# Patient Record
Sex: MALE | Race: OTHER | URBAN - METROPOLITAN AREA
[De-identification: names, ages, dates, MRNs, and addresses within clinical notes are randomized per-mention and may not be internally consistent; named-entity substitution may affect disease eponyms.]

---

## 2023-01-11 ENCOUNTER — OUTPATIENT (OUTPATIENT)
Dept: OUTPATIENT SERVICES | Facility: HOSPITAL | Age: 68
LOS: 1 days | Discharge: HOME | End: 2023-01-11
Payer: MEDICAID

## 2023-01-11 DIAGNOSIS — C61 MALIGNANT NEOPLASM OF PROSTATE: ICD-10-CM

## 2023-01-11 PROCEDURE — 78815 PET IMAGE W/CT SKULL-THIGH: CPT | Mod: 26,PI

## 2023-08-29 PROBLEM — Z00.00 ENCOUNTER FOR PREVENTIVE HEALTH EXAMINATION: Status: ACTIVE | Noted: 2023-08-29

## 2023-08-30 ENCOUNTER — APPOINTMENT (OUTPATIENT)
Dept: PAIN MANAGEMENT | Facility: CLINIC | Age: 68
End: 2023-08-30
Payer: MEDICARE

## 2023-08-30 VITALS
HEIGHT: 73 IN | SYSTOLIC BLOOD PRESSURE: 152 MMHG | DIASTOLIC BLOOD PRESSURE: 97 MMHG | WEIGHT: 170 LBS | BODY MASS INDEX: 22.53 KG/M2 | HEART RATE: 94 BPM

## 2023-08-30 DIAGNOSIS — Z85.038 PERSONAL HISTORY OF OTHER MALIGNANT NEOPLASM OF LARGE INTESTINE: ICD-10-CM

## 2023-08-30 PROCEDURE — 99204 OFFICE O/P NEW MOD 45 MIN: CPT

## 2023-08-30 RX ORDER — LOPERAMIDE HYDROCHLORIDE 2 MG/1
2 CAPSULE ORAL
Refills: 0 | Status: ACTIVE | COMMUNITY

## 2023-08-30 RX ORDER — CHLORHEXIDINE GLUCONATE 4 %
325 (65 FE) LIQUID (ML) TOPICAL
Refills: 0 | Status: ACTIVE | COMMUNITY

## 2023-08-30 RX ORDER — AMLODIPINE BESYLATE 10 MG/1
10 TABLET ORAL
Refills: 0 | Status: ACTIVE | COMMUNITY

## 2023-08-30 RX ORDER — OXYCODONE HYDROCHLORIDE AND ACETAMINOPHEN 10; 325 MG/1; MG/1
10-325 TABLET ORAL
Refills: 0 | Status: ACTIVE | COMMUNITY

## 2023-08-30 NOTE — ASSESSMENT
[FreeTextEntry1] : This is 67-year-old female with complaints of lower back pain with radicular features into the lower extremities. Pain is not associated with numbness/tingling. He is not experiencing any pain today. Since the pain is intermittent, we will hold off on obtaining any imaging at this time.  In the interim we will provide him with a prescription for physical therapy which she is advised to begin as soon as possible. He will follow up in 6 weeks for reassessment. quest  Physical therapy of the lumbar spine 2-3 times a week for 4-8 weeks stressing a home exercise program of walking, shoulder girdle strengthening, swimming, elliptical , recumbent bike, Buddy chi and Yoga. Use things that heat like hot shower or icy heat before rehab, exercising and at the beginning of the day, and ice (ice in a bag never directly on the skin) after activity and at the end of the day.  I, Oliva Granado, attest that this documentation has been prepared under the direction and in the presence of Provider Clarisa Manzo MD.   Thank you for allowing me to assist in the management of this patient.    Best Regards,    Clarisa Manzo M.D., FAAPMR   Diplomate, American Board of Physical Medicine and Rehabilitation Diplomate, American Board of Pain Medicine  Diplomate, American Board of Pain Management

## 2023-08-30 NOTE — DATA REVIEWED
[FreeTextEntry1] : SOAPP: low risk 8/30/23 Low risk: Patient has combination of a low risk SOAP and no risk factors. UDS would be repeated randomly every quarter.  UDS: No data obtained today.   [place] REGISTRY: Checked.

## 2023-08-30 NOTE — HISTORY OF PRESENT ILLNESS
[FreeTextEntry1] : This is a 67-year-old male here to establish care for back pain that radiates down into the bilateral lower extremities. Pain is not associated with numbness/tingling. Patient has not previously trialed a physical therapy program. There is no imaging for review today. The pain is intermittent, and he is not experiencing any pain today.   The patient has had this pain for 1 year. The pain started after an injury at work. Patient describes pain as severe and constant. During the last month the pain has been worse during the morning. Pain described as cramping. Pain is associated with numbness and pins and needles into the lower extremities. Patient has weakness in the upper extremities. Pain is increased with lying down, standing, sitting, walking, exercise, relaxation, coughing/wheezing, bowel movements. Bowel or bladder habits have not changed.  ACTIVITIES: Patient could walk less than 1 block before the pain starts. Patient can sit 1 hour before pain starts. Patient can stand 30 minutes before pain starts. The patient sometimes lays down because of pain. Patient uses walker at this time. Patient has difficulty going to work, performing household chores, doing yardwork shopping, participating regulation activities, exercising at this time.  PRIOR PAIN TREATMENTS: No relief with surgery.  PRIOR PAIN MEDICATIONS: Tylenol, aspirin.

## 2023-08-30 NOTE — PHYSICAL EXAM
[Diminished] : patella reflex diminished [Normal Coordination] : normal coordination [Normal DTR UE/LE] : normal DTR UE/LE  [Normal Sensation] : normal sensation [Normal Mood and Affect] : normal mood and affect [Orientated] : orientated [Able to Communicate] : able to communicate [Well Developed] : well developed [Well Nourished] : well nourished [] : patient ambulates with assistive device

## 2023-09-15 ENCOUNTER — EMERGENCY (EMERGENCY)
Facility: HOSPITAL | Age: 68
LOS: 0 days | Discharge: HOME | End: 2023-09-15
Attending: EMERGENCY MEDICINE
Payer: MEDICAID

## 2023-09-15 VITALS
OXYGEN SATURATION: 100 % | HEART RATE: 80 BPM | DIASTOLIC BLOOD PRESSURE: 95 MMHG | RESPIRATION RATE: 16 BRPM | SYSTOLIC BLOOD PRESSURE: 154 MMHG | HEIGHT: 73 IN | WEIGHT: 173.06 LBS | TEMPERATURE: 98 F

## 2023-09-15 VITALS
TEMPERATURE: 99 F | DIASTOLIC BLOOD PRESSURE: 96 MMHG | SYSTOLIC BLOOD PRESSURE: 150 MMHG | OXYGEN SATURATION: 100 % | RESPIRATION RATE: 18 BRPM | HEART RATE: 68 BPM

## 2023-09-15 DIAGNOSIS — M54.50 LOW BACK PAIN, UNSPECIFIED: ICD-10-CM

## 2023-09-15 DIAGNOSIS — I10 ESSENTIAL (PRIMARY) HYPERTENSION: ICD-10-CM

## 2023-09-15 DIAGNOSIS — G89.29 OTHER CHRONIC PAIN: ICD-10-CM

## 2023-09-15 PROCEDURE — 99283 EMERGENCY DEPT VISIT LOW MDM: CPT

## 2023-09-15 PROCEDURE — 99284 EMERGENCY DEPT VISIT MOD MDM: CPT

## 2023-09-15 RX ORDER — LIDOCAINE 4 G/100G
1 CREAM TOPICAL ONCE
Refills: 0 | Status: COMPLETED | OUTPATIENT
Start: 2023-09-15 | End: 2023-09-15

## 2023-09-15 RX ORDER — LIDOCAINE 4 G/100G
1 CREAM TOPICAL
Qty: 2 | Refills: 0
Start: 2023-09-15 | End: 2023-09-24

## 2023-09-15 RX ORDER — IBUPROFEN 200 MG
800 TABLET ORAL ONCE
Refills: 0 | Status: COMPLETED | OUTPATIENT
Start: 2023-09-15 | End: 2023-09-15

## 2023-09-15 RX ORDER — IBUPROFEN 200 MG
1 TABLET ORAL
Qty: 28 | Refills: 0
Start: 2023-09-15 | End: 2023-09-21

## 2023-09-15 RX ADMIN — Medication 800 MILLIGRAM(S): at 12:19

## 2023-09-15 RX ADMIN — LIDOCAINE 1 PATCH: 4 CREAM TOPICAL at 12:19

## 2023-09-15 NOTE — ED ADULT TRIAGE NOTE - CHIEF COMPLAINT QUOTE
"Since yesterday I've been having back pain." Pt denies injury/ fall/ trauma. Pt states he is unable to ambulate.

## 2023-09-15 NOTE — ED PROVIDER NOTE - ATTENDING CONTRIBUTION TO CARE
67-year-old male with past medical history of hypertension, sent from [facility] for back pain for 2 days.  Patient has been having chronic back pain and had recently seen a pain management doctor.  Patient was advised to have physical therapy, patient says that he is about to start it but not yet.  Patient was assessed and his symptoms were consistent with radiculopathy as per the pain management doctor.  Patient admits his back pain is similar to his previous back pain, no recent trauma.  No midline back pain, no back pain red flags.  Patient says pain is worse with movement, but able to ambulate.  Patient denies any other symptoms.  Exam: Tender to palpation bilateral paravertebral lumbar muscles, no midline T or L-spine tenderness, no step-off, negative bilateral straight leg raise, patient is ambulatory in ED impression: Symptomatic treatment for patient's MSK back pain, patient advised to follow through with physical therapy that was arranged for him by his pain management doctor.  Patient was also advised to follow-up with his pain management physician.

## 2023-09-15 NOTE — ED ADULT NURSE NOTE - CAS TRG GEN SKIN COLOR
58 year old white female known to us from a prior screening colonoscopy done 10 years ago which was normal.  She has had no major interval illness.  There is no family history of colon cancer or polyps.
Normal for race

## 2023-09-15 NOTE — ED PROVIDER NOTE - NSFOLLOWUPCLINICS_GEN_ALL_ED_FT
[facility]  Physical Therapy  Multiple Location  [place]   Phone: [phone]  Fax:   Follow Up Time: 1-3 Days

## 2023-09-15 NOTE — ED ADULT NURSE NOTE - CAS ELECT INFOMATION PROVIDED
no lesions,  no deformities,  no traumatic injuries,  no significant scars are present,  chest wall non-tender,  no masses present, breathing is unlabored without accessory muscle use,normal breath sounds
DC instructions

## 2023-09-15 NOTE — ED PROVIDER NOTE - OBJECTIVE STATEMENT
67 year-old male past medical history-year-old male with a history of hypertension coming from [facility] with back pain x2 days.  Admits to chronic back pain for greater than a year.  Saw pain management 2 weeks ago and was advised to follow-up with physical therapy.  Concern for radiculopathy and also referred to a neurosurgeon.  Patient admits to bilateral lower back pain for the last 2 days that has been worse with movement.  Still able to ambulate at baseline with a walker.  Denies urinary or fecal incontinence, fever, chills, numbness tingling, trauma.  Taking Tylenol with minimal relief

## 2023-09-15 NOTE — ED PROVIDER NOTE - PHYSICAL EXAMINATION
CONSTITUTIONAL: NAD  SKIN: Warm dry  HEAD: NCAT  EYES: NL inspection  ENT: MMM  NECK: Supple; non tender.  CARD: RRR  RESP: CTAB  ABD: S/NT no R/G  EXT: no pedal edema  NEURO: Grossly unremarkable, Gait normal with walker  MSK: No midline c/t/l ttp, bl paraspinal lumbar ttp  PSYCH: Cooperative, appropriate.

## 2023-09-15 NOTE — ED PROVIDER NOTE - NSFOLLOWUPINSTRUCTIONS_ED_ALL_ED_FT
Back Pain    Back pain is very common in adults. The cause of back pain is rarely dangerous and the pain often gets better over time. The cause of your back pain may not be known and may include strain of muscles or ligaments, degeneration of the spinal disks, or arthritis. Occasionally the pain may radiate down your leg(s). Over-the-counter medicines to reduce pain and inflammation are often the most helpful. Stretching and remaining active frequently helps the healing process.     SEEK IMMEDIATE MEDICAL CARE IF YOU HAVE THE FOLLOWING SYMPTOMS: bowel or bladder control problems, unusual weakness or numbness in your arms or legs, nausea or vomiting, abdominal pain, fever, dizziness/lightheadedness.  ~~  Our Emergency Department Referral Coordinators will be reaching out to you in the next 24-48 hours from 9:00am to 5:00pm (Monday - Friday) with a follow up appointment. Please expect a phone call from the hospital in that time frame. If you do not receive a call or if you have any questions or concerns, you can reach them at [phone] or [phone].

## 2023-09-15 NOTE — ED PROVIDER NOTE - PATIENT PORTAL LINK FT
You can access the FollowMyHealth Patient Portal offered by [facility] by registering at the following website: [email or web address]. By joining [email or web address]’s FollowMyHealth portal, you will also be able to view your health information using other applications (apps) compatible with our system.

## 2023-09-15 NOTE — ED PROVIDER NOTE - CLINICAL SUMMARY MEDICAL DECISION MAKING FREE TEXT BOX
Symptomatic treatment for patient's MSK back pain, patient advised to follow through with physical therapy that was arranged for him by his pain management doctor.  Patient was also advised to follow-up with his pain management physician.

## 2023-10-18 ENCOUNTER — APPOINTMENT (OUTPATIENT)
Dept: PAIN MANAGEMENT | Facility: CLINIC | Age: 68
End: 2023-10-18
Payer: MEDICARE

## 2023-10-18 VITALS
HEIGHT: 73 IN | HEART RATE: 82 BPM | WEIGHT: 170 LBS | SYSTOLIC BLOOD PRESSURE: 158 MMHG | DIASTOLIC BLOOD PRESSURE: 106 MMHG | BODY MASS INDEX: 22.53 KG/M2

## 2023-10-18 PROCEDURE — 99213 OFFICE O/P EST LOW 20 MIN: CPT

## 2023-11-22 ENCOUNTER — EMERGENCY (EMERGENCY)
Facility: HOSPITAL | Age: 68
LOS: 0 days | Discharge: HOME | End: 2023-11-22
Attending: EMERGENCY MEDICINE
Payer: MEDICARE

## 2023-11-22 VITALS
OXYGEN SATURATION: 98 % | HEART RATE: 96 BPM | WEIGHT: 169.98 LBS | DIASTOLIC BLOOD PRESSURE: 102 MMHG | RESPIRATION RATE: 18 BRPM | TEMPERATURE: 99 F | SYSTOLIC BLOOD PRESSURE: 177 MMHG

## 2023-11-22 DIAGNOSIS — M54.9 DORSALGIA, UNSPECIFIED: ICD-10-CM

## 2023-11-22 DIAGNOSIS — I10 ESSENTIAL (PRIMARY) HYPERTENSION: ICD-10-CM

## 2023-11-22 DIAGNOSIS — N32.89 OTHER SPECIFIED DISORDERS OF BLADDER: ICD-10-CM

## 2023-11-22 DIAGNOSIS — K40.90 UNILATERAL INGUINAL HERNIA, WITHOUT OBSTRUCTION OR GANGRENE, NOT SPECIFIED AS RECURRENT: ICD-10-CM

## 2023-11-22 DIAGNOSIS — Z85.46 PERSONAL HISTORY OF MALIGNANT NEOPLASM OF PROSTATE: ICD-10-CM

## 2023-11-22 LAB
ALBUMIN SERPL ELPH-MCNC: 4.3 G/DL — SIGNIFICANT CHANGE UP (ref 3.5–5.2)
ALBUMIN SERPL ELPH-MCNC: 4.3 G/DL — SIGNIFICANT CHANGE UP (ref 3.5–5.2)
ALP SERPL-CCNC: 57 U/L — SIGNIFICANT CHANGE UP (ref 30–115)
ALP SERPL-CCNC: 57 U/L — SIGNIFICANT CHANGE UP (ref 30–115)
ALT FLD-CCNC: 14 U/L — SIGNIFICANT CHANGE UP (ref 0–41)
ALT FLD-CCNC: 14 U/L — SIGNIFICANT CHANGE UP (ref 0–41)
ANION GAP SERPL CALC-SCNC: 12 MMOL/L — SIGNIFICANT CHANGE UP (ref 7–14)
ANION GAP SERPL CALC-SCNC: 12 MMOL/L — SIGNIFICANT CHANGE UP (ref 7–14)
APPEARANCE UR: CLEAR — SIGNIFICANT CHANGE UP
APPEARANCE UR: CLEAR — SIGNIFICANT CHANGE UP
AST SERPL-CCNC: 17 U/L — SIGNIFICANT CHANGE UP (ref 0–41)
AST SERPL-CCNC: 17 U/L — SIGNIFICANT CHANGE UP (ref 0–41)
BACTERIA # UR AUTO: NEGATIVE /HPF — SIGNIFICANT CHANGE UP
BACTERIA # UR AUTO: NEGATIVE /HPF — SIGNIFICANT CHANGE UP
BASOPHILS # BLD AUTO: 0.01 K/UL — SIGNIFICANT CHANGE UP (ref 0–0.2)
BASOPHILS # BLD AUTO: 0.01 K/UL — SIGNIFICANT CHANGE UP (ref 0–0.2)
BASOPHILS NFR BLD AUTO: 0.2 % — SIGNIFICANT CHANGE UP (ref 0–1)
BASOPHILS NFR BLD AUTO: 0.2 % — SIGNIFICANT CHANGE UP (ref 0–1)
BILIRUB SERPL-MCNC: 0.5 MG/DL — SIGNIFICANT CHANGE UP (ref 0.2–1.2)
BILIRUB SERPL-MCNC: 0.5 MG/DL — SIGNIFICANT CHANGE UP (ref 0.2–1.2)
BILIRUB UR-MCNC: NEGATIVE — SIGNIFICANT CHANGE UP
BILIRUB UR-MCNC: NEGATIVE — SIGNIFICANT CHANGE UP
BUN SERPL-MCNC: 15 MG/DL — SIGNIFICANT CHANGE UP (ref 10–20)
BUN SERPL-MCNC: 15 MG/DL — SIGNIFICANT CHANGE UP (ref 10–20)
CALCIUM SERPL-MCNC: 9.3 MG/DL — SIGNIFICANT CHANGE UP (ref 8.4–10.5)
CALCIUM SERPL-MCNC: 9.3 MG/DL — SIGNIFICANT CHANGE UP (ref 8.4–10.5)
CAST: 1 /LPF — SIGNIFICANT CHANGE UP (ref 0–4)
CAST: 1 /LPF — SIGNIFICANT CHANGE UP (ref 0–4)
CHLORIDE SERPL-SCNC: 109 MMOL/L — SIGNIFICANT CHANGE UP (ref 98–110)
CHLORIDE SERPL-SCNC: 109 MMOL/L — SIGNIFICANT CHANGE UP (ref 98–110)
CO2 SERPL-SCNC: 24 MMOL/L — SIGNIFICANT CHANGE UP (ref 17–32)
CO2 SERPL-SCNC: 24 MMOL/L — SIGNIFICANT CHANGE UP (ref 17–32)
COLOR SPEC: YELLOW — SIGNIFICANT CHANGE UP
COLOR SPEC: YELLOW — SIGNIFICANT CHANGE UP
CREAT SERPL-MCNC: 1.4 MG/DL — SIGNIFICANT CHANGE UP (ref 0.7–1.5)
CREAT SERPL-MCNC: 1.4 MG/DL — SIGNIFICANT CHANGE UP (ref 0.7–1.5)
DIFF PNL FLD: ABNORMAL
DIFF PNL FLD: ABNORMAL
EGFR: 54 ML/MIN/1.73M2 — LOW
EGFR: 54 ML/MIN/1.73M2 — LOW
EOSINOPHIL # BLD AUTO: 0.12 K/UL — SIGNIFICANT CHANGE UP (ref 0–0.7)
EOSINOPHIL # BLD AUTO: 0.12 K/UL — SIGNIFICANT CHANGE UP (ref 0–0.7)
EOSINOPHIL NFR BLD AUTO: 2.8 % — SIGNIFICANT CHANGE UP (ref 0–8)
EOSINOPHIL NFR BLD AUTO: 2.8 % — SIGNIFICANT CHANGE UP (ref 0–8)
GLUCOSE SERPL-MCNC: 110 MG/DL — HIGH (ref 70–99)
GLUCOSE SERPL-MCNC: 110 MG/DL — HIGH (ref 70–99)
GLUCOSE UR QL: NEGATIVE MG/DL — SIGNIFICANT CHANGE UP
GLUCOSE UR QL: NEGATIVE MG/DL — SIGNIFICANT CHANGE UP
HCT VFR BLD CALC: 31.7 % — LOW (ref 42–52)
HCT VFR BLD CALC: 31.7 % — LOW (ref 42–52)
HGB BLD-MCNC: 9.7 G/DL — LOW (ref 14–18)
HGB BLD-MCNC: 9.7 G/DL — LOW (ref 14–18)
IMM GRANULOCYTES NFR BLD AUTO: 0.5 % — HIGH (ref 0.1–0.3)
IMM GRANULOCYTES NFR BLD AUTO: 0.5 % — HIGH (ref 0.1–0.3)
KETONES UR-MCNC: NEGATIVE MG/DL — SIGNIFICANT CHANGE UP
KETONES UR-MCNC: NEGATIVE MG/DL — SIGNIFICANT CHANGE UP
LACTATE SERPL-SCNC: 0.9 MMOL/L — SIGNIFICANT CHANGE UP (ref 0.7–2)
LACTATE SERPL-SCNC: 0.9 MMOL/L — SIGNIFICANT CHANGE UP (ref 0.7–2)
LEUKOCYTE ESTERASE UR-ACNC: NEGATIVE — SIGNIFICANT CHANGE UP
LEUKOCYTE ESTERASE UR-ACNC: NEGATIVE — SIGNIFICANT CHANGE UP
LYMPHOCYTES # BLD AUTO: 0.6 K/UL — LOW (ref 1.2–3.4)
LYMPHOCYTES # BLD AUTO: 0.6 K/UL — LOW (ref 1.2–3.4)
LYMPHOCYTES # BLD AUTO: 13.8 % — LOW (ref 20.5–51.1)
LYMPHOCYTES # BLD AUTO: 13.8 % — LOW (ref 20.5–51.1)
MCHC RBC-ENTMCNC: 28.9 PG — SIGNIFICANT CHANGE UP (ref 27–31)
MCHC RBC-ENTMCNC: 28.9 PG — SIGNIFICANT CHANGE UP (ref 27–31)
MCHC RBC-ENTMCNC: 30.6 G/DL — LOW (ref 32–37)
MCHC RBC-ENTMCNC: 30.6 G/DL — LOW (ref 32–37)
MCV RBC AUTO: 94.3 FL — HIGH (ref 80–94)
MCV RBC AUTO: 94.3 FL — HIGH (ref 80–94)
MONOCYTES # BLD AUTO: 0.51 K/UL — SIGNIFICANT CHANGE UP (ref 0.1–0.6)
MONOCYTES # BLD AUTO: 0.51 K/UL — SIGNIFICANT CHANGE UP (ref 0.1–0.6)
MONOCYTES NFR BLD AUTO: 11.7 % — HIGH (ref 1.7–9.3)
MONOCYTES NFR BLD AUTO: 11.7 % — HIGH (ref 1.7–9.3)
NEUTROPHILS # BLD AUTO: 3.1 K/UL — SIGNIFICANT CHANGE UP (ref 1.4–6.5)
NEUTROPHILS # BLD AUTO: 3.1 K/UL — SIGNIFICANT CHANGE UP (ref 1.4–6.5)
NEUTROPHILS NFR BLD AUTO: 71 % — SIGNIFICANT CHANGE UP (ref 42.2–75.2)
NEUTROPHILS NFR BLD AUTO: 71 % — SIGNIFICANT CHANGE UP (ref 42.2–75.2)
NITRITE UR-MCNC: NEGATIVE — SIGNIFICANT CHANGE UP
NITRITE UR-MCNC: NEGATIVE — SIGNIFICANT CHANGE UP
NRBC # BLD: 0 /100 WBCS — SIGNIFICANT CHANGE UP (ref 0–0)
NRBC # BLD: 0 /100 WBCS — SIGNIFICANT CHANGE UP (ref 0–0)
PH UR: 6 — SIGNIFICANT CHANGE UP (ref 5–8)
PH UR: 6 — SIGNIFICANT CHANGE UP (ref 5–8)
PLATELET # BLD AUTO: 251 K/UL — SIGNIFICANT CHANGE UP (ref 130–400)
PLATELET # BLD AUTO: 251 K/UL — SIGNIFICANT CHANGE UP (ref 130–400)
PMV BLD: 9.5 FL — SIGNIFICANT CHANGE UP (ref 7.4–10.4)
PMV BLD: 9.5 FL — SIGNIFICANT CHANGE UP (ref 7.4–10.4)
POTASSIUM SERPL-MCNC: 3.9 MMOL/L — SIGNIFICANT CHANGE UP (ref 3.5–5)
POTASSIUM SERPL-MCNC: 3.9 MMOL/L — SIGNIFICANT CHANGE UP (ref 3.5–5)
POTASSIUM SERPL-SCNC: 3.9 MMOL/L — SIGNIFICANT CHANGE UP (ref 3.5–5)
POTASSIUM SERPL-SCNC: 3.9 MMOL/L — SIGNIFICANT CHANGE UP (ref 3.5–5)
PROT SERPL-MCNC: 6.8 G/DL — SIGNIFICANT CHANGE UP (ref 6–8)
PROT SERPL-MCNC: 6.8 G/DL — SIGNIFICANT CHANGE UP (ref 6–8)
PROT UR-MCNC: 100 MG/DL
PROT UR-MCNC: 100 MG/DL
RBC # BLD: 3.36 M/UL — LOW (ref 4.7–6.1)
RBC # BLD: 3.36 M/UL — LOW (ref 4.7–6.1)
RBC # FLD: 12.6 % — SIGNIFICANT CHANGE UP (ref 11.5–14.5)
RBC # FLD: 12.6 % — SIGNIFICANT CHANGE UP (ref 11.5–14.5)
RBC CASTS # UR COMP ASSIST: 2 /HPF — SIGNIFICANT CHANGE UP (ref 0–4)
RBC CASTS # UR COMP ASSIST: 2 /HPF — SIGNIFICANT CHANGE UP (ref 0–4)
SODIUM SERPL-SCNC: 145 MMOL/L — SIGNIFICANT CHANGE UP (ref 135–146)
SODIUM SERPL-SCNC: 145 MMOL/L — SIGNIFICANT CHANGE UP (ref 135–146)
SP GR SPEC: 1.02 — SIGNIFICANT CHANGE UP (ref 1–1.03)
SP GR SPEC: 1.02 — SIGNIFICANT CHANGE UP (ref 1–1.03)
SQUAMOUS # UR AUTO: 3 /HPF — SIGNIFICANT CHANGE UP (ref 0–5)
SQUAMOUS # UR AUTO: 3 /HPF — SIGNIFICANT CHANGE UP (ref 0–5)
UROBILINOGEN FLD QL: 1 MG/DL — SIGNIFICANT CHANGE UP (ref 0.2–1)
UROBILINOGEN FLD QL: 1 MG/DL — SIGNIFICANT CHANGE UP (ref 0.2–1)
WBC # BLD: 4.36 K/UL — LOW (ref 4.8–10.8)
WBC # BLD: 4.36 K/UL — LOW (ref 4.8–10.8)
WBC # FLD AUTO: 4.36 K/UL — LOW (ref 4.8–10.8)
WBC # FLD AUTO: 4.36 K/UL — LOW (ref 4.8–10.8)
WBC UR QL: 2 /HPF — SIGNIFICANT CHANGE UP (ref 0–5)
WBC UR QL: 2 /HPF — SIGNIFICANT CHANGE UP (ref 0–5)

## 2023-11-22 PROCEDURE — 99285 EMERGENCY DEPT VISIT HI MDM: CPT

## 2023-11-22 PROCEDURE — 80053 COMPREHEN METABOLIC PANEL: CPT

## 2023-11-22 PROCEDURE — 81001 URINALYSIS AUTO W/SCOPE: CPT

## 2023-11-22 PROCEDURE — 99284 EMERGENCY DEPT VISIT MOD MDM: CPT | Mod: 25

## 2023-11-22 PROCEDURE — 74177 CT ABD & PELVIS W/CONTRAST: CPT | Mod: 26,MA

## 2023-11-22 PROCEDURE — 74177 CT ABD & PELVIS W/CONTRAST: CPT | Mod: MA

## 2023-11-22 PROCEDURE — 36415 COLL VENOUS BLD VENIPUNCTURE: CPT

## 2023-11-22 PROCEDURE — 96372 THER/PROPH/DIAG INJ SC/IM: CPT

## 2023-11-22 PROCEDURE — 85025 COMPLETE CBC W/AUTO DIFF WBC: CPT

## 2023-11-22 PROCEDURE — 87086 URINE CULTURE/COLONY COUNT: CPT

## 2023-11-22 PROCEDURE — 83605 ASSAY OF LACTIC ACID: CPT

## 2023-11-22 RX ORDER — METHOCARBAMOL 500 MG/1
1000 TABLET, FILM COATED ORAL ONCE
Refills: 0 | Status: DISCONTINUED | OUTPATIENT
Start: 2023-11-22 | End: 2023-11-22

## 2023-11-22 RX ORDER — KETOROLAC TROMETHAMINE 30 MG/ML
30 SYRINGE (ML) INJECTION ONCE
Refills: 0 | Status: DISCONTINUED | OUTPATIENT
Start: 2023-11-22 | End: 2023-11-22

## 2023-11-22 RX ADMIN — METHOCARBAMOL 1000 MILLIGRAM(S): 500 TABLET, FILM COATED ORAL at 09:17

## 2023-11-22 RX ADMIN — Medication 30 MILLIGRAM(S): at 09:17

## 2023-11-22 RX ADMIN — Medication 30 MILLIGRAM(S): at 09:32

## 2023-11-22 NOTE — ED PROVIDER NOTE - ATTENDING APP SHARED VISIT CONTRIBUTION OF CARE
68-year-old male past med history of hypertension, chronic back pain, prostate cancer presenting for evaluation of left-sided back pain similar to prior chronic pain.  He only takes aspirin for it.  Pain is non-radiating, no clear exacerbating/ alleviating factors.  Denies any associated fever /chills, nausea/ vomiting, urinary complaints, diarrhea, black or bloody stools, focal weakness or paresthesias.  Patient states that his appointment with his doctor who treats his cancer on December 6th.  Elderly male resting in stretcher, does not appear to be in acute distress, PERRL, pink conjunctiva, supple neck, speaking full sentences, lungs clear to auscultation, abdomen soft/NT/ND, mild left-sided paraspinal tenderness to palpation, no lower leg edema, he is awake and alert, follows directions appropriately, normal mood and affect.  Plan: Will give dose of analgesia, obtain CT, labs and reassess.  Patient is amenable to plan.

## 2023-11-22 NOTE — ED PROVIDER NOTE - NSFOLLOWUPINSTRUCTIONS_ED_ALL_ED_FT
Please follow up with your oncologist appointment on Dec 6 2023.     Back Pain    Back pain is very common in adults. The cause of back pain is rarely dangerous and the pain often gets better over time. The cause of your back pain may not be known and may include strain of muscles or ligaments, degeneration of the spinal disks, or arthritis. Occasionally the pain may radiate down your leg(s). Over-the-counter medicines to reduce pain and inflammation are often the most helpful. Stretching and remaining active frequently helps the healing process.     SEEK IMMEDIATE MEDICAL CARE IF YOU HAVE THE FOLLOWING SYMPTOMS: bowel or bladder control problems, unusual weakness or numbness in your arms or legs, nausea or vomiting, abdominal pain, fever, dizziness/lightheadedness.

## 2023-11-22 NOTE — ED PROVIDER NOTE - PHYSICAL EXAMINATION
VITAL SIGNS: I have reviewed nursing notes and confirm.  CONSTITUTIONAL: Patient is in no acute distress.  SKIN: Skin exam is warm and dry, no acute rash.  HEAD: Normocephalic; atraumatic.  EYES: PERRL, EOM intact; conjunctiva and sclera clear.  ENT: No nasal discharge; airway clear.   NECK: Supple; non tender.  CARD: S1, S2 normal; no murmurs, gallops, or rubs. Regular rate and rhythm.  RESP: Clear to auscultation bilaterally. No wheezes, rales or rhonchi.  ABD: Normal bowel sounds; soft; non-distended; non-tender. +Large, non-tender inguinal hernia.   EXT: Normal ROM. No edema. +Tenderness to left paraspinal region. No midline tenderness.   LYMPH: No acute cervical adenopathy.  NEURO: Alert, oriented. Grossly unremarkable. No focal deficits.  PSYCH: Cooperative, appropriate.

## 2023-11-22 NOTE — ED ADULT NURSE NOTE - NSFALLHARMRISKINTERV_ED_ALL_ED

## 2023-11-22 NOTE — ED PROVIDER NOTE - CARE PROVIDER_API CALL
Julius Tapia  Pain Medicine  [address] [place] [address]  Phone: [phone]  Fax: [phone]  Follow Up Time: 1-3 Days

## 2023-11-22 NOTE — ED PROVIDER NOTE - PATIENT PORTAL LINK FT
You can access the FollowMyHealth Patient Portal offered by [facility] by registering at the following website: [email or web address]. By joining [employer]’s FollowMyHealth portal, you will also be able to view your health information using other applications (apps) compatible with our system.

## 2023-11-22 NOTE — ED PROVIDER NOTE - CLINICAL SUMMARY MEDICAL DECISION MAKING FREE TEXT BOX
68-year-old male with back pain.  His history of pain, takes aspirin for it.  Vital signs reviewed.  Labs ordered and reviewed..  CT abdomen pelvis suspicious for malignancy, patient reports history of prostate cancer.  Has an appointment with his doctor on December 6.  States that he is currently not on any cancer treatment. Patient was given dose of analgesia, appears stable for discharge home, will give contact information to pain management doctor.  Patient is aware of the need to follow-up with his cancer specialist as scheduled, strict return precautions given.

## 2023-11-22 NOTE — ED PROVIDER NOTE - OBJECTIVE STATEMENT
68-year-old male with past medical history of hypertension, chronic back pain, prostate cancer presenting for evaluation of worsening left-sided back pain.  States that he has been taking aspirin for it without any relief.  Denies any urinary or bowel incontinence.  No paresthesias or weakness.  Denies any abdominal pain, nausea, vomiting, or diarrhea.  No fevers or chills. No urinary symptoms.

## 2023-11-23 LAB
CULTURE RESULTS: SIGNIFICANT CHANGE UP
CULTURE RESULTS: SIGNIFICANT CHANGE UP
SPECIMEN SOURCE: SIGNIFICANT CHANGE UP
SPECIMEN SOURCE: SIGNIFICANT CHANGE UP

## 2023-11-29 ENCOUNTER — APPOINTMENT (OUTPATIENT)
Dept: PAIN MANAGEMENT | Facility: CLINIC | Age: 68
End: 2023-11-29
Payer: MEDICAID

## 2023-11-29 VITALS
WEIGHT: 170 LBS | DIASTOLIC BLOOD PRESSURE: 92 MMHG | BODY MASS INDEX: 22.53 KG/M2 | SYSTOLIC BLOOD PRESSURE: 136 MMHG | HEIGHT: 73 IN | HEART RATE: 91 BPM

## 2023-11-29 PROCEDURE — 99213 OFFICE O/P EST LOW 20 MIN: CPT

## 2024-01-04 ENCOUNTER — OUTPATIENT (OUTPATIENT)
Dept: OUTPATIENT SERVICES | Facility: HOSPITAL | Age: 69
LOS: 1 days | End: 2024-01-04
Payer: MEDICAID

## 2024-01-04 DIAGNOSIS — M25.511 PAIN IN RIGHT SHOULDER: ICD-10-CM

## 2024-01-04 DIAGNOSIS — M41.9 SCOLIOSIS, UNSPECIFIED: ICD-10-CM

## 2024-01-04 PROCEDURE — 72082 X-RAY EXAM ENTIRE SPI 2/3 VW: CPT | Mod: 26

## 2024-01-04 PROCEDURE — 73030 X-RAY EXAM OF SHOULDER: CPT | Mod: RT

## 2024-01-04 PROCEDURE — 72082 X-RAY EXAM ENTIRE SPI 2/3 VW: CPT

## 2024-01-04 PROCEDURE — 73030 X-RAY EXAM OF SHOULDER: CPT | Mod: 26,RT

## 2024-01-05 DIAGNOSIS — M25.511 PAIN IN RIGHT SHOULDER: ICD-10-CM

## 2024-01-05 DIAGNOSIS — M41.9 SCOLIOSIS, UNSPECIFIED: ICD-10-CM

## 2024-01-09 ENCOUNTER — APPOINTMENT (OUTPATIENT)
Dept: PAIN MANAGEMENT | Facility: CLINIC | Age: 69
End: 2024-01-09
Payer: MEDICAID

## 2024-01-09 VITALS
BODY MASS INDEX: 22.53 KG/M2 | WEIGHT: 170 LBS | HEIGHT: 73 IN | SYSTOLIC BLOOD PRESSURE: 125 MMHG | HEART RATE: 92 BPM | DIASTOLIC BLOOD PRESSURE: 85 MMHG

## 2024-01-09 DIAGNOSIS — M54.16 RADICULOPATHY, LUMBAR REGION: ICD-10-CM

## 2024-01-09 PROCEDURE — 99213 OFFICE O/P EST LOW 20 MIN: CPT

## 2024-01-23 ENCOUNTER — APPOINTMENT (OUTPATIENT)
Dept: UROLOGY | Facility: CLINIC | Age: 69
End: 2024-01-23
Payer: MEDICAID

## 2024-01-23 DIAGNOSIS — C61 MALIGNANT NEOPLASM OF PROSTATE: ICD-10-CM

## 2024-01-23 DIAGNOSIS — R93.49 ABNORMAL RADIOLOGIC FINDINGS ON DIAGNOSITIC IMAGING OF OTHER URINARY ORGANS: ICD-10-CM

## 2024-01-23 DIAGNOSIS — E78.00 PURE HYPERCHOLESTEROLEMIA, UNSPECIFIED: ICD-10-CM

## 2024-01-23 PROCEDURE — 99204 OFFICE O/P NEW MOD 45 MIN: CPT

## 2024-01-23 NOTE — REVIEW OF SYSTEMS
[Feeling Tired] : feeling tired [Recent Weight Loss (___ Lbs)] : no recent weight loss [Shortness Of Breath] : no shortness of breath

## 2024-01-23 NOTE — PHYSICAL EXAM
[General Appearance - In No Acute Distress] : no acute distress [] : no respiratory distress [de-identified] : Uses a walker

## 2024-01-23 NOTE — HISTORY OF PRESENT ILLNESS
[FreeTextEntry1] : Patient is very poor historian.  68-year-old states history of prostate cancer underwent radiation therapy 1 year ago according to patient.  He has a 1 year history of feeling tired and weak.  Currently he has no pain complaint but has seen Ortho for low back pain.  Currently uses a walker.  No urinary complaint currently  Patient was seen in the emergency room in November 22, 2023 for back pain.  White blood cell count 4.36, urine culture negative, urine analysis okay, creatinine 1.4.  CT scan showed retroperitoneal and left pelvic adenopathy, left hydronephrosis, left adrenal mass increasing in size, left lateral urinary bladder mass and retrocrural mass.  I reviewed a PET scan previously done showed retroperitoneal mass encasing the aorta and left iliac chain, left perirectal wall mass, left suprapubic clavicular mass, medial spinal and retrocrural masses.  I note testosterone level done 10/20/2023 was 9 and 1-24 was 11.  I do not know if he is on antiandrogen therapy.  He cannot provide for me his previous urologist or treating physicians names

## 2024-01-23 NOTE — ASSESSMENT
[FreeTextEntry1] : Prostate cancer history post radiation according to patient with extensive lymphadenopathy, masses consistent with metastatic disease.  Question is whether this is from prostate or some other neoplastic process.  Recommend PSMA PET scan and PSA.  Left hydronephrosis likely secondary to lymphadenopathy which is extensive.  Will continue to monitor.  New bladder mass recommend cystoscopy

## 2024-02-20 ENCOUNTER — INPATIENT (INPATIENT)
Facility: HOSPITAL | Age: 69
LOS: 30 days | Discharge: NURSING, REHAB OR LONG-TERM CARE | DRG: 374 | End: 2024-03-22
Attending: INTERNAL MEDICINE | Admitting: STUDENT IN AN ORGANIZED HEALTH CARE EDUCATION/TRAINING PROGRAM
Payer: MEDICARE

## 2024-02-20 ENCOUNTER — APPOINTMENT (OUTPATIENT)
Dept: PAIN MANAGEMENT | Facility: CLINIC | Age: 69
End: 2024-02-20
Payer: MEDICARE

## 2024-02-20 VITALS
HEART RATE: 78 BPM | SYSTOLIC BLOOD PRESSURE: 112 MMHG | RESPIRATION RATE: 17 BRPM | WEIGHT: 169.98 LBS | TEMPERATURE: 98 F | DIASTOLIC BLOOD PRESSURE: 78 MMHG | OXYGEN SATURATION: 100 %

## 2024-02-20 DIAGNOSIS — J98.59 OTHER DISEASES OF MEDIASTINUM, NOT ELSEWHERE CLASSIFIED: ICD-10-CM

## 2024-02-20 DIAGNOSIS — M54.50 LOW BACK PAIN, UNSPECIFIED: ICD-10-CM

## 2024-02-20 DIAGNOSIS — M25.511 PAIN IN RIGHT SHOULDER: ICD-10-CM

## 2024-02-20 LAB
ALBUMIN SERPL ELPH-MCNC: 4.1 G/DL — SIGNIFICANT CHANGE UP (ref 3.5–5.2)
ALP SERPL-CCNC: 66 U/L — SIGNIFICANT CHANGE UP (ref 30–115)
ALT FLD-CCNC: 13 U/L — SIGNIFICANT CHANGE UP (ref 0–41)
ANION GAP SERPL CALC-SCNC: 13 MMOL/L — SIGNIFICANT CHANGE UP (ref 7–14)
AST SERPL-CCNC: 21 U/L — SIGNIFICANT CHANGE UP (ref 0–41)
BASOPHILS # BLD AUTO: 0.01 K/UL — SIGNIFICANT CHANGE UP (ref 0–0.2)
BASOPHILS NFR BLD AUTO: 0.2 % — SIGNIFICANT CHANGE UP (ref 0–1)
BILIRUB SERPL-MCNC: 0.4 MG/DL — SIGNIFICANT CHANGE UP (ref 0.2–1.2)
BUN SERPL-MCNC: 23 MG/DL — HIGH (ref 10–20)
CALCIUM SERPL-MCNC: 8.4 MG/DL — SIGNIFICANT CHANGE UP (ref 8.4–10.5)
CHLORIDE SERPL-SCNC: 112 MMOL/L — HIGH (ref 98–110)
CO2 SERPL-SCNC: 18 MMOL/L — SIGNIFICANT CHANGE UP (ref 17–32)
CREAT SERPL-MCNC: 1.5 MG/DL — SIGNIFICANT CHANGE UP (ref 0.7–1.5)
EGFR: 50 ML/MIN/1.73M2 — LOW
EOSINOPHIL # BLD AUTO: 0.03 K/UL — SIGNIFICANT CHANGE UP (ref 0–0.7)
EOSINOPHIL NFR BLD AUTO: 0.5 % — SIGNIFICANT CHANGE UP (ref 0–8)
GLUCOSE SERPL-MCNC: 107 MG/DL — HIGH (ref 70–99)
HCT VFR BLD CALC: 32.5 % — LOW (ref 42–52)
HGB BLD-MCNC: 9.9 G/DL — LOW (ref 14–18)
IMM GRANULOCYTES NFR BLD AUTO: 0.3 % — SIGNIFICANT CHANGE UP (ref 0.1–0.3)
LYMPHOCYTES # BLD AUTO: 0.61 K/UL — LOW (ref 1.2–3.4)
LYMPHOCYTES # BLD AUTO: 10.5 % — LOW (ref 20.5–51.1)
MCHC RBC-ENTMCNC: 28.5 PG — SIGNIFICANT CHANGE UP (ref 27–31)
MCHC RBC-ENTMCNC: 30.5 G/DL — LOW (ref 32–37)
MCV RBC AUTO: 93.7 FL — SIGNIFICANT CHANGE UP (ref 80–94)
MONOCYTES # BLD AUTO: 0.68 K/UL — HIGH (ref 0.1–0.6)
MONOCYTES NFR BLD AUTO: 11.7 % — HIGH (ref 1.7–9.3)
NEUTROPHILS # BLD AUTO: 4.47 K/UL — SIGNIFICANT CHANGE UP (ref 1.4–6.5)
NEUTROPHILS NFR BLD AUTO: 76.8 % — HIGH (ref 42.2–75.2)
NRBC # BLD: 0 /100 WBCS — SIGNIFICANT CHANGE UP (ref 0–0)
PLATELET # BLD AUTO: 292 K/UL — SIGNIFICANT CHANGE UP (ref 130–400)
PMV BLD: 9.5 FL — SIGNIFICANT CHANGE UP (ref 7.4–10.4)
POTASSIUM SERPL-MCNC: 5 MMOL/L — SIGNIFICANT CHANGE UP (ref 3.5–5)
POTASSIUM SERPL-SCNC: 5 MMOL/L — SIGNIFICANT CHANGE UP (ref 3.5–5)
PROT SERPL-MCNC: 6.5 G/DL — SIGNIFICANT CHANGE UP (ref 6–8)
RBC # BLD: 3.47 M/UL — LOW (ref 4.7–6.1)
RBC # FLD: 13.6 % — SIGNIFICANT CHANGE UP (ref 11.5–14.5)
SODIUM SERPL-SCNC: 143 MMOL/L — SIGNIFICANT CHANGE UP (ref 135–146)
WBC # BLD: 5.82 K/UL — SIGNIFICANT CHANGE UP (ref 4.8–10.8)
WBC # FLD AUTO: 5.82 K/UL — SIGNIFICANT CHANGE UP (ref 4.8–10.8)

## 2024-02-20 PROCEDURE — 86901 BLOOD TYPING SEROLOGIC RH(D): CPT

## 2024-02-20 PROCEDURE — 84155 ASSAY OF PROTEIN SERUM: CPT

## 2024-02-20 PROCEDURE — 84300 ASSAY OF URINE SODIUM: CPT

## 2024-02-20 PROCEDURE — 93971 EXTREMITY STUDY: CPT | Mod: LT

## 2024-02-20 PROCEDURE — 83735 ASSAY OF MAGNESIUM: CPT

## 2024-02-20 PROCEDURE — 85014 HEMATOCRIT: CPT

## 2024-02-20 PROCEDURE — 97530 THERAPEUTIC ACTIVITIES: CPT | Mod: GP

## 2024-02-20 PROCEDURE — 93005 ELECTROCARDIOGRAM TRACING: CPT

## 2024-02-20 PROCEDURE — 84540 ASSAY OF URINE/UREA-N: CPT

## 2024-02-20 PROCEDURE — 86160 COMPLEMENT ANTIGEN: CPT

## 2024-02-20 PROCEDURE — 87077 CULTURE AEROBIC IDENTIFY: CPT

## 2024-02-20 PROCEDURE — 82570 ASSAY OF URINE CREATININE: CPT

## 2024-02-20 PROCEDURE — 86301 IMMUNOASSAY TUMOR CA 19-9: CPT

## 2024-02-20 PROCEDURE — 97116 GAIT TRAINING THERAPY: CPT | Mod: GP

## 2024-02-20 PROCEDURE — 80053 COMPREHEN METABOLIC PANEL: CPT

## 2024-02-20 PROCEDURE — 85027 COMPLETE CBC AUTOMATED: CPT

## 2024-02-20 PROCEDURE — 87040 BLOOD CULTURE FOR BACTERIA: CPT

## 2024-02-20 PROCEDURE — 86304 IMMUNOASSAY TUMOR CA 125: CPT

## 2024-02-20 PROCEDURE — 86803 HEPATITIS C AB TEST: CPT

## 2024-02-20 PROCEDURE — 81001 URINALYSIS AUTO W/SCOPE: CPT

## 2024-02-20 PROCEDURE — 84133 ASSAY OF URINE POTASSIUM: CPT

## 2024-02-20 PROCEDURE — 92610 EVALUATE SWALLOWING FUNCTION: CPT | Mod: GN

## 2024-02-20 PROCEDURE — 82803 BLOOD GASES ANY COMBINATION: CPT

## 2024-02-20 PROCEDURE — 84154 ASSAY OF PSA FREE: CPT

## 2024-02-20 PROCEDURE — 84145 PROCALCITONIN (PCT): CPT

## 2024-02-20 PROCEDURE — 88305 TISSUE EXAM BY PATHOLOGIST: CPT

## 2024-02-20 PROCEDURE — 84100 ASSAY OF PHOSPHORUS: CPT

## 2024-02-20 PROCEDURE — 77333 RADIATION TREATMENT AID(S): CPT

## 2024-02-20 PROCEDURE — 82728 ASSAY OF FERRITIN: CPT

## 2024-02-20 PROCEDURE — 87150 DNA/RNA AMPLIFIED PROBE: CPT

## 2024-02-20 PROCEDURE — 92526 ORAL FUNCTION THERAPY: CPT | Mod: GN

## 2024-02-20 PROCEDURE — 36415 COLL VENOUS BLD VENIPUNCTURE: CPT

## 2024-02-20 PROCEDURE — 84466 ASSAY OF TRANSFERRIN: CPT

## 2024-02-20 PROCEDURE — 83615 LACTATE (LD) (LDH) ENZYME: CPT

## 2024-02-20 PROCEDURE — 83605 ASSAY OF LACTIC ACID: CPT

## 2024-02-20 PROCEDURE — 83550 IRON BINDING TEST: CPT

## 2024-02-20 PROCEDURE — 87186 SC STD MICRODIL/AGAR DIL: CPT

## 2024-02-20 PROCEDURE — 71045 X-RAY EXAM CHEST 1 VIEW: CPT

## 2024-02-20 PROCEDURE — 82105 ALPHA-FETOPROTEIN SERUM: CPT

## 2024-02-20 PROCEDURE — 94640 AIRWAY INHALATION TREATMENT: CPT

## 2024-02-20 PROCEDURE — 97110 THERAPEUTIC EXERCISES: CPT | Mod: GP

## 2024-02-20 PROCEDURE — 84165 PROTEIN E-PHORESIS SERUM: CPT

## 2024-02-20 PROCEDURE — 84295 ASSAY OF SERUM SODIUM: CPT

## 2024-02-20 PROCEDURE — 86900 BLOOD TYPING SEROLOGIC ABO: CPT

## 2024-02-20 PROCEDURE — 97162 PT EVAL MOD COMPLEX 30 MIN: CPT | Mod: GP

## 2024-02-20 PROCEDURE — 82962 GLUCOSE BLOOD TEST: CPT

## 2024-02-20 PROCEDURE — 74220 X-RAY XM ESOPHAGUS 1CNTRST: CPT

## 2024-02-20 PROCEDURE — 82378 CARCINOEMBRYONIC ANTIGEN: CPT

## 2024-02-20 PROCEDURE — 74018 RADEX ABDOMEN 1 VIEW: CPT

## 2024-02-20 PROCEDURE — 76770 US EXAM ABDO BACK WALL COMP: CPT

## 2024-02-20 PROCEDURE — 71260 CT THORAX DX C+: CPT | Mod: 26,MA

## 2024-02-20 PROCEDURE — 93306 TTE W/DOPPLER COMPLETE: CPT

## 2024-02-20 PROCEDURE — 99285 EMERGENCY DEPT VISIT HI MDM: CPT

## 2024-02-20 PROCEDURE — 74177 CT ABD & PELVIS W/CONTRAST: CPT

## 2024-02-20 PROCEDURE — 84132 ASSAY OF SERUM POTASSIUM: CPT

## 2024-02-20 PROCEDURE — C1726: CPT

## 2024-02-20 PROCEDURE — C9113: CPT

## 2024-02-20 PROCEDURE — 88173 CYTOPATH EVAL FNA REPORT: CPT

## 2024-02-20 PROCEDURE — 77290 THER RAD SIMULAJ FIELD CPLX: CPT

## 2024-02-20 PROCEDURE — 99222 1ST HOSP IP/OBS MODERATE 55: CPT

## 2024-02-20 PROCEDURE — 88341 IMHCHEM/IMCYTCHM EA ADD ANTB: CPT

## 2024-02-20 PROCEDURE — 99214 OFFICE O/P EST MOD 30 MIN: CPT

## 2024-02-20 PROCEDURE — 73030 X-RAY EXAM OF SHOULDER: CPT | Mod: LT

## 2024-02-20 PROCEDURE — 84156 ASSAY OF PROTEIN URINE: CPT

## 2024-02-20 PROCEDURE — L8699: CPT

## 2024-02-20 PROCEDURE — C9399: CPT

## 2024-02-20 PROCEDURE — 86850 RBC ANTIBODY SCREEN: CPT

## 2024-02-20 PROCEDURE — C1876: CPT

## 2024-02-20 PROCEDURE — 83935 ASSAY OF URINE OSMOLALITY: CPT

## 2024-02-20 PROCEDURE — 85018 HEMOGLOBIN: CPT

## 2024-02-20 PROCEDURE — 83010 ASSAY OF HAPTOGLOBIN QUANT: CPT

## 2024-02-20 PROCEDURE — C1769: CPT

## 2024-02-20 PROCEDURE — 82330 ASSAY OF CALCIUM: CPT

## 2024-02-20 PROCEDURE — 80048 BASIC METABOLIC PNL TOTAL CA: CPT

## 2024-02-20 PROCEDURE — 88344 IMHCHEM/IMCYTCHM EA MLT ANTB: CPT

## 2024-02-20 PROCEDURE — 83540 ASSAY OF IRON: CPT

## 2024-02-20 PROCEDURE — 85025 COMPLETE CBC W/AUTO DIFF WBC: CPT

## 2024-02-20 PROCEDURE — 88342 IMHCHEM/IMCYTCHM 1ST ANTB: CPT

## 2024-02-20 PROCEDURE — C1889: CPT

## 2024-02-20 PROCEDURE — 84153 ASSAY OF PSA TOTAL: CPT

## 2024-02-20 PROCEDURE — 86334 IMMUNOFIX E-PHORESIS SERUM: CPT

## 2024-02-20 RX ORDER — ONDANSETRON 8 MG/1
4 TABLET, FILM COATED ORAL EVERY 8 HOURS
Refills: 0 | Status: DISCONTINUED | OUTPATIENT
Start: 2024-02-20 | End: 2024-02-26

## 2024-02-20 RX ORDER — SODIUM CHLORIDE 9 MG/ML
1000 INJECTION INTRAMUSCULAR; INTRAVENOUS; SUBCUTANEOUS ONCE
Refills: 0 | Status: COMPLETED | OUTPATIENT
Start: 2024-02-20 | End: 2024-02-20

## 2024-02-20 RX ORDER — ACETAMINOPHEN 500 MG
2 TABLET ORAL
Refills: 0 | DISCHARGE

## 2024-02-20 RX ORDER — PANTOPRAZOLE SODIUM 20 MG/1
40 TABLET, DELAYED RELEASE ORAL ONCE
Refills: 0 | Status: COMPLETED | OUTPATIENT
Start: 2024-02-20 | End: 2024-02-20

## 2024-02-20 RX ORDER — AMLODIPINE BESYLATE 2.5 MG/1
5 TABLET ORAL DAILY
Refills: 0 | Status: DISCONTINUED | OUTPATIENT
Start: 2024-02-20 | End: 2024-02-21

## 2024-02-20 RX ORDER — ENOXAPARIN SODIUM 100 MG/ML
30 INJECTION SUBCUTANEOUS EVERY 24 HOURS
Refills: 0 | Status: DISCONTINUED | OUTPATIENT
Start: 2024-02-20 | End: 2024-02-21

## 2024-02-20 RX ORDER — IBUPROFEN 200 MG
800 TABLET ORAL EVERY 6 HOURS
Refills: 0 | Status: DISCONTINUED | OUTPATIENT
Start: 2024-02-20 | End: 2024-02-21

## 2024-02-20 RX ORDER — SODIUM CHLORIDE 9 MG/ML
1000 INJECTION, SOLUTION INTRAVENOUS
Refills: 0 | Status: DISCONTINUED | OUTPATIENT
Start: 2024-02-20 | End: 2024-02-21

## 2024-02-20 RX ORDER — LANOLIN ALCOHOL/MO/W.PET/CERES
3 CREAM (GRAM) TOPICAL AT BEDTIME
Refills: 0 | Status: DISCONTINUED | OUTPATIENT
Start: 2024-02-20 | End: 2024-03-06

## 2024-02-20 RX ORDER — ACETAMINOPHEN 500 MG
650 TABLET ORAL EVERY 6 HOURS
Refills: 0 | Status: DISCONTINUED | OUTPATIENT
Start: 2024-02-20 | End: 2024-03-06

## 2024-02-20 RX ORDER — INFLUENZA VIRUS VACCINE 15; 15; 15; 15 UG/.5ML; UG/.5ML; UG/.5ML; UG/.5ML
0.7 SUSPENSION INTRAMUSCULAR ONCE
Refills: 0 | Status: DISCONTINUED | OUTPATIENT
Start: 2024-02-20 | End: 2024-03-06

## 2024-02-20 RX ADMIN — PANTOPRAZOLE SODIUM 40 MILLIGRAM(S): 20 TABLET, DELAYED RELEASE ORAL at 16:11

## 2024-02-20 NOTE — H&P ADULT - ASSESSMENT
CT Chest: Interval enlargement of the central/posterior mediastinal mass with development of more extensive lymphadenopathy. The mass encircles the descending thoracic aorta and has some mass effect upon the left atrium. The mid aspect of the esophagus is encircled by the mass and difficult to   distinguish. Possible ovoid pill is noted within the mass and possible location of the mid esophagus appear. The maximal esophagus appears distended with layering debris within. Consideration may be given to esophageal stenting.  - f/u GI consult  - f/u oncology consult  - f/u Surgical oncology consult  - f/u Speech and swallow  -   Anemia  - Hg of 9.9  - f/u iron panel  - MCV normal      #DVT PPx- Lovenox  #GI PPx- Protonix IV  #Diet- NPO till cleared by speech and swallow  #Activity- AAT  #Dispo- Acute      68-year-old male with past medical history of hypertension, chronic back pain, prostate cancer, and posterior mediastinal mass 6.5 cm seen on CT 11/2023, presents for 3 months of progressively difficulty swallowing associated with 10 pounds weight loss and odynophagia. Comes in today now unable to swallow his pills with water, resulting in 2 episode of emesis. He mentioned that he gets SOB during exertion but denies any exertional chest pain. As per patient he was treated at [facility] for prostate cancer and is s/p radiation therapy. He states that he has problem during walking and unable to maintain the posture.     # Dysphagia with odynophagia  # Hx of prostate cancer s/p radiation therapy  # Prostate cancer metastasis to mediastinum or primary mediastinal mass?  - CT Chest: Interval enlargement of the central/posterior mediastinal mass with development of more extensive lymphadenopathy. The mass encircles the descending thoracic aorta and has some mass effect upon the left atrium. The mid aspect of the esophagus is encircled by the mass and difficult to   distinguish. Possible ovoid pill is noted within the mass and possible location of the mid esophagus appear. The maximal esophagus appears distended with layering debris within. Consideration may be given to esophageal stenting.  - NPO for now  - started on D5LR @ 50  - consider CTAb/pel w contrast for staging  - AFP, LDH, PSA  - f/u GI consult  - f/u oncology consult  -f/u pulm consult  - f/u Speech and swallow  -   Anemia  - Hg of 9.9  - f/u iron panel  - MCV normal, SPEP    # CKD stage 3  - avoid nephrotoxic agent  - creat baseline    #DVT PPx- Lovenox  #GI PPx- Protonix IV  #Diet- NPO till cleared by speech and swallow  #Activity- AAT, PT consult,   #Dispo- Acute

## 2024-02-20 NOTE — H&P ADULT - NSHPLABSRESULTS_GEN_ALL_CORE
9.9    5.82  )-----------( 292      ( 20 Feb 2024 12:10 )             32.5     02-20    143  |  112<H>  |  23<H>  ----------------------------<  107<H>  5.0   |  18  |  1.5    Ca    8.4      20 Feb 2024 12:10    TPro  6.5  /  Alb  4.1  /  TBili  0.4  /  DBili  x   /  AST  21  /  ALT  13  /  AlkPhos  66  02-20          Urinalysis Basic - ( 20 Feb 2024 12:10 )    Color: x / Appearance: x / SG: x / pH: x  Gluc: 107 mg/dL / Ketone: x  / Bili: x / Urobili: x   Blood: x / Protein: x / Nitrite: x   Leuk Esterase: x / RBC: x / WBC x   Sq Epi: x / Non Sq Epi: x / Bacteria: x        Lactate Trend        CAPILLARY BLOOD GLUCOSE

## 2024-02-20 NOTE — ED ADULT NURSE NOTE - OBJECTIVE STATEMENT
67 y/o male BIBA from [facility] with c/o difficulty swallowing and sensation of something stuck x2 months

## 2024-02-20 NOTE — PATIENT PROFILE ADULT - NS PRO AD NO ADVANCE DIRECTIVE
19 yo F, no known history, here for assessment of L sided CP and palpitations which woke her from sleep.    Pain L sided, sharp, worse with inspiration and associated with dyspnea.    Had recent 10 lb intentional weight loss via keto diet, just started working out and noted HR in 180s with low impact work out.    No LE edema, personal or FH of PE/DVT, is on OCPs.
No

## 2024-02-20 NOTE — DISCUSSION/SUMMARY
[de-identified] : This is a 68 year-old male with complaints of localized lower back pain. He is been undergoing physical therapy for his back, which he states has improved his symptoms. We have previously recommended an MRI for back pain, however, we will hold off obtaining imaging at this point since his back pain has improved.  He will start PT on his R shoulder, a script was provided for him today.   We will re-evaluate in 6-8 weeks after he finishes Physical Therapy.  Total clinician time spent today on the patient is 30 minutes including preparing to see the patient, obtaining and/or reviewing and confirming history, performing medically necessary and appropriate examination, counseling and educating the patient and/or family, documenting clinical information in the EHR and communicating and/or referring to other healthcare professionals.  ODILIA Arechiga MD

## 2024-02-20 NOTE — ED PROVIDER NOTE - PROGRESS NOTE DETAILS
Russ Adair DO: CT results show increasing mediastinal mass. GI consulted and will follow. Discussed with mar and accepts for admission.

## 2024-02-20 NOTE — REASON FOR VISIT
[Follow-Up Visit] : a follow-up pain management visit [FreeTextEntry2] : LOWER BACK PAIN / RIGHT SHOULDER PAIN

## 2024-02-20 NOTE — H&P ADULT - NSHPREVIEWOFSYSTEMS_GEN_ALL_CORE
CONSTITUTIONAL: No weakness, fevers or chills  EYES/ENT: No visual changes;  No vertigo or throat pain   NECK: No pain or stiffness  RESPIRATORY: No cough, wheezing, hemoptysis; No shortness of breath  CARDIOVASCULAR: No chest pain or palpitations  GASTROINTESTINAL: No abdominal or epigastric pain. No nausea, vomiting, or hematemesis; No diarrhea or constipation. No melena or hematochezia.  GENITOURINARY: No dysuria, frequency or hematuria  NEUROLOGICAL: No numbness or weakness  SKIN: No itching, rashes CONSTITUTIONAL: + weakness, fevers or chills  EYES/ENT: No visual changes;  No vertigo or throat pain   NECK: No pain or stiffness  RESPIRATORY: No cough, wheezing, hemoptysis; No shortness of breath  CARDIOVASCULAR: No chest pain or palpitations  GASTROINTESTINAL: No abdominal or epigastric pain. 2 episodes of nausea, vomiting,  No diarrhea or constipation.  GENITOURINARY: No dysuria, frequency or hematuria  NEUROLOGICAL: No numbness or weakness  SKIN: No itching, rashes

## 2024-02-20 NOTE — ED PROVIDER NOTE - OBJECTIVE STATEMENT
68-year-old male with past medical history of hypertension, chronic back pain, prostate cancer, and posterior mediastinal mass 6.5 cm seen on CT 11/2023, presents for 2 months of progressively difficulty swallowing. Comes in today now unable to swallow his pills with water, resulting in episode of emesis. States having severe decreased PO intake secondary to worsening symptoms. Has not yet followed up with anyone about this. Is still able to tolerate own secretions. Denies any abdominal pain, chest pain, dyspnea, diarrhea, fever, chills.

## 2024-02-20 NOTE — ED PROVIDER NOTE - CLINICAL SUMMARY MEDICAL DECISION MAKING FREE TEXT BOX
68-year-old male history of hypertension presenting for evaluation of progressively worsening difficulty swallowing for the past several months.  Per EMR, patient was found to have mediastinal mass on CT in November 2023 but has not followed up.  States that he is now unable to swallow medications, difficulty swallowing liquids and solids.  No abdominal pain, diarrhea.  No other acute complaints.  No difficulty breathing.  Chronically ill appearing, NAD, non toxic. NCAT PERRLA EOMI airway intact, no drooling, no stridor, no pooling of secretions, no posterior oropharyngeal erythema/edema/lesions. Speaking in full sentences. +tolerating secretions, tolerating PO neck supple non tender normal wob cta bl rrr abdomen s nt nd no rebound no guarding WWPx4 neuro non focal.  Labs imaging reviewed.  GI paged.  Will admit for further evaluation.

## 2024-02-20 NOTE — PHYSICAL THERAPY INITIAL EVALUATION ADULT - SPECIFY REASON(S)
pt declined despite max encouragement. Pt was educated about the importance/benefits of participating in PT and being OOB.  pt continued to decline.  (continued below)

## 2024-02-20 NOTE — H&P ADULT - NSHPPHYSICALEXAM_GEN_ALL_CORE
GENERAL: NAD, lying in bed comfortably  HEAD:  Atraumatic, Normocephalic  EYES: conjunctiva and sclera clear  ENT: Moist mucous membranes  NECK: Supple, No JVD  CHEST/LUNG: Clear to auscultation bilaterally; No rales, rhonchi, wheezing, or rubs. Unlabored respirations  HEART: Regular rate and rhythm; No murmurs, rubs, or gallops  ABDOMEN: Soft, nontender, nondistended  EXTREMITIES:  No clubbing, cyanosis, or edema  NERVOUS SYSTEM:  A&Ox3 GENERAL: NAD, lying in bed comfortably  HEAD:  Atraumatic, Normocephalic  EYES: conjunctiva and sclera clear  ENT: Moist mucous membranes  NECK: Supple, No JVD  CHEST/LUNG: Clear to auscultation bilaterally; No rales, rhonchi, wheezing, or rubs. Unlabored respirations  HEART: Regular rate and rhythm; No murmurs, rubs, or gallops  ABDOMEN: Soft, nontender, nondistended, dysphagia, nausea, vomiting  EXTREMITIES:  No clubbing, cyanosis, or edema  NERVOUS SYSTEM:  A&Ox3

## 2024-02-20 NOTE — PHYSICAL EXAM
[Normal Coordination] : normal coordination [Normal DTR UE/LE] : normal DTR UE/LE  [Normal Sensation] : normal sensation [Normal Mood and Affect] : normal mood and affect [Oriented] : oriented [Able to Communicate] : able to communicate [Well Developed] : well developed [Well Nourished] : well nourished [Diminished] : patella reflex diminished [Right] : right shoulder [] : ROM is limited secondary to pain

## 2024-02-20 NOTE — ED PROVIDER NOTE - PHYSICAL EXAMINATION
Initial vital signs reviewed.  General: NAD, nontoxic appearing.  HENT: AT/NC. +halitosis. Dry mucous membrane. Oropharynx clear. Able to swallow secretions.  Eyes: non-injected conjunctivae b/l.  Neck: supple.  CV: RRR, no murmurs. 2+ distal pulses x4.  Pulm: nonlabored work of breathing, CTAB.  Abd: soft, nondistended, nontender.  MSK: no joint deformity.  Skin: warm, dry, well-perfused.  Neuro: A&Ox4.  Psych: appropriate mood and affect.

## 2024-02-20 NOTE — H&P ADULT - ATTENDING COMMENTS
Medicine Attending Addendum  Patient was seen and examined with medicine team.  Nursing records reviewed. I agree with the resident/PA/NP's note including past medical history, home medications, social history, allergies, surgical history, family history, and review of system. I have reviewed relevant vitals, laboratory values, imaging studies, and microbiology.     68-year-old male with past medical history of hypertension, chronic back pain, prostate cancer, and posterior mediastinal mass 6.5 cm seen on CT 11/2023, presents for 3 months of progressively difficulty swallowing associated with 10 pounds weight loss and odynophagia. Comes in today now unable to swallow his pills with water, resulting in 2 episode of emesis. He mentioned that he gets SOB during exertion but denies any exertional chest pain. As per patient he was treated at [facility] for prostate cancer and is s/p radiation therapy. He states that he has problem during walking and unable to maintain the posture.     # Dysphagia with odynophagia in setting of Mediastinal Mass, r/o lymphoma vs solid tumors  # Hx of prostate cancer s/p radiation therapy  # Prostate cancer metastasis to mediastinum or primary mediastinal mass?  - CT Chest: Interval enlargement of the central/posterior mediastinal mass with development of more extensive lymphadenopathy. The mass encircles the descending thoracic aorta and has some mass effect upon the left atrium. The mid aspect of the esophagus is encircled by the mass and difficult to   distinguish. Possible ovoid pill is noted within the mass and possible location of the mid esophagus appear. The maximal esophagus appears distended with layering debris within. Consideration may be given to esophageal stenting.  - NPO for now  - started on D5LR @ 50  - consider CTAb/pel w contrast for staging  - AFP, LDH, PSA  - May need IR for mediastinal mass biopsy  - f/u GI consult for possible stent  - f/u oncology consult  -f/u pulm consult  - f/u Speech and swallow    #Anemia in setting of h/o Cancer  - Hg of 9.9, MCV normal  - f/u iron panel  - transfuse in Hgb < 7.0    # CKD stage 3  - avoid nephrotoxic agent  - creat baseline    Seen and examined on 2/20/2024

## 2024-02-20 NOTE — HISTORY OF PRESENT ILLNESS
[FreeTextEntry1] : ORIGINAL PRESENTATION: This is a 68-year-old male here to establish care for back pain that radiates down into the bilateral lower extremities. Pain is not associated with numbness/tingling. Patient has not previously trialed a physical therapy program. There is no imaging for review today. The pain is intermittent, and he is not experiencing any pain today.   The patient has had this pain for 1 year. The pain started after an injury at work. Patient describes pain as severe and constant. During the last month the pain has been worse during the morning. Pain described as cramping. Pain is associated with numbness and pins and needles into the lower extremities. Patient has weakness in the upper extremities. Pain is increased with lying down, standing, sitting, walking, exercise, relaxation, coughing/wheezing, bowel movements. Bowel or bladder habits have not changed.  ACTIVITIES: Patient could walk less than 1 block before the pain starts. Patient can sit 1 hour before pain starts. Patient can stand 30 minutes before pain starts. The patient sometimes lays down because of pain. Patient uses walker at this time. Patient has difficulty going to work, performing household chores, doing yardwork shopping, participating regulation activities, exercising at this time.  PRIOR PAIN TREATMENTS: No relief with surgery.  PRIOR PAIN MEDICATIONS: Tylenol, aspirin.  TODAY: (2/20/24) 68-year-old male, here for a revisit, last seen on 1/9/24. There are no new complaints or acute changes today. He ambulates with a walker. He is a resident at [facility].  He is under our care for localized lower back pain without radicular features into the lower extremities. We had previously advised him that we would need to order imaging, however he would need to complete a full 6 weeks of physical therapy prior to that. Pt has been undergoing physical therapy for the past 6 weeks, he states that he goes there twice a week. He has been noticing great improvement in his back pain and therefore, we will hold off obtaining a MRI at this time.  He has also been complaining of R shoulder pain for the past 7 months without radicular features, he denies any trauma to the shoulder. We advised him to start physical therapy and pt agrees with the plan.  He is currently on no pain medications.

## 2024-02-20 NOTE — PATIENT PROFILE ADULT - CHOOSE INDICATION TO IMMUNIZE (AN ORDER WILL BE GENERATED WHEN THIS NOTE IS SAVED):
Alert-The patient is alert, awake and responds to voice. The patient is oriented to time, place, and person. The triage nurse is able to obtain subjective information.
Patient is not pregnant (male or female)

## 2024-02-20 NOTE — PATIENT PROFILE ADULT - FALL HARM RISK - RISK INTERVENTIONS
Assistance OOB with selected safe patient handling equipment/Assistance with ambulation/Communicate Fall Risk and Risk Factors to all staff, patient, and family/Discuss with provider need for PT consult/Monitor gait and stability/Provide patient with walking aids - walker, cane, crutches/Reinforce activity limits and safety measures with patient and family/Review medications for side effects contributing to fall risk/Sit up slowly, dangle for a short time, stand at bedside before walking/Visual Cue: Yellow wristband/Bed in lowest position, wheels locked, appropriate side rails in place/Call bell, personal items and telephone in reach/Instruct patient to call for assistance before getting out of bed or chair/Non-slip footwear when patient is out of bed/[place] to call system/Physically safe environment - no spills, clutter or unnecessary equipment/Purposeful Proactive Rounding/Room/bathroom lighting operational, light cord in reach

## 2024-02-20 NOTE — H&P ADULT - HISTORY OF PRESENT ILLNESS
68-year-old male with past medical history of hypertension, chronic back pain, prostate cancer, and posterior mediastinal mass 6.5 cm seen on CT 11/2023, presents for 2 months of progressively difficulty swallowing. Comes in today now unable to swallow his pills with water, resulting in episode of emesis. States having severe decreased PO intake secondary to worsening symptoms. Has not yet followed up with anyone about this. Is still able to tolerate own secretions. Denies any abdominal pain, chest pain, dyspnea, diarrhea, fever, chills     Vital Signs Last 24 Hrs  T(C): 36.7 (20 Feb 2024 10:53), Max: 36.7 (20 Feb 2024 10:53)  T(F): 98 (20 Feb 2024 10:53), Max: 98 (20 Feb 2024 10:53)  HR: 78 (20 Feb 2024 10:53) (78 - 78)  BP: 112/78 (20 Feb 2024 10:53) (112/78 - 112/78)  RR: 17 (20 Feb 2024 10:53) (17 - 17)  SpO2: 100% (20 Feb 2024 10:53) (100% - 100%)  O2 Parameters below as of 20 Feb 2024 10:53  Patient On (Oxygen Delivery Method): room air           68-year-old male with past medical history of hypertension, chronic back pain, prostate cancer, and posterior mediastinal mass 6.5 cm seen on CT 11/2023, presents for 3 months of progressively difficulty swallowing associated with 10 pounds weight loss and odynophagia. Comes in today now unable to swallow his pills with water, resulting in 2 episode of emesis. He mentioned that he gets SOB during exertion but denies any exertional chest pain. As per patient he was treated at [facility] for prostate cancer and is s/p radiation therapy. He states that he has problem during walking and unable to maintain the posture.   Denies any abdominal pain, chest pain, dyspnea, diarrhea, fever, chills     Vital Signs Last 24 Hrs  T(C): 36.7 (20 Feb 2024 10:53), Max: 36.7 (20 Feb 2024 10:53)  T(F): 98 (20 Feb 2024 10:53), Max: 98 (20 Feb 2024 10:53)  HR: 78 (20 Feb 2024 10:53) (78 - 78)  BP: 112/78 (20 Feb 2024 10:53) (112/78 - 112/78)  RR: 17 (20 Feb 2024 10:53) (17 - 17)  SpO2: 100% (20 Feb 2024 10:53) (100% - 100%)  O2 Parameters below as of 20 Feb 2024 10:53  Patient On (Oxygen Delivery Method): room air

## 2024-02-20 NOTE — ED ADULT NURSE NOTE - NSFALLRISKINTERV_ED_ALL_ED

## 2024-02-20 NOTE — ED ADULT TRIAGE NOTE - MODE OF ARRIVAL
Patient  is stable with current medication and we discussed a low fat low cholesterol diet  Weight loss also discussed for this will help lower cholesterol also  Recheck lipids in 6 months  Ambulance EMS

## 2024-02-21 ENCOUNTER — TRANSCRIPTION ENCOUNTER (OUTPATIENT)
Age: 69
End: 2024-02-21

## 2024-02-21 ENCOUNTER — RESULT REVIEW (OUTPATIENT)
Age: 69
End: 2024-02-21

## 2024-02-21 LAB
ANION GAP SERPL CALC-SCNC: 13 MMOL/L — SIGNIFICANT CHANGE UP (ref 7–14)
BUN SERPL-MCNC: 21 MG/DL — HIGH (ref 10–20)
CALCIUM SERPL-MCNC: 8.2 MG/DL — LOW (ref 8.4–10.5)
CHLORIDE SERPL-SCNC: 109 MMOL/L — SIGNIFICANT CHANGE UP (ref 98–110)
CO2 SERPL-SCNC: 18 MMOL/L — SIGNIFICANT CHANGE UP (ref 17–32)
CREAT SERPL-MCNC: 1.3 MG/DL — SIGNIFICANT CHANGE UP (ref 0.7–1.5)
EGFR: 59 ML/MIN/1.73M2 — LOW
GLUCOSE SERPL-MCNC: 104 MG/DL — HIGH (ref 70–99)
HAPTOGLOB SERPL-MCNC: 237 MG/DL — HIGH (ref 34–200)
HCT VFR BLD CALC: 32.5 % — LOW (ref 42–52)
HCV AB S/CO SERPL IA: 0.04 COI — SIGNIFICANT CHANGE UP
HCV AB SERPL-IMP: SIGNIFICANT CHANGE UP
HGB BLD-MCNC: 10 G/DL — LOW (ref 14–18)
IRON SATN MFR SERPL: 27 % — SIGNIFICANT CHANGE UP (ref 15–50)
IRON SATN MFR SERPL: 48 UG/DL — SIGNIFICANT CHANGE UP (ref 35–150)
LDH SERPL L TO P-CCNC: 246 U/L — HIGH (ref 50–242)
MCHC RBC-ENTMCNC: 29.1 PG — SIGNIFICANT CHANGE UP (ref 27–31)
MCHC RBC-ENTMCNC: 30.8 G/DL — LOW (ref 32–37)
MCV RBC AUTO: 94.5 FL — HIGH (ref 80–94)
NRBC # BLD: 0 /100 WBCS — SIGNIFICANT CHANGE UP (ref 0–0)
PLATELET # BLD AUTO: 282 K/UL — SIGNIFICANT CHANGE UP (ref 130–400)
PMV BLD: 9.7 FL — SIGNIFICANT CHANGE UP (ref 7.4–10.4)
POTASSIUM SERPL-MCNC: 5 MMOL/L — SIGNIFICANT CHANGE UP (ref 3.5–5)
POTASSIUM SERPL-SCNC: 5 MMOL/L — SIGNIFICANT CHANGE UP (ref 3.5–5)
RBC # BLD: 3.44 M/UL — LOW (ref 4.7–6.1)
RBC # FLD: 13.6 % — SIGNIFICANT CHANGE UP (ref 11.5–14.5)
SODIUM SERPL-SCNC: 140 MMOL/L — SIGNIFICANT CHANGE UP (ref 135–146)
TIBC SERPL-MCNC: 179 UG/DL — LOW (ref 220–430)
TRANSFERRIN SERPL-MCNC: 145 MG/DL — LOW (ref 200–360)
UIBC SERPL-MCNC: 131 UG/DL — SIGNIFICANT CHANGE UP (ref 110–370)
WBC # BLD: 4.88 K/UL — SIGNIFICANT CHANGE UP (ref 4.8–10.8)
WBC # FLD AUTO: 4.88 K/UL — SIGNIFICANT CHANGE UP (ref 4.8–10.8)

## 2024-02-21 PROCEDURE — 88342 IMHCHEM/IMCYTCHM 1ST ANTB: CPT | Mod: 26

## 2024-02-21 PROCEDURE — 93010 ELECTROCARDIOGRAM REPORT: CPT

## 2024-02-21 PROCEDURE — 88305 TISSUE EXAM BY PATHOLOGIST: CPT | Mod: 26

## 2024-02-21 PROCEDURE — 88344 IMHCHEM/IMCYTCHM EA MLT ANTB: CPT | Mod: 26

## 2024-02-21 PROCEDURE — 88341 IMHCHEM/IMCYTCHM EA ADD ANTB: CPT | Mod: 26

## 2024-02-21 PROCEDURE — 88342 IMHCHEM/IMCYTCHM 1ST ANTB: CPT | Mod: 26,XP

## 2024-02-21 PROCEDURE — 43266 EGD ENDOSCOPIC STENT PLACE: CPT | Mod: XU

## 2024-02-21 PROCEDURE — 99222 1ST HOSP IP/OBS MODERATE 55: CPT

## 2024-02-21 PROCEDURE — 99223 1ST HOSP IP/OBS HIGH 75: CPT | Mod: GC

## 2024-02-21 PROCEDURE — 74177 CT ABD & PELVIS W/CONTRAST: CPT | Mod: 26

## 2024-02-21 PROCEDURE — 43238 EGD US FINE NEEDLE BX/ASPIR: CPT

## 2024-02-21 PROCEDURE — 88173 CYTOPATH EVAL FNA REPORT: CPT | Mod: 26

## 2024-02-21 PROCEDURE — 99232 SBSQ HOSP IP/OBS MODERATE 35: CPT

## 2024-02-21 PROCEDURE — 99223 1ST HOSP IP/OBS HIGH 75: CPT | Mod: 25

## 2024-02-21 RX ORDER — SODIUM CHLORIDE 9 MG/ML
1000 INJECTION, SOLUTION INTRAVENOUS
Refills: 0 | Status: DISCONTINUED | OUTPATIENT
Start: 2024-02-21 | End: 2024-02-24

## 2024-02-21 RX ORDER — FUROSEMIDE 40 MG
40 TABLET ORAL ONCE
Refills: 0 | Status: COMPLETED | OUTPATIENT
Start: 2024-02-21 | End: 2024-02-21

## 2024-02-21 RX ORDER — ENOXAPARIN SODIUM 100 MG/ML
40 INJECTION SUBCUTANEOUS EVERY 24 HOURS
Refills: 0 | Status: DISCONTINUED | OUTPATIENT
Start: 2024-02-21 | End: 2024-02-27

## 2024-02-21 RX ADMIN — Medication 40 MILLIGRAM(S): at 09:32

## 2024-02-21 RX ADMIN — ENOXAPARIN SODIUM 40 MILLIGRAM(S): 100 INJECTION SUBCUTANEOUS at 09:44

## 2024-02-21 NOTE — CONSULT NOTE ADULT - SUBJECTIVE AND OBJECTIVE BOX
RUFUS PICKARD  68y  [id number]  No Known Allergies      Chief Complaint   Patient is a 68y old  Male who presents with a chief complaint of Mediastinal Mass (21 Feb 2024 11:26)      History of Present Illness   HPI:  68-year-old male with past medical history of hypertension, chronic back pain, prostate cancer, and posterior mediastinal mass 6.5 cm seen on CT 11/2023, presents for 3 months of progressively difficulty swallowing associated with 10 pounds weight loss and odynophagia. Comes in today now unable to swallow his pills with water, resulting in 2 episode of emesis. He mentioned that he gets SOB during exertion but denies any exertional chest pain. As per patient he was treated at [facility] for prostate cancer and is s/p radiation therapy. He states that he has problem during walking and unable to maintain the posture.   Denies any abdominal pain, chest pain, dyspnea, diarrhea, fever, chills     Vital Signs Last 24 Hrs  T(C): 36.7 (20 Feb 2024 10:53), Max: 36.7 (20 Feb 2024 10:53)  T(F): 98 (20 Feb 2024 10:53), Max: 98 (20 Feb 2024 10:53)  HR: 78 (20 Feb 2024 10:53) (78 - 78)  BP: 112/78 (20 Feb 2024 10:53) (112/78 - 112/78)  RR: 17 (20 Feb 2024 10:53) (17 - 17)  SpO2: 100% (20 Feb 2024 10:53) (100% - 100%)  O2 Parameters below as of 20 Feb 2024 10:53  Patient On (Oxygen Delivery Method): room air     (20 Feb 2024 15:17)       Past Medical and Surgical History  PAST MEDICAL & SURGICAL HISTORY:  HTN (hypertension)      Prostate cancer          Social History     Oncologic history     Family History   FAMILY HISTORY:      Medicaltions   Home medication  HOME MEDICATIONS:  Feosol 325 mg (65 mg elemental iron) oral tablet: 1 tab(s) orally 2 times a day (20 Feb 2024 16:17)  ibuprofen 800 mg oral tablet: 1 tab(s) orally every 6 hours (20 Feb 2024 16:19)  Imodium 2 mg oral capsule: 1 cap(s) orally 2 times a day (20 Feb 2024 16:16)  lidocaine 5% patch:  (20 Feb 2024 16:26)  Norvasc 5 mg oral tablet: 1 tab(s) orally once a day (20 Feb 2024 16:16)  Tylenol 325 mg oral tablet: 2 tab(s) orally every 6 hours (20 Feb 2024 16:26)    Active medication  MEDICATIONS  (STANDING):  dextrose 5% + lactated ringers. 1000 milliLiter(s) (50 mL/Hr) IV Continuous <Continuous>  enoxaparin Injectable 40 milliGRAM(s) SubCutaneous every 24 hours  influenza  Vaccine (HIGH DOSE) 0.7 milliLiter(s) IntraMuscular once    MEDICATIONS  (PRN):  acetaminophen     Tablet .. 650 milliGRAM(s) Oral every 6 hours PRN Temp greater or equal to 38C (100.4F), Mild Pain (1 - 3)  aluminum hydroxide/magnesium hydroxide/simethicone Suspension 30 milliLiter(s) Oral every 4 hours PRN Dyspepsia  melatonin 3 milliGRAM(s) Oral at bedtime PRN Insomnia  ondansetron Injectable 4 milliGRAM(s) IV Push every 8 hours PRN Nausea and/or Vomiting        Physical Exam   Vital Signs Last 24 Hrs  T(C): 36.8 (21 Feb 2024 11:05), Max: 37.2 (21 Feb 2024 00:13)  T(F): 98.2 (21 Feb 2024 11:05), Max: 98.9 (21 Feb 2024 00:13)  HR: 100 (21 Feb 2024 11:05) (87 - 100)  BP: 105/68 (21 Feb 2024 11:05) (89/60 - 115/77)  BP(mean): 84 (21 Feb 2024 00:13) (84 - 84)  RR: 18 (21 Feb 2024 11:05) (17 - 19)  SpO2: 97% (20 Feb 2024 22:44) (97% - 97%)    Parameters below as of 20 Feb 2024 23:02  Patient On (Oxygen Delivery Method): room air        General: adult in NAD  HEENT: clear oropharynx, anicteric sclera, pink conjunctiva  Neck: supple  CV: normal S1/S2 with no murmur rubs or gallops  Lungs: positive air movement b/l ant lungs,clear to auscultation, no wheezes, no rales  Abdomen: soft non-tender non-distended, no hepatosplenomegaly  Ext: no clubbing cyanosis or edema  Skin: no rashes and no petechiae  Neuro: alert and oriented X 4, no focal deficits      Labs                          10.0   4.88  )-----------( 282      ( 21 Feb 2024 07:21 )             32.5         Mean Cell Volume : 94.5 fL  Mean Cell Hemoglobin : 29.1 pg  Mean Cell Hemoglobin Concentration : 30.8 g/dL  Auto Neutrophil # : x  Auto Lymphocyte # : x  Auto Monocyte # : x  Auto Eosinophil # : x  Auto Basophil # : x  Auto Neutrophil % : x  Auto Lymphocyte % : x  Auto Monocyte % : x  Auto Eosinophil % : x  Auto Basophil % : x    Serial CBC's  02-21 @ 07:21  Hct-32.5 / Hgb-10.0 / Plat-282 / RBC-3.44 / WBC-4.88  Serial CBC's  02-20 @ 12:10  Hct-32.5 / Hgb-9.9 / Plat-292 / RBC-3.47 / WBC-5.82    02-21    140  |  109  |  21<H>  ----------------------------<  104<H>  5.0   |  18  |  1.3    Ca    8.2<L>      21 Feb 2024 07:21    TPro  6.5  /  Alb  4.1  /  TBili  0.4  /  DBili  x   /  AST  21  /  ALT  13  /  AlkPhos  66  02-20      Iron - Total Binding Capacity.: 179 ug/dL (02-21 @ 07:21)        Urinalysis Basic - ( 21 Feb 2024 07:21 )    Color: x / Appearance: x / SG: x / pH: x  Gluc: 104 mg/dL / Ketone: x  / Bili: x / Urobili: x   Blood: x / Protein: x / Nitrite: x   Leuk Esterase: x / RBC: x / WBC x   Sq Epi: x / Non Sq Epi: x / Bacteria: x      Radiology    ACC: [id number] EXAM:  CT CHEST IC   ORDERED BY: LUCIA READ   PROCEDURE DATE:  02/20/2024   INTERPRETATION:  CLINICAL HISTORY / REASON FOR EXAM: Mediastinal mass.    TECHNIQUE: Multislice helical sections were obtained from the thoracic   inlet to the lung bases with intravenous contrast. Coronal and sagittal   reformatted images are also submitted.  3-D/MIP postprocessing images were performed as well..    COMPARISON: PET CT dated 1/11/2023    FINDINGS:    TUBES/LINES: None.    LUNGS, PLEURA, AND AIRWAYS: There are small bilateral pleural effusions.   The left pleural effusion is similar to the prior exam. No lobar   consolidation or pneumothorax is present. No evidence of endobronchial   obstruction, bronchiectasis or honeycombing.    MEDIASTINUM/LYMPH NODES: There is a large posterior/central mediastinal   mass extending from the subcarinal region and surrounding the aorta and   distal esophagus. It measures approximately 11.7 x 10.5 x 5.7 cm at the   level of the pulmonary trunk. There are associated enlarged paratracheal   and prevascular lymph nodes measuring up to 3 cm. These nodes are much   larger since the prior CT of 1/11/2023. Perihilar enlarged lymph nodes   are noted as well. Some area of this mass demonstrate hypodensities   likely reflecting central necrosis. An oval density is noted within the   mass at the level of the ember and may reflect a pill within the   esophagus. The more proximal esophagus is distended with layering debris   within.    HEART/GREAT VESSELS: No pericardial effusion. No pulmonary embolus. Heart   size unremarkable. The left atrium is mildly compressed by the   mediastinal mass Coronary artery and thoracic aorta atherosclerotic   calcifications. No aneurysmal dilation of the thoracic aorta.    BONES/SOFT TISSUES: No acute osseous abnormality. No definite lytic or   blastic lesion is seen.    VISUALIZED UPPER ABDOMEN: Upper abdominal superior mesenteric mass in the   region of the SMA measuring up to 5 x 5 cm. There is delayed left   nephrogram, partially included nodular thickening of the left adrenal.    OTHER: Thyroid gland is unremarkable.    IMPRESSION:    No evidence of pulmonary embolus    Interval enlargement of the central/posterior mediastinal mass with   development of more extensive lymphadenopathy. The mass encircles the   descending thoracic aorta and has some mass effect upon the left atrium.   The mid aspect of the esophagus is encircled by the mass and difficult to   distinguish. Possible ovoid pill is noted within the mass and possible   location of the mid esophagus appear. The maximal esophagus appears   distended with layering debris within. Consideration may be given to   esophageal stenting.    ACC: [id number] EXAM:  CT ABDOMEN AND PELVIS IC   ORDERED BY: ISABEL ARCE   PROCEDURE DATE:  11/22/2023    INTERPRETATION:  CLINICAL STATEMENT: Metastatic prostate cancer. Back   pain.    TECHNIQUE: Contiguous axial CT images were obtained from the lower chest   to the pubic symphysis following administration of intravenous contrast.    100 cc administered of Omnipaque 350 (0 cc discarded).  Oral contrast was   not administered.  Reformatted images in the coronal and sagittal planes   were acquired.    Comparison made with FDG PET CT January 11, 2023.    FINDINGS:    LOWER CHEST: Partially imaged posterior mediastinal mass measuring up to   6.5 cm; correlation with oncological history recommended.    HEPATOBILIARY: Unremarkable.    SPLEEN: Unremarkable.    PANCREAS: Unremarkable.    ADRENAL GLANDS: Left adrenal 2.3 cm mass, apparently larger since January 2023 PET/CT.    KIDNEYS/ABDOMINOPELVIC NODES: Retrocrural soft tissue mass, correlating   with January 2023 PET/CT. Retroperitoneal and left pelvic iliac chain   conglomerate adenopathy, similar to January 2023 PET/CT. Increased severe   left hydroureteronephrosis likely obstructed by pelvic lymphadenopathy   rather than left lateral urinary bladder mass.    Right renal cyst. No right hydronephrosis.    PELVIC ORGANS: Evaluation of primary prostate neoplasm limited by   modality. Left lateral urinary bladder wall masslike thickening,   suspicious for primary urinary bladder neoplasm, correlation for history   of synchronous urinary bladder neoplasm suggested.    PERITONEUM/MESENTERY/BOWEL: Unchanged large left inguinal hernia   containing nonobstructed large bowel. No evidence of acute osseous   abnormality.    BONES/SOFT TISSUES: Degenerative change, lumbar spine.    OTHER: Vascular calcifications.      IMPRESSION:    Clarification with patient complex oncological history and prior outside   imaging recommended. Limited history available.    Retroperitoneal and left pelvic iliac chain conglomerate lymphadenopathy,   similar to January 2023 PET/CT. Increased severe left   hydroureteronephrosis likely obstructed by pelvic lymphadenopathy rather   than left lateral urinary bladder mass.    Left lateral urinary bladder wall masslike thickening, suspicious for   primary urinary bladder neoplasm, correlation for history of synchronous   urinary bladder neoplasm suggested.    Evaluation of primary prostate neoplasm limited by modality.    Partial imaged posterior mediastinal mass measuring up to 6.5 cm;   correlation with oncological history recommended. Retrocrural soft tissue   mass, correlating with January 2023 PET/CT.      ACC: [id number] EXAM:  PETCT [facility] ONC FDG INIT                        PROCEDURE DATE:  01/11/2023    INTERPRETATION:  Reason for study: Malignant neoplasm of kidney  FDG PET CT STUDY   initial  treatment strategy  REASON: TUMOR IMAGING - PET with concurrently acquired CT for attenuation   correction and anatomic localization; skull base to mid - thigh .    CPT code 50804.    Fasting blood glucose level:     85 mg/dl    HISTORY: 67-year-old male with biopsy proven (mediastinal mass)   metastatic prostate adenocarcinoma. No chemotherapy or radiation.    Started on Docetaxel.  CT chest, abdomen, pelvis 12/21/2022  Left supraclavicular lymphadenopathy.  Left posterior mediastinal mass.  Abdominal retroperitoneal heterogeneous mass with presumed obstruction of   left ureter and resulting moderate hydronephroureter.  Heterogeneous mesenteric mass abutting the sigmoid colon, additional   necrotic mass inseparable from the posterior rectal wall.  Soft tissue mass at left lateral urinary bladder base.  Left inguinal hernia.    TECHNIQUE: Approximately 45 minutes after the intravenous administration   of 7.5 mCi 18-Fluorine FDG, whole body PET images were acquired from base   of skull to mid - thigh.    CT protocol used for this PET/CT study is designed for attenuation   correction and anatomic localization of PET findings.  This  CT   is not designed to replace state-of-the-art diagnostic CT scans for   specific imaging protocols of different body parts.      COMPARISON : None available    FINDINGS:    Head/Neck: There is diffuse bilateral symmetric parotid gland FDG uptake.   Correlate for infectious/inflammatory etiologies. No focal mass seen.  Normal uptake within the brain, pharyngeal lymphoid tissue, salivary   glands and laryngeal musculature is noted.    Thorax:  FDG avid 4.1 x 4.2 cm left supraclavicular mass (series 3 image 250) max   SUV 4.7.  FDG avid posterior mediastinal mass measuring 3.9 x 5.0 cm abutting the   descending thoracic aorta (series 3 image 217) max SUV 5.3.  Borderline FDG avid retrocrural soft tissue seen on axial image 170, SUV   max 3.3.    Non FDG avid small left pleural effusion.      Abdomen/Pelvis:    Indistinct retroperitoneal soft tissue mass encasing the abdominal aorta   and left iliac arteries and extending into the left pelvis, measuring   approximately 3.2 x 6.0 x 21.9 cm max SUV 4.7. Again noted moderate left   hydronephroureter, likely due to mass effect upon the left ureter from   retroperitoneal mass.    FDG avid indistinct soft tissue mass abutting the left posterolateral   aspect of the rectum measuring approximately 3.7 x 2.9 x 5.5 cm max SUV   4.3.    Musculoskeletal :  No suspicious hypermetabolic osseous lesions.    Additional CT findings:  Right renal cyst.  8.7 x 12.7 cm left inguinal hernia containing loops of nonobstructed   bowel.  Coronary artery calcifications. Aortic vascular calcifications.  Degenerative changes of the cervical spine. Grade 1 L3-4 and L4-5   anterolisthesis.  Prostatic calcifications.    IMPRESSION:    FDG avid indistinct retroperitoneal soft tissue mass encasing the   abdominal aorta and left iliac arteries spanning 21.9 cm.    Additional FDG avid left posterolateral rectal wall soft tissue mass,   left supraclavicular mass, posterior mediastinal mass, retrocrural soft   tissue as catalogued above. Findings most consistent with metastatic   disease. (SUV max 5.3)    Again noted moderate left hydronephroureter, likely due to mass effect   upon the left ureter from retroperitoneal mass.    Non FDG avid small left pleural effusion.

## 2024-02-21 NOTE — PROGRESS NOTE ADULT - ASSESSMENT
68-year-old male with past medical history of hypertension, chronic back pain, prostate cancer, and posterior mediastinal mass 6.5 cm seen on CT 11/2023, presents for 3 months of progressively difficulty swallowing associated with 10 pounds weight loss and odynophagia. Comes in today now unable to swallow his pills with water, resulting in 2 episode of emesis. He mentioned that he gets SOB during exertion but denies any exertional chest pain. As per patient he was treated at [facility] for prostate cancer and is s/p radiation therapy. He states that he has problem during walking and unable to maintain the posture.     # Dysphagia with odynophagia  # Hx of prostate cancer s/p radiation therapy  # Prostate cancer metastasis to mediastinum or primary mediastinal mass?  - CT Chest: Interval enlargement of the central/posterior mediastinal mass with development of more extensive lymphadenopathy. The mass encircles the descending thoracic aorta and has some mass effect upon the left atrium. The mid aspect of the esophagus is encircled by the mass and difficult to distinguish. Possible ovoid pill is noted within the mass and possible location of the mid esophagus appear. The maximal esophagus appears distended with layering debris within. Consideration may be given to esophageal stenting.  - NPO for now, consider easy to chew once GI eval complete (per speech and swallow)   - c/w D5LR @ 50  - f/u AFP, LDH, PSA, CEA, CA-19, CA-125  - EGD with GI today   - f/u oncology consult  - f/u pulm consult  - f/u CT surgery   - pending CT abd and pelvis w/ IV contrast for staging   - will give 1x dose 40mg IV solumedrol for possible edema   - patient may have already had biopsy of lesion and workup at [facility], will try to get records     # small b/l pleural effusions on CT chest   - will give 1x dose 40mg IV Lasix   - f/u CXR in the AM     # Anemia  - Hg of 9.9  - f/u iron panel  - MCV normal, SPEP    # CKD stage 3  - avoid nephrotoxic agent  - creat baseline    #Misc   #DVT PPx- Lovenox  #GI PPx- Protonix IV  #Diet- NPO till cleared by speech and swallow  #Activity- AAT, PT consult,   #Dispo- Acute

## 2024-02-21 NOTE — CONSULT NOTE ADULT - ATTENDING COMMENTS
Mr. Stallings was seen and examined in the endoscopy suite prior to his procedure.  Pulmonary was consulted due to finding of progression of mediastinal mass with lymphadenopathy highly concerning for malignancy.  While bronchoscopy with biopsy is possible, the patient is at high risk of undergoing any respiratory procedure due to compression of the pulmonary vein by his malignancy, as well as pressure on central airways.  Recommended possible obtaining IR biopsy of adrenal or abdominal lymph nodes.  Patient is currently scheduled for GI procedure for biopsy, this is also a reasonable approach to obtain the necessary diagnosis.    As the patient is about to undergo biopsy by different service, The pulmonary service will sign off.  Please do not hesitate to recall the pulmonary service for further evaluation and recommendation if the patient's pulmonary symptoms increase or if there are further clinical questions.  We will sign off at this time.

## 2024-02-21 NOTE — PROGRESS NOTE ADULT - ATTENDING COMMENTS
Pt examined this am. Feels like his swallowing has improved since admission, but still difficult. Seen by slp and did well but given location of mediastinal mass, recommended npo for now. He stated he was told about the mass before, but had no op follow up as the NH he is in did not send him. However, collateral information noted that he did follow up at [facility]? Onc,CT surg and GI consult. Continue IVF, give 40mg solumedrol IV, changes meds to IV, avoid an NGT at this time. No concern for lack of airway protection. Send tumor markers. Could be metastatic prostate ca. CTAP showed a bladder thickening which must also be evaluated for possible Mets.

## 2024-02-21 NOTE — PRE-ANESTHESIA EVALUATION ADULT - NSDENTALSD_ENT_ALL_CORE
Very loose tooth Right upper, possibility of dislodging  the tooth with the pt under stood and agreed./loose teeth Very loose tooth Right upper, possibility of dislodging the tooth D/W the pt under stood and agreed./loose teeth

## 2024-02-21 NOTE — PRE-OP CHECKLIST - ANTIBIOTIC
From: Keven Fuentes  Sent: 11/7/2018 5:21 PM EST  Subject: Medication Renewal Request    [address]  Carlos Spivey would like a refill of the following medications:     simvastatin (ZOCOR) 20 MG tablet Gildardo Espinal, APRN - CNP]   Patient Comment: 30 day supply @ [facility] & 90 day supply for [facility] to be put on hold til due    Preferred pharmacy: [address] [place] [address] , [address] [facility] [phone] - F [phone] n/a

## 2024-02-21 NOTE — CONSULT NOTE ADULT - ATTENDING COMMENTS
Patient was seen earlier today after placement of esophageal stent he is a poor historian and unable to tell us any information the team found a previous PET scan that was done in January of last year there is some inflammation in the PET scan but states that he has metastatic biopsy-proven prostate cancer and that the mediastinal mass was previously biopsied and was consistent with prostate cancer based on the ordering of the report and information provided he has been on Docetaxel.    the patient is unable to provide us with any of his oncologist for treatment records    I am unable to offer any recommendations regarding treatment and asked to have some additional information including patient's biopsy report treatment summary.  Please reach out to  [facility] medical record department to see if there is any records can also reach out to the nurse practitioner who ordered the previous PET CT scan to see if she could tell us who has been treating the patient and if they have any records  Anaya Multani NP.    Without the above information is difficult for me to recommend any treatment options for example if he progressed on ADT, chemotherapy, ARPI, may be due next could be PSMA radioactive based treatment, does he have a HRD mutation than PARP inhibitor? If any family is available would be great to speak to them as well.  Can reach out to his current residence to see if they have any information.

## 2024-02-21 NOTE — CONSULT NOTE ADULT - ATTENDING COMMENTS
Agree with above.  Patient with hx of metastatic prostate cancer treated at [facility], who is a poor historian, being seen for dysphagia to solids and liquids. he is unable to tolerate p.o. intake likely due to the extrinsic compression +/- esophageal invasion of the mediastinal mass.  He will need EGD for further evaluation + EUS with tissue sampling (pt reports no prior workup of mediastinal mass).  Pending EGD findings, may consider esophageal stent placement for palliation of sx.  Rest of recommendations per the note above.

## 2024-02-21 NOTE — PRE-OP CHECKLIST - LOOSE TEETH
- Etiology likely multi-factorial in setting of acute ischemic infarctions of the brain in conjunction vs acute kidney injury with uremia (now resolving) vs resolving hypoxic respiratory failure, covid-19 infection, superimposed bacterial pneumonia, urinary retention, concern for nutritional deficiencies)  - S/p management of: acute pneumonia with antibiotics; pulmonary embolus with full-dose anticoagulation  - Reorientation with family at bedside (pt speaks [place], does best with family present)  - MRI Brain, and MRA Head demonstrated multiple evolving subacute infarcts with associated hemorrhages, likely expected evolution of ischemia  - Neuro recs appreciated  - As per neuro, EEG not suggestive of seizure-like activity  - C/w Keppra 500 mg BID  - repeat CT head 5/5 normal no yes

## 2024-02-21 NOTE — CONSULT NOTE ADULT - ASSESSMENT
Impression:    Enlarging mediastinal mass with central necrosis  Mediastinal lymphadenopathy  Retrocrural mass  Retroperitonal mass  Left renal hydronephrosis  Urinary bladder mass  Ho prostate cancer s/p radiation      Plan:    Patient is a poor historian  Unsure if its metastatic prostate cancer or another tumor.  Need to collaborate with urology and may need a biopsy likely bronch EBUS for diagnostic purpose.  Clarify GOC       Impression:    Enlarging mediastinal mass with central necrosis  Mediastinal lymphadenopathy  Retrocrural mass  Retroperitonal mass  Left renal hydronephrosis  Urinary bladder mass  Ho prostate cancer s/p radiation      Plan:    Patient is a poor historian  Unsure if its metastatic prostate cancer or another tumor.  Patient to undergo biopsy with GI  Clarify GOC      Please call with questions.

## 2024-02-21 NOTE — CONSULT NOTE ADULT - SUBJECTIVE AND OBJECTIVE BOX
Patient is a 68y old  Male who presents with a chief complaint of Mediastinal Mass (20 Feb 2024 15:17)      HPI:  68-year-old male with past medical history of hypertension, chronic back pain, prostate cancer, and posterior mediastinal mass 6.5 cm seen on CT 11/2023, presents for 3 months of progressively difficulty swallowing associated with 10 pounds weight loss and odynophagia. Comes in today now unable to swallow his pills with water, resulting in 2 episode of emesis. He mentioned that he gets SOB during exertion but denies any exertional chest pain. As per patient he was treated at [facility] for prostate cancer and is s/p radiation therapy. He states that he has problem during walking and unable to maintain the posture.   Denies any abdominal pain, chest pain, dyspnea, diarrhea, fever, chills    Pulmonary consulted for mediastinal mass. (20 Feb 2024 15:17)      PAST MEDICAL & SURGICAL HISTORY:  HTN (hypertension)      Prostate cancer          SOCIAL HX:   Ex Smoking                             FAMILY HISTORY:  :  No known cardiovacular family hisotry     Review Of Systems:     All ROS are negative except per HPI       Allergies    No Known Allergies    Intolerances          PHYSICAL EXAM    ICU Vital Signs Last 24 Hrs  T(C): 36.6 (21 Feb 2024 05:00), Max: 37.2 (21 Feb 2024 00:13)  T(F): 97.8 (21 Feb 2024 05:00), Max: 98.9 (21 Feb 2024 00:13)  HR: 93 (21 Feb 2024 06:13) (78 - 96)  BP: 109/73 (21 Feb 2024 06:13) (89/60 - 115/77)  BP(mean): 84 (21 Feb 2024 00:13) (84 - 84)  ABP: --  ABP(mean): --  RR: 18 (21 Feb 2024 05:00) (17 - 19)  SpO2: 97% (20 Feb 2024 22:44) (97% - 100%)    O2 Parameters below as of 20 Feb 2024 23:02  Patient On (Oxygen Delivery Method): room air            CONSTITUTIONAL:  cachetic    ENT:   Airway patent,   Mouth with normal mucosa.     CARDIAC:   Normal rate,   Regular rhythm.    No edema      RESPIRATORY:   No wheezing  mildly diminished on the left lung fields  Not tachypneic,  No use of accessory muscles    GASTROINTESTINAL:  Abdomen soft,   Non-tender,   No guarding,   + BS      NEUROLOGICAL:   Alert and oriented   No motor deficits.    SKIN:   Skin normal color for race,   No evidence of rash.                LABS:                          10.0   4.88  )-----------( 282      ( 21 Feb 2024 07:21 )             32.5                                               02-21    140  |  109  |  21<H>  ----------------------------<  104<H>  5.0   |  18  |  1.3    Ca    8.2<L>      21 Feb 2024 07:21    TPro  6.5  /  Alb  4.1  /  TBili  0.4  /  DBili  x   /  AST  21  /  ALT  13  /  AlkPhos  66  02-20             Urinalysis Basic - ( 21 Feb 2024 07:21 )    Color: x / Appearance: x / SG: x / pH: x  Gluc: 104 mg/dL / Ketone: x  / Bili: x / Urobili: x   Blood: x / Protein: x / Nitrite: x   Leuk Esterase: x / RBC: x / WBC x   Sq Epi: x / Non Sq Epi: x / Bacteria: x                  LIVER FUNCTIONS - ( 20 Feb 2024 12:10 )  Alb: 4.1 g/dL / Pro: 6.5 g/dL / ALK PHOS: 66 U/L / ALT: 13 U/L / AST: 21 U/L / GGT: x                                                                                                   MEDICATIONS  (STANDING):  dextrose 5% + lactated ringers. 1000 milliLiter(s) (50 mL/Hr) IV Continuous <Continuous>  enoxaparin Injectable 40 milliGRAM(s) SubCutaneous every 24 hours  influenza  Vaccine (HIGH DOSE) 0.7 milliLiter(s) IntraMuscular once    MEDICATIONS  (PRN):  acetaminophen     Tablet .. 650 milliGRAM(s) Oral every 6 hours PRN Temp greater or equal to 38C (100.4F), Mild Pain (1 - 3)  aluminum hydroxide/magnesium hydroxide/simethicone Suspension 30 milliLiter(s) Oral every 4 hours PRN Dyspepsia  melatonin 3 milliGRAM(s) Oral at bedtime PRN Insomnia  ondansetron Injectable 4 milliGRAM(s) IV Push every 8 hours PRN Nausea and/or Vomiting

## 2024-02-21 NOTE — CONSULT NOTE ADULT - SUBJECTIVE AND OBJECTIVE BOX
Gastroenterology Consultation:    Patient is a 68y old  Male who presents with a chief complaint of Mediastinal Mass (21 Feb 2024 09:40)    Admitted on: 02-20-24    HPI:  68-year-old male with past medical history of hypertension, chronic back pain, prostate cancer, and posterior mediastinal mass 6.5 cm seen on CT 11/2023, presents for 3 months of progressively difficulty swallowing associated with 10 pounds weight loss and odynophagia. Comes in today now unable to swallow his pills with water, resulting in 2 episode of emesis. He mentioned that he gets SOB during exertion. As per patient he was treated at [facility] for prostate cancer and is s/p radiation therapy.   Patient had known mediastinal mass since jan 2023, Was evaluated recently by his urologist for new bladder mass. Patient was scheduled for cystoscopy and PET scan to r/o multiple primary malignancy.   Mediastinal mass was never biopsied per patient. Denies any hematochezia, melena or hematemesis.     Prior EGD/ Colonoscopy:  never had endoscopy or colonoscopy     PAST MEDICAL & SURGICAL HISTORY:  HTN (hypertension)      Prostate cancer            FAMILY HISTORY:  no fhx of gi cancers    Social History:  no smoking alcohol or substance use     Home Medications:  Feosol 325 mg (65 mg elemental iron) oral tablet: 1 tab(s) orally 2 times a day (20 Feb 2024 16:17)  ibuprofen 800 mg oral tablet: 1 tab(s) orally every 6 hours (20 Feb 2024 16:19)  Imodium 2 mg oral capsule: 1 cap(s) orally 2 times a day (20 Feb 2024 16:16)  lidocaine 5% patch:  (20 Feb 2024 16:26)  Norvasc 5 mg oral tablet: 1 tab(s) orally once a day (20 Feb 2024 16:16)  Tylenol 325 mg oral tablet: 2 tab(s) orally every 6 hours (20 Feb 2024 16:26)        MEDICATIONS  (STANDING):  dextrose 5% + lactated ringers. 1000 milliLiter(s) (50 mL/Hr) IV Continuous <Continuous>  enoxaparin Injectable 40 milliGRAM(s) SubCutaneous every 24 hours  influenza  Vaccine (HIGH DOSE) 0.7 milliLiter(s) IntraMuscular once    MEDICATIONS  (PRN):  acetaminophen     Tablet .. 650 milliGRAM(s) Oral every 6 hours PRN Temp greater or equal to 38C (100.4F), Mild Pain (1 - 3)  aluminum hydroxide/magnesium hydroxide/simethicone Suspension 30 milliLiter(s) Oral every 4 hours PRN Dyspepsia  melatonin 3 milliGRAM(s) Oral at bedtime PRN Insomnia  ondansetron Injectable 4 milliGRAM(s) IV Push every 8 hours PRN Nausea and/or Vomiting      Allergies  No Known Allergies      Review of Systems:   Constitutional:  No Fever, No Chills  ENT/Mouth:  No Hearing Changes,  No Difficulty Swallowing  Eyes:  No Eye Pain, No Vision Changes  Cardiovascular:  No Chest Pain, No Palpitations  Respiratory:  No Cough, No Dyspnea  Gastrointestinal:  As described in HPI  Musculoskeletal:  No Joint Swelling, No Back Pain  Skin:  No Skin Lesions, No Jaundice  Neuro:  No Syncope, No Dizziness  Heme/Lymph:  No Bruising, No Bleeding.          Physical Examination:  T(C): 36.6 (02-21-24 @ 05:00), Max: 37.2 (02-21-24 @ 00:13)  HR: 93 (02-21-24 @ 06:13) (87 - 96)  BP: 109/73 (02-21-24 @ 06:13) (89/60 - 115/77)  RR: 18 (02-21-24 @ 05:00) (17 - 19)  SpO2: 97% (02-20-24 @ 22:44) (97% - 97%)      02-21-24 @ 07:01  -  02-21-24 @ 11:04  --------------------------------------------------------  IN: 0 mL / OUT: 130 mL / NET: -130 mL      GENERAL: AAOx3, no acute distress.  HEAD:  Atraumatic, Normocephalic  EYES: conjunctiva and sclera clear  NECK: Supple, no JVD or thyromegaly  CHEST/LUNG: Clear to auscultation bilaterally  HEART: Regular rate and rhythm; normal S1, S2, No murmurs.  ABDOMEN: Soft, nontender, nondistended  NEUROLOGY: No asterixis or tremor.   SKIN: Intact, no jaundice    Data:                        10.0   4.88  )-----------( 282      ( 21 Feb 2024 07:21 )             32.5     Hgb Trend:  10.0  02-21-24 @ 07:21  9.9  02-20-24 @ 12:10        02-21    140  |  109  |  21<H>  ----------------------------<  104<H>  5.0   |  18  |  1.3    Ca    8.2<L>      21 Feb 2024 07:21    TPro  6.5  /  Alb  4.1  /  TBili  0.4  /  DBili  x   /  AST  21  /  ALT  13  /  AlkPhos  66  02-20    Liver panel trend:  TBili 0.4   /   AST 21   /   ALT 13   /   AlkP 66   /   Tptn 6.5   /   Alb 4.1    /   DBili --      02-20              Radiology:

## 2024-02-21 NOTE — CONSULT NOTE ADULT - ASSESSMENT
68-year-old male with past medical history of hypertension, chronic back pain, prostate cancer, and posterior mediastinal mass 6.5 cm seen on CT 11/2023, presents for 3 months of progressively difficulty swallowing associated with 10 pounds weight loss. Comes in today now unable to swallow his pills with water, resulting in 2 episode of emesis. GI consulted for dysphagia.     # Dysphagia secondary to mediastinal mass   Prostate cancer and is s/p radiation therapy.   Patient had known mediastinal mass since jan 2023, Was evaluated recently by his urologist for new bladder mass. Patient was scheduled for cystoscopy and PET scan to r/o multiple primary malignancy.   Mediastinal mass was never biopsied per patient.  Hx of VERONICA on iron supplement. NO overt GI bleeding.   Reviewed CT scan: large post mediastinal mass, + LNs, pill within upper esophagus, distended proximal esophagus. and upper mesenteric mass.    PLAN   NPO   EGD today   Oncology evaluation.   F/U with urology as outpatient for bladder mass and prostate cancer.   Plan was discussed with the patient.    Will follow

## 2024-02-22 LAB
AFP-TM SERPL-MCNC: 3.5 NG/ML — SIGNIFICANT CHANGE UP
ALBUMIN SERPL ELPH-MCNC: 3.9 G/DL — SIGNIFICANT CHANGE UP (ref 3.5–5.2)
ALP SERPL-CCNC: 64 U/L — SIGNIFICANT CHANGE UP (ref 30–115)
ALT FLD-CCNC: 17 U/L — SIGNIFICANT CHANGE UP (ref 0–41)
ANION GAP SERPL CALC-SCNC: 15 MMOL/L — HIGH (ref 7–14)
AST SERPL-CCNC: 23 U/L — SIGNIFICANT CHANGE UP (ref 0–41)
BASOPHILS # BLD AUTO: 0.02 K/UL — SIGNIFICANT CHANGE UP (ref 0–0.2)
BASOPHILS NFR BLD AUTO: 0.2 % — SIGNIFICANT CHANGE UP (ref 0–1)
BILIRUB SERPL-MCNC: 0.5 MG/DL — SIGNIFICANT CHANGE UP (ref 0.2–1.2)
BUN SERPL-MCNC: 22 MG/DL — HIGH (ref 10–20)
CALCIUM SERPL-MCNC: 7.8 MG/DL — LOW (ref 8.4–10.5)
CANCER AG125 SERPL-ACNC: 57 U/ML — HIGH
CANCER AG19-9 SERPL-ACNC: 131 U/ML — HIGH
CEA SERPL-MCNC: 2.4 NG/ML — SIGNIFICANT CHANGE UP (ref 0–3.8)
CHLORIDE SERPL-SCNC: 106 MMOL/L — SIGNIFICANT CHANGE UP (ref 98–110)
CO2 SERPL-SCNC: 18 MMOL/L — SIGNIFICANT CHANGE UP (ref 17–32)
CREAT SERPL-MCNC: 1.5 MG/DL — SIGNIFICANT CHANGE UP (ref 0.7–1.5)
EGFR: 50 ML/MIN/1.73M2 — LOW
EOSINOPHIL # BLD AUTO: 0 K/UL — SIGNIFICANT CHANGE UP (ref 0–0.7)
EOSINOPHIL NFR BLD AUTO: 0 % — SIGNIFICANT CHANGE UP (ref 0–8)
FERRITIN SERPL-MCNC: 883 NG/ML — HIGH (ref 30–400)
GLUCOSE SERPL-MCNC: 115 MG/DL — HIGH (ref 70–99)
HCT VFR BLD CALC: 32.6 % — LOW (ref 42–52)
HGB BLD-MCNC: 10 G/DL — LOW (ref 14–18)
IMM GRANULOCYTES NFR BLD AUTO: 0.3 % — SIGNIFICANT CHANGE UP (ref 0.1–0.3)
LYMPHOCYTES # BLD AUTO: 0.64 K/UL — LOW (ref 1.2–3.4)
LYMPHOCYTES # BLD AUTO: 6.5 % — LOW (ref 20.5–51.1)
MAGNESIUM SERPL-MCNC: 2.3 MG/DL — SIGNIFICANT CHANGE UP (ref 1.8–2.4)
MCHC RBC-ENTMCNC: 28.7 PG — SIGNIFICANT CHANGE UP (ref 27–31)
MCHC RBC-ENTMCNC: 30.7 G/DL — LOW (ref 32–37)
MCV RBC AUTO: 93.4 FL — SIGNIFICANT CHANGE UP (ref 80–94)
MONOCYTES # BLD AUTO: 0.81 K/UL — HIGH (ref 0.1–0.6)
MONOCYTES NFR BLD AUTO: 8.2 % — SIGNIFICANT CHANGE UP (ref 1.7–9.3)
NEUTROPHILS # BLD AUTO: 8.32 K/UL — HIGH (ref 1.4–6.5)
NEUTROPHILS NFR BLD AUTO: 84.8 % — HIGH (ref 42.2–75.2)
NRBC # BLD: 0 /100 WBCS — SIGNIFICANT CHANGE UP (ref 0–0)
PLATELET # BLD AUTO: 286 K/UL — SIGNIFICANT CHANGE UP (ref 130–400)
PMV BLD: 9.8 FL — SIGNIFICANT CHANGE UP (ref 7.4–10.4)
POTASSIUM SERPL-MCNC: 4.8 MMOL/L — SIGNIFICANT CHANGE UP (ref 3.5–5)
POTASSIUM SERPL-SCNC: 4.8 MMOL/L — SIGNIFICANT CHANGE UP (ref 3.5–5)
PROT SERPL-MCNC: 5.9 G/DL — LOW (ref 6–8.3)
PROT SERPL-MCNC: 6.5 G/DL — SIGNIFICANT CHANGE UP (ref 6–8)
PSA FREE FLD-MCNC: 10 % — SIGNIFICANT CHANGE UP
PSA FREE FLD-MCNC: 22.9 NG/ML — SIGNIFICANT CHANGE UP
PSA SERPL-MCNC: 220 NG/ML — HIGH (ref 0–4)
RBC # BLD: 3.49 M/UL — LOW (ref 4.7–6.1)
RBC # FLD: 13.5 % — SIGNIFICANT CHANGE UP (ref 11.5–14.5)
SODIUM SERPL-SCNC: 139 MMOL/L — SIGNIFICANT CHANGE UP (ref 135–146)
WBC # BLD: 9.82 K/UL — SIGNIFICANT CHANGE UP (ref 4.8–10.8)
WBC # FLD AUTO: 9.82 K/UL — SIGNIFICANT CHANGE UP (ref 4.8–10.8)

## 2024-02-22 PROCEDURE — 71045 X-RAY EXAM CHEST 1 VIEW: CPT | Mod: 26

## 2024-02-22 PROCEDURE — 99232 SBSQ HOSP IP/OBS MODERATE 35: CPT

## 2024-02-22 RX ORDER — CHLORHEXIDINE GLUCONATE 213 G/1000ML
1 SOLUTION TOPICAL
Refills: 0 | Status: DISCONTINUED | OUTPATIENT
Start: 2024-02-22 | End: 2024-03-22

## 2024-02-22 RX ORDER — TRAMADOL HYDROCHLORIDE 50 MG/1
25 TABLET ORAL EVERY 6 HOURS
Refills: 0 | Status: DISCONTINUED | OUTPATIENT
Start: 2024-02-22 | End: 2024-02-24

## 2024-02-22 RX ORDER — SODIUM CHLORIDE 9 MG/ML
500 INJECTION, SOLUTION INTRAVENOUS ONCE
Refills: 0 | Status: COMPLETED | OUTPATIENT
Start: 2024-02-22 | End: 2024-02-22

## 2024-02-22 RX ADMIN — Medication 650 MILLIGRAM(S): at 16:47

## 2024-02-22 RX ADMIN — Medication 650 MILLIGRAM(S): at 11:10

## 2024-02-22 RX ADMIN — Medication 650 MILLIGRAM(S): at 09:49

## 2024-02-22 RX ADMIN — Medication 650 MILLIGRAM(S): at 17:17

## 2024-02-22 RX ADMIN — ENOXAPARIN SODIUM 40 MILLIGRAM(S): 100 INJECTION SUBCUTANEOUS at 09:50

## 2024-02-22 NOTE — DIETITIAN INITIAL EVALUATION ADULT - WEIGHT USED FOR CALCULATIONS
Pediatric Protocol: Jet Aerosol Assessment      Patient  Gilberto Yanez     14 m.o.   male     12/4/2021  5:57 AM    Breath Sounds Pre Procedure: Right Breath Sounds: Expiratory wheezing                               Left Breath Sounds: Expiratory wheezing    Breath Sounds Post Procedure: Right Breath Sounds: Clear, Scattered wheezing                                 Left Breath Sounds: Clear    Breathing pattern: Pre procedure Breathing Pattern: Regular          Post procedure Breathing Pattern: Regular    PAS Score:  2     Heart Rate: Pre procedure Pulse: 141           Post procedure Pulse: 152    Resp Rate: Pre procedure Respirations: 36           Post procedure Respirations: 32    Suctioned:     Sputum: Pre procedure                   Post procedure      Oxygen: O2 Device: Nasal cannula   3L     Changed: Oxygen 2L    SpO2: Pre procedure SpO2: 100 %                 Post procedure SpO2: 100 %      Nebulizer Therapy: Current medications Aerosolized Medications: Albuterol 5mg every 2 hours      Changed: no      Problem List:   Patient Active Problem List   Diagnosis Code    Single liveborn infant, delivered vaginally Z38.00    Acute respiratory distress R06.03    Wheezing-associated respiratory infection (WARI) J98.8         Respiratory Therapist: Tootie Alves RT current weight IBW

## 2024-02-22 NOTE — PHYSICAL THERAPY INITIAL EVALUATION ADULT - GENERAL OBSERVATIONS, REHAB EVAL
15:23. Pt currently off unit at endoscopy. Will f/u with PT as appropriate.
ADONIS alves reviewed with pt. He reports doing them daily at home. [id number]
8:03-8:30am  Pt encountered in semi-vincent position in bed, A & O x 3 in NAD,  c/o pain 8/10 Rt shoulder at end range( dec Rt shoulder AROM by 25%). Pt requires supervision in bed mobility and sit/stand transfer.  Pt ambulated 50 ft CGA using RW with decreased sherri, NBOS, dec posture and minimal gait instability secondary to weakness. Pt demonstrates limited ambulation secondary to c/o feeling ligthheaded. Orthostatic BP checked: /79, HR 97 supine; /75, HR 95 sitting; BP 92/57, HR 111supine. Pt left in bed as found after PT, applied heating pad to Rt shoulder, Dr Gomez made aware.  Pt will benefit from skilled PT 3-5x/wk for thera ex, balance act and gait training to improve mobility status and return to previous level of function.

## 2024-02-22 NOTE — PROGRESS NOTE ADULT - ATTENDING COMMENTS
Pt examined this am. Having pain in the front and back of his throat. Tolerating liquids but not solids. Will start IVF. Feels dizzy at times. SW able to obtain [facility] records and noted oncologist there comes here and they were contacted. Orthostatics +. Pt had a stent placed via EGD on 2/21, on clear liquid for now.

## 2024-02-22 NOTE — DIETITIAN INITIAL EVALUATION ADULT - PERTINENT LABORATORY DATA
02-22    139  |  106  |  22<H>  ----------------------------<  115<H>  4.8   |  18  |  1.5    Ca    7.8<L>      22 Feb 2024 07:16  Mg     2.3     02-22    TPro  6.5  /  Alb  3.9  /  TBili  0.5  /  DBili  x   /  AST  23  /  ALT  17  /  AlkPhos  64  02-22

## 2024-02-22 NOTE — PROGRESS NOTE ADULT - ATTENDING COMMENTS
Dysphagia secondary to mediastinal mass  s/p EGD with esophageal stent and EUS guided FNB , awaitng pathology, assesment and plan as above

## 2024-02-22 NOTE — DIETITIAN INITIAL EVALUATION ADULT - ORAL INTAKE PTA/DIET HISTORY
Pt reports poor po intake for the last 2 months d/t poor appetite and pain.  Pt unsure of UBW but states he weighed about 300# 3 years ago.   pt states he has lost about 10# in last several months  per [facility] tab, wt hx: 6/23: 77.1 kg, 9/23: 78.5, 10/23: 79.8, 11/23:74.7, 12/23: 77.6  pt unsure when last BM was- sometime last week.  NKFA  Pt c/o vomiting 2 days ago but this has resolved.    Appetite while at this facility poor. Pt reports increased pain when swallowing on neck area. Pt is not consuming clear liquid trays, only takes small sips of water but this is becoming increasingly painful for him.   Pt has no other GI complaints

## 2024-02-22 NOTE — PROGRESS NOTE ADULT - ASSESSMENT
68-year-old male with past medical history of hypertension, chronic back pain, prostate cancer, and posterior mediastinal mass 6.5 cm seen on CT 11/2023, presents for 3 months of progressively difficulty swallowing associated with 10 pounds weight loss. Comes in today now unable to swallow his pills with water, resulting in 2 episode of emesis. GI consulted for dysphagia.     # Dysphagia secondary to mediastinal mass  s/p EGD with esophageal stent and EUS guided FNB   Prostate cancer and is s/p radiation therapy.   Patient had known mediastinal mass since jan 2023, Was evaluated recently by his urologist for new bladder mass. Patient was scheduled for cystoscopy and PET scan to r/o multiple primary malignancy.   Mediastinal mass was never biopsied per patient.  Hx of VERONICA on iron supplement. NO overt GI bleeding.   Reviewed CT scan: large post mediastinal mass, + LNs, pill within upper esophagus, distended proximal esophagus. and upper mesenteric mass.    PLAN   Await pathology results.   c/w clear liquids today. advance to soft diet tomorrow as tolerated  f/u with oncology   F/U with urology as outpatient for bladder mass and prostate cancer.   Plan was discussed with the patient.    Will follow    68-year-old male with past medical history of hypertension, chronic back pain, prostate cancer, and posterior mediastinal mass 6.5 cm seen on CT 11/2023, presents for 3 months of progressively difficulty swallowing associated with 10 pounds weight loss. Comes in today now unable to swallow his pills with water, resulting in 2 episode of emesis. GI consulted for dysphagia.     # Dysphagia secondary to mediastinal mass  s/p EGD with esophageal stent and EUS guided FNB   Prostate cancer and is s/p radiation therapy.   Patient had known mediastinal mass since jan 2023, Was evaluated recently by his urologist for new bladder mass. Patient was scheduled for cystoscopy and PET scan to r/o multiple primary malignancy.   Mediastinal mass was never biopsied per patient.  Hx of VERONICA on iron supplement. NO overt GI bleeding.   Reviewed CT scan: large post mediastinal mass, + LNs, pill within upper esophagus, distended proximal esophagus. and upper mesenteric mass.    PLAN   Await pathology results.   c/w clear liquids today. advance to soft diet tomorrow as tolerated  f/u with oncology   F/U with urology as outpatient for bladder mass and prostate cancer.   Plan was discussed with the patient.        Please follow up with [facility] after discharge.

## 2024-02-22 NOTE — CONSULT NOTE ADULT - SUBJECTIVE AND OBJECTIVE BOX
GENERAL SURGERY CONSULT NOTE    Patient: RUFUS PICKARD , 68y ([birth year])Male   MRN: [id number]  Location: [address]   Visit: 02-20-24 Inpatient  Date: 02-22-24 @ 14:54    HPI:  68-year-old male with past medical history of hypertension, chronic back pain, prostate cancer, and posterior mediastinal mass 6.5 cm seen on CT 11/2023, presents for 3 months of progressively difficulty swallowing associated with 10 pounds weight loss and odynophagia. Comes in today now unable to swallow his pills with water, resulting in 2 episode of emesis. He mentioned that he gets SOB during exertion but denies any exertional chest pain. As per patient he was treated at [facility] for prostate cancer and is s/p radiation therapy. He states that he has problem during walking and unable to maintain the posture.   Denies any abdominal pain, chest pain, dyspnea, diarrhea, fever, chills     Vital Signs Last 24 Hrs  T(C): 36.7 (20 Feb 2024 10:53), Max: 36.7 (20 Feb 2024 10:53)  T(F): 98 (20 Feb 2024 10:53), Max: 98 (20 Feb 2024 10:53)  HR: 78 (20 Feb 2024 10:53) (78 - 78)  BP: 112/78 (20 Feb 2024 10:53) (112/78 - 112/78)  RR: 17 (20 Feb 2024 10:53) (17 - 17)  SpO2: 100% (20 Feb 2024 10:53) (100% - 100%)  O2 Parameters below as of 20 Feb 2024 10:53  Patient On (Oxygen Delivery Method): room air      General Surgery consulted for evaluation of mediastinal mass. As per patient he was treated at [facility] for prostate cancer and is s/p radiation therapy. He states that he has problem during walking and unable to maintain the posture.    According to the patient he only received radiation for the prostate cancer and no biopsy was done. However according to the PET scan report done on 1/11/2023 the patient had biopsy of mediastinal mass done showing metastatic prostate adenocarcinoma and was started on docetaxel. The patient then had both the mediastinal and the retroperitoneal mass. He does not remember he was biopsied and unable to provide further information about who he was following with. Pt also had OP appointment with urology on 1/23/2024 for new findings on CT A/P done in our ED in November showing again the mediastinal mass, retroperitoneal mass/LN conglomerate and possible new left sided bladder mass causing severe hydronephrosis. He was ordered cystoscopy and PET scan as OP but not done.     Overall patient is awake but has poor insight into medical condition but unable to tell about his medical history and the treatment he has gotten for his maliganancy. The patient reports nausea with no vomiting however reports that he is unable to tolerate liquids at this time. The patient denies any abdominal pain, no CP, SOB fever or chills.     This information was found in the report of the PET scan done on 1/11/2023:   67-year-old male with biopsy proven (mediastinal mass)   metastatic prostate adenocarcinoma. No chemotherapy or radiation.    Started on Docetaxel.  CT chest, abdomen, pelvis 12/21/2022  Left supraclavicular lymphadenopathy.  Left posterior mediastinal mass.  Abdominal retroperitoneal heterogeneous mass with presumed obstruction of   left ureter and resulting moderate hydronephroureter.  Heterogeneous mesenteric mass abutting the sigmoid colon, additional   necrotic mass inseparable from the posterior rectal wall.  Soft tissue mass at left lateral urinary bladder base.  Left inguinal hernia.      CT chest scan here demonstrates interval enlargement of this mass diffusely and development o LAD, encircles the descending aorta, mass effect on LA, esophageal encircling with distension of esophagus as well. Patient is s/p EGD EUS yesterday where a 18mm x 15cm covered metallic stent with ovesco clip was placed 30cm from incisiors down to 38cm from incisors.        (20 Feb 2024 15:17)      PAST MEDICAL & SURGICAL HISTORY:  HTN (hypertension)      Prostate cancer          Home Medications:  Feosol 325 mg (65 mg elemental iron) oral tablet: 1 tab(s) orally 2 times a day (20 Feb 2024 16:17)  ibuprofen 800 mg oral tablet: 1 tab(s) orally every 6 hours (20 Feb 2024 16:19)  Imodium 2 mg oral capsule: 1 cap(s) orally 2 times a day (20 Feb 2024 16:16)  lidocaine 5% patch:  (20 Feb 2024 16:26)  Norvasc 5 mg oral tablet: 1 tab(s) orally once a day (20 Feb 2024 16:16)  Tylenol 325 mg oral tablet: 2 tab(s) orally every 6 hours (20 Feb 2024 16:26)        VITALS:  T(F): 97.3 (02-22-24 @ 14:20), Max: 97.8 (02-22-24 @ 05:36)  HR: 94 (02-22-24 @ 14:20) (91 - 101)  BP: 116/64 (02-22-24 @ 14:20) (102/68 - 128/88)  RR: 18 (02-22-24 @ 14:20) (16 - 21)  SpO2: 100% (02-21-24 @ 17:14) (97% - 100%)    PHYSICAL EXAM:  General: NAD, AAOx3, calm and cooperative-poor historian  HEENT: NCAT, SAKINA, EOMI, Trachea ML, Neck supple  Cardiac: RRR  Respiratory: CTAB  Abdomen: Soft, non-distended, non-tender, no rebound, no guarding.       MEDICATIONS  (STANDING):  chlorhexidine 2% Cloths 1 Application(s) Topical <User Schedule>  dextrose 5% + lactated ringers. 1000 milliLiter(s) (50 mL/Hr) IV Continuous <Continuous>  enoxaparin Injectable 40 milliGRAM(s) SubCutaneous every 24 hours  influenza  Vaccine (HIGH DOSE) 0.7 milliLiter(s) IntraMuscular once  traMADol 25 milliGRAM(s) Oral every 6 hours    MEDICATIONS  (PRN):  acetaminophen     Tablet .. 650 milliGRAM(s) Oral every 6 hours PRN Temp greater or equal to 38C (100.4F), Mild Pain (1 - 3)  aluminum hydroxide/magnesium hydroxide/simethicone Suspension 30 milliLiter(s) Oral every 4 hours PRN Dyspepsia  melatonin 3 milliGRAM(s) Oral at bedtime PRN Insomnia  ondansetron Injectable 4 milliGRAM(s) IV Push every 8 hours PRN Nausea and/or Vomiting      LAB/STUDIES:                        10.0   9.82  )-----------( 286      ( 22 Feb 2024 07:16 )             32.6     02-22    139  |  106  |  22<H>  ----------------------------<  115<H>  4.8   |  18  |  1.5    Ca    7.8<L>      22 Feb 2024 07:16  Mg     2.3     02-22    TPro  6.5  /  Alb  3.9  /  TBili  0.5  /  DBili  x   /  AST  23  /  ALT  17  /  AlkPhos  64  02-22      LIVER FUNCTIONS - ( 22 Feb 2024 07:16 )  Alb: 3.9 g/dL / Pro: 6.5 g/dL / ALK PHOS: 64 U/L / ALT: 17 U/L / AST: 23 U/L / GGT: x           Urinalysis Basic - ( 22 Feb 2024 07:16 )    Color: x / Appearance: x / SG: x / pH: x  Gluc: 115 mg/dL / Ketone: x  / Bili: x / Urobili: x   Blood: x / Protein: x / Nitrite: x   Leuk Esterase: x / RBC: x / WBC x   Sq Epi: x / Non Sq Epi: x / Bacteria: x        IMAGING:    < from: CT Chest w/ IV Cont (02.20.24 @ 13:58) >  No evidence of pulmonary embolus    Interval enlargement of the central/posterior mediastinal mass with   development of more extensive lymphadenopathy. The mass encircles the   descending thoracic aorta and has some mass effect upon the left atrium.   The mid aspect of the esophagus is encircled by the mass and difficult to   distinguish. Possible ovoid pill is noted within the mass and possible   location of the mid esophagus appear. The maximal esophagus appears   distended with layering debris within. Consideration may be given to   esophageal stenting.    < end of copied text >  < from: CT Abdomen and Pelvis w/ IV Cont (02.21.24 @ 12:07) >  1.  Since November 22, 2023, increasedupper abdominal bulky   lymphadenopathy.  2.  Unchanged bulky retroperitoneal lymphadenopathy, large left bladder   mass and perirectal soft tissue infiltrative mass.  3.  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  4.  Chronic moderate to severe left hydroureteronephrosis.    See separately dictated CT chest report for intrathoracic findings.    < end of copied text >

## 2024-02-22 NOTE — DIETITIAN INITIAL EVALUATION ADULT - OTHER INFO
CT Chest: Interval enlargement of the central/posterior mediastinal mass with development of more extensive lymphadenopathy. The mass encircles the descending thoracic aorta and has some mass effect upon the left atrium. The mid aspect of the esophagus is encircled by the mass and difficult to distinguish. Possible ovoid pill is noted within the mass and possible location of the mid esophagus appear.  Primary diagnosis of Mediastinal mass  pmhx: prostate cx, htn

## 2024-02-22 NOTE — PHYSICAL THERAPY INITIAL EVALUATION ADULT - PERTINENT HX OF CURRENT PROBLEM, REHAB EVAL
68-year-old male with past medical history of hypertension, chronic back pain, prostate cancer, and posterior mediastinal mass 6.5 cm seen on CT 11/2023, presents for 3 months of progressively difficulty swallowing associated with 10 pounds weight loss and odynophagia. Comes in today now unable to swallow his pills with water, resulting in 2 episode of emesis. He mentioned that he gets SOB during exertion but denies any exertional chest pain. As per patient he was treated at [facility] for prostate cancer and is s/p radiation therapy. He states that he has problem during walking and unable to maintain the posture.
68-year-old male with past medical history of hypertension, chronic back pain, prostate cancer, and posterior mediastinal mass 6.5 cm seen on CT 11/2023, presents for 3 months of progressively difficulty swallowing associated with 10 pounds weight loss and odynophagia. Comes in today now unable to swallow his pills with water, resulting in 2 episode of emesis. He mentioned that he gets SOB during exertion but denies any exertional chest pain. As per patient he was treated at [facility] for prostate cancer and is s/p radiation therapy. He states that he has problem during walking and unable to maintain the posture.

## 2024-02-22 NOTE — PROGRESS NOTE ADULT - ASSESSMENT
68-year-old male with past medical history of hypertension, chronic back pain, prostate cancer, and posterior mediastinal mass 6.5 cm seen on CT 11/2023, presents for 3 months of progressively difficulty swallowing associated with 10 pounds weight loss and odynophagia. Comes in today now unable to swallow his pills with water, resulting in 2 episode of emesis. He mentioned that he gets SOB during exertion but denies any exertional chest pain. As per patient he was treated at [facility] for prostate cancer and is s/p radiation therapy. He states that he has problem during walking and unable to maintain the posture.     # Dysphagia with odynophagia  # Hx of prostate cancer s/p radiation therapy  # Prostate cancer metastasis to mediastinum or primary mediastinal mass?  - CT Chest: Interval enlargement of the central/posterior mediastinal mass with development of more extensive lymphadenopathy. The mass encircles the descending thoracic aorta and has some mass effect upon the left atrium. The mid aspect of the esophagus is encircled by the mass and difficult to distinguish. Possible ovoid pill is noted within the mass and possible location of the mid esophagus appear. The maximal esophagus appears distended with layering debris within. Consideration may be given to esophageal stenting.  - CT abdomen and pelvis:  Increasedupper abdominal bulky lymphadenopathy. Unchanged bulky retroperitoneal lymphadenopathy, large left bladder   mass and perirectal soft tissue infiltrative mass. 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 Chronic moderate to severe left hydroureteronephrosis  - cleared for easy to chew from SS but deferred to GI; can start clear liquid diet today as per GI   - EGD with GI 2/21 -> stent placed and biopsies taken   - AFP, LDH, PSA, CEA, CA-19, CA-125 noted   - oncology recs appreciated -> pending records from [facility] for further recommendations on treatment options   - pulm recs appreciated   - pending CT surgery eval   - per onc note, patient had prior biopsy of site that demonstrated metastatic prostate adenocarcinoma and was started on Docetaxel. Patient does not recall; attempted to contact [facility] medical records twice yesterday, left voicemail and phone number without call back; will try [facility] today.     # small b/l pleural effusions on CT chest   - will give 1x dose 40mg IV Lasix     # Anemia  - Hg of 9.9  - f/u iron panel  - MCV normal, SPEP    # CKD stage 3  - avoid nephrotoxic agent  - creat baseline    #Misc   #DVT PPx- Lovenox  #GI PPx- Protonix IV  #Diet- clear liquid diet   #Activity- AAT, PT consult  #Dispo- Acute   68-year-old male with past medical history of hypertension, chronic back pain, prostate cancer, and posterior mediastinal mass 6.5 cm seen on CT 11/2023, presents for 3 months of progressively difficulty swallowing associated with 10 pounds weight loss and odynophagia. Comes in today now unable to swallow his pills with water, resulting in 2 episode of emesis. He mentioned that he gets SOB during exertion but denies any exertional chest pain. As per patient he was treated at [facility] for prostate cancer and is s/p radiation therapy. He states that he has problem during walking and unable to maintain the posture.     # Dysphagia with odynophagia  # Hx of prostate cancer s/p radiation therapy  # Prostate cancer metastasis to mediastinum or primary mediastinal mass?  - CT Chest: Interval enlargement of the central/posterior mediastinal mass with development of more extensive lymphadenopathy. The mass encircles the descending thoracic aorta and has some mass effect upon the left atrium. The mid aspect of the esophagus is encircled by the mass and difficult to distinguish. Possible ovoid pill is noted within the mass and possible location of the mid esophagus appear. The maximal esophagus appears distended with layering debris within. Consideration may be given to esophageal stenting.  - CT abdomen and pelvis:  Increasedupper abdominal bulky lymphadenopathy. Unchanged bulky retroperitoneal lymphadenopathy, large left bladder   mass and perirectal soft tissue infiltrative mass. 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 Chronic moderate to severe left hydroureteronephrosis  - cleared for easy to chew from SS but deferred to GI; can start clear liquid diet today as per GI   - EGD with GI 2/21 -> stent placed and biopsies taken   - AFP, LDH, PSA, CEA, CA-19, CA-125 noted   - oncology recs appreciated -> pending records from [facility] for further recommendations on treatment options   - pulm recs appreciated   - pending CT surgery eval   - per onc note, patient had prior biopsy of site that demonstrated metastatic prostate adenocarcinoma and was started on Docetaxel. Patient does not recall; attempted to contact [facility] medical records twice yesterday, left voicemail and phone number without call back; will try [facility] today  - tramadol 25mg q6h for pain management     # small b/l pleural effusions on CT chest   - will give 1x dose 40mg IV Lasix     # Anemia  - Hg of 9.9  - f/u iron panel  - MCV normal, SPEP    # CKD stage 3  - avoid nephrotoxic agent  - creat baseline    #Misc   #DVT PPx- Lovenox  #GI PPx- Protonix IV  #Diet- clear liquid diet   #Activity- AAT, PT consult  #Dispo- Acute

## 2024-02-22 NOTE — CONSULT NOTE ADULT - ASSESSMENT
ASSESSMENT:  68-year-old male with past medical history of hypertension, chronic back pain, prostate cancer, and posterior mediastinal mass 6.5 cm seen on CT 11/2023, presents for 3 months of progressively difficulty swallowing associated with 10 pounds weight loss and odynophagia    PLAN:  -Pending         Above plan discussed with Attending Surgeon Dr. Cornejo  , patient, patient family, and Primary team  02-22-24 @ 14:54     ASSESSMENT:  68-year-old male with past medical history of hypertension, chronic back pain, prostate cancer, and posterior mediastinal mass 6.5 cm seen on CT 11/2023, presents for 3 months of progressively difficulty swallowing associated with 10 pounds weight loss and odynophagia. CT surgery consulted for mediastinal mass.    PLAN:  -No acute surgical intervention indicated at this time  -Appreciate GI follow up, monitor toleration of PO intake  -Appreciate Heme/Onc recommendations-patient will likely benefit from continued radiation +/- chemo  -Surgery team to follow      Above plan discussed with Attending Surgeon Dr. Cornejo  , patient, patient family, and Primary team  02-22-24 @ 14:54

## 2024-02-22 NOTE — PROGRESS NOTE ADULT - SUBJECTIVE AND OBJECTIVE BOX
24H events:    Patient is a 68y old Male who presents with a chief complaint of Mediastinal Mass (21 Feb 2024 12:04)    Primary diagnosis of Mediastinal mass    Today is hospital day 2d.   EGD done yesterday, stent placed at site of stricture and biopsies taken.   Attempted to get medical records from [facility] yesterday without success, will call NH today.   Pending CT surgery.     PAST MEDICAL & SURGICAL HISTORY  HTN (hypertension)    Prostate cancer      SOCIAL HISTORY:  Social History:      ALLERGIES:  No Known Allergies    MEDICATIONS:  STANDING MEDICATIONS  dextrose 5% + lactated ringers. 1000 milliLiter(s) IV Continuous <Continuous>  enoxaparin Injectable 40 milliGRAM(s) SubCutaneous every 24 hours  influenza  Vaccine (HIGH DOSE) 0.7 milliLiter(s) IntraMuscular once  lactated ringers Bolus 500 milliLiter(s) IV Bolus once    PRN MEDICATIONS  acetaminophen     Tablet .. 650 milliGRAM(s) Oral every 6 hours PRN  aluminum hydroxide/magnesium hydroxide/simethicone Suspension 30 milliLiter(s) Oral every 4 hours PRN  melatonin 3 milliGRAM(s) Oral at bedtime PRN  ondansetron Injectable 4 milliGRAM(s) IV Push every 8 hours PRN    VITALS:   T(F): 97.8  HR: 91  BP: 102/68  RR: 18  SpO2: 100%    PHYSICAL EXAM:    GENERAL: NAD, non-toxic appearing  NECK: Supple, no stiffness, no JVD, no thyromegaly   HEART: Regular rate and rhythm, normal s1s2  LUNGS: Unlabored respirations, b/l air entry, no adventitious breath sounds   ABDOMEN: Soft, nontender, nondistended; +BS  EXTREMITIES: No clubbing, cyanosis, or edema; 2+ peripheral pulses   NERVOUS SYSTEM: AOx3  SKIN: Warm and dry, no rashes or lesions      LABS:                        10.0   9.82  )-----------( 286      ( 22 Feb 2024 07:16 )             32.6     02-22    139  |  106  |  22<H>  ----------------------------<  115<H>  4.8   |  18  |  1.5    Ca    7.8<L>      22 Feb 2024 07:16  Mg     2.3     02-22    TPro  6.5  /  Alb  3.9  /  TBili  0.5  /  DBili  x   /  AST  23  /  ALT  17  /  AlkPhos  64  02-22      Urinalysis Basic - ( 22 Feb 2024 07:16 )    Color: x / Appearance: x / SG: x / pH: x  Gluc: 115 mg/dL / Ketone: x  / Bili: x / Urobili: x   Blood: x / Protein: x / Nitrite: x   Leuk Esterase: x / RBC: x / WBC x   Sq Epi: x / Non Sq Epi: x / Bacteria: x          RADIOLOGY:  < from: CT Abdomen and Pelvis w/ IV Cont (02.21.24 @ 12:07) >      FINDINGS:    HEPATOBILIARY: Unremarkable.    SPLEEN: Unremarkable.    PANCREAS: Unremarkable.    ADRENAL GLANDS: Left adrenal gland 2.8 cm metastasis, previously 2.3 cm.    KIDNEYS:Chronic moderate to severe left hydroureteronephrosis secondary   to a large bladder mass. Subcentimeter hypodensities in the right kidney,   too small to characterize.    ABDOMINOPELVIC NODES: Increased bulky heterogeneous and partially   necrotic upper abdominal lymphadenopathy. For example, a large   gastrohepatic xavier mass measures 5.3 x 4.4 cm. Persistent   retroperitoneal lymphadenopathy, and not significantly changed.    PELVIC ORGANS: Large 7 cm bladder mass, unchanged since prior CT. Limited   evaluation of the primary prostate neoplasm secondary to modality.    PERITONEUM/MESENTERY/BOWEL: Infiltrative mesorectal soft tissue   inseparable from the prostate gland and the rectum. No bowel obstruction,   ascites or pneumoperitoneum.    BONES/SOFT TISSUES: Large left inguinal hernia containing fat and   nonobstructed small bowel/colonic loops. Multilevel degenerative changes   of the spine noted. No suspicious osseous lesions.      IMPRESSION:  1.  Since November 22, 2023, increasedupper abdominal bulky   lymphadenopathy.  2.  Unchanged bulky retroperitoneal lymphadenopathy, large left bladder   mass and perirectal soft tissue infiltrative mass.  3.  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  4.  Chronic moderate to severe left hydroureteronephrosis.    See separately dictated CT chest report for intrathoracic findings.

## 2024-02-22 NOTE — DIETITIAN INITIAL EVALUATION ADULT - SIGNS/SYMPTOMS
[facility] Care Transitions Follow Up Call    -First attempt to reach the patient for subsequent Care Transition call. HIPAA compliant message left with CTN's contact information requesting return phone call.   -Noted in EMR patient had appointment with PCP earlier today.        Patient: Rosemarie Powell  Patient : 1959   MRN: [id number]  Reason for Admission: intractable abdominal pain  Discharge Date: 23 RARS: Readmission Risk Score: 21.7        Follow Up  Future Appointments   Date Time Provider [address] [place] [address]   11/15/2023 10:30 AM DO [facility]                Jeramie Robertson RN moderate fat and muscle loss, <50% estimated needs for >5 days decreased estimate of food, dysphagia

## 2024-02-22 NOTE — DIETITIAN INITIAL EVALUATION ADULT - OTHER CALCULATIONS
Energy: 5137-2179 kcal/day (MSJ x 1.2-1.5 SF)  Protein: 101-126 g/day (1.2-1.5 g/kg)  Fluids: 2100 ml/day (25 ml/kg)  Estimates made with consideration for hx of poor po intake, acuity of illness, age, BMI

## 2024-02-22 NOTE — DIETITIAN INITIAL EVALUATION ADULT - NSFNSGIASSESSMENTFT_GEN_A_CORE
pt unsure of last BM- reports sometime last week.  No recorded BM as st this facility. No other c/o GI issues

## 2024-02-22 NOTE — PROGRESS NOTE ADULT - SUBJECTIVE AND OBJECTIVE BOX
Gastroenterology progress note:     Patient is a 68y old  Male who presents with a chief complaint of Mediastinal Mass (22 Feb 2024 14:54)       Admitted on: 02-20-24    We are following the patient for dysphagia     s/p esophageal stent and FNB     PAST MEDICAL & SURGICAL HISTORY:  HTN (hypertension)      Prostate cancer          MEDICATIONS  (STANDING):  chlorhexidine 2% Cloths 1 Application(s) Topical <User Schedule>  dextrose 5% + lactated ringers. 1000 milliLiter(s) (50 mL/Hr) IV Continuous <Continuous>  enoxaparin Injectable 40 milliGRAM(s) SubCutaneous every 24 hours  influenza  Vaccine (HIGH DOSE) 0.7 milliLiter(s) IntraMuscular once  traMADol 25 milliGRAM(s) Oral every 6 hours    MEDICATIONS  (PRN):  acetaminophen     Tablet .. 650 milliGRAM(s) Oral every 6 hours PRN Temp greater or equal to 38C (100.4F), Mild Pain (1 - 3)  aluminum hydroxide/magnesium hydroxide/simethicone Suspension 30 milliLiter(s) Oral every 4 hours PRN Dyspepsia  melatonin 3 milliGRAM(s) Oral at bedtime PRN Insomnia  ondansetron Injectable 4 milliGRAM(s) IV Push every 8 hours PRN Nausea and/or Vomiting      Allergies  No Known Allergies      Review of Systems:   Cardiovascular:  No Chest Pain, No Palpitations  Respiratory:  No Cough, No Dyspnea  Gastrointestinal:  As described in HPI  Skin:  No Skin Lesions, No Jaundice  Neuro:  No Syncope, No Dizziness    Physical Examination:  T(C): 36.3 (02-22-24 @ 14:20), Max: 36.6 (02-22-24 @ 05:36)  HR: 94 (02-22-24 @ 14:20) (91 - 100)  BP: 116/64 (02-22-24 @ 14:20) (102/68 - 127/91)  RR: 18 (02-22-24 @ 14:20) (18 - 21)  SpO2: 100% (02-21-24 @ 17:14) (100% - 100%)      02-21-24 @ 07:01  -  02-22-24 @ 07:00  --------------------------------------------------------  IN: 0 mL / OUT: 130 mL / NET: -130 mL      GENERAL:  no acute distress.  HEAD:  Atraumatic, Normocephalic  EYES: conjunctiva and sclera clear  NECK: Supple, no JVD or thyromegaly  CHEST/LUNG: Clear to auscultation bilaterally  HEART: Regular rate and rhythm; normal S1, S2, No murmurs.  ABDOMEN: Soft, nontender, nondistended  NEUROLOGY: No asterixis or tremor.   SKIN: Intact, no jaundice     Data:                        10.0   9.82  )-----------( 286      ( 22 Feb 2024 07:16 )             32.6     Hgb trend:  10.0  02-22-24 @ 07:16  10.0  02-21-24 @ 07:21  9.9  02-20-24 @ 12:10        02-22    139  |  106  |  22<H>  ----------------------------<  115<H>  4.8   |  18  |  1.5    Ca    7.8<L>      22 Feb 2024 07:16  Mg     2.3     02-22    TPro  6.5  /  Alb  3.9  /  TBili  0.5  /  DBili  x   /  AST  23  /  ALT  17  /  AlkPhos  64  02-22    Liver panel trend:  TBili 0.5   /   AST 23   /   ALT 17   /   AlkP 64   /   Tptn 6.5   /   Alb 3.9    /   DBili --      02-22  TBili --   /   AST --   /   ALT --   /   AlkP --   /   Tptn 5.9   /   Alb --    /   DBili --      02-21  TBili 0.4   /   AST 21   /   ALT 13   /   AlkP 66   /   Tptn 6.5   /   Alb 4.1    /   DBili --      02-20             Radiology:

## 2024-02-22 NOTE — DIETITIAN INITIAL EVALUATION ADULT - COLLABORATION WITH OTHER PROVIDERS
interventions: meals, snacks, supplements  Monitoring/intervention: energy/protein intake, wts, labs, skin status, GI, NFPE

## 2024-02-22 NOTE — DIETITIAN INITIAL EVALUATION ADULT - ENTERAL
Consider Jevity 1.2 bolus of 420 ml q 6 hours (1680 ml total)  100 ml H2O flush pre and post feeds  to provide 2016 kcal 93 g protein, 2100 ml total H2O

## 2024-02-22 NOTE — DIETITIAN INITIAL EVALUATION ADULT - PERTINENT MEDS FT
MEDICATIONS  (STANDING):  enoxaparin Injectable 40 milliGRAM(s) SubCutaneous every 24 hours  influenza  Vaccine (HIGH DOSE) 0.7 milliLiter(s) IntraMuscular once  traMADol 25 milliGRAM(s) Oral every 6 hours    MEDICATIONS  (PRN):  aluminum hydroxide/magnesium hydroxide/simethicone Suspension 30 milliLiter(s) Oral every 4 hours PRN Dyspepsia  melatonin 3 milliGRAM(s) Oral at bedtime PRN Insomnia  ondansetron Injectable 4 milliGRAM(s) IV Push every 8 hours PRN Nausea and/or Vomiting

## 2024-02-23 PROCEDURE — 99232 SBSQ HOSP IP/OBS MODERATE 35: CPT

## 2024-02-23 RX ORDER — PIPERACILLIN AND TAZOBACTAM 4; .5 G/20ML; G/20ML
3.38 INJECTION, POWDER, LYOPHILIZED, FOR SOLUTION INTRAVENOUS EVERY 8 HOURS
Refills: 0 | Status: DISCONTINUED | OUTPATIENT
Start: 2024-02-23 | End: 2024-02-23

## 2024-02-23 RX ORDER — LIDOCAINE 4 G/100G
1 CREAM TOPICAL EVERY 24 HOURS
Refills: 0 | Status: DISCONTINUED | OUTPATIENT
Start: 2024-02-23 | End: 2024-03-22

## 2024-02-23 RX ORDER — PIPERACILLIN AND TAZOBACTAM 4; .5 G/20ML; G/20ML
3.38 INJECTION, POWDER, LYOPHILIZED, FOR SOLUTION INTRAVENOUS ONCE
Refills: 0 | Status: DISCONTINUED | OUTPATIENT
Start: 2024-02-23 | End: 2024-02-23

## 2024-02-23 RX ADMIN — TRAMADOL HYDROCHLORIDE 25 MILLIGRAM(S): 50 TABLET ORAL at 07:30

## 2024-02-23 RX ADMIN — TRAMADOL HYDROCHLORIDE 25 MILLIGRAM(S): 50 TABLET ORAL at 13:44

## 2024-02-23 RX ADMIN — ENOXAPARIN SODIUM 40 MILLIGRAM(S): 100 INJECTION SUBCUTANEOUS at 12:42

## 2024-02-23 RX ADMIN — TRAMADOL HYDROCHLORIDE 25 MILLIGRAM(S): 50 TABLET ORAL at 06:57

## 2024-02-23 RX ADMIN — CHLORHEXIDINE GLUCONATE 1 APPLICATION(S): 213 SOLUTION TOPICAL at 06:30

## 2024-02-23 RX ADMIN — TRAMADOL HYDROCHLORIDE 25 MILLIGRAM(S): 50 TABLET ORAL at 12:44

## 2024-02-23 RX ADMIN — TRAMADOL HYDROCHLORIDE 25 MILLIGRAM(S): 50 TABLET ORAL at 00:28

## 2024-02-23 RX ADMIN — Medication 650 MILLIGRAM(S): at 01:50

## 2024-02-23 RX ADMIN — Medication 650 MILLIGRAM(S): at 09:19

## 2024-02-23 RX ADMIN — TRAMADOL HYDROCHLORIDE 25 MILLIGRAM(S): 50 TABLET ORAL at 01:00

## 2024-02-23 RX ADMIN — TRAMADOL HYDROCHLORIDE 25 MILLIGRAM(S): 50 TABLET ORAL at 23:25

## 2024-02-23 NOTE — PROGRESS NOTE ADULT - ATTENDING COMMENTS
Pt is complaining of pain and not eating and refusing medications. Continue IVF. Awaiting path. Repeat slp in case any edema from interventions. Trying to get a hold of his oncologist who comes to both [facility] and [facility].

## 2024-02-23 NOTE — SWALLOW BEDSIDE ASSESSMENT ADULT - COMMENTS
Dysphagia secondary to mediastinal mass  s/p EGD with esophageal stent and EUS guided FNB Prostate cancer and is s/p radiation therapy.   Known to ST 2/21/24- reccs for ETC/thin   GI recommends upgrade to soft diet/thin liquids as tolerated

## 2024-02-23 NOTE — PROGRESS NOTE ADULT - ASSESSMENT
68-year-old male with past medical history of hypertension, chronic back pain, prostate cancer, and posterior mediastinal mass 6.5 cm seen on CT 11/2023, presents for 3 months of progressively difficulty swallowing associated with 10 pounds weight loss and odynophagia. Comes in today now unable to swallow his pills with water, resulting in 2 episode of emesis. He mentioned that he gets SOB during exertion but denies any exertional chest pain. As per patient he was treated at [facility] for prostate cancer and is s/p radiation therapy. He states that he has problem during walking and unable to maintain the posture.     # Dysphagia with odynophagia  # Hx of prostate cancer s/p radiation therapy  # Prostate cancer metastasis to mediastinum or primary mediastinal mass?  - CT Chest: Interval enlargement of the central/posterior mediastinal mass with development of more extensive lymphadenopathy. The mass encircles the descending thoracic aorta and has some mass effect upon the left atrium. The mid aspect of the esophagus is encircled by the mass and difficult to distinguish. Possible ovoid pill is noted within the mass and possible location of the mid esophagus appear. The maximal esophagus appears distended with layering debris within. Consideration may be given to esophageal stenting.  - CT abdomen and pelvis:  Increasedupper abdominal bulky lymphadenopathy. Unchanged bulky retroperitoneal lymphadenopathy, large left bladder   mass and perirectal soft tissue infiltrative mass. 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 Chronic moderate to severe left hydroureteronephrosis  - cleared for easy to chew from SS but deferred to GI; can start clear liquid diet today as per GI   - EGD with GI 2/21 -> stent placed and biopsies taken   - AFP, LDH, PSA, CEA, CA-19, CA-125 noted   - oncology recs appreciated ->patient used to follow with Dr. Kenia Irizarry, consult placed   - pulm recs appreciated   - CT surgery following -> no acute intervention   - per onc note, patient had prior biopsy of site that demonstrated metastatic prostate adenocarcinoma and was started on Docetaxel.  - tramadol 25mg q6h for pain management   - orthostatics positive, likely secondary to poor oral intake, started on IV fluids     # small b/l pleural effusions on CT chest   - s/p 1x dose 40mg IV Lasix , CXR improved     # Anemia  - Hg of 9.9  - f/u iron panel  - MCV normal, SPEP    # CKD stage 3  - avoid nephrotoxic agent  - creat baseline    #Misc   #DVT PPx- Lovenox  #GI PPx- Protonix IV  #Diet- clear liquid diet   #Activity- AAT, PT consult  #Dispo- Acute

## 2024-02-23 NOTE — PROGRESS NOTE ADULT - SUBJECTIVE AND OBJECTIVE BOX
24H events:    Patient is a 68y old Male who presents with a chief complaint of Mediastinal Mass (23 Feb 2024 06:33)    Primary diagnosis of Mediastinal mass    Today is hospital day 3d.   Patient reports pain and difficulty with swallowing. Will have SS re-eval.   Medical records obtained from [facility], patient was following with Dr. Kenia Irizarry.     PAST MEDICAL & SURGICAL HISTORY  HTN (hypertension)    Prostate cancer      SOCIAL HISTORY:  Social History:      ALLERGIES:  No Known Allergies    MEDICATIONS:  STANDING MEDICATIONS  chlorhexidine 2% Cloths 1 Application(s) Topical <User Schedule>  dextrose 5% + lactated ringers. 1000 milliLiter(s) IV Continuous <Continuous>  enoxaparin Injectable 40 milliGRAM(s) SubCutaneous every 24 hours  influenza  Vaccine (HIGH DOSE) 0.7 milliLiter(s) IntraMuscular once  traMADol 25 milliGRAM(s) Oral every 6 hours    PRN MEDICATIONS  acetaminophen     Tablet .. 650 milliGRAM(s) Oral every 6 hours PRN  aluminum hydroxide/magnesium hydroxide/simethicone Suspension 30 milliLiter(s) Oral every 4 hours PRN  melatonin 3 milliGRAM(s) Oral at bedtime PRN  ondansetron Injectable 4 milliGRAM(s) IV Push every 8 hours PRN    VITALS:   T(F): 98.6  HR: 112  BP: 98/-  RR: 18  SpO2: 97%    PHYSICAL EXAM:    GENERAL: NAD, non-toxic appearing  NECK: Supple, no stiffness, no JVD, no thyromegaly   HEART: Regular rate and rhythm, normal s1s2  LUNGS: Unlabored respirations, b/l air entry, no adventitious breath sounds   ABDOMEN: Soft, nontender, nondistended; +BS  EXTREMITIES: No clubbing, cyanosis, or edema; 2+ peripheral pulses   NERVOUS SYSTEM: AOx3  SKIN: Warm and dry, no rashes or lesions    LABS:                        10.0   9.82  )-----------( 286      ( 22 Feb 2024 07:16 )             32.6     02-22    139  |  106  |  22<H>  ----------------------------<  115<H>  4.8   |  18  |  1.5    Ca    7.8<L>      22 Feb 2024 07:16  Mg     2.3     02-22    TPro  6.5  /  Alb  3.9  /  TBili  0.5  /  DBili  x   /  AST  23  /  ALT  17  /  AlkPhos  64  02-22      Urinalysis Basic - ( 22 Feb 2024 07:16 )    Color: x / Appearance: x / SG: x / pH: x  Gluc: 115 mg/dL / Ketone: x  / Bili: x / Urobili: x   Blood: x / Protein: x / Nitrite: x   Leuk Esterase: x / RBC: x / WBC x   Sq Epi: x / Non Sq Epi: x / Bacteria: x        RADIOLOGY:    < from: Xray Chest 1 View- PORTABLE-Urgent (Xray Chest 1 View- PORTABLE-Urgent .) (02.22.24 @ 08:09) >    Findings:    Support devices: None.    Cardiac/mediastinum/hilum: Mediastinal mass better appreciated on   referenced CT    Lung parenchyma/Pleura: Bibasilar opacities/effusions. No pneumothorax.    Skeleton/soft tissues: Esophageal stent in place    Impression:    Bibasilar opacities/effusions.    Mediastinal mass, better evaluated on referenced CT.               show

## 2024-02-23 NOTE — PROGRESS NOTE ADULT - SUBJECTIVE AND OBJECTIVE BOX
GENERAL SURGERY PROGRESS NOTE    Patient: RUFUS PICKARD , 68y ([birth year])Male   MRN: [id number]  Location: [address]  Visit: 02-20-24 Inpatient  Date: 02-23-24 @ 06:34    PAST MEDICAL & SURGICAL HISTORY:  HTN (hypertension)  Prostate cancer    Vitals:   T(F): 98.8 (02-23-24 @ 04:14), Max: 98.8 (02-23-24 @ 04:14)  HR: 118 (02-23-24 @ 04:14)  BP: 97/61 (02-23-24 @ 04:14)  RR: 18 (02-23-24 @ 04:14)  SpO2: 96% (02-23-24 @ 04:14)    Diet, Clear Liquid    Fluids:     I & O's:    02-21-24 @ 07:01  -  02-22-24 @ 07:00  --------------------------------------------------------  IN:  Total IN: 0 mL    OUT:    Voided (mL): 130 mL  Total OUT: 130 mL    Total NET: -130 mL    PHYSICAL EXAM:  General: NAD, AAOx3, calm and cooperative-poor historian  HEENT: NCAT, SAKINA, EOMI, Trachea ML, Neck supple  Cardiac: RRR  Respiratory: CTAB  Abdomen: Soft, non-distended, non-tender, no rebound, no guarding.     MEDICATIONS  (STANDING):  chlorhexidine 2% Cloths 1 Application(s) Topical <User Schedule>  dextrose 5% + lactated ringers. 1000 milliLiter(s) (50 mL/Hr) IV Continuous <Continuous>  enoxaparin Injectable 40 milliGRAM(s) SubCutaneous every 24 hours  influenza  Vaccine (HIGH DOSE) 0.7 milliLiter(s) IntraMuscular once  traMADol 25 milliGRAM(s) Oral every 6 hours    MEDICATIONS  (PRN):  acetaminophen     Tablet .. 650 milliGRAM(s) Oral every 6 hours PRN Temp greater or equal to 38C (100.4F), Mild Pain (1 - 3)  aluminum hydroxide/magnesium hydroxide/simethicone Suspension 30 milliLiter(s) Oral every 4 hours PRN Dyspepsia  melatonin 3 milliGRAM(s) Oral at bedtime PRN Insomnia  ondansetron Injectable 4 milliGRAM(s) IV Push every 8 hours PRN Nausea and/or Vomiting    DVT PROPHYLAXIS: enoxaparin Injectable 40 milliGRAM(s) SubCutaneous every 24 hours    GI PROPHYLAXIS:   ANTICOAGULATION:   ANTIBIOTICS:      LAB/STUDIES:  Labs:  CAPILLARY BLOOD GLUCOSE                        10.0   9.82  )-----------( 286      ( 22 Feb 2024 07:16 )             32.6       Auto Neutrophil %: 84.8 % (02-22-24 @ 07:16)  Auto Immature Granulocyte %: 0.3 % (02-22-24 @ 07:16)    02-22    139  |  106  |  22<H>  ----------------------------<  115<H>  4.8   |  18  |  1.5    Calcium: 7.8 mg/dL (02-22-24 @ 07:16)    LFTs:             6.5  | 0.5  | 23       ------------------[64      ( 22 Feb 2024 07:16 )  3.9  | x    | 17          Lipase:x      Amylase:x        Urinalysis Basic - ( 22 Feb 2024 07:16 )    Color: x / Appearance: x / SG: x / pH: x  Gluc: 115 mg/dL / Ketone: x  / Bili: x / Urobili: x   Blood: x / Protein: x / Nitrite: x   Leuk Esterase: x / RBC: x / WBC x   Sq Epi: x / Non Sq Epi: x / Bacteria: x

## 2024-02-23 NOTE — PROGRESS NOTE ADULT - ASSESSMENT
ASSESSMENT:  68-year-old male with past medical history of hypertension, chronic back pain, prostate cancer, and posterior mediastinal mass 6.5 cm seen on CT 11/2023, presents for 3 months of progressively difficulty swallowing associated with 10 pounds weight loss and odynophagia. CT surgery consulted for mediastinal mass.    PLAN:  -No acute surgical intervention indicated at this time  -Tolerating diet  -Appreciate Heme/Onc recommendations-patient will likely benefit from continued radiation +/- chemo  -Recall as needed    [phone]

## 2024-02-24 LAB
% ALBUMIN: 56.5 % — SIGNIFICANT CHANGE UP
% ALPHA 1: 5.7 % — SIGNIFICANT CHANGE UP
% ALPHA 2: 13.6 % — SIGNIFICANT CHANGE UP
% BETA: 11 % — SIGNIFICANT CHANGE UP
% GAMMA: 13.2 % — SIGNIFICANT CHANGE UP
ALBUMIN SERPL ELPH-MCNC: 3.3 G/DL — LOW (ref 3.6–5.5)
ALBUMIN SERPL ELPH-MCNC: 3.4 G/DL — LOW (ref 3.5–5.2)
ALBUMIN/GLOB SERPL ELPH: 1.3 RATIO — SIGNIFICANT CHANGE UP
ALP SERPL-CCNC: 91 U/L — SIGNIFICANT CHANGE UP (ref 30–115)
ALPHA1 GLOB SERPL ELPH-MCNC: 0.3 G/DL — SIGNIFICANT CHANGE UP (ref 0.1–0.4)
ALPHA2 GLOB SERPL ELPH-MCNC: 0.8 G/DL — SIGNIFICANT CHANGE UP (ref 0.5–1)
ALT FLD-CCNC: 16 U/L — SIGNIFICANT CHANGE UP (ref 0–41)
ANION GAP SERPL CALC-SCNC: 15 MMOL/L — HIGH (ref 7–14)
AST SERPL-CCNC: 20 U/L — SIGNIFICANT CHANGE UP (ref 0–41)
B-GLOBULIN SERPL ELPH-MCNC: 0.6 G/DL — SIGNIFICANT CHANGE UP (ref 0.5–1)
BASOPHILS # BLD AUTO: 0.03 K/UL — SIGNIFICANT CHANGE UP (ref 0–0.2)
BASOPHILS NFR BLD AUTO: 0.2 % — SIGNIFICANT CHANGE UP (ref 0–1)
BILIRUB SERPL-MCNC: 1 MG/DL — SIGNIFICANT CHANGE UP (ref 0.2–1.2)
BUN SERPL-MCNC: 51 MG/DL — HIGH (ref 10–20)
CALCIUM SERPL-MCNC: 7.5 MG/DL — LOW (ref 8.4–10.5)
CHLORIDE SERPL-SCNC: 106 MMOL/L — SIGNIFICANT CHANGE UP (ref 98–110)
CO2 SERPL-SCNC: 18 MMOL/L — SIGNIFICANT CHANGE UP (ref 17–32)
CREAT SERPL-MCNC: 2.3 MG/DL — HIGH (ref 0.7–1.5)
EGFR: 30 ML/MIN/1.73M2 — LOW
EOSINOPHIL # BLD AUTO: 0 K/UL — SIGNIFICANT CHANGE UP (ref 0–0.7)
EOSINOPHIL NFR BLD AUTO: 0 % — SIGNIFICANT CHANGE UP (ref 0–8)
GAMMA GLOBULIN: 0.8 G/DL — SIGNIFICANT CHANGE UP (ref 0.6–1.6)
GLUCOSE SERPL-MCNC: 150 MG/DL — HIGH (ref 70–99)
HCT VFR BLD CALC: 34.2 % — LOW (ref 42–52)
HGB BLD-MCNC: 10.5 G/DL — LOW (ref 14–18)
IMM GRANULOCYTES NFR BLD AUTO: 1.6 % — HIGH (ref 0.1–0.3)
INTERPRETATION SERPL IFE-IMP: SIGNIFICANT CHANGE UP
LACTATE SERPL-SCNC: 2 MMOL/L — SIGNIFICANT CHANGE UP (ref 0.7–2)
LYMPHOCYTES # BLD AUTO: 0.46 K/UL — LOW (ref 1.2–3.4)
LYMPHOCYTES # BLD AUTO: 2.7 % — LOW (ref 20.5–51.1)
MAGNESIUM SERPL-MCNC: 2.5 MG/DL — HIGH (ref 1.8–2.4)
MCHC RBC-ENTMCNC: 28.8 PG — SIGNIFICANT CHANGE UP (ref 27–31)
MCHC RBC-ENTMCNC: 30.7 G/DL — LOW (ref 32–37)
MCV RBC AUTO: 94 FL — SIGNIFICANT CHANGE UP (ref 80–94)
MONOCYTES # BLD AUTO: 0.59 K/UL — SIGNIFICANT CHANGE UP (ref 0.1–0.6)
MONOCYTES NFR BLD AUTO: 3.5 % — SIGNIFICANT CHANGE UP (ref 1.7–9.3)
NEUTROPHILS # BLD AUTO: 15.42 K/UL — HIGH (ref 1.4–6.5)
NEUTROPHILS NFR BLD AUTO: 92 % — HIGH (ref 42.2–75.2)
NRBC # BLD: 0 /100 WBCS — SIGNIFICANT CHANGE UP (ref 0–0)
PLATELET # BLD AUTO: 257 K/UL — SIGNIFICANT CHANGE UP (ref 130–400)
PMV BLD: 10.1 FL — SIGNIFICANT CHANGE UP (ref 7.4–10.4)
POTASSIUM SERPL-MCNC: 4.6 MMOL/L — SIGNIFICANT CHANGE UP (ref 3.5–5)
POTASSIUM SERPL-SCNC: 4.6 MMOL/L — SIGNIFICANT CHANGE UP (ref 3.5–5)
PROT PATTERN SERPL ELPH-IMP: SIGNIFICANT CHANGE UP
PROT SERPL-MCNC: 5.9 G/DL — LOW (ref 6–8.3)
PROT SERPL-MCNC: 6.3 G/DL — SIGNIFICANT CHANGE UP (ref 6–8)
RBC # BLD: 3.64 M/UL — LOW (ref 4.7–6.1)
RBC # FLD: 13.8 % — SIGNIFICANT CHANGE UP (ref 11.5–14.5)
SODIUM SERPL-SCNC: 139 MMOL/L — SIGNIFICANT CHANGE UP (ref 135–146)
WBC # BLD: 16.77 K/UL — HIGH (ref 4.8–10.8)
WBC # FLD AUTO: 16.77 K/UL — HIGH (ref 4.8–10.8)

## 2024-02-24 PROCEDURE — 99232 SBSQ HOSP IP/OBS MODERATE 35: CPT

## 2024-02-24 PROCEDURE — 71045 X-RAY EXAM CHEST 1 VIEW: CPT | Mod: 26

## 2024-02-24 RX ORDER — TRAMADOL HYDROCHLORIDE 50 MG/1
25 TABLET ORAL
Refills: 0 | Status: DISCONTINUED | OUTPATIENT
Start: 2024-02-24 | End: 2024-02-25

## 2024-02-24 RX ORDER — PIPERACILLIN AND TAZOBACTAM 4; .5 G/20ML; G/20ML
3.38 INJECTION, POWDER, LYOPHILIZED, FOR SOLUTION INTRAVENOUS EVERY 8 HOURS
Refills: 0 | Status: DISCONTINUED | OUTPATIENT
Start: 2024-02-24 | End: 2024-02-27

## 2024-02-24 RX ORDER — SODIUM CHLORIDE 9 MG/ML
1000 INJECTION, SOLUTION INTRAVENOUS
Refills: 0 | Status: DISCONTINUED | OUTPATIENT
Start: 2024-02-24 | End: 2024-02-24

## 2024-02-24 RX ORDER — PIPERACILLIN AND TAZOBACTAM 4; .5 G/20ML; G/20ML
3.38 INJECTION, POWDER, LYOPHILIZED, FOR SOLUTION INTRAVENOUS ONCE
Refills: 0 | Status: COMPLETED | OUTPATIENT
Start: 2024-02-24 | End: 2024-02-24

## 2024-02-24 RX ORDER — SODIUM CHLORIDE 9 MG/ML
1000 INJECTION, SOLUTION INTRAVENOUS
Refills: 0 | Status: DISCONTINUED | OUTPATIENT
Start: 2024-02-24 | End: 2024-02-26

## 2024-02-24 RX ORDER — SODIUM CHLORIDE 9 MG/ML
500 INJECTION, SOLUTION INTRAVENOUS ONCE
Refills: 0 | Status: COMPLETED | OUTPATIENT
Start: 2024-02-24 | End: 2024-02-24

## 2024-02-24 RX ORDER — LOPERAMIDE HCL 2 MG
2 TABLET ORAL ONCE
Refills: 0 | Status: DISCONTINUED | OUTPATIENT
Start: 2024-02-24 | End: 2024-02-25

## 2024-02-24 RX ADMIN — SODIUM CHLORIDE 1000 MILLILITER(S): 9 INJECTION, SOLUTION INTRAVENOUS at 13:14

## 2024-02-24 RX ADMIN — PIPERACILLIN AND TAZOBACTAM 25 GRAM(S): 4; .5 INJECTION, POWDER, LYOPHILIZED, FOR SOLUTION INTRAVENOUS at 21:24

## 2024-02-24 RX ADMIN — TRAMADOL HYDROCHLORIDE 25 MILLIGRAM(S): 50 TABLET ORAL at 05:42

## 2024-02-24 RX ADMIN — Medication 650 MILLIGRAM(S): at 06:10

## 2024-02-24 RX ADMIN — Medication 650 MILLIGRAM(S): at 06:45

## 2024-02-24 RX ADMIN — TRAMADOL HYDROCHLORIDE 25 MILLIGRAM(S): 50 TABLET ORAL at 06:15

## 2024-02-24 RX ADMIN — TRAMADOL HYDROCHLORIDE 25 MILLIGRAM(S): 50 TABLET ORAL at 00:00

## 2024-02-24 RX ADMIN — ENOXAPARIN SODIUM 40 MILLIGRAM(S): 100 INJECTION SUBCUTANEOUS at 09:38

## 2024-02-24 RX ADMIN — CHLORHEXIDINE GLUCONATE 1 APPLICATION(S): 213 SOLUTION TOPICAL at 05:42

## 2024-02-24 RX ADMIN — PIPERACILLIN AND TAZOBACTAM 200 GRAM(S): 4; .5 INJECTION, POWDER, LYOPHILIZED, FOR SOLUTION INTRAVENOUS at 10:42

## 2024-02-24 RX ADMIN — TRAMADOL HYDROCHLORIDE 25 MILLIGRAM(S): 50 TABLET ORAL at 23:29

## 2024-02-24 NOTE — PROGRESS NOTE ADULT - ATTENDING COMMENTS
Overnight, pt had a 101 fever, leukocytosis of 16k, and cxr showing developing pna; likely aspiration. Started on zosyn. SLP recommended pureed. Cr shows DIMAS with a cr of 2.3 from 1.5 and he is complaining of dry mouth.  Chest tube still attached to suction. Refusing meds and food. Encouraged him to try to eat and take his medications.

## 2024-02-24 NOTE — PROGRESS NOTE ADULT - ASSESSMENT
68-year-old male with past medical history of hypertension, chronic back pain, prostate cancer, and posterior mediastinal mass 6.5 cm seen on CT 11/2023, presents for 3 months of progressively difficulty swallowing associated with 10 pounds weight loss and odynophagia. Comes in today now unable to swallow his pills with water, resulting in 2 episode of emesis. He mentioned that he gets SOB during exertion but denies any exertional chest pain. As per patient he was treated at [facility] for prostate cancer and is s/p radiation therapy. He states that he has problem during walking and unable to maintain the posture.     #aspiration PNA vs viral infection vs HAP   - SIRS positive (WBC increased to 16, febrile to 101 and tachy to 129 overnight)  - CXR with worsening L-sided opacity   - f/u infectious workup (BCx, UA, UCx, procal, RVP, MRSA, lactate)  - patient cleared for pureed diet and thin liquids per SS   - start zosyn 3.375mg q8h   - aspiration precautions     # Dysphagia with odynophagia  # Hx of prostate cancer s/p radiation therapy  # Prostate cancer metastasis to mediastinum or primary mediastinal mass?  - CT Chest: Interval enlargement of the central/posterior mediastinal mass with development of more extensive lymphadenopathy. The mass encircles the descending thoracic aorta and has some mass effect upon the left atrium. The mid aspect of the esophagus is encircled by the mass and difficult to distinguish. Possible ovoid pill is noted within the mass and possible location of the mid esophagus appear. The maximal esophagus appears distended with layering debris within. Consideration may be given to esophageal stenting.  - CT abdomen and pelvis:  Increasedupper abdominal bulky lymphadenopathy. Unchanged bulky retroperitoneal lymphadenopathy, large left bladder   mass and perirectal soft tissue infiltrative mass. 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 Chronic moderate to severe left hydroureteronephrosis  - cleared for easy to chew from SS but deferred to GI; can start clear liquid diet today as per GI   - EGD with GI 2/21 -> stent placed and biopsies taken   - AFP, LDH, PSA, CEA, CA-19, CA-125 noted   - oncology recs appreciated ->patient used to follow with Dr. Kenia Irizarry, consult placed   - pulm recs appreciated   - CT surgery following -> no acute intervention   - per onc note, patient had prior biopsy of site that demonstrated metastatic prostate adenocarcinoma and was started on Docetaxel.  - tramadol 25mg q6h PRN for pain management   - orthostatics positive, likely secondary to poor oral intake, started on IV fluids     # small b/l pleural effusions on CT chest   - s/p 1x dose 40mg IV Lasix , CXR improved     # Anemia  - Hg of 9.9  - f/u iron panel  - MCV normal, SPEP    # CKD stage 3  - avoid nephrotoxic agent  - creat baseline    #Misc   #DVT PPx- Lovenox  #GI PPx- Protonix IV  #Diet- clear liquid diet   #Activity- AAT, PT consult  #Dispo- Acute

## 2024-02-24 NOTE — PROGRESS NOTE ADULT - SUBJECTIVE AND OBJECTIVE BOX
24H events:    Patient is a 68y old Male who presents with a chief complaint of Mediastinal Mass (23 Feb 2024 09:17)    Primary diagnosis of Mediastinal mass    Today is hospital day 4d.   Febrile overnight, CXR worsening, concern for aspiration. Infectious workup pending.   Patient with poor oral intake, states has no appetite, nutrition consult placed.     PAST MEDICAL & SURGICAL HISTORY  HTN (hypertension)    Prostate cancer      SOCIAL HISTORY:  Social History:      ALLERGIES:  No Known Allergies    MEDICATIONS:  STANDING MEDICATIONS  chlorhexidine 2% Cloths 1 Application(s) Topical <User Schedule>  dextrose 5% + lactated ringers. 1000 milliLiter(s) IV Continuous <Continuous>  enoxaparin Injectable 40 milliGRAM(s) SubCutaneous every 24 hours  influenza  Vaccine (HIGH DOSE) 0.7 milliLiter(s) IntraMuscular once  lidocaine   4% Patch 1 Patch Transdermal every 24 hours  piperacillin/tazobactam IVPB. 3.375 Gram(s) IV Intermittent once  piperacillin/tazobactam IVPB.- 3.375 Gram(s) IV Intermittent once  piperacillin/tazobactam IVPB.. 3.375 Gram(s) IV Intermittent every 8 hours    PRN MEDICATIONS  acetaminophen     Tablet .. 650 milliGRAM(s) Oral every 6 hours PRN  aluminum hydroxide/magnesium hydroxide/simethicone Suspension 30 milliLiter(s) Oral every 4 hours PRN  melatonin 3 milliGRAM(s) Oral at bedtime PRN  ondansetron Injectable 4 milliGRAM(s) IV Push every 8 hours PRN  traMADol 25 milliGRAM(s) Oral four times a day PRN    VITALS:   T(F): 101  HR: 80  BP: 140/63  RR: 18  SpO2: --    PHYSICAL EXAM:    GENERAL: NAD, non-toxic appearing  NECK: Supple, no stiffness, no JVD, no thyromegaly   HEART: Regular rate and rhythm, normal s1s2  LUNGS: Unlabored respirations, b/l air entry, no adventitious breath sounds   ABDOMEN: Soft, nontender, nondistended; +BS  EXTREMITIES: No clubbing, cyanosis, or edema; 2+ peripheral pulses   NERVOUS SYSTEM: AOx3  SKIN: Warm and dry, no rashes or lesions    LABS:                        10.5   16.77 )-----------( 257      ( 24 Feb 2024 06:17 )             34.2     02-24    139  |  106  |  51<H>  ----------------------------<  150<H>  4.6   |  18  |  2.3<H>    Ca    7.5<L>      24 Feb 2024 06:17  Mg     2.5     02-24    TPro  6.3  /  Alb  3.4<L>  /  TBili  1.0  /  DBili  x   /  AST  20  /  ALT  16  /  AlkPhos  91  02-24      Urinalysis Basic - ( 24 Feb 2024 06:17 )    Color: x / Appearance: x / SG: x / pH: x  Gluc: 150 mg/dL / Ketone: x  / Bili: x / Urobili: x   Blood: x / Protein: x / Nitrite: x   Leuk Esterase: x / RBC: x / WBC x   Sq Epi: x / Non Sq Epi: x / Bacteria: x          RADIOLOGY:

## 2024-02-25 LAB
ALBUMIN SERPL ELPH-MCNC: 3.1 G/DL — LOW (ref 3.5–5.2)
ALP SERPL-CCNC: 78 U/L — SIGNIFICANT CHANGE UP (ref 30–115)
ALT FLD-CCNC: 13 U/L — SIGNIFICANT CHANGE UP (ref 0–41)
ANION GAP SERPL CALC-SCNC: 16 MMOL/L — HIGH (ref 7–14)
AST SERPL-CCNC: 15 U/L — SIGNIFICANT CHANGE UP (ref 0–41)
BASOPHILS # BLD AUTO: 0.01 K/UL — SIGNIFICANT CHANGE UP (ref 0–0.2)
BASOPHILS NFR BLD AUTO: 0.1 % — SIGNIFICANT CHANGE UP (ref 0–1)
BILIRUB SERPL-MCNC: 0.7 MG/DL — SIGNIFICANT CHANGE UP (ref 0.2–1.2)
BUN SERPL-MCNC: 59 MG/DL — HIGH (ref 10–20)
CALCIUM SERPL-MCNC: 6.9 MG/DL — LOW (ref 8.4–10.5)
CHLORIDE SERPL-SCNC: 107 MMOL/L — SIGNIFICANT CHANGE UP (ref 98–110)
CO2 SERPL-SCNC: 16 MMOL/L — LOW (ref 17–32)
CREAT SERPL-MCNC: 2.6 MG/DL — HIGH (ref 0.7–1.5)
EGFR: 26 ML/MIN/1.73M2 — LOW
EOSINOPHIL # BLD AUTO: 0 K/UL — SIGNIFICANT CHANGE UP (ref 0–0.7)
EOSINOPHIL NFR BLD AUTO: 0 % — SIGNIFICANT CHANGE UP (ref 0–8)
GLUCOSE SERPL-MCNC: 163 MG/DL — HIGH (ref 70–99)
HCT VFR BLD CALC: 29.7 % — LOW (ref 42–52)
HGB BLD-MCNC: 9.4 G/DL — LOW (ref 14–18)
IMM GRANULOCYTES NFR BLD AUTO: 0.6 % — HIGH (ref 0.1–0.3)
LYMPHOCYTES # BLD AUTO: 0.56 K/UL — LOW (ref 1.2–3.4)
LYMPHOCYTES # BLD AUTO: 3.9 % — LOW (ref 20.5–51.1)
MAGNESIUM SERPL-MCNC: 2.8 MG/DL — HIGH (ref 1.8–2.4)
MCHC RBC-ENTMCNC: 28.7 PG — SIGNIFICANT CHANGE UP (ref 27–31)
MCHC RBC-ENTMCNC: 31.6 G/DL — LOW (ref 32–37)
MCV RBC AUTO: 90.5 FL — SIGNIFICANT CHANGE UP (ref 80–94)
MONOCYTES # BLD AUTO: 0.93 K/UL — HIGH (ref 0.1–0.6)
MONOCYTES NFR BLD AUTO: 6.5 % — SIGNIFICANT CHANGE UP (ref 1.7–9.3)
NEUTROPHILS # BLD AUTO: 12.71 K/UL — HIGH (ref 1.4–6.5)
NEUTROPHILS NFR BLD AUTO: 88.9 % — HIGH (ref 42.2–75.2)
NRBC # BLD: 0 /100 WBCS — SIGNIFICANT CHANGE UP (ref 0–0)
PLATELET # BLD AUTO: 253 K/UL — SIGNIFICANT CHANGE UP (ref 130–400)
PMV BLD: 10.4 FL — SIGNIFICANT CHANGE UP (ref 7.4–10.4)
POTASSIUM SERPL-MCNC: 4.1 MMOL/L — SIGNIFICANT CHANGE UP (ref 3.5–5)
POTASSIUM SERPL-SCNC: 4.1 MMOL/L — SIGNIFICANT CHANGE UP (ref 3.5–5)
PROT SERPL-MCNC: 5.7 G/DL — LOW (ref 6–8)
RBC # BLD: 3.28 M/UL — LOW (ref 4.7–6.1)
RBC # FLD: 13.7 % — SIGNIFICANT CHANGE UP (ref 11.5–14.5)
SODIUM SERPL-SCNC: 139 MMOL/L — SIGNIFICANT CHANGE UP (ref 135–146)
WBC # BLD: 14.29 K/UL — HIGH (ref 4.8–10.8)
WBC # FLD AUTO: 14.29 K/UL — HIGH (ref 4.8–10.8)

## 2024-02-25 PROCEDURE — 99232 SBSQ HOSP IP/OBS MODERATE 35: CPT

## 2024-02-25 RX ORDER — CALCIUM GLUCONATE 100 MG/ML
2 VIAL (ML) INTRAVENOUS ONCE
Refills: 0 | Status: COMPLETED | OUTPATIENT
Start: 2024-02-25 | End: 2024-02-25

## 2024-02-25 RX ORDER — METOPROLOL TARTRATE 50 MG
25 TABLET ORAL
Refills: 0 | Status: DISCONTINUED | OUTPATIENT
Start: 2024-02-25 | End: 2024-02-27

## 2024-02-25 RX ORDER — POLYETHYLENE GLYCOL 3350 17 G/17G
17 POWDER, FOR SOLUTION ORAL DAILY
Refills: 0 | Status: DISCONTINUED | OUTPATIENT
Start: 2024-02-25 | End: 2024-02-29

## 2024-02-25 RX ADMIN — CHLORHEXIDINE GLUCONATE 1 APPLICATION(S): 213 SOLUTION TOPICAL at 05:33

## 2024-02-25 RX ADMIN — TRAMADOL HYDROCHLORIDE 25 MILLIGRAM(S): 50 TABLET ORAL at 05:34

## 2024-02-25 RX ADMIN — ENOXAPARIN SODIUM 40 MILLIGRAM(S): 100 INJECTION SUBCUTANEOUS at 08:44

## 2024-02-25 RX ADMIN — LIDOCAINE 1 PATCH: 4 CREAM TOPICAL at 12:26

## 2024-02-25 RX ADMIN — Medication 25 MILLIGRAM(S): at 16:44

## 2024-02-25 RX ADMIN — LIDOCAINE 1 PATCH: 4 CREAM TOPICAL at 19:42

## 2024-02-25 RX ADMIN — PIPERACILLIN AND TAZOBACTAM 25 GRAM(S): 4; .5 INJECTION, POWDER, LYOPHILIZED, FOR SOLUTION INTRAVENOUS at 21:24

## 2024-02-25 RX ADMIN — TRAMADOL HYDROCHLORIDE 25 MILLIGRAM(S): 50 TABLET ORAL at 06:05

## 2024-02-25 RX ADMIN — PIPERACILLIN AND TAZOBACTAM 25 GRAM(S): 4; .5 INJECTION, POWDER, LYOPHILIZED, FOR SOLUTION INTRAVENOUS at 12:27

## 2024-02-25 RX ADMIN — Medication 200 GRAM(S): at 16:43

## 2024-02-25 RX ADMIN — TRAMADOL HYDROCHLORIDE 25 MILLIGRAM(S): 50 TABLET ORAL at 21:26

## 2024-02-25 RX ADMIN — SODIUM CHLORIDE 75 MILLILITER(S): 9 INJECTION, SOLUTION INTRAVENOUS at 01:50

## 2024-02-25 RX ADMIN — SODIUM CHLORIDE 75 MILLILITER(S): 9 INJECTION, SOLUTION INTRAVENOUS at 21:25

## 2024-02-25 RX ADMIN — PIPERACILLIN AND TAZOBACTAM 25 GRAM(S): 4; .5 INJECTION, POWDER, LYOPHILIZED, FOR SOLUTION INTRAVENOUS at 05:34

## 2024-02-25 RX ADMIN — POLYETHYLENE GLYCOL 3350 17 GRAM(S): 17 POWDER, FOR SOLUTION ORAL at 16:43

## 2024-02-25 RX ADMIN — TRAMADOL HYDROCHLORIDE 25 MILLIGRAM(S): 50 TABLET ORAL at 21:57

## 2024-02-25 RX ADMIN — TRAMADOL HYDROCHLORIDE 25 MILLIGRAM(S): 50 TABLET ORAL at 00:00

## 2024-02-25 RX ADMIN — SODIUM CHLORIDE 75 MILLILITER(S): 9 INJECTION, SOLUTION INTRAVENOUS at 08:43

## 2024-02-25 NOTE — PROGRESS NOTE ADULT - SUBJECTIVE AND OBJECTIVE BOX
24H events:    Patient is a 68y old Male who presents with a chief complaint of Mediastinal Mass (23 Feb 2024 09:17)    Primary diagnosis of Mediastinal mass.     Pt is still refusing food. He is taking medications with apple sauce. He said that he is not having pain when swallows, but is still refusing food. Asked that he at least have his apple sauce. He sounds wet but limited to his upper resp tract, his lungs sound clear. Elevated the head of his bed.       PAST MEDICAL & SURGICAL HISTORY  HTN (hypertension)    Prostate cancer      SOCIAL HISTORY:  Social History:      ALLERGIES:  No Known Allergies    MEDICATIONS:  STANDING MEDICATIONS  chlorhexidine 2% Cloths 1 Application(s) Topical <User Schedule>  dextrose 5% + lactated ringers. 1000 milliLiter(s) IV Continuous <Continuous>  enoxaparin Injectable 40 milliGRAM(s) SubCutaneous every 24 hours  influenza  Vaccine (HIGH DOSE) 0.7 milliLiter(s) IntraMuscular once  lidocaine   4% Patch 1 Patch Transdermal every 24 hours  piperacillin/tazobactam IVPB. 3.375 Gram(s) IV Intermittent once  piperacillin/tazobactam IVPB.- 3.375 Gram(s) IV Intermittent once  piperacillin/tazobactam IVPB.. 3.375 Gram(s) IV Intermittent every 8 hours    PRN MEDICATIONS  acetaminophen     Tablet .. 650 milliGRAM(s) Oral every 6 hours PRN  aluminum hydroxide/magnesium hydroxide/simethicone Suspension 30 milliLiter(s) Oral every 4 hours PRN  melatonin 3 milliGRAM(s) Oral at bedtime PRN  ondansetron Injectable 4 milliGRAM(s) IV Push every 8 hours PRN  traMADol 25 milliGRAM(s) Oral four times a day PRN    VITALS:   T(F): 101  HR: 80  BP: 140/63  RR: 18  SpO2: --    PHYSICAL EXAM:    GENERAL: NAD, non-toxic appearing  NECK: Supple, no stiffness, no JVD, no thyromegaly   HEART: Regular rate and rhythm, normal s1s2  LUNGS: Unlabored respirations, b/l air entry, no adventitious breath sounds   ABDOMEN: Soft, nontender, nondistended; +BS  EXTREMITIES: No clubbing, cyanosis, or edema; 2+ peripheral pulses   NERVOUS SYSTEM: AOx3  SKIN: Warm and dry, no rashes or lesions    LABS:                        10.5   16.77 )-----------( 257      ( 24 Feb 2024 06:17 )             34.2     02-24    139  |  106  |  51<H>  ----------------------------<  150<H>  4.6   |  18  |  2.3<H>    Ca    7.5<L>      24 Feb 2024 06:17  Mg     2.5     02-24    TPro  6.3  /  Alb  3.4<L>  /  TBili  1.0  /  DBili  x   /  AST  20  /  ALT  16  /  AlkPhos  91  02-24      Urinalysis Basic - ( 24 Feb 2024 06:17 )    Color: x / Appearance: x / SG: x / pH: x  Gluc: 150 mg/dL / Ketone: x  / Bili: x / Urobili: x   Blood: x / Protein: x / Nitrite: x   Leuk Esterase: x / RBC: x / WBC x   Sq Epi: x / Non Sq Epi: x / Bacteria: x          RADIOLOGY:

## 2024-02-25 NOTE — PROGRESS NOTE ADULT - ASSESSMENT
68-year-old male with past medical history of hypertension, chronic back pain, prostate cancer, and posterior mediastinal mass 6.5 cm seen on CT 11/2023, presents for 3 months of progressively difficulty swallowing associated with 10 pounds weight loss and odynophagia. Comes in today now unable to swallow his pills with water, resulting in 2 episode of emesis. He mentioned that he gets SOB during exertion but denies any exertional chest pain. As per patient he was treated at [facility] for prostate cancer and is s/p radiation therapy. He states that he has problem during walking and unable to maintain the posture.     #aspiration PNA vs viral infection vs HAP   - SIRS positive (WBC increased to 16, febrile to 101 and tachy to 129 )  - CXR with worsening L-sided opacity   - f/u infectious workup (BCx, UA, UCx, procal, RVP, MRSA, lactate)  - patient cleared for pureed diet and thin liquids per SS   - start zosyn 3.375mg q8h   - aspiration precautions     # Dysphagia with odynophagia  # Hx of prostate cancer s/p radiation therapy  # Prostate cancer metastasis to mediastinum or primary mediastinal mass?  - CT Chest: Interval enlargement of the central/posterior mediastinal mass with development of more extensive lymphadenopathy. The mass encircles the descending thoracic aorta and has some mass effect upon the left atrium. The mid aspect of the esophagus is encircled by the mass and difficult to distinguish. Possible ovoid pill is noted within the mass and possible location of the mid esophagus appear. The maximal esophagus appears distended with layering debris within. Consideration may be given to esophageal stenting.  - CT abdomen and pelvis:  Increasedupper abdominal bulky lymphadenopathy. Unchanged bulky retroperitoneal lymphadenopathy, large left bladder   mass and perirectal soft tissue infiltrative mass. 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 Chronic moderate to severe left hydroureteronephrosis  - cleared for easy to chew from SS but deferred to GI; can start clear liquid diet today as per GI   - EGD with GI 2/21 -> stent placed and biopsies taken   - AFP, LDH, PSA, CEA, CA-19, CA-125 noted   - oncology recs appreciated ->patient used to follow with Dr. Kenia Irizarry, consult placed   - pulm recs appreciated   - CT surgery following -> no acute intervention   - per onc note, patient had prior biopsy of site that demonstrated metastatic prostate adenocarcinoma and was started on Docetaxel.  - tramadol 25mg q6h PRN for pain management   - orthostatics positive, likely secondary to poor oral intake, started on IV fluids     # small b/l pleural effusions on CT chest   - s/p 1x dose 40mg IV Lasix , CXR improved     # Anemia  - Hg of 9.9  - f/u iron panel  - MCV normal, SPEP    # CKD stage 3  - avoid nephrotoxic agent  - creat baseline    #Misc   #DVT PPx- Lovenox  #GI PPx- Protonix IV  #Diet- clear liquid diet   #Activity- AAT, PT consult  #Dispo- Acute   68-year-old male with past medical history of hypertension, chronic back pain, prostate cancer, and posterior mediastinal mass 6.5 cm seen on CT 11/2023, presents for 3 months of progressively difficulty swallowing associated with 10 pounds weight loss and odynophagia. Comes in today now unable to swallow his pills with water, resulting in 2 episode of emesis. He mentioned that he gets SOB during exertion but denies any exertional chest pain. As per patient he was treated at [facility] for prostate cancer and is s/p radiation therapy. He states that he has problem during walking and unable to maintain the posture.     #aspiration PNA vs viral infection vs HAP   - SIRS positive (WBC increased to 16, febrile to 101 and tachy to 129 )  - CXR with worsening L-sided opacity   - f/u infectious workup (BCx, UA, UCx, procal, RVP, MRSA, lactate)  - patient cleared for pureed diet and thin liquids per SS   - start zosyn 3.375mg q8h   - aspiration precautions   - metoprolol 12.5mg bid  - ECG      # Dysphagia with odynophagia  # Hx of prostate cancer s/p radiation therapy  # Prostate cancer metastasis to mediastinum or primary mediastinal mass?  - CT Chest: Interval enlargement of the central/posterior mediastinal mass with development of more extensive lymphadenopathy. The mass encircles the descending thoracic aorta and has some mass effect upon the left atrium. The mid aspect of the esophagus is encircled by the mass and difficult to distinguish. Possible ovoid pill is noted within the mass and possible location of the mid esophagus appear. The maximal esophagus appears distended with layering debris within. Consideration may be given to esophageal stenting.  - CT abdomen and pelvis:  Increasedupper abdominal bulky lymphadenopathy. Unchanged bulky retroperitoneal lymphadenopathy, large left bladder   mass and perirectal soft tissue infiltrative mass. 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 Chronic moderate to severe left hydroureteronephrosis  - cleared for easy to chew from SS but deferred to GI; can start clear liquid diet today as per GI   - EGD with GI 2/21 -> stent placed and biopsies taken   - AFP and CEA wnl  - LDH and haptoglobin elevated   - PSA total 220, PSA free is 22   - CA19 131  -  57  - oncology recs appreciated ->patient used to follow with Dr. Kenia Irizarry, called and teams her and she is not picking up the phone calls  - pulm recs appreciated   - CT surgery following -> no acute intervention   - per onc note, patient had prior biopsy of site that demonstrated metastatic prostate adenocarcinoma and was started on Docetaxel.  - tramadol 25mg q6h PRN for pain management   - orthostatics positive, likely secondary to poor oral intake, started on IV fluids   - ensure added, nutrition consulted    #CHUCKIE vs ATN induced DIMAS  - pt came in with a baseline of 1.5 and increased to 2.6 with contrast use  - c/w IVF  - renally dose medications    # small b/l pleural effusions on CT chest   - s/p 1x dose 40mg IV Lasix , CXR improved     # normocytic Anemia  - Hb 9-10  - f/u iron panel  - MCV normal, SPEP    #Misc   #DVT PPx- Lovenox  #GI PPx- Protonix IV  #Diet- clear liquid diet   #Activity- AAT, PT consult  #Dispo- Acute

## 2024-02-26 DIAGNOSIS — N17.9 ACUTE KIDNEY FAILURE, UNSPECIFIED: ICD-10-CM

## 2024-02-26 DIAGNOSIS — R13.10 DYSPHAGIA, UNSPECIFIED: ICD-10-CM

## 2024-02-26 DIAGNOSIS — J18.9 PNEUMONIA, UNSPECIFIED ORGANISM: ICD-10-CM

## 2024-02-26 DIAGNOSIS — Z51.5 ENCOUNTER FOR PALLIATIVE CARE: ICD-10-CM

## 2024-02-26 DIAGNOSIS — J98.59 OTHER DISEASES OF MEDIASTINUM, NOT ELSEWHERE CLASSIFIED: ICD-10-CM

## 2024-02-26 LAB
ALBUMIN SERPL ELPH-MCNC: 2.7 G/DL — LOW (ref 3.5–5.2)
ALP SERPL-CCNC: 65 U/L — SIGNIFICANT CHANGE UP (ref 30–115)
ALT FLD-CCNC: 26 U/L — SIGNIFICANT CHANGE UP (ref 0–41)
ANION GAP SERPL CALC-SCNC: 12 MMOL/L — SIGNIFICANT CHANGE UP (ref 7–14)
AST SERPL-CCNC: 32 U/L — SIGNIFICANT CHANGE UP (ref 0–41)
BASOPHILS # BLD AUTO: 0.01 K/UL — SIGNIFICANT CHANGE UP (ref 0–0.2)
BASOPHILS NFR BLD AUTO: 0.1 % — SIGNIFICANT CHANGE UP (ref 0–1)
BILIRUB SERPL-MCNC: 0.4 MG/DL — SIGNIFICANT CHANGE UP (ref 0.2–1.2)
BUN SERPL-MCNC: 52 MG/DL — HIGH (ref 10–20)
CALCIUM SERPL-MCNC: 6.8 MG/DL — LOW (ref 8.4–10.4)
CHLORIDE SERPL-SCNC: 109 MMOL/L — SIGNIFICANT CHANGE UP (ref 98–110)
CO2 SERPL-SCNC: 19 MMOL/L — SIGNIFICANT CHANGE UP (ref 17–32)
CREAT SERPL-MCNC: 2.3 MG/DL — HIGH (ref 0.7–1.5)
EGFR: 30 ML/MIN/1.73M2 — LOW
EOSINOPHIL # BLD AUTO: 0.06 K/UL — SIGNIFICANT CHANGE UP (ref 0–0.7)
EOSINOPHIL NFR BLD AUTO: 0.6 % — SIGNIFICANT CHANGE UP (ref 0–8)
GLUCOSE SERPL-MCNC: 169 MG/DL — HIGH (ref 70–99)
GRAM STN FLD: ABNORMAL
GRAM STN FLD: ABNORMAL
HCT VFR BLD CALC: 26.3 % — LOW (ref 42–52)
HGB BLD-MCNC: 8.2 G/DL — LOW (ref 14–18)
IMM GRANULOCYTES NFR BLD AUTO: 0.9 % — HIGH (ref 0.1–0.3)
LYMPHOCYTES # BLD AUTO: 0.4 K/UL — LOW (ref 1.2–3.4)
LYMPHOCYTES # BLD AUTO: 3.9 % — LOW (ref 20.5–51.1)
MAGNESIUM SERPL-MCNC: 2.5 MG/DL — HIGH (ref 1.8–2.4)
MCHC RBC-ENTMCNC: 28.6 PG — SIGNIFICANT CHANGE UP (ref 27–31)
MCHC RBC-ENTMCNC: 31.2 G/DL — LOW (ref 32–37)
MCV RBC AUTO: 91.6 FL — SIGNIFICANT CHANGE UP (ref 80–94)
METHOD TYPE: SIGNIFICANT CHANGE UP
MONOCYTES # BLD AUTO: 0.95 K/UL — HIGH (ref 0.1–0.6)
MONOCYTES NFR BLD AUTO: 9.3 % — SIGNIFICANT CHANGE UP (ref 1.7–9.3)
NEUTROPHILS # BLD AUTO: 8.7 K/UL — HIGH (ref 1.4–6.5)
NEUTROPHILS NFR BLD AUTO: 85.2 % — HIGH (ref 42.2–75.2)
NRBC # BLD: 0 /100 WBCS — SIGNIFICANT CHANGE UP (ref 0–0)
PLATELET # BLD AUTO: 250 K/UL — SIGNIFICANT CHANGE UP (ref 130–400)
PMV BLD: 10 FL — SIGNIFICANT CHANGE UP (ref 7.4–10.4)
POTASSIUM SERPL-MCNC: 3.4 MMOL/L — LOW (ref 3.5–5)
POTASSIUM SERPL-SCNC: 3.4 MMOL/L — LOW (ref 3.5–5)
PROT SERPL-MCNC: 5.1 G/DL — LOW (ref 6–8)
RBC # BLD: 2.87 M/UL — LOW (ref 4.7–6.1)
RBC # FLD: 14 % — SIGNIFICANT CHANGE UP (ref 11.5–14.5)
S PYO DNA BLD POS QL NAA+NON-PROBE: SIGNIFICANT CHANGE UP
SODIUM SERPL-SCNC: 140 MMOL/L — SIGNIFICANT CHANGE UP (ref 135–146)
WBC # BLD: 10.21 K/UL — SIGNIFICANT CHANGE UP (ref 4.8–10.8)
WBC # FLD AUTO: 10.21 K/UL — SIGNIFICANT CHANGE UP (ref 4.8–10.8)

## 2024-02-26 PROCEDURE — 99232 SBSQ HOSP IP/OBS MODERATE 35: CPT

## 2024-02-26 PROCEDURE — 99223 1ST HOSP IP/OBS HIGH 75: CPT

## 2024-02-26 PROCEDURE — 99497 ADVNCD CARE PLAN 30 MIN: CPT | Mod: 25

## 2024-02-26 RX ORDER — POTASSIUM CHLORIDE 20 MEQ
40 PACKET (EA) ORAL ONCE
Refills: 0 | Status: COMPLETED | OUTPATIENT
Start: 2024-02-26 | End: 2024-02-26

## 2024-02-26 RX ORDER — SODIUM CHLORIDE 9 MG/ML
1000 INJECTION, SOLUTION INTRAVENOUS
Refills: 0 | Status: DISCONTINUED | OUTPATIENT
Start: 2024-02-26 | End: 2024-02-29

## 2024-02-26 RX ORDER — CALCIUM GLUCONATE 100 MG/ML
2 VIAL (ML) INTRAVENOUS ONCE
Refills: 0 | Status: COMPLETED | OUTPATIENT
Start: 2024-02-26 | End: 2024-02-26

## 2024-02-26 RX ADMIN — ENOXAPARIN SODIUM 40 MILLIGRAM(S): 100 INJECTION SUBCUTANEOUS at 12:25

## 2024-02-26 RX ADMIN — SODIUM CHLORIDE 75 MILLILITER(S): 9 INJECTION, SOLUTION INTRAVENOUS at 05:34

## 2024-02-26 RX ADMIN — Medication 200 GRAM(S): at 16:41

## 2024-02-26 RX ADMIN — POLYETHYLENE GLYCOL 3350 17 GRAM(S): 17 POWDER, FOR SOLUTION ORAL at 12:29

## 2024-02-26 RX ADMIN — PIPERACILLIN AND TAZOBACTAM 25 GRAM(S): 4; .5 INJECTION, POWDER, LYOPHILIZED, FOR SOLUTION INTRAVENOUS at 14:50

## 2024-02-26 RX ADMIN — LIDOCAINE 1 PATCH: 4 CREAM TOPICAL at 12:24

## 2024-02-26 RX ADMIN — Medication 25 MILLIGRAM(S): at 18:50

## 2024-02-26 RX ADMIN — PIPERACILLIN AND TAZOBACTAM 25 GRAM(S): 4; .5 INJECTION, POWDER, LYOPHILIZED, FOR SOLUTION INTRAVENOUS at 21:12

## 2024-02-26 RX ADMIN — PIPERACILLIN AND TAZOBACTAM 25 GRAM(S): 4; .5 INJECTION, POWDER, LYOPHILIZED, FOR SOLUTION INTRAVENOUS at 05:34

## 2024-02-26 RX ADMIN — Medication 40 MILLIEQUIVALENT(S): at 12:24

## 2024-02-26 RX ADMIN — CHLORHEXIDINE GLUCONATE 1 APPLICATION(S): 213 SOLUTION TOPICAL at 05:34

## 2024-02-26 RX ADMIN — Medication 25 MILLIGRAM(S): at 05:35

## 2024-02-26 RX ADMIN — SODIUM CHLORIDE 100 MILLILITER(S): 9 INJECTION, SOLUTION INTRAVENOUS at 09:39

## 2024-02-26 NOTE — CONSULT NOTE ADULT - SUBJECTIVE AND OBJECTIVE BOX
CC: weight loss and odynophagia    HPI:  68-year-old male with past medical history of hypertension, chronic back pain, prostate cancer, and posterior mediastinal mass 6.5 cm seen on CT 11/2023, presents for 3 months of progressively difficulty swallowing associated with 10 pounds weight loss and odynophagia. Comes in today now unable to swallow his pills with water, resulting in 2 episode of emesis. He mentioned that he gets SOB during exertion but denies any exertional chest pain. As per patient he was treated at [facility] for prostate cancer and is s/p radiation therapy. He states that he has problem during walking and unable to maintain the posture.   Denies any abdominal pain, chest pain, dyspnea, diarrhea, fever, chills     Vital Signs Last 24 Hrs  T(C): 36.7 (20 Feb 2024 10:53), Max: 36.7 (20 Feb 2024 10:53)  T(F): 98 (20 Feb 2024 10:53), Max: 98 (20 Feb 2024 10:53)  HR: 78 (20 Feb 2024 10:53) (78 - 78)  BP: 112/78 (20 Feb 2024 10:53) (112/78 - 112/78)  RR: 17 (20 Feb 2024 10:53) (17 - 17)  SpO2: 100% (20 Feb 2024 10:53) (100% - 100%)  O2 Parameters below as of 20 Feb 2024 10:53  Patient On (Oxygen Delivery Method): room air           (20 Feb 2024 15:17)    PERTINENT PM/SXH:   HTN (hypertension)    Prostate cancer        FAMILY HISTORY:  None pertinent    ITEMS NOT CHECKED ARE NOT PRESENT    SOCIAL HISTORY:   Significant other/partner[ ]  Children[ ]  Restorationism/Spirituality:  Substance hx:  [ ]   Tobacco hx:  [ ]   Alcohol hx: [ ]   Living Situation: [ x]Home  [ ]Long term care  [ ]Rehab [ ]Other  Home Services: [ ] HHA [ ] Domi RN [ ] Hospice  Occupation:  Home Opioid hx:  [ ] Y [ ] N [x ] I-Stop Reference No:     Reference #: [id number]    Practitioner Count: 0  Pharmacy Count: 0  Current Opioid Prescriptions: 0  Current Benzodiazepine Prescriptions: 0  Current Stimulant Prescriptions: 0      Patient Demographic Information (PDI)       PDI	First Name	Last Name	Birth Date	Gender	Street Address	[facility]  ANA Stallings	1955	[place]	[address] [facility]	[address]  B	Armani Stallings	1955	Male	[address] [facility]	[id number]    Prescription Information      PDI Filter:    PDI	Current Rx	Drug Type	Rx Written	Rx Dispensed	Drug	Quantity	Days Supply  A	N	O	07/21/2023	07/22/2023	oxycodone-acetaminophen 5-325 mg tablet	30	30  Prescriber Name Russ Haynes MD  Prescriber ALYSSA # [id number]  Payment Method Medicaid  Dispenser [employer]  A	N	O	06/26/2023	06/26/2023	oxycodone-acetaminophen 5-325 mg tablet	30	30  Prescriber Name Anaya Multani  Prescriber ALYSSA # [id number]  Payment Method Medicaid  Dispenser [employer]  A	N	O	05/25/2023	05/25/2023	oxycodone-acetaminophen 5-325 mg tablet	30	30  Prescriber Name Anaya Multani  Prescriber ALYSSA # [id number]  Payment Method Medicaid  Dispenser [employer]  A	N	O	04/28/2023	04/28/2023	oxycodone-acetaminophen 5-325 mg tablet	30	30  Prescriber Name Anaya Multani  Prescriber ALYSSA # [id number]  Payment Method Medicaid  Dispenser [employer]  A	N	O	03/21/2023	03/23/2023	oxycodone-acetaminophen 5-325 mg tablet	30	7  Prescriber Name Anaya Multani  Prescriber ALYSSA # [id number]  Payment Method Insurance  Dispenser [employer]  B	N	O	07/25/2023	07/25/2023	oxycodone-acetaminophen 5-325 mg tab	30	30  Prescriber Name Anaya Multani  Prescriber ALYSSA # [id number]  Payment Method Cash  Dispenser [employer]     ADVANCE DIRECTIVES:     [ x] Full Code [ ] DNR  MOLST  [ ]  Living Will  [ ]   DECISION MAKER(s):  [ ] Health Care Proxy(s)  [ ] Surrogate(s)  [ ] Guardian           Name(s): Phone Number(s):      BASELINE (I)ADL(s) (prior to admission):    [place]: [ ]Total  [ ] Moderate [ ]Dependent  Palliative Performance Status Version 2:         %    http://[facility].org/files/news/palliative_performance_scale_ppsv2.pdf    Allergies    No Known Allergies    Intolerances    MEDICATIONS  (STANDING):  chlorhexidine 2% Cloths 1 Application(s) Topical <User Schedule>  dextrose 5% + lactated ringers. 1000 milliLiter(s) (100 mL/Hr) IV Continuous <Continuous>  enoxaparin Injectable 40 milliGRAM(s) SubCutaneous every 24 hours  influenza  Vaccine (HIGH DOSE) 0.7 milliLiter(s) IntraMuscular once  lidocaine   4% Patch 1 Patch Transdermal every 24 hours  metoprolol tartrate 25 milliGRAM(s) Oral two times a day  piperacillin/tazobactam IVPB.. 3.375 Gram(s) IV Intermittent every 8 hours  polyethylene glycol 3350 17 Gram(s) Oral daily  potassium chloride   Powder 40 milliEquivalent(s) Oral once    MEDICATIONS  (PRN):  acetaminophen     Tablet .. 650 milliGRAM(s) Oral every 6 hours PRN Temp greater or equal to 38C (100.4F), Mild Pain (1 - 3)  aluminum hydroxide/magnesium hydroxide/simethicone Suspension 30 milliLiter(s) Oral every 4 hours PRN Dyspepsia  melatonin 3 milliGRAM(s) Oral at bedtime PRN Insomnia  traMADol 25 milliGRAM(s) Oral four times a day PRN Moderate Pain (4 - 6)    PRESENT SYMPTOMS: [ ]Unable to obtain due to poor mentation   Source if other than patient:  [ ]Family   [ ]Team     Pain: [ ]yes [ ]no  QOL impact -   Location -                    Aggravating factors -  Quality -  Radiation -  Timing-  Severity (0-10 scale):  Minimal acceptable level (0-10 scale):     CPOT:    https://www.[facility].org/getattachment/[email or web address]/Critical-Care-Pain-Observation-Tool-(CPOT)    PAIN AD Score:   http://geriatrictoolkit.[place].[facility]/cog/painad.pdf (press ctrl +  left click to view)    Dyspnea:                           [ ]None[ ]Mild [ ]Moderate [ ]Severe     Respiratory Distress Observation Scale (RDOS):   A score of 0 to 2 signifies little or no respiratory distress, 3 signifies mild distress, scores 4 to 6 indicate moderate distress, and scores greater than 7 signify severe distress  https://www.[facility].ca/sites/default/files/PDFS/563371-yhxnpldojpk-yevskiyu-uiwnagfapfr-udxbt.pdf    Anxiety:                             [ ]None[ ]Mild [ ]Moderate [ ]Severe   Fatigue:                             [ ]None[ ]Mild [ ]Moderate [ ]Severe   Nausea:                             [ ]None[ ]Mild [ ]Moderate [ ]Severe   Loss of appetite:              [ ]None[ ]Mild [ ]Moderate [ ]Severe   Constipation:                    [ ]None[ ]Mild [ ]Moderate [ ]Severe    Other Symptoms:  [ ]All other review of systems negative     Palliative Performance Status Version 2:         %    http://npcrc.org/files/news/palliative_performance_scale_ppsv2.pdf    PHYSICAL EXAM:  Vital Signs Last 24 Hrs  T(C): 36.3 (26 Feb 2024 05:35), Max: 36.8 (25 Feb 2024 13:11)  T(F): 97.4 (26 Feb 2024 05:35), Max: 98.2 (25 Feb 2024 13:11)  HR: 88 (26 Feb 2024 05:35) (88 - 103)  BP: 90/59 (26 Feb 2024 05:35) (90/59 - 121/63)  BP(mean): 86 (25 Feb 2024 20:44) (86 - 86)  RR: 18 (26 Feb 2024 05:35) (18 - 18)  SpO2: --     I&O's Summary    25 Feb 2024 07:01  -  26 Feb 2024 07:00  --------------------------------------------------------  IN: 901 mL / OUT: 750 mL / NET: 151 mL        GENERAL:  [ ] No acute distress [ ]Lethargic  [ ]Unarousable  [ ]Verbal  [ ]Non-Verbal [ ]Cachexia    BEHAVIORAL/PSYCH:  [ ]Alert and Oriented x  [ ] Anxiety [ ] Delirium [ ] Agitation [ ] Calm   EYES: [ ] No scleral icterus [ ] Scleral icterus [ ] Closed  ENMT:  [ ]Dry mouth  [ ]No external oral lesions [ ] No external ear or nose lesions  CARDIOVASCULAR:  [ ]Regular [ ]Irregular [ ]Tachy [ ]Not Tachy  [ ]Rambo [ ] Edema [ ] No edema  PULMONARY:  [ ]Tachypnea  [ ]Audible excessive secretions [ ] No labored breathing [ ] labored breathing  GASTROINTESTINAL: [ ]Soft  [ ]Distended  [ ]Not distended [ ]Non tender [ ]Tender  MUSCULOSKELETAL: [ ]No clubbing [ ] clubbing  [ ] No cyanosis [ ] cyanosis  NEUROLOGIC: [ ]No focal deficits  [ ]Follows commands  [ ]Does not follow commands  [ ]Cognitive impairment  [ ]Dysphagia  [ ]Dysarthria  [ ]Paresis   SKIN: [ ] Jaundiced [ ] Non-jaundiced [ ]Rash [ ]No Rash [ ] Warm [ ] Dry  MISC/LINES: [ ] ET tube [ ] Trach [ ]NGT/OGT [ ]PEG [ ]Angel    LABS: reviewed by me                        8.2    10.21 )-----------( 250      ( 26 Feb 2024 07:54 )             26.3   02-26    140  |  109  |  52<H>  ----------------------------<  169<H>  3.4<L>   |  19  |  2.3<H>    Ca    6.8<L>      26 Feb 2024 07:54  Mg     2.5     02-26    TPro  5.1<L>  /  Alb  2.7<L>  /  TBili  0.4  /  DBili  x   /  AST  32  /  ALT  26  /  AlkPhos  65  02-26      Urinalysis Basic - ( 26 Feb 2024 07:54 )    Color: x / Appearance: x / SG: x / pH: x  Gluc: 169 mg/dL / Ketone: x  / Bili: x / Urobili: x   Blood: x / Protein: x / Nitrite: x   Leuk Esterase: x / RBC: x / WBC x   Sq Epi: x / Non Sq Epi: x / Bacteria: x      RADIOLOGY & ADDITIONAL STUDIES: reviewed by me    EKG: reviewed by me      PROTEIN CALORIE MALNUTRITION PRESENT: [ ]mild [ ]moderate [ ]severe [ ]underweight [ ]morbid obesity  https://www.andeal.org/vault/4930/web/files/ONC/Table_Clinical%20Characteristics%20to%20Document%20Malnutrition-White%20JV%20et%20al%202012.pdf    Height (cm): 185.4 (02-22-24 @ 14:44), 185.4 (09-15-23 @ 10:44)  Weight (kg): 77.1 (02-21-24 @ 14:55), 77.1 (11-22-23 @ 08:11), 78.5 (09-15-23 @ 10:44)  BMI (kg/m2): 22.4 (02-22-24 @ 14:44), 22.4 (02-21-24 @ 14:55), 22.4 (11-22-23 @ 08:11)  [ ]PPSV2 < or = to 30% [ ]significant weight loss  [ ]poor nutritional intake  [ ]anasarca      [ ]Artificial Nutrition      Palliative Care Spiritual/Emotional Screening Tool Question  Severity (0-4):                    OR                    [ x] Unable to determine. Will assess at later time if appropriate.  Score of 2 or greater indicates recommendation of Chaplaincy and/or SW referral  Chaplaincy Referral: [ ] Yes [ ] Refused [ ] Following     Caregiver [place]:  [ ] Yes [ ] No    OR    [x ] Unable to determine. Will assess at later time if appropriate.  Social Work Referral [ ]  Patient and Family Centered Care Referral [ ]    Anticipatory Grief Present: [ ] Yes [ ] No    OR     [ x] Unable to determine. Will assess at later time if appropriate.  Social Work Referral [ ]  Patient and Family Centered Care Referral [ ]    Patient discussed with primary medical team MD  Palliative care education provided to patient and/or family   CC: weight loss and odynophagia    HPI:  68-year-old male with past medical history of hypertension, chronic back pain, prostate cancer, and posterior mediastinal mass 6.5 cm seen on CT 11/2023, presents for 3 months of progressively difficulty swallowing associated with 10 pounds weight loss and odynophagia. Comes in today now unable to swallow his pills with water, resulting in 2 episode of emesis. He mentioned that he gets SOB during exertion but denies any exertional chest pain. As per patient he was treated at [facility] for prostate cancer and is s/p radiation therapy. He states that he has problem during walking and unable to maintain the posture.   Denies any abdominal pain, chest pain, dyspnea, diarrhea, fever, chills     Vital Signs Last 24 Hrs  T(C): 36.7 (20 Feb 2024 10:53), Max: 36.7 (20 Feb 2024 10:53)  T(F): 98 (20 Feb 2024 10:53), Max: 98 (20 Feb 2024 10:53)  HR: 78 (20 Feb 2024 10:53) (78 - 78)  BP: 112/78 (20 Feb 2024 10:53) (112/78 - 112/78)  RR: 17 (20 Feb 2024 10:53) (17 - 17)  SpO2: 100% (20 Feb 2024 10:53) (100% - 100%)  O2 Parameters below as of 20 Feb 2024 10:53  Patient On (Oxygen Delivery Method): room air           (20 Feb 2024 15:17)    PERTINENT PM/SXH:   HTN (hypertension)    Prostate cancer        FAMILY HISTORY:  None pertinent    ITEMS NOT CHECKED ARE NOT PRESENT    SOCIAL HISTORY:   Significant other/partner[ ]  Children[ ]  Quaker/Spirituality:  Substance hx:  [ ]   Tobacco hx:  [ ]   Alcohol hx: [ ]   Living Situation: [ x]Home  [ ]Long term care  [ ]Rehab [ ]Other  Home Services: [ ] HHA [ ] Domi RN [ ] Hospice  Occupation:  Home Opioid hx:  [ ] Y [ ] N [x ] I-Stop Reference No:     Reference #: [id number]    Practitioner Count: 0  Pharmacy Count: 0  Current Opioid Prescriptions: 0  Current Benzodiazepine Prescriptions: 0  Current Stimulant Prescriptions: 0      Patient Demographic Information (PDI)       PDI	First Name	Last Name	Birth Date	Gender	Street Address	[facility]  ANA Stallings	1955	[place]	[address]	[address]  B	Armani Stallings	1955	Male	[address] [facility]	[address]    Prescription Information      PDI Filter:    PDI	Current Rx	Drug Type	Rx Written	Rx Dispensed	Drug	Quantity	Days Supply  A	N	O	07/21/2023	07/22/2023	oxycodone-acetaminophen 5-325 mg tablet	30	30  Prescriber Name Russ Haynes MD  Prescriber ALYSSA # [id number]  Payment Method Medicaid  Dispenser [employer]  A	N	O	06/26/2023	06/26/2023	oxycodone-acetaminophen 5-325 mg tablet	30	30  Prescriber Name Anaya Multani  Prescriber ALYSSA # [id number]  Payment Method Medicaid  Dispenser [employer]  A	N	O	05/25/2023	05/25/2023	oxycodone-acetaminophen 5-325 mg tablet	30	30  Prescriber Name Anaya Multani  Prescriber ALYSSA # [id number]  Payment Method Medicaid  Dispenser [employer]  A	N	O	04/28/2023	04/28/2023	oxycodone-acetaminophen 5-325 mg tablet	30	30  Prescriber Name Anaya Multani  Prescriber ALYSSA # [id number]  Payment Method Medicaid  Dispenser [employer]  A	N	O	03/21/2023	03/23/2023	oxycodone-acetaminophen 5-325 mg tablet	30	7  Prescriber Name Anaya Multani  Prescriber ALYSSA # [id number]  Payment Method Insurance  Dispenser [employer]  B	N	O	07/25/2023	07/25/2023	oxycodone-acetaminophen 5-325 mg tab	30	30  Prescriber Name Anaya Multani  Prescriber ALYSSA # [id number]  Payment Method Cash  Dispenser [employer]     ADVANCE DIRECTIVES:     [ x] Full Code [ ] DNR  MOLST  [ ]  Living Will  [ ]   DECISION MAKER(s):  [ ] Health Care Proxy(s)  [ x] Surrogate(s)  [ ] Guardian           Name(s): Phone Number(s):  Unknown      BASELINE (I)ADL(s) (prior to admission):    [place]: [ ]Total  [ ] Moderate [ ]Dependent  Palliative Performance Status Version 2:         %    http://[facility].org/files/news/palliative_performance_scale_ppsv2.pdf    Allergies    No Known Allergies    Intolerances    MEDICATIONS  (STANDING):  chlorhexidine 2% Cloths 1 Application(s) Topical <User Schedule>  dextrose 5% + lactated ringers. 1000 milliLiter(s) (100 mL/Hr) IV Continuous <Continuous>  enoxaparin Injectable 40 milliGRAM(s) SubCutaneous every 24 hours  influenza  Vaccine (HIGH DOSE) 0.7 milliLiter(s) IntraMuscular once  lidocaine   4% Patch 1 Patch Transdermal every 24 hours  metoprolol tartrate 25 milliGRAM(s) Oral two times a day  piperacillin/tazobactam IVPB.. 3.375 Gram(s) IV Intermittent every 8 hours  polyethylene glycol 3350 17 Gram(s) Oral daily  potassium chloride   Powder 40 milliEquivalent(s) Oral once    MEDICATIONS  (PRN):  acetaminophen     Tablet .. 650 milliGRAM(s) Oral every 6 hours PRN Temp greater or equal to 38C (100.4F), Mild Pain (1 - 3)  aluminum hydroxide/magnesium hydroxide/simethicone Suspension 30 milliLiter(s) Oral every 4 hours PRN Dyspepsia  melatonin 3 milliGRAM(s) Oral at bedtime PRN Insomnia  traMADol 25 milliGRAM(s) Oral four times a day PRN Moderate Pain (4 - 6)    PRESENT SYMPTOMS: [ ]Unable to obtain due to poor mentation   Source if other than patient:  [ ]Family   [ ]Team     Pain: [ ]yes [ x]no  QOL impact -   Location -                    Aggravating factors -  Quality -  Radiation -  Timing-  Severity (0-10 scale):  Minimal acceptable level (0-10 scale):     CPOT:    https://www.[facility].org/getattachment/[email or web address]/Critical-Care-Pain-Observation-Tool-(CPOT)    PAIN AD Score:   http://geriatrictoolkit.[place].[facility]/cog/painad.pdf (press ctrl +  left click to view)    Dyspnea:                           [ x]None[ ]Mild [ ]Moderate [ ]Severe     Respiratory Distress Observation Scale (RDOS):   A score of 0 to 2 signifies little or no respiratory distress, 3 signifies mild distress, scores 4 to 6 indicate moderate distress, and scores greater than 7 signify severe distress  https://www.[facility].ca/sites/default/files/PDFS/582014-ndvztaafxps-lfefjedf-pcslqqttful-yeuln.pdf    Anxiety:                             [x ]None[ ]Mild [ ]Moderate [ ]Severe   Fatigue:                             [x ]None[ ]Mild [ ]Moderate [ ]Severe   Nausea:                             [x ]None[ ]Mild [ ]Moderate [ ]Severe   Loss of appetite:              [ x]None[ ]Mild [ ]Moderate [ ]Severe   Constipation:                    [x ]None[ ]Mild [ ]Moderate [ ]Severe    Other Symptoms:  [x ]All other review of systems negative     Palliative Performance Status Version 2:         30%    http://[facility].org/files/news/palliative_performance_scale_ppsv2.pdf    PHYSICAL EXAM:  Vital Signs Last 24 Hrs  T(C): 36.3 (26 Feb 2024 05:35), Max: 36.8 (25 Feb 2024 13:11)  T(F): 97.4 (26 Feb 2024 05:35), Max: 98.2 (25 Feb 2024 13:11)  HR: 88 (26 Feb 2024 05:35) (88 - 103)  BP: 90/59 (26 Feb 2024 05:35) (90/59 - 121/63)  BP(mean): 86 (25 Feb 2024 20:44) (86 - 86)  RR: 18 (26 Feb 2024 05:35) (18 - 18)  SpO2: --     I&O's Summary    25 Feb 2024 07:01  -  26 Feb 2024 07:00  --------------------------------------------------------  IN: 901 mL / OUT: 750 mL / NET: 151 mL        GENERAL:  [ ] No acute distress [ ]Lethargic  [ ]Unarousable  [x ]Verbal  [ ]Non-Verbal [ ]Cachexia    BEHAVIORAL/PSYCH:  [x ]Alert and Oriented x2-3  [ ] Anxiety [ ] Delirium [ ] Agitation [x ] Calm   EYES: [x ] No scleral icterus [ ] Scleral icterus [ ] Closed  ENMT:  [ ]Dry mouth  [x ]No external oral lesions [ ] No external ear or nose lesions  CARDIOVASCULAR:  [ ]Regular [ ]Irregular [ ]Tachy [x ]Not Tachy  [ ]Rambo [ ] Edema [ ] No edema  PULMONARY:  [ ]Tachypnea  [ ]Audible excessive secretions [x ] No labored breathing [ ] labored breathing  GASTROINTESTINAL: [ ]Soft  [ ]Distended  [ x]Not distended [ ]Non tender [ ]Tender  MUSCULOSKELETAL: [ ]No clubbing [ ] clubbing  [ x] No cyanosis [ ] cyanosis  NEUROLOGIC: [ ]No focal deficits  [ x]Follows commands  [ ]Does not follow commands  [ ]Cognitive impairment  [ ]Dysphagia  [ ]Dysarthria  [ ]Paresis   SKIN: [ ] Jaundiced [ ] Non-jaundiced [ ]Rash [ ]No Rash [ ] Warm [ ] Dry  MISC/LINES: [ ] ET tube [ ] Trach [ ]NGT/OGT [ ]PEG [ ]Angel    LABS: reviewed by me                        8.2    10.21 )-----------( 250      ( 26 Feb 2024 07:54 )             26.3   02-26    140  |  109  |  52<H>  ----------------------------<  169<H>  3.4<L>   |  19  |  2.3<H>    Ca    6.8<L>      26 Feb 2024 07:54  Mg     2.5     02-26    TPro  5.1<L>  /  Alb  2.7<L>  /  TBili  0.4  /  DBili  x   /  AST  32  /  ALT  26  /  AlkPhos  65  02-26      Urinalysis Basic - ( 26 Feb 2024 07:54 )    Color: x / Appearance: x / SG: x / pH: x  Gluc: 169 mg/dL / Ketone: x  / Bili: x / Urobili: x   Blood: x / Protein: x / Nitrite: x   Leuk Esterase: x / RBC: x / WBC x   Sq Epi: x / Non Sq Epi: x / Bacteria: x      RADIOLOGY & ADDITIONAL STUDIES: reviewed by me    < from: Xray Chest 1 View- PORTABLE-Urgent (Xray Chest 1 View- PORTABLE-Urgent .) (02.24.24 @ 07:57) >  Impression:    Increased left basilar opacity, could represent pneumonia in the   appropriate clinical setting.    < end of copied text >      EKG: reviewed by me    < from: 12 Lead ECG (02.21.24 @ 14:56) >  Ventricular Rate 90 BPM    Atrial Rate 90 BPM    P-R Interval 174 ms    QRS Duration 82 ms    Q-T Interval 400 ms    QTC Calculation(Bazett) 489 ms    P Axis 40 degrees    R Axis 40 degrees    T Axis 67 degrees    Diagnosis Line Normal sinus rhythm  Prolonged QT  Abnormal ECG    < end of copied text >      PROTEIN CALORIE MALNUTRITION PRESENT: [ ]mild [ ]moderate [ ]severe [ ]underweight [ ]morbid obesity  https://www.andeal.org/vault/2440/web/files/ONC/Table_Clinical%20Characteristics%20to%20Document%20Malnutrition-White%20JV%20et%20al%232487.pdf    Height (cm): 185.4 (02-22-24 @ 14:44), 185.4 (09-15-23 @ 10:44)  Weight (kg): 77.1 (02-21-24 @ 14:55), 77.1 (11-22-23 @ 08:11), 78.5 (09-15-23 @ 10:44)  BMI (kg/m2): 22.4 (02-22-24 @ 14:44), 22.4 (02-21-24 @ 14:55), 22.4 (11-22-23 @ 08:11)  [ ]PPSV2 < or = to 30% [ ]significant weight loss  [ ]poor nutritional intake  [ ]anasarca      [ ]Artificial Nutrition      Palliative Care Spiritual/Emotional Screening Tool Question  Severity (0-4):                    OR                    [ x] Unable to determine. Will assess at later time if appropriate.  Score of 2 or greater indicates recommendation of Chaplaincy and/or SW referral  Chaplaincy Referral: [ ] Yes [ ] Refused [ ] Following     Caregiver [place]:  [ ] Yes [ ] No    OR    [x ] Unable to determine. Will assess at later time if appropriate.  Social Work Referral [ ]  Patient and Family Centered Care Referral [ ]    Anticipatory Grief Present: [ ] Yes [ ] No    OR     [ x] Unable to determine. Will assess at later time if appropriate.  Social Work Referral [ ]  Patient and Family Centered Care Referral [ ]    Patient discussed with primary medical team MD  Palliative care education provided to patient and/or family

## 2024-02-26 NOTE — CONSULT NOTE ADULT - PROBLEM SELECTOR RECOMMENDATION 3
Seen by Dr. Irizarry as outpatient  -She has not been reached by primary team  -per chart review, outside biopsy showed mestatic prostate cancer  -f/u with oncology  -GOC as appropriate

## 2024-02-26 NOTE — PROGRESS NOTE ADULT - ATTENDING COMMENTS
Pt is still not eating. Palliative consulted for discussions with family regarding a PEG tube as blindly placing an NGT would not be a good idea given the location of his mass. BP low, asymptomatic but increased rate of IVF. Hb trending downwards, continue to monitor and transfuse if Hb <7. Path still pending. Please re-consult oncology as the private attending has not been picking up. Pt is still not eating. Palliative consulted for discussions with family regarding a PEG tube as blindly placing an NGT would not be a good idea given the location of his mass. BP low, asymptomatic but increased rate of IVF. Hb trending downwards, continue to monitor and transfuse if Hb <7. Path still pending. Please re-consult oncology as the private attending has not been picking up. Cr from [facility] has peaked and downtrending.

## 2024-02-26 NOTE — PROGRESS NOTE ADULT - ASSESSMENT
68-year-old male with past medical history of hypertension, chronic back pain, prostate cancer, and posterior mediastinal mass 6.5 cm seen on CT 11/2023, presents for 3 months of progressively difficulty swallowing associated with 10 pounds weight loss and odynophagia. Comes in today now unable to swallow his pills with water, resulting in 2 episode of emesis. He mentioned that he gets SOB during exertion but denies any exertional chest pain. As per patient he was treated at [facility] for prostate cancer and is s/p radiation therapy. He states that he has problem during walking and unable to maintain the posture.     #aspiration PNA vs viral infection vs HAP   - SIRS positive (WBC increased to 16, febrile to 101 and tachy to 129 )  - CXR with worsening L-sided opacity   - f/u infectious workup (BCx, UA, UCx, procal, RVP, MRSA, lactate)  - patient cleared for pureed diet and thin liquids per SS   - start zosyn 3.375mg q8h   - aspiration precautions   - metoprolol 12.5mg bid  - ECG      # Dysphagia with odynophagia  # Hx of prostate cancer s/p radiation therapy  # Prostate cancer metastasis to mediastinum or primary mediastinal mass?  - CT Chest: Interval enlargement of the central/posterior mediastinal mass with development of more extensive lymphadenopathy. The mass encircles the descending thoracic aorta and has some mass effect upon the left atrium. The mid aspect of the esophagus is encircled by the mass and difficult to distinguish. Possible ovoid pill is noted within the mass and possible location of the mid esophagus appear. The maximal esophagus appears distended with layering debris within. Consideration may be given to esophageal stenting.  - CT abdomen and pelvis:  Increasedupper abdominal bulky lymphadenopathy. Unchanged bulky retroperitoneal lymphadenopathy, large left bladder   mass and perirectal soft tissue infiltrative mass. 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 Chronic moderate to severe left hydroureteronephrosis  - cleared for easy to chew from SS but deferred to GI; can start clear liquid diet today as per GI   - EGD with GI 2/21 -> stent placed and biopsies taken   - AFP and CEA wnl  - LDH and haptoglobin elevated   - PSA total 220, PSA free is 22   - CA19 131  -  57  - oncology recs appreciated ->patient used to follow with Dr. Kenia Irizarry, called and teams her and she is not picking up the phone calls  - pulm recs appreciated   - CT surgery following -> no acute intervention   - per onc note, patient had prior biopsy of site that demonstrated metastatic prostate adenocarcinoma and was started on Docetaxel.  - tramadol 25mg q6h PRN for pain management   - orthostatics positive, likely secondary to poor oral intake, started on IV fluids   - ensure added, nutrition consulted    #CHUCKIE vs ATN induced DIMAS  - pt came in with a baseline of 1.5 and increased to 2.6 with contrast use  - c/w IVF  - renally dose medications    # small b/l pleural effusions on CT chest   - s/p 1x dose 40mg IV Lasix , CXR improved     # normocytic Anemia  - Hb 9-10  - f/u iron panel  - MCV normal, SPEP    #Misc   #DVT PPx- Lovenox  #GI PPx- Protonix IV  #Diet- clear liquid diet   #Activity- AAT, PT consult  #Dispo- Acute   68-year-old male with past medical history of hypertension, chronic back pain, prostate cancer, and posterior mediastinal mass 6.5 cm seen on CT 11/2023, presents for 3 months of progressively difficulty swallowing associated with 10 pounds weight loss and odynophagia. Comes in today now unable to swallow his pills with water, resulting in 2 episode of emesis. He mentioned that he gets SOB during exertion but denies any exertional chest pain. As per patient he was treated at [facility] for prostate cancer and is s/p radiation therapy. He states that he has problem during walking and unable to maintain the posture.     #aspiration PNA vs viral infection vs HAP   - SIRS positive (WBC increased to 16, febrile to 101 and tachy to 129 )  - CXR with worsening L-sided opacity   - f/u infectious workup (BCx NGTD, pending procal, RVP, MRSA)  - patient cleared for pureed diet and thin liquids per SS   - on zosyn 3.375mg q8h   - aspiration precautions     # Dysphagia with odynophagia  # Hx of prostate cancer s/p radiation therapy  # Prostate cancer metastasis to mediastinum or primary mediastinal mass?  - CT Chest: Interval enlargement of the central/posterior mediastinal mass with development of more extensive lymphadenopathy. The mass encircles the descending thoracic aorta and has some mass effect upon the left atrium. The mid aspect of the esophagus is encircled by the mass and difficult to distinguish. Possible ovoid pill is noted within the mass and possible location of the mid esophagus appear. The maximal esophagus appears distended with layering debris within. Consideration may be given to esophageal stenting.  - CT abdomen and pelvis:  Increasedupper abdominal bulky lymphadenopathy. Unchanged bulky retroperitoneal lymphadenopathy, large left bladder   mass and perirectal soft tissue infiltrative mass. 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 Chronic moderate to severe left hydroureteronephrosis  - EGD with GI 2/21 -> stent placed and biopsies taken -pathology pending   - CT surgery following -> no acute intervention   - AFP and CEA wnl  - LDH and haptoglobin elevated   - PSA total 220, PSA free is 22   - CA19 131  -  57  - oncology recs appreciated ->patient used to follow with Dr. Kenia Irizarry, called and teams her and she is not picking up the phone calls  - per onc note, patient had prior biopsy of site that demonstrated metastatic prostate adenocarcinoma and was started on Docetaxel.  - tramadol 25mg q6h PRN for pain management   - orthostatics positive, likely secondary to poor oral intake, on IV fluids   - ensure added, nutrition consulted    #CHUCKIE vs ATN induced DIMAS  - pt came in with a baseline of 1.5 and increased to 2.6 with contrast use  - c/w IVF  - renally dose medications    # small b/l pleural effusions on CT chest   - s/p 1x dose 40mg IV Lasix , CXR improved     #normocytic Anemia  - Hb 9-10  - f/u iron  studies noted % sat 27   - MCV normal, SPEP negative     #hypocalcemia  -replenished   -monitor     #Misc   #DVT PPx- Lovenox  #GI PPx- none   #Diet-  puree   #Activity- AAT, PT consult  #Dispo- Acute

## 2024-02-26 NOTE — CONSULT NOTE ADULT - ASSESSMENT
68yMale with history of prostate cancer, posterior mediastinal mass seen on CT in November 2023, presents with difficulty swallowing associated with weight loss and odynophagia.  Patient with evidence of aspiration on arrival with concern for viral infection vs pneumonia, dysphagia with odynophagia, DIMAS. Palliative care consulted for [facility].    Spoke with patient at bedside. Palliative care introduced. He was not able to provide a significant medical history, but noted something was wrong with his throat.  We discussed if he had family or NOK, and he noted there was nobody that could help make decisions for him. We discussed code status briefly, and he noted that he wished to be Full code.    Called and spoke with Sadi at [facility] to find out about additional NOK. He asked that I call tomorrow.      MEDD (morphine equivalent daily dose):    Education about palliative care provided to patient/family.  See Recs below.    Please call [phone] with questions or concerns 24/7.   We will continue to follow.

## 2024-02-26 NOTE — PROGRESS NOTE ADULT - SUBJECTIVE AND OBJECTIVE BOX
24H events:    Today is hospital day 6d. This morning patient was seen and examined at bedside, resting comfortably in bed.    No acute or major events overnight.    PAST MEDICAL & SURGICAL HISTORY  HTN (hypertension)    Prostate cancer        SOCIAL HISTORY:  Social History:      ALLERGIES:  No Known Allergies      MEDICATIONS:  STANDING MEDICATIONS  chlorhexidine 2% Cloths 1 Application(s) Topical <User Schedule>  dextrose 5% + lactated ringers. 1000 milliLiter(s) IV Continuous <Continuous>  enoxaparin Injectable 40 milliGRAM(s) SubCutaneous every 24 hours  influenza  Vaccine (HIGH DOSE) 0.7 milliLiter(s) IntraMuscular once  lidocaine   4% Patch 1 Patch Transdermal every 24 hours  metoprolol tartrate 25 milliGRAM(s) Oral two times a day  piperacillin/tazobactam IVPB.. 3.375 Gram(s) IV Intermittent every 8 hours  polyethylene glycol 3350 17 Gram(s) Oral daily    PRN MEDICATIONS  acetaminophen     Tablet .. 650 milliGRAM(s) Oral every 6 hours PRN  aluminum hydroxide/magnesium hydroxide/simethicone Suspension 30 milliLiter(s) Oral every 4 hours PRN  melatonin 3 milliGRAM(s) Oral at bedtime PRN  ondansetron Injectable 4 milliGRAM(s) IV Push every 8 hours PRN  traMADol 25 milliGRAM(s) Oral four times a day PRN      VITALS:   T(C): 36.3 (02-26-24 @ 05:35), Max: 36.8 (02-25-24 @ 13:11)  HR: 88 (02-26-24 @ 05:35) (88 - 103)  BP: 90/59 (02-26-24 @ 05:35) (90/59 - 121/63)  RR: 18 (02-26-24 @ 05:35) (18 - 18)  SpO2: --  I&O's Summary    24 Feb 2024 07:01  -  25 Feb 2024 07:00  --------------------------------------------------------  IN: 0 mL / OUT: 150 mL / NET: -150 mL    25 Feb 2024 07:01  -  26 Feb 2024 06:44  --------------------------------------------------------  IN: 901 mL / OUT: 750 mL / NET: 151 mL          PHYSICAL EXAM:      LABS:                        9.4    14.29 )-----------( 253      ( 25 Feb 2024 07:51 )             29.7     02-25    139  |  107  |  59<H>  ----------------------------<  163<H>  4.1   |  16<L>  |  2.6<H>    Ca    6.9<L>      25 Feb 2024 07:51  Mg     2.8     02-25    TPro  5.7<L>  /  Alb  3.1<L>  /  TBili  0.7  /  DBili  x   /  AST  15  /  ALT  13  /  AlkPhos  78  02-25      Urinalysis Basic - ( 25 Feb 2024 07:51 )    Color: x / Appearance: x / SG: x / pH: x  Gluc: 163 mg/dL / Ketone: x  / Bili: x / Urobili: x   Blood: x / Protein: x / Nitrite: x   Leuk Esterase: x / RBC: x / WBC x   Sq Epi: x / Non Sq Epi: x / Bacteria: x            Culture - Blood (collected 24 Feb 2024 12:14)  Source: .Blood None  Preliminary Report (25 Feb 2024 20:01):    No growth at 24 hours               24H events:    Today is hospital day 6d. This morning patient was seen and examined at bedside, resting comfortably in bed.    No acute or major events overnight. Not eating much.     PAST MEDICAL & SURGICAL HISTORY  HTN (hypertension)    Prostate cancer        SOCIAL HISTORY:  Social History:      ALLERGIES:  No Known Allergies      MEDICATIONS:  STANDING MEDICATIONS  chlorhexidine 2% Cloths 1 Application(s) Topical <User Schedule>  dextrose 5% + lactated ringers. 1000 milliLiter(s) IV Continuous <Continuous>  enoxaparin Injectable 40 milliGRAM(s) SubCutaneous every 24 hours  influenza  Vaccine (HIGH DOSE) 0.7 milliLiter(s) IntraMuscular once  lidocaine   4% Patch 1 Patch Transdermal every 24 hours  metoprolol tartrate 25 milliGRAM(s) Oral two times a day  piperacillin/tazobactam IVPB.. 3.375 Gram(s) IV Intermittent every 8 hours  polyethylene glycol 3350 17 Gram(s) Oral daily    PRN MEDICATIONS  acetaminophen     Tablet .. 650 milliGRAM(s) Oral every 6 hours PRN  aluminum hydroxide/magnesium hydroxide/simethicone Suspension 30 milliLiter(s) Oral every 4 hours PRN  melatonin 3 milliGRAM(s) Oral at bedtime PRN  ondansetron Injectable 4 milliGRAM(s) IV Push every 8 hours PRN  traMADol 25 milliGRAM(s) Oral four times a day PRN      VITALS:   T(C): 36.3 (02-26-24 @ 05:35), Max: 36.8 (02-25-24 @ 13:11)  HR: 88 (02-26-24 @ 05:35) (88 - 103)  BP: 90/59 (02-26-24 @ 05:35) (90/59 - 121/63)  RR: 18 (02-26-24 @ 05:35) (18 - 18)  SpO2: --  I&O's Summary    24 Feb 2024 07:01  -  25 Feb 2024 07:00  --------------------------------------------------------  IN: 0 mL / OUT: 150 mL / NET: -150 mL    25 Feb 2024 07:01  -  26 Feb 2024 06:44  --------------------------------------------------------  IN: 901 mL / OUT: 750 mL / NET: 151 mL          PHYSICAL EXAM:    GENERAL: NAD, non-toxic appearing  NECK: Supple, no stiffness, no JVD, no thyromegaly   HEART: Regular rate and rhythm, normal s1s2  LUNGS: Unlabored respirations, b/l air entry, no adventitious breath sounds   ABDOMEN: Soft, nontender, nondistended; +BS  EXTREMITIES: No clubbing, cyanosis, or edema;   NERVOUS SYSTEM: AOx3  SKIN: Warm and dry, no rashes or lesions    LABS:                        9.4    14.29 )-----------( 253      ( 25 Feb 2024 07:51 )             29.7     02-25    139  |  107  |  59<H>  ----------------------------<  163<H>  4.1   |  16<L>  |  2.6<H>    Ca    6.9<L>      25 Feb 2024 07:51  Mg     2.8     02-25    TPro  5.7<L>  /  Alb  3.1<L>  /  TBili  0.7  /  DBili  x   /  AST  15  /  ALT  13  /  AlkPhos  78  02-25      Urinalysis Basic - ( 25 Feb 2024 07:51 )    Color: x / Appearance: x / SG: x / pH: x  Gluc: 163 mg/dL / Ketone: x  / Bili: x / Urobili: x   Blood: x / Protein: x / Nitrite: x   Leuk Esterase: x / RBC: x / WBC x   Sq Epi: x / Non Sq Epi: x / Bacteria: x            Culture - Blood (collected 24 Feb 2024 12:14)  Source: .Blood None  Preliminary Report (25 Feb 2024 20:01):    No growth at 24 hours

## 2024-02-26 NOTE — CONSULT NOTE ADULT - CONVERSATION DETAILS
Spoke with patient at bedside. Palliative care introduced. He was not able to provide a significant medical history, but noted something was wrong with his throat.  We discussed if he had family or NOK, and he noted there was nobody that could help make decisions for him. We discussed code status briefly, and he noted that he wished to be Full code.

## 2024-02-26 NOTE — CONSULT NOTE ADULT - NS ATTEST RISK PROBLEM GEN_ALL_CORE FT
[x ] 1 acute or chronic illnesses or injuries that may pose a threat to life or bodily function  2.2  [x ] Personally review and interpretation of  image or testing   2.3  [ x] Discussion of management or test interpretation with external physician/other qualified health care professional\appropriate source (not separately reported)

## 2024-02-26 NOTE — CONSULT NOTE ADULT - PROBLEM SELECTOR RECOMMENDATION 5
-Full code  -Patient with limited understanding of medical condition  -Would benefit from NOK/surrogate/HCP  -will follow up with Sadi at [facility]'s tomorrow  -will follow

## 2024-02-27 PROBLEM — C61 MALIGNANT NEOPLASM OF PROSTATE: Chronic | Status: ACTIVE | Noted: 2024-02-20

## 2024-02-27 PROBLEM — I10 ESSENTIAL (PRIMARY) HYPERTENSION: Chronic | Status: ACTIVE | Noted: 2024-02-20

## 2024-02-27 LAB
ALBUMIN SERPL ELPH-MCNC: 2.6 G/DL — LOW (ref 3.5–5.2)
ALP SERPL-CCNC: 77 U/L — SIGNIFICANT CHANGE UP (ref 30–115)
ALT FLD-CCNC: 36 U/L — SIGNIFICANT CHANGE UP (ref 0–41)
ANION GAP SERPL CALC-SCNC: 12 MMOL/L — SIGNIFICANT CHANGE UP (ref 7–14)
AST SERPL-CCNC: 41 U/L — SIGNIFICANT CHANGE UP (ref 0–41)
BASOPHILS # BLD AUTO: 0.01 K/UL — SIGNIFICANT CHANGE UP (ref 0–0.2)
BASOPHILS NFR BLD AUTO: 0.1 % — SIGNIFICANT CHANGE UP (ref 0–1)
BILIRUB SERPL-MCNC: 0.6 MG/DL — SIGNIFICANT CHANGE UP (ref 0.2–1.2)
BUN SERPL-MCNC: 45 MG/DL — HIGH (ref 10–20)
CALCIUM SERPL-MCNC: 7.4 MG/DL — LOW (ref 8.4–10.5)
CHLORIDE SERPL-SCNC: 109 MMOL/L — SIGNIFICANT CHANGE UP (ref 98–110)
CO2 SERPL-SCNC: 19 MMOL/L — SIGNIFICANT CHANGE UP (ref 17–32)
CREAT SERPL-MCNC: 2.6 MG/DL — HIGH (ref 0.7–1.5)
EGFR: 26 ML/MIN/1.73M2 — LOW
EOSINOPHIL # BLD AUTO: 0.04 K/UL — SIGNIFICANT CHANGE UP (ref 0–0.7)
EOSINOPHIL NFR BLD AUTO: 0.5 % — SIGNIFICANT CHANGE UP (ref 0–8)
GLUCOSE SERPL-MCNC: 190 MG/DL — HIGH (ref 70–99)
HCT VFR BLD CALC: 26.3 % — LOW (ref 42–52)
HGB BLD-MCNC: 8.3 G/DL — LOW (ref 14–18)
IMM GRANULOCYTES NFR BLD AUTO: 1.5 % — HIGH (ref 0.1–0.3)
LYMPHOCYTES # BLD AUTO: 0.56 K/UL — LOW (ref 1.2–3.4)
LYMPHOCYTES # BLD AUTO: 7 % — LOW (ref 20.5–51.1)
MAGNESIUM SERPL-MCNC: 2.6 MG/DL — HIGH (ref 1.8–2.4)
MCHC RBC-ENTMCNC: 28.1 PG — SIGNIFICANT CHANGE UP (ref 27–31)
MCHC RBC-ENTMCNC: 31.6 G/DL — LOW (ref 32–37)
MCV RBC AUTO: 89.2 FL — SIGNIFICANT CHANGE UP (ref 80–94)
MONOCYTES # BLD AUTO: 0.89 K/UL — HIGH (ref 0.1–0.6)
MONOCYTES NFR BLD AUTO: 11.1 % — HIGH (ref 1.7–9.3)
NEUTROPHILS # BLD AUTO: 6.39 K/UL — SIGNIFICANT CHANGE UP (ref 1.4–6.5)
NEUTROPHILS NFR BLD AUTO: 79.8 % — HIGH (ref 42.2–75.2)
NRBC # BLD: 0 /100 WBCS — SIGNIFICANT CHANGE UP (ref 0–0)
PHOSPHATE SERPL-MCNC: 1.9 MG/DL — LOW (ref 2.1–4.9)
PLATELET # BLD AUTO: 262 K/UL — SIGNIFICANT CHANGE UP (ref 130–400)
POTASSIUM SERPL-MCNC: 3.9 MMOL/L — SIGNIFICANT CHANGE UP (ref 3.5–5)
POTASSIUM SERPL-SCNC: 3.9 MMOL/L — SIGNIFICANT CHANGE UP (ref 3.5–5)
PROCALCITONIN SERPL-MCNC: 7.32 NG/ML — HIGH (ref 0.02–0.1)
PROT SERPL-MCNC: 5.3 G/DL — LOW (ref 6–8)
RBC # BLD: 2.95 M/UL — LOW (ref 4.7–6.1)
RBC # FLD: 13.9 % — SIGNIFICANT CHANGE UP (ref 11.5–14.5)
SODIUM SERPL-SCNC: 140 MMOL/L — SIGNIFICANT CHANGE UP (ref 135–146)
SURGICAL PATHOLOGY STUDY: SIGNIFICANT CHANGE UP
WBC # BLD: 8.01 K/UL — SIGNIFICANT CHANGE UP (ref 4.8–10.8)
WBC # FLD AUTO: 8.01 K/UL — SIGNIFICANT CHANGE UP (ref 4.8–10.8)

## 2024-02-27 PROCEDURE — 99232 SBSQ HOSP IP/OBS MODERATE 35: CPT

## 2024-02-27 RX ORDER — SODIUM CHLORIDE 9 MG/ML
1000 INJECTION, SOLUTION INTRAVENOUS
Refills: 0 | Status: COMPLETED | OUTPATIENT
Start: 2024-02-27 | End: 2024-02-28

## 2024-02-27 RX ORDER — METOPROLOL TARTRATE 50 MG
25 TABLET ORAL
Refills: 0 | Status: DISCONTINUED | OUTPATIENT
Start: 2024-02-27 | End: 2024-03-10

## 2024-02-27 RX ORDER — CEFEPIME 1 G/1
2000 INJECTION, POWDER, FOR SOLUTION INTRAMUSCULAR; INTRAVENOUS EVERY 12 HOURS
Refills: 0 | Status: DISCONTINUED | OUTPATIENT
Start: 2024-02-27 | End: 2024-02-28

## 2024-02-27 RX ORDER — VANCOMYCIN HCL 1 G
VIAL (EA) INTRAVENOUS
Refills: 0 | Status: DISCONTINUED | OUTPATIENT
Start: 2024-02-27 | End: 2024-02-27

## 2024-02-27 RX ORDER — HEPARIN SODIUM 5000 [USP'U]/ML
5000 INJECTION INTRAVENOUS; SUBCUTANEOUS EVERY 12 HOURS
Refills: 0 | Status: DISCONTINUED | OUTPATIENT
Start: 2024-02-28 | End: 2024-03-22

## 2024-02-27 RX ORDER — ONDANSETRON 8 MG/1
4 TABLET, FILM COATED ORAL ONCE
Refills: 0 | Status: DISCONTINUED | OUTPATIENT
Start: 2024-02-27 | End: 2024-03-22

## 2024-02-27 RX ORDER — VANCOMYCIN HCL 1 G
1250 VIAL (EA) INTRAVENOUS ONCE
Refills: 0 | Status: DISCONTINUED | OUTPATIENT
Start: 2024-02-27 | End: 2024-02-27

## 2024-02-27 RX ADMIN — Medication 25 MILLIGRAM(S): at 18:16

## 2024-02-27 RX ADMIN — Medication 100 MILLIGRAM(S): at 10:39

## 2024-02-27 RX ADMIN — LIDOCAINE 1 PATCH: 4 CREAM TOPICAL at 20:02

## 2024-02-27 RX ADMIN — CEFEPIME 100 MILLIGRAM(S): 1 INJECTION, POWDER, FOR SOLUTION INTRAMUSCULAR; INTRAVENOUS at 18:14

## 2024-02-27 RX ADMIN — Medication 100 MILLIGRAM(S): at 22:20

## 2024-02-27 RX ADMIN — PIPERACILLIN AND TAZOBACTAM 25 GRAM(S): 4; .5 INJECTION, POWDER, LYOPHILIZED, FOR SOLUTION INTRAVENOUS at 05:11

## 2024-02-27 RX ADMIN — CHLORHEXIDINE GLUCONATE 1 APPLICATION(S): 213 SOLUTION TOPICAL at 05:12

## 2024-02-27 RX ADMIN — Medication 100 MILLIGRAM(S): at 18:13

## 2024-02-27 RX ADMIN — Medication 63.75 MILLIMOLE(S): at 10:39

## 2024-02-27 RX ADMIN — LIDOCAINE 1 PATCH: 4 CREAM TOPICAL at 13:09

## 2024-02-27 NOTE — PROGRESS NOTE ADULT - SUBJECTIVE AND OBJECTIVE BOX
HPI:  68-year-old male with past medical history of hypertension, chronic back pain, prostate cancer, and posterior mediastinal mass 6.5 cm seen on CT 11/2023, presents for 3 months of progressively difficulty swallowing associated with 10 pounds weight loss and odynophagia. Comes in today now unable to swallow his pills with water, resulting in 2 episode of emesis. He mentioned that he gets SOB during exertion but denies any exertional chest pain. As per patient he was treated at [facility] for prostate cancer and is s/p radiation therapy. He states that he has problem during walking and unable to maintain the posture.   Denies any abdominal pain, chest pain, dyspnea, diarrhea, fever, chills    Interval history  -Patient seen at bedside  -Denied SOB  -endorsed some back pain     ADVANCE DIRECTIVES:     [ x] Full Code [ ] DNR  MOLST  [ ]  Living Will  [ ]   DECISION MAKER(s):  [ ] Health Care Proxy(s)  [ x] Surrogate(s)  [ ] Guardian           Name(s): Phone Number(s):  Unknown      BASELINE (I)ADL(s) (prior to admission):    [place]: [ ]Total  [ ] Moderate [ ]Dependent  Palliative Performance Status Version 2:         %    http://npcrc.org/files/news/palliative_performance_scale_[email or web address].pdf    Allergies    No Known Allergies    Intolerances    MEDICATIONS  (STANDING):  cefepime   IVPB 2000 milliGRAM(s) IV Intermittent every 12 hours  chlorhexidine 2% Cloths 1 Application(s) Topical <User Schedule>  clindamycin IVPB      clindamycin IVPB 900 milliGRAM(s) IV Intermittent every 8 hours  dextrose 5% + lactated ringers. 1000 milliLiter(s) (100 mL/Hr) IV Continuous <Continuous>  dextrose 5% + lactated ringers. 1000 milliLiter(s) (100 mL/Hr) IV Continuous <Continuous>  influenza  Vaccine (HIGH DOSE) 0.7 milliLiter(s) IntraMuscular once  lidocaine   4% Patch 1 Patch Transdermal every 24 hours  metoprolol tartrate 25 milliGRAM(s) Oral two times a day  ondansetron Injectable 4 milliGRAM(s) IV Push once  polyethylene glycol 3350 17 Gram(s) Oral daily    MEDICATIONS  (PRN):  acetaminophen     Tablet .. 650 milliGRAM(s) Oral every 6 hours PRN Temp greater or equal to 38C (100.4F), Mild Pain (1 - 3)  aluminum hydroxide/magnesium hydroxide/simethicone Suspension 30 milliLiter(s) Oral every 4 hours PRN Dyspepsia  melatonin 3 milliGRAM(s) Oral at bedtime PRN Insomnia  traMADol 25 milliGRAM(s) Oral four times a day PRN Moderate Pain (4 - 6)    PRESENT SYMPTOMS: [ ]Unable to obtain due to poor mentation   Source if other than patient:  [ ]Family   [ ]Team     Pain: [x ]yes [ ]no  QOL impact - none given  Location -             back       Aggravating factors - none given  Quality - none given  Radiation - none given  Timing- none given  Severity (0-10 scale): none given  Minimal acceptable level (0-10 scale):  none given    CPOT:    https://www.[email or web address]/getattachment/[email or web address]/Critical-Care-Pain-Observation-Tool-(CPOT)    PAIN AD Score:   [email or web address]/painad.pdf (press ctrl +  left click to view)    Dyspnea:                           [ x]None[ ]Mild [ ]Moderate [ ]Severe     Respiratory Distress Observation Scale (RDOS):   A score of 0 to 2 signifies little or no respiratory distress, 3 signifies mild distress, scores 4 to 6 indicate moderate distress, and scores greater than 7 signify severe distress  [email or web address]/sites/default/files/PDFS/424149-tlkzeowghln-mrokvyzj-quupfmbmqpu-kilzu.pdf    Anxiety:                             [x ]None[ ]Mild [ ]Moderate [ ]Severe   Fatigue:                             [x ]None[ ]Mild [ ]Moderate [ ]Severe   Nausea:                             [x ]None[ ]Mild [ ]Moderate [ ]Severe   Loss of appetite:              [ x]None[ ]Mild [ ]Moderate [ ]Severe   Constipation:                    [x ]None[ ]Mild [ ]Moderate [ ]Severe    Other Symptoms:  [x ]All other review of systems negative     Palliative Performance Status Version 2:         30%    http://npcrc.org/files/news/palliative_performance_scale_ppsv2.pdf    PHYSICAL EXAM:  Vital Signs Last 24 Hrs  T(C): 36.7 (27 Feb 2024 14:02), Max: 36.9 (27 Feb 2024 05:00)  T(F): 98.1 (27 Feb 2024 14:02), Max: 98.5 (27 Feb 2024 05:00)  HR: 92 (27 Feb 2024 14:02) (87 - 95)  BP: 107/73 (27 Feb 2024 14:02) (93/62 - 107/73)  BP(mean): 87 (27 Feb 2024 14:02) (87 - 87)  RR: 18 (27 Feb 2024 14:02) (18 - 18)  SpO2: 95% (27 Feb 2024 14:02) (95% - 95%)        GENERAL:  [ ] No acute distress [ ]Lethargic  [ ]Unarousable  [x ]Verbal  [ ]Non-Verbal [ ]Cachexia    BEHAVIORAL/PSYCH:  [x ]Alert and Oriented x2-3  [ ] Anxiety [ ] Delirium [ ] Agitation [x ] Calm   EYES: [x ] No scleral icterus [ ] Scleral icterus [ ] Closed  ENMT:  [ ]Dry mouth  [x ]No external oral lesions [ ] No external ear or nose lesions  CARDIOVASCULAR:  [ ]Regular [ ]Irregular [ ]Tachy [x ]Not Tachy  [ ]Rambo [ ] Edema [ ] No edema  PULMONARY:  [ ]Tachypnea  [ ]Audible excessive secretions [x ] No labored breathing [ ] labored breathing  GASTROINTESTINAL: [ ]Soft  [ ]Distended  [ x]Not distended [ ]Non tender [ ]Tender  MUSCULOSKELETAL: [ ]No clubbing [ ] clubbing  [ x] No cyanosis [ ] cyanosis  NEUROLOGIC: [ ]No focal deficits  [ x]Follows commands  [ ]Does not follow commands  [ ]Cognitive impairment  [ ]Dysphagia  [ ]Dysarthria  [ ]Paresis   SKIN: [ ] Jaundiced [ ] Non-jaundiced [ ]Rash [ ]No Rash [ ] Warm [ ] Dry  MISC/LINES: [ ] ET tube [ ] Trach [ ]NGT/OGT [ ]PEG [ ]Angle    LABS: reviewed by me                                   8.3    8.01  )-----------( 262      ( 27 Feb 2024 06:34 )             26.3       02-27    140  |  109  |  45<H>  ----------------------------<  190<H>  3.9   |  19  |  2.6<H>    Ca    7.4<L>      27 Feb 2024 06:34  Phos  1.9     02-27  Mg     2.6     02-27    TPro  5.3<L>  /  Alb  2.6<L>  /  TBili  0.6  /  DBili  x   /  AST  41  /  ALT  36  /  AlkPhos  77  02-27              Urinalysis Basic - ( 27 Feb 2024 06:34 )    Color: x / Appearance: x / SG: x / pH: x  Gluc: 190 mg/dL / Ketone: x  / Bili: x / Urobili: x   Blood: x / Protein: x / Nitrite: x   Leuk Esterase: x / RBC: x / WBC x   Sq Epi: x / Non Sq Epi: x / Bacteria: x                  CAPILLARY BLOOD GLUCOSE                RADIOLOGY & ADDITIONAL STUDIES: reviewed by me    < from: Xray Chest 1 View- PORTABLE-Urgent (Xray Chest 1 View- PORTABLE-Urgent .) (02.24.24 @ 07:57) >  Impression:    Increased left basilar opacity, could represent pneumonia in the   appropriate clinical setting.    < end of copied text >      EKG: reviewed by me    < from: 12 Lead ECG (02.21.24 @ 14:56) >  Ventricular Rate 90 BPM    Atrial Rate 90 BPM    P-R Interval 174 ms    QRS Duration 82 ms    Q-T Interval 400 ms    QTC Calculation(Bazett) 489 ms    P Axis 40 degrees    R Axis 40 degrees    T Axis 67 degrees    Diagnosis Line Normal sinus rhythm  Prolonged QT  Abnormal ECG    < end of copied text >        PROTEIN CALORIE MALNUTRITION PRESENT: [ ]mild [ ]moderate [ ]severe [ ]underweight [ ]morbid obesity  https://www.andeal.org/vault/2440/web/files/ONC/Table_Clinical%20Characteristics%20to%20Document%20Malnutrition-White%20JV%20et%20al%416915.pdf    Height (cm): 185.4 (02-22-24 @ 14:44), 185.4 (09-15-23 @ 10:44)  Weight (kg): 77.1 (02-21-24 @ 14:55), 77.1 (11-22-23 @ 08:11), 78.5 (09-15-23 @ 10:44)  BMI (kg/m2): 22.4 (02-22-24 @ 14:44), 22.4 (02-21-24 @ 14:55), 22.4 (11-22-23 @ 08:11)  [ ]PPSV2 < or = to 30% [ ]significant weight loss  [ ]poor nutritional intake  [ ]anasarca      [ ]Artificial Nutrition      Palliative Care Spiritual/Emotional Screening Tool Question  Severity (0-4):                    OR                    [ x] Unable to determine. Will assess at later time if appropriate.  Score of 2 or greater indicates recommendation of Chaplaincy and/or SW referral  Chaplaincy Referral: [ ] Yes [ ] Refused [ ] Following     Caregiver [place]:  [ ] Yes [ ] No    OR    [x ] Unable to determine. Will assess at later time if appropriate.  Social Work Referral [ ]  Patient and Family Centered Care Referral [ ]    Anticipatory Grief Present: [ ] Yes [ ] No    OR     [ x] Unable to determine. Will assess at later time if appropriate.  Social Work Referral [ ]  Patient and Family Centered Care Referral [ ]    Patient discussed with primary medical team MD  Palliative care education provided to patient and/or family

## 2024-02-27 NOTE — PROGRESS NOTE ADULT - ASSESSMENT
68-year-old male with past medical history of hypertension, chronic back pain, prostate cancer, and posterior mediastinal mass 6.5 cm seen on CT 11/2023, presents for 3 months of progressively difficulty swallowing associated with 10 pounds weight loss and odynophagia. Comes in today now unable to swallow his pills with water, resulting in 2 episode of emesis. He mentioned that he gets SOB during exertion but denies any exertional chest pain. As per patient he was treated at [facility] for prostate cancer and is s/p radiation therapy. He states that he has problem during walking and unable to maintain the posture.     #aspiration PNA vs viral infection vs HAP   - SIRS positive (WBC increased to 16, febrile to 101 and tachy to 129 )  - CXR with worsening L-sided opacity   - f/u infectious workup (BCx NGTD, pending procal, RVP, MRSA)-procal 7, blood culture positive GAS  - patient cleared for pureed diet and thin liquids per SS   - on zosyn 3.375mg q8h   - aspiration precautions     # Dysphagia with odynophagia  # Hx of prostate cancer s/p radiation therapy  # Prostate cancer metastasis to mediastinum or primary mediastinal mass?  - CT Chest: Interval enlargement of the central/posterior mediastinal mass with development of more extensive lymphadenopathy. The mass encircles the descending thoracic aorta and has some mass effect upon the left atrium. The mid aspect of the esophagus is encircled by the mass and difficult to distinguish. Possible ovoid pill is noted within the mass and possible location of the mid esophagus appear. The maximal esophagus appears distended with layering debris within. Consideration may be given to esophageal stenting.  - CT abdomen and pelvis:  Increasedupper abdominal bulky lymphadenopathy. Unchanged bulky retroperitoneal lymphadenopathy, large left bladder   mass and perirectal soft tissue infiltrative mass. 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 Chronic moderate to severe left hydroureteronephrosis  - EGD with GI 2/21 -> stent placed and biopsies taken -pathology pending   - CT surgery following -> no acute intervention   - AFP and CEA wnl  - LDH and haptoglobin elevated   - PSA total 220, PSA free is 22   - CA19 131  -  57  - oncology recs appreciated ->patient used to follow with Dr. Kenia Irizarry, called and teams her and she is not picking up the phone calls  - per onc note, patient had prior biopsy of site that demonstrated metastatic prostate adenocarcinoma and was started on Docetaxel.  - tramadol 25mg q6h PRN for pain management   - orthostatics positive, likely secondary to poor oral intake, on IV fluids   - ensure added, nutrition consulted    #CHUCKIE vs ATN induced DIMAS  - pt came in with a baseline of 1.5 and increased to 2.6 with contrast use  - c/w IVF  - renally dose medications    # small b/l pleural effusions on CT chest   - s/p 1x dose 40mg IV Lasix , CXR improved     #normocytic Anemia  - Hb 9-10  - f/u iron  studies noted % sat 27   - MCV normal, SPEP negative     #hypocalcemia  -replenished   -monitor     #Misc   #DVT PPx- Lovenox  #GI PPx- none   #Diet-  puree   #Activity- AAT, PT consult  #Dispo- Acute 68-year-old male with past medical history of hypertension, chronic back pain, prostate cancer, and posterior mediastinal mass 6.5 cm seen on CT 11/2023, presents for 3 months of progressively difficulty swallowing associated with 10 pounds weight loss and odynophagia. Comes in today now unable to swallow his pills with water, resulting in 2 episode of emesis. He mentioned that he gets SOB during exertion but denies any exertional chest pain. As per patient he was treated at [facility] for prostate cancer and is s/p radiation therapy. He states that he has problem during walking and unable to maintain the posture.     #aspiration PNA vs viral infection vs HAP   #GAS s pyogenes bacteremia   - SIRS positive (WBC increased to 16, febrile to 101 and tachy to 129 )   - CXR with worsening L-sided opacity   - Blood cx on the 24 positive    -procal 7  - aspiration precautions   - patient cleared for pureed diet and thin liquids per SS   - on zosyn 3.375mg q8h switched to cefepime and clindamycin pending ID rec  -TTE   -repeat blood cx orderd      # Dysphagia with odynophagia  # Hx of prostate cancer s/p radiation therapy  # Prostate cancer metastasis to mediastinum or primary mediastinal mass?  - CT Chest: Interval enlargement of the central/posterior mediastinal mass with development of more extensive lymphadenopathy. The mass encircles the descending thoracic aorta and has some mass effect upon the left atrium. The mid aspect of the esophagus is encircled by the mass and difficult to distinguish. Possible ovoid pill is noted within the mass and possible location of the mid esophagus appear. The maximal esophagus appears distended with layering debris within. Consideration may be given to esophageal stenting.  - CT abdomen and pelvis:  Increasedupper abdominal bulky lymphadenopathy. Unchanged bulky retroperitoneal lymphadenopathy, large left bladder   mass and perirectal soft tissue infiltrative mass. 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 Chronic moderate to severe left hydroureteronephrosis  - EGD with GI 2/21 -> stent placed and biopsies taken -pathology pending   - CT surgery following -> no acute intervention   - AFP and CEA wnl  - LDH and haptoglobin elevated   - PSA total 220, PSA free is 22   - CA19 131  -  57  - oncology recs appreciated ->patient used to follow with Dr. Kenia Irizarry, called and teams her and she is not picking up the phone calls  - per onc note, patient had prior biopsy of site that demonstrated metastatic prostate adenocarcinoma and was started on Docetaxel.  - tramadol 25mg q6h PRN for pain management   - orthostatics positive, likely secondary to poor oral intake, on IV fluids   - ensure added, nutrition consulted, calorie count ordered   -palliative consulted , patient needs surrogate/HCP     #CHUCKIE vs ATN induced DIMAS  - pt came in with a baseline of 1.5 and increased to 2.6 with contrast use  - c/w IVF  - renally dose medications  -crea not improving , bladder scan ordered    # small b/l pleural effusions on CT chest   - s/p 1x dose 40mg IV Lasix , CXR improved     #normocytic Anemia  - Hb 9-10  - f/u iron  studies noted % sat 27   - MCV normal, SPEP negative     #hypocalcemia  -replenished   -monitor , today corrected 8.52    #Misc   #DVT PPx- Lovenox  #GI PPx- none   #Diet-  puree   #Activity- AAT, PT consult Home PT; back to [facility] w/ assistance  #Dispo- Acute 68-year-old male with past medical history of hypertension, chronic back pain, prostate cancer, and posterior mediastinal mass 6.5 cm seen on CT 11/2023, presents for 3 months of progressively difficulty swallowing associated with 10 pounds weight loss and odynophagia. Comes in today now unable to swallow his pills with water, resulting in 2 episode of emesis. He mentioned that he gets SOB during exertion but denies any exertional chest pain. As per patient he was treated at [facility] for prostate cancer and is s/p radiation therapy. He states that he has problem during walking and unable to maintain the posture.     #aspiration PNA vs viral infection vs HAP   #GAS s pyogenes bacteremia   - SIRS positive (WBC increased to 16, febrile to 101 and tachy to 129 )   - CXR with worsening L-sided opacity   - Blood cx on the 24 positive    -procal 7  - aspiration precautions   - patient cleared for pureed diet and thin liquids per SS   - on zosyn 3.375mg q8h switched to cefepime and clindamycin pending ID rec  -TTE   -repeat blood cx orderd      # Dysphagia with odynophagia  # Hx of prostate cancer s/p radiation therapy  # Prostate cancer metastasis to mediastinum or primary mediastinal mass?  - CT Chest: Interval enlargement of the central/posterior mediastinal mass with development of more extensive lymphadenopathy. The mass encircles the descending thoracic aorta and has some mass effect upon the left atrium. The mid aspect of the esophagus is encircled by the mass and difficult to distinguish. Possible ovoid pill is noted within the mass and possible location of the mid esophagus appear. The maximal esophagus appears distended with layering debris within. Consideration may be given to esophageal stenting.  - CT abdomen and pelvis:  Increasedupper abdominal bulky lymphadenopathy. Unchanged bulky retroperitoneal lymphadenopathy, large left bladder   mass and perirectal soft tissue infiltrative mass. 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 Chronic moderate to severe left hydroureteronephrosis  - EGD with GI 2/21 -> stent placed and biopsies taken -pathology pending   - CT surgery following -> no acute intervention   - AFP and CEA wnl  - LDH and haptoglobin elevated   - PSA total 220, PSA free is 22   - CA19 131  -  57  - oncology recs appreciated ->patient used to follow with Dr. Kenia Irizarry, called and teams her and she is not picking up the phone calls  - per onc note, patient had prior biopsy of site that demonstrated metastatic prostate adenocarcinoma and was started on Docetaxel.  - tramadol 25mg q6h PRN for pain management   - orthostatics positive, likely secondary to poor oral intake, on IV fluids   - ensure added, nutrition consulted, calorie count ordered   -palliative consulted , patient needs surrogate/HCP     #CHUCKIE vs ATN induced DIMAS  - pt came in with a baseline of 1.5 and increased to 2.6 with contrast use  - c/w IVF  - renally dose medications  -crea not improving , bladder scan ordered    # small b/l pleural effusions on CT chest   - s/p 1x dose 40mg IV Lasix , CXR improved     #normocytic Anemia  - Hb 9-10  - f/u iron  studies noted % sat 27   - MCV normal, SPEP negative     #hypocalcemia  -replenished   -monitor , today corrected 8.52    #Misc   #DVT PPx- heparin   #GI PPx- none   #Diet-  puree   #Activity- AAT, PT consult Home PT; back to [facility] w/ assistance  #Dispo- Acute 68-year-old male with past medical history of hypertension, chronic back pain, prostate cancer, and posterior mediastinal mass 6.5 cm seen on CT 11/2023, presents for 3 months of progressively difficulty swallowing associated with 10 pounds weight loss and odynophagia. Comes in today now unable to swallow his pills with water, resulting in 2 episode of emesis. He mentioned that he gets SOB during exertion but denies any exertional chest pain. As per patient he was treated at [facility] for prostate cancer and is s/p radiation therapy. He states that he has problem during walking and unable to maintain the posture.     #aspiration PNA vs viral infection vs HAP   #GAS s pyogenes bacteremia   - SIRS positive (WBC increased to 16, febrile to 101 and tachy to 129 )   - CXR with worsening L-sided opacity   - Blood cx (2/24/24): positive for strep pyogenes  - repeat BCx daily until cleared  - procal 7  - aspiration precautions   - patient cleared for pureed diet and thin liquids per SS   - on zosyn 3.375mg q8h switched to cefepime and clindamycin pending ID rec  - TTE       # Dysphagia with odynophagia  # Hx of prostate cancer s/p radiation therapy  # Prostate cancer metastasis to mediastinum or primary mediastinal mass?  - CT Chest: Interval enlargement of the central/posterior mediastinal mass with development of more extensive lymphadenopathy. The mass encircles the descending thoracic aorta and has some mass effect upon the left atrium. The mid aspect of the esophagus is encircled by the mass and difficult to distinguish. Possible ovoid pill is noted within the mass and possible location of the mid esophagus appear. The maximal esophagus appears distended with layering debris within. Consideration may be given to esophageal stenting.  - CT abdomen and pelvis:  Increased upper abdominal bulky lymphadenopathy. Unchanged bulky retroperitoneal lymphadenopathy, large left bladder   mass and perirectal soft tissue infiltrative mass. 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 Chronic moderate to severe left hydroureteronephrosis  - EGD with GI 2/21 -> stent placed and biopsies taken -pathology pending   - CT surgery following -> no acute intervention   - AFP and CEA wnl  - LDH and haptoglobin elevated   - PSA total 220, PSA free is 22   - CA19 131  -  57  - oncology recs appreciated ->patient used to follow with Dr. Kenia Irizarry, called and teams her and she is not picking up the phone calls  - per onc note, patient had prior biopsy of site that demonstrated metastatic prostate adenocarcinoma and was started on Docetaxel.  - tramadol 25mg q6h PRN for pain management   - orthostatics positive, likely secondary to poor oral intake, on IV fluids   - ensure added, nutrition consulted, calorie count ordered   -palliative consulted , patient needs surrogate/HCP   - zofran prn    #CHUCKIE vs ATN induced DIMAS  - pt came in with a baseline of 1.5 and increased to 2.6 with contrast use  - c/w IVF  - renally dose medications  -crea not improving , bladder scan ordered    # small b/l pleural effusions on CT chest   - s/p 1x dose 40mg IV Lasix , CXR improved     #normocytic Anemia  - Hb 9-10  - f/u iron  studies noted % sat 27   - MCV normal, SPEP negative     #hypocalcemia  -replenished   -monitor , today corrected 8.52    #Misc   #DVT PPx- heparin   #GI PPx- none   #Diet-  puree   #Activity- AAT, PT consult Home PT; back to [facility] w/ assistance  #Dispo- Acute

## 2024-02-27 NOTE — PROGRESS NOTE ADULT - SUBJECTIVE AND OBJECTIVE BOX
24H events:    Today is hospital day 7d. This morning patient was seen and examined at bedside, resting comfortably in bed.    No acute or major events overnight.    PAST MEDICAL & SURGICAL HISTORY  HTN (hypertension)    Prostate cancer        SOCIAL HISTORY:  Social History:      ALLERGIES:  No Known Allergies      MEDICATIONS:  STANDING MEDICATIONS  chlorhexidine 2% Cloths 1 Application(s) Topical <User Schedule>  dextrose 5% + lactated ringers. 1000 milliLiter(s) IV Continuous <Continuous>  enoxaparin Injectable 40 milliGRAM(s) SubCutaneous every 24 hours  influenza  Vaccine (HIGH DOSE) 0.7 milliLiter(s) IntraMuscular once  lidocaine   4% Patch 1 Patch Transdermal every 24 hours  metoprolol tartrate 25 milliGRAM(s) Oral two times a day  piperacillin/tazobactam IVPB.. 3.375 Gram(s) IV Intermittent every 8 hours  polyethylene glycol 3350 17 Gram(s) Oral daily    PRN MEDICATIONS  acetaminophen     Tablet .. 650 milliGRAM(s) Oral every 6 hours PRN  aluminum hydroxide/magnesium hydroxide/simethicone Suspension 30 milliLiter(s) Oral every 4 hours PRN  melatonin 3 milliGRAM(s) Oral at bedtime PRN  traMADol 25 milliGRAM(s) Oral four times a day PRN      VITALS:   T(C): 36.9 (02-27-24 @ 05:00), Max: 36.9 (02-27-24 @ 05:00)  HR: 87 (02-27-24 @ 05:00) (87 - 97)  BP: 93/62 (02-27-24 @ 05:00) (93/62 - 132/59)  RR: 18 (02-27-24 @ 05:00) (18 - 20)  SpO2: --  I&O's Summary    25 Feb 2024 07:01  -  26 Feb 2024 07:00  --------------------------------------------------------  IN: 901 mL / OUT: 750 mL / NET: 151 mL    26 Feb 2024 07:01  -  27 Feb 2024 06:52  --------------------------------------------------------  IN: 1375 mL / OUT: 0 mL / NET: 1375 mL          PHYSICAL EXAM:      LABS:                        8.2    10.21 )-----------( 250      ( 26 Feb 2024 07:54 )             26.3     02-26    140  |  109  |  52<H>  ----------------------------<  169<H>  3.4<L>   |  19  |  2.3<H>    Ca    6.8<L>      26 Feb 2024 07:54  Mg     2.5     02-26    TPro  5.1<L>  /  Alb  2.7<L>  /  TBili  0.4  /  DBili  x   /  AST  32  /  ALT  26  /  AlkPhos  65  02-26      Urinalysis Basic - ( 26 Feb 2024 07:54 )    Color: x / Appearance: x / SG: x / pH: x  Gluc: 169 mg/dL / Ketone: x  / Bili: x / Urobili: x   Blood: x / Protein: x / Nitrite: x   Leuk Esterase: x / RBC: x / WBC x   Sq Epi: x / Non Sq Epi: x / Bacteria: x            Culture - Blood (collected 24 Feb 2024 12:14)  Source: .Blood None  Gram Stain (26 Feb 2024 21:23):    Growth in aerobic bottle: Gram positive cocci in pairs    Growth in anaerobic bottle: Gram positive cocci in pairs  Preliminary Report (26 Feb 2024 21:23):    Growth in aerobic bottle: Gram positive cocci in pairs    Direct identification is available within approximately 3-5    hours either by Blood Panel Multiplexed PCR or Direct    MALDI-TOF. Details: [email or web address]    Growth in anaerobic bottle: Gram positive cocci in pairs  Organism: Blood Culture PCR (26 Feb 2024 17:25)  Organism: Blood Culture PCR (26 Feb 2024 17:25)               24H events:    Today is hospital day 7d. This morning patient was seen and examined at bedside, resting comfortably in bed.    Blood cultures positive from the 24th. Patient encouraged to eat more.      PAST MEDICAL & SURGICAL HISTORY  HTN (hypertension)    Prostate cancer        SOCIAL HISTORY:  Social History:      ALLERGIES:  No Known Allergies      MEDICATIONS:  STANDING MEDICATIONS  chlorhexidine 2% Cloths 1 Application(s) Topical <User Schedule>  dextrose 5% + lactated ringers. 1000 milliLiter(s) IV Continuous <Continuous>  enoxaparin Injectable 40 milliGRAM(s) SubCutaneous every 24 hours  influenza  Vaccine (HIGH DOSE) 0.7 milliLiter(s) IntraMuscular once  lidocaine   4% Patch 1 Patch Transdermal every 24 hours  metoprolol tartrate 25 milliGRAM(s) Oral two times a day  piperacillin/tazobactam IVPB.. 3.375 Gram(s) IV Intermittent every 8 hours  polyethylene glycol 3350 17 Gram(s) Oral daily    PRN MEDICATIONS  acetaminophen     Tablet .. 650 milliGRAM(s) Oral every 6 hours PRN  aluminum hydroxide/magnesium hydroxide/simethicone Suspension 30 milliLiter(s) Oral every 4 hours PRN  melatonin 3 milliGRAM(s) Oral at bedtime PRN  traMADol 25 milliGRAM(s) Oral four times a day PRN      VITALS:   T(C): 36.9 (02-27-24 @ 05:00), Max: 36.9 (02-27-24 @ 05:00)  HR: 87 (02-27-24 @ 05:00) (87 - 97)  BP: 93/62 (02-27-24 @ 05:00) (93/62 - 132/59)  RR: 18 (02-27-24 @ 05:00) (18 - 20)  SpO2: --  I&O's Summary    25 Feb 2024 07:01  -  26 Feb 2024 07:00  --------------------------------------------------------  IN: 901 mL / OUT: 750 mL / NET: 151 mL    26 Feb 2024 07:01  -  27 Feb 2024 06:52  --------------------------------------------------------  IN: 1375 mL / OUT: 0 mL / NET: 1375 mL          PHYSICAL EXAM:    GENERAL: NAD, non-toxic appearing, cachectic , poor dental hygiene   NECK: Supple, no stiffness, no JVD  HEART: Regular rate and rhythm, normal s1s2  LUNGS: Unlabored respirations, b/l air entry, no adventitious breath sounds   ABDOMEN: Soft, nontender, nondistended; +BS  EXTREMITIES: No clubbing, cyanosis, or edema;   NERVOUS SYSTEM: AOx3  SKIN: Warm and dry,     LABS:                        8.2    10.21 )-----------( 250      ( 26 Feb 2024 07:54 )             26.3     02-26    140  |  109  |  52<H>  ----------------------------<  169<H>  3.4<L>   |  19  |  2.3<H>    Ca    6.8<L>      26 Feb 2024 07:54  Mg     2.5     02-26    TPro  5.1<L>  /  Alb  2.7<L>  /  TBili  0.4  /  DBili  x   /  AST  32  /  ALT  26  /  AlkPhos  65  02-26      Urinalysis Basic - ( 26 Feb 2024 07:54 )    Color: x / Appearance: x / SG: x / pH: x  Gluc: 169 mg/dL / Ketone: x  / Bili: x / Urobili: x   Blood: x / Protein: x / Nitrite: x   Leuk Esterase: x / RBC: x / WBC x   Sq Epi: x / Non Sq Epi: x / Bacteria: x            Culture - Blood (collected 24 Feb 2024 12:14)  Source: .Blood None  Gram Stain (26 Feb 2024 21:23):    Growth in aerobic bottle: Gram positive cocci in pairs    Growth in anaerobic bottle: Gram positive cocci in pairs  Preliminary Report (26 Feb 2024 21:23):    Growth in aerobic bottle: Gram positive cocci in pairs    Direct identification is available within approximately 3-5    hours either by Blood Panel Multiplexed PCR or Direct    MALDI-TOF. Details: [email or web address]    Growth in anaerobic bottle: Gram positive cocci in pairs  Organism: Blood Culture PCR (26 Feb 2024 17:25)  Organism: Blood Culture PCR (26 Feb 2024 17:25)

## 2024-02-27 NOTE — PROGRESS NOTE ADULT - ATTENDING COMMENTS
Pt stated he has been throwing up all morning. Elevated the head of his bed and added zofran. BCx + for strep pyogenes. Abx changed to cefepime and clindamycin. Repeat Bcx qd. Tried calling his oncologist, Dr. Aquino but has been unable to get a hold of her despite multiple attempts.

## 2024-02-27 NOTE — PROGRESS NOTE ADULT - ASSESSMENT
68yMale with history of prostate cancer, posterior mediastinal mass seen on CT in November 2023, presents with difficulty swallowing associated with weight loss and odynophagia.  Patient with evidence of aspiration on arrival with concern for viral infection vs pneumonia, dysphagia with odynophagia, DIMAS. Palliative care consulted for GOC.    Patient continues to not have capacity.  He denies any family or NOK. Recommend speaking with [facility]. If the patient does not have any NOK, he would benefit from guardianship.     Will follow      Education about palliative care provided to patient/family.  See Recs below.    Please call [phone] with questions or concerns 24/7.   We will continue to follow.

## 2024-02-28 LAB
-  CEFTRIAXONE: SIGNIFICANT CHANGE UP
-  PENICILLIN: SIGNIFICANT CHANGE UP
-  VANCOMYCIN: SIGNIFICANT CHANGE UP
ALBUMIN SERPL ELPH-MCNC: 2.8 G/DL — LOW (ref 3.5–5.2)
ALP SERPL-CCNC: 82 U/L — SIGNIFICANT CHANGE UP (ref 30–115)
ALT FLD-CCNC: 39 U/L — SIGNIFICANT CHANGE UP (ref 0–41)
ANION GAP SERPL CALC-SCNC: 13 MMOL/L — SIGNIFICANT CHANGE UP (ref 7–14)
APPEARANCE UR: ABNORMAL
AST SERPL-CCNC: 29 U/L — SIGNIFICANT CHANGE UP (ref 0–41)
BACTERIA # UR AUTO: NEGATIVE /HPF — SIGNIFICANT CHANGE UP
BASOPHILS # BLD AUTO: 0.01 K/UL — SIGNIFICANT CHANGE UP (ref 0–0.2)
BASOPHILS NFR BLD AUTO: 0.1 % — SIGNIFICANT CHANGE UP (ref 0–1)
BILIRUB SERPL-MCNC: 0.4 MG/DL — SIGNIFICANT CHANGE UP (ref 0.2–1.2)
BILIRUB UR-MCNC: NEGATIVE — SIGNIFICANT CHANGE UP
BUN SERPL-MCNC: 41 MG/DL — HIGH (ref 10–20)
CALCIUM SERPL-MCNC: 7.9 MG/DL — LOW (ref 8.4–10.5)
CAST: 6 /LPF — HIGH (ref 0–4)
CHLORIDE SERPL-SCNC: 112 MMOL/L — HIGH (ref 98–110)
CO2 SERPL-SCNC: 17 MMOL/L — SIGNIFICANT CHANGE UP (ref 17–32)
COLOR SPEC: YELLOW — SIGNIFICANT CHANGE UP
CREAT ?TM UR-MCNC: 126 MG/DL — SIGNIFICANT CHANGE UP
CREAT SERPL-MCNC: 2.3 MG/DL — HIGH (ref 0.7–1.5)
CULTURE RESULTS: ABNORMAL
DIFF PNL FLD: ABNORMAL
EGFR: 30 ML/MIN/1.73M2 — LOW
EOSINOPHIL # BLD AUTO: 0.07 K/UL — SIGNIFICANT CHANGE UP (ref 0–0.7)
EOSINOPHIL NFR BLD AUTO: 0.8 % — SIGNIFICANT CHANGE UP (ref 0–8)
FINE GRAN CASTS #/AREA URNS AUTO: PRESENT
GLUCOSE SERPL-MCNC: 134 MG/DL — HIGH (ref 70–99)
GLUCOSE UR QL: NEGATIVE MG/DL — SIGNIFICANT CHANGE UP
HCT VFR BLD CALC: 28.4 % — LOW (ref 42–52)
HGB BLD-MCNC: 8.8 G/DL — LOW (ref 14–18)
IMM GRANULOCYTES NFR BLD AUTO: 2.9 % — HIGH (ref 0.1–0.3)
KETONES UR-MCNC: NEGATIVE MG/DL — SIGNIFICANT CHANGE UP
LEUKOCYTE ESTERASE UR-ACNC: NEGATIVE — SIGNIFICANT CHANGE UP
LYMPHOCYTES # BLD AUTO: 0.64 K/UL — LOW (ref 1.2–3.4)
LYMPHOCYTES # BLD AUTO: 7.5 % — LOW (ref 20.5–51.1)
MAGNESIUM SERPL-MCNC: 2.6 MG/DL — HIGH (ref 1.8–2.4)
MCHC RBC-ENTMCNC: 28.6 PG — SIGNIFICANT CHANGE UP (ref 27–31)
MCHC RBC-ENTMCNC: 31 G/DL — LOW (ref 32–37)
MCV RBC AUTO: 92.2 FL — SIGNIFICANT CHANGE UP (ref 80–94)
METHOD TYPE: SIGNIFICANT CHANGE UP
MONOCYTES # BLD AUTO: 1.06 K/UL — HIGH (ref 0.1–0.6)
MONOCYTES NFR BLD AUTO: 12.4 % — HIGH (ref 1.7–9.3)
NEUTROPHILS # BLD AUTO: 6.53 K/UL — HIGH (ref 1.4–6.5)
NEUTROPHILS NFR BLD AUTO: 76.3 % — HIGH (ref 42.2–75.2)
NITRITE UR-MCNC: NEGATIVE — SIGNIFICANT CHANGE UP
NON-GYNECOLOGICAL CYTOLOGY STUDY: SIGNIFICANT CHANGE UP
NRBC # BLD: 0 /100 WBCS — SIGNIFICANT CHANGE UP (ref 0–0)
ORGANISM # SPEC MICROSCOPIC CNT: ABNORMAL
ORGANISM # SPEC MICROSCOPIC CNT: ABNORMAL
ORGANISM # SPEC MICROSCOPIC CNT: SIGNIFICANT CHANGE UP
OSMOLALITY UR: 509 MOS/KG — SIGNIFICANT CHANGE UP (ref 50–1200)
PH UR: 5.5 — SIGNIFICANT CHANGE UP (ref 5–8)
PHOSPHATE SERPL-MCNC: 3.8 MG/DL — SIGNIFICANT CHANGE UP (ref 2.1–4.9)
PLATELET # BLD AUTO: 278 K/UL — SIGNIFICANT CHANGE UP (ref 130–400)
PMV BLD: 10.1 FL — SIGNIFICANT CHANGE UP (ref 7.4–10.4)
POTASSIUM SERPL-MCNC: 3.9 MMOL/L — SIGNIFICANT CHANGE UP (ref 3.5–5)
POTASSIUM SERPL-SCNC: 3.9 MMOL/L — SIGNIFICANT CHANGE UP (ref 3.5–5)
POTASSIUM UR-SCNC: 23 MMOL/L — SIGNIFICANT CHANGE UP
PROT ?TM UR-MCNC: 148 MG/DLG/24H — SIGNIFICANT CHANGE UP
PROT SERPL-MCNC: 5.5 G/DL — LOW (ref 6–8)
PROT UR-MCNC: 100 MG/DL
PROT/CREAT UR-RTO: 1.2 RATIO — HIGH (ref 0–0.2)
RBC # BLD: 3.08 M/UL — LOW (ref 4.7–6.1)
RBC # FLD: 14 % — SIGNIFICANT CHANGE UP (ref 11.5–14.5)
RBC CASTS # UR COMP ASSIST: 3 /HPF — SIGNIFICANT CHANGE UP (ref 0–4)
SODIUM SERPL-SCNC: 142 MMOL/L — SIGNIFICANT CHANGE UP (ref 135–146)
SODIUM UR-SCNC: 42 MMOL/L — SIGNIFICANT CHANGE UP
SP GR SPEC: 1.02 — SIGNIFICANT CHANGE UP (ref 1–1.03)
SPECIMEN SOURCE: SIGNIFICANT CHANGE UP
SQUAMOUS # UR AUTO: 5 /HPF — SIGNIFICANT CHANGE UP (ref 0–5)
UROBILINOGEN FLD QL: 0.2 MG/DL — SIGNIFICANT CHANGE UP (ref 0.2–1)
UUN UR-MCNC: 909 MG/DL — SIGNIFICANT CHANGE UP
WBC # BLD: 8.56 K/UL — SIGNIFICANT CHANGE UP (ref 4.8–10.8)
WBC # FLD AUTO: 8.56 K/UL — SIGNIFICANT CHANGE UP (ref 4.8–10.8)
WBC UR QL: 2 /HPF — SIGNIFICANT CHANGE UP (ref 0–5)

## 2024-02-28 PROCEDURE — 99232 SBSQ HOSP IP/OBS MODERATE 35: CPT

## 2024-02-28 RX ORDER — CEFTRIAXONE 500 MG/1
2000 INJECTION, POWDER, FOR SOLUTION INTRAMUSCULAR; INTRAVENOUS EVERY 24 HOURS
Refills: 0 | Status: DISCONTINUED | OUTPATIENT
Start: 2024-02-28 | End: 2024-03-06

## 2024-02-28 RX ADMIN — Medication 25 MILLIGRAM(S): at 18:21

## 2024-02-28 RX ADMIN — Medication 100 MILLIGRAM(S): at 05:55

## 2024-02-28 RX ADMIN — LIDOCAINE 1 PATCH: 4 CREAM TOPICAL at 18:23

## 2024-02-28 RX ADMIN — Medication 25 MILLIGRAM(S): at 05:55

## 2024-02-28 RX ADMIN — SODIUM CHLORIDE 100 MILLILITER(S): 9 INJECTION, SOLUTION INTRAVENOUS at 09:16

## 2024-02-28 RX ADMIN — CEFTRIAXONE 100 MILLIGRAM(S): 500 INJECTION, POWDER, FOR SOLUTION INTRAMUSCULAR; INTRAVENOUS at 18:22

## 2024-02-28 RX ADMIN — HEPARIN SODIUM 5000 UNIT(S): 5000 INJECTION INTRAVENOUS; SUBCUTANEOUS at 18:21

## 2024-02-28 RX ADMIN — CEFEPIME 100 MILLIGRAM(S): 1 INJECTION, POWDER, FOR SOLUTION INTRAMUSCULAR; INTRAVENOUS at 06:33

## 2024-02-28 RX ADMIN — HEPARIN SODIUM 5000 UNIT(S): 5000 INJECTION INTRAVENOUS; SUBCUTANEOUS at 05:55

## 2024-02-28 RX ADMIN — CHLORHEXIDINE GLUCONATE 1 APPLICATION(S): 213 SOLUTION TOPICAL at 06:06

## 2024-02-28 RX ADMIN — LIDOCAINE 1 PATCH: 4 CREAM TOPICAL at 11:58

## 2024-02-28 RX ADMIN — LIDOCAINE 1 PATCH: 4 CREAM TOPICAL at 01:37

## 2024-02-28 NOTE — CONSULT NOTE ADULT - ASSESSMENT
68-year-old male with past medical history of hypertension, chronic back pain, prostate cancer, and posterior mediastinal mass 6.5 cm seen on CT 11/2023, presents for 3 months of progressively difficulty swallowing associated with 10 pounds weight loss and odynophagia. 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 Also found to be in sepsis secondary to GAS bacteremia. hospital stay complicated by DIMAS.    Assessment and plan  DIMAS/ stage 4 prostate cancer/ mediastinal mass s/p bx/ HTN  patient s/p 2 iv contrast studies within 2 days period   DIMAS likely due to CHUCKIE  other possible causes IRGN iso GAS bacteremia  chronic left hydro iso prostate cancer and LN, creatinine at baseline on admission, unlikely contributing to DIMAS  please send UA with urine lytes and spot for PCR  C3 C4 levels  repeat blood cultures  creatinine stable, non oliguric  strict i/os  gentle IVH if odyno/dysphagia still present with inability to tolerate adequate po intake  no indications for RRT  will follow  68-year-old male with past medical history of hypertension, chronic back pain, prostate cancer, and posterior mediastinal mass 6.5 cm seen on CT 11/2023, presents for 3 months of progressively difficulty swallowing associated with 10 pounds weight loss and odynophagia. 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 Also found to be in sepsis secondary to GAS bacteremia. hospital stay complicated by DIMAS.    Assessment and plan  DIMAS/ stage 4 prostate cancer/ mediastinal mass s/p bx/ HTN  patient s/p 2 iv contrast studies within 2 days period   DIMAS likely due to CHUCKIE  other possible causes IRGN iso GAS bacteremia, doubt prerenal given no improvement after IVF   chronic left hydro iso prostate cancer and LN, creatinine at baseline on admission, unlikely contributing to DIMAS  please send UA with urine lytes and spot for PCR  C3 C4 levels  repeat blood cultures  creatinine stable, non oliguric  strict i/os  gentle IVH if odyno/dysphagia still present with inability to tolerate adequate po intake  no indications for RRT  will follow

## 2024-02-28 NOTE — CONSULT NOTE ADULT - ATTENDING COMMENTS
DIMAS on CKD 3 iso contrast administration, bacteremia, mediastinal mass with poor po intake  creat stable, cont gentle iv hydration, rest of w/u as above DIMAS on CKD 3 iso contrast administration, bacteremia, left hydronephrosis, mediastinal mass with poor po intake  creat stable, cont gentle iv hydration, rest of w/u as above

## 2024-02-28 NOTE — CHART NOTE - NSCHARTNOTEFT_GEN_A_CORE
3 Day Calorie count was initiated from 2/28 to 3/1; Calorie count hung, RN aware. Covering RD to f/u on results of calorie count on 3/2     Stefanie Riojas, KRISAT [id number] Roxi Sanon)  [facility]ne[facility]  [address] [place] [address], [address]  [place] [address]  Phone: [phone]  Fax: [phone]  Follow Up Time:

## 2024-02-28 NOTE — PROGRESS NOTE ADULT - ASSESSMENT
68-year-old male with past medical history of hypertension, chronic back pain, prostate cancer, and posterior mediastinal mass 6.5 cm seen on CT 11/2023, presents for 3 months of progressively difficulty swallowing associated with 10 pounds weight loss and odynophagia. Comes in today now unable to swallow his pills with water, resulting in 2 episode of emesis. He mentioned that he gets SOB during exertion but denies any exertional chest pain. As per patient he was treated at [facility] for prostate cancer and is s/p radiation therapy. He states that he has problem during walking and unable to maintain the posture.     #aspiration PNA vs viral infection vs HAP   #GAS s pyogenes bacteremia   - SIRS positive (WBC increased to 16, febrile to 101 and tachy to 129 )   - CXR with worsening L-sided opacity   - Blood cx (2/24/24): positive for strep pyogenes  - repeat BCx daily until cleared  - procal 7  - aspiration precautions   - patient cleared for pureed diet and thin liquids per SS   - on zosyn 3.375mg q8h switched to cefepime and clindamycin pending ID rec  - TTE       # Dysphagia with odynophagia  # Hx of prostate cancer s/p radiation therapy  # Prostate cancer metastasis to mediastinum or primary mediastinal mass?  - CT Chest: Interval enlargement of the central/posterior mediastinal mass with development of more extensive lymphadenopathy. The mass encircles the descending thoracic aorta and has some mass effect upon the left atrium. The mid aspect of the esophagus is encircled by the mass and difficult to distinguish. Possible ovoid pill is noted within the mass and possible location of the mid esophagus appear. The maximal esophagus appears distended with layering debris within. Consideration may be given to esophageal stenting.  - CT abdomen and pelvis:  Increased upper abdominal bulky lymphadenopathy. Unchanged bulky retroperitoneal lymphadenopathy, large left bladder   mass and perirectal soft tissue infiltrative mass. 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 Chronic moderate to severe left hydroureteronephrosis  - EGD with GI 2/21 -> stent placed and biopsies taken -pathology - Poorly differentiated carcinoma.  - CT surgery following -> no acute intervention   - AFP and CEA wnl  - LDH and haptoglobin elevated   - PSA total 220, PSA free is 22   - CA19 131  -  57  - oncology recs appreciated ->patient used to follow with Dr. Kenia Irizarry, called and teams her and she is not picking up the phone calls  - per onc note, patient had prior biopsy of site that demonstrated metastatic prostate adenocarcinoma and was started on Docetaxel.  - tramadol 25mg q6h PRN for pain management   - orthostatics positive, likely secondary to poor oral intake, on IV fluids   - ensure added, nutrition consulted, calorie count ordered   -palliative consulted , patient needs surrogate/HCP   - zofran prn    #CHUCKIE vs ATN induced DIMAS  - pt came in with a baseline of 1.5 and increased to 2.6 with contrast use  - c/w IVF  - renally dose medications  -crea not improving , bladder scan ordered    # small b/l pleural effusions on CT chest   - s/p 1x dose 40mg IV Lasix , CXR improved     #normocytic Anemia  - Hb 9-10  - f/u iron  studies noted % sat 27   - MCV normal, SPEP negative     #hypocalcemia  -replenished   -monitor , today corrected 8.52    #Misc   #DVT PPx- heparin   #GI PPx- none   #Diet-  puree   #Activity- AAT, PT consult Home PT; back to [facility] w/ assistance  #Dispo- Acute   68-year-old male with past medical history of hypertension, chronic back pain, prostate cancer, and posterior mediastinal mass 6.5 cm seen on CT 11/2023, presents for 3 months of progressively difficulty swallowing associated with 10 pounds weight loss and odynophagia. Comes in today now unable to swallow his pills with water, resulting in 2 episode of emesis. He mentioned that he gets SOB during exertion but denies any exertional chest pain. As per patient he was treated at [facility] for prostate cancer and is s/p radiation therapy. He states that he has problem during walking and unable to maintain the posture.     #aspiration PNA vs viral infection vs HAP   #GAS s pyogenes bacteremia   - SIRS positive (WBC increased to 16, febrile to 101 and tachy to 129 )   - CXR with worsening L-sided opacity   - Blood cx (2/24/24): positive for strep pyogenes  - repeat BCx daily until cleared  - procal 7  - aspiration precautions   - patient cleared for pureed diet and thin liquids per SS   - on zosyn 3.375mg q8h switched to cefepime and clindamycin pending ID rec  - TTE       # Dysphagia with odynophagia  # Hx of prostate cancer s/p radiation therapy  # Prostate cancer metastasis to mediastinum or primary mediastinal mass?  - CT Chest: Interval enlargement of the central/posterior mediastinal mass with development of more extensive lymphadenopathy. The mass encircles the descending thoracic aorta and has some mass effect upon the left atrium. The mid aspect of the esophagus is encircled by the mass and difficult to distinguish. Possible ovoid pill is noted within the mass and possible location of the mid esophagus appear. The maximal esophagus appears distended with layering debris within. Consideration may be given to esophageal stenting.  - CT abdomen and pelvis:  Increased upper abdominal bulky lymphadenopathy. Unchanged bulky retroperitoneal lymphadenopathy, large left bladder   mass and perirectal soft tissue infiltrative mass. 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 Chronic moderate to severe left hydroureteronephrosis  - EGD with GI 2/21 -> stent placed and esophageal biopsies taken -pathology - Poorly differentiated carcinoma.  - CT surgery following -> no acute intervention   - AFP and CEA wnl  - LDH and haptoglobin elevated   - PSA total 220, PSA free is 22   - CA19 131  -  57  - oncology recs appreciated ->patient used to follow with Dr. Kenia Irizarry, called and teams her and she is not picking up the phone calls  - per onc note, patient had prior biopsy of site that demonstrated metastatic prostate adenocarcinoma and was started on Docetaxel.  - tramadol 25mg q6h PRN for pain management   - orthostatics positive, likely secondary to poor oral intake, on IV fluids   - ensure added, nutrition consulted, calorie count ordered   -palliative consulted , patient needs surrogate    - zofran prn    #CHUCKIE vs ATN induced DIMAS  - pt came in with a baseline of 1.5 and increased to 2.6 with contrast use  - c/w IVF  - renally dose medications  -crea not improving , US ordered, nephro cs     # small b/l pleural effusions on CT chest   - s/p 1x dose 40mg IV Lasix , CXR improved     #normocytic Anemia  - Hb 9-10  - f/u iron  studies noted % sat 27   - MCV normal, SPEP negative     #hypocalcemia  -replenished   -monitor     #Misc   #DVT PPx- heparin   #GI PPx- none   #Diet-  puree   #Activity- AAT, PT consult Home PT; [facility] w/ assistance  #Dispo- Acute

## 2024-02-28 NOTE — CHART NOTE - NSCHARTNOTEFT_GEN_A_CORE
Pt seen for dysphagia follow-up at b/s. Pt declined all po trials w/ SLP (thin, puree and offered soft solids) Calorie count hung at b/s. Pt reportedly taking milk and water only, not eating puree foods, reports they are getting "stuck". Pt also reporting "even water is still coming back up". The need to optimize nutrition was discussed. Pt was questioned whether he would consider a feeding tube to support his nutrition/hydration needs at this time.  Pt was closed off and presented w/ decreased reception to information provided.   Recommend:   -Suggest Esophagram/barium swallow to assess if placement of stent improved bolus flow through esophagus   -continue puree/thin as tolerated/accepted, encourage supplements  -GOC discussion to determine if pt is a candidate for and would consent to PEG if he is unable to meet his nut/hydration needs

## 2024-02-28 NOTE — CONSULT NOTE ADULT - SUBJECTIVE AND OBJECTIVE BOX
RUFUS PICKARD  68y, Male  Allergy: No Known Allergies      CHIEF COMPLAINT: Mediastinal Mass (28 Feb 2024 09:28)      LOS  8d    HPI:  68-year-old male with past medical history of hypertension, chronic back pain, prostate cancer, and posterior mediastinal mass 6.5 cm seen on CT 11/2023, presents for 3 months of progressively difficulty swallowing associated with 10 pounds weight loss and odynophagia. Comes in today now unable to swallow his pills with water, resulting in 2 episode of emesis. He mentioned that he gets SOB during exertion but denies any exertional chest pain. As per patient he was treated at [facility] for prostate cancer and is s/p radiation therapy. He states that he has problem during walking and unable to maintain the posture.   Denies any abdominal pain, chest pain, dyspnea, diarrhea, fever, chills     Vital Signs Last 24 Hrs  T(C): 36.7 (20 Feb 2024 10:53), Max: 36.7 (20 Feb 2024 10:53)  T(F): 98 (20 Feb 2024 10:53), Max: 98 (20 Feb 2024 10:53)  HR: 78 (20 Feb 2024 10:53) (78 - 78)  BP: 112/78 (20 Feb 2024 10:53) (112/78 - 112/78)  RR: 17 (20 Feb 2024 10:53) (17 - 17)  SpO2: 100% (20 Feb 2024 10:53) (100% - 100%)  O2 Parameters below as of 20 Feb 2024 10:53  Patient On (Oxygen Delivery Method): room air           (20 Feb 2024 15:17)      INFECTIOUS DISEASE HISTORY:  History as above.    PAST MEDICAL & SURGICAL HISTORY:  HTN (hypertension)      Prostate cancer          FAMILY HISTORY  Family history reviewed and non-contributory      SOCIAL HISTORY  Social History:        ROS  General: Denies rigors, nightsweats  HEENT: Denies headache, rhinorrhea, sore throat, eye pain  CV: Denies CP, palpitations  PULM: Denies wheezing, hemoptysis  GI: Denies hematemesis, hematochezia, melena  : Denies discharge, hematuria  MSK: Denies arthralgias, myalgias  SKIN: Denies rash, lesions  NEURO: Denies paresthesias, weakness  PSYCH: Denies depression, anxiety    VITALS:  T(F): 98.1, Max: 98.8 (02-27-24 @ 20:30)  HR: 92  BP: 129/66  RR: 18Vital Signs Last 24 Hrs  T(C): 36.7 (28 Feb 2024 05:25), Max: 37.1 (27 Feb 2024 20:30)  T(F): 98.1 (28 Feb 2024 05:25), Max: 98.8 (27 Feb 2024 20:30)  HR: 92 (28 Feb 2024 05:25) (92 - 97)  BP: 129/66 (28 Feb 2024 05:25) (100/64 - 129/66)  BP(mean): 87 (27 Feb 2024 14:02) (87 - 87)  RR: 18 (28 Feb 2024 05:25) (18 - 18)  SpO2: 96% (28 Feb 2024 05:25) (95% - 96%)        PHYSICAL EXAM:  Gen: NAD, resting in bed  HEENT: Normocephalic, atraumatic  Neck: supple, no lymphadenopathy  CV: Regular rate & regular rhythm  Lungs: decreased BS at bases, no fremitus  Abdomen: Soft, BS present  Ext: Warm, well perfused  Neuro: non focal, awake  Skin: no rash, no erythema  Lines: no phlebitis    TESTS & MEASUREMENTS:                        8.8    8.56  )-----------( 278      ( 28 Feb 2024 04:30 )             28.4     02-28    142  |  112<H>  |  41<H>  ----------------------------<  134<H>  3.9   |  17  |  2.3<H>    Ca    7.9<L>      28 Feb 2024 04:30  Phos  3.8     02-28  Mg     2.6     02-28    TPro  5.5<L>  /  Alb  2.8<L>  /  TBili  0.4  /  DBili  x   /  AST  29  /  ALT  39  /  AlkPhos  82  02-28      LIVER FUNCTIONS - ( 28 Feb 2024 04:30 )  Alb: 2.8 g/dL / Pro: 5.5 g/dL / ALK PHOS: 82 U/L / ALT: 39 U/L / AST: 29 U/L / GGT: x           Urinalysis Basic - ( 28 Feb 2024 04:30 )    Color: x / Appearance: x / SG: x / pH: x  Gluc: 134 mg/dL / Ketone: x  / Bili: x / Urobili: x   Blood: x / Protein: x / Nitrite: x   Leuk Esterase: x / RBC: x / WBC x   Sq Epi: x / Non Sq Epi: x / Bacteria: x        Culture - Blood (collected 02-24-24 @ 12:14)  Source: .Blood None  Gram Stain (02-26-24 @ 21:23):    Growth in aerobic bottle: Gram positive cocci in pairs    Growth in anaerobic bottle: Gram positive cocci in pairs  Preliminary Report (02-27-24 @ 12:03):    Growth in aerobic and anaerobic bottles: Streptococcus pyogenes (Group A)    Direct identification is available within approximately 3-5    hours either by Blood Panel Multiplexed PCR or Direct    MALDI-TOF. Details: [email or web address]  Organism: Blood Culture PCR (02-26-24 @ 17:25)  Organism: Blood Culture PCR (02-26-24 @ 17:25)      Method Type: PCR      -  Streptococcus pyogenes (Group A): Detec    Culture - Urine (collected 11-22-23 @ 09:16)  Source: Clean Catch Clean Catch (Midstream)  Final Report (11-23-23 @ 22:36):    <10,000 CFU/mL Normal Urogenital Leigh Ann        Lactate, Blood: 2.0 mmol/L (02-24-24 @ 12:14)      INFECTIOUS DISEASES TESTING  Procalcitonin, Serum: 7.32 ng/mL (02-24-24 @ 12:14)      RADIOLOGY & ADDITIONAL TESTS:  I have personally reviewed the last Chest xray  CXR      CT      CARDIOLOGY TESTING  12 Lead ECG:   Ventricular Rate 90 BPM    Atrial Rate 90 BPM    P-R Interval 174 ms    QRS Duration 82 ms    Q-T Interval 400 ms    QTC Calculation(Bazett) 489 ms    P Axis 40 degrees    R Axis 40 degrees    T Axis 67 degrees    Diagnosis Line Normal sinus rhythm  Prolonged QT  Abnormal ECG    Confirmed by SHERITA OSMAN, JEANIE ([id number]) on 2/21/2024 10:11:43 PM (02-21-24 @ 14:56)      MEDICATIONS  cefepime   IVPB 2000 IV Intermittent every 12 hours  chlorhexidine 2% Cloths 1 Topical <User Schedule>  clindamycin IVPB     clindamycin IVPB 900 IV Intermittent every 8 hours  dextrose 5% + lactated ringers. 1000 IV Continuous <Continuous>  heparin   Injectable 5000 SubCutaneous every 12 hours  influenza  Vaccine (HIGH DOSE) 0.7 IntraMuscular once  lidocaine   4% Patch 1 Transdermal every 24 hours  metoprolol tartrate 25 Oral two times a day  ondansetron Injectable 4 IV Push once  polyethylene glycol 3350 17 Oral daily      Weight  Weight (kg): 77.1 (02-21-24 @ 14:55)    ANTIBIOTICS:  cefepime   IVPB 2000 milliGRAM(s) IV Intermittent every 12 hours  clindamycin IVPB      clindamycin IVPB 900 milliGRAM(s) IV Intermittent every 8 hours      ALLERGIES:  No Known Allergies      ASSESSMENT  68y M admitted with Other diseases of mediastinum      HTN (hypertension)    Prostate cancer        IMPRESSION  #  #Severe Sepsis on admission T<96.8F, T>101F, Pulse>90, Resp Rate>20, WBC>12, wbc<4, Bands>10%, lactic acidosis, metabolic encephalopathy, metabolic acidosis, metabolic alkalosis, DIMAS due to suspected Gram negative pneumonia, aspiration pneumonia, pyelonephritis, cystitis, cellulitis, bacteremia  Pt has an acute illness which poses threat to bodily function   #Lactic acidosis  #Hyponatremia   #Obesity BMI (kg/m2): 22.4  #DM   #Abx allergy: No Known Allergies        RECOMMENDATIONS  - F/u blood cultures, urine culture, wound culture  - Continue  - Continue  - Please check vanc trough 30 min prior to the 4th dose     Spectra [id number]     RUFUS PICKARD  68y, Male  Allergy: No Known Allergies      CHIEF COMPLAINT: Mediastinal Mass (28 Feb 2024 09:28)      LOS  8d    HPI:  68-year-old male with past medical history of hypertension, chronic back pain, prostate cancer, and posterior mediastinal mass 6.5 cm seen on CT 11/2023, presents for 3 months of progressively difficulty swallowing associated with 10 pounds weight loss and odynophagia. Comes in today now unable to swallow his pills with water, resulting in 2 episode of emesis. He mentioned that he gets SOB during exertion but denies any exertional chest pain. As per patient he was treated at [facility] for prostate cancer and is s/p radiation therapy. He states that he has problem during walking and unable to maintain the posture.   Denies any abdominal pain, chest pain, dyspnea, diarrhea, fever, chills     Vital Signs Last 24 Hrs  T(C): 36.7 (20 Feb 2024 10:53), Max: 36.7 (20 Feb 2024 10:53)  T(F): 98 (20 Feb 2024 10:53), Max: 98 (20 Feb 2024 10:53)  HR: 78 (20 Feb 2024 10:53) (78 - 78)  BP: 112/78 (20 Feb 2024 10:53) (112/78 - 112/78)  RR: 17 (20 Feb 2024 10:53) (17 - 17)  SpO2: 100% (20 Feb 2024 10:53) (100% - 100%)  O2 Parameters below as of 20 Feb 2024 10:53  Patient On (Oxygen Delivery Method): room air           (20 Feb 2024 15:17)      INFECTIOUS DISEASE HISTORY:  History as above.  ID consulted for GAS bacteremia       PAST MEDICAL & SURGICAL HISTORY:  HTN (hypertension)      Prostate cancer          FAMILY HISTORY  Family history reviewed and non-contributory      SOCIAL HISTORY  Social History:        ROS  General: Denies rigors, nightsweats  HEENT: Denies headache, rhinorrhea, sore throat, eye pain  CV: Denies CP, palpitations  PULM: Denies wheezing, hemoptysis  GI: Denies hematemesis, hematochezia, melena  : Denies discharge, hematuria  MSK: Denies arthralgias, myalgias  SKIN: Denies rash, lesions  NEURO: Denies paresthesias, weakness  PSYCH: Denies depression, anxiety    VITALS:  T(F): 98.1, Max: 98.8 (02-27-24 @ 20:30)  HR: 92  BP: 129/66  RR: 18Vital Signs Last 24 Hrs  T(C): 36.7 (28 Feb 2024 05:25), Max: 37.1 (27 Feb 2024 20:30)  T(F): 98.1 (28 Feb 2024 05:25), Max: 98.8 (27 Feb 2024 20:30)  HR: 92 (28 Feb 2024 05:25) (92 - 97)  BP: 129/66 (28 Feb 2024 05:25) (100/64 - 129/66)  BP(mean): 87 (27 Feb 2024 14:02) (87 - 87)  RR: 18 (28 Feb 2024 05:25) (18 - 18)  SpO2: 96% (28 Feb 2024 05:25) (95% - 96%)        PHYSICAL EXAM:  Gen: NAD, resting in bed  HEENT: Normocephalic, atraumatic  Neck: supple, no lymphadenopathy  CV: Regular rate & regular rhythm  Lungs: decreased BS at bases, no fremitus  Abdomen: Soft, BS present  Ext: Warm, well perfused  Neuro: non focal, awake  Skin: no rash, no erythema  Lines: no phlebitis    TESTS & MEASUREMENTS:                        8.8    8.56  )-----------( 278      ( 28 Feb 2024 04:30 )             28.4     02-28    142  |  112<H>  |  41<H>  ----------------------------<  134<H>  3.9   |  17  |  2.3<H>    Ca    7.9<L>      28 Feb 2024 04:30  Phos  3.8     02-28  Mg     2.6     02-28    TPro  5.5<L>  /  Alb  2.8<L>  /  TBili  0.4  /  DBili  x   /  AST  29  /  ALT  39  /  AlkPhos  82  02-28      LIVER FUNCTIONS - ( 28 Feb 2024 04:30 )  Alb: 2.8 g/dL / Pro: 5.5 g/dL / ALK PHOS: 82 U/L / ALT: 39 U/L / AST: 29 U/L / GGT: x           Urinalysis Basic - ( 28 Feb 2024 04:30 )    Color: x / Appearance: x / SG: x / pH: x  Gluc: 134 mg/dL / Ketone: x  / Bili: x / Urobili: x   Blood: x / Protein: x / Nitrite: x   Leuk Esterase: x / RBC: x / WBC x   Sq Epi: x / Non Sq Epi: x / Bacteria: x        Culture - Blood (collected 02-24-24 @ 12:14)  Source: .Blood None  Gram Stain (02-26-24 @ 21:23):    Growth in aerobic bottle: Gram positive cocci in pairs    Growth in anaerobic bottle: Gram positive cocci in pairs  Preliminary Report (02-27-24 @ 12:03):    Growth in aerobic and anaerobic bottles: Streptococcus pyogenes (Group A)    Direct identification is available within approximately 3-5    hours either by Blood Panel Multiplexed PCR or Direct    MALDI-TOF. Details: [email or web address]  Organism: Blood Culture PCR (02-26-24 @ 17:25)  Organism: Blood Culture PCR (02-26-24 @ 17:25)      Method Type: PCR      -  Streptococcus pyogenes (Group A): Detec    Culture - Urine (collected 11-22-23 @ 09:16)  Source: Clean Catch Clean Catch (Midstream)  Final Report (11-23-23 @ 22:36):    <10,000 CFU/mL Normal Urogenital Leigh Ann        Lactate, Blood: 2.0 mmol/L (02-24-24 @ 12:14)      INFECTIOUS DISEASES TESTING  Procalcitonin, Serum: 7.32 ng/mL (02-24-24 @ 12:14)      RADIOLOGY & ADDITIONAL TESTS:  I have personally reviewed the last Chest xray  CXR      CT      CARDIOLOGY TESTING  12 Lead ECG:   Ventricular Rate 90 BPM    Atrial Rate 90 BPM    P-R Interval 174 ms    QRS Duration 82 ms    Q-T Interval 400 ms    QTC Calculation(Bazett) 489 ms    P Axis 40 degrees    R Axis 40 degrees    T Axis 67 degrees    Diagnosis Line Normal sinus rhythm  Prolonged QT  Abnormal ECG    Confirmed by SHERITA OSMAN, JEANIE ([id number]) on 2/21/2024 10:11:43 PM (02-21-24 @ 14:56)      MEDICATIONS  cefepime   IVPB 2000 IV Intermittent every 12 hours  chlorhexidine 2% Cloths 1 Topical <User Schedule>  clindamycin IVPB     clindamycin IVPB 900 IV Intermittent every 8 hours  dextrose 5% + lactated ringers. 1000 IV Continuous <Continuous>  heparin   Injectable 5000 SubCutaneous every 12 hours  influenza  Vaccine (HIGH DOSE) 0.7 IntraMuscular once  lidocaine   4% Patch 1 Transdermal every 24 hours  metoprolol tartrate 25 Oral two times a day  ondansetron Injectable 4 IV Push once  polyethylene glycol 3350 17 Oral daily      Weight  Weight (kg): 77.1 (02-21-24 @ 14:55)    ANTIBIOTICS:  cefepime   IVPB 2000 milliGRAM(s) IV Intermittent every 12 hours  clindamycin IVPB      clindamycin IVPB 900 milliGRAM(s) IV Intermittent every 8 hours      ALLERGIES:  No Known Allergies

## 2024-02-28 NOTE — PROGRESS NOTE ADULT - SUBJECTIVE AND OBJECTIVE BOX
24H events:    Today is hospital day 8d. This morning patient was seen and examined at bedside, resting comfortably in bed.    No acute or major events overnight.    PAST MEDICAL & SURGICAL HISTORY  HTN (hypertension)    Prostate cancer        SOCIAL HISTORY:  Social History:      ALLERGIES:  No Known Allergies      MEDICATIONS:  STANDING MEDICATIONS  cefepime   IVPB 2000 milliGRAM(s) IV Intermittent every 12 hours  chlorhexidine 2% Cloths 1 Application(s) Topical <User Schedule>  clindamycin IVPB      clindamycin IVPB 900 milliGRAM(s) IV Intermittent every 8 hours  dextrose 5% + lactated ringers. 1000 milliLiter(s) IV Continuous <Continuous>  dextrose 5% + lactated ringers. 1000 milliLiter(s) IV Continuous <Continuous>  heparin   Injectable 5000 Unit(s) SubCutaneous every 12 hours  influenza  Vaccine (HIGH DOSE) 0.7 milliLiter(s) IntraMuscular once  lidocaine   4% Patch 1 Patch Transdermal every 24 hours  metoprolol tartrate 25 milliGRAM(s) Oral two times a day  ondansetron Injectable 4 milliGRAM(s) IV Push once  polyethylene glycol 3350 17 Gram(s) Oral daily    PRN MEDICATIONS  acetaminophen     Tablet .. 650 milliGRAM(s) Oral every 6 hours PRN  aluminum hydroxide/magnesium hydroxide/simethicone Suspension 30 milliLiter(s) Oral every 4 hours PRN  melatonin 3 milliGRAM(s) Oral at bedtime PRN  traMADol 25 milliGRAM(s) Oral four times a day PRN      VITALS:   T(C): 37.1 (02-27-24 @ 20:30), Max: 37.1 (02-27-24 @ 20:30)  HR: 97 (02-27-24 @ 20:30) (92 - 97)  BP: 100/64 (02-27-24 @ 20:30) (100/64 - 107/73)  RR: 18 (02-27-24 @ 20:30) (18 - 18)  SpO2: 95% (02-27-24 @ 14:02) (95% - 95%)  I&O's Summary    26 Feb 2024 07:01  -  27 Feb 2024 07:00  --------------------------------------------------------  IN: 1375 mL / OUT: 0 mL / NET: 1375 mL    27 Feb 2024 07:01  -  28 Feb 2024 06:50  --------------------------------------------------------  IN: 0 mL / OUT: 100 mL / NET: -100 mL          PHYSICAL EXAM:      LABS:                        8.3    8.01  )-----------( 262      ( 27 Feb 2024 06:34 )             26.3     02-27    140  |  109  |  45<H>  ----------------------------<  190<H>  3.9   |  19  |  2.6<H>    Ca    7.4<L>      27 Feb 2024 06:34  Phos  1.9     02-27  Mg     2.6     02-27    TPro  5.3<L>  /  Alb  2.6<L>  /  TBili  0.6  /  DBili  x   /  AST  41  /  ALT  36  /  AlkPhos  77  02-27      Urinalysis Basic - ( 27 Feb 2024 06:34 )    Color: x / Appearance: x / SG: x / pH: x  Gluc: 190 mg/dL / Ketone: x  / Bili: x / Urobili: x   Blood: x / Protein: x / Nitrite: x   Leuk Esterase: x / RBC: x / WBC x   Sq Epi: x / Non Sq Epi: x / Bacteria: x                     24H events:    Today is hospital day 8d. This morning patient was seen and examined at bedside, resting comfortably in bed.    No acute or major events overnight. No fever.    PAST MEDICAL & SURGICAL HISTORY  HTN (hypertension)    Prostate cancer        SOCIAL HISTORY:  Social History:      ALLERGIES:  No Known Allergies      MEDICATIONS:  STANDING MEDICATIONS  cefepime   IVPB 2000 milliGRAM(s) IV Intermittent every 12 hours  chlorhexidine 2% Cloths 1 Application(s) Topical <User Schedule>  clindamycin IVPB      clindamycin IVPB 900 milliGRAM(s) IV Intermittent every 8 hours  dextrose 5% + lactated ringers. 1000 milliLiter(s) IV Continuous <Continuous>  dextrose 5% + lactated ringers. 1000 milliLiter(s) IV Continuous <Continuous>  heparin   Injectable 5000 Unit(s) SubCutaneous every 12 hours  influenza  Vaccine (HIGH DOSE) 0.7 milliLiter(s) IntraMuscular once  lidocaine   4% Patch 1 Patch Transdermal every 24 hours  metoprolol tartrate 25 milliGRAM(s) Oral two times a day  ondansetron Injectable 4 milliGRAM(s) IV Push once  polyethylene glycol 3350 17 Gram(s) Oral daily    PRN MEDICATIONS  acetaminophen     Tablet .. 650 milliGRAM(s) Oral every 6 hours PRN  aluminum hydroxide/magnesium hydroxide/simethicone Suspension 30 milliLiter(s) Oral every 4 hours PRN  melatonin 3 milliGRAM(s) Oral at bedtime PRN  traMADol 25 milliGRAM(s) Oral four times a day PRN      VITALS:   T(C): 37.1 (02-27-24 @ 20:30), Max: 37.1 (02-27-24 @ 20:30)  HR: 97 (02-27-24 @ 20:30) (92 - 97)  BP: 100/64 (02-27-24 @ 20:30) (100/64 - 107/73)  RR: 18 (02-27-24 @ 20:30) (18 - 18)  SpO2: 95% (02-27-24 @ 14:02) (95% - 95%)  I&O's Summary    26 Feb 2024 07:01  -  27 Feb 2024 07:00  --------------------------------------------------------  IN: 1375 mL / OUT: 0 mL / NET: 1375 mL    27 Feb 2024 07:01  -  28 Feb 2024 06:50  --------------------------------------------------------  IN: 0 mL / OUT: 100 mL / NET: -100 mL          PHYSICAL EXAM:  GENERAL: NAD, non-toxic appearing, cachectic , poor dental hygiene   NECK: Supple, no stiffness, no JVD  HEART: Regular rate and rhythm, normal s1s2  LUNGS: Unlabored respirations, b/l air entry, no adventitious breath sounds   ABDOMEN: Soft, nontender, nondistended; +BS  EXTREMITIES: No clubbing, cyanosis, or edema;   NERVOUS SYSTEM: AOx3  SKIN: Warm and dry,     LABS:                        8.3    8.01  )-----------( 262      ( 27 Feb 2024 06:34 )             26.3     02-27    140  |  109  |  45<H>  ----------------------------<  190<H>  3.9   |  19  |  2.6<H>    Ca    7.4<L>      27 Feb 2024 06:34  Phos  1.9     02-27  Mg     2.6     02-27    TPro  5.3<L>  /  Alb  2.6<L>  /  TBili  0.6  /  DBili  x   /  AST  41  /  ALT  36  /  AlkPhos  77  02-27      Urinalysis Basic - ( 27 Feb 2024 06:34 )    Color: x / Appearance: x / SG: x / pH: x  Gluc: 190 mg/dL / Ketone: x  / Bili: x / Urobili: x   Blood: x / Protein: x / Nitrite: x   Leuk Esterase: x / RBC: x / WBC x   Sq Epi: x / Non Sq Epi: x / Bacteria: x

## 2024-02-28 NOTE — PROGRESS NOTE ADULT - ATTENDING COMMENTS
patient seen and examined at bedside independently of medical resident and agree with the note unless otherwise stated    Vital Signs Last 24 Hrs  T(C): 36.7 (28 Feb 2024 05:25), Max: 37.1 (27 Feb 2024 20:30)  T(F): 98.1 (28 Feb 2024 05:25), Max: 98.8 (27 Feb 2024 20:30)  HR: 87 (28 Feb 2024 12:44) (87 - 97)  BP: 112/67 (28 Feb 2024 12:44) (100/64 - 129/66)  BP(mean): --  RR: 18 (28 Feb 2024 12:44) (18 - 18)  SpO2: 96% (28 Feb 2024 05:25) (96% - 96%)                          8.8    8.56  )-----------( 278      ( 28 Feb 2024 04:30 )             28.4   02-28    142  |  112<H>  |  41<H>  ----------------------------<  134<H>  3.9   |  17  |  2.3<H>    Ca    7.9<L>      28 Feb 2024 04:30  Phos  3.8     02-28  Mg     2.6     02-28    TPro  5.5<L>  /  Alb  2.8<L>  /  TBili  0.4  /  DBili  x   /  AST  29  /  ALT  39  /  AlkPhos  82  02-28    -patient with odynophagia - newly diagnosed esophageal cancer - unable to reach patient's oncologist Dr. Irizarry   -continue with IV abx, ID following   -recheck BCx  -Monitor Kidney function - Nephro eval   -overall poor prognosis - no info on next of kin     Attending Physician Dr. Erinn Leslie # [id number]

## 2024-02-28 NOTE — CONSULT NOTE ADULT - ASSESSMENT
ASSESSMENT  68-year-old male with past medical history of hypertension, chronic back pain, prostate cancer, and posterior mediastinal mass 6.5 cm seen on CT 11/2023, presents for 3 months of progressively difficulty swallowing associated with 10 pounds weight loss and odynophagia    IMPRESSION  #Mediastinal Mass - seen on CT 11/2023   - CT Chest w/ IV Cont (02.20.24 @ 13:58): No evidence of pulmonary embolus Interval enlargement of the central/posterior mediastinal mass with   development of more extensive lymphadenopathy. The mass encircles the  descending thoracic aorta and has some mass effect upon the left atrium.  The mid aspect of the esophagus is encircled by the mass and difficult to distinguish. Possible ovoid pill is noted within the mass and possible  location of the mid esophagus appear. The maximal esophagus appears  distended with layering debris within. Consideration may be given to  esophageal stenting.  - CT Abdomen and Pelvis w/ IV Cont (02.21.24 @ 12:07): IMPRESSION: 1.  Since November 22, 2023, increasedupper abdominal bulky  lymphadenopathy. 2.  Unchanged bulky retroperitoneal lymphadenopathy, large left bladder  mass and perirectal soft tissue infiltrative mass. 3.  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 4.  Chronic moderate to severe left hydroureteronephrosis.    #Group A strep bacteremia  - BLood Cx 2/24 +   -   #Dysphagia with odynophagia  - s/p EGD 2/21 - biopsy showing poorly differentiated carcinoma     # Hx of prostate cancer s/p radiation therapy    #Abx allergy: No Known Allergies    RECOMMENDATIONS  This is a preliminary incomplete pended note, all final recommendations to follow after interview and examination of the patient.    Please call or message on [employer] Teams if with any questions.  Spectra [phone] ASSESSMENT  68-year-old male with past medical history of hypertension, chronic back pain, prostate cancer, and posterior mediastinal mass 6.5 cm seen on CT 11/2023, presents for 3 months of progressively difficulty swallowing associated with 10 pounds weight loss and odynophagia    IMPRESSION  #Mediastinal Mass - seen on CT 11/2023   - CT Chest w/ IV Cont (02.20.24 @ 13:58): No evidence of pulmonary embolus Interval enlargement of the central/posterior mediastinal mass with   development of more extensive lymphadenopathy. The mass encircles the  descending thoracic aorta and has some mass effect upon the left atrium.  The mid aspect of the esophagus is encircled by the mass and difficult to distinguish. Possible ovoid pill is noted within the mass and possible  location of the mid esophagus appear. The maximal esophagus appears  distended with layering debris within. Consideration may be given to  esophageal stenting.  - CT Abdomen and Pelvis w/ IV Cont (02.21.24 @ 12:07): IMPRESSION: 1.  Since November 22, 2023, increasedupper abdominal bulky  lymphadenopathy. 2.  Unchanged bulky retroperitoneal lymphadenopathy, large left bladder  mass and perirectal soft tissue infiltrative mass. 3.  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 4.  Chronic moderate to severe left hydroureteronephrosis.    #Group A strep bacteremia  - BLood Cx 2/24 +   -   #Dysphagia with odynophagia  - s/p EGD 2/21 - biopsy showing poorly differentiated carcinoma     # Hx of prostate cancer s/p radiation therapy    #Abx allergy: No Known Allergies    RECOMMENDATIONS  - source potentially from translocation in setting of infiltrative masses   - repeat blood cx for surveillance   - check TTE   - stop clindamycin   - switch cefepime to ceftriaxone 2g daily   - poor prognosis overall - surrogate decision maker needed     Please call or message on [employer] Teams if with any questions.  Spectra [phone]

## 2024-02-28 NOTE — CHART NOTE - NSCHARTNOTEFT_GEN_A_CORE
Registered Dietitian Follow-Up     Patient Profile Reviewed                           Yes [x]   No []     Nutrition History Previously Obtained        Yes [x]  No []       Pertinent Subjective Information:  Patient seen for consult - Refusing food.  Patient requesting milk to drink.   He reports that also not eating much as feels the food gives him diarrhea and emesis. RD inquired if patient notices diarrhea with any specific foods?  RD also discussed oral nutrition supplements with him.      Pertinent Medical Interventions:  68-year-old male with past medical history of hypertension, chronic back pain, prostate cancer, and posterior mediastinal mass 6.5 cm seen on CT 2023, presents for 3 months of progressively difficulty swallowing associated with 10 pounds weight loss and odynophagia. Comes in today now unable to swallow his pills with water, resulting in 2 episode of emesis.    aspiration PNA vs viral infection vs HAP; GAS s pyogenes bacteremia; SIRS positive; Dysphagia with odynophagia; hx of prostate cancer metastasis to mediastinum or primary mediastinal mass; EGD with GI  -> stent placed and biopsies taken - pathology - poorly differentiated carcinoma; hypocalcemia;      Diet order:   Diet, Pureed:   Supplement Feeding Modality:  Oral  Ensure Clear Cans or Servings Per Day:  3       Frequency:  Daily (24 @ 14:55) [Active]    Anthropometrics:  Height (cm): 185.4 (24 @ 14:44)  Weight (kg): 77.1 (24 @ 14:55)  BMI (kg/m2): 22.4 (24 @ 14:44)  IBW: 83.6 kg     Daily Weight in k.9 (02-23)  % Weight Change    MEDICATIONS  (STANDING):  cefepime   IVPB 2000 milliGRAM(s) IV Intermittent every 12 hours  chlorhexidine 2% Cloths 1 Application(s) Topical <User Schedule>  clindamycin IVPB      clindamycin IVPB 900 milliGRAM(s) IV Intermittent every 8 hours  dextrose 5% + lactated ringers. 1000 milliLiter(s) (100 mL/Hr) IV Continuous <Continuous>  heparin   Injectable 5000 Unit(s) SubCutaneous every 12 hours  influenza  Vaccine (HIGH DOSE) 0.7 milliLiter(s) IntraMuscular once  lidocaine   4% Patch 1 Patch Transdermal every 24 hours  metoprolol tartrate 25 milliGRAM(s) Oral two times a day  ondansetron Injectable 4 milliGRAM(s) IV Push once  polyethylene glycol 3350 17 Gram(s) Oral daily    MEDICATIONS  (PRN):  acetaminophen     Tablet .. 650 milliGRAM(s) Oral every 6 hours PRN Temp greater or equal to 38C (100.4F), Mild Pain (1 - 3)  aluminum hydroxide/magnesium hydroxide/simethicone Suspension 30 milliLiter(s) Oral every 4 hours PRN Dyspepsia  melatonin 3 milliGRAM(s) Oral at bedtime PRN Insomnia  traMADol 25 milliGRAM(s) Oral four times a day PRN Moderate Pain (4 - 6)    Pertinent Labs:  @ 04:30: Na 142, BUN 41<H>, Cr 2.3<H>, <H>, K+ 3.9, Phos 3.8, Mg 2.6<H>, Alk Phos 82, ALT/SGPT 39, AST/SGOT 29, HbA1c --    Finger Sticks:    Physical Findings:  - Appearance: AAOx4  - GI function: no recent BM documented   - Tubes: n/a - no feeding tubes   - Oral/Mouth cavity: pureed texture/thin liquids   - Skin: no pressure injuries documented  - Edema: no edema documented      Nutrition Requirements:  Weight Used: 83.4 kg - per initial RD assessment      Estimated Energy Needs    Continue [x]  Adjust []  5747-2537 kcal/day (MSJ x 1.2-1.5 SF)     Estimated Protein Needs    Continue [x]  Adjust []  101-126 g/day (1.2-1.5 g/kg)    Estimated Fluid Needs        Continue [x]  Adjust []  2100 ml/day (25 ml/kg)     Nutrient Intake: Patient with poor PO intake <50% of meals      [x] Previous Nutrition Diagnosis:  Severe PCM             [x] Ongoing          [] Resolved     Nutrition Intervention:  meals and snacks, medical food supplements, coordination of care     Goal/Expected Outcome:   PO intake >/=50% of meals within 3-5 days      Indicator/Monitoring:   energy intake, weight, labs, skin status, NFPE      Recommendation:  1) Continue current diet order  2) Would recommend to discontinue Ensure Clear supplement and ADD:  Ensure Plus HP 3x/day (350 kcal, 20 gm Protein each), Magic Cup 2x/day (290 kcal, 9 gm Protein each) and Prosoure Gelatein Plus daily (160 kcal, 20 gm Protein) to optimize kcal and protein intake   3) Maintain aspiration precautions - Maintain all aspiration precautions - HOB 45 degrees   4) Maximum encouragement and assistance appreciated with all meals, snacks, supplement   5) Consider giving Reglan 30 mins prior to meals   6) Calorie count now initiated - if results poor may need to discuss GOC in relation to nutrition support (Enteral nutrition)  7) Consider adding appetite stimulant to aid with increasing PO intake     Patient continues at high nutrition risk, RD to f/u in 3-5 days or PRN    RD to remain available: Stefanie Riojas RD [phone] or via Teams

## 2024-02-28 NOTE — CONSULT NOTE ADULT - SUBJECTIVE AND OBJECTIVE BOX
NEPHROLOGY CONSULTATION NOTE    68-year-old male with past medical history of hypertension, chronic back pain, prostate cancer, and posterior mediastinal mass 6.5 cm seen on CT 11/2023, presents for 3 months of progressively difficulty swallowing associated with 10 pounds weight loss and odynophagia. 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 Also found to be in sepsis secondary to GAS bacteremia. hospital stay complicated by DIMAS.  Patient seen at bedside, no major complaints.  VS within acceptable range.    PAST MEDICAL & SURGICAL HISTORY:  HTN (hypertension)      Prostate cancer        Allergies:  No Known Allergies    Home Medications Reviewed  Hospital Medications:   MEDICATIONS  (STANDING):  cefepime   IVPB 2000 milliGRAM(s) IV Intermittent every 12 hours  chlorhexidine 2% Cloths 1 Application(s) Topical <User Schedule>  clindamycin IVPB 900 milliGRAM(s) IV Intermittent every 8 hours  clindamycin IVPB      dextrose 5% + lactated ringers. 1000 milliLiter(s) (100 mL/Hr) IV Continuous <Continuous>  heparin   Injectable 5000 Unit(s) SubCutaneous every 12 hours  influenza  Vaccine (HIGH DOSE) 0.7 milliLiter(s) IntraMuscular once  lidocaine   4% Patch 1 Patch Transdermal every 24 hours  metoprolol tartrate 25 milliGRAM(s) Oral two times a day  ondansetron Injectable 4 milliGRAM(s) IV Push once  polyethylene glycol 3350 17 Gram(s) Oral daily    SOCIAL HISTORY:  Denies ETOH,Smoking,   FAMILY HISTORY:      REVIEW OF SYSTEMS:  CONSTITUTIONAL: No weakness, fevers or chills  EYES/ENT: No visual changes;  No vertigo or throat pain   NECK: No pain or stiffness  RESPIRATORY: No cough, wheezing, hemoptysis; No shortness of breath  CARDIOVASCULAR: No chest pain or palpitations.  GASTROINTESTINAL: No abdominal or epigastric pain. No nausea, vomiting, or hematemesis; No diarrhea or constipation. No melena or hematochezia.  GENITOURINARY: No dysuria, frequency, foamy urine, urinary urgency, incontinence or hematuria  NEUROLOGICAL: No numbness or weakness  SKIN: No itching, burning, rashes, or lesions   VASCULAR: No bilateral lower extremity edema.   All other review of systems is negative unless indicated above.    VITALS:  Vital Signs Last 24 Hrs  T(C): 36.7 (28 Feb 2024 05:25), Max: 37.1 (27 Feb 2024 20:30)  T(F): 98.1 (28 Feb 2024 05:25), Max: 98.8 (27 Feb 2024 20:30)  HR: 92 (28 Feb 2024 05:25) (92 - 97)  BP: 129/66 (28 Feb 2024 05:25) (100/64 - 129/66)  BP(mean): 87 (27 Feb 2024 14:02) (87 - 87)  RR: 18 (28 Feb 2024 05:25) (18 - 18)  SpO2: 96% (28 Feb 2024 05:25) (95% - 96%)        02-27 @ 07:01  -  02-28 @ 07:00  --------------------------------------------------------  IN: 0 mL / OUT: 100 mL / NET: -100 mL        PHYSICAL EXAM:  Constitutional: NAD  HEENT: anicteric sclera, oropharynx clear, MMM  Neck: No JVD  Respiratory: CTAB, no wheezes, rales or rhonchi  Cardiovascular: S1, S2, RRR  Gastrointestinal: BS+, soft, NT/ND  Extremities: No cyanosis or clubbing. No peripheral edema  Neurological: A/O x 3, no focal deficits  Psychiatric: Normal mood, normal affect  : No CVA tenderness. No castro.   Skin: No rashes    LABS:  02-28    142  |  112<H>  |  41<H>  ----------------------------<  134<H>  3.9   |  17  |  2.3<H>    Ca    7.9<L>      28 Feb 2024 04:30  Phos  3.8     02-28  Mg     2.6     02-28    TPro  5.5<L>  /  Alb  2.8<L>  /  TBili  0.4  /  DBili      /  AST  29  /  ALT  39  /  AlkPhos  82  02-28    Creatinine Trend: 2.3 <--, 2.6 <--, 2.3 <--, 2.6 <--, 2.3 <--, 1.5 <--, 1.3 <--, 1.5 <--                        8.8    8.56  )-----------( 278      ( 28 Feb 2024 04:30 )             28.4     Urine Studies:  Urinalysis Basic - ( 28 Feb 2024 04:30 )    Color:  / Appearance:  / SG:  / pH:   Gluc: 134 mg/dL / Ketone:   / Bili:  / Urobili:    Blood:  / Protein:  / Nitrite:    Leuk Esterase:  / RBC:  / WBC    Sq Epi:  / Non Sq Epi:  / Bacteria:

## 2024-02-29 LAB
ALBUMIN SERPL ELPH-MCNC: 2.8 G/DL — LOW (ref 3.5–5.2)
ALP SERPL-CCNC: 80 U/L — SIGNIFICANT CHANGE UP (ref 30–115)
ALT FLD-CCNC: 46 U/L — HIGH (ref 0–41)
ANION GAP SERPL CALC-SCNC: 14 MMOL/L — SIGNIFICANT CHANGE UP (ref 7–14)
AST SERPL-CCNC: 42 U/L — HIGH (ref 0–41)
BASOPHILS # BLD AUTO: 0.01 K/UL — SIGNIFICANT CHANGE UP (ref 0–0.2)
BASOPHILS NFR BLD AUTO: 0.1 % — SIGNIFICANT CHANGE UP (ref 0–1)
BILIRUB SERPL-MCNC: 0.3 MG/DL — SIGNIFICANT CHANGE UP (ref 0.2–1.2)
BUN SERPL-MCNC: 33 MG/DL — HIGH (ref 10–20)
CALCIUM SERPL-MCNC: 8.2 MG/DL — LOW (ref 8.4–10.5)
CHLORIDE SERPL-SCNC: 112 MMOL/L — HIGH (ref 98–110)
CO2 SERPL-SCNC: 18 MMOL/L — SIGNIFICANT CHANGE UP (ref 17–32)
CREAT SERPL-MCNC: 2.1 MG/DL — HIGH (ref 0.7–1.5)
EGFR: 33 ML/MIN/1.73M2 — LOW
EOSINOPHIL # BLD AUTO: 0.06 K/UL — SIGNIFICANT CHANGE UP (ref 0–0.7)
EOSINOPHIL NFR BLD AUTO: 0.7 % — SIGNIFICANT CHANGE UP (ref 0–8)
GLUCOSE SERPL-MCNC: 100 MG/DL — HIGH (ref 70–99)
HCT VFR BLD CALC: 28.1 % — LOW (ref 42–52)
HGB BLD-MCNC: 8.8 G/DL — LOW (ref 14–18)
IMM GRANULOCYTES NFR BLD AUTO: 2.2 % — HIGH (ref 0.1–0.3)
LYMPHOCYTES # BLD AUTO: 0.72 K/UL — LOW (ref 1.2–3.4)
LYMPHOCYTES # BLD AUTO: 7.9 % — LOW (ref 20.5–51.1)
MAGNESIUM SERPL-MCNC: 2.4 MG/DL — SIGNIFICANT CHANGE UP (ref 1.8–2.4)
MCHC RBC-ENTMCNC: 28.2 PG — SIGNIFICANT CHANGE UP (ref 27–31)
MCHC RBC-ENTMCNC: 31.3 G/DL — LOW (ref 32–37)
MCV RBC AUTO: 90.1 FL — SIGNIFICANT CHANGE UP (ref 80–94)
MONOCYTES # BLD AUTO: 0.83 K/UL — HIGH (ref 0.1–0.6)
MONOCYTES NFR BLD AUTO: 9.1 % — SIGNIFICANT CHANGE UP (ref 1.7–9.3)
NEUTROPHILS # BLD AUTO: 7.35 K/UL — HIGH (ref 1.4–6.5)
NEUTROPHILS NFR BLD AUTO: 80 % — HIGH (ref 42.2–75.2)
NRBC # BLD: 0 /100 WBCS — SIGNIFICANT CHANGE UP (ref 0–0)
PHOSPHATE SERPL-MCNC: 3.5 MG/DL — SIGNIFICANT CHANGE UP (ref 2.1–4.9)
PLATELET # BLD AUTO: 358 K/UL — SIGNIFICANT CHANGE UP (ref 130–400)
PMV BLD: 10.4 FL — SIGNIFICANT CHANGE UP (ref 7.4–10.4)
POTASSIUM SERPL-MCNC: 4.5 MMOL/L — SIGNIFICANT CHANGE UP (ref 3.5–5)
POTASSIUM SERPL-SCNC: 4.5 MMOL/L — SIGNIFICANT CHANGE UP (ref 3.5–5)
PROT SERPL-MCNC: 5.8 G/DL — LOW (ref 6–8)
RBC # BLD: 3.12 M/UL — LOW (ref 4.7–6.1)
RBC # FLD: 14 % — SIGNIFICANT CHANGE UP (ref 11.5–14.5)
SODIUM SERPL-SCNC: 144 MMOL/L — SIGNIFICANT CHANGE UP (ref 135–146)
WBC # BLD: 9.17 K/UL — SIGNIFICANT CHANGE UP (ref 4.8–10.8)
WBC # FLD AUTO: 9.17 K/UL — SIGNIFICANT CHANGE UP (ref 4.8–10.8)

## 2024-02-29 PROCEDURE — 74220 X-RAY XM ESOPHAGUS 1CNTRST: CPT | Mod: 26

## 2024-02-29 PROCEDURE — 99232 SBSQ HOSP IP/OBS MODERATE 35: CPT

## 2024-02-29 RX ORDER — SODIUM CHLORIDE 9 MG/ML
1000 INJECTION, SOLUTION INTRAVENOUS
Refills: 0 | Status: DISCONTINUED | OUTPATIENT
Start: 2024-02-29 | End: 2024-03-06

## 2024-02-29 RX ADMIN — CHLORHEXIDINE GLUCONATE 1 APPLICATION(S): 213 SOLUTION TOPICAL at 05:54

## 2024-02-29 RX ADMIN — CEFTRIAXONE 100 MILLIGRAM(S): 500 INJECTION, POWDER, FOR SOLUTION INTRAMUSCULAR; INTRAVENOUS at 18:03

## 2024-02-29 RX ADMIN — HEPARIN SODIUM 5000 UNIT(S): 5000 INJECTION INTRAVENOUS; SUBCUTANEOUS at 05:54

## 2024-02-29 RX ADMIN — LIDOCAINE 1 PATCH: 4 CREAM TOPICAL at 18:23

## 2024-02-29 RX ADMIN — Medication 25 MILLIGRAM(S): at 17:53

## 2024-02-29 RX ADMIN — LIDOCAINE 1 PATCH: 4 CREAM TOPICAL at 12:49

## 2024-02-29 RX ADMIN — HEPARIN SODIUM 5000 UNIT(S): 5000 INJECTION INTRAVENOUS; SUBCUTANEOUS at 17:53

## 2024-02-29 RX ADMIN — Medication 25 MILLIGRAM(S): at 05:55

## 2024-02-29 NOTE — PROGRESS NOTE ADULT - ASSESSMENT
68-year-old male with past medical history of hypertension, chronic back pain, prostate cancer, and posterior mediastinal mass 6.5 cm seen on CT 11/2023, presents for 3 months of progressively difficulty swallowing associated with 10 pounds weight loss and odynophagia. Comes in today now unable to swallow his pills with water, resulting in 2 episode of emesis. He mentioned that he gets SOB during exertion but denies any exertional chest pain. As per patient he was treated at [facility] for prostate cancer and is s/p radiation therapy. He states that he has problem during walking and unable to maintain the posture.     #aspiration PNA vs viral infection vs HAP   #GAS s pyogenes bacteremia   - SIRS positive (WBC increased to 16, febrile to 101 and tachy to 129 )   - CXR with worsening L-sided opacity   - Blood cx (2/24/24): positive for strep pyogenes  - repeat BCx 27 negative to date   - procal 7  - aspiration precautions   - patient cleared for pureed diet and thin liquids per SS   - on zosyn 3.375mg q8h switched to cefepime and clindamycin pending ID rec  -ID rec:  source potentially from translocation in setting of infiltrative masses, stop clindamycin , switch cefepime to ceftriaxone 2g daily   - TTE       # Dysphagia with odynophagia  #Failure to thrive   # Hx of prostate cancer s/p radiation therapy  # Prostate cancer metastasis to mediastinum or primary mediastinal mass?  - CT Chest: Interval enlargement of the central/posterior mediastinal mass with development of more extensive lymphadenopathy. The mass encircles the descending thoracic aorta and has some mass effect upon the left atrium. The mid aspect of the esophagus is encircled by the mass and difficult to distinguish. Possible ovoid pill is noted within the mass and possible location of the mid esophagus appear. The maximal esophagus appears distended with layering debris within. Consideration may be given to esophageal stenting.  - CT abdomen and pelvis:  Increased upper abdominal bulky lymphadenopathy. Unchanged bulky retroperitoneal lymphadenopathy, large left bladder   mass and perirectal soft tissue infiltrative mass. 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 Chronic moderate to severe left hydroureteronephrosis  - EGD with GI 2/21 -> stent placed and esophageal biopsies taken -pathology - Poorly differentiated carcinoma.  - CT surgery following -> no acute intervention   - AFP and CEA wnl  - LDH and haptoglobin elevated   - PSA total 220, PSA free is 22   - CA19 131  -  57  - oncology recs appreciated ->patient used to follow with Dr. Kenia Irizarry, called and teams her and she is not picking up the phone calls, not reachable in any way   - per onc note, patient had prior biopsy of site that demonstrated metastatic prostate adenocarcinoma and was started on Docetaxel.  - tramadol 25mg q6h PRN for pain management   - orthostatics positive, likely secondary to poor oral intake, on IV fluids   - ensure added, nutrition consulted, calorie count ordered - not accepting to eat   -palliative consulted , patient needs surrogate    - zofran prn  barium swallow     #CHUCKIE vs ATN induced DIMAS  - pt came in with a baseline of 1.5 and increased to 2.6 with contrast use  - c/w IVF  - renally dose medications  -crea not improving , US ordered,   -nephro cs   DIMAS likely due to CHUCKIE  other possible causes IRGN iso GAS bacteremia, doubt prerenal given no improvement after IVF   chronic left hydro iso prostate cancer and LN, creatinine at baseline on admission, unlikely contributing to DIMAS  send UA with urine lytes and spot for PCR -> fena 0.5%   C3 C4 levels pening   repeat blood cultures  creatinine stable, non oliguric  strict i/os  gentle IVH if odyno/dysphagia still present with inability to tolerate adequate po intake  no indications for RRT      # small b/l pleural effusions on CT chest   - s/p 1x dose 40mg IV Lasix , CXR improved     #normocytic Anemia  - Hb 9-10  - f/u iron  studies noted % sat 27   - MCV normal, SPEP negative     #hypocalcemia  -replenished   -monitor     #Misc   #DVT PPx- heparin   #GI PPx- none   #Diet-  puree   #Activity- AAT, PT consult Home PT; back to [facility] w/ assistance  #Dispo- Acute

## 2024-02-29 NOTE — PROGRESS NOTE ADULT - SUBJECTIVE AND OBJECTIVE BOX
24H events:    Today is hospital day 9d. This morning patient was seen and examined at bedside, resting comfortably in bed.    No acute or major events overnight.    PAST MEDICAL & SURGICAL HISTORY  HTN (hypertension)    Prostate cancer        SOCIAL HISTORY:  Social History:      ALLERGIES:  No Known Allergies      MEDICATIONS:  STANDING MEDICATIONS  cefTRIAXone   IVPB 2000 milliGRAM(s) IV Intermittent every 24 hours  chlorhexidine 2% Cloths 1 Application(s) Topical <User Schedule>  dextrose 5% + lactated ringers. 1000 milliLiter(s) IV Continuous <Continuous>  heparin   Injectable 5000 Unit(s) SubCutaneous every 12 hours  influenza  Vaccine (HIGH DOSE) 0.7 milliLiter(s) IntraMuscular once  lidocaine   4% Patch 1 Patch Transdermal every 24 hours  metoprolol tartrate 25 milliGRAM(s) Oral two times a day  ondansetron Injectable 4 milliGRAM(s) IV Push once  polyethylene glycol 3350 17 Gram(s) Oral daily    PRN MEDICATIONS  acetaminophen     Tablet .. 650 milliGRAM(s) Oral every 6 hours PRN  aluminum hydroxide/magnesium hydroxide/simethicone Suspension 30 milliLiter(s) Oral every 4 hours PRN  melatonin 3 milliGRAM(s) Oral at bedtime PRN  traMADol 25 milliGRAM(s) Oral four times a day PRN      VITALS:   T(C): 36.3 (24 @ 05:15), Max: 37.7 (24 @ 20:59)  HR: 91 (24 @ 05:15) (87 - 91)  BP: 111/81 (24 @ 05:15) (104/63 - 116/75)  RR: 18 (24 @ 05:15) (18 - 18)  SpO2: --  I&O's Summary    2024 07:01  -  2024 07:00  --------------------------------------------------------  IN: 0 mL / OUT: 100 mL / NET: -100 mL          PHYSICAL EXAM:    GENERAL: NAD, non-toxic appearing, cachectic , poor dental hygiene   NECK: Supple, no stiffness, no JVD  HEART: Regular rate and rhythm, normal s1s2  LUNGS: Unlabored respirations, b/l air entry, no adventitious breath sounds   ABDOMEN: Soft, nontender, nondistended; +BS  EXTREMITIES: No clubbing, cyanosis, or edema;   NERVOUS SYSTEM: AOx3  SKIN: Warm and dry,     LABS:                        8.8    8.56  )-----------( 278      ( 2024 04:30 )             28.4         142  |  112<H>  |  41<H>  ----------------------------<  134<H>  3.9   |  17  |  2.3<H>    Ca    7.9<L>      2024 04:30  Phos  3.8       Mg     2.6         TPro  5.5<L>  /  Alb  2.8<L>  /  TBili  0.4  /  DBili  x   /  AST  29  /  ALT  39  /  AlkPhos  82        Urinalysis Basic - ( 2024 13:35 )    Color: Yellow / Appearance: Cloudy / S.021 / pH: x  Gluc: x / Ketone: Negative mg/dL  / Bili: Negative / Urobili: 0.2 mg/dL   Blood: x / Protein: 100 mg/dL / Nitrite: Negative   Leuk Esterase: Negative / RBC: 3 /HPF / WBC 2 /HPF   Sq Epi: x / Non Sq Epi: 5 /HPF / Bacteria: Negative /HPF            Culture - Blood (collected 2024 11:26)  Source: .Blood Blood-Peripheral  Preliminary Report (2024 23:01):    No growth at 24 hours    Culture - Blood (collected 2024 11:26)  Source: .Blood Blood  Preliminary Report (2024 23:01):    No growth at 24 hours

## 2024-02-29 NOTE — CDI QUERY NOTE - NSCDIOTHERTXTBX_GEN_ALL_CORE_HH
DOCUMENTATION CLARIFICATION FORM     Encounter #: [id number]             Patient’s Name: RUFUS PICKARD    Medical Record #: [id number]         Admit Date: 2/20/2024      CDI Specialist:   Guerline Doe RN       Contact #:   [phone]    Dear Dr. Erinn Leslie, DO                Date: 2/28/2024                  Clinical documentation and/or evidence of the patient’s presentation, evaluation, and medical management, as evidenced below, may support a diagnosis that is not documented in the medical record.  To ensure accurate coding and accuracy of the clinical record, the documentation in this patient’s medical record requires additional clarification.  If you think the supporting documentation and/or clinical evidence supports a more specific diagnosis, please include more specific documentation of a diagnosis associated with these findings in your progress note and/or discharge summary.    Please clarify if the patient was found to have one of the following:      •Severe protein calorie malnutrition  •Malnutrition (specify degree)  •Other (specify)    Supporting documentation and/or clinical evidence:     2/22 Dietitian Initial Evaluation Adult  Patient meets criteria for malnutrition	yes  Etiology-Basis	Acute illness or injury  Nutrition Diagnosis	yes...  Nutrition Diagnositc Terminology #1	Malnutrition...  Malnutrition	Severe malnutrition in context of acute illness  Etiology	r/t changes in ability to swallow  Signs/Symptoms	moderate fat and muscle loss, <50% estimated needs for >5 days    2/28 Diet order: Pureed, Prosource Gelatein Plus Qty per day: 2; Ensure Plus High Protein Cans per day: Three’ Magic cup with lunch and dinner meals; please give 2x Milk with all meals    Thank you!  Guerline Doe RN

## 2024-02-29 NOTE — PROGRESS NOTE ADULT - ATTENDING COMMENTS
patient seen and examined at bedside independently of medical resident and agree with the note unless otherwise stated    Vital Signs Last 24 Hrs  T(C): 36 (29 Feb 2024 12:10), Max: 37.7 (28 Feb 2024 20:59)  T(F): 96.8 (29 Feb 2024 12:10), Max: 99.8 (28 Feb 2024 20:59)  HR: 89 (29 Feb 2024 12:10) (89 - 91)  BP: 163/96 (29 Feb 2024 12:10) (104/63 - 163/96)  BP(mean): --  RR: 18 (29 Feb 2024 12:10) (18 - 18)                                   8.8    9.17  )-----------( 358      ( 29 Feb 2024 07:58 )             28.1              02-29    144  |  112<H>  |  33<H>  ----------------------------<  100<H>  4.5   |  18  |  2.1<H>    Ca    8.2<L>      29 Feb 2024 07:58  Phos  3.5     02-29  Mg     2.4     02-29    TPro  5.8<L>  /  Alb  2.8<L>  /  TBili  0.3  /  DBili  x   /  AST  42<H>  /  ALT  46<H>  /  AlkPhos  80  02-29    # Bacteremia   # Odynophagia   # hx of prostate cancer now with mets?   # Esophageal carcinoma   # severe protein calorie malnutrition   # DIMAS/CHUCKIE    -patient with odynophagia - newly diagnosed esophageal cancer - unable to reach patient's oncologist Dr. Irizarry - will place routine service oncology consult   -continue with IV abx, ID following   -recheck BCx  -Monitor Kidney function; cr improving - Nephro eval appreciated   -overall poor prognosis - no info on next of kin - discussed with CM in rounds, will assist      Attending Physician Dr. Erinn Leslie # [id number]

## 2024-02-29 NOTE — PROGRESS NOTE ADULT - SUBJECTIVE AND OBJECTIVE BOX
Nephrology progress note    THIS IS AN INCOMPLETE NOTE . FULL NOTE TO FOLLOW SHORTLY    Patient is seen and examined, events over the last 24 h noted .    Allergies:  No Known Allergies    Hospital Medications:   MEDICATIONS  (STANDING):  cefTRIAXone   IVPB 2000 milliGRAM(s) IV Intermittent every 24 hours  chlorhexidine 2% Cloths 1 Application(s) Topical <User Schedule>  dextrose 5% + lactated ringers. 1000 milliLiter(s) (75 mL/Hr) IV Continuous <Continuous>  heparin   Injectable 5000 Unit(s) SubCutaneous every 12 hours  influenza  Vaccine (HIGH DOSE) 0.7 milliLiter(s) IntraMuscular once  lidocaine   4% Patch 1 Patch Transdermal every 24 hours  metoprolol tartrate 25 milliGRAM(s) Oral two times a day  ondansetron Injectable 4 milliGRAM(s) IV Push once  polyethylene glycol 3350 17 Gram(s) Oral daily        VITALS:  T(F): 97.3 (24 @ 05:15), Max: 99.8 (24 @ 20:59)  HR: 91 (24 @ 05:15)  BP: 111/81 (24 @ 05:15)  RR: 18 (24 @ 05:15)  SpO2: --  Wt(kg): --     @ 07:01  -   @ 07:00  --------------------------------------------------------  IN: 0 mL / OUT: 100 mL / NET: -100 mL          PHYSICAL EXAM:  Constitutional: NAD  HEENT: anicteric sclera, oropharynx clear, MMM  Neck: No JVD  Respiratory: CTAB, no wheezes, rales or rhonchi  Cardiovascular: S1, S2, RRR  Gastrointestinal: BS+, soft, NT/ND  Extremities: No cyanosis or clubbing. No peripheral edema  :  No castro.   Skin: No rashes    LABS:      142  |  112<H>  |  41<H>  ----------------------------<  134<H>  3.9   |  17  |  2.3<H>    Ca    7.9<L>      2024 04:30  Phos  3.8       Mg     2.6         TPro  5.5<L>  /  Alb  2.8<L>  /  TBili  0.4  /  DBili      /  AST  29  /  ALT  39  /  AlkPhos  82                            8.8    8.56  )-----------( 278      ( 2024 04:30 )             28.4       Urine Studies:  Urinalysis Basic - ( 2024 13:35 )    Color: Yellow / Appearance: Cloudy / S.021 / pH:   Gluc:  / Ketone: Negative mg/dL  / Bili: Negative / Urobili: 0.2 mg/dL   Blood:  / Protein: 100 mg/dL / Nitrite: Negative   Leuk Esterase: Negative / RBC: 3 /HPF / WBC 2 /HPF   Sq Epi:  / Non Sq Epi: 5 /HPF / Bacteria: Negative /HPF      Sodium, Random Urine: 42.0 mmoL/L ( @ 13:35)  Creatinine, Random Urine: 126 mg/dL ( @ 13:35)  Protein/Creatinine Ratio Calculation: 1.2 Ratio ( @ 13:35)  Osmolality, Random Urine: 509 mos/kg ( @ 13:35)  Potassium, Random Urine: 23 mmol/L ( @ 13:35)      Iron 48, TIBC 179, %sat 27      [24 @ 07:21]  Ferritin 883      [24 @ 07:21]      Immunofixation Serum:   No Monoclonal Band Identified      Reference Range: None Detected      [24 @ 07:21]  SPEP Interpretation: Normal Electrophoresis Pattern      [24 @ 07:21]      RADIOLOGY & ADDITIONAL STUDIES:   Nephrology progress note    Patient is seen and examined, events over the last 24 h noted .  Lying in bed comfortable     Allergies:  No Known Allergies    Hospital Medications:   MEDICATIONS  (STANDING):  cefTRIAXone   IVPB 2000 milliGRAM(s) IV Intermittent every 24 hours  dextrose 5% + lactated ringers. 1000 milliLiter(s) (75 mL/Hr) IV Continuous <Continuous>  heparin   Injectable 5000 Unit(s) SubCutaneous every 12 hours  influenza  Vaccine (HIGH DOSE) 0.7 milliLiter(s) IntraMuscular once  lidocaine   4% Patch 1 Patch Transdermal every 24 hours  metoprolol tartrate 25 milliGRAM(s) Oral two times a day  ondansetron Injectable 4 milliGRAM(s) IV Push once  polyethylene glycol 3350 17 Gram(s) Oral daily        VITALS:  T(F): 97.3 (24 @ 05:15), Max: 99.8 (24 @ 20:59)  HR: 91 (24 @ 05:15)  BP: 111/81 (24 @ 05:15)  RR: 18 (24 @ 05:15)       @ 07:01  -   @ 07:00  --------------------------------------------------------  IN: 0 mL / OUT: 100 mL / NET: -100 mL          PHYSICAL EXAM:  Constitutional: NAD  Respiratory: CTAB,  Cardiovascular: S1, S2, RRR  Gastrointestinal: BS+, soft, NT/ND  Extremities: No cyanosis or clubbing. No peripheral edema  :  No castro.   Skin: No rashes    LABS:      144  |  112<H>  |  33<H>  ----------------------------<  100<H>  4.5   |  18  |  2.1<H>    Ca    8.2<L>      2024 07:58  Phos  3.5       Mg     2.4         TPro  5.8<L>  /  Alb  2.8<L>  /  TBili  0.3  /  DBili  x   /  AST  42<H>  /  ALT  46<H>  /  AlkPhos  80      142  |  112<H>  |  41<H>  ----------------------------<  134<H>  3.9   |  17  |  2.3<H>    Ca    7.9<L>      2024 04:30  Phos  3.8       Mg     2.6         TPro  5.5<L>  /  Alb  2.8<L>  /  TBili  0.4  /  DBili      /  AST  29  /  ALT  39  /  AlkPhos  82                            8.8    8.56  )-----------( 278      ( 2024 04:30 )             28.4       Urine Studies:  Urinalysis Basic - ( 2024 13:35 )    Color: Yellow / Appearance: Cloudy / S.021 / pH:   Gluc:  / Ketone: Negative mg/dL  / Bili: Negative / Urobili: 0.2 mg/dL   Blood:  / Protein: 100 mg/dL / Nitrite: Negative   Leuk Esterase: Negative / RBC: 3 /HPF / WBC 2 /HPF   Sq Epi:  / Non Sq Epi: 5 /HPF / Bacteria: Negative /HPF      Sodium, Random Urine: 42.0 mmoL/L ( @ 13:35)  Creatinine, Random Urine: 126 mg/dL ( @ 13:35)  Protein/Creatinine Ratio Calculation: 1.2 Ratio ( @ 13:35)  Osmolality, Random Urine: 509 mos/kg ( @ 13:35)  Potassium, Random Urine: 23 mmol/L ( @ 13:35)      Iron 48, TIBC 179, %sat 27      [24 @ 07:21]  Ferritin 883      [24 @ 07:21]      Immunofixation Serum:   No Monoclonal Band Identified      Reference Range: None Detected      [24 @ 07:21]  SPEP Interpretation: Normal Electrophoresis Pattern      [24 @ 07:21]      RADIOLOGY & ADDITIONAL STUDIES:

## 2024-02-29 NOTE — PROGRESS NOTE ADULT - ASSESSMENT
Refill Request       Gabapentin (NEURONTIN) 600 MG Tablet    Pharmacy confirmed. Please advise.    68-year-old male with past medical history of hypertension, chronic back pain, prostate cancer, and posterior mediastinal mass 6.5 cm seen on CT 11/2023, presents for 3 months of progressively difficulty swallowing associated with 10 pounds weight loss and odynophagia. 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 Also found to be in sepsis secondary to GAS bacteremia. hospital stay complicated by DIMAS.    Assessment and plan  DIMAS/ stage 4 prostate cancer/ mediastinal mass s/p bx/ HTN  patient s/p 2 iv contrast studies within 2 days period   DIMAS likely due to CHUCKIE  other possible causes IRGN iso GAS bacteremia, doubt prerenal given no improvement after IVF  creat trend noted Creatinine Trend: 2.1<--, 2.3<--, 2.6<--, 2.3<--, 2.6<--, 2.3<--   chronic left hydro iso prostate cancer and LN, creatinine at baseline on admission, unlikely contributing to DIMAS  please send UA with urine lytes and spot for PCR  C3 C4 levels  repeat blood cultures  creatinine stable, non oliguric  strict i/os  gentle IVH if odyno/dysphagia still present with inability to tolerate adequate po intake  no indications for RRT  will follow

## 2024-03-01 ENCOUNTER — RESULT REVIEW (OUTPATIENT)
Age: 69
End: 2024-03-01

## 2024-03-01 ENCOUNTER — APPOINTMENT (OUTPATIENT)
Dept: UROLOGY | Facility: CLINIC | Age: 69
End: 2024-03-01

## 2024-03-01 LAB
ALBUMIN SERPL ELPH-MCNC: 2.9 G/DL — LOW (ref 3.5–5.2)
ALP SERPL-CCNC: 68 U/L — SIGNIFICANT CHANGE UP (ref 30–115)
ALT FLD-CCNC: 41 U/L — SIGNIFICANT CHANGE UP (ref 0–41)
ANION GAP SERPL CALC-SCNC: 14 MMOL/L — SIGNIFICANT CHANGE UP (ref 7–14)
AST SERPL-CCNC: 28 U/L — SIGNIFICANT CHANGE UP (ref 0–41)
BASOPHILS # BLD AUTO: 0.02 K/UL — SIGNIFICANT CHANGE UP (ref 0–0.2)
BASOPHILS NFR BLD AUTO: 0.2 % — SIGNIFICANT CHANGE UP (ref 0–1)
BILIRUB SERPL-MCNC: 0.2 MG/DL — SIGNIFICANT CHANGE UP (ref 0.2–1.2)
BLD GP AB SCN SERPL QL: SIGNIFICANT CHANGE UP
BUN SERPL-MCNC: 31 MG/DL — HIGH (ref 10–20)
C3 SERPL-MCNC: 150 MG/DL — SIGNIFICANT CHANGE UP (ref 81–157)
C4 SERPL-MCNC: 31 MG/DL — SIGNIFICANT CHANGE UP (ref 13–39)
CALCIUM SERPL-MCNC: 8.4 MG/DL — SIGNIFICANT CHANGE UP (ref 8.4–10.5)
CHLORIDE SERPL-SCNC: 114 MMOL/L — HIGH (ref 98–110)
CO2 SERPL-SCNC: 18 MMOL/L — SIGNIFICANT CHANGE UP (ref 17–32)
CREAT SERPL-MCNC: 1.9 MG/DL — HIGH (ref 0.7–1.5)
EGFR: 37 ML/MIN/1.73M2 — LOW
EOSINOPHIL # BLD AUTO: 0.11 K/UL — SIGNIFICANT CHANGE UP (ref 0–0.7)
EOSINOPHIL NFR BLD AUTO: 1 % — SIGNIFICANT CHANGE UP (ref 0–8)
GLUCOSE SERPL-MCNC: 111 MG/DL — HIGH (ref 70–99)
HCT VFR BLD CALC: 28.8 % — LOW (ref 42–52)
HGB BLD-MCNC: 8.8 G/DL — LOW (ref 14–18)
IMM GRANULOCYTES NFR BLD AUTO: 1.8 % — HIGH (ref 0.1–0.3)
LYMPHOCYTES # BLD AUTO: 0.63 K/UL — LOW (ref 1.2–3.4)
LYMPHOCYTES # BLD AUTO: 6 % — LOW (ref 20.5–51.1)
MAGNESIUM SERPL-MCNC: 2.2 MG/DL — SIGNIFICANT CHANGE UP (ref 1.8–2.4)
MCHC RBC-ENTMCNC: 28.3 PG — SIGNIFICANT CHANGE UP (ref 27–31)
MCHC RBC-ENTMCNC: 30.6 G/DL — LOW (ref 32–37)
MCV RBC AUTO: 92.6 FL — SIGNIFICANT CHANGE UP (ref 80–94)
MONOCYTES # BLD AUTO: 0.81 K/UL — HIGH (ref 0.1–0.6)
MONOCYTES NFR BLD AUTO: 7.7 % — SIGNIFICANT CHANGE UP (ref 1.7–9.3)
NEUTROPHILS # BLD AUTO: 8.78 K/UL — HIGH (ref 1.4–6.5)
NEUTROPHILS NFR BLD AUTO: 83.3 % — HIGH (ref 42.2–75.2)
NRBC # BLD: 0 /100 WBCS — SIGNIFICANT CHANGE UP (ref 0–0)
PHOSPHATE SERPL-MCNC: 3.3 MG/DL — SIGNIFICANT CHANGE UP (ref 2.1–4.9)
PLATELET # BLD AUTO: 387 K/UL — SIGNIFICANT CHANGE UP (ref 130–400)
PMV BLD: 9.9 FL — SIGNIFICANT CHANGE UP (ref 7.4–10.4)
POTASSIUM SERPL-MCNC: 4.3 MMOL/L — SIGNIFICANT CHANGE UP (ref 3.5–5)
POTASSIUM SERPL-SCNC: 4.3 MMOL/L — SIGNIFICANT CHANGE UP (ref 3.5–5)
PROT SERPL-MCNC: 5.8 G/DL — LOW (ref 6–8)
RBC # BLD: 3.11 M/UL — LOW (ref 4.7–6.1)
RBC # FLD: 14 % — SIGNIFICANT CHANGE UP (ref 11.5–14.5)
SODIUM SERPL-SCNC: 146 MMOL/L — SIGNIFICANT CHANGE UP (ref 135–146)
WBC # BLD: 10.54 K/UL — SIGNIFICANT CHANGE UP (ref 4.8–10.8)
WBC # FLD AUTO: 10.54 K/UL — SIGNIFICANT CHANGE UP (ref 4.8–10.8)

## 2024-03-01 PROCEDURE — 93306 TTE W/DOPPLER COMPLETE: CPT | Mod: 26

## 2024-03-01 PROCEDURE — 99232 SBSQ HOSP IP/OBS MODERATE 35: CPT

## 2024-03-01 PROCEDURE — 76770 US EXAM ABDO BACK WALL COMP: CPT | Mod: 26

## 2024-03-01 RX ADMIN — LIDOCAINE 1 PATCH: 4 CREAM TOPICAL at 12:04

## 2024-03-01 RX ADMIN — Medication 25 MILLIGRAM(S): at 19:22

## 2024-03-01 RX ADMIN — Medication 25 MILLIGRAM(S): at 05:58

## 2024-03-01 RX ADMIN — CEFTRIAXONE 100 MILLIGRAM(S): 500 INJECTION, POWDER, FOR SOLUTION INTRAMUSCULAR; INTRAVENOUS at 20:31

## 2024-03-01 RX ADMIN — HEPARIN SODIUM 5000 UNIT(S): 5000 INJECTION INTRAVENOUS; SUBCUTANEOUS at 05:57

## 2024-03-01 RX ADMIN — CHLORHEXIDINE GLUCONATE 1 APPLICATION(S): 213 SOLUTION TOPICAL at 06:06

## 2024-03-01 RX ADMIN — HEPARIN SODIUM 5000 UNIT(S): 5000 INJECTION INTRAVENOUS; SUBCUTANEOUS at 20:32

## 2024-03-01 NOTE — PROGRESS NOTE ADULT - ATTENDING COMMENTS
patient seen and examined at bedside independently of medical resident and agree with the note unless otherwise stated    Vital Signs Last 24 Hrs  T(C): 36.1 (01 Mar 2024 12:54), Max: 36.6 (29 Feb 2024 19:35)  T(F): 96.9 (01 Mar 2024 12:54), Max: 97.9 (29 Feb 2024 19:35)  HR: 83 (01 Mar 2024 12:54) (83 - 94)  BP: 129/84 (01 Mar 2024 12:54) (115/77 - 132/83)  BP(mean): 102 (01 Mar 2024 12:54) (92 - 102)  RR: 18 (01 Mar 2024 12:54) (18 - 18)  SpO2: 97% (01 Mar 2024 12:54) (97% - 97%)    Parameters below as of 01 Mar 2024 12:54  Patient On (Oxygen Delivery Method): room air                          8.8    10.54 )-----------( 387      ( 01 Mar 2024 07:31 )             28.8                03-01    146  |  114<H>  |  31<H>  ----------------------------<  111<H>  4.3   |  18  |  1.9<H>    Ca    8.4      01 Mar 2024 07:31  Phos  3.3     03-01  Mg     2.2     03-01    TPro  5.8<L>  /  Alb  2.9<L>  /  TBili  0.2  /  DBili  x   /  AST  28  /  ALT  41  /  AlkPhos  68  03-01      # Bacteremia   # Odynophagia   # hx of prostate cancer now with mets?   # Esophageal carcinoma   # severe protein calorie malnutrition   # DIMAS/CHUCKIE    -patient with odynophagia - newly diagnosed esophageal cancer (poorly differentiated) - unable to reach patient's oncologist Dr. Irizarry - placed routine service oncology consult - will follow reccs   -continue with IV abx, ID following   -repeat BCx prelim negative   -Monitor Kidney function; cr improving  -overall poor prognosis - no next of kin as per CM - discussed with CM in rounds  -Palliative care follow up     Attending Physician Dr. Erinn Leslie # [id number]

## 2024-03-01 NOTE — PROGRESS NOTE ADULT - SUBJECTIVE AND OBJECTIVE BOX
RUFUS PICKARD 68y Male  MRN#: [id number]   Hospital Day: 10d    SUBJECTIVE  Patient is a 68y old Male who presents with a chief complaint of Mediastinal Mass (01 Mar 2024 06:46)  Currently admitted to medicine with the primary diagnosis of Mediastinal mass      INTERVAL HPI AND OVERNIGHT EVENTS:  Patient was examined and seen at bedside. This morning he is resting comfortably in bed and reports no issues or overnight events.    REVIEW OF SYMPTOMS:  CONSTITUTIONAL: No weakness, fevers or chills; No headaches  EYES: No visual changes, eye pain, or discharge  ENT: No vertigo; No ear pain or change in hearing; No sore throat or difficulty swallowing  NECK: No pain or stiffness  RESPIRATORY: No cough, wheezing, or hemoptysis; No shortness of breath  CARDIOVASCULAR: No chest pain or palpitations  GASTROINTESTINAL: No abdominal or epigastric pain; No nausea, vomiting, or hematemesis; No diarrhea or constipation; No melena or hematochezia  GENITOURINARY: No dysuria, frequency or hematuria  MUSCULOSKELETAL: No joint pain, no muscle pain, no weakness  NEUROLOGICAL: No numbness or weakness  SKIN: No itching or rashes    OBJECTIVE  PAST MEDICAL & SURGICAL HISTORY  HTN (hypertension)    Prostate cancer      ALLERGIES:  No Known Allergies    MEDICATIONS:  STANDING MEDICATIONS  cefTRIAXone   IVPB 2000 milliGRAM(s) IV Intermittent every 24 hours  chlorhexidine 2% Cloths 1 Application(s) Topical <User Schedule>  dextrose 5% + lactated ringers. 1000 milliLiter(s) IV Continuous <Continuous>  heparin   Injectable 5000 Unit(s) SubCutaneous every 12 hours  influenza  Vaccine (HIGH DOSE) 0.7 milliLiter(s) IntraMuscular once  lidocaine   4% Patch 1 Patch Transdermal every 24 hours  metoprolol tartrate 25 milliGRAM(s) Oral two times a day  ondansetron Injectable 4 milliGRAM(s) IV Push once    PRN MEDICATIONS  acetaminophen     Tablet .. 650 milliGRAM(s) Oral every 6 hours PRN  aluminum hydroxide/magnesium hydroxide/simethicone Suspension 30 milliLiter(s) Oral every 4 hours PRN  melatonin 3 milliGRAM(s) Oral at bedtime PRN  traMADol 25 milliGRAM(s) Oral four times a day PRN      VITAL SIGNS: Last 24 Hours  T(C): 35.6 (01 Mar 2024 05:36), Max: 36.6 (29 Feb 2024 19:35)  T(F): 96.1 (01 Mar 2024 05:36), Max: 97.9 (29 Feb 2024 19:35)  HR: 89 (01 Mar 2024 05:36) (85 - 94)  BP: 115/77 (01 Mar 2024 05:36) (115/77 - 163/96)  BP(mean): 92 (01 Mar 2024 05:36) (92 - 92)  RR: 18 (01 Mar 2024 05:36) (18 - 19)  SpO2: 97% (29 Feb 2024 14:12) (97% - 97%)    LABS:                        8.8    10.54 )-----------( 387      ( 01 Mar 2024 07:31 )             28.8     03-01    146  |  114<H>  |  31<H>  ----------------------------<  111<H>  4.3   |  18  |  1.9<H>    Ca    8.4      01 Mar 2024 07:31  Phos  3.3     03-01  Mg     2.2     03-01    TPro  5.8<L>  /  Alb  2.9<L>  /  TBili  0.2  /  DBili  x   /  AST  28  /  ALT  41  /  AlkPhos  68  03-01      Urinalysis Basic - ( 01 Mar 2024 07:31 )    Color: x / Appearance: x / SG: x / pH: x  Gluc: 111 mg/dL / Ketone: x  / Bili: x / Urobili: x   Blood: x / Protein: x / Nitrite: x   Leuk Esterase: x / RBC: x / WBC x   Sq Epi: x / Non Sq Epi: x / Bacteria: x            Culture - Blood (collected 28 Feb 2024 09:04)  Source: .Blood Blood-Peripheral  Preliminary Report (29 Feb 2024 14:02):    No growth at 24 hours    Culture - Blood (collected 27 Feb 2024 11:26)  Source: .Blood Blood-Peripheral  Preliminary Report (29 Feb 2024 23:01):    No growth at 48 Hours    Culture - Blood (collected 27 Feb 2024 11:26)  Source: .Blood Blood  Preliminary Report (29 Feb 2024 23:01):    No growth at 48 Hours          RADIOLOGY:      PHYSICAL EXAM:  CONSTITUTIONAL: No acute distress, well-developed, well-groomed, AAOx3  HEAD: Atraumatic, normocephalic  EYES: EOM intact, PERRLA, conjunctiva and sclera clear  ENT: Supple, no masses, no thyromegaly, no bruits, no JVD; moist mucous membranes  PULMONARY: Clear to auscultation bilaterally; no wheezes, rales, or rhonchi  CARDIOVASCULAR: Regular rate and rhythm; no murmurs, rubs, or gallops  GASTROINTESTINAL: Soft, non-tender, non-distended; bowel sounds present  MUSCULOSKELETAL: 2+ peripheral pulses; no clubbing, no cyanosis, no edema  NEUROLOGY: non-focal  SKIN: No rashes or lesions; warm and dry    ASSESSMENT & PLAN    68-year-old male with past medical history of hypertension, chronic back pain, prostate cancer, and posterior mediastinal mass 6.5 cm seen on CT 11/2023, presents for 3 months of progressively difficulty swallowing associated with 10 pounds weight loss and odynophagia. Comes in today now unable to swallow his pills with water, resulting in 2 episode of emesis. He mentioned that he gets SOB during exertion but denies any exertional chest pain. As per patient he was treated at [facility] for prostate cancer and is s/p radiation therapy. He states that he has problem during walking and unable to maintain the posture.    According to the patient he only received radiation for the prostate cancer and no biopsy was done. However according to the PET scan report done on 1/11/2023 the patient had biopsy of mediastinal mass done showing metastatic prostate adenocarcinoma and was started on docetaxel. The patient then had both the mediastinal and the retroperitoneal mass. He does not remember he was biopsied and unable to provide further information about who he was following with. Pt also had OP appointment with urology on 1/23/2024 for new findings on CT A/P done in our ED in November showing again the mediastinal mass, retroperitoneal mass/LN conglomerate and possible new left sided bladder mass causing severe hydronephrosis. He was ordered cystoscopy and PET scan as OP but not done.     Overall patient is awake but has poor insight into medical condition but unable to tell about his medical history and the treatment he has gotten for his maliganancy.     This information was found in the report of the PET scan done on 1/11/2023:   67-year-old male with biopsy proven (mediastinal mass)   metastatic prostate adenocarcinoma. No chemotherapy or radiation.    Started on Docetaxel.  CT chest, abdomen, pelvis 12/21/2022  Left supraclavicular lymphadenopathy.  Left posterior mediastinal mass.  Abdominal retroperitoneal heterogeneous mass with presumed obstruction of   left ureter and resulting moderate hydronephroureter.  Heterogeneous mesenteric mass abutting the sigmoid colon, additional   necrotic mass inseparable from the posterior rectal wall.  Soft tissue mass at left lateral urinary bladder base.  Left inguinal hernia.    # Prostate Cancer w/ biopsy proven metastasis to mediastium  # Esophageal Ulcer- poorly differentiated carcinoma- 2/2 primary vs possible related to mediastinal metastasis of prostate cancer that is encircling esophagus   - unclear treatment hx of prostate cancer, s/p radiation, PET report mentions docetaxel but unable to reach primary oncologist for clarification of oncology hx and treatment   - CT Chest 2/20/24 : Interval enlargement of the central/posterior mediastinal mass with development of more extensive lymphadenopathy. The mass encircles the descending thoracic aorta and has some mass effect upon the left atrium. The mid aspect of the esophagus is encircled by the mass and difficult to distinguish. Possible ovoid pill is noted within the mass and possible location of the mid esophagus appear. The maximal esophagus appears distended with layering debris within. Consideration may be given to esophageal stenting.  - CT abdomen and pelvis 2/21/24:  Increased upper abdominal bulky lymphadenopathy. Unchanged bulky retroperitoneal lymphadenopathy, large left bladder mass and perirectal soft tissue infiltrative mass. 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 Chronic moderate to severe left hydroureteronephrosis  - EGD with GI 2/21 -> stent placed and esophageal biopsies taken  - AFP and CEA wnl  - PSA total 220, PSA free is 22   - CA19 131  -  57  - Advanced GI recalled again 2/29/24  for suspicion of stent migration   - Esophageal biopsy: Cytopathology - Non Gyn Report: ACCESSION No:  [id number]. Final Diagnosis- MEDIASTINUM MASS; EUS GUIDED FNA (THINPREP AND CELL BLOCK): POSITIVE FOR MALIGNANT CELLS,  Metastatic poorly differentiated carcinoma. The morphology of the current tumor and the immunohistochemical profile along with the patient's history of prostatic cancer are consistent with a metastatic poorly differentiated carcinoma of prostatic origin.  - Esophageal ulcer biopsy shows poorly differentiated carcinoma, IHC staining pending.  RUFUS PICKARD 68y Male  MRN#: [id number]   Hospital Day: 10d    SUBJECTIVE  Patient is a 68y old Male who presents with a chief complaint of Mediastinal Mass (01 Mar 2024 06:46)  Currently admitted to medicine with the primary diagnosis of Mediastinal mass      INTERVAL HPI AND OVERNIGHT EVENTS:  Patient was examined and seen at bedside. This morning he is resting comfortably in bed and reports no issues or overnight events.    REVIEW OF SYMPTOMS:  CONSTITUTIONAL: No weakness, fevers or chills; No headaches  EYES: No visual changes, eye pain, or discharge  ENT: No vertigo; No ear pain or change in hearing; No sore throat or difficulty swallowing  NECK: No pain or stiffness  RESPIRATORY: No cough, wheezing, or hemoptysis; No shortness of breath  CARDIOVASCULAR: No chest pain or palpitations  GASTROINTESTINAL: No abdominal or epigastric pain; No nausea, vomiting, or hematemesis; No diarrhea or constipation; No melena or hematochezia  GENITOURINARY: No dysuria, frequency or hematuria  MUSCULOSKELETAL: No joint pain, no muscle pain, no weakness  NEUROLOGICAL: No numbness or weakness  SKIN: No itching or rashes    OBJECTIVE  PAST MEDICAL & SURGICAL HISTORY  HTN (hypertension)    Prostate cancer      ALLERGIES:  No Known Allergies    MEDICATIONS:  STANDING MEDICATIONS  cefTRIAXone   IVPB 2000 milliGRAM(s) IV Intermittent every 24 hours  chlorhexidine 2% Cloths 1 Application(s) Topical <User Schedule>  dextrose 5% + lactated ringers. 1000 milliLiter(s) IV Continuous <Continuous>  heparin   Injectable 5000 Unit(s) SubCutaneous every 12 hours  influenza  Vaccine (HIGH DOSE) 0.7 milliLiter(s) IntraMuscular once  lidocaine   4% Patch 1 Patch Transdermal every 24 hours  metoprolol tartrate 25 milliGRAM(s) Oral two times a day  ondansetron Injectable 4 milliGRAM(s) IV Push once    PRN MEDICATIONS  acetaminophen     Tablet .. 650 milliGRAM(s) Oral every 6 hours PRN  aluminum hydroxide/magnesium hydroxide/simethicone Suspension 30 milliLiter(s) Oral every 4 hours PRN  melatonin 3 milliGRAM(s) Oral at bedtime PRN  traMADol 25 milliGRAM(s) Oral four times a day PRN      VITAL SIGNS: Last 24 Hours  T(C): 35.6 (01 Mar 2024 05:36), Max: 36.6 (29 Feb 2024 19:35)  T(F): 96.1 (01 Mar 2024 05:36), Max: 97.9 (29 Feb 2024 19:35)  HR: 89 (01 Mar 2024 05:36) (85 - 94)  BP: 115/77 (01 Mar 2024 05:36) (115/77 - 163/96)  BP(mean): 92 (01 Mar 2024 05:36) (92 - 92)  RR: 18 (01 Mar 2024 05:36) (18 - 19)  SpO2: 97% (29 Feb 2024 14:12) (97% - 97%)    LABS:                        8.8    10.54 )-----------( 387      ( 01 Mar 2024 07:31 )             28.8     03-01    146  |  114<H>  |  31<H>  ----------------------------<  111<H>  4.3   |  18  |  1.9<H>    Ca    8.4      01 Mar 2024 07:31  Phos  3.3     03-01  Mg     2.2     03-01    TPro  5.8<L>  /  Alb  2.9<L>  /  TBili  0.2  /  DBili  x   /  AST  28  /  ALT  41  /  AlkPhos  68  03-01      Urinalysis Basic - ( 01 Mar 2024 07:31 )    Color: x / Appearance: x / SG: x / pH: x  Gluc: 111 mg/dL / Ketone: x  / Bili: x / Urobili: x   Blood: x / Protein: x / Nitrite: x   Leuk Esterase: x / RBC: x / WBC x   Sq Epi: x / Non Sq Epi: x / Bacteria: x            Culture - Blood (collected 28 Feb 2024 09:04)  Source: .Blood Blood-Peripheral  Preliminary Report (29 Feb 2024 14:02):    No growth at 24 hours    Culture - Blood (collected 27 Feb 2024 11:26)  Source: .Blood Blood-Peripheral  Preliminary Report (29 Feb 2024 23:01):    No growth at 48 Hours    Culture - Blood (collected 27 Feb 2024 11:26)  Source: .Blood Blood  Preliminary Report (29 Feb 2024 23:01):    No growth at 48 Hours          RADIOLOGY:      PHYSICAL EXAM:  CONSTITUTIONAL: No acute distress, well-developed, well-groomed, AAOx3  HEAD: Atraumatic, normocephalic  EYES: EOM intact, PERRLA, conjunctiva and sclera clear  ENT: Supple, no masses, no thyromegaly, no bruits, no JVD; moist mucous membranes  PULMONARY: Clear to auscultation bilaterally; no wheezes, rales, or rhonchi  CARDIOVASCULAR: Regular rate and rhythm; no murmurs, rubs, or gallops  GASTROINTESTINAL: Soft, non-tender, non-distended; bowel sounds present  MUSCULOSKELETAL: 2+ peripheral pulses; no clubbing, no cyanosis, no edema  NEUROLOGY: non-focal  SKIN: No rashes or lesions; warm and dry    ASSESSMENT & PLAN    68-year-old male with past medical history of hypertension, chronic back pain, prostate cancer, and posterior mediastinal mass 6.5 cm seen on CT 11/2023, presents for 3 months of progressively difficulty swallowing associated with 10 pounds weight loss and odynophagia. Comes in today now unable to swallow his pills with water, resulting in 2 episode of emesis. He mentioned that he gets SOB during exertion but denies any exertional chest pain. As per patient he was treated at [facility] for prostate cancer and is s/p radiation therapy. He states that he has problem during walking and unable to maintain the posture.    According to the patient he only received radiation for the prostate cancer and no biopsy was done. However according to the PET scan report done on 1/11/2023 the patient had biopsy of mediastinal mass done showing metastatic prostate adenocarcinoma and was started on docetaxel. The patient then had both the mediastinal and the retroperitoneal mass. He does not remember he was biopsied and unable to provide further information about who he was following with. Pt also had OP appointment with urology on 1/23/2024 for new findings on CT A/P done in our ED in November showing again the mediastinal mass, retroperitoneal mass/LN conglomerate and possible new left sided bladder mass causing severe hydronephrosis. He was ordered cystoscopy and PET scan as OP but not done.     Overall patient is awake but has poor insight into medical condition but unable to tell about his medical history and the treatment he has gotten for his maliganancy.     This information was found in the report of the PET scan done on 1/11/2023:   67-year-old male with biopsy proven (mediastinal mass)   metastatic prostate adenocarcinoma. No chemotherapy or radiation.    Started on Docetaxel.  CT chest, abdomen, pelvis 12/21/2022  Left supraclavicular lymphadenopathy.  Left posterior mediastinal mass.  Abdominal retroperitoneal heterogeneous mass with presumed obstruction of   left ureter and resulting moderate hydronephroureter.  Heterogeneous mesenteric mass abutting the sigmoid colon, additional   necrotic mass inseparable from the posterior rectal wall.  Soft tissue mass at left lateral urinary bladder base.  Left inguinal hernia.    # Prostate Cancer w/ biopsy proven metastasis to mediastium  # Esophageal Ulcer- poorly differentiated carcinoma IHC pending   - unclear treatment hx of prostate cancer, s/p radiation, PET report mentions docetaxel but unable to reach primary oncologist for clarification of oncology hx and treatment   - CT Chest 2/20/24 : Interval enlargement of the central/posterior mediastinal mass with development of more extensive lymphadenopathy. The mass encircles the descending thoracic aorta and has some mass effect upon the left atrium. The mid aspect of the esophagus is encircled by the mass and difficult to distinguish. Possible ovoid pill is noted within the mass and possible location of the mid esophagus appear. The maximal esophagus appears distended with layering debris within. Consideration may be given to esophageal stenting.  - CT abdomen and pelvis 2/21/24:  Increased upper abdominal bulky lymphadenopathy. Unchanged bulky retroperitoneal lymphadenopathy, large left bladder mass and perirectal soft tissue infiltrative mass. 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 Chronic moderate to severe left hydroureteronephrosis  - EGD with GI 2/21 -> stent placed and esophageal biopsies taken  - AFP and CEA wnl  - PSA total 220, PSA free is 22   - CA19 131  -  57  - Advanced GI recalled again 2/29/24  for suspicion of stent migration   - Esophageal biopsy: Cytopathology - Non Gyn Report: ACCESSION No:  [id number]. Final Diagnosis- MEDIASTINUM MASS; EUS GUIDED FNA (THINPREP AND CELL BLOCK): POSITIVE FOR MALIGNANT CELLS,  Metastatic poorly differentiated carcinoma. The morphology of the current tumor and the immunohistochemical profile along with the patient's history of prostatic cancer are consistent with a metastatic poorly differentiated carcinoma of prostatic origin.  - Esophageal ulcer biopsy shows poorly differentiated carcinoma, IHC staining pending. Discussed with Dr. Lewis, related to prostate cancer, not a second primary malignancy     Recommendations:  - per one oncology note in the patients paper chart, in January 2024 patient was started on Pembrolizumab and continued on keytruda injections  - will need to obtain further records from Dr. Irizarry's office to detail treatment history  - Dr. Irizarry is returning to the office on Monday 3/4, patients information provided to the office

## 2024-03-01 NOTE — PROGRESS NOTE ADULT - ASSESSMENT
68-year-old male with past medical history of hypertension, chronic back pain, prostate cancer, and posterior mediastinal mass 6.5 cm seen on CT 11/2023, presents for 3 months of progressively difficulty swallowing associated with 10 pounds weight loss. Comes in today now unable to swallow his pills with water, resulting in 2 episode of emesis. GI consulted for dysphagia.     # Dysphagia secondary to mediastinal mass  s/p EGD with esophageal stent and EUS guided FNB   Prostate cancer and is s/p radiation therapy.   Patient had known mediastinal mass since jan 2023, Was evaluated recently by his urologist for new bladder mass. Patient was scheduled for cystoscopy and PET scan to r/o multiple primary malignancy.   Mediastinal mass was never biopsied per patient.  Hx of VERONICA on iron supplement. NO overt GI bleeding.   Reviewed CT scan: large post mediastinal mass, + LNs, pill within upper esophagus, distended proximal esophagus. and upper mesenteric mass.  Path: poorly differentiated carcinoma     PLAN   Esophagogram to check for stent patency  Await pathology results.   c/w clear liquids today.   If patient is not tolerating solids and stent is patent on esophagogram will consider G tube placement next week.   f/u with oncology   F/U with urology as outpatient for bladder mass and prostate cancer.   Plan was discussed with the patient.

## 2024-03-01 NOTE — PROGRESS NOTE ADULT - SUBJECTIVE AND OBJECTIVE BOX
Gastroenterology progress note:     Patient is a 68y old  Male who presents with a chief complaint of Mediastinal Mass (01 Mar 2024 11:19)       Admitted on: 02-20-24    We are following the patient for dysphagia     patient tolerates liquids only not able to tolerate solid s     PAST MEDICAL & SURGICAL HISTORY:  HTN (hypertension)      Prostate cancer          MEDICATIONS  (STANDING):  cefTRIAXone   IVPB 2000 milliGRAM(s) IV Intermittent every 24 hours  chlorhexidine 2% Cloths 1 Application(s) Topical <User Schedule>  dextrose 5% + lactated ringers. 1000 milliLiter(s) (75 mL/Hr) IV Continuous <Continuous>  heparin   Injectable 5000 Unit(s) SubCutaneous every 12 hours  influenza  Vaccine (HIGH DOSE) 0.7 milliLiter(s) IntraMuscular once  lidocaine   4% Patch 1 Patch Transdermal every 24 hours  metoprolol tartrate 25 milliGRAM(s) Oral two times a day  ondansetron Injectable 4 milliGRAM(s) IV Push once    MEDICATIONS  (PRN):  acetaminophen     Tablet .. 650 milliGRAM(s) Oral every 6 hours PRN Temp greater or equal to 38C (100.4F), Mild Pain (1 - 3)  aluminum hydroxide/magnesium hydroxide/simethicone Suspension 30 milliLiter(s) Oral every 4 hours PRN Dyspepsia  melatonin 3 milliGRAM(s) Oral at bedtime PRN Insomnia  traMADol 25 milliGRAM(s) Oral four times a day PRN Moderate Pain (4 - 6)      Allergies  No Known Allergies      Review of Systems:   Cardiovascular:  No Chest Pain, No Palpitations  Respiratory:  No Cough, No Dyspnea  Gastrointestinal:  As described in HPI  Skin:  No Skin Lesions, No Jaundice  Neuro:  No Syncope, No Dizziness    Physical Examination:  T(C): 36.1 (03-01-24 @ 12:54), Max: 36.6 (02-29-24 @ 19:35)  HR: 83 (03-01-24 @ 12:54) (83 - 94)  BP: 129/84 (03-01-24 @ 12:54) (115/77 - 132/83)  RR: 18 (03-01-24 @ 12:54) (18 - 18)  SpO2: 97% (03-01-24 @ 12:54) (97% - 97%)      02-29-24 @ 07:01  -  03-01-24 @ 07:00  --------------------------------------------------------  IN: 0 mL / OUT: 250 mL / NET: -250 mL    03-01-24 @ 07:01  -  03-01-24 @ 17:41  --------------------------------------------------------  IN: 375 mL / OUT: 0 mL / NET: 375 mL        GENERAL: AAOx3, no acute distress.  HEAD:  Atraumatic, Normocephalic  EYES: conjunctiva and sclera clear  NECK: Supple, no JVD or thyromegaly  CHEST/LUNG: Clear to auscultation bilaterally; No wheeze, rhonchi, or rales  HEART: Regular rate and rhythm; normal S1, S2, No murmurs.  ABDOMEN: Soft, nontender, nondistended; Bowel sounds present  NEUROLOGY: No asterixis or tremor.   SKIN: Intact, no jaundice     Data:                        8.8    10.54 )-----------( 387      ( 01 Mar 2024 07:31 )             28.8     Hgb trend:  8.8  03-01-24 @ 07:31  8.8  02-29-24 @ 07:58  8.8  02-28-24 @ 04:30        03-01    146  |  114<H>  |  31<H>  ----------------------------<  111<H>  4.3   |  18  |  1.9<H>    Ca    8.4      01 Mar 2024 07:31  Phos  3.3     03-01  Mg     2.2     03-01    TPro  5.8<L>  /  Alb  2.9<L>  /  TBili  0.2  /  DBili  x   /  AST  28  /  ALT  41  /  AlkPhos  68  03-01    Liver panel trend:  TBili 0.2   /   AST 28   /   ALT 41   /   AlkP 68   /   Tptn 5.8   /   Alb 2.9    /   DBili --      03-01  TBili 0.3   /   AST 42   /   ALT 46   /   AlkP 80   /   Tptn 5.8   /   Alb 2.8    /   DBili --      02-29  TBili 0.4   /   AST 29   /   ALT 39   /   AlkP 82   /   Tptn 5.5   /   Alb 2.8    /   DBili --      02-28  TBili 0.6   /   AST 41   /   ALT 36   /   AlkP 77   /   Tptn 5.3   /   Alb 2.6    /   DBili --      02-27  TBili 0.4   /   AST 32   /   ALT 26   /   AlkP 65   /   Tptn 5.1   /   Alb 2.7    /   DBili --      02-26  TBili 0.7   /   AST 15   /   ALT 13   /   AlkP 78   /   Tptn 5.7   /   Alb 3.1    /   DBili --      02-25  TBili 1.0   /   AST 20   /   ALT 16   /   AlkP 91   /   Tptn 6.3   /   Alb 3.4    /   DBili --      02-24  TBili 0.5   /   AST 23   /   ALT 17   /   AlkP 64   /   Tptn 6.5   /   Alb 3.9    /   DBili --      02-22  TBili --   /   AST --   /   ALT --   /   AlkP --   /   Tptn 5.9   /   Alb 3.3    /   DBili --      02-21          Culture - Blood (collected 28 Feb 2024 09:04)  Source: .Blood Blood-Peripheral  Preliminary Report (01 Mar 2024 14:02):    No growth at 48 Hours         Radiology:

## 2024-03-01 NOTE — PROGRESS NOTE ADULT - SUBJECTIVE AND OBJECTIVE BOX
24H events:    Today is hospital day 10d. This morning patient was seen and examined at bedside, resting comfortably in bed.    No acute or major events overnight.    PAST MEDICAL & SURGICAL HISTORY  HTN (hypertension)    Prostate cancer        SOCIAL HISTORY:  Social History:      ALLERGIES:  No Known Allergies      MEDICATIONS:  STANDING MEDICATIONS  cefTRIAXone   IVPB 2000 milliGRAM(s) IV Intermittent every 24 hours  chlorhexidine 2% Cloths 1 Application(s) Topical <User Schedule>  dextrose 5% + lactated ringers. 1000 milliLiter(s) IV Continuous <Continuous>  heparin   Injectable 5000 Unit(s) SubCutaneous every 12 hours  influenza  Vaccine (HIGH DOSE) 0.7 milliLiter(s) IntraMuscular once  lidocaine   4% Patch 1 Patch Transdermal every 24 hours  metoprolol tartrate 25 milliGRAM(s) Oral two times a day  ondansetron Injectable 4 milliGRAM(s) IV Push once    PRN MEDICATIONS  acetaminophen     Tablet .. 650 milliGRAM(s) Oral every 6 hours PRN  aluminum hydroxide/magnesium hydroxide/simethicone Suspension 30 milliLiter(s) Oral every 4 hours PRN  melatonin 3 milliGRAM(s) Oral at bedtime PRN  traMADol 25 milliGRAM(s) Oral four times a day PRN      VITALS:   T(C): 35.6 (03-01-24 @ 05:36), Max: 36.6 (02-29-24 @ 19:35)  HR: 89 (03-01-24 @ 05:36) (85 - 94)  BP: 115/77 (03-01-24 @ 05:36) (115/77 - 163/96)  RR: 18 (03-01-24 @ 05:36) (18 - 19)  SpO2: 97% (02-29-24 @ 14:12) (97% - 97%)  I&O's Summary        PHYSICAL EXAM:  GENERAL: NAD, non-toxic appearing, cachectic , poor dental hygiene   NECK: Supple, no stiffness, no JVD  HEART: Regular rate and rhythm, normal s1s2  LUNGS: Unlabored respirations, b/l air entry, no adventitious breath sounds   ABDOMEN: Soft, nontender, nondistended; +BS  EXTREMITIES: No clubbing, cyanosis, or edema;   NERVOUS SYSTEM: AOx3  SKIN: Warm and dry  LABS:                        8.8    9.17  )-----------( 358      ( 29 Feb 2024 07:58 )             28.1     02-29    144  |  112<H>  |  33<H>  ----------------------------<  100<H>  4.5   |  18  |  2.1<H>    Ca    8.2<L>      29 Feb 2024 07:58  Phos  3.5     02-29  Mg     2.4     02-29    TPro  5.8<L>  /  Alb  2.8<L>  /  TBili  0.3  /  DBili  x   /  AST  42<H>  /  ALT  46<H>  /  AlkPhos  80  02-29      Urinalysis Basic - ( 29 Feb 2024 07:58 )    Color: x / Appearance: x / SG: x / pH: x  Gluc: 100 mg/dL / Ketone: x  / Bili: x / Urobili: x   Blood: x / Protein: x / Nitrite: x   Leuk Esterase: x / RBC: x / WBC x   Sq Epi: x / Non Sq Epi: x / Bacteria: x            Culture - Blood (collected 28 Feb 2024 09:04)  Source: .Blood Blood-Peripheral  Preliminary Report (29 Feb 2024 14:02):    No growth at 24 hours    Culture - Blood (collected 27 Feb 2024 11:26)  Source: .Blood Blood-Peripheral  Preliminary Report (29 Feb 2024 23:01):    No growth at 48 Hours    Culture - Blood (collected 27 Feb 2024 11:26)  Source: .Blood Blood  Preliminary Report (29 Feb 2024 23:01):    No growth at 48 Hours               24H events:    Today is hospital day 10d. This morning patient was seen and examined at bedside, resting comfortably in bed.    No acute or major events overnight.  I encouraged oral intake.   said diarrhea is better off miralax.     PAST MEDICAL & SURGICAL HISTORY  HTN (hypertension)    Prostate cancer        SOCIAL HISTORY:  Social History:      ALLERGIES:  No Known Allergies      MEDICATIONS:  STANDING MEDICATIONS  cefTRIAXone   IVPB 2000 milliGRAM(s) IV Intermittent every 24 hours  chlorhexidine 2% Cloths 1 Application(s) Topical <User Schedule>  dextrose 5% + lactated ringers. 1000 milliLiter(s) IV Continuous <Continuous>  heparin   Injectable 5000 Unit(s) SubCutaneous every 12 hours  influenza  Vaccine (HIGH DOSE) 0.7 milliLiter(s) IntraMuscular once  lidocaine   4% Patch 1 Patch Transdermal every 24 hours  metoprolol tartrate 25 milliGRAM(s) Oral two times a day  ondansetron Injectable 4 milliGRAM(s) IV Push once    PRN MEDICATIONS  acetaminophen     Tablet .. 650 milliGRAM(s) Oral every 6 hours PRN  aluminum hydroxide/magnesium hydroxide/simethicone Suspension 30 milliLiter(s) Oral every 4 hours PRN  melatonin 3 milliGRAM(s) Oral at bedtime PRN  traMADol 25 milliGRAM(s) Oral four times a day PRN      VITALS:   T(C): 35.6 (03-01-24 @ 05:36), Max: 36.6 (02-29-24 @ 19:35)  HR: 89 (03-01-24 @ 05:36) (85 - 94)  BP: 115/77 (03-01-24 @ 05:36) (115/77 - 163/96)  RR: 18 (03-01-24 @ 05:36) (18 - 19)  SpO2: 97% (02-29-24 @ 14:12) (97% - 97%)  I&O's Summary        PHYSICAL EXAM:  GENERAL: NAD, non-toxic appearing, cachectic , poor dental hygiene   NECK: Supple, no stiffness, no JVD  HEART: Regular rate and rhythm, normal s1s2  LUNGS: Unlabored respirations, b/l air entry, no adventitious breath sounds   ABDOMEN: Soft, nontender, nondistended; +BS  EXTREMITIES: No clubbing, cyanosis, or edema;   NERVOUS SYSTEM: AOx3  SKIN: Warm and dry  LABS:                        8.8    9.17  )-----------( 358      ( 29 Feb 2024 07:58 )             28.1     02-29    144  |  112<H>  |  33<H>  ----------------------------<  100<H>  4.5   |  18  |  2.1<H>    Ca    8.2<L>      29 Feb 2024 07:58  Phos  3.5     02-29  Mg     2.4     02-29    TPro  5.8<L>  /  Alb  2.8<L>  /  TBili  0.3  /  DBili  x   /  AST  42<H>  /  ALT  46<H>  /  AlkPhos  80  02-29      Urinalysis Basic - ( 29 Feb 2024 07:58 )    Color: x / Appearance: x / SG: x / pH: x  Gluc: 100 mg/dL / Ketone: x  / Bili: x / Urobili: x   Blood: x / Protein: x / Nitrite: x   Leuk Esterase: x / RBC: x / WBC x   Sq Epi: x / Non Sq Epi: x / Bacteria: x            Culture - Blood (collected 28 Feb 2024 09:04)  Source: .Blood Blood-Peripheral  Preliminary Report (29 Feb 2024 14:02):    No growth at 24 hours    Culture - Blood (collected 27 Feb 2024 11:26)  Source: .Blood Blood-Peripheral  Preliminary Report (29 Feb 2024 23:01):    No growth at 48 Hours    Culture - Blood (collected 27 Feb 2024 11:26)  Source: .Blood Blood  Preliminary Report (29 Feb 2024 23:01):    No growth at 48 Hours

## 2024-03-01 NOTE — PROGRESS NOTE ADULT - ASSESSMENT
68-year-old male with past medical history of hypertension, chronic back pain, prostate cancer, and posterior mediastinal mass 6.5 cm seen on CT 11/2023, presents for 3 months of progressively difficulty swallowing associated with 10 pounds weight loss and odynophagia. Comes in today now unable to swallow his pills with water, resulting in 2 episode of emesis. He mentioned that he gets SOB during exertion but denies any exertional chest pain. As per patient he was treated at [facility] for prostate cancer and is s/p radiation therapy. He states that he has problem during walking and unable to maintain the posture.     #aspiration PNA vs viral infection vs HAP   #GAS s pyogenes bacteremia   - SIRS positive (WBC increased to 16, febrile to 101 and tachy to 129 )   - CXR with worsening L-sided opacity   - Blood cx (2/24/24): positive for strep pyogenes  - repeat BCx 27,28  negative to date   - procal 7  - aspiration precautions   - patient cleared for pureed diet and thin liquids per SS -refusing food as patient reports inability to swallow   - on zosyn 3.375mg q8h switched to cefepime and clindamycin pending ID rec   -ID rec:  source potentially from translocation in setting of infiltrative masses, stop clindamycin , switch cefepime to ceftriaxone 2g daily   - TTE still pending    # Dysphagia with odynophagia  #Failure to thrive   # Hx of prostate cancer s/p radiation therapy  # Prostate cancer metastasis to mediastinum or primary mediastinal mass?  - CT Chest: Interval enlargement of the central/posterior mediastinal mass with development of more extensive lymphadenopathy. The mass encircles the descending thoracic aorta and has some mass effect upon the left atrium. The mid aspect of the esophagus is encircled by the mass and difficult to distinguish. Possible ovoid pill is noted within the mass and possible location of the mid esophagus appear. The maximal esophagus appears distended with layering debris within. Consideration may be given to esophageal stenting.  - CT abdomen and pelvis:  Increased upper abdominal bulky lymphadenopathy. Unchanged bulky retroperitoneal lymphadenopathy, large left bladder   mass and perirectal soft tissue infiltrative mass. 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 Chronic moderate to severe left hydroureteronephrosis  - EGD with GI 2/21 -> stent placed and esophageal biopsies taken -pathology - Poorly differentiated carcinoma.  - CT surgery following -> no acute intervention   - AFP and CEA wnl  - LDH and haptoglobin elevated   - PSA total 220, PSA free is 22   - CA19 131  -  57  - oncology recs appreciated ->patient used to follow with Dr. Kenia Irizarry, called and teams her and she is not picking up the phone calls, not reachable in any way   - per onc note, patient had prior biopsy of site that demonstrated metastatic prostate adenocarcinoma and was started on Docetaxel.  - tramadol 25mg q6h PRN for pain management   - orthostatics positive, likely secondary to poor oral intake, on IV fluids   - ensure added, nutrition consulted, calorie count ordered - not accepting to eat   -palliative consulted , patient needs surrogate    - zofran prn  barium swallow     #CHUCKIE vs ATN induced DIMAS  - pt came in with a baseline of 1.5 and increased to 2.6 with contrast use  - c/w IVF  - renally dose medications  -crea not improving , US ordered,   -nephro cs   DIMAS likely due to CHUCKIE  other possible causes IRGN iso GAS bacteremia, doubt prerenal given no improvement after IVF   chronic left hydro iso prostate cancer and LN, creatinine at baseline on admission, unlikely contributing to DIMAS  send UA with urine lytes and spot for PCR -> fena 0.5%   C3 C4 levels pening   repeat blood cultures  creatinine stable, non oliguric  strict i/os  gentle IVH if odyno/dysphagia still present with inability to tolerate adequate po intake  no indications for RRT      # small b/l pleural effusions on CT chest   - s/p 1x dose 40mg IV Lasix , CXR improved     #normocytic Anemia  - Hb 9-10  - f/u iron  studies noted % sat 27   - MCV normal, SPEP negative     #hypocalcemia  -replenished   -monitor     #Misc   #DVT PPx- heparin   #GI PPx- none   #Diet-  puree   #Activity- AAT, PT consult Home PT; back to [facility] w/ assistance  #Dispo- Acute   68-year-old male with past medical history of hypertension, chronic back pain, prostate cancer, and posterior mediastinal mass 6.5 cm seen on CT 11/2023, presents for 3 months of progressively difficulty swallowing associated with 10 pounds weight loss and odynophagia. Comes in today now unable to swallow his pills with water, resulting in 2 episode of emesis. He mentioned that he gets SOB during exertion but denies any exertional chest pain. As per patient he was treated at [facility] for prostate cancer and is s/p radiation therapy. He states that he has problem during walking and unable to maintain the posture.     #aspiration PNA vs viral infection vs HAP   #GAS s pyogenes bacteremia   - SIRS positive (WBC increased to 16, febrile to 101 and tachy to 129 )   - CXR with worsening L-sided opacity   - Blood cx (2/24/24): positive for strep pyogenes  - repeat BCx 27,28  negative to date   - procal 7  - aspiration precautions   - patient cleared for pureed diet and thin liquids per SS -refusing food as patient reports inability to swallow   - on zosyn 3.375mg q8h switched to cefepime and clindamycin pending ID rec   -ID rec:  source potentially from translocation in setting of infiltrative masses, stop clindamycin , switch cefepime to ceftriaxone 2g daily   - TTE still pending    # Dysphagia with odynophagia  #Failure to thrive   # Hx of prostate cancer s/p radiation therapy  # Prostate cancer metastasis to mediastinum or primary mediastinal mass?  - CT Chest: Interval enlargement of the central/posterior mediastinal mass with development of more extensive lymphadenopathy. The mass encircles the descending thoracic aorta and has some mass effect upon the left atrium. The mid aspect of the esophagus is encircled by the mass and difficult to distinguish. Possible ovoid pill is noted within the mass and possible location of the mid esophagus appear. The maximal esophagus appears distended with layering debris within. Consideration may be given to esophageal stenting.  - CT abdomen and pelvis:  Increased upper abdominal bulky lymphadenopathy. Unchanged bulky retroperitoneal lymphadenopathy, large left bladder   mass and perirectal soft tissue infiltrative mass. 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 Chronic moderate to severe left hydroureteronephrosis  - EGD with GI 2/21 -> stent placed and esophageal biopsies taken -pathology - Poorly differentiated carcinoma.  - CT surgery following -> no acute intervention   - AFP and CEA wnl  - LDH and haptoglobin elevated   - PSA total 220, PSA free is 22   - CA19 131  -  57  - oncology recs appreciated ->patient used to follow with Dr. Kenia Irizarry, called and teams her and she is not picking up the phone calls, not reachable in any way   - per onc note, patient had prior biopsy of site that demonstrated metastatic prostate adenocarcinoma and was started on Docetaxel.  - tramadol 25mg q6h PRN for pain management   - orthostatics positive, likely secondary to poor oral intake, on IV fluids   - ensure added, nutrition consulted, calorie count ordered - not accepting to eat   -palliative consulted , patient needs surrogate    - zofran prn  -barium swallow:Esophageal stent is visualized in the mid esophagus and extending below   the left hemidiaphragm. The lumen of the mid esophagus is severely   narrowed.A small amount of contrast is visualized passing through the mid   esophagus.No evidence of contrast extravasation or fistula formation to suggest   leak.  => advanged GI recalled again2/29/24  for suspicion of stent migration     #CHUCKIE vs ATN induced DIMAS  - pt came in with a baseline of 1.5 and increased to 2.6 with contrast use  - c/w IVF  - renally dose medications  -crea not improving , US ordered still not done   -nephro cs   DIMAS likely due to CHUCKIE  other possible causes IRGN iso GAS bacteremia, doubt prerenal given no improvement after IVF   chronic left hydro iso prostate cancer and LN, creatinine at baseline on admission, unlikely contributing to DIMAS  send UA with urine lytes and spot for PCR -> fena 0.5%   C3 C4 levels pending   repeat blood cultures  creatinine stable, non oliguric  strict i/os  gentle IVH if odyno/dysphagia still present with inability to tolerate adequate po intake  no indications for RRT    # small b/l pleural effusions on CT chest   - s/p 1x dose 40mg IV Lasix , CXR improved     #normocytic Anemia  - Hb 9-10  - f/u iron  studies noted % sat 27   - MCV normal, SPEP negative     #hypocalcemia  -replenished   -monitor     #Misc   #DVT PPx- heparin   #GI PPx- none   #Diet-  puree   #Activity- AAT, PT consult Home PT; back to [facility] w/ assistance???  #Dispo- Acute

## 2024-03-01 NOTE — PROGRESS NOTE ADULT - ASSESSMENT
68-year-old male with past medical history of hypertension, chronic back pain, prostate cancer, and posterior mediastinal mass 6.5 cm seen on CT 11/2023, presents for 3 months of progressively difficulty swallowing associated with 10 pounds weight loss and odynophagia. 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 Also found to be in sepsis secondary to GAS bacteremia. hospital stay complicated by DIMAS.    Assessment and plan  DIMAS/ stage 4 prostate cancer/ mediastinal mass s/p bx/ HTN  patient s/p 2 iv contrast studies within 2 days period   DIMAS likely due to CHUCKIE. Though wouldnt explain 1.2 grma proteinuria , doubt immune related GN in absence of hematuria ? membranous  related to malignancy doubt prerenal given no improvement after IVF  creat trend noted Creatinine Trend: 2.1<--, 2.3<--, 2.6<--, 2.3<--, 2.6<--, 2.3<--   chronic left hydro iso prostate cancer and LN, creatinine at baseline on admission, unlikely contributing to DIMAS  UA w/ few RBC' 1.2 2 gram protein on Urine Prot/cr   check C3 C4 levels  repeat blood cultures  creatinine stable, non oliguric  strict i/os  gentle IVH if odyno/dysphagia still present with inability to tolerate adequate po intake  no indications for RRT  will follow

## 2024-03-01 NOTE — PROGRESS NOTE ADULT - ATTENDING COMMENTS
The patient was seen. Agree with above.  69 yo male with metastatic prostate cancer with diffuse mets, s/p multiple lines of therapy was admitted for 3 months of progressively difficulty swallowing associated with 10 pounds weight loss and odynophagia. CT chest showed large mediational mass. Biopsy revealed poorly differentiated adenocarcinoma.  Recommend to followup with Dr. Irizarry who is patient's oncologist. We contacted her office. Dr. Irizarry will see patient on Monday.

## 2024-03-01 NOTE — PROGRESS NOTE ADULT - SUBJECTIVE AND OBJECTIVE BOX
Nephrology progress note    Patient was seen and examined, events over the last 24 h noted .    Allergies:  No Known Allergies    Hospital Medications:   MEDICATIONS  (STANDING):  cefTRIAXone   IVPB 2000 milliGRAM(s) IV Intermittent every 24 hours  chlorhexidine 2% Cloths 1 Application(s) Topical <User Schedule>  dextrose 5% + lactated ringers. 1000 milliLiter(s) (75 mL/Hr) IV Continuous <Continuous>  heparin   Injectable 5000 Unit(s) SubCutaneous every 12 hours  influenza  Vaccine (HIGH DOSE) 0.7 milliLiter(s) IntraMuscular once  lidocaine   4% Patch 1 Patch Transdermal every 24 hours  metoprolol tartrate 25 milliGRAM(s) Oral two times a day  ondansetron Injectable 4 milliGRAM(s) IV Push once        VITALS:  T(F): 96.1 (03-01-24 @ 05:36), Max: 97.9 (02-29-24 @ 19:35)  HR: 89 (03-01-24 @ 05:36)  BP: 115/77 (03-01-24 @ 05:36)  RR: 18 (03-01-24 @ 05:36)  SpO2: 97% (02-29-24 @ 14:12)  Wt(kg): --    02-29 @ 07:01  -  03-01 @ 07:00  --------------------------------------------------------  IN: 0 mL / OUT: 250 mL / NET: -250 mL          PHYSICAL EXAM:  Constitutional: NAD  HEENT: anicteric sclera, oropharynx clear, MMM  Neck: No JVD  Respiratory: CTAB, no wheezes, rales or rhonchi  Cardiovascular: S1, S2, RRR  Gastrointestinal: BS+, soft, NT/ND  Extremities: No cyanosis or clubbing. No peripheral edema  :  No castro.   Skin: No rashes    LABS:  03-01    146  |  114<H>  |  31<H>  ----------------------------<  111<H>  4.3   |  18  |  1.9<H>    Ca    8.4      01 Mar 2024 07:31  Phos  3.3     03-01  Mg     2.2     03-01    TPro  5.8<L>  /  Alb  2.9<L>  /  TBili  0.2  /  DBili      /  AST  28  /  ALT  41  /  AlkPhos  68  03-01                          8.8    10.54 )-----------( 387      ( 01 Mar 2024 07:31 )             28.8       Urine Studies:  Urinalysis Basic - ( 01 Mar 2024 07:31 )    Color:  / Appearance:  / SG:  / pH:   Gluc: 111 mg/dL / Ketone:   / Bili:  / Urobili:    Blood:  / Protein:  / Nitrite:    Leuk Esterase:  / RBC:  / WBC    Sq Epi:  / Non Sq Epi:  / Bacteria:       Sodium, Random Urine: 42.0 mmoL/L (02-28 @ 13:35)  Creatinine, Random Urine: 126 mg/dL (02-28 @ 13:35)  Protein/Creatinine Ratio Calculation: 1.2 Ratio (02-28 @ 13:35)  Osmolality, Random Urine: 509 mos/kg (02-28 @ 13:35)  Potassium, Random Urine: 23 mmol/L (02-28 @ 13:35)    RADIOLOGY & ADDITIONAL STUDIES:

## 2024-03-02 LAB
ALBUMIN SERPL ELPH-MCNC: 2.8 G/DL — LOW (ref 3.5–5.2)
ALP SERPL-CCNC: 63 U/L — SIGNIFICANT CHANGE UP (ref 30–115)
ALT FLD-CCNC: 40 U/L — SIGNIFICANT CHANGE UP (ref 0–41)
ANION GAP SERPL CALC-SCNC: 13 MMOL/L — SIGNIFICANT CHANGE UP (ref 7–14)
AST SERPL-CCNC: 29 U/L — SIGNIFICANT CHANGE UP (ref 0–41)
BASOPHILS # BLD AUTO: 0.01 K/UL — SIGNIFICANT CHANGE UP (ref 0–0.2)
BASOPHILS NFR BLD AUTO: 0.1 % — SIGNIFICANT CHANGE UP (ref 0–1)
BILIRUB SERPL-MCNC: 0.3 MG/DL — SIGNIFICANT CHANGE UP (ref 0.2–1.2)
BUN SERPL-MCNC: 23 MG/DL — HIGH (ref 10–20)
CALCIUM SERPL-MCNC: 8 MG/DL — LOW (ref 8.4–10.4)
CHLORIDE SERPL-SCNC: 112 MMOL/L — HIGH (ref 98–110)
CO2 SERPL-SCNC: 17 MMOL/L — SIGNIFICANT CHANGE UP (ref 17–32)
CREAT SERPL-MCNC: 1.6 MG/DL — HIGH (ref 0.7–1.5)
EGFR: 46 ML/MIN/1.73M2 — LOW
EOSINOPHIL # BLD AUTO: 0.12 K/UL — SIGNIFICANT CHANGE UP (ref 0–0.7)
EOSINOPHIL NFR BLD AUTO: 1.1 % — SIGNIFICANT CHANGE UP (ref 0–8)
GLUCOSE BLDC GLUCOMTR-MCNC: 82 MG/DL — SIGNIFICANT CHANGE UP (ref 70–99)
GLUCOSE SERPL-MCNC: 89 MG/DL — SIGNIFICANT CHANGE UP (ref 70–99)
HCT VFR BLD CALC: 27.7 % — LOW (ref 42–52)
HGB BLD-MCNC: 8.6 G/DL — LOW (ref 14–18)
IMM GRANULOCYTES NFR BLD AUTO: 1.4 % — HIGH (ref 0.1–0.3)
LYMPHOCYTES # BLD AUTO: 0.62 K/UL — LOW (ref 1.2–3.4)
LYMPHOCYTES # BLD AUTO: 5.9 % — LOW (ref 20.5–51.1)
MAGNESIUM SERPL-MCNC: 1.9 MG/DL — SIGNIFICANT CHANGE UP (ref 1.8–2.4)
MCHC RBC-ENTMCNC: 28.4 PG — SIGNIFICANT CHANGE UP (ref 27–31)
MCHC RBC-ENTMCNC: 31 G/DL — LOW (ref 32–37)
MCV RBC AUTO: 91.4 FL — SIGNIFICANT CHANGE UP (ref 80–94)
MONOCYTES # BLD AUTO: 0.65 K/UL — HIGH (ref 0.1–0.6)
MONOCYTES NFR BLD AUTO: 6.1 % — SIGNIFICANT CHANGE UP (ref 1.7–9.3)
NEUTROPHILS # BLD AUTO: 9.04 K/UL — HIGH (ref 1.4–6.5)
NEUTROPHILS NFR BLD AUTO: 85.4 % — HIGH (ref 42.2–75.2)
NRBC # BLD: 0 /100 WBCS — SIGNIFICANT CHANGE UP (ref 0–0)
PHOSPHATE SERPL-MCNC: 2.7 MG/DL — SIGNIFICANT CHANGE UP (ref 2.1–4.9)
PLATELET # BLD AUTO: 421 K/UL — HIGH (ref 130–400)
PMV BLD: 9.9 FL — SIGNIFICANT CHANGE UP (ref 7.4–10.4)
POTASSIUM SERPL-MCNC: 4.4 MMOL/L — SIGNIFICANT CHANGE UP (ref 3.5–5)
POTASSIUM SERPL-SCNC: 4.4 MMOL/L — SIGNIFICANT CHANGE UP (ref 3.5–5)
PROT SERPL-MCNC: 5.6 G/DL — LOW (ref 6–8)
RBC # BLD: 3.03 M/UL — LOW (ref 4.7–6.1)
RBC # FLD: 13.9 % — SIGNIFICANT CHANGE UP (ref 11.5–14.5)
SODIUM SERPL-SCNC: 142 MMOL/L — SIGNIFICANT CHANGE UP (ref 135–146)
WBC # BLD: 10.59 K/UL — SIGNIFICANT CHANGE UP (ref 4.8–10.8)
WBC # FLD AUTO: 10.59 K/UL — SIGNIFICANT CHANGE UP (ref 4.8–10.8)

## 2024-03-02 PROCEDURE — 99232 SBSQ HOSP IP/OBS MODERATE 35: CPT

## 2024-03-02 RX ORDER — SODIUM BICARBONATE 1 MEQ/ML
650 SYRINGE (ML) INTRAVENOUS EVERY 12 HOURS
Refills: 0 | Status: DISCONTINUED | OUTPATIENT
Start: 2024-03-02 | End: 2024-03-03

## 2024-03-02 RX ADMIN — LIDOCAINE 1 PATCH: 4 CREAM TOPICAL at 12:13

## 2024-03-02 RX ADMIN — Medication 25 MILLIGRAM(S): at 05:57

## 2024-03-02 RX ADMIN — LIDOCAINE 1 PATCH: 4 CREAM TOPICAL at 19:30

## 2024-03-02 RX ADMIN — CHLORHEXIDINE GLUCONATE 1 APPLICATION(S): 213 SOLUTION TOPICAL at 06:00

## 2024-03-02 RX ADMIN — HEPARIN SODIUM 5000 UNIT(S): 5000 INJECTION INTRAVENOUS; SUBCUTANEOUS at 05:57

## 2024-03-02 NOTE — PROGRESS NOTE ADULT - SUBJECTIVE AND OBJECTIVE BOX
seen and examined  24 h events noted   no distress         PAST HISTORY  --------------------------------------------------------------------------------  No significant changes to PMH, PSH, FHx, SHx, unless otherwise noted    ALLERGIES & MEDICATIONS  --------------------------------------------------------------------------------  Allergies    No Known Allergies    Intolerances      Standing Inpatient Medications  cefTRIAXone   IVPB 2000 milliGRAM(s) IV Intermittent every 24 hours  chlorhexidine 2% Cloths 1 Application(s) Topical <User Schedule>  dextrose 5% + lactated ringers. 1000 milliLiter(s) IV Continuous <Continuous>  heparin   Injectable 5000 Unit(s) SubCutaneous every 12 hours  influenza  Vaccine (HIGH DOSE) 0.7 milliLiter(s) IntraMuscular once  lidocaine   4% Patch 1 Patch Transdermal every 24 hours  metoprolol tartrate 25 milliGRAM(s) Oral two times a day  ondansetron Injectable 4 milliGRAM(s) IV Push once    PRN Inpatient Medications  acetaminophen     Tablet .. 650 milliGRAM(s) Oral every 6 hours PRN  aluminum hydroxide/magnesium hydroxide/simethicone Suspension 30 milliLiter(s) Oral every 4 hours PRN  melatonin 3 milliGRAM(s) Oral at bedtime PRN  traMADol 25 milliGRAM(s) Oral four times a day PRN        VITALS/PHYSICAL EXAM  --------------------------------------------------------------------------------  T(C): 36.8 (03-02-24 @ 05:30), Max: 37 (03-01-24 @ 20:20)  HR: 86 (03-02-24 @ 05:30) (83 - 97)  BP: 119/78 (03-02-24 @ 05:30) (119/78 - 173/94)  RR: 18 (03-02-24 @ 05:30) (18 - 18)  SpO2: 98% (03-02-24 @ 05:30) (97% - 98%)  Wt(kg): --        03-01-24 @ 07:01  -  03-02-24 @ 07:00  --------------------------------------------------------  IN: 375 mL / OUT: 0 mL / NET: 375 mL      Physical Exam:  	Gen: NAD,  	Pulm: decrease BS  B/L  	CV:  S1S2; no rub  	Abd: +distended      LABS/STUDIES  --------------------------------------------------------------------------------              8.6    10.59 >-----------<  421      [03-02-24 @ 07:22]              27.7     142  |  112  |  23  ----------------------------<  89      [03-02-24 @ 07:22]  4.4   |  17  |  1.6        Ca     8.0     [03-02-24 @ 07:22]      Mg     1.9     [03-02-24 @ 07:22]      Phos  2.7     [03-02-24 @ 07:22]    TPro  5.6  /  Alb  2.8  /  TBili  0.3  /  DBili  x   /  AST  29  /  ALT  40  /  AlkPhos  63  [03-02-24 @ 07:22]          Creatinine Trend:  SCr 1.6 [03-02 @ 07:22]  SCr 1.9 [03-01 @ 07:31]  SCr 2.1 [02-29 @ 07:58]  SCr 2.3 [02-28 @ 04:30]  SCr 2.6 [02-27 @ 06:34]    Urinalysis - [03-02-24 @ 07:22]      Color  / Appearance  / SG  / pH       Gluc 89 / Ketone   / Bili  / Urobili        Blood  / Protein  / Leuk Est  / Nitrite       RBC  / WBC  / Hyaline  / Gran  / Sq Epi  / Non Sq Epi  / Bacteria     Urine Creatinine 126      [02-28-24 @ 13:35]  Urine Protein 148      [02-28-24 @ 13:35]  Urine Sodium 42.0      [02-28-24 @ 13:35]  Urine Urea Nitrogen 909      [02-28-24 @ 13:35]  Urine Potassium 23      [02-28-24 @ 13:35]  Urine Osmolality 509      [02-28-24 @ 13:35]    Iron 48, TIBC 179, %sat 27      [02-21-24 @ 07:21]  Ferritin 883      [02-21-24 @ 07:21]      C3 Complement 150      [02-29-24 @ 07:58]  C4 Complement 31      [02-29-24 @ 07:58]  Immunofixation Serum:   No Monoclonal Band Identified      Reference Range: None Detected      [02-21-24 @ 07:21]  SPEP Interpretation: Normal Electrophoresis Pattern      [02-21-24 @ 07:21]

## 2024-03-02 NOTE — PROGRESS NOTE ADULT - ASSESSMENT
68-year-old male with past medical history of hypertension, chronic back pain, prostate cancer, and posterior mediastinal mass 6.5 cm seen on CT 11/2023, presents for 3 months of progressively difficulty swallowing associated with 10 pounds weight loss and odynophagia. Comes in today now unable to swallow his pills with water, resulting in 2 episode of emesis. He mentioned that he gets SOB during exertion but denies any exertional chest pain. As per patient he was treated at [facility] for prostate cancer and is s/p radiation therapy. He states that he has problem during walking and unable to maintain the posture.     #aspiration PNA vs viral infection vs HAP   #GAS s pyogenes bacteremia   - SIRS positive (WBC increased to 16, febrile to 101 and tachy to 129 )   - CXR with worsening L-sided opacity   - Blood cx (2/24/24): positive for strep pyogenes  - repeat BCx 27,28,29  negative to date   - procal 7  - aspiration precautions   - patient cleared for pureed diet and thin liquids per SS -refusing food as patient reports inability to swallow   - on zosyn 3.375mg q8h switched to cefepime and clindamycin pending ID rec   -ID rec:  source potentially from translocation in setting of infiltrative masses, stop clindamycin , switch cefepime to ceftriaxone 2g daily   - TTE   1. Left ventricular ejection fraction, by visual estimation, is 65 to   70%.   2. Hyperdynamic global left ventricular systolic function.   3. Mildly increased LV wall thickness.   4. Spectral Doppler shows impaired relaxation pattern of left   ventricular myocardial filling (Grade I diastolic dysfunction).   5. Trace mitral valve regurgitation.      # Dysphagia with odynophagia  #Failure to thrive   # Hx of prostate cancer s/p radiation therapy  # Prostate cancer metastasis to mediastinum or primary mediastinal mass?  - CT Chest: Interval enlargement of the central/posterior mediastinal mass with development of more extensive lymphadenopathy. The mass encircles the descending thoracic aorta and has some mass effect upon the left atrium. The mid aspect of the esophagus is encircled by the mass and difficult to distinguish. Possible ovoid pill is noted within the mass and possible location of the mid esophagus appear. The maximal esophagus appears distended with layering debris within. Consideration may be given to esophageal stenting.  - CT abdomen and pelvis:  Increased upper abdominal bulky lymphadenopathy. Unchanged bulky retroperitoneal lymphadenopathy, large left bladder   mass and perirectal soft tissue infiltrative mass. 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 Chronic moderate to severe left hydroureteronephrosis  - EGD with GI 2/21 -> stent placed and esophageal biopsies taken -pathology - Poorly differentiated carcinoma.  - CT surgery following -> no acute intervention   - AFP and CEA wnl  - LDH and haptoglobin elevated   - PSA total 220, PSA free is 22   - CA19 131  -  57  - oncology recs appreciated ->patient used to follow with Dr. Kenia Irizarry, called and teams her and she is not picking up the phone calls, not reachable in any way   -service oncology consulted  - Esophageal biopsy: Cytopathology - Non Gyn Report: ACCESSION No:  [id number]. Final Diagnosis- MEDIASTINUM MASS; EUS GUIDED FNA (THINPREP AND CELL BLOCK): POSITIVE FOR MALIGNANT CELLS,  Metastatic poorly differentiated carcinoma. The morphology of the current tumor and the immunohistochemical profile along with the patient's history of prostatic cancer are consistent with a metastatic poorly differentiated carcinoma of prostatic origin.  - Esophageal ulcer biopsy shows poorly differentiated carcinoma, IHC staining pending. Discussed with Dr. Lewis, related to prostate cancer, not a second primary malignancy   - per onc note, patient had prior biopsy of site that demonstrated metastatic prostate adenocarcinoma and was started on Docetaxel.  - per one oncology note in the patients paper chart, in January 2024 patient was started on Pembrolizumab and continued on keytruda injections  - will need to obtain further records from Dr. Irizarry's office to detail treatment history  - Dr. Irizarry is returning to the office on Monday 3/4, patients information provided to the office   - orthostatics positive, likely secondary to poor oral intake, on IV fluids   - ensure added, nutrition consulted, calorie count ordered - not accepting to eat   -palliative consulted , patient needs surrogate    - zofran prn  -barium swallow:Esophageal stent is visualized in the mid esophagus and extending below   the left hemidiaphragm. The lumen of the mid esophagus is severely   narrowed.A small amount of contrast is visualized passing through the mid   esophagus.No evidence of contrast extravasation or fistula formation to suggest   leak.  => advanged GI recalled again2/29/24  for suspicion of stent migration : Esophagogram to check for stent patency      #CHUCKIE vs ATN induced DIMAS  - pt came in with a baseline of 1.5 and increased to 2.6 with contrast use  - c/w IVF  - renally dose medications  -crea not improving , US ordered still not done   -nephro cs   DIMAS likely due to CHUCKIE  other possible causes IRGN iso GAS bacteremia, doubt prerenal given no improvement after IVF   chronic left hydro iso prostate cancer and LN, creatinine at baseline on admission, unlikely contributing to DIMAS  send UA with urine lytes and spot for PCR -> fena 0.5%   C3 C4 levels normal  repeat blood cultures  creatinine stable, non oliguric  strict i/os  gentle IVH if odyno/dysphagia still present with inability to tolerate adequate po intake  no indications for RRT    # small b/l pleural effusions on CT chest   - s/p 1x dose 40mg IV Lasix , CXR improved     #normocytic Anemia  - Hb 9-10  - f/u iron  studies noted % sat 27   - MCV normal, SPEP negative     #hypocalcemia  -replenished   -monitor     #Misc   #DVT PPx- heparin   #GI PPx- none   #Diet-  puree   #Activity- AAT, PT consult Home PT; back to [facility] w/ assistance???  #Dispo- Acute     68-year-old male with past medical history of hypertension, chronic back pain, prostate cancer, and posterior mediastinal mass 6.5 cm seen on CT 11/2023, presents for 3 months of progressively difficulty swallowing associated with 10 pounds weight loss and odynophagia. Comes in today now unable to swallow his pills with water, resulting in 2 episode of emesis. He mentioned that he gets SOB during exertion but denies any exertional chest pain. As per patient he was treated at [facility] for prostate cancer and is s/p radiation therapy. He states that he has problem during walking and unable to maintain the posture.     #aspiration PNA vs viral infection vs HAP   #GAS s pyogenes bacteremia   - SIRS positive (WBC increased to 16, febrile to 101 and tachy to 129 )   - CXR with worsening L-sided opacity   - Blood cx (2/24/24): positive for strep pyogenes  - repeat BCx 27,28,29  negative to date   - procal 7  - aspiration precautions   - patient cleared for pureed diet and thin liquids per SS -refusing food as patient reports inability to swallow   - on zosyn 3.375mg q8h switched to cefepime and clindamycin pending ID rec   -ID rec:  source potentially from translocation in setting of infiltrative masses, stop clindamycin , switch cefepime to ceftriaxone 2g daily   - TTE   1. Left ventricular ejection fraction, by visual estimation, is 65 to   70%.   2. Hyperdynamic global left ventricular systolic function.   3. Mildly increased LV wall thickness.   4. Spectral Doppler shows impaired relaxation pattern of left   ventricular myocardial filling (Grade I diastolic dysfunction).   5. Trace mitral valve regurgitation.      # Dysphagia with odynophagia  #Failure to thrive   # Hx of prostate cancer s/p radiation therapy  # Prostate cancer metastasis to mediastinum or primary mediastinal mass?  - CT Chest: Interval enlargement of the central/posterior mediastinal mass with development of more extensive lymphadenopathy. The mass encircles the descending thoracic aorta and has some mass effect upon the left atrium. The mid aspect of the esophagus is encircled by the mass and difficult to distinguish. Possible ovoid pill is noted within the mass and possible location of the mid esophagus appear. The maximal esophagus appears distended with layering debris within. Consideration may be given to esophageal stenting.  - CT abdomen and pelvis:  Increased upper abdominal bulky lymphadenopathy. Unchanged bulky retroperitoneal lymphadenopathy, large left bladder   mass and perirectal soft tissue infiltrative mass. 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 Chronic moderate to severe left hydroureteronephrosis  - EGD with GI 2/21 -> stent placed and esophageal biopsies taken -pathology - Poorly differentiated carcinoma.  - CT surgery following -> no acute intervention   - AFP and CEA wnl  - LDH and haptoglobin elevated   - PSA total 220, PSA free is 22   - CA19 131  -  57  - oncology recs appreciated ->patient used to follow with Dr. Kenia Irizarry, called and teams her and she is not picking up the phone calls, not reachable in any way   -service oncology consulted  - Esophageal biopsy: Cytopathology - Non Gyn Report: ACCESSION No:  [id number]. Final Diagnosis- MEDIASTINUM MASS; EUS GUIDED FNA (THINPREP AND CELL BLOCK): POSITIVE FOR MALIGNANT CELLS,  Metastatic poorly differentiated carcinoma. The morphology of the current tumor and the immunohistochemical profile along with the patient's history of prostatic cancer are consistent with a metastatic poorly differentiated carcinoma of prostatic origin.  - Esophageal ulcer biopsy shows poorly differentiated carcinoma, IHC staining pending. Discussed with Dr. Lewis, related to prostate cancer, not a second primary malignancy   - per onc note, patient had prior biopsy of site that demonstrated metastatic prostate adenocarcinoma and was started on Docetaxel.  - per one oncology note in the patients paper chart, in January 2024 patient was started on Pembrolizumab and continued on keytruda injections  - will need to obtain further records from Dr. Irizarry's office to detail treatment history  - Dr. Irizarry is returning to the office on Monday 3/4, patients information provided to the office   - orthostatics positive, likely secondary to poor oral intake, on IV fluids   - ensure added, nutrition consulted, calorie count ordered - not accepting to eat   -palliative consulted , patient needs surrogate    - zofran prn  -barium swallow:Esophageal stent is visualized in the mid esophagus and extending below   the left hemidiaphragm. The lumen of the mid esophagus is severely   narrowed.A small amount of contrast is visualized passing through the mid   esophagus.No evidence of contrast extravasation or fistula formation to suggest   leak.  => advanged GI recalled again2/29/24  for suspicion of stent migration : Esophagogram to check for stent patency      #CHUCKIE vs ATN induced DIMAS  - pt came in with a baseline of 1.5 and increased to 2.6 with contrast use  - c/w IVF  - renally dose medications  -crea not improving   -US Kidney and Bladder (03.01.24 @ 18:42) >  1.  Diffusely echogenic bilateral kidneys which can be seen with medical renal disease.  2.  Moderate to severe left hydronephrosis with left renal parenchymal thinning, as seen on prior CT scan.  3.  Large mass identified within the urinary bladder, measuring up to 7.9  cm, previously up to 7 cm.  No ureteral jets are seen bilaterally.  4.  Incidentally noted right pleural effusion.  -nephro cs   DIMAS likely due to CHUCKIE  other possible causes IRGN iso GAS bacteremia, doubt prerenal given no improvement after IVF   chronic left hydro iso prostate cancer and LN, creatinine at baseline on admission, unlikely contributing to DIMAS  send UA with urine lytes and spot for PCR -> fena 0.5%   C3 C4 levels normal  repeat blood cultures  creatinine stable, non oliguric  strict i/os  gentle IVH if odyno/dysphagia still present with inability to tolerate adequate po intake  no indications for RRT    # small b/l pleural effusions on CT chest   - s/p 1x dose 40mg IV Lasix , CXR improved     #normocytic Anemia  - Hb 9-10  - f/u iron  studies noted % sat 27   - MCV normal, SPEP negative     #hypocalcemia  -replenished   -monitor     #Misc   #DVT PPx- heparin   #GI PPx- none   #Diet-  puree   #Activity- AAT, PT consult Home PT; back to [facility] w/ assistance???  #Dispo- Acute

## 2024-03-02 NOTE — PROGRESS NOTE ADULT - ATTENDING COMMENTS
patient seen and examined at bedside independently of medical resident and agree with the note unless otherwise stated    Vital Signs Last 24 Hrs  T(C): 36.8 (02 Mar 2024 05:30), Max: 37 (01 Mar 2024 20:20)  T(F): 98.3 (02 Mar 2024 05:30), Max: 98.6 (01 Mar 2024 20:20)  HR: 86 (02 Mar 2024 05:30) (86 - 97)  BP: 119/78 (02 Mar 2024 05:30) (119/78 - 173/94)  BP(mean): --  RR: 18 (02 Mar 2024 05:30) (18 - 18)  SpO2: 98% (02 Mar 2024 05:30) (98% - 98%)    Parameters below as of 02 Mar 2024 05:30  Patient On (Oxygen Delivery Method): room air                          8.6    10.59 )-----------( 421      ( 02 Mar 2024 07:22 )             27.7           03-02    142  |  112<H>  |  23<H>  ----------------------------<  89  4.4   |  17  |  1.6<H>    Ca    8.0<L>      02 Mar 2024 07:22  Phos  2.7     03-02  Mg     1.9     03-02    TPro  5.6<L>  /  Alb  2.8<L>  /  TBili  0.3  /  DBili  x   /  AST  29  /  ALT  40  /  AlkPhos  63  03-02    # Bacteremia   # Odynophagia   # hx of prostate cancer now with mets?   # Esophageal carcinoma   # severe protein calorie malnutrition   # DIMAS/CHUCKIE    -patient with odynophagia - newly diagnosed esophageal cancer (poorly differentiated) - unable to reach patient's oncologist Dr. Irizarry - placed routine service oncology consult - appreciated their input - Dr. Irizarry will see the patient on Monday (after she comes back from vacation and recommend on starting chemo)  -continue with IV abx, ID following   -repeat BCx prelim negative   -Monitor Kidney function; cr improving  -overall poor prognosis - no next of kin as per CM - discussed with CM in rounds  -Palliative care follow up     DISPO: not discharge ready - oncology reccs on chemo?      Attending Physician Dr. Erinn Leslie # [id number]

## 2024-03-02 NOTE — PROGRESS NOTE ADULT - SUBJECTIVE AND OBJECTIVE BOX
24H events:    Today is hospital day 11d. This morning patient was seen and examined at bedside, resting comfortably in bed.    Refuses hydration overnight.    PAST MEDICAL & SURGICAL HISTORY  HTN (hypertension)    Prostate cancer        SOCIAL HISTORY:  Social History:      ALLERGIES:  No Known Allergies      MEDICATIONS:  STANDING MEDICATIONS  cefTRIAXone   IVPB 2000 milliGRAM(s) IV Intermittent every 24 hours  chlorhexidine 2% Cloths 1 Application(s) Topical <User Schedule>  dextrose 5% + lactated ringers. 1000 milliLiter(s) IV Continuous <Continuous>  heparin   Injectable 5000 Unit(s) SubCutaneous every 12 hours  influenza  Vaccine (HIGH DOSE) 0.7 milliLiter(s) IntraMuscular once  lidocaine   4% Patch 1 Patch Transdermal every 24 hours  metoprolol tartrate 25 milliGRAM(s) Oral two times a day  ondansetron Injectable 4 milliGRAM(s) IV Push once    PRN MEDICATIONS  acetaminophen     Tablet .. 650 milliGRAM(s) Oral every 6 hours PRN  aluminum hydroxide/magnesium hydroxide/simethicone Suspension 30 milliLiter(s) Oral every 4 hours PRN  melatonin 3 milliGRAM(s) Oral at bedtime PRN  traMADol 25 milliGRAM(s) Oral four times a day PRN      VITALS:   T(C): 36.8 (03-02-24 @ 05:30), Max: 37 (03-01-24 @ 20:20)  HR: 86 (03-02-24 @ 05:30) (83 - 97)  BP: 119/78 (03-02-24 @ 05:30) (119/78 - 173/94)  RR: 18 (03-02-24 @ 05:30) (18 - 18)  SpO2: 98% (03-02-24 @ 05:30) (97% - 98%)  I&O's Summary    29 Feb 2024 07:01  -  01 Mar 2024 07:00  --------------------------------------------------------  IN: 0 mL / OUT: 250 mL / NET: -250 mL    01 Mar 2024 07:01  -  02 Mar 2024 06:44  --------------------------------------------------------  IN: 375 mL / OUT: 0 mL / NET: 375 mL          PHYSICAL EXAM:    GENERAL: NAD, non-toxic appearing, cachectic , poor dental hygiene   NECK: Supple, no stiffness, no JVD  HEART: Regular rate and rhythm, normal s1s2  LUNGS: Unlabored respirations, b/l air entry, no adventitious breath sounds   ABDOMEN: Soft, nontender, nondistended; +BS  EXTREMITIES: No clubbing, cyanosis, or edema;   NERVOUS SYSTEM: AOx3  SKIN: Warm and dry      LABS:                        8.8    10.54 )-----------( 387      ( 01 Mar 2024 07:31 )             28.8     03-01    146  |  114<H>  |  31<H>  ----------------------------<  111<H>  4.3   |  18  |  1.9<H>    Ca    8.4      01 Mar 2024 07:31  Phos  3.3     03-01  Mg     2.2     03-01    TPro  5.8<L>  /  Alb  2.9<L>  /  TBili  0.2  /  DBili  x   /  AST  28  /  ALT  41  /  AlkPhos  68  03-01      Urinalysis Basic - ( 01 Mar 2024 07:31 )    Color: x / Appearance: x / SG: x / pH: x  Gluc: 111 mg/dL / Ketone: x  / Bili: x / Urobili: x   Blood: x / Protein: x / Nitrite: x   Leuk Esterase: x / RBC: x / WBC x   Sq Epi: x / Non Sq Epi: x / Bacteria: x            Culture - Blood (collected 29 Feb 2024 12:44)  Source: .Blood Blood  Preliminary Report (01 Mar 2024 22:02):    No growth at 24 hours    Culture - Blood (collected 29 Feb 2024 07:58)  Source: .Blood None  Preliminary Report (01 Mar 2024 23:02):    No growth at 24 hours    Culture - Blood (collected 28 Feb 2024 09:04)  Source: .Blood Blood-Peripheral  Preliminary Report (01 Mar 2024 14:02):    No growth at 48 Hours

## 2024-03-02 NOTE — PROGRESS NOTE ADULT - ASSESSMENT
68-year-old male with past medical history of hypertension, chronic back pain, prostate cancer, and posterior mediastinal mass 6.5 cm seen on CT 11/2023, presents for 3 months of progressively difficulty swallowing associated with 10 pounds weight loss and odynophagia. 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 Also found to be in sepsis secondary to GAS bacteremia. hospital stay complicated by DIMAS.  Assessment and plan  DIMAS/ stage 4 prostate cancer/ mediastinal mass s/p bx/ HTN  patient s/p 2 iv contrast studies within 2 days period   DIMAS likely due to CHUCKIE. Though wouldnt explain 1.2 grma proteinuria , doubt immune related GN in absence of hematuria ? membranous  related to malignancy doubt prerenal given no improvement after IVF  creat continues to improve  sono ;1.  Diffusely echogenic bilateral kidneys which can be seen with medical   renal disease.  2.  Moderate to severe left hydronephrosis with left renal parenchymal   thinning, as seen on prior CT scan.  3.  Large mass identified within the urinary bladder, measuring up to 7.9   cm, previously up to 7 cm.  No ureteral jets are seen bilaterally.  4.  Incidentally noted right pleural effusion.    evaluation   oncology notes appreciated  start sodium bicarbonate 650 q 12  ph at goal  followed by GI   please document accurate UO   will follow

## 2024-03-03 LAB
ALBUMIN SERPL ELPH-MCNC: 2.7 G/DL — LOW (ref 3.5–5.2)
ALP SERPL-CCNC: 65 U/L — SIGNIFICANT CHANGE UP (ref 30–115)
ALT FLD-CCNC: 33 U/L — SIGNIFICANT CHANGE UP (ref 0–41)
ANION GAP SERPL CALC-SCNC: 14 MMOL/L — SIGNIFICANT CHANGE UP (ref 7–14)
AST SERPL-CCNC: 25 U/L — SIGNIFICANT CHANGE UP (ref 0–41)
BILIRUB SERPL-MCNC: 0.3 MG/DL — SIGNIFICANT CHANGE UP (ref 0.2–1.2)
BLD GP AB SCN SERPL QL: SIGNIFICANT CHANGE UP
BUN SERPL-MCNC: 22 MG/DL — HIGH (ref 10–20)
CALCIUM SERPL-MCNC: 7.9 MG/DL — LOW (ref 8.4–10.5)
CHLORIDE SERPL-SCNC: 112 MMOL/L — HIGH (ref 98–110)
CO2 SERPL-SCNC: 15 MMOL/L — LOW (ref 17–32)
CREAT SERPL-MCNC: 1.5 MG/DL — SIGNIFICANT CHANGE UP (ref 0.7–1.5)
CULTURE RESULTS: SIGNIFICANT CHANGE UP
CULTURE RESULTS: SIGNIFICANT CHANGE UP
EGFR: 50 ML/MIN/1.73M2 — LOW
GLUCOSE SERPL-MCNC: 68 MG/DL — LOW (ref 70–99)
MAGNESIUM SERPL-MCNC: 1.9 MG/DL — SIGNIFICANT CHANGE UP (ref 1.8–2.4)
PHOSPHATE SERPL-MCNC: 2.8 MG/DL — SIGNIFICANT CHANGE UP (ref 2.1–4.9)
POTASSIUM SERPL-MCNC: 4.9 MMOL/L — SIGNIFICANT CHANGE UP (ref 3.5–5)
POTASSIUM SERPL-SCNC: 4.9 MMOL/L — SIGNIFICANT CHANGE UP (ref 3.5–5)
PROT SERPL-MCNC: 5.7 G/DL — LOW (ref 6–8)
SODIUM SERPL-SCNC: 141 MMOL/L — SIGNIFICANT CHANGE UP (ref 135–146)
SPECIMEN SOURCE: SIGNIFICANT CHANGE UP
SPECIMEN SOURCE: SIGNIFICANT CHANGE UP

## 2024-03-03 PROCEDURE — 99232 SBSQ HOSP IP/OBS MODERATE 35: CPT

## 2024-03-03 RX ORDER — SODIUM BICARBONATE 1 MEQ/ML
1300 SYRINGE (ML) INTRAVENOUS EVERY 8 HOURS
Refills: 0 | Status: DISCONTINUED | OUTPATIENT
Start: 2024-03-03 | End: 2024-03-06

## 2024-03-03 RX ADMIN — LIDOCAINE 1 PATCH: 4 CREAM TOPICAL at 01:00

## 2024-03-03 RX ADMIN — Medication 650 MILLIGRAM(S): at 05:21

## 2024-03-03 RX ADMIN — HEPARIN SODIUM 5000 UNIT(S): 5000 INJECTION INTRAVENOUS; SUBCUTANEOUS at 17:50

## 2024-03-03 RX ADMIN — Medication 25 MILLIGRAM(S): at 05:21

## 2024-03-03 RX ADMIN — Medication 1300 MILLIGRAM(S): at 21:11

## 2024-03-03 RX ADMIN — Medication 25 MILLIGRAM(S): at 17:50

## 2024-03-03 RX ADMIN — CHLORHEXIDINE GLUCONATE 1 APPLICATION(S): 213 SOLUTION TOPICAL at 05:23

## 2024-03-03 RX ADMIN — CEFTRIAXONE 100 MILLIGRAM(S): 500 INJECTION, POWDER, FOR SOLUTION INTRAMUSCULAR; INTRAVENOUS at 17:49

## 2024-03-03 NOTE — PROGRESS NOTE ADULT - SUBJECTIVE AND OBJECTIVE BOX
RUUFS PICKARD  68y  Male      Patient is a 68y old  Male who presents with a chief complaint of Mediastinal Mass (03 Mar 2024 07:45)      INTERVAL HPI/OVERNIGHT EVENTS:    pt seen this am   -no overnight events notified to me     Vital Signs Last 24 Hrs  T(C): 36.1 (03 Mar 2024 12:50), Max: 36.2 (02 Mar 2024 19:28)  T(F): 97 (03 Mar 2024 12:50), Max: 97.2 (02 Mar 2024 19:28)  HR: 78 (03 Mar 2024 12:50) (78 - 88)  BP: 138/82 (03 Mar 2024 12:50) (134/94 - 154/82)  BP(mean): 110 (03 Mar 2024 05:56) (110 - 110)  RR: 20 (03 Mar 2024 12:50) (18 - 20)      PHYSICAL EXAM:  GENERAL: Not in distress - comfortable in bed   HEAD:  Atraumatic, Normocephalic  EYES: EOMI, PERRLA, conjunctiva and sclera clear  NERVOUS SYSTEM:  Alert & Oriented X2  CHEST/LUNG: Clear to percussion bilaterally; No rales, rhonchi, wheezing, or rubs  CV/HEART: Regular rate and rhythm; No murmurs, rubs, or gallops  GI/ABDOMEN: Soft, Nontender, Nondistended; Bowel sounds present  EXTREMITIES:  2+ Peripheral Pulses, No clubbing, cyanosis, or edema  SKIN: No rashes or lesions    LAB:                        8.5    12.64 )-----------( 447      ( 03 Mar 2024 07:05 )             28.3     03-03    141  |  112<H>  |  22<H>  ----------------------------<  68<L>  4.9   |  15<L>  |  1.5    Ca    7.9<L>      03 Mar 2024 07:05  Phos  2.8     03-03  Mg     1.9     03-03    TPro  5.7<L>  /  Alb  2.7<L>  /  TBili  0.3  /  DBili  x   /  AST  25  /  ALT  33  /  AlkPhos  65  03-03        Daily     Daily   CAPILLARY BLOOD GLUCOSE      POCT Blood Glucose.: 82 mg/dL (02 Mar 2024 20:58)    Urinalysis Basic - ( 03 Mar 2024 07:05 )    Color: x / Appearance: x / SG: x / pH: x  Gluc: 68 mg/dL / Ketone: x  / Bili: x / Urobili: x   Blood: x / Protein: x / Nitrite: x   Leuk Esterase: x / RBC: x / WBC x   Sq Epi: x / Non Sq Epi: x / Bacteria: x      LIVER FUNCTIONS - ( 03 Mar 2024 07:05 )  Alb: 2.7 g/dL / Pro: 5.7 g/dL / ALK PHOS: 65 U/L / ALT: 33 U/L / AST: 25 U/L / GGT: x             acetaminophen     Tablet .. 650 milliGRAM(s) Oral every 6 hours PRN  aluminum hydroxide/magnesium hydroxide/simethicone Suspension 30 milliLiter(s) Oral every 4 hours PRN  cefTRIAXone   IVPB 2000 milliGRAM(s) IV Intermittent every 24 hours  chlorhexidine 2% Cloths 1 Application(s) Topical <User Schedule>  dextrose 5% + lactated ringers. 1000 milliLiter(s) IV Continuous <Continuous>  heparin   Injectable 5000 Unit(s) SubCutaneous every 12 hours  influenza  Vaccine (HIGH DOSE) 0.7 milliLiter(s) IntraMuscular once  lidocaine   4% Patch 1 Patch Transdermal every 24 hours  melatonin 3 milliGRAM(s) Oral at bedtime PRN  metoprolol tartrate 25 milliGRAM(s) Oral two times a day  ondansetron Injectable 4 milliGRAM(s) IV Push once  sodium bicarbonate 1300 milliGRAM(s) Oral every 8 hours

## 2024-03-03 NOTE — PROGRESS NOTE ADULT - SUBJECTIVE AND OBJECTIVE BOX
Nephrology progress note    THIS IS AN INCOMPLETE NOTE . FULL NOTE TO FOLLOW SHORTLY    Patient is seen and examined, events over the last 24 h noted .    Allergies:  No Known Allergies    Hospital Medications:   MEDICATIONS  (STANDING):  cefTRIAXone   IVPB 2000 milliGRAM(s) IV Intermittent every 24 hours  chlorhexidine 2% Cloths 1 Application(s) Topical <User Schedule>  dextrose 5% + lactated ringers. 1000 milliLiter(s) (75 mL/Hr) IV Continuous <Continuous>  heparin   Injectable 5000 Unit(s) SubCutaneous every 12 hours  influenza  Vaccine (HIGH DOSE) 0.7 milliLiter(s) IntraMuscular once  lidocaine   4% Patch 1 Patch Transdermal every 24 hours  metoprolol tartrate 25 milliGRAM(s) Oral two times a day  ondansetron Injectable 4 milliGRAM(s) IV Push once  sodium bicarbonate 650 milliGRAM(s) Oral every 12 hours        VITALS:  T(F): 96.9 (03-03-24 @ 05:56), Max: 98.2 (03-02-24 @ 14:48)  HR: 88 (03-03-24 @ 05:56)  BP: 134/94 (03-03-24 @ 05:56)  RR: 18 (03-03-24 @ 05:56)  SpO2: --  Wt(kg): --    03-01 @ 07:01  -  03-02 @ 07:00  --------------------------------------------------------  IN: 375 mL / OUT: 0 mL / NET: 375 mL    03-02 @ 07:01  -  03-03 @ 07:00  --------------------------------------------------------  IN: 0 mL / OUT: 450 mL / NET: -450 mL          PHYSICAL EXAM:  Constitutional: NAD  HEENT: anicteric sclera, oropharynx clear, MMM  Neck: No JVD  Respiratory: CTAB, no wheezes, rales or rhonchi  Cardiovascular: S1, S2, RRR  Gastrointestinal: BS+, soft, NT/ND  Extremities: No cyanosis or clubbing. No peripheral edema  :  No castro.   Skin: No rashes    LABS:  03-02    142  |  112<H>  |  23<H>  ----------------------------<  89  4.4   |  17  |  1.6<H>    Ca    8.0<L>      02 Mar 2024 07:22  Phos  2.7     03-02  Mg     1.9     03-02    TPro  5.6<L>  /  Alb  2.8<L>  /  TBili  0.3  /  DBili      /  AST  29  /  ALT  40  /  AlkPhos  63  03-02                          8.6    10.59 )-----------( 421      ( 02 Mar 2024 07:22 )             27.7       Urine Studies:  Urinalysis Basic - ( 02 Mar 2024 07:22 )    Color:  / Appearance:  / SG:  / pH:   Gluc: 89 mg/dL / Ketone:   / Bili:  / Urobili:    Blood:  / Protein:  / Nitrite:    Leuk Esterase:  / RBC:  / WBC    Sq Epi:  / Non Sq Epi:  / Bacteria:       Sodium, Random Urine: 42.0 mmoL/L (02-28 @ 13:35)  Creatinine, Random Urine: 126 mg/dL (02-28 @ 13:35)  Protein/Creatinine Ratio Calculation: 1.2 Ratio (02-28 @ 13:35)  Osmolality, Random Urine: 509 mos/kg (02-28 @ 13:35)  Potassium, Random Urine: 23 mmol/L (02-28 @ 13:35)      Iron 48, TIBC 179, %sat 27      [02-21-24 @ 07:21]  Ferritin 883      [02-21-24 @ 07:21]      C3 Complement 150      [02-29-24 @ 07:58]  C4 Complement 31      [02-29-24 @ 07:58]  Immunofixation Serum:   No Monoclonal Band Identified      Reference Range: None Detected      [02-21-24 @ 07:21]  SPEP Interpretation: Normal Electrophoresis Pattern      [02-21-24 @ 07:21]      RADIOLOGY & ADDITIONAL STUDIES:   Nephrology progress note    Patient is seen and examined, events over the last 24 h noted .  Lying in bed comfortable     Allergies:  No Known Allergies    Hospital Medications:   MEDICATIONS  (STANDING):  cefTRIAXone   IVPB 2000 milliGRAM(s) IV Intermittent every 24 hours  dextrose 5% + lactated ringers. 1000 milliLiter(s) (75 mL/Hr) IV Continuous <Continuous>  heparin   Injectable 5000 Unit(s) SubCutaneous every 12 hours  influenza  Vaccine (HIGH DOSE) 0.7 milliLiter(s) IntraMuscular once  lidocaine   4% Patch 1 Patch Transdermal every 24 hours  metoprolol tartrate 25 milliGRAM(s) Oral two times a day  ondansetron Injectable 4 milliGRAM(s) IV Push once  sodium bicarbonate 650 milliGRAM(s) Oral every 12 hours        VITALS:  T(F): 96.9 (03-03-24 @ 05:56), Max: 98.2 (03-02-24 @ 14:48)  HR: 88 (03-03-24 @ 05:56)  BP: 134/94 (03-03-24 @ 05:56)  RR: 18 (03-03-24 @ 05:56)    03-01 @ 07:01  -  03-02 @ 07:00  --------------------------------------------------------  IN: 375 mL / OUT: 0 mL / NET: 375 mL    03-02 @ 07:01  -  03-03 @ 07:00  --------------------------------------------------------  IN: 0 mL / OUT: 450 mL / NET: -450 mL          PHYSICAL EXAM:  Constitutional: NAD  Respiratory: CTAB,  Cardiovascular: S1, S2, RRR  Gastrointestinal: BS+, soft, NT/ND  Extremities: No cyanosis or clubbing. No peripheral edema  Skin: No rashes    LABS:  03-03    141  |  112<H>  |  22<H>  ----------------------------<  68<L>  4.9   |  15<L>  |  1.5    Ca    7.9<L>      03 Mar 2024 07:05  Phos  2.8     03-03  Mg     1.9     03-03    TPro  5.7<L>  /  Alb  2.7<L>  /  TBili  0.3  /  DBili  x   /  AST  25  /  ALT  33  /  AlkPhos  65  03-03    03-02    142  |  112<H>  |  23<H>  ----------------------------<  89  4.4   |  17  |  1.6<H>    Ca    8.0<L>      02 Mar 2024 07:22  Phos  2.7     03-02  Mg     1.9     03-02    TPro  5.6<L>  /  Alb  2.8<L>  /  TBili  0.3  /  DBili      /  AST  29  /  ALT  40  /  AlkPhos  63  03-02                          8.6    10.59 )-----------( 421      ( 02 Mar 2024 07:22 )             27.7       Urine Studies:  Urinalysis Basic - ( 02 Mar 2024 07:22 )    Color:  / Appearance:  / SG:  / pH:   Gluc: 89 mg/dL / Ketone:   / Bili:  / Urobili:    Blood:  / Protein:  / Nitrite:    Leuk Esterase:  / RBC:  / WBC    Sq Epi:  / Non Sq Epi:  / Bacteria:       Sodium, Random Urine: 42.0 mmoL/L (02-28 @ 13:35)  Creatinine, Random Urine: 126 mg/dL (02-28 @ 13:35)  Protein/Creatinine Ratio Calculation: 1.2 Ratio (02-28 @ 13:35)  Osmolality, Random Urine: 509 mos/kg (02-28 @ 13:35)  Potassium, Random Urine: 23 mmol/L (02-28 @ 13:35)      Iron 48, TIBC 179, %sat 27      [02-21-24 @ 07:21]  Ferritin 883      [02-21-24 @ 07:21]      C3 Complement 150      [02-29-24 @ 07:58]  C4 Complement 31      [02-29-24 @ 07:58]  Immunofixation Serum:   No Monoclonal Band Identified      Reference Range: None Detected      [02-21-24 @ 07:21]  SPEP Interpretation: Normal Electrophoresis Pattern      [02-21-24 @ 07:21]      RADIOLOGY & ADDITIONAL STUDIES:

## 2024-03-03 NOTE — PROGRESS NOTE ADULT - ASSESSMENT
·	Bacteremia   ·	Odynophagia   ·	hx of prostate cancer now with mets?   ·	Esophageal carcinoma   ·	severe protein calorie malnutrition   ·	DIMAS/CHUCKIE  ·	Moderate to severe left hydronephrosis   ·	metabolic acidosis     -patient with odynophagia - newly diagnosed esophageal cancer (poorly differentiated) - unable to reach patient's oncologist Dr. Irizarry - placed routine service oncology consult - appreciated their input - Dr. Irizarry/pt's private oncologist will see the patient on Monday  -continue with IV abx, ID following   -repeat BCx prelim negative   -Monitor Kidney function; cr improving  -increased bicarb tabs to 1300mg q 8 hrs  -overall poor prognosis - no next of kin as per CM  -Palliative care follow up     DISPO: not discharge ready - oncology reccs on chemo? - will follow       Attending Physician Dr. Erinn Leslie # [id number] .

## 2024-03-04 LAB
ALBUMIN SERPL ELPH-MCNC: 2.9 G/DL — LOW (ref 3.5–5.2)
ALP SERPL-CCNC: 64 U/L — SIGNIFICANT CHANGE UP (ref 30–115)
ALT FLD-CCNC: 27 U/L — SIGNIFICANT CHANGE UP (ref 0–41)
ANION GAP SERPL CALC-SCNC: 19 MMOL/L — HIGH (ref 7–14)
AST SERPL-CCNC: 18 U/L — SIGNIFICANT CHANGE UP (ref 0–41)
BASOPHILS # BLD AUTO: 0.01 K/UL — SIGNIFICANT CHANGE UP (ref 0–0.2)
BASOPHILS NFR BLD AUTO: 0.1 % — SIGNIFICANT CHANGE UP (ref 0–1)
BILIRUB SERPL-MCNC: 0.5 MG/DL — SIGNIFICANT CHANGE UP (ref 0.2–1.2)
BUN SERPL-MCNC: 22 MG/DL — HIGH (ref 10–20)
CALCIUM SERPL-MCNC: 8.2 MG/DL — LOW (ref 8.4–10.4)
CHLORIDE SERPL-SCNC: 112 MMOL/L — HIGH (ref 98–110)
CO2 SERPL-SCNC: 14 MMOL/L — LOW (ref 17–32)
CREAT SERPL-MCNC: 1.3 MG/DL — SIGNIFICANT CHANGE UP (ref 0.7–1.5)
CULTURE RESULTS: SIGNIFICANT CHANGE UP
EGFR: 59 ML/MIN/1.73M2 — LOW
EOSINOPHIL # BLD AUTO: 0.1 K/UL — SIGNIFICANT CHANGE UP (ref 0–0.7)
EOSINOPHIL NFR BLD AUTO: 0.8 % — SIGNIFICANT CHANGE UP (ref 0–8)
GLUCOSE SERPL-MCNC: 66 MG/DL — LOW (ref 70–99)
HCT VFR BLD CALC: 29.6 % — LOW (ref 42–52)
HGB BLD-MCNC: 8.8 G/DL — LOW (ref 14–18)
IMM GRANULOCYTES NFR BLD AUTO: 1.4 % — HIGH (ref 0.1–0.3)
LYMPHOCYTES # BLD AUTO: 0.58 K/UL — LOW (ref 1.2–3.4)
LYMPHOCYTES # BLD AUTO: 4.8 % — LOW (ref 20.5–51.1)
MAGNESIUM SERPL-MCNC: 1.9 MG/DL — SIGNIFICANT CHANGE UP (ref 1.8–2.4)
MCHC RBC-ENTMCNC: 28.3 PG — SIGNIFICANT CHANGE UP (ref 27–31)
MCHC RBC-ENTMCNC: 29.7 G/DL — LOW (ref 32–37)
MCV RBC AUTO: 95.2 FL — HIGH (ref 80–94)
MONOCYTES # BLD AUTO: 0.63 K/UL — HIGH (ref 0.1–0.6)
MONOCYTES NFR BLD AUTO: 5.2 % — SIGNIFICANT CHANGE UP (ref 1.7–9.3)
NEUTROPHILS # BLD AUTO: 10.55 K/UL — HIGH (ref 1.4–6.5)
NEUTROPHILS NFR BLD AUTO: 87.7 % — HIGH (ref 42.2–75.2)
NRBC # BLD: 0 /100 WBCS — SIGNIFICANT CHANGE UP (ref 0–0)
PHOSPHATE SERPL-MCNC: 2.8 MG/DL — SIGNIFICANT CHANGE UP (ref 2.1–4.9)
PLATELET # BLD AUTO: 459 K/UL — HIGH (ref 130–400)
PMV BLD: 9.6 FL — SIGNIFICANT CHANGE UP (ref 7.4–10.4)
POTASSIUM SERPL-MCNC: 5.4 MMOL/L — HIGH (ref 3.5–5)
POTASSIUM SERPL-SCNC: 5.4 MMOL/L — HIGH (ref 3.5–5)
PROT SERPL-MCNC: 6 G/DL — SIGNIFICANT CHANGE UP (ref 6–8)
RBC # BLD: 3.11 M/UL — LOW (ref 4.7–6.1)
RBC # FLD: 14.1 % — SIGNIFICANT CHANGE UP (ref 11.5–14.5)
SODIUM SERPL-SCNC: 145 MMOL/L — SIGNIFICANT CHANGE UP (ref 135–146)
SPECIMEN SOURCE: SIGNIFICANT CHANGE UP
WBC # BLD: 12.04 K/UL — HIGH (ref 4.8–10.8)
WBC # FLD AUTO: 12.04 K/UL — HIGH (ref 4.8–10.8)

## 2024-03-04 PROCEDURE — 99222 1ST HOSP IP/OBS MODERATE 55: CPT

## 2024-03-04 PROCEDURE — 99232 SBSQ HOSP IP/OBS MODERATE 35: CPT

## 2024-03-04 RX ORDER — SODIUM ZIRCONIUM CYCLOSILICATE 10 G/10G
10 POWDER, FOR SUSPENSION ORAL ONCE
Refills: 0 | Status: COMPLETED | OUTPATIENT
Start: 2024-03-04 | End: 2024-03-04

## 2024-03-04 RX ADMIN — Medication 25 MILLIGRAM(S): at 05:33

## 2024-03-04 RX ADMIN — Medication 1300 MILLIGRAM(S): at 05:33

## 2024-03-04 RX ADMIN — SODIUM ZIRCONIUM CYCLOSILICATE 10 GRAM(S): 10 POWDER, FOR SUSPENSION ORAL at 13:45

## 2024-03-04 RX ADMIN — Medication 1300 MILLIGRAM(S): at 13:45

## 2024-03-04 RX ADMIN — CHLORHEXIDINE GLUCONATE 1 APPLICATION(S): 213 SOLUTION TOPICAL at 05:35

## 2024-03-04 NOTE — CHART NOTE - NSCHARTNOTEFT_GEN_A_CORE
Patient have been refusing medical management, today he refused IV hydration and antibiotics, I explain to the patient the need for abx and the risks of his bacteremia but he still refused. Psych consulted for capacity.

## 2024-03-04 NOTE — PROGRESS NOTE ADULT - SUBJECTIVE AND OBJECTIVE BOX
PICKARDRUFUS SEAMAN  68y, Male  Allergy: No Known Allergies      LOS  13d    CHIEF COMPLAINT: Mediastinal Mass (04 Mar 2024 10:58)      INTERVAL EVENTS/HPI  - No acute events overnight  - T(F): , Max: 98.3 (03-03-24 @ 20:18)  - Denies any worsening symptoms  - Tolerating medication  - WBC Count: 12.04 (03-04-24 @ 06:17)  WBC Count: 12.64 (03-03-24 @ 07:05)     - Creatinine: 1.3 (03-04-24 @ 06:17)  Creatinine: 1.5 (03-03-24 @ 07:05)       ROS  General: Denies rigors, nightsweats  HEENT: Denies headache, rhinorrhea, sore throat, eye pain  CV: Denies CP, palpitations  PULM: Denies wheezing, hemoptysis  GI: Denies hematemesis, hematochezia, melena  : Denies discharge, hematuria  MSK: Denies arthralgias, myalgias  SKIN: Denies rash, lesions  NEURO: Denies paresthesias, weakness  PSYCH: Denies depression, anxiety    VITALS:  T(F): 96.5, Max: 98.3 (03-03-24 @ 20:18)  HR: 80  BP: 123/77  RR: 18Vital Signs Last 24 Hrs  T(C): 35.8 (04 Mar 2024 05:30), Max: 36.8 (03 Mar 2024 20:18)  T(F): 96.5 (04 Mar 2024 05:30), Max: 98.3 (03 Mar 2024 20:18)  HR: 80 (04 Mar 2024 05:30) (80 - 91)  BP: 123/77 (04 Mar 2024 05:30) (123/77 - 134/78)  BP(mean): --  RR: 18 (04 Mar 2024 05:30) (18 - 18)  SpO2: --        PHYSICAL EXAM:  Gen: NAD, resting in bed  HEENT: Normocephalic, atraumatic  Neck: supple, no lymphadenopathy  CV: Regular rate & regular rhythm  Lungs: decreased BS at bases, no fremitus  Abdomen: Soft, BS present  Ext: Warm, well perfused  Neuro: non focal, awake  Skin: no rash, no erythema  Lines: no phlebitis    FH: Non-contributory  Social Hx: Non-contributory    TESTS & MEASUREMENTS:                        8.8    12.04 )-----------( 459      ( 04 Mar 2024 06:17 )             29.6     03-04    145  |  112<H>  |  22<H>  ----------------------------<  66<L>  5.4<H>   |  14<L>  |  1.3    Ca    8.2<L>      04 Mar 2024 06:17  Phos  2.8     03-04  Mg     1.9     03-04    TPro  6.0  /  Alb  2.9<L>  /  TBili  0.5  /  DBili  x   /  AST  18  /  ALT  27  /  AlkPhos  64  03-04      LIVER FUNCTIONS - ( 04 Mar 2024 06:17 )  Alb: 2.9 g/dL / Pro: 6.0 g/dL / ALK PHOS: 64 U/L / ALT: 27 U/L / AST: 18 U/L / GGT: x           Urinalysis Basic - ( 04 Mar 2024 06:17 )    Color: x / Appearance: x / SG: x / pH: x  Gluc: 66 mg/dL / Ketone: x  / Bili: x / Urobili: x   Blood: x / Protein: x / Nitrite: x   Leuk Esterase: x / RBC: x / WBC x   Sq Epi: x / Non Sq Epi: x / Bacteria: x        Culture - Blood (collected 03-02-24 @ 07:22)  Source: .Blood None  Preliminary Report (03-03-24 @ 17:01):    No growth at 24 hours    Culture - Blood (collected 03-01-24 @ 07:31)  Source: .Blood None  Preliminary Report (03-03-24 @ 23:02):    No growth at 48 Hours    Culture - Blood (collected 02-29-24 @ 12:44)  Source: .Blood Blood  Preliminary Report (03-03-24 @ 22:01):    No growth at 72 Hours    Culture - Blood (collected 02-29-24 @ 07:58)  Source: .Blood None  Preliminary Report (03-03-24 @ 23:01):    No growth at 72 Hours    Culture - Blood (collected 02-28-24 @ 09:04)  Source: .Blood Blood-Peripheral  Preliminary Report (03-03-24 @ 14:01):    No growth at 4 days    Culture - Blood (collected 02-27-24 @ 11:26)  Source: .Blood Blood-Peripheral  Final Report (03-03-24 @ 23:00):    No growth at 5 days    Culture - Blood (collected 02-27-24 @ 11:26)  Source: .Blood Blood  Final Report (03-03-24 @ 23:00):    No growth at 5 days    Culture - Blood (collected 02-24-24 @ 12:14)  Source: .Blood None  Gram Stain (02-26-24 @ 21:23):    Growth in aerobic bottle: Gram positive cocci in pairs    Growth in anaerobic bottle: Gram positive cocci in pairs  Final Report (02-28-24 @ 12:01):    Growth in aerobic and anaerobic bottles: Streptococcus pyogenes (Group A)    Direct identification is available within approximately 3-5    hours either by Blood Panel Multiplexed PCR or Direct    MALDI-TOF. Details: [email or web address]  Organism: Blood Culture PCR  Streptococcus pyogenes (Group A) (02-28-24 @ 12:01)  Organism: Streptococcus pyogenes (Group A) (02-28-24 @ 12:01)      Method Type: MIREYA      -  Ceftriaxone: S <=0.25      -  Penicillin: S <=0.03 Predicts results for ampicillin, amoxicillin, amoxicillin/clavulanate, ampicillin/sulbactam, 1st, 2nd and 3rd generation cephalosporins and carbapenems.      -  Vancomycin: S 0.5  Organism: Blood Culture PCR (02-28-24 @ 12:01)      Method Type: PCR      -  Streptococcus pyogenes (Group A): Detec            INFECTIOUS DISEASES TESTING  Procalcitonin, Serum: 7.32 (02-24-24 @ 12:14)      INFLAMMATORY MARKERS      RADIOLOGY & ADDITIONAL TESTS:  I have personally reviewed the last available Chest xray  CXR      CT      CARDIOLOGY TESTING  12 Lead ECG:   Ventricular Rate 90 BPM    Atrial Rate 90 BPM    P-R Interval 174 ms    QRS Duration 82 ms    Q-T Interval 400 ms    QTC Calculation(Bazett) 489 ms    P Axis 40 degrees    R Axis 40 degrees    T Axis 67 degrees    Diagnosis Line Normal sinus rhythm  Prolonged QT  Abnormal ECG    Confirmed by SHERITA OSMAN, JEANIE ([id number]) on 2/21/2024 10:11:43 PM (02-21-24 @ 14:56)      MEDICATIONS  cefTRIAXone   IVPB 2000 IV Intermittent every 24 hours  chlorhexidine 2% Cloths 1 Topical <User Schedule>  dextrose 5% + lactated ringers. 1000 IV Continuous <Continuous>  heparin   Injectable 5000 SubCutaneous every 12 hours  influenza  Vaccine (HIGH DOSE) 0.7 IntraMuscular once  lidocaine   4% Patch 1 Transdermal every 24 hours  metoprolol tartrate 25 Oral two times a day  ondansetron Injectable 4 IV Push once  sodium bicarbonate 1300 Oral every 8 hours  sodium zirconium cyclosilicate 10 Oral once      WEIGHT  Weight (kg): 77.1 (02-21-24 @ 14:55)  Creatinine: 1.3 mg/dL (03-04-24 @ 06:17)      ANTIBIOTICS:  cefTRIAXone   IVPB 2000 milliGRAM(s) IV Intermittent every 24 hours      All available historical records have been reviewed

## 2024-03-04 NOTE — PROGRESS NOTE ADULT - SUBJECTIVE AND OBJECTIVE BOX
Gastroenterology progress note:     Patient is a 68y old  Male who presents with a chief complaint of Mediastinal Mass (04 Mar 2024 15:54)       Admitted on: 02-20-24    We are following the patient for dysphagia     pt tolerates liquids only   s/p esophagogram  no overnight events       PAST MEDICAL & SURGICAL HISTORY:  HTN (hypertension)      Prostate cancer          MEDICATIONS  (STANDING):  cefTRIAXone   IVPB 2000 milliGRAM(s) IV Intermittent every 24 hours  chlorhexidine 2% Cloths 1 Application(s) Topical <User Schedule>  dextrose 5% + lactated ringers. 1000 milliLiter(s) (100 mL/Hr) IV Continuous <Continuous>  heparin   Injectable 5000 Unit(s) SubCutaneous every 12 hours  influenza  Vaccine (HIGH DOSE) 0.7 milliLiter(s) IntraMuscular once  lidocaine   4% Patch 1 Patch Transdermal every 24 hours  metoprolol tartrate 25 milliGRAM(s) Oral two times a day  ondansetron Injectable 4 milliGRAM(s) IV Push once  sodium bicarbonate 1300 milliGRAM(s) Oral every 8 hours    MEDICATIONS  (PRN):  acetaminophen     Tablet .. 650 milliGRAM(s) Oral every 6 hours PRN Temp greater or equal to 38C (100.4F), Mild Pain (1 - 3)  aluminum hydroxide/magnesium hydroxide/simethicone Suspension 30 milliLiter(s) Oral every 4 hours PRN Dyspepsia  melatonin 3 milliGRAM(s) Oral at bedtime PRN Insomnia      Allergies  No Known Allergies      Review of Systems:   Cardiovascular:  No Chest Pain, No Palpitations  Respiratory:  No Cough, No Dyspnea  Gastrointestinal:  As described in HPI  Skin:  No Skin Lesions, No Jaundice  Neuro:  No Syncope, No Dizziness    Physical Examination:  T(C): 36.9 (03-04-24 @ 14:48), Max: 36.9 (03-04-24 @ 14:48)  HR: 85 (03-04-24 @ 14:48) (80 - 91)  BP: 134/85 (03-04-24 @ 14:48) (123/77 - 134/85)  RR: 18 (03-04-24 @ 14:48) (18 - 18)  SpO2: --        GENERAL: AAOx3, no acute distress.  HEAD:  Atraumatic, Normocephalic  EYES: conjunctiva and sclera clear  NECK: Supple, no JVD or thyromegaly  CHEST/LUNG: Clear to auscultation bilaterally; No wheeze, rhonchi, or rales  HEART: Regular rate and rhythm; normal S1, S2, No murmurs.  ABDOMEN: Soft, nontender, nondistended; Bowel sounds present  NEUROLOGY: No asterixis or tremor.   SKIN: Intact, no jaundice     Data:                        8.8    12.04 )-----------( 459      ( 04 Mar 2024 06:17 )             29.6     Hgb trend:  8.8  03-04-24 @ 06:17  8.5  03-03-24 @ 07:05  8.6  03-02-24 @ 07:22        03-04    145  |  112<H>  |  22<H>  ----------------------------<  66<L>  5.4<H>   |  14<L>  |  1.3    Ca    8.2<L>      04 Mar 2024 06:17  Phos  2.8     03-04  Mg     1.9     03-04    TPro  6.0  /  Alb  2.9<L>  /  TBili  0.5  /  DBili  x   /  AST  18  /  ALT  27  /  AlkPhos  64  03-04    Liver panel trend:  TBili 0.5   /   AST 18   /   ALT 27   /   AlkP 64   /   Tptn 6.0   /   Alb 2.9    /   DBili --      03-04  TBili 0.3   /   AST 25   /   ALT 33   /   AlkP 65   /   Tptn 5.7   /   Alb 2.7    /   DBili --      03-03  TBili 0.3   /   AST 29   /   ALT 40   /   AlkP 63   /   Tptn 5.6   /   Alb 2.8    /   DBili --      03-02  TBili 0.2   /   AST 28   /   ALT 41   /   AlkP 68   /   Tptn 5.8   /   Alb 2.9    /   DBili --      03-01  TBili 0.3   /   AST 42   /   ALT 46   /   AlkP 80   /   Tptn 5.8   /   Alb 2.8    /   DBili --      02-29  TBili 0.4   /   AST 29   /   ALT 39   /   AlkP 82   /   Tptn 5.5   /   Alb 2.8    /   DBili --      02-28  TBili 0.6   /   AST 41   /   ALT 36   /   AlkP 77   /   Tptn 5.3   /   Alb 2.6    /   DBili --      02-27  TBili 0.4   /   AST 32   /   ALT 26   /   AlkP 65   /   Tptn 5.1   /   Alb 2.7    /   DBili --      02-26  TBili 0.7   /   AST 15   /   ALT 13   /   AlkP 78   /   Tptn 5.7   /   Alb 3.1    /   DBili --      02-25  TBili 1.0   /   AST 20   /   ALT 16   /   AlkP 91   /   Tptn 6.3   /   Alb 3.4    /   DBili --      02-24          Culture - Blood (collected 02 Mar 2024 07:22)  Source: .Blood None  Preliminary Report (03 Mar 2024 17:01):    No growth at 24 hours         Radiology:

## 2024-03-04 NOTE — CONSULT NOTE ADULT - NS ATTEND AMEND GEN_ALL_CORE FT
Recent events noted.  Will ask for old radiation records to ensure there will be no overlap of radiation fields.  Will revisit palliative chest radiation at that time.

## 2024-03-04 NOTE — PROGRESS NOTE ADULT - SUBJECTIVE AND OBJECTIVE BOX
Nephrology progress note    Patient was seen and examined, events over the last 24 h noted .    Allergies:  No Known Allergies    Hospital Medications:   MEDICATIONS  (STANDING):  cefTRIAXone   IVPB 2000 milliGRAM(s) IV Intermittent every 24 hours  chlorhexidine 2% Cloths 1 Application(s) Topical <User Schedule>  dextrose 5% + lactated ringers. 1000 milliLiter(s) (100 mL/Hr) IV Continuous <Continuous>  heparin   Injectable 5000 Unit(s) SubCutaneous every 12 hours  influenza  Vaccine (HIGH DOSE) 0.7 milliLiter(s) IntraMuscular once  lidocaine   4% Patch 1 Patch Transdermal every 24 hours  metoprolol tartrate 25 milliGRAM(s) Oral two times a day  ondansetron Injectable 4 milliGRAM(s) IV Push once  sodium bicarbonate 1300 milliGRAM(s) Oral every 8 hours        VITALS:  T(F): 98.5 (03-04-24 @ 14:48), Max: 98.5 (03-04-24 @ 14:48)  HR: 85 (03-04-24 @ 14:48)  BP: 134/85 (03-04-24 @ 14:48)  RR: 18 (03-04-24 @ 14:48)  SpO2: --  Wt(kg): --    03-02 @ 07:01  -  03-03 @ 07:00  --------------------------------------------------------  IN: 0 mL / OUT: 450 mL / NET: -450 mL          PHYSICAL EXAM:  Constitutional: NAD  HEENT: anicteric sclera, oropharynx clear, MMM  Neck: No JVD  Respiratory: CTAB, no wheezes, rales or rhonchi  Cardiovascular: S1, S2, RRR  Gastrointestinal: BS+, soft, NT/ND  Extremities: No cyanosis or clubbing. No peripheral edema  :  No castro.   Skin: No rashes    LABS:  03-04    145  |  112<H>  |  22<H>  ----------------------------<  66<L>  5.4<H>   |  14<L>  |  1.3    Ca    8.2<L>      04 Mar 2024 06:17  Phos  2.8     03-04  Mg     1.9     03-04    TPro  6.0  /  Alb  2.9<L>  /  TBili  0.5  /  DBili      /  AST  18  /  ALT  27  /  AlkPhos  64  03-04                          8.8    12.04 )-----------( 459      ( 04 Mar 2024 06:17 )             29.6       Urine Studies:  Urinalysis Basic - ( 04 Mar 2024 06:17 )    Color:  / Appearance:  / SG:  / pH:   Gluc: 66 mg/dL / Ketone:   / Bili:  / Urobili:    Blood:  / Protein:  / Nitrite:    Leuk Esterase:  / RBC:  / WBC    Sq Epi:  / Non Sq Epi:  / Bacteria:       Sodium, Random Urine: 42.0 mmoL/L (02-28 @ 13:35)  Creatinine, Random Urine: 126 mg/dL (02-28 @ 13:35)  Protein/Creatinine Ratio Calculation: 1.2 Ratio (02-28 @ 13:35)  Osmolality, Random Urine: 509 mos/kg (02-28 @ 13:35)  Potassium, Random Urine: 23 mmol/L (02-28 @ 13:35)    RADIOLOGY & ADDITIONAL STUDIES:

## 2024-03-04 NOTE — PROGRESS NOTE ADULT - ASSESSMENT
68-year-old male with past medical history of hypertension, chronic back pain, prostate cancer, and posterior mediastinal mass 6.5 cm seen on CT 11/2023, presents for 3 months of progressively difficulty swallowing associated with 10 pounds weight loss and odynophagia. Comes in today now unable to swallow his pills with water, resulting in 2 episode of emesis. He mentioned that he gets SOB during exertion but denies any exertional chest pain. As per patient he was treated at [facility] for prostate cancer and is s/p radiation therapy. He states that he has problem during walking and unable to maintain the posture.     #aspiration PNA vs viral infection vs HAP   #GAS s pyogenes bacteremia   - SIRS positive (WBC increased to 16, febrile to 101 and tachy to 129 )   - CXR with worsening L-sided opacity   - Blood cx (2/24/24): positive for strep pyogenes  - repeat BCx 27,28,29,1,2  negative to date   - procal 7  - aspiration precautions   - patient cleared for pureed diet and thin liquids per SS -refusing food as patient reports inability to swallow   - on zosyn 3.375mg q8h switched to cefepime and clindamycin pending ID rec   -ID rec:  source potentially from translocation in setting of infiltrative masses, stop clindamycin , switch cefepime to ceftriaxone 2g daily   - TTE   1. Left ventricular ejection fraction, by visual estimation, is 65 to   70%.   2. Hyperdynamic global left ventricular systolic function.   3. Mildly increased LV wall thickness.   4. Spectral Doppler shows impaired relaxation pattern of left   ventricular myocardial filling (Grade I diastolic dysfunction).   5. Trace mitral valve regurgitation.      # Dysphagia with odynophagia  #Failure to thrive   # Hx of prostate cancer s/p radiation therapy  # Prostate cancer metastasis to mediastinum or primary mediastinal mass?  - CT Chest: Interval enlargement of the central/posterior mediastinal mass with development of more extensive lymphadenopathy. The mass encircles the descending thoracic aorta and has some mass effect upon the left atrium. The mid aspect of the esophagus is encircled by the mass and difficult to distinguish. Possible ovoid pill is noted within the mass and possible location of the mid esophagus appear. The maximal esophagus appears distended with layering debris within. Consideration may be given to esophageal stenting.  - CT abdomen and pelvis:  Increased upper abdominal bulky lymphadenopathy. Unchanged bulky retroperitoneal lymphadenopathy, large left bladder   mass and perirectal soft tissue infiltrative mass. 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 Chronic moderate to severe left hydroureteronephrosis  - EGD with GI 2/21 -> stent placed and esophageal biopsies taken -pathology - Poorly differentiated carcinoma.  - CT surgery following -> no acute intervention   - AFP and CEA wnl  - LDH and haptoglobin elevated   - PSA total 220, PSA free is 22   - CA19 131  -  57  - oncology recs appreciated ->patient used to follow with Dr. Kenia Irizarry, called and teams her and she is not picking up the phone calls, not reachable in any way   -service oncology consulted  - Esophageal biopsy: Cytopathology - Non Gyn Report: ACCESSION No:  [id number]. Final Diagnosis- MEDIASTINUM MASS; EUS GUIDED FNA (THINPREP AND CELL BLOCK): POSITIVE FOR MALIGNANT CELLS,  Metastatic poorly differentiated carcinoma. The morphology of the current tumor and the immunohistochemical profile along with the patient's history of prostatic cancer are consistent with a metastatic poorly differentiated carcinoma of prostatic origin.  - Esophageal ulcer biopsy shows poorly differentiated carcinoma, IHC staining pending. Discussed with Dr. Lewis, related to prostate cancer, not a second primary malignancy   - per onc note, patient had prior biopsy of site that demonstrated metastatic prostate adenocarcinoma and was started on Docetaxel.  - per one oncology note in the patients paper chart, in January 2024 patient was started on Pembrolizumab and continued on keytruda injections  - will need to obtain further records from Dr. Irizarry's office to detail treatment history  - Dr. Irizarry is returning to the office on Monday 3/4, patients information provided to the office   - orthostatics positive, likely secondary to poor oral intake, on IV fluids   - ensure added, nutrition consulted, calorie count ordered - not accepting to eat   -palliative consulted , patient needs surrogate    - zofran prn  -barium swallow:Esophageal stent is visualized in the mid esophagus and extending below   the left hemidiaphragm. The lumen of the mid esophagus is severely   narrowed.A small amount of contrast is visualized passing through the mid   esophagus.No evidence of contrast extravasation or fistula formation to suggest   leak.  => advanged GI recalled again2/29/24  for suspicion of stent migration : Esophagogram to check for stent patency      #CHUCKIE vs ATN induced DIMAS  - pt came in with a baseline of 1.5 and increased to 2.6 with contrast use  - c/w IVF  - renally dose medications  -crea not improving   -US Kidney and Bladder (03.01.24 @ 18:42) >  1.  Diffusely echogenic bilateral kidneys which can be seen with medical renal disease.  2.  Moderate to severe left hydronephrosis with left renal parenchymal thinning, as seen on prior CT scan.  3.  Large mass identified within the urinary bladder, measuring up to 7.9  cm, previously up to 7 cm.  No ureteral jets are seen bilaterally.  4.  Incidentally noted right pleural effusion.  -nephro cs   DIMAS likely due to CHUCKIE  other possible causes IRGN iso GAS bacteremia, doubt prerenal given no improvement after IVF   chronic left hydro iso prostate cancer and LN, creatinine at baseline on admission, unlikely contributing to DIMAS  send UA with urine lytes and spot for PCR -> fena 0.5%   C3 C4 levels normal  repeat blood cultures  creatinine stable, non oliguric  strict i/os  gentle IVH if odyno/dysphagia still present with inability to tolerate adequate po intake  bicarb   no indications for RRT    # small b/l pleural effusions on CT chest   - s/p 1x dose 40mg IV Lasix , CXR improved     #normocytic Anemia  - Hb 9-10  - f/u iron  studies noted % sat 27   - MCV normal, SPEP negative     #hypocalcemia  -replenished   -monitor     #Misc   #DVT PPx- heparin   #GI PPx- none   #Diet-  puree   #Activity- AAT, PT consult Home PT; back to [facility] w/ assistance???  #Dispo- Acute   68-year-old male with past medical history of hypertension, chronic back pain, prostate cancer, and posterior mediastinal mass 6.5 cm seen on CT 11/2023, presents for 3 months of progressively difficulty swallowing associated with 10 pounds weight loss and odynophagia. Comes in today now unable to swallow his pills with water, resulting in 2 episode of emesis. He mentioned that he gets SOB during exertion but denies any exertional chest pain. As per patient he was treated at [facility] for prostate cancer and is s/p radiation therapy. He states that he has problem during walking and unable to maintain the posture.     #aspiration PNA vs viral infection vs HAP   #GAS s pyogenes bacteremia   - SIRS positive (WBC increased to 16, febrile to 101 and tachy to 129 )   - CXR with worsening L-sided opacity   - Blood cx (2/24/24): positive for strep pyogenes  - repeat BCx 27,28,29,1,2  negative to date   - procal 7  - aspiration precautions   - patient cleared for pureed diet and thin liquids per SS -refusing food as patient reports inability to swallow   - on zosyn 3.375mg q8h switched to cefepime and clindamycin pending ID rec   -ID rec:  source potentially from translocation in setting of infiltrative masses, stop clindamycin , switch cefepime to ceftriaxone 2g daily => ID rec for dc abx   - TTE   1. Left ventricular ejection fraction, by visual estimation, is 65 to 70%.   2. Hyperdynamic global left ventricular systolic function.   3. Mildly increased LV wall thickness.   4. Spectral Doppler shows impaired relaxation pattern of left   ventricular myocardial filling (Grade I diastolic dysfunction).   5. Trace mitral valve regurgitation.    # Dysphagia with odynophagia  #Failure to thrive   # Hx of prostate cancer s/p radiation therapy  # Prostate cancer metastasis to mediastinum or primary mediastinal mass?  - CT Chest: Interval enlargement of the central/posterior mediastinal mass with development of more extensive lymphadenopathy. The mass encircles the descending thoracic aorta and has some mass effect upon the left atrium. The mid aspect of the esophagus is encircled by the mass and difficult to distinguish. Possible ovoid pill is noted within the mass and possible location of the mid esophagus appear. The maximal esophagus appears distended with layering debris within. Consideration may be given to esophageal stenting.  - CT abdomen and pelvis:  Increased upper abdominal bulky lymphadenopathy. Unchanged bulky retroperitoneal lymphadenopathy, large left bladder mass and perirectal soft tissue infiltrative mass. 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 Chronic moderate to severe left hydroureteronephrosis  - EGD with GI 2/21 -> stent placed and esophageal biopsies taken -pathology - Poorly differentiated carcinoma.  - CT surgery following -> no acute intervention   - AFP and CEA wnl - LDH and haptoglobin elevated - PSA total 220, PSA free is 22 - CA19 131-  57  - oncology recs appreciated ->patient used to follow with Dr. Kenia Irizarry, called and teams her and she is not picking up the phone calls, not reachable in any way   -service oncology consulted  - Esophageal biopsy: Cytopathology - Non Gyn Report: ACCESSION No:  [id number]. Final Diagnosis- MEDIASTINUM MASS; EUS GUIDED FNA (THINPREP AND CELL BLOCK): POSITIVE FOR MALIGNANT CELLS,  Metastatic poorly differentiated carcinoma. The morphology of the current tumor and the immunohistochemical profile along with the patient's history of prostatic cancer are consistent with a metastatic poorly differentiated carcinoma of prostatic origin.  - Esophageal ulcer biopsy shows poorly differentiated carcinoma, IHC staining pending. Discussed with Dr. Lewis, related to prostate cancer, not a second primary malignancy   - per onc note, patient had prior biopsy of site that demonstrated metastatic prostate adenocarcinoma and was started on Docetaxel, in January 2024 patient was started on Pembrolizumab and continued on keytruda injections.   - Dr. Irizarry is returning to the office on Monday 3/4 => on phone cs rad/onco for palliative radiation   - orthostatics positive, likely secondary to poor oral intake, on IV fluids, ensure added, nutrition consulted, calorie count ordered, not accepting to eat,zofran prn  -palliative consulted , patient needs surrogate    -esophogram :Esophageal stent is visualized in the mid esophagus and extending below  the left hemidiaphragm. The lumen of the mid esophagus is severely   narrowed.A small amount of contrast is visualized passing through the mid esophagus.No evidence of contrast extravasation or fistula formation to suggest leak.  => advanced GI recalled again : EGD today to salvage stent or replace it     #CHUCKIE vs ATN induced DIMAS  - pt came in with a baseline of 1.5 and increased to 2.6 with contrast use  - c/w IVF  - renally dose medications  -crea not improving   -US Kidney and Bladder (03.01.24 @ 18:42) >  1.  Diffusely echogenic bilateral kidneys which can be seen with medical renal disease.  2.  Moderate to severe left hydronephrosis with left renal parenchymal thinning, as seen on prior CT scan.  3.  Large mass identified within the urinary bladder, measuring up to 7.9  cm, previously up to 7 cm.  No ureteral jets are seen bilaterally.  4.  Incidentally noted right pleural effusion.  -nephro cs   DIMAS likely due to CHUCKIE  other possible causes IRGN iso GAS bacteremia, doubt prerenal given no improvement after IVF   chronic left hydro iso prostate cancer and LN, creatinine at baseline on admission, unlikely contributing to DIMAS  send UA with urine lytes and spot for PCR -> fena 0.5%   C3 C4 levels normal  repeat blood cultures negative   creatinine stable, non oliguric  strict i/os  gentle IVH if odyno/dysphagia still present with inability to tolerate adequate po intake  bicarb   no indications for RRT    # small b/l pleural effusions on CT chest   - s/p 1x dose 40mg IV Lasix , CXR improved     #normocytic Anemia  - Hb 9-10  - f/u iron  studies noted % sat 27   - MCV normal, SPEP negative     #hypocalcemia  -replenished   -monitor     #Misc   #DVT PPx- heparin   #GI PPx- none   #Diet-  puree   #Activity- AAT, PT consult Home PT; back to [facility] w/ assistance???  #Dispo- Acute

## 2024-03-04 NOTE — PROGRESS NOTE ADULT - ASSESSMENT
68yMale with history of prostate cancer, posterior mediastinal mass seen on CT in November 2023, presents with difficulty swallowing associated with weight loss and odynophagia.  Patient with evidence of aspiration on arrival with concern for viral infection vs pneumonia, dysphagia with odynophagia, DIMAS. Palliative care consulted for GOC.    Patient continues to not have capacity.  He denies any family or NOK. Recommend speaking with [place]. If the patient does not have any NOK, he would benefit from guardianship.     Awaiting input from patient's outpatient oncologist. Will follow      Education about palliative care provided to patient/family.  See Recs below.    Please call [phone] with questions or concerns 24/7.   We will continue to follow.

## 2024-03-04 NOTE — PROGRESS NOTE ADULT - ASSESSMENT
68-year-old male with past medical history of hypertension, chronic back pain, prostate cancer, and posterior mediastinal mass 6.5 cm seen on CT 11/2023, presents for 3 months of progressively difficulty swallowing associated with 10 pounds weight loss and odynophagia. 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 Also found to be in sepsis secondary to GAS bacteremia. hospital stay complicated by DIMAS.  Assessment and plan    DIMAS/ stage 4 prostate cancer/ mediastinal mass s/p bx/ HTN  patient s/p 2 iv contrast studies within 2 days period   DIMAS likely due to CHUCKIE. Though wouldnt explain 1.2 gr proteinuria , doubt immune related GN in absence of hematuria ? membranous  related to malignancy doubt prerenal given no improvement after IVF  creat continues to improve  sono " 1.  Diffusely echogenic bilateral kidneys which can be seen with medical  renal disease. 2.  Moderate to severe left hydronephrosis with left renal parenchymal  thinning, as seen on prior CT scan. 3.  Large mass identified within the urinary bladder, measuring up to 7.9  cm, previously up to 7 cm.  No ureteral jets are seen bilaterally." 4.  Incidentally noted right pleural effusion.   evaluation   oncology notes appreciated  started sodium bicarb increase to 1300 mg po q8h   ph at goal    Pt refusing care,  confused, no clear gaurdian,  Psch eval pending Palliatve eval noted   followed by GI   please document accurate UO   will follow

## 2024-03-04 NOTE — PROGRESS NOTE ADULT - SUBJECTIVE AND OBJECTIVE BOX
HPI:  68-year-old male with past medical history of hypertension, chronic back pain, prostate cancer, and posterior mediastinal mass 6.5 cm seen on CT 11/2023, presents for 3 months of progressively difficulty swallowing associated with 10 pounds weight loss and odynophagia. Comes in today now unable to swallow his pills with water, resulting in 2 episode of emesis. He mentioned that he gets SOB during exertion but denies any exertional chest pain. As per patient he was treated at [facility] for prostate cancer and is s/p radiation therapy. He states that he has problem during walking and unable to maintain the posture.   Denies any abdominal pain, chest pain, dyspnea, diarrhea, fever, chills      (20 Feb 2024 15:17)    PAST MEDICAL & SURGICAL HISTORY:  HTN (hypertension)  Prostate cancer    Allergies  No Known Allergies  Intolerances    Home Medications:  Feosol 325 mg (65 mg elemental iron) oral tablet: 1 tab(s) orally 2 times a day (20 Feb 2024 16:17)  ibuprofen 800 mg oral tablet: 1 tab(s) orally every 6 hours (20 Feb 2024 16:19)  Imodium 2 mg oral capsule: 1 cap(s) orally 2 times a day (20 Feb 2024 16:16)  lidocaine 5% patch:  (20 Feb 2024 16:26)  Norvasc 5 mg oral tablet: 1 tab(s) orally once a day (20 Feb 2024 16:16)  Tylenol 325 mg oral tablet: 2 tab(s) orally every 6 hours (20 Feb 2024 16:26)    MEDICATIONS  (STANDING):  cefTRIAXone   IVPB 2000 milliGRAM(s) IV Intermittent every 24 hours  chlorhexidine 2% Cloths 1 Application(s) Topical <User Schedule>  dextrose 5% + lactated ringers. 1000 milliLiter(s) (100 mL/Hr) IV Continuous <Continuous>  heparin   Injectable 5000 Unit(s) SubCutaneous every 12 hours  influenza  Vaccine (HIGH DOSE) 0.7 milliLiter(s) IntraMuscular once  lidocaine   4% Patch 1 Patch Transdermal every 24 hours  metoprolol tartrate 25 milliGRAM(s) Oral two times a day  ondansetron Injectable 4 milliGRAM(s) IV Push once  sodium bicarbonate 1300 milliGRAM(s) Oral every 8 hours    MEDICATIONS  (PRN):  acetaminophen     Tablet .. 650 milliGRAM(s) Oral every 6 hours PRN Temp greater or equal to 38C (100.4F), Mild Pain (1 - 3)  aluminum hydroxide/magnesium hydroxide/simethicone Suspension 30 milliLiter(s) Oral every 4 hours PRN Dyspepsia  melatonin 3 milliGRAM(s) Oral at bedtime PRN Insomnia    < from: US Kidney and Bladder (03.01.24 @ 18:42) >  IMPRESSION:  1.  Diffusely echogenic bilateral kidneys which can be seen with medical   renal disease.  2.  Moderate to severe left hydronephrosis with left renal parenchymal   thinning, as seen on prior CT scan.  3.  Large mass identified within the urinary bladder, measuring up to 7.9   cm, previously up to 7 cm.  No ureteral jets are seen bilaterally.  4.  Incidentally noted right pleural effusion.    < end of copied text >  < from: Xray Esophagram Single Contrast (02.29.24 @ 11:19) >  FINDINGS/  IMPRESSION:  This study is available for clinical review.  Esophageal stent is visualized in the mid esophagus and extending below   the left hemidiaphragm. The lumen of the mid esophagus is severely   narrowed.    A small amount of contrast is visualized passing through the mid   esophagus.    No evidence of contrast extravasation or fistula formation to suggest   leak.    < end of copied text >  < from: CT Abdomen and Pelvis w/ IV Cont (02.21.24 @ 12:07) >  IMPRESSION:  1.  Since November 22, 2023, increasedupper abdominal bulky   lymphadenopathy.  2.  Unchanged bulky retroperitoneal lymphadenopathy, large left bladder   mass and perirectal soft tissue infiltrative mass.  3.  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  4.  Chronic moderate to severe left hydroureteronephrosis.    < end of copied text >  < from: CT Chest w/ IV Cont (02.20.24 @ 13:58) >  IMPRESSION:      No evidence of pulmonary embolus    Interval enlargement of the central/posterior mediastinal mass with   development of more extensive lymphadenopathy. The mass encircles the   descending thoracic aorta and has some mass effect upon the left atrium.   The mid aspect of the esophagus is encircled by the mass and difficult to   distinguish. Possible ovoid pill is noted within the mass and possible   location of the mid esophagus appear. The maximal esophagus appears   distended with layering debris within. Consideration may be given to   esophageal stenting.      < end of copied text >  < from: NM PET/CT Onc FDG Skull to Thigh, Inital (01.11.23 @ 15:46) >  IMPRESSION:    FDG avid indistinct retroperitoneal soft tissue mass encasing the   abdominal aorta and left iliac arteries spanning 21.9 cm.    Additional FDG avid left posterolateral rectal wall soft tissue mass,   left supraclavicular mass, posterior mediastinal mass, retrocrural soft   tissue as catalogued above. Findings most consistent with metastatic   disease. (SUV max 5.3)    Again noted moderate left hydronephroureter, likely due to mass effect   upon the left ureter from retroperitoneal mass.    Non FDG avid small left pleural effusion.    --- End of Report ---      < end of copied text >    Cytopathology - Non Gyn Report:   ACCESSION No: [id number]   Patient: RUFUS PICKARD   Accession: [id number]   Collected Date/Time: 2/21/2024 00:00 EST   Received Date/Time: 2/22/2024 17:52 EST   Fine Needle Aspiration Report - Auth (Verified)   Specimen(s) Submitted   Mediastinal mass fluid   Final Diagnosis   MEDIASTINUM MASS; EUS GUIDED FNA (THINPREP AND CELL BLOCK):   - POSITIVE FOR MALIGNANT CELLS   - Metastatic poorly differentiated carcinoma (please see the comment)

## 2024-03-04 NOTE — PROGRESS NOTE ADULT - SUBJECTIVE AND OBJECTIVE BOX
HPI:  68-year-old male with past medical history of hypertension, chronic back pain, prostate cancer, and posterior mediastinal mass 6.5 cm seen on CT 11/2023, presents for 3 months of progressively difficulty swallowing associated with 10 pounds weight loss and odynophagia. Comes in today now unable to swallow his pills with water, resulting in 2 episode of emesis. He mentioned that he gets SOB during exertion but denies any exertional chest pain. As per patient he was treated at [facility] for prostate cancer and is s/p radiation therapy. He states that he has problem during walking and unable to maintain the posture.   Denies any abdominal pain, chest pain, dyspnea, diarrhea, fever, chills    Interval history  -Patient seen at bedside  -Denied pain  -endorsed some SOB and nausea     ADVANCE DIRECTIVES:     [ x] Full Code [ ] DNR  MOLST  [ ]  Living Will  [ ]   DECISION MAKER(s):  [ ] Health Care Proxy(s)  [ x] Surrogate(s)  [ ] Guardian           Name(s): Phone Number(s):  Unknown      BASELINE (I)ADL(s) (prior to admission):    [place]: [ ]Total  [ ] Moderate [ ]Dependent  Palliative Performance Status Version 2:         %    http://npcrc.org/files/news/palliative_performance_scale_ppsv2.pdf    Allergies    No Known Allergies    Intolerances    MEDICATIONS  (STANDING):  cefTRIAXone   IVPB 2000 milliGRAM(s) IV Intermittent every 24 hours  chlorhexidine 2% Cloths 1 Application(s) Topical <User Schedule>  dextrose 5% + lactated ringers. 1000 milliLiter(s) (100 mL/Hr) IV Continuous <Continuous>  heparin   Injectable 5000 Unit(s) SubCutaneous every 12 hours  influenza  Vaccine (HIGH DOSE) 0.7 milliLiter(s) IntraMuscular once  lidocaine   4% Patch 1 Patch Transdermal every 24 hours  metoprolol tartrate 25 milliGRAM(s) Oral two times a day  ondansetron Injectable 4 milliGRAM(s) IV Push once  sodium bicarbonate 1300 milliGRAM(s) Oral every 8 hours    MEDICATIONS  (PRN):  acetaminophen     Tablet .. 650 milliGRAM(s) Oral every 6 hours PRN Temp greater or equal to 38C (100.4F), Mild Pain (1 - 3)  aluminum hydroxide/magnesium hydroxide/simethicone Suspension 30 milliLiter(s) Oral every 4 hours PRN Dyspepsia  melatonin 3 milliGRAM(s) Oral at bedtime PRN Insomnia      PRESENT SYMPTOMS: [ ]Unable to obtain due to poor mentation   Source if other than patient:  [ ]Family   [ ]Team     Pain: [ ]yes [ x]no  QOL impact -   Location -                    Aggravating factors -   Quality -   Radiation -   Timing-   Severity (0-10 scale):   Minimal acceptable level (0-10 scale):      CPOT:    https://www.[facility].org/getattachment/[email or web address]/Critical-Care-Pain-Observation-Tool-(CPOT)    PAIN AD Score:   http://geriatrictoolkit.[facility]/cog/painad.pdf (press ctrl +  left click to view)    Dyspnea:                           [ ]None[ x]Mild [ ]Moderate [ ]Severe     Respiratory Distress Observation Scale (RDOS):   A score of 0 to 2 signifies little or no respiratory distress, 3 signifies mild distress, scores 4 to 6 indicate moderate distress, and scores greater than 7 signify severe distress  https://www.[facility].ca/sites/default/files/PDFS/457382-ifaabdlhjzs-ztzebzfv-ylbvujllmnm-fhggk.pdf    Anxiety:                             [x ]None[ ]Mild [ ]Moderate [ ]Severe   Fatigue:                             [x ]None[ ]Mild [ ]Moderate [ ]Severe   Nausea:                             [x ]None[ ]Mild [ ]Moderate [ ]Severe   Loss of appetite:              [ x]None[ ]Mild [ ]Moderate [ ]Severe   Constipation:                    [x ]None[ ]Mild [ ]Moderate [ ]Severe    Other Symptoms:  [x ]All other review of systems negative     Palliative Performance Status Version 2:         30%    http://npcrc.org/files/news/palliative_performance_scale_ppsv2.pdf    PHYSICAL EXAM:  Vital Signs Last 24 Hrs  T(C): 35.8 (04 Mar 2024 05:30), Max: 36.8 (03 Mar 2024 20:18)  T(F): 96.5 (04 Mar 2024 05:30), Max: 98.3 (03 Mar 2024 20:18)  HR: 80 (04 Mar 2024 05:30) (80 - 91)  BP: 123/77 (04 Mar 2024 05:30) (123/77 - 134/78)  BP(mean): --  RR: 18 (04 Mar 2024 05:30) (18 - 18)  SpO2: --    GENERAL:  [ ] No acute distress [ ]Lethargic  [ ]Unarousable  [x ]Verbal  [ ]Non-Verbal [ ]Cachexia    BEHAVIORAL/PSYCH:  [x ]Alert and Oriented x2-3  [ ] Anxiety [ ] Delirium [ ] Agitation [x ] Calm   EYES: [x ] No scleral icterus [ ] Scleral icterus [ ] Closed  ENMT:  [ ]Dry mouth  [x ]No external oral lesions [ ] No external ear or nose lesions  CARDIOVASCULAR:  [ ]Regular [ ]Irregular [ ]Tachy [x ]Not Tachy  [ ][place]ambo [ ] Edema [ ] No edema  PULMONARY:  [ ]Tachypnea  [ ]Audible excessive secretions [x ] No labored breathing [ ] labored breathing  GASTROINTESTINAL: [ ]Soft  [ ]Distended  [ x]Not distended [ ]Non tender [ ]Tender  MUSCULOSKELETAL: [ ]No clubbing [ ] clubbing  [ x] No cyanosis [ ] cyanosis  NEUROLOGIC: [ ]No focal deficits  [ x]Follows commands  [ ]Does not follow commands  [x ]Cognitive impairment  [ ]Dysphagia  [ ]Dysarthria  [ ]Paresis   SKIN: [ ] Jaundiced [ ] Non-jaundiced [ ]Rash [ ]No Rash [ ] Warm [ ] Dry  MISC/LINES: [ ] ET tube [ ] Trach [ ]NGT/OGT [ ]PEG [ ]Angel    LABS: reviewed by me                                   8.8    12.04 )-----------( 459      ( 04 Mar 2024 06:17 )             29.6       03-04    145  |  112<H>  |  22<H>  ----------------------------<  66<L>  5.4<H>   |  14<L>  |  1.3    Ca    8.2<L>      04 Mar 2024 06:17  Phos  2.8     03-04  Mg     1.9     03-04    TPro  6.0  /  Alb  2.9<L>  /  TBili  0.5  /  DBili  x   /  AST  18  /  ALT  27  /  AlkPhos  64  03-04              Urinalysis Basic - ( 04 Mar 2024 06:17 )    Color: x / Appearance: x / SG: x / pH: x  Gluc: 66 mg/dL / Ketone: x  / Bili: x / Urobili: x   Blood: x / Protein: x / Nitrite: x   Leuk Esterase: x / RBC: x / WBC x   Sq Epi: x / Non Sq Epi: x / Bacteria: x                  CAPILLARY BLOOD GLUCOSE      POCT Blood Glucose.: 82 mg/dL (02 Mar 2024 20:58)              RADIOLOGY & ADDITIONAL STUDIES: reviewed by me    < from: US Kidney and Bladder (03.01.24 @ 18:42) >  IMPRESSION:  1.  Diffusely echogenic bilateral kidneys which can be seen with medical   renal disease.  2.  Moderate to severe left hydronephrosis with left renal parenchymal   thinning, as seen on prior CT scan.  3.  Large mass identified within the urinary bladder, measuring up to 7.9   cm, previously up to 7 cm.  No ureteral jets are seen bilaterally.  4.  Incidentally noted right pleural effusion.    < end of copied text >      EKG: reviewed by me    < from: 12 Lead ECG (02.21.24 @ 14:56) >    Ventricular Rate 90 BPM    Atrial Rate 90 BPM    P-R Interval 174 ms    QRS Duration 82 ms    Q-T Interval 400 ms    QTC Calculation(Bazett) 489 ms    P Axis 40 degrees    R Axis 40 degrees    T Axis 67 degrees    Diagnosis Line Normal sinus rhythm  Prolonged QT  Abnormal ECG    < end of copied text >          PROTEIN CALORIE MALNUTRITION PRESENT: [ ]mild [ ]moderate [ ]severe [ ]underweight [ ]morbid obesity  https://www.andeal.org/vault/2440/web/files/ONC/Table_Clinical%20Characteristics%20to%20Document%20Malnutrition-White%20JV%20et%20al%085851.pdf    Height (cm): 185.4 (02-22-24 @ 14:44), 185.4 (09-15-23 @ 10:44)  Weight (kg): 77.1 (02-21-24 @ 14:55), 77.1 (11-22-23 @ 08:11), 78.5 (09-15-23 @ 10:44)  BMI (kg/m2): 22.4 (02-22-24 @ 14:44), 22.4 (02-21-24 @ 14:55), 22.4 (11-22-23 @ 08:11)  [ ]PPSV2 < or = to 30% [ ]significant weight loss  [ ]poor nutritional intake  [ ]anasarca      [ ]Artificial Nutrition      Palliative Care Spiritual/Emotional Screening Tool Question  Severity (0-4):                    OR                    [ x] Unable to determine. Will assess at later time if appropriate.  Score of 2 or greater indicates recommendation of Chaplaincy and/or SW referral  Chaplaincy Referral: [ ] Yes [ ] Refused [ ] Following     Caregiver [place]:  [ ] Yes [ ] No    OR    [x ] Unable to determine. Will assess at later time if appropriate.  Social Work Referral [ ]  Patient and Family Centered Care Referral [ ]    Anticipatory Grief Present: [ ] Yes [ ] No    OR     [ x] Unable to determine. Will assess at later time if appropriate.  Social Work Referral [ ]  Patient and Family Centered Care Referral [ ]    Patient discussed with primary medical team MD  Palliative care education provided to patient and/or family

## 2024-03-04 NOTE — CONSULT NOTE ADULT - ASSESSMENT
Patient is a 68 year old gentlemen with metastatic poorly differentiated carcinoma of prostatic origin, posterior mediastinal mass seen on CT in November 2023, presents with difficulty swallowing associated with weight loss and odynophagia.    Dr. Irizarry - [place]/onc outpt Patient is a 68 year old gentlemen with metastatic poorly differentiated carcinoma of prostatic origin, posterior mediastinal mass seen on CT in November 2023, presents with difficulty swallowing associated with weight loss and odynophagia.    Dr. Irizarry - med/onc outpt    Addendum 3/6/2024  Pt's case d/w Dr. Caal  Obtain old XRT records (discussed with in-house team on 3/5/2024)  Dx images & path reviewed  Call [phone], rad/onc, once XRT records obtained.

## 2024-03-04 NOTE — PROGRESS NOTE ADULT - ATTENDING COMMENTS
Patient seen and examined during the GI–advance endoscopy rounds, patient was dysphagia secondary to mediastinal mass, s/p status post esophageal stent placement did well currently presented with dysphagia again likely stent migration, Patient will benefit from EGD stent revision versus stent replacement, depending on the finding, may benefit from Gastrostomy feeding tube given his pathology

## 2024-03-04 NOTE — PROGRESS NOTE ADULT - ASSESSMENT
·	Bacteremia   ·	Odynophagia   ·	hx of prostate cancer now with mets?   ·	Esophageal carcinoma   ·	severe protein calorie malnutrition   ·	DIMAS/CHUCKIE  ·	Moderate to severe left hydronephrosis   ·	metabolic acidosis     -patient with odynophagia - newly diagnosed esophageal cancer (poorly differentiated) - unable to reach patient's oncologist Dr. Irizarry - placed routine service oncology consult - appreciated their input - Dr. Irizarry/pt's private oncologist will see the patient today   -continue with IV abx, ID following   -repeat BCx prelim negative   -Monitor Kidney function; cr improving  -increased bicarb tabs to 1300mg q 8 hrs  -lokelma dose - monitor K+  -Nutrition follow up - may need TPN vs. G tube   -overall poor prognosis - no next of kin as per CM  -Palliative care follow up     DISPO: not discharge ready - oncology reccs on chemo? - will follow   -discussed with CM in rounds       Attending Physician Dr. Erinn Leslie # [phone] .

## 2024-03-04 NOTE — PROGRESS NOTE ADULT - ASSESSMENT
ASSESSMENT  68-year-old male with past medical history of hypertension, chronic back pain, prostate cancer, and posterior mediastinal mass 6.5 cm seen on CT 11/2023, presents for 3 months of progressively difficulty swallowing associated with 10 pounds weight loss and odynophagia    IMPRESSION  #Mediastinal Mass - seen on CT 11/2023   - CT Chest w/ IV Cont (02.20.24 @ 13:58): No evidence of pulmonary embolus Interval enlargement of the central/posterior mediastinal mass with   development of more extensive lymphadenopathy. The mass encircles the  descending thoracic aorta and has some mass effect upon the left atrium.  The mid aspect of the esophagus is encircled by the mass and difficult to distinguish. Possible ovoid pill is noted within the mass and possible  location of the mid esophagus appear. The maximal esophagus appears  distended with layering debris within. Consideration may be given to  esophageal stenting.  - CT Abdomen and Pelvis w/ IV Cont (02.21.24 @ 12:07): IMPRESSION: 1.  Since November 22, 2023, increasedupper abdominal bulky  lymphadenopathy. 2.  Unchanged bulky retroperitoneal lymphadenopathy, large left bladder  mass and perirectal soft tissue infiltrative mass. 3.  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 4.  Chronic moderate to severe left hydroureteronephrosis.    #Group A strep bacteremia  - BLood Cx 2/24 +   - Blood Cx 2/27 NG  - TTE 3/1 - no significant valvulopathy ]    #Dysphagia with odynophagia  - s/p EGD 2/21 - biopsy showing poorly differentiated carcinoma     # Hx of prostate cancer s/p radiation therapy    #Abx allergy: No Known Allergies    RECOMMENDATIONS  - source potentially from translocation in setting of infiltrative masses   - continue ceftriaxone 2g daily (end date 3/11 to complete 2 weeks from culture clearance) ideally to finish with midline   - poor prognosis overall     Please call or message on Microsoft Teams if with any questions.  Spectra [phone]

## 2024-03-04 NOTE — PROGRESS NOTE ADULT - SUBJECTIVE AND OBJECTIVE BOX
RUFUS PICKARD  68y  Male      Patient is a 68y old  Male who presents with a chief complaint of Mediastinal Mass (03 Mar 2024 07:45)      INTERVAL HPI/OVERNIGHT EVENTS:    pt seen this am   -no overnight events notified to me   -minimal oral intake   -discussed with GI fellow this am - patient not agreeing on G tube option   -overall extremely poor prognosis     Vital Signs Last 24 Hrs  T(C): 35.8 (04 Mar 2024 05:30), Max: 36.8 (03 Mar 2024 20:18)  T(F): 96.5 (04 Mar 2024 05:30), Max: 98.3 (03 Mar 2024 20:18)  HR: 80 (04 Mar 2024 05:30) (78 - 91)  BP: 123/77 (04 Mar 2024 05:30) (123/77 - 138/82)  RR: 18 (04 Mar 2024 05:30) (18 - 20)      PHYSICAL EXAM:  GENERAL: Not in distress - comfortable in bed   HEAD:  Atraumatic, Normocephalic  EYES: EOMI, PERRLA, conjunctiva and sclera clear  NERVOUS SYSTEM:  Alert & Oriented X2  CHEST/LUNG: Clear to percussion bilaterally; No rales, rhonchi, wheezing, or rubs  CV/HEART: Regular rate and rhythm; No murmurs, rubs, or gallops  GI/ABDOMEN: Soft, Nontender, Nondistended; Bowel sounds present  EXTREMITIES:  2+ Peripheral Pulses, No clubbing, cyanosis, or edema  SKIN: No rashes or lesions    LAB:                                 8.8    12.04 )-----------( 459      ( 04 Mar 2024 06:17 )             29.6     03-04    145  |  112<H>  |  22<H>  ----------------------------<  66<L>  5.4<H>   |  14<L>  |  1.3    Ca    8.2<L>      04 Mar 2024 06:17  Phos  2.8     03-04  Mg     1.9     03-04    TPro  6.0  /  Alb  2.9<L>  /  TBili  0.5  /  DBili  x   /  AST  18  /  ALT  27  /  AlkPhos  64  03-04    Daily   CAPILLARY BLOOD GLUCOSE      POCT Blood Glucose.: 82 mg/dL (02 Mar 2024 20:58)    Urinalysis Basic - ( 03 Mar 2024 07:05 )    Color: x / Appearance: x / SG: x / pH: x  Gluc: 68 mg/dL / Ketone: x  / Bili: x / Urobili: x   Blood: x / Protein: x / Nitrite: x   Leuk Esterase: x / RBC: x / WBC x   Sq Epi: x / Non Sq Epi: x / Bacteria: x      LIVER FUNCTIONS - ( 03 Mar 2024 07:05 )  Alb: 2.7 g/dL / Pro: 5.7 g/dL / ALK PHOS: 65 U/L / ALT: 33 U/L / AST: 25 U/L / GGT: x             MEDICATIONS  (STANDING):  cefTRIAXone   IVPB 2000 milliGRAM(s) IV Intermittent every 24 hours  chlorhexidine 2% Cloths 1 Application(s) Topical <User Schedule>  dextrose 5% + lactated ringers. 1000 milliLiter(s) (75 mL/Hr) IV Continuous <Continuous>  heparin   Injectable 5000 Unit(s) SubCutaneous every 12 hours  influenza  Vaccine (HIGH DOSE) 0.7 milliLiter(s) IntraMuscular once  lidocaine   4% Patch 1 Patch Transdermal every 24 hours  metoprolol tartrate 25 milliGRAM(s) Oral two times a day  ondansetron Injectable 4 milliGRAM(s) IV Push once  sodium bicarbonate 1300 milliGRAM(s) Oral every 8 hours  sodium zirconium cyclosilicate 10 Gram(s) Oral once    MEDICATIONS  (PRN):  acetaminophen     Tablet .. 650 milliGRAM(s) Oral every 6 hours PRN Temp greater or equal to 38C (100.4F), Mild Pain (1 - 3)  aluminum hydroxide/magnesium hydroxide/simethicone Suspension 30 milliLiter(s) Oral every 4 hours PRN Dyspepsia  melatonin 3 milliGRAM(s) Oral at bedtime PRN Insomnia

## 2024-03-04 NOTE — PROGRESS NOTE ADULT - ASSESSMENT
pt admitted with sev dysphagia big mediastinal mass ,and esophageal mass   has a stent now in the esophagus also had a biopsy done consistent with prostate cancere  [facility]wrobby to [facility] fpr metastatic prostate cxancer  was treated with chemo,docetaxol ,and complete androgen blockade  pt non compliant and doesnot follow regularlyI    imp   metastatic prostate cancer  progression of disease  in multiple organs   will need   to review all records and treatment given at [facility]   will follow with you  suggest rt evaluation for palliation of dysphagia      kirit irving MD

## 2024-03-04 NOTE — PROGRESS NOTE ADULT - SUBJECTIVE AND OBJECTIVE BOX
24H events:    Today is hospital day 13d. This morning patient was seen and examined at bedside, resting comfortably in bed.    No acute or major events overnight.    PAST MEDICAL & SURGICAL HISTORY  HTN (hypertension)    Prostate cancer        SOCIAL HISTORY:  Social History:      ALLERGIES:  No Known Allergies      MEDICATIONS:  STANDING MEDICATIONS  cefTRIAXone   IVPB 2000 milliGRAM(s) IV Intermittent every 24 hours  chlorhexidine 2% Cloths 1 Application(s) Topical <User Schedule>  dextrose 5% + lactated ringers. 1000 milliLiter(s) IV Continuous <Continuous>  heparin   Injectable 5000 Unit(s) SubCutaneous every 12 hours  influenza  Vaccine (HIGH DOSE) 0.7 milliLiter(s) IntraMuscular once  lidocaine   4% Patch 1 Patch Transdermal every 24 hours  metoprolol tartrate 25 milliGRAM(s) Oral two times a day  ondansetron Injectable 4 milliGRAM(s) IV Push once  sodium bicarbonate 1300 milliGRAM(s) Oral every 8 hours    PRN MEDICATIONS  acetaminophen     Tablet .. 650 milliGRAM(s) Oral every 6 hours PRN  aluminum hydroxide/magnesium hydroxide/simethicone Suspension 30 milliLiter(s) Oral every 4 hours PRN  melatonin 3 milliGRAM(s) Oral at bedtime PRN      VITALS:   T(C): 35.8 (03-04-24 @ 05:30), Max: 36.8 (03-03-24 @ 20:18)  HR: 80 (03-04-24 @ 05:30) (78 - 91)  BP: 123/77 (03-04-24 @ 05:30) (123/77 - 138/82)  RR: 18 (03-04-24 @ 05:30) (18 - 20)  SpO2: --  I&O's Summary    02 Mar 2024 07:01  -  03 Mar 2024 07:00  --------------------------------------------------------  IN: 0 mL / OUT: 450 mL / NET: -450 mL          PHYSICAL EXAM:      GENERAL: NAD, non-toxic appearing, cachectic , poor dental hygiene   NECK: Supple, no stiffness, no JVD  HEART: Regular rate and rhythm, normal s1s2  LUNGS: Unlabored respirations, b/l air entry, no adventitious breath sounds   ABDOMEN: Soft, nontender, nondistended; +BS  EXTREMITIES: No clubbing, cyanosis, or edema;   NERVOUS SYSTEM: AOx3  SKIN: Warm and dry    LABS:                        8.5    12.64 )-----------( 447      ( 03 Mar 2024 07:05 )             28.3     03-03    141  |  112<H>  |  22<H>  ----------------------------<  68<L>  4.9   |  15<L>  |  1.5    Ca    7.9<L>      03 Mar 2024 07:05  Phos  2.8     03-03  Mg     1.9     03-03    TPro  5.7<L>  /  Alb  2.7<L>  /  TBili  0.3  /  DBili  x   /  AST  25  /  ALT  33  /  AlkPhos  65  03-03      Urinalysis Basic - ( 03 Mar 2024 07:05 )    Color: x / Appearance: x / SG: x / pH: x  Gluc: 68 mg/dL / Ketone: x  / Bili: x / Urobili: x   Blood: x / Protein: x / Nitrite: x   Leuk Esterase: x / RBC: x / WBC x   Sq Epi: x / Non Sq Epi: x / Bacteria: x            Culture - Blood (collected 02 Mar 2024 07:22)  Source: .Blood None  Preliminary Report (03 Mar 2024 17:01):    No growth at 24 hours    Culture - Blood (collected 01 Mar 2024 07:31)  Source: .Blood None  Preliminary Report (03 Mar 2024 23:02):    No growth at 48 Hours               24H events:    Today is hospital day 13d. This morning patient was seen and examined at bedside, resting comfortably in bed.    No acute or major events overnight. called dr irving and will see patient today. NPO for EGD today. Needs PEG but refused.     PAST MEDICAL & SURGICAL HISTORY  HTN (hypertension)    Prostate cancer        SOCIAL HISTORY:  Social History:      ALLERGIES:  No Known Allergies      MEDICATIONS:  STANDING MEDICATIONS  cefTRIAXone   IVPB 2000 milliGRAM(s) IV Intermittent every 24 hours  chlorhexidine 2% Cloths 1 Application(s) Topical <User Schedule>  dextrose 5% + lactated ringers. 1000 milliLiter(s) IV Continuous <Continuous>  heparin   Injectable 5000 Unit(s) SubCutaneous every 12 hours  influenza  Vaccine (HIGH DOSE) 0.7 milliLiter(s) IntraMuscular once  lidocaine   4% Patch 1 Patch Transdermal every 24 hours  metoprolol tartrate 25 milliGRAM(s) Oral two times a day  ondansetron Injectable 4 milliGRAM(s) IV Push once  sodium bicarbonate 1300 milliGRAM(s) Oral every 8 hours    PRN MEDICATIONS  acetaminophen     Tablet .. 650 milliGRAM(s) Oral every 6 hours PRN  aluminum hydroxide/magnesium hydroxide/simethicone Suspension 30 milliLiter(s) Oral every 4 hours PRN  melatonin 3 milliGRAM(s) Oral at bedtime PRN      VITALS:   T(C): 35.8 (03-04-24 @ 05:30), Max: 36.8 (03-03-24 @ 20:18)  HR: 80 (03-04-24 @ 05:30) (78 - 91)  BP: 123/77 (03-04-24 @ 05:30) (123/77 - 138/82)  RR: 18 (03-04-24 @ 05:30) (18 - 20)  SpO2: --  I&O's Summary    02 Mar 2024 07:01  -  03 Mar 2024 07:00  --------------------------------------------------------  IN: 0 mL / OUT: 450 mL / NET: -450 mL          PHYSICAL EXAM:      GENERAL: NAD, non-toxic appearing, cachectic , poor dental hygiene   NECK: Supple, no stiffness, no JVD  HEART: Regular rate and rhythm, normal s1s2  LUNGS: Unlabored respirations, b/l air entry, no adventitious breath sounds   ABDOMEN: Soft, nontender, nondistended; +BS  EXTREMITIES: No clubbing, cyanosis, or edema;   NERVOUS SYSTEM: AOx3  SKIN: Warm and dry    LABS:                        8.5    12.64 )-----------( 447      ( 03 Mar 2024 07:05 )             28.3     03-03    141  |  112<H>  |  22<H>  ----------------------------<  68<L>  4.9   |  15<L>  |  1.5    Ca    7.9<L>      03 Mar 2024 07:05  Phos  2.8     03-03  Mg     1.9     03-03    TPro  5.7<L>  /  Alb  2.7<L>  /  TBili  0.3  /  DBili  x   /  AST  25  /  ALT  33  /  AlkPhos  65  03-03      Urinalysis Basic - ( 03 Mar 2024 07:05 )    Color: x / Appearance: x / SG: x / pH: x  Gluc: 68 mg/dL / Ketone: x  / Bili: x / Urobili: x   Blood: x / Protein: x / Nitrite: x   Leuk Esterase: x / RBC: x / WBC x   Sq Epi: x / Non Sq Epi: x / Bacteria: x            Culture - Blood (collected 02 Mar 2024 07:22)  Source: .Blood None  Preliminary Report (03 Mar 2024 17:01):    No growth at 24 hours    Culture - Blood (collected 01 Mar 2024 07:31)  Source: .Blood None  Preliminary Report (03 Mar 2024 23:02):    No growth at 48 Hours

## 2024-03-04 NOTE — CONSULT NOTE ADULT - SUBJECTIVE AND OBJECTIVE BOX
HPI:  68-year-old male with past medical history of hypertension, chronic back pain, prostate cancer, and posterior mediastinal mass 6.5 cm seen on CT 11/2023, presents for 3 months of progressively difficulty swallowing associated with 10 pounds weight loss and odynophagia. Comes in today now unable to swallow his pills with water, resulting in 2 episode of emesis. He mentioned that he gets SOB during exertion but denies any exertional chest pain. As per patient he was treated at [facility] for prostate cancer and is s/p radiation therapy. He states that he has problem during walking and unable to maintain the posture.   Denies any abdominal pain, chest pain, dyspnea, diarrhea, fever, chills      (20 Feb 2024 15:17)    PAST MEDICAL & SURGICAL HISTORY:  HTN (hypertension)  Prostate cancer    Allergies  No Known Allergies  Intolerances    Home Medications:  Feosol 325 mg (65 mg elemental iron) oral tablet: 1 tab(s) orally 2 times a day (20 Feb 2024 16:17)  ibuprofen 800 mg oral tablet: 1 tab(s) orally every 6 hours (20 Feb 2024 16:19)  Imodium 2 mg oral capsule: 1 cap(s) orally 2 times a day (20 Feb 2024 16:16)  lidocaine 5% patch:  (20 Feb 2024 16:26)  Norvasc 5 mg oral tablet: 1 tab(s) orally once a day (20 Feb 2024 16:16)  Tylenol 325 mg oral tablet: 2 tab(s) orally every 6 hours (20 Feb 2024 16:26)    MEDICATIONS  (STANDING):  cefTRIAXone   IVPB 2000 milliGRAM(s) IV Intermittent every 24 hours  chlorhexidine 2% Cloths 1 Application(s) Topical <User Schedule>  dextrose 5% + lactated ringers. 1000 milliLiter(s) (100 mL/Hr) IV Continuous <Continuous>  heparin   Injectable 5000 Unit(s) SubCutaneous every 12 hours  influenza  Vaccine (HIGH DOSE) 0.7 milliLiter(s) IntraMuscular once  lidocaine   4% Patch 1 Patch Transdermal every 24 hours  metoprolol tartrate 25 milliGRAM(s) Oral two times a day  ondansetron Injectable 4 milliGRAM(s) IV Push once  sodium bicarbonate 1300 milliGRAM(s) Oral every 8 hours    MEDICATIONS  (PRN):  acetaminophen     Tablet .. 650 milliGRAM(s) Oral every 6 hours PRN Temp greater or equal to 38C (100.4F), Mild Pain (1 - 3)  aluminum hydroxide/magnesium hydroxide/simethicone Suspension 30 milliLiter(s) Oral every 4 hours PRN Dyspepsia  melatonin 3 milliGRAM(s) Oral at bedtime PRN Insomnia    < from: US Kidney and Bladder (03.01.24 @ 18:42) >  IMPRESSION:  1.  Diffusely echogenic bilateral kidneys which can be seen with medical   renal disease.  2.  Moderate to severe left hydronephrosis with left renal parenchymal   thinning, as seen on prior CT scan.  3.  Large mass identified within the urinary bladder, measuring up to 7.9   cm, previously up to 7 cm.  No ureteral jets are seen bilaterally.  4.  Incidentally noted right pleural effusion.    < end of copied text >  < from: Xray Esophagram Single Contrast (02.29.24 @ 11:19) >  FINDINGS/  IMPRESSION:  This study is available for clinical review.  Esophageal stent is visualized in the mid esophagus and extending below   the left hemidiaphragm. The lumen of the mid esophagus is severely   narrowed.    A small amount of contrast is visualized passing through the mid   esophagus.    No evidence of contrast extravasation or fistula formation to suggest   leak.    < end of copied text >  < from: CT Abdomen and Pelvis w/ IV Cont (02.21.24 @ 12:07) >  IMPRESSION:  1.  Since November 22, 2023, increasedupper abdominal bulky   lymphadenopathy.  2.  Unchanged bulky retroperitoneal lymphadenopathy, large left bladder   mass and perirectal soft tissue infiltrative mass.  3.  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  4.  Chronic moderate to severe left hydroureteronephrosis.    < end of copied text >  < from: CT Chest w/ IV Cont (02.20.24 @ 13:58) >  IMPRESSION:      No evidence of pulmonary embolus    Interval enlargement of the central/posterior mediastinal mass with   development of more extensive lymphadenopathy. The mass encircles the   descending thoracic aorta and has some mass effect upon the left atrium.   The mid aspect of the esophagus is encircled by the mass and difficult to   distinguish. Possible ovoid pill is noted within the mass and possible   location of the mid esophagus appear. The maximal esophagus appears   distended with layering debris within. Consideration may be given to   esophageal stenting.      < end of copied text >  < from: NM PET/CT Onc FDG Skull to Thigh, Inital (01.11.23 @ 15:46) >  IMPRESSION:    FDG avid indistinct retroperitoneal soft tissue mass encasing the   abdominal aorta and left iliac arteries spanning 21.9 cm.    Additional FDG avid left posterolateral rectal wall soft tissue mass,   left supraclavicular mass, posterior mediastinal mass, retrocrural soft   tissue as catalogued above. Findings most consistent with metastatic   disease. (SUV max 5.3)    Again noted moderate left hydronephroureter, likely due to mass effect   upon the left ureter from retroperitoneal mass.    Non FDG avid small left pleural effusion.    --- End of Report ---      < end of copied text >    Cytopathology - Non Gyn Report:   ACCESSION No: [id number]   Patient: RUFUS PICKARD   Accession: [id number]   Collected Date/Time: 2/21/2024 00:00 EST   Received Date/Time: 2/22/2024 17:52 EST   Fine Needle Aspiration Report - Auth (Verified)   Specimen(s) Submitted   Mediastinal mass fluid   Final Diagnosis   MEDIASTINUM MASS; EUS GUIDED FNA (THINPREP AND CELL BLOCK):   - POSITIVE FOR MALIGNANT CELLS   - Metastatic poorly differentiated carcinoma (please see the comment)

## 2024-03-05 ENCOUNTER — TRANSCRIPTION ENCOUNTER (OUTPATIENT)
Age: 69
End: 2024-03-05

## 2024-03-05 ENCOUNTER — APPOINTMENT (OUTPATIENT)
Dept: GASTROENTEROLOGY | Facility: CLINIC | Age: 69
End: 2024-03-05

## 2024-03-05 DIAGNOSIS — Z00.8 ENCOUNTER FOR OTHER GENERAL EXAMINATION: ICD-10-CM

## 2024-03-05 LAB
ALBUMIN SERPL ELPH-MCNC: 3.1 G/DL — LOW (ref 3.5–5.2)
ALP SERPL-CCNC: 63 U/L — SIGNIFICANT CHANGE UP (ref 30–115)
ALT FLD-CCNC: 21 U/L — SIGNIFICANT CHANGE UP (ref 0–41)
ANION GAP SERPL CALC-SCNC: 19 MMOL/L — HIGH (ref 7–14)
AST SERPL-CCNC: 15 U/L — SIGNIFICANT CHANGE UP (ref 0–41)
BASOPHILS # BLD AUTO: 0.02 K/UL — SIGNIFICANT CHANGE UP (ref 0–0.2)
BASOPHILS NFR BLD AUTO: 0.2 % — SIGNIFICANT CHANGE UP (ref 0–1)
BILIRUB SERPL-MCNC: 0.5 MG/DL — SIGNIFICANT CHANGE UP (ref 0.2–1.2)
BUN SERPL-MCNC: 21 MG/DL — HIGH (ref 10–20)
CALCIUM SERPL-MCNC: 7.9 MG/DL — LOW (ref 8.4–10.5)
CHLORIDE SERPL-SCNC: 113 MMOL/L — HIGH (ref 98–110)
CO2 SERPL-SCNC: 14 MMOL/L — LOW (ref 17–32)
CREAT SERPL-MCNC: 1.2 MG/DL — SIGNIFICANT CHANGE UP (ref 0.7–1.5)
CULTURE RESULTS: SIGNIFICANT CHANGE UP
CULTURE RESULTS: SIGNIFICANT CHANGE UP
EGFR: 65 ML/MIN/1.73M2 — SIGNIFICANT CHANGE UP
EOSINOPHIL # BLD AUTO: 0.11 K/UL — SIGNIFICANT CHANGE UP (ref 0–0.7)
EOSINOPHIL NFR BLD AUTO: 1.1 % — SIGNIFICANT CHANGE UP (ref 0–8)
GLUCOSE SERPL-MCNC: 77 MG/DL — SIGNIFICANT CHANGE UP (ref 70–99)
HCT VFR BLD CALC: 30.2 % — LOW (ref 42–52)
HGB BLD-MCNC: 9.1 G/DL — LOW (ref 14–18)
IMM GRANULOCYTES NFR BLD AUTO: 1.3 % — HIGH (ref 0.1–0.3)
LYMPHOCYTES # BLD AUTO: 0.8 K/UL — LOW (ref 1.2–3.4)
LYMPHOCYTES # BLD AUTO: 7.9 % — LOW (ref 20.5–51.1)
MAGNESIUM SERPL-MCNC: 1.8 MG/DL — SIGNIFICANT CHANGE UP (ref 1.8–2.4)
MCHC RBC-ENTMCNC: 28.3 PG — SIGNIFICANT CHANGE UP (ref 27–31)
MCHC RBC-ENTMCNC: 30.1 G/DL — LOW (ref 32–37)
MCV RBC AUTO: 94.1 FL — HIGH (ref 80–94)
MONOCYTES # BLD AUTO: 0.64 K/UL — HIGH (ref 0.1–0.6)
MONOCYTES NFR BLD AUTO: 6.3 % — SIGNIFICANT CHANGE UP (ref 1.7–9.3)
NEUTROPHILS # BLD AUTO: 8.47 K/UL — HIGH (ref 1.4–6.5)
NEUTROPHILS NFR BLD AUTO: 83.2 % — HIGH (ref 42.2–75.2)
NRBC # BLD: 0 /100 WBCS — SIGNIFICANT CHANGE UP (ref 0–0)
PHOSPHATE SERPL-MCNC: 2 MG/DL — LOW (ref 2.1–4.9)
PLATELET # BLD AUTO: 539 K/UL — HIGH (ref 130–400)
PMV BLD: 9.4 FL — SIGNIFICANT CHANGE UP (ref 7.4–10.4)
POTASSIUM SERPL-MCNC: 4.3 MMOL/L — SIGNIFICANT CHANGE UP (ref 3.5–5)
POTASSIUM SERPL-SCNC: 4.3 MMOL/L — SIGNIFICANT CHANGE UP (ref 3.5–5)
PROT SERPL-MCNC: 6.2 G/DL — SIGNIFICANT CHANGE UP (ref 6–8)
RBC # BLD: 3.21 M/UL — LOW (ref 4.7–6.1)
RBC # FLD: 14.2 % — SIGNIFICANT CHANGE UP (ref 11.5–14.5)
SODIUM SERPL-SCNC: 146 MMOL/L — SIGNIFICANT CHANGE UP (ref 135–146)
SPECIMEN SOURCE: SIGNIFICANT CHANGE UP
SPECIMEN SOURCE: SIGNIFICANT CHANGE UP
WBC # BLD: 10.17 K/UL — SIGNIFICANT CHANGE UP (ref 4.8–10.8)
WBC # FLD AUTO: 10.17 K/UL — SIGNIFICANT CHANGE UP (ref 4.8–10.8)

## 2024-03-05 PROCEDURE — 43246 EGD PLACE GASTROSTOMY TUBE: CPT

## 2024-03-05 PROCEDURE — 73030 X-RAY EXAM OF SHOULDER: CPT | Mod: 26,LT

## 2024-03-05 PROCEDURE — 99232 SBSQ HOSP IP/OBS MODERATE 35: CPT | Mod: 25

## 2024-03-05 PROCEDURE — 43247 EGD REMOVE FOREIGN BODY: CPT | Mod: XU

## 2024-03-05 PROCEDURE — 99497 ADVNCD CARE PLAN 30 MIN: CPT

## 2024-03-05 PROCEDURE — 90792 PSYCH DIAG EVAL W/MED SRVCS: CPT | Mod: 2W

## 2024-03-05 PROCEDURE — 99233 SBSQ HOSP IP/OBS HIGH 50: CPT

## 2024-03-05 RX ADMIN — CHLORHEXIDINE GLUCONATE 1 APPLICATION(S): 213 SOLUTION TOPICAL at 05:41

## 2024-03-05 RX ADMIN — HEPARIN SODIUM 5000 UNIT(S): 5000 INJECTION INTRAVENOUS; SUBCUTANEOUS at 22:38

## 2024-03-05 RX ADMIN — Medication 1300 MILLIGRAM(S): at 22:38

## 2024-03-05 RX ADMIN — Medication 25 MILLIGRAM(S): at 22:37

## 2024-03-05 RX ADMIN — Medication 650 MILLIGRAM(S): at 20:32

## 2024-03-05 NOTE — PRE-ANESTHESIA EVALUATION ADULT - NSANTHPMHFT_GEN_ALL_CORE
Posterior mediastinal mass 6.5 cm
Chart reviewed, Med, Pulmonary , GI evaluation seen D/W Pulmonary abd GI team. pt interviewed and examined. Labs, EKG, CT report seen.

## 2024-03-05 NOTE — PROGRESS NOTE ADULT - ASSESSMENT
68yMale with history of prostate cancer, posterior mediastinal mass seen on CT in November 2023, presents with difficulty swallowing associated with weight loss and odynophagia.  Patient with evidence of aspiration on arrival with concern for viral infection vs pneumonia, dysphagia with odynophagia, DIMAS. Palliative care consulted for [facility].    Spoke with patient at bedside. Per discussion with primary, he has been refusing IV antibiotics and PEG placement. Discussed care with the patient. We discussed his overall clinical status and malignancy history, and he did not believe that he has cancer. We discussed his cancer and that if he refuses PEG/IV antibiotics, he will likely decline, be rehospitalized, and die after a time of not taking in enough food. He not believe that he would decline/die. He noted he just wanted to go back to his residence and would not discuss/believe information about his health.  He would not discuss code status.    Discussed case with Dr. Escobar and Dr. Leslie. Patient does not have capacity. Guardianship procedures should be initiated while we await additional information from Dr. Irizarry.    Education about palliative care provided to patient/family.  See Recs below.    Please call [phone] with questions or concerns 24/7.   We will continue to follow.

## 2024-03-05 NOTE — PROGRESS NOTE ADULT - SUBJECTIVE AND OBJECTIVE BOX
Nephrology progress note    Patient was seen and examined, events over the last 24 h noted .    Allergies:  No Known Allergies    Hospital Medications:   MEDICATIONS  (STANDING):  cefTRIAXone   IVPB 2000 milliGRAM(s) IV Intermittent every 24 hours  chlorhexidine 2% Cloths 1 Application(s) Topical <User Schedule>  dextrose 5% + lactated ringers. 1000 milliLiter(s) (100 mL/Hr) IV Continuous <Continuous>  heparin   Injectable 5000 Unit(s) SubCutaneous every 12 hours  influenza  Vaccine (HIGH DOSE) 0.7 milliLiter(s) IntraMuscular once  lidocaine   4% Patch 1 Patch Transdermal every 24 hours  metoprolol tartrate 25 milliGRAM(s) Oral two times a day  ondansetron Injectable 4 milliGRAM(s) IV Push once  sodium bicarbonate 1300 milliGRAM(s) Oral every 8 hours        VITALS:  T(F): 96.8 (03-05-24 @ 05:00), Max: 98.5 (03-04-24 @ 14:48)  HR: 92 (03-05-24 @ 05:00)  BP: 119/75 (03-05-24 @ 05:00)  RR: 18 (03-05-24 @ 05:00)  SpO2: --  Wt(kg): --    Height (cm): 185.4 (03-05 @ 11:10)  Weight (kg): 77.1 (03-05 @ 11:10)  BMI (kg/m2): 22.4 (03-05 @ 11:10)  BSA (m2): 2.01 (03-05 @ 11:10)    PHYSICAL EXAM:  Constitutional: NAD  HEENT: anicteric sclera, oropharynx clear, MMM  Neck: No JVD  Respiratory: CTAB, no wheezes, rales or rhonchi  Cardiovascular: S1, S2, RRR  Gastrointestinal: BS+, soft, NT/ND  Extremities: No cyanosis or clubbing. No peripheral edema  :  No castro.   Skin: No rashes    LABS:  03-04    145  |  112<H>  |  22<H>  ----------------------------<  66<L>  5.4<H>   |  14<L>  |  1.3    Ca    8.2<L>      04 Mar 2024 06:17  Phos  2.8     03-04  Mg     1.9     03-04    TPro  6.0  /  Alb  2.9<L>  /  TBili  0.5  /  DBili      /  AST  18  /  ALT  27  /  AlkPhos  64  03-04                          8.8    12.04 )-----------( 459      ( 04 Mar 2024 06:17 )             29.6       Urine Studies:  Urinalysis Basic - ( 04 Mar 2024 06:17 )    Color:  / Appearance:  / SG:  / pH:   Gluc: 66 mg/dL / Ketone:   / Bili:  / Urobili:    Blood:  / Protein:  / Nitrite:    Leuk Esterase:  / RBC:  / WBC    Sq Epi:  / Non Sq Epi:  / Bacteria:       Sodium, Random Urine: 42.0 mmoL/L (02-28 @ 13:35)  Creatinine, Random Urine: 126 mg/dL (02-28 @ 13:35)  Protein/Creatinine Ratio Calculation: 1.2 Ratio (02-28 @ 13:35)  Osmolality, Random Urine: 509 mos/kg (02-28 @ 13:35)  Potassium, Random Urine: 23 mmol/L (02-28 @ 13:35)    RADIOLOGY & ADDITIONAL STUDIES:

## 2024-03-05 NOTE — PROGRESS NOTE ADULT - ATTENDING COMMENTS
Vital Signs Last 24 Hrs  T(C): 36 (05 Mar 2024 05:00), Max: 36.9 (04 Mar 2024 14:48)  T(F): 96.8 (05 Mar 2024 05:00), Max: 98.5 (04 Mar 2024 14:48)  HR: 96 (05 Mar 2024 12:14) (85 - 96)  BP: 137/89 (05 Mar 2024 12:14) (119/75 - 137/89)  BP(mean): --  RR: 18 (05 Mar 2024 12:14) (18 - 18)                          9.1    10.17 )-----------( 539      ( 05 Mar 2024 11:58 )             30.2   03-05    146  |  113<H>  |  21<H>  ----------------------------<  77  4.3   |  14<L>  |  1.2    Ca    7.9<L>      05 Mar 2024 11:58  Phos  2.0     03-05  Mg     1.8     03-05    TPro  6.2  /  Alb  3.1<L>  /  TBili  0.5  /  DBili  x   /  AST  15  /  ALT  21  /  AlkPhos  63  03-05    Bacteremia   Odynophagia   hx of prostate cancer now with mets?   Esophageal carcinoma   severe protein calorie malnutrition   DIMAS/CHUCKIE  Moderate to severe left hydronephrosis   metabolic acidosis     -patient with odynophagia - newly diagnosed esophageal cancer (poorly differentiated) likely progression of metastatic prostate cancer - unable to reach patient's oncologist Dr. Irizarry for a long time and hence patient was seen by the service oncology group and awaited for Dr. Irizarry to return back on the case as of yesterday - need a follow up for detailed plan of care regarding his malignancy treatment (much needed for Palliative care and multidisciplinary team to know the plan of txt and overall prognosis as patient lacks the capacity and has no next of kin - reach out to Palliative care Dr. Werner once plan confirmed from Dr. Irizarry/oncology and also let Dr. Escobar from Psych know so she can also assist - I have discussed the current plan with Dr. Escobar and I will be away for the next week to discuss further and to be done by the attending of record for the day and or week starting tomorrow)  -continue with IV abx, ID follow up appreciated - Dr Prince and I wrote separate chart notes for 2 PC for abx continuation (this is sufficient documentation / all discussed with Dr. Escobar/psych - see her note as well)  -repeat BCx prelim negative   -Monitor Kidney function; cr improving  -increased bicarb tabs to 1300mg q 8 hrs  -lokelma dose as needed - monitor K+  -Nutrition follow up - may need TPN vs. G tube - GI follow up to assist as patient's oral intake is minimal - calorie count and ng tube for feeds in the interim after the stent revision with GI and if ok to be inserted today or wait next 24 hrs - will follow   -overall poor prognosis - no next of kin as per CM  -Palliative care follow up     DISPO: not discharge ready - oncology reccs on chemo? - will follow   -discussed with CM in rounds       Attending Physician Dr. Erinn Leslie # [id number] . Vital Signs Last 24 Hrs  T(C): 36 (05 Mar 2024 05:00), Max: 36.9 (04 Mar 2024 14:48)  T(F): 96.8 (05 Mar 2024 05:00), Max: 98.5 (04 Mar 2024 14:48)  HR: 96 (05 Mar 2024 12:14) (85 - 96)  BP: 137/89 (05 Mar 2024 12:14) (119/75 - 137/89)  BP(mean): --  RR: 18 (05 Mar 2024 12:14) (18 - 18)                          9.1    10.17 )-----------( 539      ( 05 Mar 2024 11:58 )             30.2   03-05    146  |  113<H>  |  21<H>  ----------------------------<  77  4.3   |  14<L>  |  1.2    Ca    7.9<L>      05 Mar 2024 11:58  Phos  2.0     03-05  Mg     1.8     03-05    TPro  6.2  /  Alb  3.1<L>  /  TBili  0.5  /  DBili  x   /  AST  15  /  ALT  21  /  AlkPhos  63  03-05    #Bacteremia   #Odynophagia   #Metastatic prostate cancer/Esophageal carcinoma   #severe protein calorie malnutrition - decreased oral intake   #DIMAS/CHUCKIE  #Moderate to severe left hydronephrosis   #metabolic acidosis     -patient with odynophagia - newly diagnosed esophageal cancer (poorly differentiated) likely progression of metastatic prostate cancer - unable to reach patient's oncologist Dr. Irizarry for a long time and hence patient was seen by the service oncology group and awaited for Dr. Irizarry to return back on the case as of yesterday - need a follow up for detailed plan of care regarding his malignancy treatment (much needed for Palliative care and multidisciplinary team to know the plan of txt and overall prognosis as patient lacks the capacity and has no next of kin - reach out to Palliative care Dr. Werner once plan confirmed from Dr. Irizarry/oncology and also let Dr. Escobar from Psych know so she can also assist - I have discussed the current plan with Dr. Escobar and I will be away for the next week to discuss further and to be done by the attending of record for the day and or week starting tomorrow)  -continue with IV abx, ID follow up appreciated - Dr Prince and I wrote separate chart notes for 2 PC for abx continuation (this is sufficient documentation / all discussed with Dr. Escobar/psych - see her note as well)  -repeat BCx prelim negative   -Monitor Kidney function; cr improving  -increased bicarb tabs to 1300mg q 8 hrs  -lokelma dose as needed - monitor K+  -Nutrition follow up - may need TPN vs. G tube - GI follow up to assist as patient's oral intake is minimal - calorie count and ng tube for feeds in the interim after the stent revision with GI and if ok to be inserted today or wait next 24 hrs - will follow - add marinol   -overall poor prognosis - no next of kin as per CM  -Palliative care follow up     DISPO: not discharge ready - oncology reccs on chemo? - will follow   -discussed with CM in rounds       Attending Physician Dr. Erinn Leslie # [id number] .

## 2024-03-05 NOTE — BH CONSULTATION LIAISON ASSESSMENT NOTE - NSBHCONSULTRECOMMENDOTHER_PSY_A_CORE FT
1. We recommend that the medical team should consider a 2 physician consent for the use of I.V antibiotics in the care of this patient given the risk of worseing infection   2. We recommend that the medical team, discuss and obtain the details of the treatment plan given patient's prognosis from the oncology team prior to an assessment of patient's capacity to refuse a PEG tube for now   3. We recommend melatonin 5 –10 mg P.O bedtime to regulate sleep wake cycle   4. We recommend avoiding or cautious use of medications that can worsen delirium in this case such as benzodiazepines , anticholinergic agents, opioid pain medications   5. We recommend behavioral interventions, and environmental modifications to help patient's orientation and help her feel safe in addition to implementing delirium precautions as per nursing staff.   6. Consider Neurology consult for possible neurocognitive deficits   7. Patient will benefit from a higher level of care , such as a nursing home placement  8. No further psychiatry intervention is indicated for now , please recall as needed

## 2024-03-05 NOTE — PRE-ANESTHESIA EVALUATION ADULT - HEIGHT IN FEET
----- Message from Rogelio Jiménez MD sent at 11/30/2022 11:26 AM CST -----  Normal CBC and metabolic panel  
6
6

## 2024-03-05 NOTE — PROGRESS NOTE ADULT - SUBJECTIVE AND OBJECTIVE BOX
HPI:  68-year-old male with past medical history of hypertension, chronic back pain, prostate cancer, and posterior mediastinal mass 6.5 cm seen on CT 11/2023, presents for 3 months of progressively difficulty swallowing associated with 10 pounds weight loss and odynophagia. Comes in today now unable to swallow his pills with water, resulting in 2 episode of emesis. He mentioned that he gets SOB during exertion but denies any exertional chest pain. As per patient he was treated at [email or web address] for [facility] cancer and is s/p radiation therapy. He states that he has problem during walking and unable to maintain the posture.   Denies any abdominal pain, chest pain, dyspnea, diarrhea, fever, chills    Interval history  -Patient seen at bedside  -Denied pain  -endorsed some SOB and nausea     ADVANCE DIRECTIVES:     [ x] Full Code [ ] DNR  MOLST  [ ]  Living Will  [ ]   DECISION MAKER(s):  [ ] Health Care Proxy(s)  [ x] Surrogate(s)  [ ] Guardian           Name(s): Phone Number(s):  Nobody      BASELINE (I)ADL(s) (prior to admission):    Stanley: [ ]Total  [ ] Moderate [ ]Dependent  Palliative Performance Status Version 2:         %    http://npcrc.org/files/news/palliative_performance_scale_[email or web address].pdf    Allergies    No Known Allergies    Intolerances    MEDICATIONS  (STANDING):  cefTRIAXone   IVPB 2000 milliGRAM(s) IV Intermittent every 24 hours  chlorhexidine 2% Cloths 1 Application(s) Topical <User Schedule>  dextrose 5% + lactated ringers. 1000 milliLiter(s) (100 mL/Hr) IV Continuous <Continuous>  heparin   Injectable 5000 Unit(s) SubCutaneous every 12 hours  influenza  Vaccine (HIGH DOSE) 0.7 milliLiter(s) IntraMuscular once  lidocaine   4% Patch 1 Patch Transdermal every 24 hours  metoprolol tartrate 25 milliGRAM(s) Oral two times a day  ondansetron Injectable 4 milliGRAM(s) IV Push once  sodium bicarbonate 1300 milliGRAM(s) Oral every 8 hours  sodium phosphate 30 milliMole(s)/500 mL IVPB 30 milliMole(s) IV Intermittent once    MEDICATIONS  (PRN):  acetaminophen     Tablet .. 650 milliGRAM(s) Oral every 6 hours PRN Temp greater or equal to 38C (100.4F), Mild Pain (1 - 3)  aluminum hydroxide/magnesium hydroxide/simethicone Suspension 30 milliLiter(s) Oral every 4 hours PRN Dyspepsia  melatonin 3 milliGRAM(s) Oral at bedtime PRN Insomnia      PRESENT SYMPTOMS: [ ]Unable to obtain due to poor mentation   Source if other than patient:  [ ]Family   [ ]Team     Pain: [ ]yes [ x]no  QOL impact -   Location -                    Aggravating factors -   Quality -   Radiation -   Timing-   Severity (0-10 scale):   Minimal acceptable level (0-10 scale):      CPOT:    https://[email or web address]/getattachment/[email or web address]/Critical-Care-Pain-Observation-Tool-(CPOT)    PAIN AD Score:   http://geriatrictoolkit.[email or web address]/painad.pdf (press ctrl +  left click to view)    Dyspnea:                           [ ]None[ x]Mild [ ]Moderate [ ]Severe     Respiratory Distress Observation Scale (RDOS):   A score of 0 to 2 signifies little or no respiratory distress, 3 signifies mild distress, scores 4 to 6 indicate moderate distress, and scores greater than 7 signify severe distress  [email or web address]/[email or web address]/[email or web address]-cqswrigkxvt-biwapspp-exbanjyaaga-nymop.pdf    Anxiety:                             [x ]None[ ]Mild [ ]Moderate [ ]Severe   Fatigue:                             [x ]None[ ]Mild [ ]Moderate [ ]Severe   Nausea:                             [x ]None[ ]Mild [ ]Moderate [ ]Severe   Loss of appetite:              [ x]None[ ]Mild [ ]Moderate [ ]Severe   Constipation:                    [x ]None[ ]Mild [ ]Moderate [ ]Severe    Other Symptoms:  [x ]All other review of systems negative     Palliative Performance Status Version 2:         30%    http://npcrc.org/files/news/palliative_performance_scale_ppsv2.pdf    PHYSICAL EXAM:  Vital Signs Last 24 Hrs  T(C): 36 (05 Mar 2024 05:00), Max: 36.9 (04 Mar 2024 14:48)  T(F): 96.8 (05 Mar 2024 05:00), Max: 98.5 (04 Mar 2024 14:48)  HR: 96 (05 Mar 2024 12:14) (85 - 96)  BP: 137/89 (05 Mar 2024 12:14) (119/75 - 137/89)  BP(mean): --  RR: 18 (05 Mar 2024 12:14) (18 - 18)  SpO2: --    GENERAL:  [ ] No acute distress [ ]Lethargic  [ ]Unarousable  [x ]Verbal  [ ]Non-Verbal [ ]Cachexia    BEHAVIORAL/PSYCH:  [x ]Alert and Oriented x1-2 ] Anxiety [ ] Delirium [ ] Agitation [x ] Calm   EYES: [x ] No scleral icterus [ ] Scleral icterus [ ] Closed  ENMT:  [ ]Dry mouth  [x ]No external oral lesions [ ] No external ear or nose lesions  CARDIOVASCULAR:  [ ]Regular [ ]Irregular [ ]Tachy [x ]Not Tachy  [ ]Rambo [ ] Edema [ ] No edema  PULMONARY:  [ ]Tachypnea  [ ]Audible excessive secretions [x ] No labored breathing [ ] labored breathing  GASTROINTESTINAL: [ ]Soft  [ ]Distended  [ x]Not distended [ ]Non tender [ ]Tender  MUSCULOSKELETAL: [ ]No clubbing [ ] clubbing  [ x] No cyanosis [ ] cyanosis  NEUROLOGIC: [ ]No focal deficits  [ x]Follows commands  [ ]Does not follow commands  [x ]Cognitive impairment  [ ]Dysphagia  [ ]Dysarthria  [ ]Paresis   SKIN: [ ] Jaundiced [ ] Non-jaundiced [ ]Rash [ ]No Rash [ ] Warm [ ] Dry  MISC/LINES: [ ] ET tube [ ] Trach [ ]NGT/OGT [ ]PEG [ ]Angel    LABS: reviewed by me                                   9.1    10.17 )-----------( 539      ( 05 Mar 2024 11:58 )             30.2       03-05    146  |  113<H>  |  21<H>  ----------------------------<  77  4.3   |  14<L>  |  1.2    Ca    7.9<L>      05 Mar 2024 11:58  Phos  2.0     03-05  Mg     1.8     03-05    TPro  6.2  /  Alb  3.1<L>  /  TBili  0.5  /  DBili  x   /  AST  15  /  ALT  21  /  AlkPhos  63  03-05              Urinalysis Basic - ( 05 Mar 2024 11:58 )    Color: x / Appearance: x / SG: x / pH: x  Gluc: 77 mg/dL / Ketone: x  / Bili: x / Urobili: x   Blood: x / Protein: x / Nitrite: x   Leuk Esterase: x / RBC: x / WBC x   Sq Epi: x / Non Sq Epi: x / Bacteria: x                  CAPILLARY BLOOD GLUCOSE                  RADIOLOGY & ADDITIONAL STUDIES: reviewed by me    < from: US Kidney and Bladder (03.01.24 @ 18:42) >  IMPRESSION:  1.  Diffusely echogenic bilateral kidneys which can be seen with medical   renal disease.  2.  Moderate to severe left hydronephrosis with left renal parenchymal   thinning, as seen on prior CT scan.  3.  Large mass identified within the urinary bladder, measuring up to 7.9   cm, previously up to 7 cm.  No ureteral jets are seen bilaterally.  4.  Incidentally noted right pleural effusion.    < end of copied text >        EKG: reviewed by me    < from: 12 Lead ECG (02.21.24 @ 14:56) >  Ventricular Rate 90 BPM    Atrial Rate 90 BPM    P-R Interval 174 ms    QRS Duration 82 ms    Q-T Interval 400 ms    QTC Calculation(Bazett) 489 ms    P Axis 40 degrees    R Axis 40 degrees    T Axis 67 degrees    Diagnosis Line Normal sinus rhythm  Prolonged QT  Abnormal ECG    < end of copied text >          PROTEIN CALORIE MALNUTRITION PRESENT: [ ]mild [ ]moderate [ ]severe [ ]underweight [ ]morbid obesity  https://www.andeal.org/vault/2440/web/files/ONC/Table_Clinical%20Characteristics%20to%20Document%20Malnutrition-White%20JV%20et%20al%364444.pdf    Height (cm): 185.4 (02-22-24 @ 14:44), 185.4 (09-15-23 @ 10:44)  Weight (kg): 77.1 (02-21-24 @ 14:55), 77.1 (11-22-23 @ 08:11), 78.5 (09-15-23 @ 10:44)  BMI (kg/m2): 22.4 (02-22-24 @ 14:44), 22.4 (02-21-24 @ 14:55), 22.4 (11-22-23 @ 08:11)  [ ]PPSV2 < or = to 30% [ ]significant weight loss  [ ]poor nutritional intake  [ ]anasarca      [ ]Artificial Nutrition      Palliative Care Spiritual/Emotional Screening Tool Question  Severity (0-4):                    OR                    [ x] Unable to determine. Will assess at later time if appropriate.  Score of 2 or greater indicates recommendation of Chaplaincy and/or SW referral  Chaplaincy Referral: [ ] Yes [ ] Refused [ ] Following     Caregiver Proctor:  [ ] Yes [ ] No    OR    [x ] Unable to determine. Will assess at later time if appropriate.  Social Work Referral [ ]  Patient and Family Centered Care Referral [ ]    Anticipatory Grief Present: [ ] Yes [ ] No    OR     [ x] Unable to determine. Will assess at later time if appropriate.  Social Work Referral [ ]  Patient and Family Centered Care Referral [ ]    Patient discussed with primary medical team MD  Palliative care education provided to patient and/or family

## 2024-03-05 NOTE — PROGRESS NOTE ADULT - CONVERSATION DETAILS
Spoke with patient at bedside. Per discussion with primary, he has been refusing IV antibiotics and PEG placement. Discussed care with the patient. We discussed his overall clinical status and malignancy history, and he did not believe that he has cancer. We discussed his cancer and that if he refuses PEG/IV antibiotics, he will likely decline, be rehospitalized, and die after a time of not taking in enough food. He not believe that he would decline/die. He noted he just wanted to go back to his residence and would not discuss/believe information about his health.  He would not discuss code status.

## 2024-03-05 NOTE — BH CONSULTATION LIAISON ASSESSMENT NOTE - NSBHCHARTREVIEWVS_PSY_A_CORE FT
Vital Signs Last 24 Hrs  T(C): 36 (05 Mar 2024 05:00), Max: 36.9 (04 Mar 2024 14:48)  T(F): 96.8 (05 Mar 2024 05:00), Max: 98.5 (04 Mar 2024 14:48)  HR: 96 (05 Mar 2024 12:14) (85 - 96)  BP: 137/89 (05 Mar 2024 12:14) (119/75 - 137/89)  BP(mean): --  RR: 18 (05 Mar 2024 12:14) (18 - 18)  SpO2: --

## 2024-03-05 NOTE — BH CONSULTATION LIAISON ASSESSMENT NOTE - NSBHCONSULTMEDAGITATION_PSY_A_CORE FT
We recommend Haldol 2 mg P.O Q 6 hrs PRN for agitation. Please note that this medication can be given via the intramuscular route if the patient is severely agitated and is considered a danger to herself or others. Please ensure that QTC is < 500

## 2024-03-05 NOTE — BH CONSULTATION LIAISON ASSESSMENT NOTE - RISK ASSESSMENT
Risk factors for suicide in this patient are his chronic medical problems , cognitive deficits , however patient denies past suicide attempts , denies current susicidal ideations,

## 2024-03-05 NOTE — BH CONSULTATION LIAISON ASSESSMENT NOTE - HPI (INCLUDE ILLNESS QUALITY, SEVERITY, DURATION, TIMING, CONTEXT, MODIFYING FACTORS, ASSOCIATED SIGNS AND SYMPTOMS)
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 Psychiatric consult was called to assess his capacity to refuse I.V antibiotics and PEG tube placement .   According to the medical team, patient is refusing much needed I.V antibiotics for bacteremia following the result of the blood culture. In addition, patient is said to be s/p esophageal stent placement for his dysphagia and is refusing the placement of a PEG tube  to receive adequate nutrition. According to the medical team, patient is denying that he was ever diagnosed with the mediastinal mass and does not think his difficulty swallowing has anything to do with the concern that the medical team has with his nutritional status.     Upon approach, patient was observer to be clam, cooperative , well oriented to time, place , person. He participated in the interview , however questions were asked and proposed medical care was described  in a very simplistic manner as patient's neurocognitive status is unclear for now  and he seemed to understand better with simple descriptions.     Writer asked patient why he was refusing treatment offered to him by the medical team and he reported that there is noting wrong with him and that he was fine until he came to the hospital.   Patient reports that he was not informed that he has an infection but reports that he is willing to listen to writer's explanation. Writer informed him that based on the blood test that was done by the medical team, it seems that he has an infection and to prevent the risk of  spread and potentially being very ill and death, he would need to receive antibiotics which is the treatment of choice in this situation. Patient asked how he would receive the medications and he was informed that he would need to receive it through his veins. he also inquired how long he will meed to receive the medications for and was informed that he would receive it for at least 5 days . Patient reports that he understands , what writer is saying and is okay with receiving the antibiotics . he states " Bring it ".    Patient later spoke about his last hospitalization, when the issue of the cancer diagnoses was broached . He reports that the police brought him to the hospital and he was discharged to his current residence.    Writer asked if he knows that he has been diagnosed with cancer and he reports that he does not have cancer  and only heard of the diagnosis when he came to the hospital this time around, he states " I was in the hospital , last year , it wasn't there , the doctor that came to see me yesterday used to see me , after that and never mentioned it , they just old me when I got here , I wasn't there ". Writer informed him that it appears that he was diagnosed with cancer lat year November and has been in the care of an oncologist  but he reported that he does not believe it . Patient states " the lady doctor , she came yesterday , she never said anything like that ". Patient was informed that the medical team is concerned that the cancer has spread to his abdomen, and is likely contributing to the difficulty he is having with swallowing . patient reports that he doesn't know , but would like to see evidence of the cancer and the metastasis. he was also informed that since he is not eating, there is a risk of not receiving enough nutrition that would help him feel better however because he can not take anything by mouth or by NG tube, the medical team recommended that he would need a tube in his john for nutrition. patient however adamantly refused , reporting that he doesn't want a tube in his belly. When asked , why , patient's explanation was unclear but he continued to say " No". When informed that there is a risk of potential death , patient's states " im not afraid to die".     Patient denies acute symptoms suggestive of depression, psychosis , or gini. He denies current suicidal thoughts , intent or plan.   According to the nurse taking care of patient, he is in good behavioral control. pleasant , however is refuses nursing care at times, refuses blood work saying that he has given enough . She reports that patient is NPO  for now . She reports that patient has not had any episodes of agitation.

## 2024-03-05 NOTE — CHART NOTE - NSCHARTNOTEFT_GEN_A_CORE
Notified by primary attending that patient now refusing antibiotics as well for treatment of Strep pyogenes bacteremia.     As patient does not have capacity and no next-of-kin, I will participate in two physician consent needed for continuation of antibiotics.    Please call or message on [employer] Teams if with any questions.  Spectra [id number]

## 2024-03-05 NOTE — BH CONSULTATION LIAISON ASSESSMENT NOTE - SUMMARY
Mr Stallings is a 68 year old man, with no reported history of a psychiatric illness but has a history of Prostate cancer and recently diagnosed mediastinal mass who was  admitted to the medical service for dysphagia. Psychiatric consult was called to assess his capacity to refuse I.V antibiotics and PEG tube placement .   According to the medical team, patient is refusing much needed I.V antibiotics for bacteremia following the result of the blood culture. In addition, patient is said to be s/p esophageal stent placement for his dysphagia and is refusing the placement of a PEG tube  to receive adequate nutrition. According to the medical team, patient is denying that he was ever diagnosed with the mediastinal mass and does not think his difficulty swallowing has anything to do with the concern that the medical team has with his nutritional status.     Patient is agreeing to the use of antibiotics , he seems to have some understanding of the concept of having an infection and the fact that antibiotics  especially I.V antibiotics are the preferred method of treatment , he also seems to have some appreciation of the fact that if the infection if left untreated , could result in a sperma through out his blood stream and potential death. He also seems to be able to rationalize the benefit of this mode of treatment and that there is no other course of treatment that will be effective for the treatment of the infection.   Given the severity of the bacteremia , and patient's pattern of behavior of episodic medical care , the risk of delirium vers sundowning , the possibility of ongoing neurocognitive deficits and the risk for impending septicemia , the threshold for assessing patient's capacity to refuse the I.V antibiotics is going to be held at a higher standard.   At this time, patient does not demonstrate the capacity to refuse I.V antibiotics to treat the bacteremia. Since patient does not appear to have a next of kin or a documented health care proxy,  a court appointed decision maker can be appointed to make this decision.   However since there is an imminent need to start the I.V antibiotics,  the medical team can consider a 2 physician consent for the need to start treratment fgor bacteremia .     With regards to the capacity to refuse the placement of the PEG tube, we will not be able to comment on this issue at this time, since the treatment plan for this patient has not been ascertained by the oncology team.

## 2024-03-05 NOTE — CHART NOTE - NSCHARTNOTEFT_GEN_A_CORE
GI NUTRITION SUPPORT TEAM  -  CONSULT NOTE     68-year-old male with past medical history of hypertension, chronic back pain, prostate cancer, and posterior mediastinal mass 6.5 cm seen on CT 11/2023, presents for 3 months of progressively difficulty swallowing associated with 10 pounds weight loss and odynophagia. Comes in today now unable to swallow his pills with water, resulting in 2 episode of emesis. He mentioned that he gets SOB during exertion but denies any exertional chest pain. As per patient he was treated at [facility] for prostate cancer and is s/p radiation therapy. He states that he has problem during walking and unable to maintain the posture.   Denies any abdominal pain, chest pain, dyspnea, diarrhea, fever, chills      GI NUTRITION SUPPORT NOTE:            REVIEW OF SYSTEMS:  Negative except as noted above.       PAST MEDICAL/SURGICAL HISTORY:   HTN (hypertension)  Prostate cancer      ALLERGIES:  No Known Allergies      VITALS:  T(F): 96.8 (03-05 @ 05:00), Max: 96.8 (03-05 @ 05:00)  HR: 92 (03-05 @ 05:00) (92 - 92)  BP: 119/75 (03-05 @ 05:00) (119/75 - 119/75)  RR: 18 (03-05 @ 05:00) (18 - 18)  SpO2: --      HEIGHT/WEIGHT/BMI:   Height (cm): 185.4 (02-22), 185.4 (09-15)  Weight (kg): 77.1 (02-21), 77.1 (11-22), 78.5 (09-15)  BMI (kg/m2): 22.4 (02-22), 22.4 (02-21), 22.4 (11-22), 22.8 (09-15)      PHYSICAL EXAM:   GENERAL:    HEENT:    ABDOMEN   EXTREMITIES:    SKIN:    IV ACCESS:   ENTERAL ACCESS:     I/Os:     STANDING MEDICATIONS:   acetaminophen     Tablet .. 650 milliGRAM(s) Oral every 6 hours PRN  aluminum hydroxide/magnesium hydroxide/simethicone Suspension 30 milliLiter(s) Oral every 4 hours PRN  cefTRIAXone   IVPB 2000 milliGRAM(s) IV Intermittent every 24 hours  chlorhexidine 2% Cloths 1 Application(s) Topical <User Schedule>  dextrose 5% + lactated ringers. 1000 milliLiter(s) IV Continuous <Continuous>  heparin   Injectable 5000 Unit(s) SubCutaneous every 12 hours  influenza  Vaccine (HIGH DOSE) 0.7 milliLiter(s) IntraMuscular once  lidocaine   4% Patch 1 Patch Transdermal every 24 hours  melatonin 3 milliGRAM(s) Oral at bedtime PRN  metoprolol tartrate 25 milliGRAM(s) Oral two times a day  ondansetron Injectable 4 milliGRAM(s) IV Push once  sodium bicarbonate 1300 milliGRAM(s) Oral every 8 hours      LABS:                         8.8    12.04 )-----------( 459      ( 04 Mar 2024 06:17 )             29.6     145  |  112<H>  |  22<H>  ----------------------------<  66<L>          (03-04-24 @ 06:17)  5.4<H>   |  14<L>  |  1.3    Ca    8.2<L>          (03-04-24 @ 06:17)  Phos  2.8         (03-04-24 @ 06:17)  Mg     1.9         (03-04-24 @ 06:17)    TPro  6.0  /  Alb  2.9<L>  /  TBili  0.5  /  DBili  x   /  AST  18  /  ALT  27  /  AlkPhos  64       03-04-24 @ 06:17      DIET:   Diet, NPO after Midnight:      NPO Start Date: 04-Mar-2024,   NPO Start Time: 23:59 (03-04-24 @ 13:24) [Active]      ASSESSMENT              PLAN GI NUTRITION SUPPORT TEAM  -  CONSULT NOTE     68-year-old male with past medical history of hypertension, chronic back pain, prostate cancer, and posterior mediastinal mass 6.5 cm seen on CT 11/2023, presents for 3 months of progressively difficulty swallowing associated with 10 pounds weight loss and odynophagia. Comes in today now unable to swallow his pills with water, resulting in 2 episode of emesis. He mentioned that he gets SOB during exertion but denies any exertional chest pain. As per patient he was treated at [facility] for prostate cancer and is s/p radiation therapy. He states that he has problem during walking and unable to maintain the posture.   Denies any abdominal pain, chest pain, dyspnea, diarrhea, fever, chills      GI NUTRITION SUPPORT NOTE:    Consult to evaluate for PN. PO intake has been poor due to progressive dysphagia/odynophagia with resultant weight loss. s/p EGD with esophageal stent and EUS guided FNB, stricture resulting from extrinsic compression of para-esophageal mediastinal mass (18mm X 18mm). 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 2/29 esophagram showing severely narrowing lumen mid esophagus. Today pt planned for endo to evaluate position of stent however was refusing IV access earlier today. Also refusing PEG placement and abx (sepsis secondary to GAS bacteremia). Now guardianship being pursued and 2 PC obtained to continue with abx.      REVIEW OF SYSTEMS:  Negative except as noted above.       PAST MEDICAL/SURGICAL HISTORY:   HTN (hypertension)  Prostate cancer      ALLERGIES:  No Known Allergies      VITALS:  T(F): 96.8 (03-05 @ 05:00), Max: 96.8 (03-05 @ 05:00)  HR: 92 (03-05 @ 05:00) (92 - 92)  BP: 119/75 (03-05 @ 05:00) (119/75 - 119/75)  RR: 18 (03-05 @ 05:00) (18 - 18)  SpO2: --      HEIGHT/WEIGHT/BMI:   Height (cm): 185.4 (02-22), 185.4 (09-15)  Weight (kg): 77.1 (02-21), 77.1 (11-22), 78.5 (09-15)  BMI (kg/m2): 22.4 (02-22), 22.4 (02-21), 22.4 (11-22), 22.8 (09-15)      PHYSICAL EXAM:   GENERAL: A&O X 3, moderate overall fat/muscle loss  HEENT: poor dentition  ABDOMEN: soft, nontender, nondistended  EXTREMITIES: no clubbing, cyanosis, edema  SKIN: warm and dry, no breakdown  IV ACCESS: none  ENTERAL ACCESS: none     I/Os:     STANDING MEDICATIONS:   acetaminophen     Tablet .. 650 milliGRAM(s) Oral every 6 hours PRN  aluminum hydroxide/magnesium hydroxide/simethicone Suspension 30 milliLiter(s) Oral every 4 hours PRN  cefTRIAXone   IVPB 2000 milliGRAM(s) IV Intermittent every 24 hours  chlorhexidine 2% Cloths 1 Application(s) Topical <User Schedule>  dextrose 5% + lactated ringers. 1000 milliLiter(s) IV Continuous <Continuous>  heparin   Injectable 5000 Unit(s) SubCutaneous every 12 hours  influenza  Vaccine (HIGH DOSE) 0.7 milliLiter(s) IntraMuscular once  lidocaine   4% Patch 1 Patch Transdermal every 24 hours  melatonin 3 milliGRAM(s) Oral at bedtime PRN  metoprolol tartrate 25 milliGRAM(s) Oral two times a day  ondansetron Injectable 4 milliGRAM(s) IV Push once  sodium bicarbonate 1300 milliGRAM(s) Oral every 8 hours      LABS:                         8.8    12.04 )-----------( 459      ( 04 Mar 2024 06:17 )             29.6     Mean Cell Volume: 94.1 fL (03.05.24 @ 11:58)  Red Cell Distrib Width: 14.2 % (03.05.24 @ 11:58)    145  |  112<H>  |  22<H>  ----------------------------<  66<L>          (03-04-24 @ 06:17)  5.4<H>   |  14<L>  |  1.3    Ca    8.2<L>          (03-04-24 @ 06:17)  Phos  2.8         (03-04-24 @ 06:17)  Mg     1.9         (03-04-24 @ 06:17)    TPro  6.0  /  Alb  2.9<L>  /  TBili  0.5  /  DBili  x   /  AST  18  /  ALT  27  /  AlkPhos  64       03-04-24 @ 06:17      DIET:   Diet, NPO after Midnight:      NPO Start Date: 04-Mar-2024,   NPO Start Time: 23:59 (03-04-24 @ 13:24) [Active]      ASSESSMENT  68-year-old male with pmh of hypertension, chronic back pain, prostate cancer s/p RT, and posterior mediastinal mass 6.5 cm seen on CT 11/2023, presents for 3 months of progressively difficulty swallowing associated with 10 pounds weight loss and odynophagia. Presented with c/o being unable to swallow his pills with water, resulting in 2 episode of emesis. Nutrition called to evaluate for PN.    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 Comes in today now unable to swallow his pills with water, resulting in 2 episode of emesis. He mentioned that he gets SOB during exertion but denies any exertional chest pain. As per patient he was treated at [facility] for prostate cancer and is s/p radiation therapy. He states that he has problem during walking and unable to maintain the posture.   Denies any abdominal pain, chest pain, dyspnea, diarrhea, fever, chills      GI NUTRITION SUPPORT NOTE:    Consult to evaluate for PN. PO intake has been poor due to progressive dysphagia/odynophagia with resultant weight loss. s/p EGD with esophageal stent and EUS guided FNB, stricture resulting from extrinsic compression of para-esophageal mediastinal mass (18mm X 18mm). 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 2/29 esophagram showing severely narrowing lumen mid esophagus. Today pt planned for endo to evaluate position of stent however was refusing IV access earlier today. Also refusing PEG placement and abx (sepsis secondary to GAS bacteremia). Now guardianship being pursued and 2 PC obtained to continue with abx.      REVIEW OF SYSTEMS:  Negative except as noted above.       PAST MEDICAL/SURGICAL HISTORY:   HTN (hypertension)  Prostate cancer      ALLERGIES:  No Known Allergies      VITALS:  T(F): 96.8 (03-05 @ 05:00), Max: 96.8 (03-05 @ 05:00)  HR: 92 (03-05 @ 05:00) (92 - 92)  BP: 119/75 (03-05 @ 05:00) (119/75 - 119/75)  RR: 18 (03-05 @ 05:00) (18 - 18)  SpO2: --      HEIGHT/WEIGHT/BMI:   Height (cm): 185.4 (02-22), 185.4 (09-15)  Weight (kg): 77.1 (02-21), 77.1 (11-22), 78.5 (09-15)  BMI (kg/m2): 22.4 (02-22), 22.4 (02-21), 22.4 (11-22), 22.8 (09-15)      PHYSICAL EXAM:   GENERAL: A&O X 3, moderate overall fat/muscle loss  HEENT: adequate dentition, no oral lesions  ABDOMEN: soft, nontender, nondistended  EXTREMITIES: no clubbing, cyanosis, edema  SKIN: warm and dry, no breakdown  IV ACCESS: none  ENTERAL ACCESS: none     I/Os:     STANDING MEDICATIONS:   acetaminophen     Tablet .. 650 milliGRAM(s) Oral every 6 hours PRN  aluminum hydroxide/magnesium hydroxide/simethicone Suspension 30 milliLiter(s) Oral every 4 hours PRN  cefTRIAXone   IVPB 2000 milliGRAM(s) IV Intermittent every 24 hours  chlorhexidine 2% Cloths 1 Application(s) Topical <User Schedule>  dextrose 5% + lactated ringers. 1000 milliLiter(s) IV Continuous <Continuous>  heparin   Injectable 5000 Unit(s) SubCutaneous every 12 hours  influenza  Vaccine (HIGH DOSE) 0.7 milliLiter(s) IntraMuscular once  lidocaine   4% Patch 1 Patch Transdermal every 24 hours  melatonin 3 milliGRAM(s) Oral at bedtime PRN  metoprolol tartrate 25 milliGRAM(s) Oral two times a day  ondansetron Injectable 4 milliGRAM(s) IV Push once  sodium bicarbonate 1300 milliGRAM(s) Oral every 8 hours      LABS:                         8.8    12.04 )-----------( 459      ( 04 Mar 2024 06:17 )             29.6     Mean Cell Volume: 94.1 fL (03.05.24 @ 11:58)  Red Cell Distrib Width: 14.2 % (03.05.24 @ 11:58)    145  |  112<H>  |  22<H>  ----------------------------<  66<L>          (03-04-24 @ 06:17)  5.4<H>   |  14<L>  |  1.3    Ca    8.2<L>          (03-04-24 @ 06:17)  Phos  2.8         (03-04-24 @ 06:17)  Mg     1.9         (03-04-24 @ 06:17)    TPro  6.0  /  Alb  2.9<L>  /  TBili  0.5  /  DBili  x   /  AST  18  /  ALT  27  /  AlkPhos  64       03-04-24 @ 06:17      DIET:   Diet, NPO after Midnight:      NPO Start Date: 04-Mar-2024,   NPO Start Time: 23:59 (03-04-24 @ 13:24) [Active]      ASSESSMENT  68-year-old male with pmh of hypertension, chronic back pain, prostate cancer s/p RT, and posterior mediastinal mass 6.5 cm seen on CT 11/2023, presents for 3 months of progressively difficulty swallowing associated with 10 pounds weight loss and odynophagia. Presented with c/o being unable to swallow his pills with water, resulting in 2 episode of emesis. Nutrition called to evaluate for PN.    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

## 2024-03-05 NOTE — CHART NOTE - NSCHARTNOTEFT_GEN_A_CORE
Received a call from anesthesia that pts left shoulder was dislocated/fractured.  could not find documentation of the same.  Ordered a Xray of left shoulder 2 views.  Will evaluate and assess pt in PACU. Received a call from anesthesia that pts left shoulder was dislocated/fractured.  could not find documentation of the same.  Ordered a Xray of left shoulder 2 views.  Will evaluate and assess pt in PACU.  After assessing pt===>motor strength equal B/L                                     No pain on passive or active ROM                                     Possible bony deformity                                     F/U XRAY of left shoulder

## 2024-03-05 NOTE — PROGRESS NOTE ADULT - ASSESSMENT
68-year-old male with past medical history of hypertension, chronic back pain, prostate cancer, and posterior mediastinal mass 6.5 cm seen on CT 11/2023, presents for 3 months of progressively difficulty swallowing associated with 10 pounds weight loss and odynophagia. Comes in today now unable to swallow his pills with water, resulting in 2 episode of emesis. He mentioned that he gets SOB during exertion but denies any exertional chest pain. As per patient he was treated at [facility] for prostate cancer and is s/p radiation therapy. He states that he has problem during walking and unable to maintain the posture.     #aspiration PNA vs viral infection vs HAP   #GAS s pyogenes bacteremia   - SIRS positive (WBC increased to 16, febrile to 101 and tachy to 129 )   - CXR with worsening L-sided opacity   - Blood cx (2/24/24): positive for strep pyogenes  - repeat BCx 27,28,29,1,2  negative to date   - procal 7  - aspiration precautions   - patient cleared for pureed diet and thin liquids per SS -refusing food as patient reports inability to swallow   - on zosyn 3.375mg q8h switched to cefepime and clindamycin pending ID rec   -ID rec:  source potentially from translocation in setting of infiltrative masses, stop clindamycin , switch cefepime to ceftriaxone 2g daily => ID rec for dc abx   - TTE   1. Left ventricular ejection fraction, by visual estimation, is 65 to 70%.   2. Hyperdynamic global left ventricular systolic function.   3. Mildly increased LV wall thickness.   4. Spectral Doppler shows impaired relaxation pattern of left   ventricular myocardial filling (Grade I diastolic dysfunction).   5. Trace mitral valve regurgitation.    # Dysphagia with odynophagia  #Failure to thrive   # Hx of prostate cancer s/p radiation therapy  # Prostate cancer metastasis to mediastinum or primary mediastinal mass?  - CT Chest: Interval enlargement of the central/posterior mediastinal mass with development of more extensive lymphadenopathy. The mass encircles the descending thoracic aorta and has some mass effect upon the left atrium. The mid aspect of the esophagus is encircled by the mass and difficult to distinguish. Possible ovoid pill is noted within the mass and possible location of the mid esophagus appear. The maximal esophagus appears distended with layering debris within. Consideration may be given to esophageal stenting.  - CT abdomen and pelvis:  Increased upper abdominal bulky lymphadenopathy. Unchanged bulky retroperitoneal lymphadenopathy, large left bladder mass and perirectal soft tissue infiltrative mass. 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 Chronic moderate to severe left hydroureteronephrosis  - EGD with GI 2/21 -> stent placed and esophageal biopsies taken -pathology - Poorly differentiated carcinoma.  - CT surgery following -> no acute intervention   - AFP and CEA wnl - LDH and haptoglobin elevated - PSA total 220, PSA free is 22 - CA19 131-  57  - oncology recs appreciated ->patient used to follow with Dr. Kenia Irizarry, called and teams her and she is not picking up the phone calls, not reachable in any way   -service oncology consulted  - Esophageal biopsy: Cytopathology - Non Gyn Report: ACCESSION No:  [id number]. Final Diagnosis- MEDIASTINUM MASS; EUS GUIDED FNA (THINPREP AND CELL BLOCK): POSITIVE FOR MALIGNANT CELLS,  Metastatic poorly differentiated carcinoma. The morphology of the current tumor and the immunohistochemical profile along with the patient's history of prostatic cancer are consistent with a metastatic poorly differentiated carcinoma of prostatic origin.  - Esophageal ulcer biopsy shows poorly differentiated carcinoma, IHC staining pending. Discussed with Dr. Lewis, related to prostate cancer, not a second primary malignancy   - per onc note, patient had prior biopsy of site that demonstrated metastatic prostate adenocarcinoma and was started on Docetaxel, in January 2024 patient was started on Pembrolizumab and continued on keytruda injections.   - Dr. Irizarry is returning to the office on Monday 3/4 => on phone cs rad/onco for palliative radiation   - orthostatics positive, likely secondary to poor oral intake, on IV fluids, ensure added, nutrition consulted, calorie count ordered, not accepting to eat,zofran prn  -palliative consulted , patient needs surrogate    -esophogram :Esophageal stent is visualized in the mid esophagus and extending below  the left hemidiaphragm. The lumen of the mid esophagus is severely   narrowed.A small amount of contrast is visualized passing through the mid esophagus.No evidence of contrast extravasation or fistula formation to suggest leak.  => advanced GI recalled again : EGD today to salvage stent or replace it     #CHUCKIE vs ATN induced DIMAS  - pt came in with a baseline of 1.5 and increased to 2.6 with contrast use  - c/w IVF  - renally dose medications  -crea not improving   -US Kidney and Bladder (03.01.24 @ 18:42) >  1.  Diffusely echogenic bilateral kidneys which can be seen with medical renal disease.  2.  Moderate to severe left hydronephrosis with left renal parenchymal thinning, as seen on prior CT scan.  3.  Large mass identified within the urinary bladder, measuring up to 7.9  cm, previously up to 7 cm.  No ureteral jets are seen bilaterally.  4.  Incidentally noted right pleural effusion.  -nephro cs   DIMAS likely due to CHUCKIE  other possible causes IRGN iso GAS bacteremia, doubt prerenal given no improvement after IVF   chronic left hydro iso prostate cancer and LN, creatinine at baseline on admission, unlikely contributing to DIMAS  send UA with urine lytes and spot for PCR -> fena 0.5%   C3 C4 levels normal  repeat blood cultures negative   creatinine stable, non oliguric  strict i/os  gentle IVH if odyno/dysphagia still present with inability to tolerate adequate po intake  bicarb   no indications for RRT    # small b/l pleural effusions on CT chest   - s/p 1x dose 40mg IV Lasix , CXR improved     #normocytic Anemia  - Hb 9-10  - f/u iron  studies noted % sat 27   - MCV normal, SPEP negative     #hypocalcemia  -replenished   -monitor     #Misc   #DVT PPx- heparin   #GI PPx- none   #Diet-  puree   #Activity- AAT, PT consult Home PT; back to [facility] w/ assistance???  #Dispo- Acute     68-year-old male with past medical history of hypertension, chronic back pain, prostate cancer, and posterior mediastinal mass 6.5 cm seen on CT 11/2023, presents for 3 months of progressively difficulty swallowing associated with 10 pounds weight loss and odynophagia. Comes in today now unable to swallow his pills with water, resulting in 2 episode of emesis. He mentioned that he gets SOB during exertion but denies any exertional chest pain. As per patient he was treated at [facility] for prostate cancer and is s/p radiation therapy. He states that he has problem during walking and unable to maintain the posture.     #aspiration PNA vs viral infection vs HAP   #GAS s pyogenes bacteremia   - SIRS positive (WBC increased to 16, febrile to 101 and tachy to 129 )   - CXR with worsening L-sided opacity   - Blood cx (2/24/24): positive for strep pyogenes  - repeat BCx 27,28,29,1,2  negative to date   - procal 7  - aspiration precautions   - patient cleared for pureed diet and thin liquids per SS -refusing food as patient reports inability to swallow   - on zosyn 3.375mg q8h switched to cefepime and clindamycin pending ID rec   -ID rec:  source potentially from translocation in setting of infiltrative masses, stop clindamycin , switch cefepime to ceftriaxone 2g daily   - continue ceftriaxone 2g daily (end date 3/11 to complete 2 weeks from culture clearance) ideally to finish with midline   - TTE   1. Left ventricular ejection fraction, by visual estimation, is 65 to 70%.   2. Hyperdynamic global left ventricular systolic function.   3. Mildly increased LV wall thickness.   4. Spectral Doppler shows impaired relaxation pattern of left   ventricular myocardial filling (Grade I diastolic dysfunction).   5. Trace mitral valve regurgitation.    # Dysphagia with odynophagia  #Failure to thrive   # Hx of prostate cancer s/p radiation therapy  # Prostate cancer metastasis to mediastinum or primary mediastinal mass?  - CT Chest: Interval enlargement of the central/posterior mediastinal mass with development of more extensive lymphadenopathy. The mass encircles the descending thoracic aorta and has some mass effect upon the left atrium. The mid aspect of the esophagus is encircled by the mass and difficult to distinguish. Possible ovoid pill is noted within the mass and possible location of the mid esophagus appear. The maximal esophagus appears distended with layering debris within. Consideration may be given to esophageal stenting.  - CT abdomen and pelvis:  Increased upper abdominal bulky lymphadenopathy. Unchanged bulky retroperitoneal lymphadenopathy, large left bladder mass and perirectal soft tissue infiltrative mass. 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 Chronic moderate to severe left hydroureteronephrosis  - EGD with GI 2/21 -> stent placed and esophageal biopsies taken -pathology - Poorly differentiated carcinoma.  - CT surgery following -> no acute intervention   - AFP and CEA wnl - LDH and haptoglobin elevated - PSA total 220, PSA free is 22 - CA19 131-  57  - oncology recs appreciated ->patient used to follow with Dr. Kenia Irizarry, called and teams her and she is not picking up the phone calls, not reachable in any way   -service oncology consulted  - Esophageal biopsy: Cytopathology - Non Gyn Report: ACCESSION No:  [id number]. Final Diagnosis- MEDIASTINUM MASS; EUS GUIDED FNA (THINPREP AND CELL BLOCK): POSITIVE FOR MALIGNANT CELLS,  Metastatic poorly differentiated carcinoma. The morphology of the current tumor and the immunohistochemical profile along with the patient's history of prostatic cancer are consistent with a metastatic poorly differentiated carcinoma of prostatic origin.  - Esophageal ulcer biopsy shows poorly differentiated carcinoma, IHC staining pending. Discussed with Dr. Lewis, related to prostate cancer, not a second primary malignancy   - per onc note, patient had prior biopsy of site that demonstrated metastatic prostate adenocarcinoma and was started on Docetaxel, in January 2024 patient was started on Pembrolizumab and continued on keytruda injections.   - Dr. Irizarry is returning to the office on Monday 3/4 => cs rad/onco for palliative radiation - needs radiation records   - orthostatics positive, likely secondary to poor oral intake, on IV fluids, ensure added, nutrition consulted, calorie count ordered, not accepting to eat,zofran prn  => pending TPN vs PPN vs NGT   -palliative consulted , patient needs surrogate    -esophogram :Esophageal stent is visualized in the mid esophagus and extending below  the left hemidiaphragm. The lumen of the mid esophagus is severely   narrowed.A small amount of contrast is visualized passing through the mid esophagus.No evidence of contrast extravasation or fistula formation to suggest leak.  => advanced GI recalled again : EGD to salvage stent or replace it     #CHUCKIE vs ATN induced DIMAS  - pt came in with a baseline of 1.5 and increased to 2.6 with contrast use  - c/w IVF  - renally dose medications  -crea not improving   -US Kidney and Bladder (03.01.24 @ 18:42) >  1.  Diffusely echogenic bilateral kidneys which can be seen with medical renal disease.  2.  Moderate to severe left hydronephrosis with left renal parenchymal thinning, as seen on prior CT scan.  3.  Large mass identified within the urinary bladder, measuring up to 7.9  cm, previously up to 7 cm.  No ureteral jets are seen bilaterally.  4.  Incidentally noted right pleural effusion.  -nephro cs   DIMAS likely due to CHUCKIE  other possible causes IRGN iso GAS bacteremia, doubt prerenal given no improvement after IVF   chronic left hydro iso prostate cancer and LN, creatinine at baseline on admission, unlikely contributing to DIMAS  send UA with urine lytes and spot for PCR -> fena 0.5%   C3 C4 levels normal  repeat blood cultures negative   creatinine stable, non oliguric  strict i/os  gentle IVH if odyno/dysphagia still present with inability to tolerate adequate po intake  bicarb   no indications for RRT    # small b/l pleural effusions on CT chest   - s/p 1x dose 40mg IV Lasix , CXR improved     #normocytic Anemia  - Hb 9-10  - f/u iron  studies noted % sat 27   - MCV normal, SPEP negative     #hypocalcemia  -replenished   -monitor     #Misc   #DVT PPx- heparin   #GI PPx- none   #Diet-  puree   #Activity- AAT, PT consult Home PT; back to [facility] w/ assistance???  #Dispo- Acute

## 2024-03-05 NOTE — BH CONSULTATION LIAISON ASSESSMENT NOTE - NSBHREFERDETAILS_PSY_A_CORE_FT
Patient has been found to have bacteremia , and has dysphagia , likely secondary to metastasis from prostate cancer. he is however refusing I.V antibiotics and is also refusing the recommended PEG tube

## 2024-03-05 NOTE — BH CONSULTATION LIAISON ASSESSMENT NOTE - CURRENT MEDICATION
MEDICATIONS  (STANDING):  cefTRIAXone   IVPB 2000 milliGRAM(s) IV Intermittent every 24 hours  chlorhexidine 2% Cloths 1 Application(s) Topical <User Schedule>  dextrose 5% + lactated ringers. 1000 milliLiter(s) (100 mL/Hr) IV Continuous <Continuous>  heparin   Injectable 5000 Unit(s) SubCutaneous every 12 hours  influenza  Vaccine (HIGH DOSE) 0.7 milliLiter(s) IntraMuscular once  lidocaine   4% Patch 1 Patch Transdermal every 24 hours  metoprolol tartrate 25 milliGRAM(s) Oral two times a day  ondansetron Injectable 4 milliGRAM(s) IV Push once  sodium bicarbonate 1300 milliGRAM(s) Oral every 8 hours  sodium phosphate 30 milliMole(s)/500 mL IVPB 30 milliMole(s) IV Intermittent once    MEDICATIONS  (PRN):  acetaminophen     Tablet .. 650 milliGRAM(s) Oral every 6 hours PRN Temp greater or equal to 38C (100.4F), Mild Pain (1 - 3)  aluminum hydroxide/magnesium hydroxide/simethicone Suspension 30 milliLiter(s) Oral every 4 hours PRN Dyspepsia  melatonin 3 milliGRAM(s) Oral at bedtime PRN Insomnia

## 2024-03-05 NOTE — PROGRESS NOTE ADULT - TREATMENT GUIDELINE COMMENT
-Full code  -Patient does not appear to understand weight of illness and does not have capacity  -Guardianship should be pursued

## 2024-03-05 NOTE — PROGRESS NOTE ADULT - ASSESSMENT
68-year-old male with past medical history of hypertension, chronic back pain, prostate cancer, and posterior mediastinal mass 6.5 cm seen on CT 11/2023, presents for 3 months of progressively difficulty swallowing associated with 10 pounds weight loss and odynophagia. 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 Also found to be in sepsis secondary to GAS bacteremia. hospital stay complicated by DIMAS.  Assessment and plan    DIMAS/ stage 4 prostate cancer/ mediastinal mass s/p bx/ HTN  patient s/p 2 iv contrast studies within 2 days period   DIMAS likely due to CHUCKIE. Though wouldnt explain 1.2 gr proteinuria , doubt immune related GN in absence of hematuria ? membranous  related to malignancy doubt prerenal given no improvement after IVF  creat continues to improve  sono " 1.  Diffusely echogenic bilateral kidneys which can be seen with medical  renal disease. 2.  Moderate to severe left hydronephrosis with left renal parenchymal  thinning, as seen on prior CT scan. 3.  Large mass identified within the urinary bladder, measuring up to 7.9  cm, previously up to 7 cm.  No ureteral jets are seen bilaterally." 4.  Incidentally noted right pleural effusion.   evaluation   oncology notes appreciated  started sodium bicarb 1300 mg po q8h   phos  at goal    Pt refusing care,  confused, no clear gaurdian,  Psch eval pending Palliatve eval noted

## 2024-03-05 NOTE — BH CONSULTATION LIAISON ASSESSMENT NOTE - NSBHCHARTREVIEWLAB_PSY_A_CORE FT
9.1    10.17 )-----------( 539      ( 05 Mar 2024 11:58 )             30.2       03-05    146  |  113<H>  |  21<H>  ----------------------------<  77  4.3   |  14<L>  |  1.2    Ca    7.9<L>      05 Mar 2024 11:58  Phos  2.0     03-05  Mg     1.8     03-05    TPro  6.2  /  Alb  3.1<L>  /  TBili  0.5  /  DBili  x   /  AST  15  /  ALT  21  /  AlkPhos  63  03-05    Culture - Blood (collected 02-24-24 @ 12:14)  Source: .Blood None  Final Report (02-28-24 @ 12:01):    Growth in aerobic and anaerobic bottles: Streptococcus pyogenes (Group A)    Direct identification is available within approximately 3-5    hours either by Blood Panel Multiplexed PCR or Direct    MALDI-TOF. Details: [email or web address]  Organism: Blood Culture PCR  Streptococcus pyogenes (Group A) (02-28-24 @ 12:01)  Organism: Streptococcus pyogenes (Group A) (02-28-24 @ 12:01)

## 2024-03-05 NOTE — BH CONSULTATION LIAISON ASSESSMENT NOTE - CURRENT INTENT:
Pt c/o severe throat pain especially on the left side.  Noted a white exudate to the left tonsil, tonsillar nodes enlarged.  Will send Rx for azithromycin.  
None known

## 2024-03-05 NOTE — CHART NOTE - NSCHARTNOTEFT_GEN_A_CORE
case discussed with Psychiatrist Dr. Escobar and ID Dr. Rooney    -Psych note to follow for capacity  -patient does not have the capacity to decline current use and need for continuation of IV antibiotics - will take part in 2 PC for continuing antibiotics with Dr. Ott - see his chart note as well   -discussed with medical intern on the case

## 2024-03-05 NOTE — BH CONSULTATION LIAISON ASSESSMENT NOTE - NSBHCONSULTFOLLOWAFTERCARE_PSY_A_CORE FT
Upon discharge, patient can follow up with the geriatric psychiatrist or geriatrician at the nursing home that he will be discharged to

## 2024-03-05 NOTE — CHART NOTE - NSCHARTNOTEFT_GEN_A_CORE
PACU ANESTHESIA ADMISSION NOTE      Procedure: EGD, PEG insertion, esophageal stent  Post op diagnosis:  FTT    ____  Intubated  TV:______       Rate: ______      FiO2: ______    __x__  Patent Airway    __x__  Full return of protective reflexes    __x__  Full recovery from anesthesia / back to baseline status    Vitals:  T(C): 36.5 (03-05-24 @ 16:45), Max: 36.5 (03-05-24 @ 11:10)  HR: 99 (03-05-24 @ 16:45) (92 - 99)  BP: 135/93 (03-05-24 @ 16:45) (119/75 - 137/89)  RR: 18 (03-05-24 @ 16:45) (18 - 18)  SpO2: 98% (03-05-24 @ 16:39) (98% - 98%)    Mental Status:  __x__ Awake   ___x__ Alert   _____ Drowsy   _____ Sedated    Nausea/Vomiting:  __x__ NO  ______Yes,   See Post - Op Orders          Pain Scale (0-10):  _____    Treatment: ____ None    __x__ See Post - Op/PCA Orders    Post - Operative Fluids:   ____ Oral   __x__ See Post - Op Orders    Plan: Discharge:   ____Home       ___x__Floor     _____Critical Care    _____  Other:_________________    It was noted upon emergence that patient's L distal clavicle is either fractured or dislocated.  Mr. Stallings denies pain and has full range of motion, but there is an obvious deformity so the primary team was notified and stated that they will order an x-ray.

## 2024-03-05 NOTE — PROGRESS NOTE ADULT - SUBJECTIVE AND OBJECTIVE BOX
24H events:    Today is hospital day 14d. This morning patient was seen and examined at bedside, resting comfortably in bed.    No acute or major events overnight.    PAST MEDICAL & SURGICAL HISTORY  HTN (hypertension)    Prostate cancer        SOCIAL HISTORY:  Social History:      ALLERGIES:  No Known Allergies      MEDICATIONS:  STANDING MEDICATIONS  cefTRIAXone   IVPB 2000 milliGRAM(s) IV Intermittent every 24 hours  chlorhexidine 2% Cloths 1 Application(s) Topical <User Schedule>  dextrose 5% + lactated ringers. 1000 milliLiter(s) IV Continuous <Continuous>  heparin   Injectable 5000 Unit(s) SubCutaneous every 12 hours  influenza  Vaccine (HIGH DOSE) 0.7 milliLiter(s) IntraMuscular once  lidocaine   4% Patch 1 Patch Transdermal every 24 hours  metoprolol tartrate 25 milliGRAM(s) Oral two times a day  ondansetron Injectable 4 milliGRAM(s) IV Push once  sodium bicarbonate 1300 milliGRAM(s) Oral every 8 hours    PRN MEDICATIONS  acetaminophen     Tablet .. 650 milliGRAM(s) Oral every 6 hours PRN  aluminum hydroxide/magnesium hydroxide/simethicone Suspension 30 milliLiter(s) Oral every 4 hours PRN  melatonin 3 milliGRAM(s) Oral at bedtime PRN      VITALS:   T(C): 36 (03-05-24 @ 05:00), Max: 36.9 (03-04-24 @ 14:48)  HR: 92 (03-05-24 @ 05:00) (85 - 95)  BP: 119/75 (03-05-24 @ 05:00) (119/75 - 134/85)  RR: 18 (03-05-24 @ 05:00) (18 - 18)  SpO2: --  I&O's Summary        PHYSICAL EXAM:    GENERAL: NAD, non-toxic appearing, cachectic , poor dental hygiene   NECK: Supple, no stiffness, no JVD  HEART: Regular rate and rhythm, normal s1s2  LUNGS: Unlabored respirations, b/l air entry, no adventitious breath sounds   ABDOMEN: Soft, nontender, nondistended; +BS  EXTREMITIES: No clubbing, cyanosis, or edema;   NERVOUS SYSTEM: AOx3  SKIN: Warm and dry    LABS:                        8.8    12.04 )-----------( 459      ( 04 Mar 2024 06:17 )             29.6     03-04    145  |  112<H>  |  22<H>  ----------------------------<  66<L>  5.4<H>   |  14<L>  |  1.3    Ca    8.2<L>      04 Mar 2024 06:17  Phos  2.8     03-04  Mg     1.9     03-04    TPro  6.0  /  Alb  2.9<L>  /  TBili  0.5  /  DBili  x   /  AST  18  /  ALT  27  /  AlkPhos  64  03-04      Urinalysis Basic - ( 04 Mar 2024 06:17 )    Color: x / Appearance: x / SG: x / pH: x  Gluc: 66 mg/dL / Ketone: x  / Bili: x / Urobili: x   Blood: x / Protein: x / Nitrite: x   Leuk Esterase: x / RBC: x / WBC x   Sq Epi: x / Non Sq Epi: x / Bacteria: x            Culture - Blood (collected 02 Mar 2024 07:22)  Source: .Blood None  Preliminary Report (04 Mar 2024 17:02):    No growth at 48 Hours               24H events:    Today is hospital day 14d. This morning patient was seen and examined at bedside, resting comfortably in bed.    Writer spoke with patient again , looks like he does not have capacity, he does not comprehend his medical problems and he is not rational.   G[facility] contacted, requested radiation hx , so Dr Irizarry contacted.  Will speak to patient regarding ngt feedings after the EGD.  Still refusing IV- pending psych eval.Will need to assess capacity regarding cancer treatment.  GI and nutrition eval for TPN vx PPN. Calorie count failed before- reordered.     PAST MEDICAL & SURGICAL HISTORY  HTN (hypertension)    Prostate cancer        SOCIAL HISTORY:  Social History:      ALLERGIES:  No Known Allergies      MEDICATIONS:  STANDING MEDICATIONS  cefTRIAXone   IVPB 2000 milliGRAM(s) IV Intermittent every 24 hours  chlorhexidine 2% Cloths 1 Application(s) Topical <User Schedule>  dextrose 5% + lactated ringers. 1000 milliLiter(s) IV Continuous <Continuous>  heparin   Injectable 5000 Unit(s) SubCutaneous every 12 hours  influenza  Vaccine (HIGH DOSE) 0.7 milliLiter(s) IntraMuscular once  lidocaine   4% Patch 1 Patch Transdermal every 24 hours  metoprolol tartrate 25 milliGRAM(s) Oral two times a day  ondansetron Injectable 4 milliGRAM(s) IV Push once  sodium bicarbonate 1300 milliGRAM(s) Oral every 8 hours    PRN MEDICATIONS  acetaminophen     Tablet .. 650 milliGRAM(s) Oral every 6 hours PRN  aluminum hydroxide/magnesium hydroxide/simethicone Suspension 30 milliLiter(s) Oral every 4 hours PRN  melatonin 3 milliGRAM(s) Oral at bedtime PRN      VITALS:   T(C): 36 (03-05-24 @ 05:00), Max: 36.9 (03-04-24 @ 14:48)  HR: 92 (03-05-24 @ 05:00) (85 - 95)  BP: 119/75 (03-05-24 @ 05:00) (119/75 - 134/85)  RR: 18 (03-05-24 @ 05:00) (18 - 18)  SpO2: --  I&O's Summary        PHYSICAL EXAM:    GENERAL: NAD, non-toxic appearing, cachectic , poor dental hygiene   NECK: Supple, no stiffness, no JVD  HEART: Regular rate and rhythm, normal s1s2  LUNGS: Unlabored respirations, b/l air entry, no adventitious breath sounds   ABDOMEN: Soft, nontender, nondistended; +BS  EXTREMITIES: No clubbing, cyanosis, or edema;   NERVOUS SYSTEM: AOx3  SKIN: Warm and dry    LABS:                        8.8    12.04 )-----------( 459      ( 04 Mar 2024 06:17 )             29.6     03-04    145  |  112<H>  |  22<H>  ----------------------------<  66<L>  5.4<H>   |  14<L>  |  1.3    Ca    8.2<L>      04 Mar 2024 06:17  Phos  2.8     03-04  Mg     1.9     03-04    TPro  6.0  /  Alb  2.9<L>  /  TBili  0.5  /  DBili  x   /  AST  18  /  ALT  27  /  AlkPhos  64  03-04      Urinalysis Basic - ( 04 Mar 2024 06:17 )    Color: x / Appearance: x / SG: x / pH: x  Gluc: 66 mg/dL / Ketone: x  / Bili: x / Urobili: x   Blood: x / Protein: x / Nitrite: x   Leuk Esterase: x / RBC: x / WBC x   Sq Epi: x / Non Sq Epi: x / Bacteria: x            Culture - Blood (collected 02 Mar 2024 07:22)  Source: .Blood None  Preliminary Report (04 Mar 2024 17:02):    No growth at 48 Hours

## 2024-03-06 LAB
ALBUMIN SERPL ELPH-MCNC: 2.9 G/DL — LOW (ref 3.5–5.2)
ALBUMIN SERPL ELPH-MCNC: 3.1 G/DL — LOW (ref 3.5–5.2)
ALP SERPL-CCNC: 56 U/L — SIGNIFICANT CHANGE UP (ref 30–115)
ALP SERPL-CCNC: 62 U/L — SIGNIFICANT CHANGE UP (ref 30–115)
ALT FLD-CCNC: 16 U/L — SIGNIFICANT CHANGE UP (ref 0–41)
ALT FLD-CCNC: 17 U/L — SIGNIFICANT CHANGE UP (ref 0–41)
ANION GAP SERPL CALC-SCNC: 17 MMOL/L — HIGH (ref 7–14)
ANION GAP SERPL CALC-SCNC: 20 MMOL/L — HIGH (ref 7–14)
AST SERPL-CCNC: 18 U/L — SIGNIFICANT CHANGE UP (ref 0–41)
AST SERPL-CCNC: 21 U/L — SIGNIFICANT CHANGE UP (ref 0–41)
BASOPHILS # BLD AUTO: 0.01 K/UL — SIGNIFICANT CHANGE UP (ref 0–0.2)
BASOPHILS NFR BLD AUTO: 0.1 % — SIGNIFICANT CHANGE UP (ref 0–1)
BILIRUB SERPL-MCNC: 0.4 MG/DL — SIGNIFICANT CHANGE UP (ref 0.2–1.2)
BILIRUB SERPL-MCNC: 0.5 MG/DL — SIGNIFICANT CHANGE UP (ref 0.2–1.2)
BUN SERPL-MCNC: 20 MG/DL — SIGNIFICANT CHANGE UP (ref 10–20)
BUN SERPL-MCNC: 21 MG/DL — HIGH (ref 10–20)
CALCIUM SERPL-MCNC: 7.9 MG/DL — LOW (ref 8.4–10.5)
CALCIUM SERPL-MCNC: 8.2 MG/DL — LOW (ref 8.4–10.4)
CHLORIDE SERPL-SCNC: 110 MMOL/L — SIGNIFICANT CHANGE UP (ref 98–110)
CHLORIDE SERPL-SCNC: 112 MMOL/L — HIGH (ref 98–110)
CO2 SERPL-SCNC: 13 MMOL/L — LOW (ref 17–32)
CO2 SERPL-SCNC: 15 MMOL/L — LOW (ref 17–32)
CREAT SERPL-MCNC: 1.4 MG/DL — SIGNIFICANT CHANGE UP (ref 0.7–1.5)
CREAT SERPL-MCNC: 1.4 MG/DL — SIGNIFICANT CHANGE UP (ref 0.7–1.5)
CULTURE RESULTS: SIGNIFICANT CHANGE UP
EGFR: 54 ML/MIN/1.73M2 — LOW
EGFR: 54 ML/MIN/1.73M2 — LOW
EOSINOPHIL # BLD AUTO: 0.03 K/UL — SIGNIFICANT CHANGE UP (ref 0–0.7)
EOSINOPHIL NFR BLD AUTO: 0.3 % — SIGNIFICANT CHANGE UP (ref 0–8)
GAS PNL BLDA: SIGNIFICANT CHANGE UP
GLUCOSE SERPL-MCNC: 108 MG/DL — HIGH (ref 70–99)
GLUCOSE SERPL-MCNC: 95 MG/DL — SIGNIFICANT CHANGE UP (ref 70–99)
HCT VFR BLD CALC: 28.1 % — LOW (ref 42–52)
HCT VFR BLD CALC: 30 % — LOW (ref 42–52)
HGB BLD-MCNC: 8.2 G/DL — LOW (ref 14–18)
HGB BLD-MCNC: 8.9 G/DL — LOW (ref 14–18)
IMM GRANULOCYTES NFR BLD AUTO: 1 % — HIGH (ref 0.1–0.3)
LYMPHOCYTES # BLD AUTO: 0.54 K/UL — LOW (ref 1.2–3.4)
LYMPHOCYTES # BLD AUTO: 4.7 % — LOW (ref 20.5–51.1)
MAGNESIUM SERPL-MCNC: 1.8 MG/DL — SIGNIFICANT CHANGE UP (ref 1.8–2.4)
MCHC RBC-ENTMCNC: 28.1 PG — SIGNIFICANT CHANGE UP (ref 27–31)
MCHC RBC-ENTMCNC: 28.3 PG — SIGNIFICANT CHANGE UP (ref 27–31)
MCHC RBC-ENTMCNC: 29.2 G/DL — LOW (ref 32–37)
MCHC RBC-ENTMCNC: 29.7 G/DL — LOW (ref 32–37)
MCV RBC AUTO: 95.5 FL — HIGH (ref 80–94)
MCV RBC AUTO: 96.2 FL — HIGH (ref 80–94)
MONOCYTES # BLD AUTO: 0.62 K/UL — HIGH (ref 0.1–0.6)
MONOCYTES NFR BLD AUTO: 5.4 % — SIGNIFICANT CHANGE UP (ref 1.7–9.3)
NEUTROPHILS # BLD AUTO: 10.11 K/UL — HIGH (ref 1.4–6.5)
NEUTROPHILS NFR BLD AUTO: 88.5 % — HIGH (ref 42.2–75.2)
NRBC # BLD: 0 /100 WBCS — SIGNIFICANT CHANGE UP (ref 0–0)
NRBC # BLD: 0 /100 WBCS — SIGNIFICANT CHANGE UP (ref 0–0)
PHOSPHATE SERPL-MCNC: 2.5 MG/DL — SIGNIFICANT CHANGE UP (ref 2.1–4.9)
PLATELET # BLD AUTO: 480 K/UL — HIGH (ref 130–400)
PLATELET # BLD AUTO: 500 K/UL — HIGH (ref 130–400)
PMV BLD: 9.4 FL — SIGNIFICANT CHANGE UP (ref 7.4–10.4)
PMV BLD: 9.6 FL — SIGNIFICANT CHANGE UP (ref 7.4–10.4)
POTASSIUM SERPL-MCNC: 4.7 MMOL/L — SIGNIFICANT CHANGE UP (ref 3.5–5)
POTASSIUM SERPL-MCNC: 5.2 MMOL/L — HIGH (ref 3.5–5)
POTASSIUM SERPL-SCNC: 4.7 MMOL/L — SIGNIFICANT CHANGE UP (ref 3.5–5)
POTASSIUM SERPL-SCNC: 5.2 MMOL/L — HIGH (ref 3.5–5)
PROT SERPL-MCNC: 5.8 G/DL — LOW (ref 6–8)
PROT SERPL-MCNC: 6.2 G/DL — SIGNIFICANT CHANGE UP (ref 6–8)
RBC # BLD: 2.92 M/UL — LOW (ref 4.7–6.1)
RBC # BLD: 3.14 M/UL — LOW (ref 4.7–6.1)
RBC # FLD: 14.6 % — HIGH (ref 11.5–14.5)
RBC # FLD: 14.6 % — HIGH (ref 11.5–14.5)
SODIUM SERPL-SCNC: 143 MMOL/L — SIGNIFICANT CHANGE UP (ref 135–146)
SODIUM SERPL-SCNC: 144 MMOL/L — SIGNIFICANT CHANGE UP (ref 135–146)
SPECIMEN SOURCE: SIGNIFICANT CHANGE UP
WBC # BLD: 11.42 K/UL — HIGH (ref 4.8–10.8)
WBC # BLD: 13.51 K/UL — HIGH (ref 4.8–10.8)
WBC # FLD AUTO: 11.42 K/UL — HIGH (ref 4.8–10.8)
WBC # FLD AUTO: 13.51 K/UL — HIGH (ref 4.8–10.8)

## 2024-03-06 PROCEDURE — 71045 X-RAY EXAM CHEST 1 VIEW: CPT | Mod: 26

## 2024-03-06 PROCEDURE — 93010 ELECTROCARDIOGRAM REPORT: CPT

## 2024-03-06 PROCEDURE — 99232 SBSQ HOSP IP/OBS MODERATE 35: CPT

## 2024-03-06 PROCEDURE — 99233 SBSQ HOSP IP/OBS HIGH 50: CPT

## 2024-03-06 RX ORDER — IPRATROPIUM/ALBUTEROL SULFATE 18-103MCG
3 AEROSOL WITH ADAPTER (GRAM) INHALATION ONCE
Refills: 0 | Status: COMPLETED | OUTPATIENT
Start: 2024-03-06 | End: 2024-03-06

## 2024-03-06 RX ORDER — HYDROMORPHONE HYDROCHLORIDE 2 MG/ML
2 INJECTION INTRAMUSCULAR; INTRAVENOUS; SUBCUTANEOUS ONCE
Refills: 0 | Status: DISCONTINUED | OUTPATIENT
Start: 2024-03-06 | End: 2024-03-06

## 2024-03-06 RX ORDER — SODIUM ZIRCONIUM CYCLOSILICATE 10 G/10G
10 POWDER, FOR SUSPENSION ORAL ONCE
Refills: 0 | Status: COMPLETED | OUTPATIENT
Start: 2024-03-06 | End: 2024-03-06

## 2024-03-06 RX ORDER — IOHEXOL 300 MG/ML
30 INJECTION, SOLUTION INTRAVENOUS ONCE
Refills: 0 | Status: DISCONTINUED | OUTPATIENT
Start: 2024-03-06 | End: 2024-03-22

## 2024-03-06 RX ORDER — SODIUM ZIRCONIUM CYCLOSILICATE 10 G/10G
5 POWDER, FOR SUSPENSION ORAL ONCE
Refills: 0 | Status: COMPLETED | OUTPATIENT
Start: 2024-03-06 | End: 2024-03-06

## 2024-03-06 RX ORDER — PIPERACILLIN AND TAZOBACTAM 4; .5 G/20ML; G/20ML
3.38 INJECTION, POWDER, LYOPHILIZED, FOR SOLUTION INTRAVENOUS EVERY 8 HOURS
Refills: 0 | Status: COMPLETED | OUTPATIENT
Start: 2024-03-06 | End: 2024-03-13

## 2024-03-06 RX ORDER — SODIUM BICARBONATE 1 MEQ/ML
1300 SYRINGE (ML) INTRAVENOUS EVERY 8 HOURS
Refills: 0 | Status: DISCONTINUED | OUTPATIENT
Start: 2024-03-06 | End: 2024-03-06

## 2024-03-06 RX ORDER — ACETAMINOPHEN 500 MG
1000 TABLET ORAL ONCE
Refills: 0 | Status: COMPLETED | OUTPATIENT
Start: 2024-03-06 | End: 2024-03-07

## 2024-03-06 RX ORDER — FUROSEMIDE 40 MG
40 TABLET ORAL ONCE
Refills: 0 | Status: COMPLETED | OUTPATIENT
Start: 2024-03-06 | End: 2024-03-06

## 2024-03-06 RX ORDER — SODIUM BICARBONATE 1 MEQ/ML
1300 SYRINGE (ML) INTRAVENOUS EVERY 6 HOURS
Refills: 0 | Status: DISCONTINUED | OUTPATIENT
Start: 2024-03-06 | End: 2024-03-22

## 2024-03-06 RX ADMIN — Medication 1300 MILLIGRAM(S): at 10:52

## 2024-03-06 RX ADMIN — Medication 3 MILLILITER(S): at 16:16

## 2024-03-06 RX ADMIN — HEPARIN SODIUM 5000 UNIT(S): 5000 INJECTION INTRAVENOUS; SUBCUTANEOUS at 06:29

## 2024-03-06 RX ADMIN — HYDROMORPHONE HYDROCHLORIDE 2 MILLIGRAM(S): 2 INJECTION INTRAMUSCULAR; INTRAVENOUS; SUBCUTANEOUS at 18:00

## 2024-03-06 RX ADMIN — Medication 40 MILLIGRAM(S): at 17:25

## 2024-03-06 RX ADMIN — Medication 1300 MILLIGRAM(S): at 06:29

## 2024-03-06 RX ADMIN — SODIUM ZIRCONIUM CYCLOSILICATE 5 GRAM(S): 10 POWDER, FOR SUSPENSION ORAL at 22:52

## 2024-03-06 RX ADMIN — LIDOCAINE 1 PATCH: 4 CREAM TOPICAL at 17:46

## 2024-03-06 RX ADMIN — HYDROMORPHONE HYDROCHLORIDE 2 MILLIGRAM(S): 2 INJECTION INTRAMUSCULAR; INTRAVENOUS; SUBCUTANEOUS at 17:25

## 2024-03-06 RX ADMIN — Medication 650 MILLIGRAM(S): at 13:52

## 2024-03-06 RX ADMIN — SODIUM ZIRCONIUM CYCLOSILICATE 10 GRAM(S): 10 POWDER, FOR SUSPENSION ORAL at 17:47

## 2024-03-06 RX ADMIN — HEPARIN SODIUM 5000 UNIT(S): 5000 INJECTION INTRAVENOUS; SUBCUTANEOUS at 17:46

## 2024-03-06 RX ADMIN — Medication 25 MILLIGRAM(S): at 17:30

## 2024-03-06 RX ADMIN — LIDOCAINE 1 PATCH: 4 CREAM TOPICAL at 12:58

## 2024-03-06 RX ADMIN — CHLORHEXIDINE GLUCONATE 1 APPLICATION(S): 213 SOLUTION TOPICAL at 06:34

## 2024-03-06 RX ADMIN — HYDROMORPHONE HYDROCHLORIDE 2 MILLIGRAM(S): 2 INJECTION INTRAMUSCULAR; INTRAVENOUS; SUBCUTANEOUS at 17:24

## 2024-03-06 RX ADMIN — PIPERACILLIN AND TAZOBACTAM 25 GRAM(S): 4; .5 INJECTION, POWDER, LYOPHILIZED, FOR SOLUTION INTRAVENOUS at 22:41

## 2024-03-06 RX ADMIN — Medication 650 MILLIGRAM(S): at 12:18

## 2024-03-06 NOTE — PROGRESS NOTE ADULT - SUBJECTIVE AND OBJECTIVE BOX
HPI:  68-year-old male with past medical history of hypertension, chronic back pain, prostate cancer, and posterior mediastinal mass 6.5 cm seen on CT 11/2023, presents for 3 months of progressively difficulty swallowing associated with 10 pounds weight loss and odynophagia. Comes in today now unable to swallow his pills with water, resulting in 2 episode of emesis. He mentioned that he gets SOB during exertion but denies any exertional chest pain. As per patient he was treated at [facility] for prostate cancer and is s/p radiation therapy. He states that he has problem during walking and unable to maintain the posture.   Denies any abdominal pain, chest pain, dyspnea, diarrhea, fever, chills      (20 Feb 2024 15:17)    PAST MEDICAL & SURGICAL HISTORY:  HTN (hypertension)  Prostate cancer    Allergies  No Known Allergies  Intolerances    Home Medications:  Feosol 325 mg (65 mg elemental iron) oral tablet: 1 tab(s) orally 2 times a day (20 Feb 2024 16:17)  ibuprofen 800 mg oral tablet: 1 tab(s) orally every 6 hours (20 Feb 2024 16:19)  Imodium 2 mg oral capsule: 1 cap(s) orally 2 times a day (20 Feb 2024 16:16)  lidocaine 5% patch:  (20 Feb 2024 16:26)  Norvasc 5 mg oral tablet: 1 tab(s) orally once a day (20 Feb 2024 16:16)  Tylenol 325 mg oral tablet: 2 tab(s) orally every 6 hours (20 Feb 2024 16:26)    MEDICATIONS  (STANDING):  cefTRIAXone   IVPB 2000 milliGRAM(s) IV Intermittent every 24 hours  chlorhexidine 2% Cloths 1 Application(s) Topical <User Schedule>  dextrose 5% + lactated ringers. 1000 milliLiter(s) (100 mL/Hr) IV Continuous <Continuous>  heparin   Injectable 5000 Unit(s) SubCutaneous every 12 hours  influenza  Vaccine (HIGH DOSE) 0.7 milliLiter(s) IntraMuscular once  lidocaine   4% Patch 1 Patch Transdermal every 24 hours  metoprolol tartrate 25 milliGRAM(s) Oral two times a day  ondansetron Injectable 4 milliGRAM(s) IV Push once  sodium bicarbonate 1300 milliGRAM(s) Oral every 8 hours    MEDICATIONS  (PRN):  acetaminophen     Tablet .. 650 milliGRAM(s) Oral every 6 hours PRN Temp greater or equal to 38C (100.4F), Mild Pain (1 - 3)  aluminum hydroxide/magnesium hydroxide/simethicone Suspension 30 milliLiter(s) Oral every 4 hours PRN Dyspepsia  melatonin 3 milliGRAM(s) Oral at bedtime PRN Insomnia    < from: US Kidney and Bladder (03.01.24 @ 18:42) >  IMPRESSION:  1.  Diffusely echogenic bilateral kidneys which can be seen with medical   renal disease.  2.  Moderate to severe left hydronephrosis with left renal parenchymal   thinning, as seen on prior CT scan.  3.  Large mass identified within the urinary bladder, measuring up to 7.9   cm, previously up to 7 cm.  No ureteral jets are seen bilaterally.  4.  Incidentally noted right pleural effusion.    < end of copied text >  < from: Xray Esophagram Single Contrast (02.29.24 @ 11:19) >  FINDINGS/  IMPRESSION:  This study is available for clinical review.  Esophageal stent is visualized in the mid esophagus and extending below   the left hemidiaphragm. The lumen of the mid esophagus is severely   narrowed.    A small amount of contrast is visualized passing through the mid   esophagus.    No evidence of contrast extravasation or fistula formation to suggest   leak.    < end of copied text >  < from: CT Abdomen and Pelvis w/ IV Cont (02.21.24 @ 12:07) >  IMPRESSION:  1.  Since November 22, 2023, increasedupper abdominal bulky   lymphadenopathy.  2.  Unchanged bulky retroperitoneal lymphadenopathy, large left bladder   mass and perirectal soft tissue infiltrative mass.  3.  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  4.  Chronic moderate to severe left hydroureteronephrosis.    < end of copied text >  < from: CT Chest w/ IV Cont (02.20.24 @ 13:58) >  IMPRESSION:      No evidence of pulmonary embolus    Interval enlargement of the central/posterior mediastinal mass with   development of more extensive lymphadenopathy. The mass encircles the   descending thoracic aorta and has some mass effect upon the left atrium.   The mid aspect of the esophagus is encircled by the mass and difficult to   distinguish. Possible ovoid pill is noted within the mass and possible   location of the mid esophagus appear. The maximal esophagus appears   distended with layering debris within. Consideration may be given to   esophageal stenting.      < end of copied text >  < from: NM PET/CT Onc FDG Skull to Thigh, Inital (01.11.23 @ 15:46) >  IMPRESSION:    FDG avid indistinct retroperitoneal soft tissue mass encasing the   abdominal aorta and left iliac arteries spanning 21.9 cm.    Additional FDG avid left posterolateral rectal wall soft tissue mass,   left supraclavicular mass, posterior mediastinal mass, retrocrural soft   tissue as catalogued above. Findings most consistent with metastatic   disease. (SUV max 5.3)    Again noted moderate left hydronephroureter, likely due to mass effect   upon the left ureter from retroperitoneal mass.    Non FDG avid small left pleural effusion.    --- End of Report ---      < end of copied text >    Cytopathology - Non Gyn Report:   ACCESSION No: [id number]   Patient: RUFUS PICKARD   Accession: [id number]   Collected Date/Time: 2/21/2024 00:00 EST   Received Date/Time: 2/22/2024 17:52 EST   Fine Needle Aspiration Report - Auth (Verified)   Specimen(s) Submitted   Mediastinal mass fluid   Final Diagnosis   MEDIASTINUM MASS; EUS GUIDED FNA (THINPREP AND CELL BLOCK):   - POSITIVE FOR MALIGNANT CELLS   - Metastatic poorly differentiated carcinoma (please see the comment)

## 2024-03-06 NOTE — PROGRESS NOTE ADULT - ASSESSMENT
pt admitted with sev dysphagia big mediastinal mass ,and esophageal mass   has a stent now in the esophagus also had a biopsy done consistent with prostate cancere  wilwn to [facility] fpr metastatic prostate cxancer  was treated with chemo,docetaxol ,and complete androgen blockade  pt non compliant and doesnot follow regularlyI    imp   metastatic prostate cancer  progression of disease  in multiple organ  [facility]    WITH chemo,docetaxel lupron and daraliutamide and zometa  responded initially symptoms improved and PSA came down   but now progressing   pt has very poor insight ,apparently has dementia now   it will not be possible to give any  cancer treatment  with this mental status  need spychiatric evaluation   with this much tumor burden prognosis is poor   recommend hospice care and palliative care    kirit irving MD           will follow with you  suggest rt evaluation for palliation of dysphagia      kirit irving MD

## 2024-03-06 NOTE — CHART NOTE - NSCHARTNOTEFT_GEN_A_CORE
Patient with free air under diaphragm. Patient is s/p EGD for G tube and stent repositioning yesterday. Patient with complaints of severe abdominal pain but benign abdominal exam. Free air under diaphragm can be Normal finding after G Tube placement.  Will Do Strict NPO  CS GS  Change CTX into Zosyn  Continue to monitor  Follow with GI for deescalation of measures based on clinical course Patient with free air under diaphragm. Patient is s/p EGD for G tube and stent repositioning yesterday. Patient with complaints of severe abdominal pain but benign abdominal exam. Free air under diaphragm can be Normal finding after G Tube placement.  Will Do Strict NPO  CS GS  Change CTX into Zosyn  Continue to monitor  Follow with GI for deescalation of measures based on clinical course  PEG tube study done per GS recs pending read. Patient with free air under diaphragm. Patient is s/p EGD for G tube and stent repositioning yesterday. Patient with complaints of severe abdominal pain but benign abdominal exam. Free air under diaphragm can be Normal finding after G Tube placement.  Will Do Strict NPO  CS GS  Change CTX into Zosyn  Continue to monitor  Follow with GI for deescalation of measures based on clinical course  PEG tube study done per GS recs pending read.    On further assessment LUQ pain is a ribs pain. Possibly pain related to known malignancy.

## 2024-03-06 NOTE — PROGRESS NOTE ADULT - ASSESSMENT
68-year-old male with past medical history of hypertension, chronic back pain, prostate cancer, and posterior mediastinal mass 6.5 cm seen on CT 11/2023, presents for 3 months of progressively difficulty swallowing associated with 10 pounds weight loss and odynophagia. Comes in today now unable to swallow his pills with water, resulting in 2 episode of emesis. He mentioned that he gets SOB during exertion but denies any exertional chest pain. As per patient he was treated at [facility] for prostate cancer and is s/p radiation therapy. He states that he has problem during walking and unable to maintain the posture.     #aspiration PNA vs viral infection vs HAP   #GAS s pyogenes bacteremia   - SIRS positive (WBC increased to 16, febrile to 101 and tachy to 129 )   - CXR with worsening L-sided opacity   - Blood cx (2/24/24): positive for strep pyogenes  - repeat BCx 27,28,29,1,2  negative to date   - procal 7  - aspiration precautions   - patient cleared for pureed diet and thin liquids per SS -refusing food as patient reports inability to swallow   - on zosyn 3.375mg q8h switched to cefepime and clindamycin pending ID rec   -ID rec:  source potentially from translocation in setting of infiltrative masses, stop clindamycin , switch cefepime to ceftriaxone 2g daily   - continue ceftriaxone 2g daily (end date 3/11 to complete 2 weeks from culture clearance) ideally to finish with midline   - TTE   1. Left ventricular ejection fraction, by visual estimation, is 65 to 70%.   2. Hyperdynamic global left ventricular systolic function.   3. Mildly increased LV wall thickness.   4. Spectral Doppler shows impaired relaxation pattern of left   ventricular myocardial filling (Grade I diastolic dysfunction).   5. Trace mitral valve regurgitation.    # Dysphagia with odynophagia  #Failure to thrive   # Hx of prostate cancer s/p radiation therapy  # Prostate cancer metastasis to mediastinum or primary mediastinal mass?  - CT Chest: Interval enlargement of the central/posterior mediastinal mass with development of more extensive lymphadenopathy. The mass encircles the descending thoracic aorta and has some mass effect upon the left atrium. The mid aspect of the esophagus is encircled by the mass and difficult to distinguish. Possible ovoid pill is noted within the mass and possible location of the mid esophagus appear. The maximal esophagus appears distended with layering debris within. Consideration may be given to esophageal stenting.  - CT abdomen and pelvis:  Increased upper abdominal bulky lymphadenopathy. Unchanged bulky retroperitoneal lymphadenopathy, large left bladder mass and perirectal soft tissue infiltrative mass. 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 Chronic moderate to severe left hydroureteronephrosis  - EGD with GI 2/21 -> stent placed and esophageal biopsies taken -pathology - Poorly differentiated carcinoma.  - CT surgery following -> no acute intervention   - AFP and CEA wnl - LDH and haptoglobin elevated - PSA total 220, PSA free is 22 - CA19 131-  57  - oncology recs appreciated ->patient used to follow with Dr. Kenia Irizarry, called and teams her and she is not picking up the phone calls, not reachable in any way   -service oncology consulted  - Esophageal biopsy: Cytopathology - Non Gyn Report: ACCESSION No:  [id number]. Final Diagnosis- MEDIASTINUM MASS; EUS GUIDED FNA (THINPREP AND CELL BLOCK): POSITIVE FOR MALIGNANT CELLS,  Metastatic poorly differentiated carcinoma. The morphology of the current tumor and the immunohistochemical profile along with the patient's history of prostatic cancer are consistent with a metastatic poorly differentiated carcinoma of prostatic origin.  - Esophageal ulcer biopsy shows poorly differentiated carcinoma, IHC staining pending. Discussed with Dr. Lewis, related to prostate cancer, not a second primary malignancy   - per onc note, patient had prior biopsy of site that demonstrated metastatic prostate adenocarcinoma and was started on Docetaxel, in January 2024 patient was started on Pembrolizumab and continued on keytruda injections.   - Dr. Irizarry is returning to the office on Monday 3/4 => cs rad/onco for palliative radiation - needs radiation records   - orthostatics positive, likely secondary to poor oral intake, on IV fluids, ensure added, nutrition consulted, calorie count ordered, not accepting to eat,zofran prn  => pending TPN vs PPN vs NGT   -palliative consulted , patient needs surrogate    -esophogram :Esophageal stent is visualized in the mid esophagus and extending below  the left hemidiaphragm. The lumen of the mid esophagus is severely   narrowed.A small amount of contrast is visualized passing through the mid esophagus.No evidence of contrast extravasation or fistula formation to suggest leak.  => advanced GI recalled again : EGD to salvage stent or replace it     #CHUCKIE vs ATN induced DIMAS  - pt came in with a baseline of 1.5 and increased to 2.6 with contrast use  - c/w IVF  - renally dose medications  -crea not improving   -US Kidney and Bladder (03.01.24 @ 18:42) >  1.  Diffusely echogenic bilateral kidneys which can be seen with medical renal disease.  2.  Moderate to severe left hydronephrosis with left renal parenchymal thinning, as seen on prior CT scan.  3.  Large mass identified within the urinary bladder, measuring up to 7.9  cm, previously up to 7 cm.  No ureteral jets are seen bilaterally.  4.  Incidentally noted right pleural effusion.  -nephro cs   DIMAS likely due to CHUCKIE  other possible causes IRGN iso GAS bacteremia, doubt prerenal given no improvement after IVF   chronic left hydro iso prostate cancer and LN, creatinine at baseline on admission, unlikely contributing to DIMAS  send UA with urine lytes and spot for PCR -> fena 0.5%   C3 C4 levels normal  repeat blood cultures negative   creatinine stable, non oliguric  strict i/os  gentle IVH if odyno/dysphagia still present with inability to tolerate adequate po intake  bicarb   no indications for RRT    # small b/l pleural effusions on CT chest   - s/p 1x dose 40mg IV Lasix , CXR improved     #normocytic Anemia  - Hb 9-10  - f/u iron  studies noted % sat 27   - MCV normal, SPEP negative     #hypocalcemia  -replenished   -monitor     #Misc   #DVT PPx- heparin   #GI PPx- none   #Diet-  puree   #Activity- AAT, PT consult Home PT; back to [facility] w/ assistance???  #Dispo- Acute     68-year-old male with past medical history of hypertension, chronic back pain, prostate cancer, and posterior mediastinal mass 6.5 cm seen on CT 11/2023, presents for 3 months of progressively difficulty swallowing associated with 10 pounds weight loss and odynophagia. Comes in today now unable to swallow his pills with water, resulting in 2 episode of emesis. He mentioned that he gets SOB during exertion but denies any exertional chest pain. As per patient he was treated at [facility] for prostate cancer and is s/p radiation therapy. He states that he has problem during walking and unable to maintain the posture.     #aspiration PNA vs viral infection vs HAP   #GAS s pyogenes bacteremia   - SIRS positive (WBC increased to 16, febrile to 101 and tachy to 129 )   - CXR with worsening L-sided opacity   - Blood cx (2/24/24): positive for strep pyogenes  - repeat BCx 27,28,29,1,2  negative to date   - procal 7  - aspiration precautions   - patient cleared for pureed diet and thin liquids per SS -refusing food as patient reports inability to swallow   - on zosyn 3.375mg q8h switched to cefepime and clindamycin pending ID rec   -ID rec:  source potentially from translocation in setting of infiltrative masses, stop clindamycin , switch cefepime to ceftriaxone 2g daily   - continue ceftriaxone 2g daily end date 3/11 to complete 2 weeks from culture clearance ideally to finish with midline   - TTE   1. Left ventricular ejection fraction, by visual estimation, is 65 to 70%.   2. Hyperdynamic global left ventricular systolic function.   3. Mildly increased LV wall thickness.   4. Spectral Doppler shows impaired relaxation pattern of left   ventricular myocardial filling (Grade I diastolic dysfunction).   5. Trace mitral valve regurgitation.    # Dysphagia with odynophagia  #Failure to thrive   # Hx of prostate cancer s/p radiation therapy  # Prostate cancer metastasis to mediastinum or primary mediastinal mass?  - CT Chest: Interval enlargement of the central/posterior mediastinal mass with development of more extensive lymphadenopathy. The mass encircles the descending thoracic aorta and has some mass effect upon the left atrium. The mid aspect of the esophagus is encircled by the mass and difficult to distinguish. Possible ovoid pill is noted within the mass and possible location of the mid esophagus appear. The maximal esophagus appears distended with layering debris within. Consideration may be given to esophageal stenting.  - CT abdomen and pelvis:  Increased upper abdominal bulky lymphadenopathy. Unchanged bulky retroperitoneal lymphadenopathy, large left bladder mass and perirectal soft tissue infiltrative mass. 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 Chronic moderate to severe left hydroureteronephrosis  - EGD with GI 2/21 -> stent placed and esophageal biopsies taken -pathology - Poorly differentiated carcinoma.  - CT surgery following -> no acute intervention   - AFP and CEA wnl - LDH and haptoglobin elevated - PSA total 220, PSA free is 22 - CA19 131-  57  - oncology recs appreciated ->patient used to follow with Dr. Kenia Irizarry, called and teams her and she is not picking up the phone calls, not reachable in any way   -service oncology consulted  - Esophageal biopsy: Cytopathology - Non Gyn Report: ACCESSION No:  [id number]. Final Diagnosis- MEDIASTINUM MASS; EUS GUIDED FNA (THINPREP AND CELL BLOCK): POSITIVE FOR MALIGNANT CELLS,  Metastatic poorly differentiated carcinoma. The morphology of the current tumor and the immunohistochemical profile along with the patient's history of prostatic cancer are consistent with a metastatic poorly differentiated carcinoma of prostatic origin.  - Esophageal ulcer biopsy shows poorly differentiated carcinoma, IHC staining pending. Discussed with Dr. Lewis, related to prostate cancer, not a second primary malignancy   - per onc note, patient had prior biopsy of site that demonstrated metastatic prostate adenocarcinoma and was started on Docetaxel, in January 2024 patient was started on Pembrolizumab and continued on keytruda injections.   - Dr. Irizarry is returning to the office on Monday 3/4 => cs rad/onco for palliative radiation , onco asked about new protocol since patient progressed   -refusing oral intake, IVF  -esophogram :Esophageal stent is visualized in the mid esophagus and extending below  the left hemidiaphragm. The lumen of the mid esophagus is severely   narrowed.A small amount of contrast is visualized passing through the mid esophagus.No evidence of contrast extravasation or fistula formation to suggest leak.  => advanced GI recalled again : EGD  replaced stent and PEG placed 3/5/24   -restart puree and PED feeding , monitor for refeeding  -lacks capacity- psych and palliative following    #shoulder body irregularity   Xray Shoulder 2 Views, Left (03.05.24 @ 20:02)   Subacute/chronic appearing distal clavicular fracture with approximately   1.7 cm of inferior displacement of the distal fragment. No acute fracture   or dislocation.  Left-sided opacities/effusion. Esophageal stent with surgical clips    #CHUCKIE vs ATN induced DIMAS  - pt came in with a baseline of 1.5 and increased to 2.6 with contrast use  - c/w IVF  - renally dose medications  -crea not improving   -US Kidney and Bladder (03.01.24 @ 18:42) >  1.  Diffusely echogenic bilateral kidneys which can be seen with medical renal disease.  2.  Moderate to severe left hydronephrosis with left renal parenchymal thinning, as seen on prior CT scan.  3.  Large mass identified within the urinary bladder, measuring up to 7.9  cm, previously up to 7 cm.  No ureteral jets are seen bilaterally.  4.  Incidentally noted right pleural effusion.  -nephro cs   DIMAS likely due to CHUCKIE  other possible causes IRGN iso GAS bacteremia, doubt prerenal given no improvement after IVF   chronic left hydro iso prostate cancer and LN, creatinine at baseline on admission, unlikely contributing to DIMAS  send UA with urine lytes and spot for PCR -> fena 0.5%   C3 C4 levels normal  repeat blood cultures negative   creatinine stable, non oliguric  strict i/os  gentle IVH if odyno/dysphagia still present with inability to tolerate adequate po intake  bicarb   no indications for RRT    # small b/l pleural effusions on CT chest   - s/p 1x dose 40mg IV Lasix , CXR improved     #normocytic Anemia  - Hb 9-10  - f/u iron  studies noted % sat 27   - MCV normal, SPEP negative     #hypocalcemia  -replenished   -monitor     #Misc   #DVT PPx- heparin   #GI PPx- none   #Diet-  puree   #Activity- AAT, PT consult Home PT; back to [facility] w/ assistance???  #Dispo- Acute     68-year-old male with past medical history of hypertension, chronic back pain, prostate cancer, and posterior mediastinal mass 6.5 cm seen on CT 11/2023, presents for 3 months of progressively difficulty swallowing associated with 10 pounds weight loss and odynophagia. Comes in today now unable to swallow his pills with water, resulting in 2 episode of emesis. He mentioned that he gets SOB during exertion but denies any exertional chest pain. As per patient he was treated at [facility] for prostate cancer and is s/p radiation therapy. He states that he has problem during walking and unable to maintain the posture.     #aspiration PNA vs viral infection vs HAP   #GAS s pyogenes bacteremia   - SIRS positive (WBC increased to 16, febrile to 101 and tachy to 129 )   - CXR with worsening L-sided opacity   - Blood cx (2/24/24): positive for strep pyogenes  - repeat BCx 27,28,29,1,2  negative to date   - procal 7  - aspiration precautions   - patient cleared for pureed diet and thin liquids per SS -refusing food as patient reports inability to swallow   - on zosyn 3.375mg q8h switched to cefepime and clindamycin pending ID rec   -ID rec:  source potentially from translocation in setting of infiltrative masses, stop clindamycin , switch cefepime to ceftriaxone 2g daily   - continue ceftriaxone 2g daily end date 3/11 to complete 2 weeks from culture clearance ideally to finish with midline   - TTE   1. Left ventricular ejection fraction, by visual estimation, is 65 to 70%.   2. Hyperdynamic global left ventricular systolic function.   3. Mildly increased LV wall thickness.   4. Spectral Doppler shows impaired relaxation pattern of left   ventricular myocardial filling (Grade I diastolic dysfunction).   5. Trace mitral valve regurgitation.    # Dysphagia with odynophagia  #Failure to thrive   # Hx of prostate cancer s/p radiation therapy  # Prostate cancer metastasis to mediastinum or primary mediastinal mass?  - CT Chest: Interval enlargement of the central/posterior mediastinal mass with development of more extensive lymphadenopathy. The mass encircles the descending thoracic aorta and has some mass effect upon the left atrium. The mid aspect of the esophagus is encircled by the mass and difficult to distinguish. Possible ovoid pill is noted within the mass and possible location of the mid esophagus appear. The maximal esophagus appears distended with layering debris within. Consideration may be given to esophageal stenting.  - CT abdomen and pelvis:  Increased upper abdominal bulky lymphadenopathy. Unchanged bulky retroperitoneal lymphadenopathy, large left bladder mass and perirectal soft tissue infiltrative mass. 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 Chronic moderate to severe left hydroureteronephrosis  - EGD with GI 2/21 -> stent placed and esophageal biopsies taken -pathology - Poorly differentiated carcinoma.  - CT surgery following -> no acute intervention   - AFP and CEA wnl - LDH and haptoglobin elevated - PSA total 220, PSA free is 22 - CA19 131-  57  - oncology recs appreciated ->patient used to follow with Dr. Kenia Irizarry, called and teams her and she is not picking up the phone calls, not reachable in any way   -service oncology consulted  - Esophageal biopsy: Cytopathology - Non Gyn Report: ACCESSION No:  [id number]. Final Diagnosis- MEDIASTINUM MASS; EUS GUIDED FNA (THINPREP AND CELL BLOCK): POSITIVE FOR MALIGNANT CELLS,  Metastatic poorly differentiated carcinoma. The morphology of the current tumor and the immunohistochemical profile along with the patient's history of prostatic cancer are consistent with a metastatic poorly differentiated carcinoma of prostatic origin.  - Esophageal ulcer biopsy shows poorly differentiated carcinoma, IHC staining pending. Discussed with Dr. Lewis, related to prostate cancer, not a second primary malignancy   - per onc note, patient had prior biopsy of site that demonstrated metastatic prostate adenocarcinoma and was started on Docetaxel, in January 2024 patient was started on Pembrolizumab and continued on keytruda injections.   - Dr. Irizarry is returning to the office on Monday 3/4 => cs rad/onco for palliative radiation , onco asked about new protocol since patient progressed   -refusing oral intake, IVF  -esophogram :Esophageal stent is visualized in the mid esophagus and extending below  the left hemidiaphragm. The lumen of the mid esophagus is severely   narrowed.A small amount of contrast is visualized passing through the mid esophagus.No evidence of contrast extravasation or fistula formation to suggest leak.  => advanced GI recalled again : EGD  replaced stent and PEG placed 3/5/24   -restart puree and PED feeding , monitor for refeeding  -lacks capacity- psych and palliative following    #shoulder body irregularity   Xray Shoulder 2 Views, Left (03.05.24 @ 20:02)   Subacute/chronic appearing distal clavicular fracture with approximately   1.7 cm of inferior displacement of the distal fragment. No acute fracture   or dislocation.  Left-sided opacities/effusion. Esophageal stent with surgical clips    #CHUCKIE vs ATN induced DIMAS  - pt came in with a baseline of 1.5 and increased to 2.6 with contrast use  - c/w IVF  - renally dose medications  -crea not improving   -US Kidney and Bladder (03.01.24 @ 18:42) >  1.  Diffusely echogenic bilateral kidneys which can be seen with medical renal disease.  2.  Moderate to severe left hydronephrosis with left renal parenchymal thinning, as seen on prior CT scan.  3.  Large mass identified within the urinary bladder, measuring up to 7.9  cm, previously up to 7 cm.  No ureteral jets are seen bilaterally.  4.  Incidentally noted right pleural effusion.  -nephro cs   DIMAS likely due to CHUCKIE  other possible causes IRGN iso GAS bacteremia, doubt prerenal given no improvement after IVF   chronic left hydro iso prostate cancer and LN, creatinine at baseline on admission, unlikely contributing to DIMAS  send UA with urine lytes and spot for PCR -> fena 0.5%   C3 C4 levels normal  repeat blood cultures negative   creatinine stable, non oliguric  strict i/os  gentle IVH if odyno/dysphagia still present with inability to tolerate adequate po intake  bicarb   no indications for RRT    # small b/l pleural effusions on CT chest   - s/p 1x dose 40mg IV Lasix , CXR improved     #normocytic Anemia  - Hb 9-10  - f/u iron  studies noted % sat 27   - MCV normal, SPEP negative     #hypocalcemia  -replenished   -monitor     #Misc   #DVT PPx- heparin   #GI PPx- none   #Diet-  puree   #Activity- AAT,   PT consult recommended Home PT  - Would be unable to return to [facility]  #Dispo- Acute

## 2024-03-06 NOTE — PROGRESS NOTE ADULT - SUBJECTIVE AND OBJECTIVE BOX
Gastroenterology progress note:     Patient is a 68y old  Male who presents with a chief complaint of Mediastinal Mass (06 Mar 2024 10:06)       Admitted on: 02-20-24    We are following the patient for dysphagia s/p EGD with stent repositioning and G tube placement yesterday. Patient did not try pureed diet since yesterday.        PAST MEDICAL & SURGICAL HISTORY:  HTN (hypertension)  Prostate cancer      MEDICATIONS  (STANDING):  cefTRIAXone   IVPB 2000 milliGRAM(s) IV Intermittent every 24 hours  chlorhexidine 2% Cloths 1 Application(s) Topical <User Schedule>  heparin   Injectable 5000 Unit(s) SubCutaneous every 12 hours  influenza  Vaccine (HIGH DOSE) 0.7 milliLiter(s) IntraMuscular once  lidocaine   4% Patch 1 Patch Transdermal every 24 hours  metoprolol tartrate 25 milliGRAM(s) Oral two times a day  ondansetron Injectable 4 milliGRAM(s) IV Push once  sodium bicarbonate 1300 milliGRAM(s) Oral every 8 hours  sodium phosphate 30 milliMole(s)/500 mL IVPB 30 milliMole(s) IV Intermittent once  sodium zirconium cyclosilicate 10 Gram(s) Oral once  sodium zirconium cyclosilicate 5 Gram(s) Oral once    MEDICATIONS  (PRN):  acetaminophen     Tablet .. 650 milliGRAM(s) Oral every 6 hours PRN Temp greater or equal to 38C (100.4F), Mild Pain (1 - 3)  aluminum hydroxide/magnesium hydroxide/simethicone Suspension 30 milliLiter(s) Oral every 4 hours PRN Dyspepsia  melatonin 3 milliGRAM(s) Oral at bedtime PRN Insomnia      Allergies  No Known Allergies      Review of Systems:   Cardiovascular:  No Chest Pain, No Palpitations  Respiratory:  No Cough, No Dyspnea  Gastrointestinal:  As described in HPI  Skin:  No Skin Lesions, No Jaundice  Neuro:  No Syncope, No Dizziness    Physical Examination:  T(C): 36.5 (03-06-24 @ 05:32), Max: 36.5 (03-05-24 @ 16:45)  HR: 92 (03-06-24 @ 05:32) (92 - 115)  BP: 101/67 (03-06-24 @ 05:32) (93/66 - 137/89)  RR: 19 (03-06-24 @ 05:32) (17 - 21)  SpO2: 95% (03-05-24 @ 18:55) (95% - 98%)  Weight (kg): 77.1 (03-05-24 @ 16:39)    GENERAL: AAOx3, no acute distress.  HEAD:  Atraumatic, Normocephalic  EYES: conjunctiva and sclera clear  NECK: Supple, no JVD or thyromegaly  CHEST/LUNG: Clear to auscultation bilaterally  HEART: Regular rate and rhythm  ABDOMEN: Soft, nontender, nondistended  NEUROLOGY: No asterixis or tremor.   SKIN: Intact, no jaundice     Data:                        8.2    11.42 )-----------( 480      ( 06 Mar 2024 05:51 )             28.1     Hgb trend:  8.2  03-06-24 @ 05:51  9.1  03-05-24 @ 11:58  8.8  03-04-24 @ 06:17      03-06    144  |  112<H>  |  21<H>  ----------------------------<  95  5.2<H>   |  15<L>  |  1.4    Ca    7.9<L>      06 Mar 2024 05:51  Phos  2.5     03-06  Mg     1.8     03-06    TPro  5.8<L>  /  Alb  2.9<L>  /  TBili  0.4  /  DBili  x   /  AST  18  /  ALT  16  /  AlkPhos  56  03-06    Liver panel trend:  TBili 0.4   /   AST 18   /   ALT 16   /   AlkP 56   /   Tptn 5.8   /   Alb 2.9    /   DBili --      03-06  TBili 0.5   /   AST 15   /   ALT 21   /   AlkP 63   /   Tptn 6.2   /   Alb 3.1    /   DBili --      03-05  TBili 0.5   /   AST 18   /   ALT 27   /   AlkP 64   /   Tptn 6.0   /   Alb 2.9    /   DBili --      03-04  TBili 0.3   /   AST 25   /   ALT 33   /   AlkP 65   /   Tptn 5.7   /   Alb 2.7    /   DBili --      03-03  TBili 0.3   /   AST 29   /   ALT 40   /   AlkP 63   /   Tptn 5.6   /   Alb 2.8    /   DBili --      03-02  TBili 0.2   /   AST 28   /   ALT 41   /   AlkP 68   /   Tptn 5.8   /   Alb 2.9    /   DBili --      03-01  TBili 0.3   /   AST 42   /   ALT 46   /   AlkP 80   /   Tptn 5.8   /   Alb 2.8    /   DBili --      02-29  TBili 0.4   /   AST 29   /   ALT 39   /   AlkP 82   /   Tptn 5.5   /   Alb 2.8    /   DBili --      02-28  TBili 0.6   /   AST 41   /   ALT 36   /   AlkP 77   /   Tptn 5.3   /   Alb 2.6    /   DBili --      02-27  TBili 0.4   /   AST 32   /   ALT 26   /   AlkP 65   /   Tptn 5.1   /   Alb 2.7    /   DBili --      02-26    Radiology:

## 2024-03-06 NOTE — PROGRESS NOTE ADULT - SUBJECTIVE AND OBJECTIVE BOX
Nephrology Progress Note    RUFUS PICKARD  MRN-[id number]  68y  Male    S:  Patient is seen and examined, events over the last 24h noted.    O:  Allergies:  No Known Allergies    Hospital Medications:   MEDICATIONS  (STANDING):  cefTRIAXone   IVPB 2000 milliGRAM(s) IV Intermittent every 24 hours  chlorhexidine 2% Cloths 1 Application(s) Topical <User Schedule>  dextrose 5% + lactated ringers. 1000 milliLiter(s) (100 mL/Hr) IV Continuous <Continuous>  heparin   Injectable 5000 Unit(s) SubCutaneous every 12 hours  influenza  Vaccine (HIGH DOSE) 0.7 milliLiter(s) IntraMuscular once  lidocaine   4% Patch 1 Patch Transdermal every 24 hours  metoprolol tartrate 25 milliGRAM(s) Oral two times a day  ondansetron Injectable 4 milliGRAM(s) IV Push once  sodium bicarbonate 1300 milliGRAM(s) Oral every 8 hours  sodium phosphate 30 milliMole(s)/500 mL IVPB 30 milliMole(s) IV Intermittent once  sodium zirconium cyclosilicate 5 Gram(s) Oral once    MEDICATIONS  (PRN):  acetaminophen     Tablet .. 650 milliGRAM(s) Oral every 6 hours PRN Temp greater or equal to 38C (100.4F), Mild Pain (1 - 3)  aluminum hydroxide/magnesium hydroxide/simethicone Suspension 30 milliLiter(s) Oral every 4 hours PRN Dyspepsia  melatonin 3 milliGRAM(s) Oral at bedtime PRN Insomnia    Home Medications:  Feosol 325 mg (65 mg elemental iron) oral tablet: 1 tab(s) orally 2 times a day (20 Feb 2024 16:17)  ibuprofen 800 mg oral tablet: 1 tab(s) orally every 6 hours (20 Feb 2024 16:19)  Imodium 2 mg oral capsule: 1 cap(s) orally 2 times a day (20 Feb 2024 16:16)  lidocaine 5% patch:  (20 Feb 2024 16:26)  Norvasc 5 mg oral tablet: 1 tab(s) orally once a day (20 Feb 2024 16:16)  Tylenol 325 mg oral tablet: 2 tab(s) orally every 6 hours (20 Feb 2024 16:26)      VITALS:  Daily Height in cm: 185.42 (05 Mar 2024 16:39)    Daily   T(F): 97.7 (03-06-24 @ 05:32), Max: 97.7 (03-05-24 @ 11:10)  HR: 92 (03-06-24 @ 05:32)  BP: 101/67 (03-06-24 @ 05:32)  RR: 19 (03-06-24 @ 05:32)  SpO2: 95% (03-05-24 @ 18:55)  Wt(kg): --  I&O's Detail    I&O's Summary    Height (cm): 185.4 (03-05 @ 16:39)  Weight (kg): 77.1 (03-05 @ 16:39)  BMI (kg/m2): 22.4 (03-05 @ 16:39)  BSA (m2): 2.01 (03-05 @ 16:39)    PHYSICAL EXAM:  Gen: NAD  Chest: b/l breath sounds  Abd: soft  Extremities: no edema  Vascular access:       LABS:      03-06    144  |  112<H>  |  21<H>  ----------------------------<  95  5.2<H>   |  15<L>  |  1.4    Ca    7.9<L>      06 Mar 2024 05:51  Phos  2.5     03-06  Mg     1.8     03-06    TPro  5.8<L>  /  Alb  2.9<L>  /  TBili  0.4  /  DBili      /  AST  18  /  ALT  16  /  AlkPhos  56  03-06    eGFR: 54 mL/min/1.73m2 (03-06-24 @ 05:51)  eGFR: 65 mL/min/1.73m2 (03-05-24 @ 11:58)    Phosphorus: 2.5 mg/dL (03-06-24 @ 05:51)  Phosphorus: 2.0 mg/dL (03-05-24 @ 11:58)                            8.2    11.42 )-----------( 480      ( 06 Mar 2024 05:51 )             28.1     Mean Cell Volume: 96.2 fL (03-06-24 @ 05:51)    Ferritin: 883 ng/mL (02-21-24 @ 07:21)  Iron Total: 48 ug/dL (02-21-24 @ 07:21)      % Saturation, Iron: 27 % (02-21-24 @ 07:21)    Urine Studies:  Protein, Urine: 100 mg/dL (02-28-24 @ 13:35)  White Blood Cell - Urine: 2 /HPF (02-28-24 @ 13:35)  Red Blood Cell - Urine: 3 /HPF (02-28-24 @ 13:35)  Protein, Urine: 100 mg/dL (11-22-23 @ 09:16)  White Blood Cell - Urine: 2 /HPF (11-22-23 @ 09:16)  Red Blood Cell - Urine: 2 /HPF (11-22-23 @ 09:16)      Urea Nitrogen,  Random Urine: 909 mg/dL (02-28-24 @ 13:35)    Culture Results:   No growth at 72 Hours (03-02 @ 07:22)  Culture Results:   No growth at 4 days (03-01 @ 07:31)    Creatinine trend:  Creatinine: 1.4 mg/dL (03-06-24 @ 05:51)  Creatinine: 1.2 mg/dL (03-05-24 @ 11:58)  Creatinine: 1.3 mg/dL (03-04-24 @ 06:17)  Creatinine: 1.5 mg/dL (03-03-24 @ 07:05)  Creatinine: 1.6 mg/dL (03-02-24 @ 07:22)  Creatinine: 1.9 mg/dL (03-01-24 @ 07:31)  Creatinine: 2.1 mg/dL (02-29-24 @ 07:58)  Creatinine: 2.3 mg/dL (02-28-24 @ 04:30)  Creatinine: 2.6 mg/dL (02-27-24 @ 06:34)  Creatinine: 2.3 mg/dL (02-26-24 @ 07:54)  Creatinine: 2.6 mg/dL (02-25-24 @ 07:51)  Creatinine: 2.3 mg/dL (02-24-24 @ 06:17)  Creatinine: 1.5 mg/dL (02-22-24 @ 07:16)  Creatinine: 1.3 mg/dL (02-21-24 @ 07:21)  Creatinine: 1.5 mg/dL (02-20-24 @ 12:10)  Creatinine: 1.4 mg/dL (11-22-23 @ 09:16)    Hepatitis C Virus Cutoff Index: .04 COI (02-21-24 @ 07:21)  Hepatitis C Virus Interpretation Result: Nonreact (02-21-24 @ 07:21)  Serum Protein Electrophoresis Interp: Normal Electrophoresis Pattern (02-21-24 @ 07:21)      US Kidney and Bladder:   ACC: [id number] EXAM:  US KIDNEYS AND BLADDER   ORDERED BY: DENYS MARTINEZ     PROCEDURE DATE:  03/01/2024          INTERPRETATION:  CLINICAL INFORMATION: 76-year-old male with clinical   concern for hydronephrosis and mediastinal disease.    COMPARISON: CT scan of the abdomen and pelvis performed 2/21/2024    TECHNIQUE: Sonography of the kidneys and bladder.    FINDINGS:    Right kidney: 10.2 cm in length.  Diffusely echogenic. No hydronephrosis   or calculi.  Vascular flow is demonstrated within the right kidney.    Left kidney:  8.9 cm in length.. Diffusely echogenic.  Moderate to severe   left hydronephrosis with left renal parenchymal thinning, as seen on   prior CT scan.  Vascular flow is weakly demonstrated within the left   kidney..    Urinary bladder: Large mass again identified within the urinary bladder   measuring approximately 7.9 x 5.0 x 5.8 cm, previously measured up to 7   cm.  No debris or calculus. Bilateral ureteral jets are not visualized.    Bladder volume is 23 mL.    Other:  There is a right pleural effusion was partially imaged previously.    IMPRESSION:  1.  Diffusely echogenic bilateral kidneys which can be seen with medical   renal disease.  2.  Moderate to severe left hydronephrosis with left renal parenchymal   thinning, as seen on prior CT scan.  3.  Large mass identified within the urinary bladder, measuring up to 7.9   cm, previously up to 7 cm.  No ureteral jets are seen bilaterally.  4.  Incidentally noted right pleural effusion.        --- End of Report ---            JENNIFER PIERSON MD; Attending Radiologist  This document has been electronically signed. Mar  1 2024  7:04PM (03-01-24 @ 18:42)     Nephrology Progress Note    RUFUS PICKARD  MRN-[id number]  68y  Male    S:  Patient is seen and examined, events over the last 24h noted.  c/o abdominal pain    O:  Allergies:  No Known Allergies    Hospital Medications:   MEDICATIONS  (STANDING):  cefTRIAXone   IVPB 2000 milliGRAM(s) IV Intermittent every 24 hours  chlorhexidine 2% Cloths 1 Application(s) Topical <User Schedule>  dextrose 5% + lactated ringers. 1000 milliLiter(s) (100 mL/Hr) IV Continuous <Continuous>  heparin   Injectable 5000 Unit(s) SubCutaneous every 12 hours  influenza  Vaccine (HIGH DOSE) 0.7 milliLiter(s) IntraMuscular once  lidocaine   4% Patch 1 Patch Transdermal every 24 hours  metoprolol tartrate 25 milliGRAM(s) Oral two times a day  ondansetron Injectable 4 milliGRAM(s) IV Push once  sodium bicarbonate 1300 milliGRAM(s) Oral every 8 hours  sodium phosphate 30 milliMole(s)/500 mL IVPB 30 milliMole(s) IV Intermittent once  sodium zirconium cyclosilicate 5 Gram(s) Oral once    MEDICATIONS  (PRN):  acetaminophen     Tablet .. 650 milliGRAM(s) Oral every 6 hours PRN Temp greater or equal to 38C (100.4F), Mild Pain (1 - 3)  aluminum hydroxide/magnesium hydroxide/simethicone Suspension 30 milliLiter(s) Oral every 4 hours PRN Dyspepsia  melatonin 3 milliGRAM(s) Oral at bedtime PRN Insomnia    Home Medications:  Feosol 325 mg (65 mg elemental iron) oral tablet: 1 tab(s) orally 2 times a day (20 Feb 2024 16:17)  ibuprofen 800 mg oral tablet: 1 tab(s) orally every 6 hours (20 Feb 2024 16:19)  Imodium 2 mg oral capsule: 1 cap(s) orally 2 times a day (20 Feb 2024 16:16)  lidocaine 5% patch:  (20 Feb 2024 16:26)  Norvasc 5 mg oral tablet: 1 tab(s) orally once a day (20 Feb 2024 16:16)  Tylenol 325 mg oral tablet: 2 tab(s) orally every 6 hours (20 Feb 2024 16:26)      VITALS:  Daily Height in cm: 185.42 (05 Mar 2024 16:39)    Daily   T(F): 97.7 (03-06-24 @ 05:32), Max: 97.7 (03-05-24 @ 11:10)  HR: 92 (03-06-24 @ 05:32)  BP: 101/67 (03-06-24 @ 05:32)  RR: 19 (03-06-24 @ 05:32)  SpO2: 95% (03-05-24 @ 18:55)  Wt(kg): --  I&O's Detail    I&O's Summary    Height (cm): 185.4 (03-05 @ 16:39)  Weight (kg): 77.1 (03-05 @ 16:39)  BMI (kg/m2): 22.4 (03-05 @ 16:39)  BSA (m2): 2.01 (03-05 @ 16:39)    PHYSICAL EXAM:  Gen: NAD  Chest: b/l breath sounds  Abd: soft, mild ttdp  Extremities: no edema      LABS:      03-06    144  |  112<H>  |  21<H>  ----------------------------<  95  5.2<H>   |  15<L>  |  1.4    Ca    7.9<L>      06 Mar 2024 05:51  Phos  2.5     03-06  Mg     1.8     03-06    TPro  5.8<L>  /  Alb  2.9<L>  /  TBili  0.4  /  DBili      /  AST  18  /  ALT  16  /  AlkPhos  56  03-06    Phosphorus: 2.5 mg/dL (03-06-24 @ 05:51)  Phosphorus: 2.0 mg/dL (03-05-24 @ 11:58)                            8.2    11.42 )-----------( 480      ( 06 Mar 2024 05:51 )             28.1     Mean Cell Volume: 96.2 fL (03-06-24 @ 05:51)    Creatinine trend:  Creatinine: 1.4 mg/dL (03-06-24 @ 05:51)  Creatinine: 1.2 mg/dL (03-05-24 @ 11:58)  Creatinine: 1.3 mg/dL (03-04-24 @ 06:17)  Creatinine: 1.5 mg/dL (03-03-24 @ 07:05)  Creatinine: 1.6 mg/dL (03-02-24 @ 07:22)  Creatinine: 1.9 mg/dL (03-01-24 @ 07:31)  Creatinine: 1.4 mg/dL (11-22-23 @ 09:16)    US Kidney and Bladder:   ACC: [id number] EXAM:  US KIDNEYS AND BLADDER   ORDERED BY: DENYS MARTINEZ     PROCEDURE DATE:  03/01/2024          INTERPRETATION:  CLINICAL INFORMATION: 76-year-old male with clinical   concern for hydronephrosis and mediastinal disease.    COMPARISON: CT scan of the abdomen and pelvis performed 2/21/2024    TECHNIQUE: Sonography of the kidneys and bladder.    FINDINGS:    Right kidney: 10.2 cm in length.  Diffusely echogenic. No hydronephrosis   or calculi.  Vascular flow is demonstrated within the right kidney.    Left kidney:  8.9 cm in length.. Diffusely echogenic.  Moderate to severe   left hydronephrosis with left renal parenchymal thinning, as seen on   prior CT scan.  Vascular flow is weakly demonstrated within the left   kidney..    Urinary bladder: Large mass again identified within the urinary bladder   measuring approximately 7.9 x 5.0 x 5.8 cm, previously measured up to 7   cm.  No debris or calculus. Bilateral ureteral jets are not visualized.    Bladder volume is 23 mL.    Other:  There is a right pleural effusion was partially imaged previously.    IMPRESSION:  1.  Diffusely echogenic bilateral kidneys which can be seen with medical   renal disease.  2.  Moderate to severe left hydronephrosis with left renal parenchymal   thinning, as seen on prior CT scan.  3.  Large mass identified within the urinary bladder, measuring up to 7.9   cm, previously up to 7 cm.  No ureteral jets are seen bilaterally.  4.  Incidentally noted right pleural effusion.        --- End of Report ---            JENNIFER PIERSON MD; Attending Radiologist  This document has been electronically signed. Mar  1 2024  7:04PM (03-01-24 @ 18:42)

## 2024-03-06 NOTE — CHART NOTE - NSCHARTNOTEFT_GEN_A_CORE
PALLIATIVE MEDICINE INTERDISCIPLINARY TEAM NOTE    Provider:                                              Met with: [ x  ] Patient  [   ] Family  [   ] Other:    Primary Language: [ x  ] English [   ] Other*:                      *Interpretation provided by:    SUPPORT DIAGNOSES            (Check all that apply)    [   ] EOL issues  [ x  ] Advanced Illness  [   ] Cultural / spiritual concerns  [  x ] Pain / suffering  [   ] Dementia / AMS  [   ] Other:  [   ] AD issues  [   ] Grief / loss / sadness  [   ] Discharge issues  [  x ] Distress / coping    PSYCHOSOCIAL ASSESSMENT OF PATIENT         (Check all that apply)    [  x ] Initial Assessment            [   ] Reassessment          [   ] Not Applicable this visit    Pain/suffering acuity:  [ x  ] None to mild (0-3)           [   ] Moderate (4-6)        [   ] High (7-10)    Mental Status:  [  x ] Alert/oriented (x2)          [   ] Confused/Altered(x2/x1)         [   ] Non-resp    Functional status:  [   ] Independent w ADLs      [  x ] Needs Assistance             [   ] Bedbound/Full Care    Coping:  [   ] Coping well                     [x   ] Coping w/difficulty            [   ] Poor coping    Support system:  [   ] Strong                              [   ] Adequate                        [  x ] Inadequate      Past history and medications for:     [ ] Anxiety       [ ] Depression    [ ] Sleep disorders       SERVICE PROVIDED  [   ]Discharge support / facilitation  [   ]AD / goals of care counseling                                  [   ]EOL / death / bereavement counseling  [  x ]Counseling / support                                                [   ] Family meeting  [   ]Prayer / sacrament / ritual                                      [   ] Referral   [   ]Other                                                                       NOTE and Plan of Care (PoC):    patient is a 68yMale with history of prostate cancer, posterior mediastinal mass seen on CT in November 2023, presents with difficulty swallowing associated with weight loss and odynophagia. patient is s/p peg. met with patient earlier today. patient encountered resting in bed. awake and alert. Briefly reviewed role of palliative SW with him. He reported having abdominal discomfort. informed bedside RN. patient has no documented family/friend to help in decision making.    discussed with Dr. Werner.       will follow. [phone]

## 2024-03-06 NOTE — PROGRESS NOTE ADULT - ASSESSMENT
68-year-old male with past medical history of hypertension, chronic back pain, prostate cancer, and posterior mediastinal mass 6.5 cm seen on CT 11/2023, presents for 3 months of progressively difficulty swallowing associated with 10 pounds weight loss. Comes in today now unable to swallow his pills with water, resulting in 2 episode of emesis. GI consulted for dysphagia.     # Dysphagia secondary to mediastinal mass  s/p EGD with esophageal stent and EUS guided FNB   Prostate cancer and is s/p radiation therapy.   Patient had known mediastinal mass since jan 2023, Was evaluated recently by his urologist for new bladder mass. Patient was scheduled for cystoscopy and PET scan to r/o multiple primary malignancy.   Mediastinal mass was never biopsied per patient.  Hx of VERONICA on iron supplement. No overt GI bleeding.   Reviewed CT scan: large post mediastinal mass, + LNs, pill within upper esophagus, distended proximal esophagus. and upper mesenteric mass.  Path: poorly differentiated carcinoma   modified barium study  noted   s/p EGD 3/5/2023:  Sten repositioned and s/p G tube placement.     PLAN:  - Advance to pureed diet   - Can use G-tube for feeds and medications   - Avoid placing more than one gauze between the external phalange and the skin  to avoid buried bumper syndrome  - Keep abdominal binder at all times  - Skin care per RN  - Flush with 30 ml of water before and after use.   - Follow up with GI as outpatient to evaluate the need for stent removal in the near future depending on cancer treatment and clinical status.   - Call as needed      68-year-old male with past medical history of hypertension, chronic back pain, prostate cancer, and posterior mediastinal mass 6.5 cm seen on CT 11/2023, presents for 3 months of progressively difficulty swallowing associated with 10 pounds weight loss. Comes in today now unable to swallow his pills with water, resulting in 2 episode of emesis. GI consulted for dysphagia.     # Dysphagia secondary to mediastinal mass  s/p EGD with esophageal stent and EUS guided FNB   Prostate cancer and is s/p radiation therapy.   Patient had known mediastinal mass since jan 2023, Was evaluated recently by his urologist for new bladder mass. Patient was scheduled for cystoscopy and PET scan to r/o multiple primary malignancy.   Mediastinal mass was never biopsied per patient.  Hx of VERONICA on iron supplement. No overt GI bleeding.   Reviewed CT scan: large post mediastinal mass, + LNs, pill within upper esophagus, distended proximal esophagus. and upper mesenteric mass.  Path: poorly differentiated carcinoma   modified barium study  noted   s/p EGD 3/5/2023:  Stent repositioned and s/p G tube placement.     PLAN:  - Advance to pureed diet   - Can use G-tube for feeds and medications   - Avoid placing more than one gauze between the external phalange and the skin to avoid buried bumper syndrome  - Keep abdominal binder at all times  - Skin care per RN  - Flush with 30 ml of water before and after use.   - Follow up with GI as outpatient to evaluate the need for stent removal in the near future depending on cancer treatment and clinical status.   - Call as needed

## 2024-03-06 NOTE — CHART NOTE - NSCHARTNOTEFT_GEN_A_CORE
GI NUTRITION SUPPORT TEAM  -  PROGRESS NOTE     Interval Events:    s/ egd with repositioning of esophageal stent and peg placement  peg cleared for use today by GI      REVIEW OF SYSTEMS:  Negative except as noted above.     VITALS:  T(F): 97.7 (03-06 @ 05:32), Max: 97.7 (03-06 @ 05:32)  HR: 92 (03-06 @ 05:32) (92 - 92)  BP: 101/67 (03-06 @ 05:32) (101/67 - 101/67)  RR: 19 (03-06 @ 05:32) (19 - 19)  SpO2: --    HEIGHT/WEIGHT/BMI:   Height (cm): 185.4 (03-05), 185.4 (09-15)  Weight (kg): 77.1 (03-05), 77.1 (11-22), 78.5 (09-15)  BMI (kg/m2): 22.4 (03-05), 22.4 (11-22), 22.8 (09-15)    I/Os:     MEDICATIONS:   acetaminophen     Tablet .. 650 milliGRAM(s) Oral every 6 hours PRN  aluminum hydroxide/magnesium hydroxide/simethicone Suspension 30 milliLiter(s) Oral every 4 hours PRN  cefTRIAXone   IVPB 2000 milliGRAM(s) IV Intermittent every 24 hours  chlorhexidine 2% Cloths 1 Application(s) Topical <User Schedule>  dextrose 5% + lactated ringers. 1000 milliLiter(s) (100 mL/Hr) IV Continuous <Continuous>  heparin   Injectable 5000 Unit(s) SubCutaneous every 12 hours  influenza  Vaccine (HIGH DOSE) 0.7 milliLiter(s) IntraMuscular once  lidocaine   4% Patch 1 Patch Transdermal every 24 hours  melatonin 3 milliGRAM(s) Oral at bedtime PRN  metoprolol tartrate 25 milliGRAM(s) Oral two times a day  ondansetron Injectable 4 milliGRAM(s) IV Push once  sodium bicarbonate 1300 milliGRAM(s) Oral every 8 hours  sodium phosphate 30 milliMole(s)/500 mL IVPB 30 milliMole(s) IV Intermittent once  sodium zirconium cyclosilicate 5 Gram(s) Oral once    LABS:                         8.2    11.42 )-----------( 480      ( 06 Mar 2024 05:51 )             28.1     144  |  112<H>  |  21<H>  ----------------------------<  95          (03-06-24 @ 05:51)  5.2<H>   |  15<L>  |  1.4    Ca    7.9<L>          (03-06-24 @ 05:51)  Phos  2.5         (03-06-24 @ 05:51)  Mg     1.8         (03-06-24 @ 05:51)    TPro  5.8<L>  /  Alb  2.9<L>  /  TBili  0.4  /  DBili  x   /  AST  18  /  ALT  16  /  AlkPhos  56       03-06-24 @ 05:51      DIET:   Diet, Pureed:   Tube Feeding Modality: Gastrostomy  Jevity 1.2 Sam  Total Volume for 24 Hours (mL): 960  Bolus  Total Volume of Bolus (mL):  240  Total # of Feeds: 4  Tube Feed Frequency: Every 6 hours   Tube Feed Start Time: 10:06  Bolus Feed Rate (mL per Hour): 480   Bolus Feed Duration (in Hours): 0.5  Bolus Feed Instructions:  Please give 120ml for first feed infused via gravity drip over 30min. Then advance to 240ml  Give feeds 1 hour after he attempts to eat  breakfast, lunch dinner and then qhs (4 feeds/d), NOT on q6hr feeding schedule  Free Water Flush  Free Water Flush Instructions:  flush peg with 50ml H20 before and 100ml H2O after each feed (03-06-24 @ 10:08) [Active]      ASSESSMENT  68-year-old male with pmh of hypertension, chronic back pain, prostate cancer s/p RT, and posterior mediastinal mass 6.5 cm seen on CT 11/2023, presents for 3 months of progressively difficulty swallowing associated with 10 pounds weight loss and odynophagia. Presented with c/o being unable to swallow his pills with water, resulting in 2 episode of emesis. Nutrition called to evaluate for PN.    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  - prostate cancer s/p RT   - odynophagia with associated wt loss (amount unclear)   - anemia, hx VERONICA   - hyperkalemia  - severe protein calorie malnutrition  - at risk for refeeding syndrome    Est nutrient needs: 0400-8158 kcals (25-30kcals/kg, 77.1kg), 92-108g protein (1.2-1.4g/kg)  Fluids: 2100ml (25ml/kg)      PLAN  - pureed diet as tolerated  - start PEG feeds with Jevity 1.2 1 hour after each attempted PO meal and qhs (4 feeds/day) given via gravity drip over 30 min  - first feed 120ml and if tolerated advance to 240ml Jevity 1.2 x 4 for now  - flush peg with 50ml H2O before and 100ml H2O after each peg feed  - bmp, phos, mg in am- monitor phos for refeeding syndrome  - monitor bm's   - weekly weights   - d/c planning with peg feeds

## 2024-03-06 NOTE — PROGRESS NOTE ADULT - ASSESSMENT
68-year-old male with past medical history of hypertension, chronic back pain, prostate cancer, and posterior mediastinal mass 6.5 cm seen on CT 11/2023, presents for 3 months of progressively difficulty swallowing associated with 10 pounds weight loss and odynophagia. 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 Also found to be in sepsis secondary to GAS bacteremia. hospital stay complicated by DIMAS.  Assessment and plan    DIMAS on CKD 3/ stage 4 prostate cancer/ mediastinal mass s/p bx/ HTN  CHUCKIE may have contributed, received 2 iv contrast studies within 2 days period   DIMAS resolved.  Creat improved to baseline  renal ultrasound reviewed: "Diffusely echogenic bilateral kidneys which can be seen with medical  renal disease. 2.  Moderate to severe left hydronephrosis with left renal parenchymal  thinning, as seen on prior CT scan. 3.  Large mass identified within the urinary bladder, measuring up to 7.9  cm, previously up to 7 cm.  No ureteral jets are seen bilaterally." 4.  Incidentally noted right pleural effusion.-- needs  eval  cont po sodium bicarb  change iv fluids to d5w with 100meq NaHCO3 (d/t metabolic acidosis, mild hypernatremia)  phos at goal, maintain > 2    Pt refusing care,  confused, no clear gaurdian,  Psch eval pending Palliatve eval noted

## 2024-03-06 NOTE — CHART NOTE - NSCHARTNOTEFT_GEN_A_CORE
Writer was called for SOB and desaturation on RA. , /81. PE: crackles rhonchi. Soft abdomen. CXR, abgs and ecg ordered. Placed on NC.  CXR bilateral effusion , slightly more congested, official read requested from radio as per the air on the right under the diaphragm if it is significant or no. + lasix 40 given with duonebs.  ABGS hyperventilating with ph 7.41. pain meds given. Writer was called for SOB and desaturation on RA. , /81. PE: crackles rhonchi. Soft abdomen. CXR, abgs and ecg ordered. Placed on NC.Suction refused.   CXR bilateral effusion , slightly more congested, official read requested from radio as per the air on the right under the diaphragm if it is significant or no. + lasix 40 mg iv given with duonebs.  ABGS hyperventilating hypoxic with ph 7.41. pain meds given.  Will follow. Writer was called for SOB and desaturation on RA. , /81. PE: crackles rhonchi. Soft abdomen. CXR, abgs and ecg ordered. Placed on NC.Suction refused.   CXR bilateral effusion , slightly more congested, official read requested from radio as per the air on the right under the diaphragm if it is significant or no. + lasix 40 mg iv given with duonebs.  ABGS hyperventilating hypoxemic with ph 7.41. lactatne normal.  pain meds given.  Will follow. Low threshold for CTA chest pe protocol. Also extensive mediastinal tumor burden engulfing and compressing major vessels and structures. Very poor prognosis no surrogate no hcp .

## 2024-03-06 NOTE — PROGRESS NOTE ADULT - ATTENDING COMMENTS
Pt examined bedside this am. He is having abdominal pain. Explained that he had a peg placed yesterday. Tube feeds to be started today. With cancer progressing, his oncologist has stated they will no longer continue treatment and recommending hospice. Palliative saw him today. Pt does not have capacity. Poor prognosis. Has no family involved in his care. Pt examined bedside this am. He is having abdominal pain. Explained that he had a peg placed yesterday. Tube feeds to be started today. Pt has a low bicarb and missed 2/3 of his sod bicarb doses 2 days in a row. Now with his peg tube in place, his bicarb should improve on current doses. With cancer progressing, his oncologist has stated they will no longer continue treatment and recommending hospice. Palliative saw him today. Pt does not have capacity. Poor prognosis. Has no family involved in his care.

## 2024-03-06 NOTE — PROGRESS NOTE ADULT - ATTENDING COMMENTS
agree w/ above - poor prognosis in light of malignant prostate ca. no social support.   liquid diet as tolerated. PEG feeds.   hem-onc notes appreciated. palliative care.   very poor prognosis. rest of recs per the above.

## 2024-03-06 NOTE — PROGRESS NOTE ADULT - SUBJECTIVE AND OBJECTIVE BOX
24H events:    Today is hospital day 15d. This morning patient was seen and examined at bedside, resting comfortably in bed.    No acute or major events overnight.    PAST MEDICAL & SURGICAL HISTORY  HTN (hypertension)    Prostate cancer        SOCIAL HISTORY:  Social History:      ALLERGIES:  No Known Allergies      MEDICATIONS:  STANDING MEDICATIONS  cefTRIAXone   IVPB 2000 milliGRAM(s) IV Intermittent every 24 hours  chlorhexidine 2% Cloths 1 Application(s) Topical <User Schedule>  dextrose 5% + lactated ringers. 1000 milliLiter(s) IV Continuous <Continuous>  heparin   Injectable 5000 Unit(s) SubCutaneous every 12 hours  influenza  Vaccine (HIGH DOSE) 0.7 milliLiter(s) IntraMuscular once  lidocaine   4% Patch 1 Patch Transdermal every 24 hours  metoprolol tartrate 25 milliGRAM(s) Oral two times a day  ondansetron Injectable 4 milliGRAM(s) IV Push once  sodium bicarbonate 1300 milliGRAM(s) Oral every 8 hours  sodium phosphate 30 milliMole(s)/500 mL IVPB 30 milliMole(s) IV Intermittent once    PRN MEDICATIONS  acetaminophen     Tablet .. 650 milliGRAM(s) Oral every 6 hours PRN  aluminum hydroxide/magnesium hydroxide/simethicone Suspension 30 milliLiter(s) Oral every 4 hours PRN  melatonin 3 milliGRAM(s) Oral at bedtime PRN      VITALS:   T(C): 36.5 (03-06-24 @ 05:32), Max: 36.5 (03-05-24 @ 11:10)  HR: 92 (03-06-24 @ 05:32) (92 - 115)  BP: 101/67 (03-06-24 @ 05:32) (93/66 - 137/89)  RR: 19 (03-06-24 @ 05:32) (17 - 21)  SpO2: 95% (03-05-24 @ 18:55) (95% - 98%)  I&O's Summary        PHYSICAL EXAM:      LABS:                        9.1    10.17 )-----------( 539      ( 05 Mar 2024 11:58 )             30.2     03-05    146  |  113<H>  |  21<H>  ----------------------------<  77  4.3   |  14<L>  |  1.2    Ca    7.9<L>      05 Mar 2024 11:58  Phos  2.0     03-05  Mg     1.8     03-05    TPro  6.2  /  Alb  3.1<L>  /  TBili  0.5  /  DBili  x   /  AST  15  /  ALT  21  /  AlkPhos  63  03-05      Urinalysis Basic - ( 05 Mar 2024 11:58 )    Color: x / Appearance: x / SG: x / pH: x  Gluc: 77 mg/dL / Ketone: x  / Bili: x / Urobili: x   Blood: x / Protein: x / Nitrite: x   Leuk Esterase: x / RBC: x / WBC x   Sq Epi: x / Non Sq Epi: x / Bacteria: x                     24H events:    Today is hospital day 15d. This morning patient was seen and examined at bedside, resting comfortably in bed.    No acute or major events overnight.    PAST MEDICAL & SURGICAL HISTORY  HTN (hypertension)    Prostate cancer        SOCIAL HISTORY:  Social History:      ALLERGIES:  No Known Allergies      MEDICATIONS:  STANDING MEDICATIONS  cefTRIAXone   IVPB 2000 milliGRAM(s) IV Intermittent every 24 hours  chlorhexidine 2% Cloths 1 Application(s) Topical <User Schedule>  dextrose 5% + lactated ringers. 1000 milliLiter(s) IV Continuous <Continuous>  heparin   Injectable 5000 Unit(s) SubCutaneous every 12 hours  influenza  Vaccine (HIGH DOSE) 0.7 milliLiter(s) IntraMuscular once  lidocaine   4% Patch 1 Patch Transdermal every 24 hours  metoprolol tartrate 25 milliGRAM(s) Oral two times a day  ondansetron Injectable 4 milliGRAM(s) IV Push once  sodium bicarbonate 1300 milliGRAM(s) Oral every 8 hours  sodium phosphate 30 milliMole(s)/500 mL IVPB 30 milliMole(s) IV Intermittent once    PRN MEDICATIONS  acetaminophen     Tablet .. 650 milliGRAM(s) Oral every 6 hours PRN  aluminum hydroxide/magnesium hydroxide/simethicone Suspension 30 milliLiter(s) Oral every 4 hours PRN  melatonin 3 milliGRAM(s) Oral at bedtime PRN      VITALS:   T(C): 36.5 (03-06-24 @ 05:32), Max: 36.5 (03-05-24 @ 11:10)  HR: 92 (03-06-24 @ 05:32) (92 - 115)  BP: 101/67 (03-06-24 @ 05:32) (93/66 - 137/89)  RR: 19 (03-06-24 @ 05:32) (17 - 21)  SpO2: 95% (03-05-24 @ 18:55) (95% - 98%)  I&O's Summary        PHYSICAL EXAM:    GENERAL: NAD, non-toxic appearing, cachectic , poor dental hygiene   NECK: Supple, no stiffness, no JVD  HEART: Regular rate and rhythm, normal s1s2  LUNGS: Unlabored respirations, b/l air entry, no adventitious breath sounds   ABDOMEN: Soft, nontender, nondistended; +BS  EXTREMITIES: No clubbing, cyanosis, or edema;   NERVOUS SYSTEM: AOx3  SKIN: Warm and dry    LABS:                        9.1    10.17 )-----------( 539      ( 05 Mar 2024 11:58 )             30.2     03-05    146  |  113<H>  |  21<H>  ----------------------------<  77  4.3   |  14<L>  |  1.2    Ca    7.9<L>      05 Mar 2024 11:58  Phos  2.0     03-05  Mg     1.8     03-05    TPro  6.2  /  Alb  3.1<L>  /  TBili  0.5  /  DBili  x   /  AST  15  /  ALT  21  /  AlkPhos  63  03-05      Urinalysis Basic - ( 05 Mar 2024 11:58 )    Color: x / Appearance: x / SG: x / pH: x  Gluc: 77 mg/dL / Ketone: x  / Bili: x / Urobili: x   Blood: x / Protein: x / Nitrite: x   Leuk Esterase: x / RBC: x / WBC x   Sq Epi: x / Non Sq Epi: x / Bacteria: x

## 2024-03-07 LAB
ALBUMIN SERPL ELPH-MCNC: 2.9 G/DL — LOW (ref 3.5–5.2)
ALP SERPL-CCNC: 64 U/L — SIGNIFICANT CHANGE UP (ref 30–115)
ALT FLD-CCNC: 16 U/L — SIGNIFICANT CHANGE UP (ref 0–41)
ANION GAP SERPL CALC-SCNC: 20 MMOL/L — HIGH (ref 7–14)
AST SERPL-CCNC: 21 U/L — SIGNIFICANT CHANGE UP (ref 0–41)
BASOPHILS # BLD AUTO: 0.02 K/UL — SIGNIFICANT CHANGE UP (ref 0–0.2)
BASOPHILS NFR BLD AUTO: 0.1 % — SIGNIFICANT CHANGE UP (ref 0–1)
BILIRUB SERPL-MCNC: 0.5 MG/DL — SIGNIFICANT CHANGE UP (ref 0.2–1.2)
BUN SERPL-MCNC: 22 MG/DL — HIGH (ref 10–20)
CALCIUM SERPL-MCNC: 7.7 MG/DL — LOW (ref 8.4–10.5)
CHLORIDE SERPL-SCNC: 111 MMOL/L — HIGH (ref 98–110)
CO2 SERPL-SCNC: 15 MMOL/L — LOW (ref 17–32)
CREAT SERPL-MCNC: 1.6 MG/DL — HIGH (ref 0.7–1.5)
CULTURE RESULTS: SIGNIFICANT CHANGE UP
EGFR: 46 ML/MIN/1.73M2 — LOW
EOSINOPHIL # BLD AUTO: 0.01 K/UL — SIGNIFICANT CHANGE UP (ref 0–0.7)
EOSINOPHIL NFR BLD AUTO: 0.1 % — SIGNIFICANT CHANGE UP (ref 0–8)
GLUCOSE SERPL-MCNC: 124 MG/DL — HIGH (ref 70–99)
HCT VFR BLD CALC: 27.4 % — LOW (ref 42–52)
HGB BLD-MCNC: 8.2 G/DL — LOW (ref 14–18)
IMM GRANULOCYTES NFR BLD AUTO: 0.8 % — HIGH (ref 0.1–0.3)
LYMPHOCYTES # BLD AUTO: 0.55 K/UL — LOW (ref 1.2–3.4)
LYMPHOCYTES # BLD AUTO: 3.8 % — LOW (ref 20.5–51.1)
MAGNESIUM SERPL-MCNC: 1.8 MG/DL — SIGNIFICANT CHANGE UP (ref 1.8–2.4)
MCHC RBC-ENTMCNC: 28.6 PG — SIGNIFICANT CHANGE UP (ref 27–31)
MCHC RBC-ENTMCNC: 29.9 G/DL — LOW (ref 32–37)
MCV RBC AUTO: 95.5 FL — HIGH (ref 80–94)
MONOCYTES # BLD AUTO: 0.94 K/UL — HIGH (ref 0.1–0.6)
MONOCYTES NFR BLD AUTO: 6.5 % — SIGNIFICANT CHANGE UP (ref 1.7–9.3)
NEUTROPHILS # BLD AUTO: 12.75 K/UL — HIGH (ref 1.4–6.5)
NEUTROPHILS NFR BLD AUTO: 88.7 % — HIGH (ref 42.2–75.2)
NRBC # BLD: 0 /100 WBCS — SIGNIFICANT CHANGE UP (ref 0–0)
PHOSPHATE SERPL-MCNC: 2.6 MG/DL — SIGNIFICANT CHANGE UP (ref 2.1–4.9)
PLATELET # BLD AUTO: 490 K/UL — HIGH (ref 130–400)
PMV BLD: 9.7 FL — SIGNIFICANT CHANGE UP (ref 7.4–10.4)
POTASSIUM SERPL-MCNC: 4.6 MMOL/L — SIGNIFICANT CHANGE UP (ref 3.5–5)
POTASSIUM SERPL-SCNC: 4.6 MMOL/L — SIGNIFICANT CHANGE UP (ref 3.5–5)
PROT SERPL-MCNC: 6.1 G/DL — SIGNIFICANT CHANGE UP (ref 6–8)
RBC # BLD: 2.87 M/UL — LOW (ref 4.7–6.1)
RBC # FLD: 14.8 % — HIGH (ref 11.5–14.5)
SODIUM SERPL-SCNC: 146 MMOL/L — SIGNIFICANT CHANGE UP (ref 135–146)
SPECIMEN SOURCE: SIGNIFICANT CHANGE UP
WBC # BLD: 14.39 K/UL — HIGH (ref 4.8–10.8)
WBC # FLD AUTO: 14.39 K/UL — HIGH (ref 4.8–10.8)

## 2024-03-07 PROCEDURE — 99232 SBSQ HOSP IP/OBS MODERATE 35: CPT

## 2024-03-07 PROCEDURE — 99233 SBSQ HOSP IP/OBS HIGH 50: CPT

## 2024-03-07 PROCEDURE — 99221 1ST HOSP IP/OBS SF/LOW 40: CPT

## 2024-03-07 PROCEDURE — 74018 RADEX ABDOMEN 1 VIEW: CPT | Mod: 26

## 2024-03-07 RX ORDER — SODIUM CHLORIDE 9 MG/ML
1000 INJECTION, SOLUTION INTRAVENOUS
Refills: 0 | Status: DISCONTINUED | OUTPATIENT
Start: 2024-03-07 | End: 2024-03-08

## 2024-03-07 RX ADMIN — LIDOCAINE 1 PATCH: 4 CREAM TOPICAL at 12:14

## 2024-03-07 RX ADMIN — LIDOCAINE 1 PATCH: 4 CREAM TOPICAL at 20:54

## 2024-03-07 RX ADMIN — LIDOCAINE 1 PATCH: 4 CREAM TOPICAL at 23:41

## 2024-03-07 RX ADMIN — PIPERACILLIN AND TAZOBACTAM 25 GRAM(S): 4; .5 INJECTION, POWDER, LYOPHILIZED, FOR SOLUTION INTRAVENOUS at 21:35

## 2024-03-07 RX ADMIN — PIPERACILLIN AND TAZOBACTAM 25 GRAM(S): 4; .5 INJECTION, POWDER, LYOPHILIZED, FOR SOLUTION INTRAVENOUS at 06:30

## 2024-03-07 RX ADMIN — Medication 1300 MILLIGRAM(S): at 11:48

## 2024-03-07 RX ADMIN — Medication 1300 MILLIGRAM(S): at 21:48

## 2024-03-07 RX ADMIN — Medication 400 MILLIGRAM(S): at 04:27

## 2024-03-07 RX ADMIN — CHLORHEXIDINE GLUCONATE 1 APPLICATION(S): 213 SOLUTION TOPICAL at 06:43

## 2024-03-07 RX ADMIN — PIPERACILLIN AND TAZOBACTAM 25 GRAM(S): 4; .5 INJECTION, POWDER, LYOPHILIZED, FOR SOLUTION INTRAVENOUS at 15:18

## 2024-03-07 RX ADMIN — HEPARIN SODIUM 5000 UNIT(S): 5000 INJECTION INTRAVENOUS; SUBCUTANEOUS at 06:38

## 2024-03-07 NOTE — PROGRESS NOTE ADULT - ASSESSMENT
68-year-old male with past medical history of hypertension, chronic back pain, prostate cancer, and posterior mediastinal mass 6.5 cm seen on CT 11/2023, presents for 3 months of progressively difficulty swallowing associated with 10 pounds weight loss and odynophagia. Comes in today now unable to swallow his pills with water, resulting in 2 episode of emesis. He mentioned that he gets SOB during exertion but denies any exertional chest pain. As per patient he was treated at [facility] for prostate cancer and is s/p radiation therapy. He states that he has problem during walking and unable to maintain the posture.     #aspiration PNA vs viral infection vs HAP   #GAS s pyogenes bacteremia   - SIRS positive (WBC increased to 16, febrile to 101 and tachy to 129 )   - CXR with worsening L-sided opacity   - Blood cx (2/24/24): positive for strep pyogenes  - repeat BCx 27,28,29,1,2  negative to date   - procal 7  - aspiration precautions   - patient cleared for pureed diet and thin liquids per SS -refusing food as patient reports inability to swallow   - on zosyn 3.375mg q8h switched to cefepime and clindamycin pending ID rec   -ID rec:  source potentially from translocation in setting of infiltrative masses, stop clindamycin , switch cefepime to ceftriaxone 2g daily   - continue ceftriaxone 2g daily end date 3/11 to complete 2 weeks from culture clearance ideally to finish with midline   - TTE   1. Left ventricular ejection fraction, by visual estimation, is 65 to 70%.   2. Hyperdynamic global left ventricular systolic function.   3. Mildly increased LV wall thickness.   4. Spectral Doppler shows impaired relaxation pattern of left   ventricular myocardial filling (Grade I diastolic dysfunction).   5. Trace mitral valve regurgitation.    # Dysphagia with odynophagia  #Failure to thrive   # Hx of prostate cancer s/p radiation therapy  # Prostate cancer metastasis to mediastinum or primary mediastinal mass?  - CT Chest: Interval enlargement of the central/posterior mediastinal mass with development of more extensive lymphadenopathy. The mass encircles the descending thoracic aorta and has some mass effect upon the left atrium. The mid aspect of the esophagus is encircled by the mass and difficult to distinguish. Possible ovoid pill is noted within the mass and possible location of the mid esophagus appear. The maximal esophagus appears distended with layering debris within. Consideration may be given to esophageal stenting.  - CT abdomen and pelvis:  Increased upper abdominal bulky lymphadenopathy. Unchanged bulky retroperitoneal lymphadenopathy, large left bladder mass and perirectal soft tissue infiltrative mass. 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 Chronic moderate to severe left hydroureteronephrosis  - EGD with GI 2/21 -> stent placed and esophageal biopsies taken -pathology - Poorly differentiated carcinoma.  - CT surgery following -> no acute intervention   - AFP and CEA wnl - LDH and haptoglobin elevated - PSA total 220, PSA free is 22 - CA19 131-  57  - oncology recs appreciated ->patient used to follow with Dr. Kenia Irizarry, called and teams her and she is not picking up the phone calls, not reachable in any way   -service oncology consulted  - Esophageal biopsy: Cytopathology - Non Gyn Report: ACCESSION No:  [id number]. Final Diagnosis- MEDIASTINUM MASS; EUS GUIDED FNA (THINPREP AND CELL BLOCK): POSITIVE FOR MALIGNANT CELLS,  Metastatic poorly differentiated carcinoma. The morphology of the current tumor and the immunohistochemical profile along with the patient's history of prostatic cancer are consistent with a metastatic poorly differentiated carcinoma of prostatic origin.  - Esophageal ulcer biopsy shows poorly differentiated carcinoma, IHC staining pending. Discussed with Dr. Lewis, related to prostate cancer, not a second primary malignancy   - per onc note, patient had prior biopsy of site that demonstrated metastatic prostate adenocarcinoma and was started on Docetaxel, in January 2024 patient was started on Pembrolizumab and continued on keytruda injections.   - Dr. Irizarry is returning to the office on Monday 3/4 => cs rad/onco for palliative radiation , onco asked about new protocol since patient progressed   -refusing oral intake, IVF  -esophogram :Esophageal stent is visualized in the mid esophagus and extending below  the left hemidiaphragm. The lumen of the mid esophagus is severely   narrowed.A small amount of contrast is visualized passing through the mid esophagus.No evidence of contrast extravasation or fistula formation to suggest leak.  => advanced GI recalled again : EGD  replaced stent and PEG placed 3/5/24   -restart puree and PED feeding , monitor for refeeding  -air under diaphragm can be Normal finding after G Tube placement.  Will Do Strict NPO  CS GS  Change CTX into Zosyn  Continue to monitor  Follow with GI for deescalation of measures based on clinical course  PEG tube study done per GS recs pending read.  -lacks capacity- psych and palliative following    #shoulder body irregularity   Xray Shoulder 2 Views, Left (03.05.24 @ 20:02)   Subacute/chronic appearing distal clavicular fracture with approximately   1.7 cm of inferior displacement of the distal fragment. No acute fracture   or dislocation.  Left-sided opacities/effusion. Esophageal stent with surgical clips    #CHUCKIE vs ATN induced DIMAS  - pt came in with a baseline of 1.5 and increased to 2.6 with contrast use  - c/w IVF  - renally dose medications  -crea not improving   -US Kidney and Bladder (03.01.24 @ 18:42) >  1.  Diffusely echogenic bilateral kidneys which can be seen with medical renal disease.  2.  Moderate to severe left hydronephrosis with left renal parenchymal thinning, as seen on prior CT scan.  3.  Large mass identified within the urinary bladder, measuring up to 7.9  cm, previously up to 7 cm.  No ureteral jets are seen bilaterally.  4.  Incidentally noted right pleural effusion.  -nephro cs   DIMAS likely due to CHUCKIE  other possible causes IRGN iso GAS bacteremia, doubt prerenal given no improvement after IVF   chronic left hydro iso prostate cancer and LN, creatinine at baseline on admission, unlikely contributing to DIMAS  send UA with urine lytes and spot for PCR -> fena 0.5%   C3 C4 levels normal  repeat blood cultures negative   creatinine stable, non oliguric  strict i/os  gentle IVH if odyno/dysphagia still present with inability to tolerate adequate po intake  bicarb   no indications for RRT    # small b/l pleural effusions on CT chest   - s/p 1x dose 40mg IV Lasix , CXR improved     #normocytic Anemia  - Hb 9-10  - f/u iron  studies noted % sat 27   - MCV normal, SPEP negative     #hypocalcemia  -replenished   -monitor     #Misc   #DVT PPx- heparin   #GI PPx- none   #Diet-  puree   #Activity- AAT,   PT consult recommended Home PT  - Would be unable to return to [facility]  #Dispo- Acute   68-year-old male with past medical history of hypertension, chronic back pain, prostate cancer, and posterior mediastinal mass 6.5 cm seen on CT 11/2023, presents for 3 months of progressively difficulty swallowing associated with 10 pounds weight loss and odynophagia. Comes in today now unable to swallow his pills with water, resulting in 2 episode of emesis. He mentioned that he gets SOB during exertion but denies any exertional chest pain. As per patient he was treated at [facility] for prostate cancer and is s/p radiation therapy. He states that he has problem during walking and unable to maintain the posture.     #aspiration PNA vs viral infection vs HAP   #GAS s pyogenes bacteremia   - SIRS positive (WBC increased to 16, febrile to 101 and tachy to 129 )   - CXR with worsening L-sided opacity   - Blood cx (2/24/24): positive for strep pyogenes  - repeat BCx 27,28,29,1,2  negative to date   - procal 7  - aspiration precautions   - patient cleared for pureed diet and thin liquids per SS -refusing food as patient reports inability to swallow   - on zosyn 3.375mg q8h switched to cefepime and clindamycin pending ID rec   -ID rec:  source potentially from translocation in setting of infiltrative masses, stop clindamycin , switch cefepime to ceftriaxone 2g daily   - continue ceftriaxone 2g daily end date 3/11 to complete 2 weeks from culture clearance ideally to finish with midline   -changed to zosyn yest   - TTE   1. Left ventricular ejection fraction, by visual estimation, is 65 to 70%.   2. Hyperdynamic global left ventricular systolic function.   3. Mildly increased LV wall thickness.   4. Spectral Doppler shows impaired relaxation pattern of left   ventricular myocardial filling (Grade I diastolic dysfunction).   5. Trace mitral valve regurgitation.    # Dysphagia with odynophagia  #Failure to thrive   # Hx of prostate cancer s/p radiation therapy  # Prostate cancer metastasis to mediastinum or primary mediastinal mass?  - CT Chest: Interval enlargement of the central/posterior mediastinal mass with development of more extensive lymphadenopathy. The mass encircles the descending thoracic aorta and has some mass effect upon the left atrium. The mid aspect of the esophagus is encircled by the mass and difficult to distinguish. Possible ovoid pill is noted within the mass and possible location of the mid esophagus appear. The maximal esophagus appears distended with layering debris within. Consideration may be given to esophageal stenting.  - CT abdomen and pelvis:  Increased upper abdominal bulky lymphadenopathy. Unchanged bulky retroperitoneal lymphadenopathy, large left bladder mass and perirectal soft tissue infiltrative mass. 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 Chronic moderate to severe left hydroureteronephrosis  - EGD with GI 2/21 -> stent placed and esophageal biopsies taken -pathology - Poorly differentiated carcinoma.  - CT surgery following -> no acute intervention   - AFP and CEA wnl - LDH and haptoglobin elevated - PSA total 220, PSA free is 22 - CA19 131-  57  - oncology recs appreciated ->patient used to follow with Dr. Kenia Irizarry, called and teams her and she is not picking up the phone calls, not reachable in any way   -service oncology consulted  - Esophageal biopsy: Cytopathology - Non Gyn Report: ACCESSION No:  [id number]. Final Diagnosis- MEDIASTINUM MASS; EUS GUIDED FNA (THINPREP AND CELL BLOCK): POSITIVE FOR MALIGNANT CELLS,  Metastatic poorly differentiated carcinoma. The morphology of the current tumor and the immunohistochemical profile along with the patient's history of prostatic cancer are consistent with a metastatic poorly differentiated carcinoma of prostatic origin.  - Esophageal ulcer biopsy shows poorly differentiated carcinoma, IHC staining pending. Discussed with Dr. Lewis, related to prostate cancer, not a second primary malignancy   - per onc note, patient had prior biopsy of site that demonstrated metastatic prostate adenocarcinoma and was started on Docetaxel, in January 2024 patient was started on Pembrolizumab and continued on keytruda injections.   - Dr. Irizarry is returning to the office on Monday 3/4 => cs rad/onco for palliative radiation , onco asked about new protocol since patient progressed   -refusing oral intake, IVF  -esophogram :Esophageal stent is visualized in the mid esophagus and extending below  the left hemidiaphragm. The lumen of the mid esophagus is severely   narrowed.A small amount of contrast is visualized passing through the mid esophagus.No evidence of contrast extravasation or fistula formation to suggest leak.  => advanced GI recalled again : EGD  replaced stent and PEG placed 3/5/24   -restart puree and PED feeding , monitor for refeeding  -air under diaphragm can be Normal finding after G Tube placement.=> CS GS and PEG studies  -Change CTX into Zosyn  -lacks capacity- psych and palliative following    #shoulder body irregularity   Xray Shoulder 2 Views, Left (03.05.24 @ 20:02)   Subacute/chronic appearing distal clavicular fracture with approximately   1.7 cm of inferior displacement of the distal fragment. No acute fracture   or dislocation.  Left-sided opacities/effusion. Esophageal stent with surgical clips    #CHUCKIE vs ATN induced DIMAS  - pt came in with a baseline of 1.5 and increased to 2.6 with contrast use  - c/w IVF  - renally dose medications  -crea not improving   -US Kidney and Bladder (03.01.24 @ 18:42) >  1.  Diffusely echogenic bilateral kidneys which can be seen with medical renal disease.  2.  Moderate to severe left hydronephrosis with left renal parenchymal thinning, as seen on prior CT scan.  3.  Large mass identified within the urinary bladder, measuring up to 7.9  cm, previously up to 7 cm.  No ureteral jets are seen bilaterally.  4.  Incidentally noted right pleural effusion.  -nephro cs   -C3 C4 levels normal  -bicarb   -no indications for RRT  -consider     # small b/l pleural effusions on CT chest   - s/p IV Lasix     #normocytic Anemia  - Hb 9-10  - f/u iron  studies noted % sat 27   - MCV normal, SPEP negative     #hypocalcemia  -replenished   -monitor     #Misc   #DVT PPx- heparin   #GI PPx- none   #Diet-  puree   #Activity- AAT,   PT consult recommended Home PT  - Would be unable to return to [facility]  #Dispo- Acute   68-year-old male with past medical history of hypertension, chronic back pain, prostate cancer, and posterior mediastinal mass 6.5 cm seen on CT 11/2023, presents for 3 months of progressively difficulty swallowing associated with 10 pounds weight loss and odynophagia. Comes in today now unable to swallow his pills with water, resulting in 2 episode of emesis. He mentioned that he gets SOB during exertion but denies any exertional chest pain. As per patient he was treated at [facility] for prostate cancer and is s/p radiation therapy. He states that he has problem during walking and unable to maintain the posture.     #aspiration PNA vs viral infection vs HAP   #GAS s pyogenes bacteremia   - SIRS positive (WBC increased to 16, febrile to 101 and tachy to 129 )   - CXR with worsening L-sided opacity   - Blood cx (2/24/24): positive for strep pyogenes  - repeat BCx 27,28,29,1,2  negative to date   - procal 7  - aspiration precautions   - patient cleared for pureed diet and thin liquids per SS -refusing food as patient reports inability to swallow   - on zosyn 3.375mg q8h switched to cefepime and clindamycin pending ID rec   -ID rec:  source potentially from translocation in setting of infiltrative masses, stop clindamycin , switch cefepime to ceftriaxone 2g daily   - continue ceftriaxone 2g daily end date 3/11 to complete 2 weeks from culture clearance ideally to finish with midline   -changed to zosyn yest   - TTE   1. Left ventricular ejection fraction, by visual estimation, is 65 to 70%.   2. Hyperdynamic global left ventricular systolic function.   3. Mildly increased LV wall thickness.   4. Spectral Doppler shows impaired relaxation pattern of left   ventricular myocardial filling (Grade I diastolic dysfunction).   5. Trace mitral valve regurgitation.    # Dysphagia with odynophagia  #Failure to thrive   # Hx of prostate cancer s/p radiation therapy  # Prostate cancer metastasis to mediastinum or primary mediastinal mass?  - CT Chest: Interval enlargement of the central/posterior mediastinal mass with development of more extensive lymphadenopathy. The mass encircles the descending thoracic aorta and has some mass effect upon the left atrium. The mid aspect of the esophagus is encircled by the mass and difficult to distinguish. Possible ovoid pill is noted within the mass and possible location of the mid esophagus appear. The maximal esophagus appears distended with layering debris within. Consideration may be given to esophageal stenting.  - CT abdomen and pelvis:  Increased upper abdominal bulky lymphadenopathy. Unchanged bulky retroperitoneal lymphadenopathy, large left bladder mass and perirectal soft tissue infiltrative mass. 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 Chronic moderate to severe left hydroureteronephrosis  - EGD with GI 2/21 -> stent placed and esophageal biopsies taken -pathology - Poorly differentiated carcinoma.  - CT surgery following -> no acute intervention   - AFP and CEA wnl - LDH and haptoglobin elevated - PSA total 220, PSA free is 22 - CA19 131-  57  - oncology recs appreciated ->patient used to follow with Dr. Kenia Irizarry, called and teams her and she is not picking up the phone calls, not reachable in any way   -service oncology consulted  - Esophageal biopsy: Cytopathology - Non Gyn Report: ACCESSION No:  [id number]. Final Diagnosis- MEDIASTINUM MASS; EUS GUIDED FNA (THINPREP AND CELL BLOCK): POSITIVE FOR MALIGNANT CELLS,  Metastatic poorly differentiated carcinoma. The morphology of the current tumor and the immunohistochemical profile along with the patient's history of prostatic cancer are consistent with a metastatic poorly differentiated carcinoma of prostatic origin.  - Esophageal ulcer biopsy shows poorly differentiated carcinoma, IHC staining pending. Discussed with Dr. Lewis, related to prostate cancer, not a second primary malignancy   - per onc note, patient had prior biopsy of site that demonstrated metastatic prostate adenocarcinoma and was started on Docetaxel, in January 2024 patient was started on Pembrolizumab and continued on keytruda injections.   - Dr. Irizarry is returning to the office on Monday 3/4 => cs rad/onco for palliative radiation , onco asked about new protocol since patient progressed , rec hospice and palliative   -refusing oral intake, IVF  -esophogram :Esophageal stent is visualized in the mid esophagus and extending below  the left hemidiaphragm. The lumen of the mid esophagus is severely   narrowed.A small amount of contrast is visualized passing through the mid esophagus.No evidence of contrast extravasation or fistula formation to suggest leak.  => advanced GI recalled again : EGD  replaced stent and PEG placed 3/5/24   -restart puree and PED feeding , monitor for refeeding  -air under diaphragm can be Normal finding after G Tube placement.=> CS GS and PEG studies  -Change CTX into Zosyn  -lacks capacity- psych and palliative following    #shoulder body irregularity   Xray Shoulder 2 Views, Left (03.05.24 @ 20:02)   Subacute/chronic appearing distal clavicular fracture with approximately   1.7 cm of inferior displacement of the distal fragment. No acute fracture   or dislocation.  Left-sided opacities/effusion. Esophageal stent with surgical clips    #CHUCKIE vs ATN induced DIMAS  - pt came in with a baseline of 1.5 and increased to 2.6 with contrast use  - c/w IVF  - renally dose medications  -crea not improving   -US Kidney and Bladder (03.01.24 @ 18:42) >  1.  Diffusely echogenic bilateral kidneys which can be seen with medical renal disease.  2.  Moderate to severe left hydronephrosis with left renal parenchymal thinning, as seen on prior CT scan.  3.  Large mass identified within the urinary bladder, measuring up to 7.9  cm, previously up to 7 cm.  No ureteral jets are seen bilaterally.  4.  Incidentally noted right pleural effusion.  -nephro cs   -C3 C4 levels normal  -bicarb   -no indications for RRT  -consider     # small b/l pleural effusions on CT chest   - s/p IV Lasix     #normocytic Anemia  - Hb 9-10  - f/u iron  studies noted % sat 27   - MCV normal, SPEP negative     #hypocalcemia  -replenished   -monitor     #Misc   #DVT PPx- heparin   #GI PPx- none   #Diet-  puree   #Activity- AAT,   PT consult recommended Home PT  - Would be unable to return to [facility]  #Dispo- Acute

## 2024-03-07 NOTE — PROGRESS NOTE ADULT - SUBJECTIVE AND OBJECTIVE BOX
seen and examined  24 h events noted   no distress         PAST HISTORY  --------------------------------------------------------------------------------  No significant changes to PMH, PSH, FHx, SHx, unless otherwise noted    ALLERGIES & MEDICATIONS  --------------------------------------------------------------------------------  Allergies    No Known Allergies    Intolerances      Standing Inpatient Medications  chlorhexidine 2% Cloths 1 Application(s) Topical <User Schedule>  heparin   Injectable 5000 Unit(s) SubCutaneous every 12 hours  iohexol 300 mG (iodine)/mL Oral Solution 30 milliLiter(s) Oral once  lidocaine   4% Patch 1 Patch Transdermal every 24 hours  metoprolol tartrate 25 milliGRAM(s) Oral two times a day  ondansetron Injectable 4 milliGRAM(s) IV Push once  piperacillin/tazobactam IVPB.. 3.375 Gram(s) IV Intermittent every 8 hours  sodium bicarbonate 1300 milliGRAM(s) Oral every 6 hours  sodium phosphate 30 milliMole(s)/500 mL IVPB 30 milliMole(s) IV Intermittent once      VITALS/PHYSICAL EXAM  --------------------------------------------------------------------------------  T(C): 35.9 (03-07-24 @ 05:00), Max: 36.9 (03-06-24 @ 17:49)  HR: 101 (03-07-24 @ 05:00) (101 - 121)  BP: 124/76 (03-07-24 @ 05:00) (124/72 - 144/74)  RR: 18 (03-07-24 @ 05:00) (18 - 18)  SpO2: --  Wt(kg): --  Height (cm): 185.4 (03-05-24 @ 16:39)  Weight (kg): 77.1 (03-05-24 @ 16:39)  BMI (kg/m2): 22.4 (03-05-24 @ 16:39)  BSA (m2): 2.01 (03-05-24 @ 16:39)      03-06-24 @ 07:01  -  03-07-24 @ 07:00  --------------------------------------------------------  IN: 0 mL / OUT: 500 mL / NET: -500 mL      Physical Exam:  	Gen: NAD  	Pulm:decrease BS  B/L  	CV: S1S2; no rub  	Abd: +distended  	LE:  no edema      LABS/STUDIES  --------------------------------------------------------------------------------              8.2    14.39 >-----------<  490      [03-07-24 @ 06:09]              27.4     143  |  110  |  20  ----------------------------<  108      [03-06-24 @ 18:15]  4.7   |  13  |  1.4        Ca     8.2     [03-06-24 @ 18:15]      Mg     1.8     [03-06-24 @ 05:51]      Phos  2.5     [03-06-24 @ 05:51]    TPro  6.2  /  Alb  3.1  /  TBili  0.5  /  DBili  x   /  AST  21  /  ALT  17  /  AlkPhos  62  [03-06-24 @ 18:15]        Creatinine Trend:  SCr 1.4 [03-06 @ 18:15]  SCr 1.4 [03-06 @ 05:51]  SCr 1.2 [03-05 @ 11:58]  SCr 1.3 [03-04 @ 06:17]  SCr 1.5 [03-03 @ 07:05]    Urinalysis - [03-06-24 @ 18:15]      Color  / Appearance  / SG  / pH       Gluc 108 / Ketone   / Bili  / Urobili        Blood  / Protein  / Leuk Est  / Nitrite       RBC  / WBC  / Hyaline  / Gran  / Sq Epi  / Non Sq Epi  / Bacteria       Iron 48, TIBC 179, %sat 27      [02-21-24 @ 07:21]  Ferritin 883      [02-21-24 @ 07:21]      C3 Complement 150      [02-29-24 @ 07:58]  C4 Complement 31      [02-29-24 @ 07:58]  Immunofixation Serum:   No Monoclonal Band Identified      Reference Range: None Detected      [02-21-24 @ 07:21]  SPEP Interpretation: Normal Electrophoresis Pattern      [02-21-24 @ 07:21]

## 2024-03-07 NOTE — PROGRESS NOTE ADULT - SUBJECTIVE AND OBJECTIVE BOX
HPI:  68-year-old male with past medical history of hypertension, chronic back pain, prostate cancer, and posterior mediastinal mass 6.5 cm seen on CT 11/2023, presents for 3 months of progressively difficulty swallowing associated with 10 pounds weight loss and odynophagia. Comes in today now unable to swallow his pills with water, resulting in 2 episode of emesis. He mentioned that he gets SOB during exertion but denies any exertional chest pain. As per patient he was treated at [facility] for prostate cancer and is s/p radiation therapy. He states that he has problem during walking and unable to maintain the posture.   Denies any abdominal pain, chest pain, dyspnea, diarrhea, fever, chills      (20 Feb 2024 15:17)    PAST MEDICAL & SURGICAL HISTORY:  HTN (hypertension)  Prostate cancer    Allergies  No Known Allergies  Intolerances    Home Medications:  Feosol 325 mg (65 mg elemental iron) oral tablet: 1 tab(s) orally 2 times a day (20 Feb 2024 16:17)  ibuprofen 800 mg oral tablet: 1 tab(s) orally every 6 hours (20 Feb 2024 16:19)  Imodium 2 mg oral capsule: 1 cap(s) orally 2 times a day (20 Feb 2024 16:16)  lidocaine 5% patch:  (20 Feb 2024 16:26)  Norvasc 5 mg oral tablet: 1 tab(s) orally once a day (20 Feb 2024 16:16)  Tylenol 325 mg oral tablet: 2 tab(s) orally every 6 hours (20 Feb 2024 16:26)    MEDICATIONS  (STANDING):  cefTRIAXone   IVPB 2000 milliGRAM(s) IV Intermittent every 24 hours  chlorhexidine 2% Cloths 1 Application(s) Topical <User Schedule>  dextrose 5% + lactated ringers. 1000 milliLiter(s) (100 mL/Hr) IV Continuous <Continuous>  heparin   Injectable 5000 Unit(s) SubCutaneous every 12 hours  influenza  Vaccine (HIGH DOSE) 0.7 milliLiter(s) IntraMuscular once  lidocaine   4% Patch 1 Patch Transdermal every 24 hours  metoprolol tartrate 25 milliGRAM(s) Oral two times a day  ondansetron Injectable 4 milliGRAM(s) IV Push once  sodium bicarbonate 1300 milliGRAM(s) Oral every 8 hours    MEDICATIONS  (PRN):  acetaminophen     Tablet .. 650 milliGRAM(s) Oral every 6 hours PRN Temp greater or equal to 38C (100.4F), Mild Pain (1 - 3)  aluminum hydroxide/magnesium hydroxide/simethicone Suspension 30 milliLiter(s) Oral every 4 hours PRN Dyspepsia  melatonin 3 milliGRAM(s) Oral at bedtime PRN Insomnia    < from: US Kidney and Bladder (03.01.24 @ 18:42) >  IMPRESSION:  1.  Diffusely echogenic bilateral kidneys which can be seen with medical   renal disease.  2.  Moderate to severe left hydronephrosis with left renal parenchymal   thinning, as seen on prior CT scan.  3.  Large mass identified within the urinary bladder, measuring up to 7.9   cm, previously up to 7 cm.  No ureteral jets are seen bilaterally.  4.  Incidentally noted right pleural effusion.    < end of copied text >  < from: Xray Esophagram Single Contrast (02.29.24 @ 11:19) >  FINDINGS/  IMPRESSION:  This study is available for clinical review.  Esophageal stent is visualized in the mid esophagus and extending below   the left hemidiaphragm. The lumen of the mid esophagus is severely   narrowed.    A small amount of contrast is visualized passing through the mid   esophagus.    No evidence of contrast extravasation or fistula formation to suggest   leak.    < end of copied text >  < from: CT Abdomen and Pelvis w/ IV Cont (02.21.24 @ 12:07) >  IMPRESSION:  1.  Since November 22, 2023, increasedupper abdominal bulky   lymphadenopathy.  2.  Unchanged bulky retroperitoneal lymphadenopathy, large left bladder   mass and perirectal soft tissue infiltrative mass.  3.  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  4.  Chronic moderate to severe left hydroureteronephrosis.    < end of copied text >  < from: CT Chest w/ IV Cont (02.20.24 @ 13:58) >  IMPRESSION:      No evidence of pulmonary embolus    Interval enlargement of the central/posterior mediastinal mass with   development of more extensive lymphadenopathy. The mass encircles the   descending thoracic aorta and has some mass effect upon the left atrium.   The mid aspect of the esophagus is encircled by the mass and difficult to   distinguish. Possible ovoid pill is noted within the mass and possible   location of the mid esophagus appear. The maximal esophagus appears   distended with layering debris within. Consideration may be given to   esophageal stenting.      < end of copied text >  < from: NM PET/CT Onc FDG Skull to Thigh, Inital (01.11.23 @ 15:46) >  IMPRESSION:    FDG avid indistinct retroperitoneal soft tissue mass encasing the   abdominal aorta and left iliac arteries spanning 21.9 cm.    Additional FDG avid left posterolateral rectal wall soft tissue mass,   left supraclavicular mass, posterior mediastinal mass, retrocrural soft   tissue as catalogued above. Findings most consistent with metastatic   disease. (SUV max 5.3)    Again noted moderate left hydronephroureter, likely due to mass effect   upon the left ureter from retroperitoneal mass.    Non FDG avid small left pleural effusion.    --- End of Report ---      < end of copied text >    Cytopathology - Non Gyn Report:   ACCESSION No: [id number]   Patient: RUFUS PICKARD   Accession: [id number]   Collected Date/Time: 2/21/2024 00:00 EST   Received Date/Time: 2/22/2024 17:52 EST   Fine Needle Aspiration Report - Auth (Verified)   Specimen(s) Submitted   Mediastinal mass fluid   Final Diagnosis   MEDIASTINUM MASS; EUS GUIDED FNA (THINPREP AND CELL BLOCK):   - POSITIVE FOR MALIGNANT CELLS   - Metastatic poorly differentiated carcinoma (please see the comment)

## 2024-03-07 NOTE — CHART NOTE - NSCHARTNOTEFT_GEN_A_CORE
called and spoke with Toño from [facility] . He confirmed patient does NOT have any family/friends/NOK/HCP.   He plans to visit patient tomorrow. He will call palliative team when he arrives.     will follow. [phone]

## 2024-03-07 NOTE — SWALLOW BEDSIDE ASSESSMENT ADULT - NS SPL SWALLOW CLINIC TRIAL FT
+ toleration of puree and thin liquids w/o overt s/s of aspiration/penetration. No further PO trials 2/2 pt declined because of pain
+toleration of ~1.5 oz thin liquids, c/o pressure localizing to the sternum which cleared within 1 min, min cough response
+belching following liquid trials.

## 2024-03-07 NOTE — CONSULT NOTE ADULT - ASSESSMENT
ASSESSMENT:  68-year-old male with past medical history of hypertension, chronic back pain, prostate cancer, and posterior mediastinal mass 6.5 cm seen on CT 11/2023, presents for 3 months of progressively difficulty swallowing associated with 10 pounds weight loss and odynophagia. Comes in today now unable to swallow his pills with water, resulting in 2 episode of emesis. He mentioned that he gets SOB during exertion but denies any exertional chest pain. As per patient he was treated at [facility] for prostate cancer and is s/p radiation therapy. He states that he has problem during walking and unable to maintain the posture. Denies any abdominal pain, chest pain, dyspnea, diarrhea, fever, chills    Surgery was consulted for air under the diaphragm seen on chest X-ray s/p PEG tube placement. Patient examined at bedside. The abdomen was soft, non-distended, and non-tender. There was no associated nausea or vomiting. The PEG tube was in place, 5 cm at the bumper. There was no resistance or associated tenderness while flushing the PEG tube.    PLAN:  - No acute surgical intervention  - Recommend tube study to assess PEG tube position and contrast extravasation  - Serial abdominal exams  - Rest of care per primary team    Above plan discussed with Attending Surgeon Dr. Hernandez, patient, and Primary team  03-07-24 @ 03:24    [phone]

## 2024-03-07 NOTE — CHART NOTE - NSCHARTNOTEFT_GEN_A_CORE
GI NUTRITION SUPPORT TEAM  -  PROGRESS NOTE     Interval Events:    comfortable on NC O2, no complaints  surgery consulted for air under diaphragm on CXR s/p peg placement. Tube study showed no extravasation of contrast  Abd soft, nondistended. Peg in place without erythema or drainage  tolerating peg feeds, not taking PO due to c/o pain (esophageal) when attempting to eat or drink  phos, K stable after feeds started    REVIEW OF SYSTEMS:  Negative except as noted above.     VITALS:  T(F): 96.9 (03-07 @ 12:35), Max: 96.9 (03-07 @ 12:35)  HR: 105 (03-07 @ 12:35) (105 - 105)  BP: 112/81 (03-07 @ 12:35) (112/81 - 112/81)  RR: 18 (03-07 @ 12:35) (18 - 18)  SpO2: 96% (03-07 @ 12:35) (96% - 96%)    HEIGHT/WEIGHT/BMI:   Height (cm): 185.4 (03-05), 185.4 (09-15)  Weight (kg): 77.1 (03-05), 77.1 (11-22), 78.5 (09-15)  BMI (kg/m2): 22.4 (03-05), 22.4 (11-22), 22.8 (09-15)      I/Os:     03-06-24 @ 07:01  -  03-07-24 @ 07:00  --------------------------------------------------------  IN:  Total IN: 0 mL    OUT:    Voided (mL): 500 mL  Total OUT: 500 mL    Total NET: -500 mL      MEDICATIONS:   chlorhexidine 2% Cloths 1 Application(s) Topical <User Schedule>  dextrose 5% 1000 milliLiter(s) (75 mL/Hr) IV Continuous <Continuous>  heparin   Injectable 5000 Unit(s) SubCutaneous every 12 hours  iohexol 300 mG (iodine)/mL Oral Solution 30 milliLiter(s) Oral once  lidocaine   4% Patch 1 Patch Transdermal every 24 hours  metoprolol tartrate 25 milliGRAM(s) Oral two times a day  ondansetron Injectable 4 milliGRAM(s) IV Push once  piperacillin/tazobactam IVPB.. 3.375 Gram(s) IV Intermittent every 8 hours  sodium bicarbonate 1300 milliGRAM(s) Oral every 6 hours  sodium phosphate 30 milliMole(s)/500 mL IVPB 30 milliMole(s) IV Intermittent once    LABS:                         8.2    14.39 )-----------( 490      ( 07 Mar 2024 06:09 )             27.4     146  |  111<H>  |  22<H>  ----------------------------<  124<H>          (03-07-24 @ 06:09)  4.6   |  15<L>  |  1.6<H>    Ca    7.7<L>          (03-07-24 @ 06:09)  Phos  2.6         (03-07-24 @ 06:09)  Mg     1.8         (03-07-24 @ 06:09)    TPro  6.1  /  Alb  2.9<L>  /  TBili  0.5  /  DBili  x   /  AST  21  /  ALT  16  /  AlkPhos  64       03-07-24 @ 06:09      DIET:   Diet, NPO with Tube Feed:   Tube Feeding Modality: Gastrostomy  Jevity 1.2 Sam  Total Volume for 24 Hours (mL): 960  Bolus  Total Volume of Bolus (mL):  240  Total # of Feeds: 4  Tube Feed Frequency: Every 6 hours   Tube Feed Start Time: 10:06  Bolus Feed Rate (mL per Hour): 480   Bolus Feed Duration (in Hours): 0.5 (03-07-24 @ 14:44) [Active]        RADIOLOGY:   < from: Xray Kidney Ureter Bladder (03.07.24 @ 03:22) >    ACC: [id number] EXAM:  XR KUB 1 VIEW   ORDERED BY: PIPE MARTINEZ   PROCEDURE DATE:  03/07/2024      INTERPRETATION:  Indication: PEG tube    Technique: 3 abdominal radiographs    Comparison: CT dated 2/21/2024    Findings/  Impression:    Contrast injected via gastrostomy tube opacifies stomach. Nonobstructive   bowel gas pattern. There appears to be previously administered contrast   within bowel loops. Osseous degenerative change.    --- End of Report ---  < end of copied text >    ASSESSMENT  68-year-old male with pmh of hypertension, chronic back pain, prostate cancer s/p RT, and posterior mediastinal mass 6.5 cm seen on CT 11/2023, presents for 3 months of progressively difficulty swallowing associated with 10 pounds weight loss and odynophagia. Presented with c/o being unable to swallow his pills with water, resulting in 2 episode of emesis. Nutrition called to evaluate for PN.    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  - prostate cancer s/p RT   - odynophagia with associated wt loss (amount unclear)   - anemia, hx VERONICA   - hyperkalemia  - severe protein calorie malnutrition  - at risk for refeeding syndrome    Est nutrient needs: 2363-1579 kcals (25-30kcals/kg, 77.1kg), 92-108g protein (1.2-1.4g/kg)  Fluids: 2100ml (25ml/kg)      PLAN  - PO diet per speech as tolerated for pleasure   - cont PEG feeds with Jevity 1.2 1 hour after each attempted PO meal and qhs (4 feeds/day) given via gravity drip over 30 min  - advance to 360ml Jevity 1.2 x 4 for now  - flush peg with 50ml H2O before and 100ml H2O after each peg feed  - bmp, phos, mg in am- monitor for refeeding syndrome  - monitor bm's   - weekly weights   - d/c planning with peg feeds

## 2024-03-07 NOTE — CONSULT NOTE ADULT - ATTENDING COMMENTS
Patient seen and examined on floor and discussed management plans with surgery resident. Patient with no symptoms, s/p PEG by GI one day ago. Binder in place Bumper at 5 cm chelsie. Contrast study reviewed contrast in stomach with no leak now. Follow up with GI no surgical intervention needed.

## 2024-03-07 NOTE — PROGRESS NOTE ADULT - ATTENDING COMMENTS
Patient seen and examined on GI rounds.  All pertinent labs and imaging studies reviewed by me.  Plan modified above where needed.

## 2024-03-07 NOTE — PROGRESS NOTE ADULT - ASSESSMENT
68-year-old male with past medical history of hypertension, chronic back pain, prostate cancer, and posterior mediastinal mass 6.5 cm seen on CT 11/2023, presents for 3 months of progressively difficulty swallowing associated with 10 pounds weight loss and odynophagia. 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 Also found to be in sepsis secondary to GAS bacteremia. hospital stay complicated by DIMAS.  Assessment and plan  DIMAS on CKD 3/ stage 4 prostate cancer/ mediastinal mass s/p bx/ HTN  CHUCKIE may have contributed, received 2 iv contrast studies within 2 days period   DIMAS resolved.  Creat improved to baseline  renal ultrasound reviewed: "Diffusely echogenic bilateral kidneys which can be seen with medical  renal disease. 2.  Moderate to severe left hydronephrosis with left renal parenchymal  thinning, as seen on prior CT scan. 3.  Large mass identified within the urinary bladder, measuring up to 7.9  cm, previously up to 7 cm.  No ureteral jets are seen bilaterally." 4.  Incidentally noted right pleural effusion.-- needs  eval  cont po sodium bicarb  iv fluids to d5w with 3 amp of  NaHCO3 at 75 cc/h   phos at goal, maintain > 2  overall prognosis poor   will follow

## 2024-03-07 NOTE — PROGRESS NOTE ADULT - SUBJECTIVE AND OBJECTIVE BOX
Gastroenterology progress note:     Patient is a 68y old  Male who presents with a chief complaint of Mediastinal Mass (07 Mar 2024 10:56)       Admitted on: 02-20-24    We are following the patient for dysphagia     s/p EGD, esophageal stent and g tube       PAST MEDICAL & SURGICAL HISTORY:  HTN (hypertension)      Prostate cancer          MEDICATIONS  (STANDING):  chlorhexidine 2% Cloths 1 Application(s) Topical <User Schedule>  dextrose 5% 1000 milliLiter(s) (75 mL/Hr) IV Continuous <Continuous>  heparin   Injectable 5000 Unit(s) SubCutaneous every 12 hours  iohexol 300 mG (iodine)/mL Oral Solution 30 milliLiter(s) Oral once  lidocaine   4% Patch 1 Patch Transdermal every 24 hours  metoprolol tartrate 25 milliGRAM(s) Oral two times a day  ondansetron Injectable 4 milliGRAM(s) IV Push once  piperacillin/tazobactam IVPB.. 3.375 Gram(s) IV Intermittent every 8 hours  sodium bicarbonate 1300 milliGRAM(s) Oral every 6 hours  sodium phosphate 30 milliMole(s)/500 mL IVPB 30 milliMole(s) IV Intermittent once    MEDICATIONS  (PRN):      Allergies  No Known Allergies      Review of Systems:   Cardiovascular:  No Chest Pain, No Palpitations  Respiratory:  No Cough, No Dyspnea  Gastrointestinal:  As described in HPI  Skin:  No Skin Lesions, No Jaundice  Neuro:  No Syncope, No Dizziness    Physical Examination:  T(C): 36.1 (03-07-24 @ 12:35), Max: 36.9 (03-06-24 @ 17:49)  HR: 105 (03-07-24 @ 12:35) (101 - 121)  BP: 112/81 (03-07-24 @ 12:35) (112/81 - 144/74)  RR: 18 (03-07-24 @ 12:35) (18 - 18)  SpO2: 96% (03-07-24 @ 12:35) (96% - 96%)      03-06-24 @ 07:01  -  03-07-24 @ 07:00  --------------------------------------------------------  IN: 0 mL / OUT: 500 mL / NET: -500 mL    03-07-24 @ 07:01  -  03-07-24 @ 16:07  --------------------------------------------------------  IN: 0 mL / OUT: 200 mL / NET: -200 mL        GENERAL: AAOx3, no acute distress.  HEAD:  Atraumatic, Normocephalic  EYES: conjunctiva and sclera clear  NECK: Supple, no JVD or thyromegaly  CHEST/LUNG: Clear to auscultation bilaterally; No wheeze, rhonchi, or rales  HEART: Regular rate and rhythm; normal S1, S2, No murmurs.  ABDOMEN: Soft, nontender, nondistended, normal surrounding skin, G tube in place   NEUROLOGY: No asterixis or tremor.   SKIN: Intact, no jaundice     Data:                        8.2    14.39 )-----------( 490      ( 07 Mar 2024 06:09 )             27.4     Hgb trend:  8.2  03-07-24 @ 06:09  8.9  03-06-24 @ 18:15  8.2  03-06-24 @ 05:51  9.1  03-05-24 @ 11:58        03-07    146  |  111<H>  |  22<H>  ----------------------------<  124<H>  4.6   |  15<L>  |  1.6<H>    Ca    7.7<L>      07 Mar 2024 06:09  Phos  2.6     03-07  Mg     1.8     03-07    TPro  6.1  /  Alb  2.9<L>  /  TBili  0.5  /  DBili  x   /  AST  21  /  ALT  16  /  AlkPhos  64  03-07    Liver panel trend:  TBili 0.5   /   AST 21   /   ALT 16   /   AlkP 64   /   Tptn 6.1   /   Alb 2.9    /   DBili --      03-07  TBili 0.5   /   AST 21   /   ALT 17   /   AlkP 62   /   Tptn 6.2   /   Alb 3.1    /   DBili --      03-06  TBili 0.4   /   AST 18   /   ALT 16   /   AlkP 56   /   Tptn 5.8   /   Alb 2.9    /   DBili --      03-06  TBili 0.5   /   AST 15   /   ALT 21   /   AlkP 63   /   Tptn 6.2   /   Alb 3.1    /   DBili --      03-05  TBili 0.5   /   AST 18   /   ALT 27   /   AlkP 64   /   Tptn 6.0   /   Alb 2.9    /   DBili --      03-04  TBili 0.3   /   AST 25   /   ALT 33   /   AlkP 65   /   Tptn 5.7   /   Alb 2.7    /   DBili --      03-03  TBili 0.3   /   AST 29   /   ALT 40   /   AlkP 63   /   Tptn 5.6   /   Alb 2.8    /   DBili --      03-02  TBili 0.2   /   AST 28   /   ALT 41   /   AlkP 68   /   Tptn 5.8   /   Alb 2.9    /   DBili --      03-01  TBili 0.3   /   AST 42   /   ALT 46   /   AlkP 80   /   Tptn 5.8   /   Alb 2.8    /   DBili --      02-29  TBili 0.4   /   AST 29   /   ALT 39   /   AlkP 82   /   Tptn 5.5   /   Alb 2.8    /   DBili --      02-28  TBili 0.6   /   AST 41   /   ALT 36   /   AlkP 77   /   Tptn 5.3   /   Alb 2.6    /   DBili --      02-27             Radiology:

## 2024-03-07 NOTE — CHART NOTE - NSCHARTNOTEFT_GEN_A_CORE
Pt is refusing heparin, sodium bicarb, fluids and metoprolol. I explained to the pt that refusing these medications is detrimental to his health. It can lead to clots in his legs which can go to his lungs, worsening acid base status and elevated heart rates. All of these outcomes can lead to death in extreme circumstances. The pt verbalized understanding of these complications and the possibility of death. He continues to refuse these medications.

## 2024-03-07 NOTE — PROGRESS NOTE ADULT - SUBJECTIVE AND OBJECTIVE BOX
HPI:  68-year-old male with past medical history of hypertension, chronic back pain, prostate cancer, and posterior mediastinal mass 6.5 cm seen on CT 11/2023, presents for 3 months of progressively difficulty swallowing associated with 10 pounds weight loss and odynophagia. Comes in today now unable to swallow his pills with water, resulting in 2 episode of emesis. He mentioned that he gets SOB during exertion but denies any exertional chest pain. As per patient he was treated at [facility] for prostate cancer and is s/p radiation therapy. He states that he has problem during walking and unable to maintain the posture.   Denies any abdominal pain, chest pain, dyspnea, diarrhea, fever, chills    Interval history  -Patient seen at bedside  -He would not participate in exam and only said he couldn't talk     ADVANCE DIRECTIVES:     [ x] Full Code [ ] DNR  MOLST  [ ]  Living Will  [ ]   DECISION MAKER(s):  [ ] Health Care Proxy(s)  [ x] Surrogate(s)  [ ] Guardian           Name(s): Phone Number(s):  Nobody      BASELINE (I)ADL(s) (prior to admission):    [place]: [ ]Total  [ ] Moderate [ ]Dependent  Palliative Performance Status Version 2:         %    http://npcrc.org/files/news/palliative_performance_scale_[email or web address].pdf    Allergies    No Known Allergies    Intolerances    MEDICATIONS  (STANDING):  MEDICATIONS  (STANDING):  chlorhexidine 2% Cloths 1 Application(s) Topical <User Schedule>  dextrose 5% 1000 milliLiter(s) (75 mL/Hr) IV Continuous <Continuous>  heparin   Injectable 5000 Unit(s) SubCutaneous every 12 hours  iohexol 300 mG (iodine)/mL Oral Solution 30 milliLiter(s) Oral once  lidocaine   4% Patch 1 Patch Transdermal every 24 hours  metoprolol tartrate 25 milliGRAM(s) Oral two times a day  ondansetron Injectable 4 milliGRAM(s) IV Push once  piperacillin/tazobactam IVPB.. 3.375 Gram(s) IV Intermittent every 8 hours  sodium bicarbonate 1300 milliGRAM(s) Oral every 6 hours  sodium phosphate 30 milliMole(s)/500 mL IVPB 30 milliMole(s) IV Intermittent once    MEDICATIONS  (PRN):        PRESENT SYMPTOMS: [ x]Unable to obtain due to poor mentation   Source if other than patient:  [ ]Family   [ ]Team     Pain: [ ]yes [ ]no  QOL impact -   Location -                    Aggravating factors -   Quality -   Radiation -   Timing-   Severity (0-10 scale):   Minimal acceptable level (0-10 scale):      CPOT:  2  https://www.[email or web address]/getattachment/[email or web address]/Critical-Care-Pain-Observation-Tool-(CPOT)    PAIN AD Score:   [email or web address]/[email or web address].pdf (press ctrl +  left click to view)    Dyspnea:                           [ ]None[ ]Mild [ ]Moderate [ ]Severe     Respiratory Distress Observation Scale (RDOS): 0  A score of 0 to 2 signifies little or no respiratory distress, 3 signifies mild distress, scores 4 to 6 indicate moderate distress, and scores greater than 7 signify severe distress  [email or web address]://[email or web address]/sites/default/files/PDFS/575070-rgkmeewxvwc-tbdqyxgv-jmtvtulytuc-woflc.pdf    Anxiety:                             [ ]None[ ]Mild [ ]Moderate [ ]Severe   Fatigue:                             [ ]None[ ]Mild [ ]Moderate [ ]Severe   Nausea:                             [ ]None[ ]Mild [ ]Moderate [ ]Severe   Loss of appetite:              [ ]None[ ]Mild [ ]Moderate [ ]Severe   Constipation:                    [ ]None[ ]Mild [ ]Moderate [ ]Severe    Other Symptoms:  [ ]All other review of systems negative     Palliative Performance Status Version 2:         30%    http://[facility].org/files/news/palliative_performance_scale_[email or web address].pdf    PHYSICAL EXAM:  Vital Signs Last 24 Hrs  T(C): 36.1 (07 Mar 2024 12:35), Max: 36.9 (06 Mar 2024 17:49)  T(F): 96.9 (07 Mar 2024 12:35), Max: 98.4 (06 Mar 2024 17:49)  HR: 105 (07 Mar 2024 12:35) (101 - 121)  BP: 112/81 (07 Mar 2024 12:35) (112/81 - 144/74)  BP(mean): 97 (06 Mar 2024 21:14) (97 - 97)  RR: 18 (07 Mar 2024 12:35) (18 - 18)  SpO2: 96% (07 Mar 2024 12:35) (96% - 96%)    Parameters below as of 07 Mar 2024 12:35  Patient On (Oxygen Delivery Method): room air    GENERAL:  [ ] No acute distress [ ]Lethargic  [ ]Unarousable  [x ]Verbal  [ ]Non-Verbal [ ]Cachexia    BEHAVIORAL/PSYCH:  [x ]Alert and Oriented x1-2 ] Anxiety [ ] Delirium [ ] Agitation [x ] Calm   EYES: [x ] No scleral icterus [ ] Scleral icterus [ ] Closed  ENMT:  [ ]Dry mouth  [x ]No external oral lesions [ ] No external ear or nose lesions  CARDIOVASCULAR:  [ ]Regular [ ]Irregular [ ]Tachy [x ]Not Tachy  [ ]Rambo [ ] Edema [ ] No edema  PULMONARY:  [ ]Tachypnea  [ ]Audible excessive secretions [x ] No labored breathing [ ] labored breathing  GASTROINTESTINAL: [ ]Soft  [ ]Distended  [ x]Not distended [ ]Non tender [ ]Tender  MUSCULOSKELETAL: [ ]No clubbing [ ] clubbing  [ x] No cyanosis [ ] cyanosis  NEUROLOGIC: [ ]No focal deficits  [ x]Follows commands  [ ]Does not follow commands  [x ]Cognitive impairment  [ ]Dysphagia  [ ]Dysarthria  [ ]Paresis   SKIN: [ ] Jaundiced [ ] Non-jaundiced [ ]Rash [ ]No Rash [ ] Warm [ ] Dry  MISC/LINES: [ ] ET tube [ ] Trach [ ]NGT/OGT [ ]PEG [ ]Angel    LABS: reviewed by me                          8.2    14.39 )-----------( 490      ( 07 Mar 2024 06:09 )             27.4       03-07    146  |  111<H>  |  22<H>  ----------------------------<  124<H>  4.6   |  15<L>  |  1.6<H>    Ca    7.7<L>      07 Mar 2024 06:09  Phos  2.6     03-07  Mg     1.8     03-07    TPro  6.1  /  Alb  2.9<L>  /  TBili  0.5  /  DBili  x   /  AST  21  /  ALT  16  /  AlkPhos  64  03-07          ABG - ( 06 Mar 2024 16:15 )  pH, Arterial: 7.41  pH, Blood: x     /  pCO2: 19    /  pO2: 68    / HCO3: 12    / Base Excess: -10.2 /  SaO2: 96.8                Urinalysis Basic - ( 07 Mar 2024 06:09 )    Color: x / Appearance: x / SG: x / pH: x  Gluc: 124 mg/dL / Ketone: x  / Bili: x / Urobili: x   Blood: x / Protein: x / Nitrite: x   Leuk Esterase: x / RBC: x / WBC x   Sq Epi: x / Non Sq Epi: x / Bacteria: x                  CAPILLARY BLOOD GLUCOSE                  RADIOLOGY & ADDITIONAL STUDIES: reviewed by me    < from: Xray Kidney Ureter Bladder (03.07.24 @ 03:22) >    Findings/  Impression:    Contrast injected via gastrostomy tube opacifies stomach. Nonobstructive   bowel gas pattern. There appears to be previously administered contrast   within bowel loops. Osseous degenerative change.    < end of copied text >          EKG: reviewed by me    < from: 12 Lead ECG (03.06.24 @ 17:19) >  Ventricular Rate 112 BPM    Atrial Rate 112 BPM    P-R Interval 146 ms    QRS Duration 76 ms    Q-T Interval 356 ms    QTC Calculation(Bazett) 485 ms    P Axis 34 degrees    R Axis -19 degrees    T Axis 76 degrees    Diagnosis Line Sinus tachycardia  Otherwise normal ECG    < end of copied text >          PROTEIN CALORIE MALNUTRITION PRESENT: [ ]mild [ ]moderate [ ]severe [ ]underweight [ ]morbid obesity  https://www.andeal.org/vault/2440/web/files/ONC/Table_Clinical%20Characteristics%20to%20Document%20Malnutrition-White%20JV%20et%20al%203802.pdf    Height (cm): 185.4 (02-22-24 @ 14:44), 185.4 (09-15-23 @ 10:44)  Weight (kg): 77.1 (02-21-24 @ 14:55), 77.1 (11-22-23 @ 08:11), 78.5 (09-15-23 @ 10:44)  BMI (kg/m2): 22.4 (02-22-24 @ 14:44), 22.4 (02-21-24 @ 14:55), 22.4 (11-22-23 @ 08:11)  [ ]PPSV2 < or = to 30% [ ]significant weight loss  [ ]poor nutritional intake  [ ]anasarca      [ ]Artificial Nutrition      Palliative Care Spiritual/Emotional Screening Tool Question  Severity (0-4):                    OR                    [ x] Unable to determine. Will assess at later time if appropriate.  Score of 2 or greater indicates recommendation of Chaplaincy and/or SW referral  Chaplaincy Referral: [ ] Yes [ ] Refused [ ] Following     Caregiver [place]:  [ ] Yes [ ] No    OR    [x ] Unable to determine. Will assess at later time if appropriate.  Social Work Referral [ ]  Patient and Family Centered Care Referral [ ]    Anticipatory Grief Present: [ ] Yes [ ] No    OR     [ x] Unable to determine. Will assess at later time if appropriate.  Social Work Referral [ ]  Patient and Family Centered Care Referral [ ]    Patient discussed with primary medical team MD  Palliative care education provided to patient and/or family

## 2024-03-07 NOTE — CHART NOTE - NSCHARTNOTEFT_GEN_A_CORE
met with patient today. he denied any pain or discomfort. patient is s/p peg. S&S at bedside for further eval.     as goals are clear, palliative team will sign off . please re-consult PRN [id number] met with patient today. he denied any pain or discomfort. patient is s/p peg. S&S at bedside for further eval.     will follow. [phone]

## 2024-03-07 NOTE — PROGRESS NOTE ADULT - ASSESSMENT
pt admitted with sev dysphagia big mediastinal mass ,and esophageal mass   has a stent now in the esophagus also had a biopsy done consistent with prostate cancere  [facility] prostate cxancer  was treated with chemo,docetaxol ,and complete androgen blockade  pt non compliant and doesnot follow regularlyI    imp   metastatic prostate cancer  progression of disease  in multiple organ  [facility]    WITH chemo,docetaxel lupron and daraliutamide and zometa  responded initially symptoms improved and PSA came down   but now progressing   pt has very poor insight ,apparently has dementia now   it will not be possible to give any  cancer treatment  with this mental status  need spychiatric evaluation  no not by psychiatry   PLEASE CALL FOR PSYCHIATRIC EVALUATION   with this much tumor burden prognosis is poor   recommend hospice care and palliative care    kirit irving MD

## 2024-03-07 NOTE — PROGRESS NOTE ADULT - ASSESSMENT
68-year-old male with past medical history of hypertension, chronic back pain, prostate cancer, and posterior mediastinal mass 6.5 cm seen on CT 11/2023, presents for 3 months of progressively difficulty swallowing associated with 10 pounds weight loss. Comes in today now unable to swallow his pills with water, resulting in 2 episode of emesis. GI consulted for dysphagia.     # Dysphagia secondary to mediastinal mass  s/p EGD with esophageal stent and EUS guided FNB   Prostate cancer and is s/p radiation therapy.   Patient had known mediastinal mass since jan 2023, Was evaluated recently by his urologist for new bladder mass. Patient was scheduled for cystoscopy and PET scan to r/o multiple primary malignancy.   Mediastinal mass was never biopsied per patient.  Hx of VERONICA on iron supplement. No overt GI bleeding.   Reviewed CT scan: large post mediastinal mass, + LNs, pill within upper esophagus, distended proximal esophagus. and upper mesenteric mass.  Path: poorly differentiated carcinoma   modified barium study  noted   s/p EGD 3/5/2023:  Stent repositioned and s/p G tube placement.     PLAN:  - Advance to pureed diet   - Can use G-tube for feeds and medications   - Avoid placing more than one gauze between the external phalange and the skin to avoid buried bumper syndrome  - Keep abdominal binder at all times  - Skin care per RN  - Flush with 30 ml of water before and after use.   - Follow up with GI as outpatient to evaluate the need for stent removal in the near future depending on cancer treatment and clinical status.   - Call as needed

## 2024-03-07 NOTE — PROGRESS NOTE ADULT - ATTENDING COMMENTS
Pt desaturated overnight requiring 2L O2 and with new rhonchi in b/l lung basal fields. XR demonstrated new Right perihilar opacity. There is air under the diaphragm which is normal after the peg placement. Surgery recommended a peg contrast study which showed no extravasation. Pt has a low bicarb due to not receiving 2/3 sod bicarb doses for the last 3 days. Nephro recommended a bicarb drip which is fine for 12 hrs. Pt is asymptomatic except for pain around his PEG site. He is unable to grasp the diagnosis, procedure, current situation. He was weaned to room air during the day. Onc recs noted, but unclear reason for radiation at this point when pt has an esophageal stent and a peg. Tried to call Dr. Aquino and the call was cut. Resumed tube feeds.

## 2024-03-07 NOTE — PROGRESS NOTE ADULT - SUBJECTIVE AND OBJECTIVE BOX
24H events:    Today is hospital day 16d. This morning patient was seen and examined at bedside, resting comfortably in bed.    No acute or major events overnight.    PAST MEDICAL & SURGICAL HISTORY  HTN (hypertension)    Prostate cancer        SOCIAL HISTORY:  Social History:      ALLERGIES:  No Known Allergies      MEDICATIONS:  STANDING MEDICATIONS  chlorhexidine 2% Cloths 1 Application(s) Topical <User Schedule>  heparin   Injectable 5000 Unit(s) SubCutaneous every 12 hours  iohexol 300 mG (iodine)/mL Oral Solution 30 milliLiter(s) Oral once  lidocaine   4% Patch 1 Patch Transdermal every 24 hours  metoprolol tartrate 25 milliGRAM(s) Oral two times a day  ondansetron Injectable 4 milliGRAM(s) IV Push once  piperacillin/tazobactam IVPB.. 3.375 Gram(s) IV Intermittent every 8 hours  sodium bicarbonate 1300 milliGRAM(s) Oral every 6 hours  sodium phosphate 30 milliMole(s)/500 mL IVPB 30 milliMole(s) IV Intermittent once    PRN MEDICATIONS      VITALS:   T(C): 35.9 (03-07-24 @ 05:00), Max: 36.9 (03-06-24 @ 17:49)  HR: 101 (03-07-24 @ 05:00) (101 - 121)  BP: 124/76 (03-07-24 @ 05:00) (124/72 - 144/74)  RR: 18 (03-07-24 @ 05:00) (18 - 18)  SpO2: --  I&O's Summary    06 Mar 2024 07:01  -  07 Mar 2024 06:52  --------------------------------------------------------  IN: 0 mL / OUT: 500 mL / NET: -500 mL          PHYSICAL EXAM:      LABS:                        8.9    13.51 )-----------( 500      ( 06 Mar 2024 18:15 )             30.0     03-06    143  |  110  |  20  ----------------------------<  108<H>  4.7   |  13<L>  |  1.4    Ca    8.2<L>      06 Mar 2024 18:15  Phos  2.5     03-06  Mg     1.8     03-06    TPro  6.2  /  Alb  3.1<L>  /  TBili  0.5  /  DBili  x   /  AST  21  /  ALT  17  /  AlkPhos  62  03-06      Urinalysis Basic - ( 06 Mar 2024 18:15 )    Color: x / Appearance: x / SG: x / pH: x  Gluc: 108 mg/dL / Ketone: x  / Bili: x / Urobili: x   Blood: x / Protein: x / Nitrite: x   Leuk Esterase: x / RBC: x / WBC x   Sq Epi: x / Non Sq Epi: x / Bacteria: x      ABG - ( 06 Mar 2024 16:15 )  pH, Arterial: 7.41  pH, Blood: x     /  pCO2: 19    /  pO2: 68    / HCO3: 12    / Base Excess: -10.2 /  SaO2: 96.8                           24H events:    Today is hospital day 16d. This morning patient was seen and examined at bedside, resting comfortably in bed.    Seen by surgery overnight for free air under diaphragm.     PAST MEDICAL & SURGICAL HISTORY  HTN (hypertension)    Prostate cancer        SOCIAL HISTORY:  Social History:      ALLERGIES:  No Known Allergies      MEDICATIONS:  STANDING MEDICATIONS  chlorhexidine 2% Cloths 1 Application(s) Topical <User Schedule>  heparin   Injectable 5000 Unit(s) SubCutaneous every 12 hours  iohexol 300 mG (iodine)/mL Oral Solution 30 milliLiter(s) Oral once  lidocaine   4% Patch 1 Patch Transdermal every 24 hours  metoprolol tartrate 25 milliGRAM(s) Oral two times a day  ondansetron Injectable 4 milliGRAM(s) IV Push once  piperacillin/tazobactam IVPB.. 3.375 Gram(s) IV Intermittent every 8 hours  sodium bicarbonate 1300 milliGRAM(s) Oral every 6 hours  sodium phosphate 30 milliMole(s)/500 mL IVPB 30 milliMole(s) IV Intermittent once    PRN MEDICATIONS      VITALS:   T(C): 35.9 (03-07-24 @ 05:00), Max: 36.9 (03-06-24 @ 17:49)  HR: 101 (03-07-24 @ 05:00) (101 - 121)  BP: 124/76 (03-07-24 @ 05:00) (124/72 - 144/74)  RR: 18 (03-07-24 @ 05:00) (18 - 18)  SpO2: --  I&O's Summary    06 Mar 2024 07:01  -  07 Mar 2024 06:52  --------------------------------------------------------  IN: 0 mL / OUT: 500 mL / NET: -500 mL          PHYSICAL EXAM:    GENERAL: NAD, non-toxic appearing, cachectic , poor dental hygiene   NECK: Supple, no stiffness, no JVD  HEART: Regular rate and rhythm, normal s1s2  LUNGS: Unlabored respirations, b/l air entry, no adventitious breath sounds   ABDOMEN: Soft, nontender, nondistended; +BS  EXTREMITIES: No clubbing, cyanosis, or edema;   NERVOUS SYSTEM: AOx3  SKIN: Warm and dry    LABS:                        8.9    13.51 )-----------( 500      ( 06 Mar 2024 18:15 )             30.0     03-06    143  |  110  |  20  ----------------------------<  108<H>  4.7   |  13<L>  |  1.4    Ca    8.2<L>      06 Mar 2024 18:15  Phos  2.5     03-06  Mg     1.8     03-06    TPro  6.2  /  Alb  3.1<L>  /  TBili  0.5  /  DBili  x   /  AST  21  /  ALT  17  /  AlkPhos  62  03-06      Urinalysis Basic - ( 06 Mar 2024 18:15 )    Color: x / Appearance: x / SG: x / pH: x  Gluc: 108 mg/dL / Ketone: x  / Bili: x / Urobili: x   Blood: x / Protein: x / Nitrite: x   Leuk Esterase: x / RBC: x / WBC x   Sq Epi: x / Non Sq Epi: x / Bacteria: x      ABG - ( 06 Mar 2024 16:15 )  pH, Arterial: 7.41  pH, Blood: x     /  pCO2: 19    /  pO2: 68    / HCO3: 12    / Base Excess: -10.2 /  SaO2: 96.8

## 2024-03-07 NOTE — PROGRESS NOTE ADULT - SUBJECTIVE AND OBJECTIVE BOX
GENERAL SURGERY PROGRESS NOTE    Patient: RUFUS PICKARD , 68y ([birth year])Male   MRN: [id number]  Location: [address]  Visit: 02-20-24 Inpatient  Date: 03-07-24 @ 10:56      Events of past 24 hours: Patient seen and examined during bedside rounds. Patient PEG tube in place, flushing appropriately 5cm at bumper, without erythema or induration at skin site. Patients abdomen soft, non-distended, non-tender. Tube study showing no extravasation of contrast in abdomen.    PAST MEDICAL & SURGICAL HISTORY:  HTN (hypertension)  Prostate cancer          Vitals:   T(F): 96.7 (03-07-24 @ 05:00), Max: 98.4 (03-06-24 @ 17:49)  HR: 101 (03-07-24 @ 05:00)  BP: 124/76 (03-07-24 @ 05:00)  RR: 18 (03-07-24 @ 10:21)  SpO2: 96% (03-07-24 @ 10:21)      Diet, NPO      Fluids:     I & O's:    03-06-24 @ 07:01  -  03-07-24 @ 07:00  --------------------------------------------------------  IN:  Total IN: 0 mL    OUT:    Voided (mL): 500 mL  Total OUT: 500 mL    Total NET: -500 mL      PHYSICAL EXAM:  General: NAD, AAOx3, calm and cooperative  Cardiac: Extremities well perfused  Respiratory: Normal respiratory effort  Abdomen: Soft, non-distended, non-tender, no rebound, no guarding. +BS, PEG tube 5cm at the bumper  Musculoskeletal: Strength 5/5 BL UE/LE, ROM intact, compartments soft        MEDICATIONS  (STANDING):  chlorhexidine 2% Cloths 1 Application(s) Topical <User Schedule>  heparin   Injectable 5000 Unit(s) SubCutaneous every 12 hours  iohexol 300 mG (iodine)/mL Oral Solution 30 milliLiter(s) Oral once  lidocaine   4% Patch 1 Patch Transdermal every 24 hours  metoprolol tartrate 25 milliGRAM(s) Oral two times a day  ondansetron Injectable 4 milliGRAM(s) IV Push once  piperacillin/tazobactam IVPB.. 3.375 Gram(s) IV Intermittent every 8 hours  sodium bicarbonate 1300 milliGRAM(s) Oral every 6 hours  sodium phosphate 30 milliMole(s)/500 mL IVPB 30 milliMole(s) IV Intermittent once    MEDICATIONS  (PRN):      DVT PROPHYLAXIS: heparin   Injectable 5000 Unit(s) SubCutaneous every 12 hours    GI PROPHYLAXIS:   ANTICOAGULATION:   ANTIBIOTICS:  piperacillin/tazobactam IVPB.. 3.375 Gram(s)            LAB/STUDIES:  Labs:  CAPILLARY BLOOD GLUCOSE                              8.2    14.39 )-----------( 490      ( 07 Mar 2024 06:09 )             27.4       Auto Neutrophil %: 88.7 % (03-07-24 @ 06:09)  Auto Immature Granulocyte %: 0.8 % (03-07-24 @ 06:09)    03-07    146  |  111<H>  |  22<H>  ----------------------------<  124<H>  4.6   |  15<L>  |  1.6<H>      Calcium: 7.7 mg/dL (03-07-24 @ 06:09)      LFTs:             6.1  | 0.5  | 21       ------------------[64      ( 07 Mar 2024 06:09 )  2.9  | x    | 16          Lipase:x      Amylase:x         Blood Gas Arterial, Lactate: 1.5 mmol/L (03-06-24 @ 16:15)    ABG - ( 06 Mar 2024 16:15 )  pH: 7.41  /  pCO2: 19    /  pO2: 68    / HCO3: 12    / Base Excess: -10.2 /  SaO2: 96.8              Coags:            Urinalysis Basic - ( 07 Mar 2024 06:09 )    Color: x / Appearance: x / SG: x / pH: x  Gluc: 124 mg/dL / Ketone: x  / Bili: x / Urobili: x   Blood: x / Protein: x / Nitrite: x   Leuk Esterase: x / RBC: x / WBC x   Sq Epi: x / Non Sq Epi: x / Bacteria: x      IMAGING:   Xray Kidney Ureter Bladder (03.07.24 @ 03:22)  Contrast injected via gastrostomy tube opacifies stomach. Nonobstructive   bowel gas pattern. There appears to be previously administered contrast   within bowel loops. Osseous degenerative change.

## 2024-03-07 NOTE — CONSULT NOTE ADULT - SUBJECTIVE AND OBJECTIVE BOX
GENERAL SURGERY CONSULT NOTE    Patient: RUFUS PICKARD , 68y ([birth year])Male   MRN: [id number]  Location: [address]  Visit: 02-20-24 Inpatient  Date: 03-07-24 @ 03:24    HPI:  68-year-old male with past medical history of hypertension, chronic back pain, prostate cancer, and posterior mediastinal mass 6.5 cm seen on CT 11/2023, presents for 3 months of progressively difficulty swallowing associated with 10 pounds weight loss and odynophagia. Comes in today now unable to swallow his pills with water, resulting in 2 episode of emesis. He mentioned that he gets SOB during exertion but denies any exertional chest pain. As per patient he was treated at [facility] for prostate cancer and is s/p radiation therapy. He states that he has problem during walking and unable to maintain the posture. Denies any abdominal pain, chest pain, dyspnea, diarrhea, fever, chills    Surgery was consulted for air under the diaphragm seen on chest X-ray s/p PEG tube placement. Patient examined at bedside. The abdomen was soft, non-distended, and non-tender. There was no associated nausea or vomiting. The PEG tube was in place, 5 cm at the bumper. There was no resistance or associated tenderness while flushing the PEG tube.    PAST MEDICAL & SURGICAL HISTORY:  HTN  Prostate cancer    Home Medications:  Feosol 325 mg (65 mg elemental iron) oral tablet: 1 tab(s) orally 2 times a day (20 Feb 2024 16:17)  ibuprofen 800 mg oral tablet: 1 tab(s) orally every 6 hours (20 Feb 2024 16:19)  Imodium 2 mg oral capsule: 1 cap(s) orally 2 times a day (20 Feb 2024 16:16)  lidocaine 5% patch:  (20 Feb 2024 16:26)  Norvasc 5 mg oral tablet: 1 tab(s) orally once a day (20 Feb 2024 16:16)  Tylenol 325 mg oral tablet: 2 tab(s) orally every 6 hours (20 Feb 2024 16:26)    VITALS:  T(F): 97.9 (03-06-24 @ 21:14), Max: 98.4 (03-06-24 @ 17:49)  HR: 121 (03-06-24 @ 21:14) (92 - 121)  BP: 128/77 (03-06-24 @ 21:14) (101/67 - 144/74)  RR: 18 (03-06-24 @ 21:14) (18 - 19)    PHYSICAL EXAM:  General: NAD, AAOx3, calm and cooperative  HEENT: NCAT, SAKINA, EOMI, Trachea ML, Neck supple  Cardiac: RRR S1, S2, no Murmurs, rubs or gallops  Respiratory: CTAB, normal respiratory effort, breath sounds equal BL, no wheeze, rhonchi or crackles  Abdomen: Soft, non-distended, non-tender, no rebound, no guarding. +BS, PEG tube 5cm at the bumper  Musculoskeletal: Strength 5/5 BL UE/LE, ROM intact, compartments soft  Neuro: Sensation grossly intact and equal throughout, no focal deficits  Vascular: Pulses 2+ throughout, extremities well perfused  Skin: Warm/dry, normal color, no jaundice    MEDICATIONS  (STANDING):  chlorhexidine 2% Cloths 1 Application(s) Topical <User Schedule>  heparin   Injectable 5000 Unit(s) SubCutaneous every 12 hours  iohexol 300 mG (iodine)/mL Oral Solution 30 milliLiter(s) Oral once  lidocaine   4% Patch 1 Patch Transdermal every 24 hours  metoprolol tartrate 25 milliGRAM(s) Oral two times a day  ondansetron Injectable 4 milliGRAM(s) IV Push once  piperacillin/tazobactam IVPB.. 3.375 Gram(s) IV Intermittent every 8 hours  sodium bicarbonate 1300 milliGRAM(s) Oral every 6 hours  sodium phosphate 30 milliMole(s)/500 mL IVPB 30 milliMole(s) IV Intermittent once    MEDICATIONS  (PRN):  acetaminophen   IVPB .. 1000 milliGRAM(s) IV Intermittent once PRN Mild Pain (1 - 3)      LAB/STUDIES:                        8.9    13.51 )-----------( 500      ( 06 Mar 2024 18:15 )             30.0     03-06    143  |  110  |  20  ----------------------------<  108<H>  4.7   |  13<L>  |  1.4    Ca    8.2<L>      06 Mar 2024 18:15  Phos  2.5     03-06  Mg     1.8     03-06    TPro  6.2  /  Alb  3.1<L>  /  TBili  0.5  /  DBili  x   /  AST  21  /  ALT  17  /  AlkPhos  62  03-06      LIVER FUNCTIONS - ( 06 Mar 2024 18:15 )  Alb: 3.1 g/dL / Pro: 6.2 g/dL / ALK PHOS: 62 U/L / ALT: 17 U/L / AST: 21 U/L / GGT: x           Urinalysis Basic - ( 06 Mar 2024 18:15 )    Color: x / Appearance: x / SG: x / pH: x  Gluc: 108 mg/dL / Ketone: x  / Bili: x / Urobili: x   Blood: x / Protein: x / Nitrite: x   Leuk Esterase: x / RBC: x / WBC x   Sq Epi: x / Non Sq Epi: x / Bacteria: x      ABG - ( 06 Mar 2024 16:15 )  pH, Arterial: 7.41  pH, Blood: x     /  pCO2: 19    /  pO2: 68    / HCO3: 12    / Base Excess: -10.2 /  SaO2: 96.8        IMAGING:  < from: Xray Chest 1 View-PORTABLE IMMEDIATE (Xray Chest 1 View-PORTABLE IMMEDIATE .) (03.06.24 @ 16:35) >  Impression:  Pneumoperitoneum. Correlation with post-op status suggested.  Left basilar and right perihilar opacities.  Repositioning of esophageal stent as above.

## 2024-03-07 NOTE — PROGRESS NOTE ADULT - ASSESSMENT
68yMale with history of prostate cancer, posterior mediastinal mass seen on CT in November 2023, presents with difficulty swallowing associated with weight loss and odynophagia.  Patient with evidence of aspiration on arrival with concern for viral infection vs pneumonia, dysphagia with odynophagia, DIMAS. Palliative care consulted for GOC.    Spoke with patient at bedside. Heme onc is recommending palliative radiation and then hospice. Patient does not have capacity to understand his medical care and will not participate in exam.  Patient is s/p PEG and has no NOK.     Discussed case with Dr. Escobar and Dr. Gomez. Patient does not have capacity. Dr. Escobar in  will see patient after patient is seen by radiation oncology and give recs. At that time, she will evaluate his capacity to make decisions about his care. Guardianship procedures should be initiated ASAP.     Education about palliative care provided to patient/family.  See Recs below.    Please call [phone] with questions or concerns 24/7.   We will continue to follow.

## 2024-03-08 LAB
ALBUMIN SERPL ELPH-MCNC: 2.8 G/DL — LOW (ref 3.5–5.2)
ALP SERPL-CCNC: 60 U/L — SIGNIFICANT CHANGE UP (ref 30–115)
ALT FLD-CCNC: 15 U/L — SIGNIFICANT CHANGE UP (ref 0–41)
ANION GAP SERPL CALC-SCNC: 11 MMOL/L — SIGNIFICANT CHANGE UP (ref 7–14)
AST SERPL-CCNC: 21 U/L — SIGNIFICANT CHANGE UP (ref 0–41)
BASOPHILS # BLD AUTO: 0.02 K/UL — SIGNIFICANT CHANGE UP (ref 0–0.2)
BASOPHILS NFR BLD AUTO: 0.2 % — SIGNIFICANT CHANGE UP (ref 0–1)
BILIRUB SERPL-MCNC: 0.5 MG/DL — SIGNIFICANT CHANGE UP (ref 0.2–1.2)
BUN SERPL-MCNC: 21 MG/DL — HIGH (ref 10–20)
CALCIUM SERPL-MCNC: 7.5 MG/DL — LOW (ref 8.4–10.5)
CHLORIDE SERPL-SCNC: 112 MMOL/L — HIGH (ref 98–110)
CO2 SERPL-SCNC: 21 MMOL/L — SIGNIFICANT CHANGE UP (ref 17–32)
CREAT SERPL-MCNC: 1.4 MG/DL — SIGNIFICANT CHANGE UP (ref 0.7–1.5)
EGFR: 54 ML/MIN/1.73M2 — LOW
EOSINOPHIL # BLD AUTO: 0.07 K/UL — SIGNIFICANT CHANGE UP (ref 0–0.7)
EOSINOPHIL NFR BLD AUTO: 0.7 % — SIGNIFICANT CHANGE UP (ref 0–8)
GLUCOSE SERPL-MCNC: 129 MG/DL — HIGH (ref 70–99)
HCT VFR BLD CALC: 25.6 % — LOW (ref 42–52)
HCT VFR BLD CALC: 26.2 % — LOW (ref 42–52)
HGB BLD-MCNC: 7.9 G/DL — LOW (ref 14–18)
HGB BLD-MCNC: 8 G/DL — LOW (ref 14–18)
IMM GRANULOCYTES NFR BLD AUTO: 0.7 % — HIGH (ref 0.1–0.3)
LYMPHOCYTES # BLD AUTO: 0.59 K/UL — LOW (ref 1.2–3.4)
LYMPHOCYTES # BLD AUTO: 5.6 % — LOW (ref 20.5–51.1)
MAGNESIUM SERPL-MCNC: 1.9 MG/DL — SIGNIFICANT CHANGE UP (ref 1.8–2.4)
MCHC RBC-ENTMCNC: 28.1 PG — SIGNIFICANT CHANGE UP (ref 27–31)
MCHC RBC-ENTMCNC: 28.3 PG — SIGNIFICANT CHANGE UP (ref 27–31)
MCHC RBC-ENTMCNC: 30.5 G/DL — LOW (ref 32–37)
MCHC RBC-ENTMCNC: 30.9 G/DL — LOW (ref 32–37)
MCV RBC AUTO: 91.8 FL — SIGNIFICANT CHANGE UP (ref 80–94)
MCV RBC AUTO: 91.9 FL — SIGNIFICANT CHANGE UP (ref 80–94)
MONOCYTES # BLD AUTO: 0.8 K/UL — HIGH (ref 0.1–0.6)
MONOCYTES NFR BLD AUTO: 7.6 % — SIGNIFICANT CHANGE UP (ref 1.7–9.3)
NEUTROPHILS # BLD AUTO: 8.93 K/UL — HIGH (ref 1.4–6.5)
NEUTROPHILS NFR BLD AUTO: 85.2 % — HIGH (ref 42.2–75.2)
NRBC # BLD: 0 /100 WBCS — SIGNIFICANT CHANGE UP (ref 0–0)
NRBC # BLD: 0 /100 WBCS — SIGNIFICANT CHANGE UP (ref 0–0)
PHOSPHATE SERPL-MCNC: 1.7 MG/DL — LOW (ref 2.1–4.9)
PLATELET # BLD AUTO: 478 K/UL — HIGH (ref 130–400)
PLATELET # BLD AUTO: 490 K/UL — HIGH (ref 130–400)
PMV BLD: 9.6 FL — SIGNIFICANT CHANGE UP (ref 7.4–10.4)
PMV BLD: 9.8 FL — SIGNIFICANT CHANGE UP (ref 7.4–10.4)
POTASSIUM SERPL-MCNC: 4.2 MMOL/L — SIGNIFICANT CHANGE UP (ref 3.5–5)
POTASSIUM SERPL-SCNC: 4.2 MMOL/L — SIGNIFICANT CHANGE UP (ref 3.5–5)
PROT SERPL-MCNC: 5.9 G/DL — LOW (ref 6–8)
RBC # BLD: 2.79 M/UL — LOW (ref 4.7–6.1)
RBC # BLD: 2.85 M/UL — LOW (ref 4.7–6.1)
RBC # FLD: 14.8 % — HIGH (ref 11.5–14.5)
RBC # FLD: 15 % — HIGH (ref 11.5–14.5)
SODIUM SERPL-SCNC: 144 MMOL/L — SIGNIFICANT CHANGE UP (ref 135–146)
WBC # BLD: 10.48 K/UL — SIGNIFICANT CHANGE UP (ref 4.8–10.8)
WBC # BLD: 9.51 K/UL — SIGNIFICANT CHANGE UP (ref 4.8–10.8)
WBC # FLD AUTO: 10.48 K/UL — SIGNIFICANT CHANGE UP (ref 4.8–10.8)
WBC # FLD AUTO: 9.51 K/UL — SIGNIFICANT CHANGE UP (ref 4.8–10.8)

## 2024-03-08 PROCEDURE — 99231 SBSQ HOSP IP/OBS SF/LOW 25: CPT

## 2024-03-08 PROCEDURE — 99232 SBSQ HOSP IP/OBS MODERATE 35: CPT

## 2024-03-08 PROCEDURE — 99233 SBSQ HOSP IP/OBS HIGH 50: CPT

## 2024-03-08 RX ORDER — POTASSIUM PHOSPHATE, MONOBASIC POTASSIUM PHOSPHATE, DIBASIC 236; 224 MG/ML; MG/ML
30 INJECTION, SOLUTION INTRAVENOUS ONCE
Refills: 0 | Status: DISCONTINUED | OUTPATIENT
Start: 2024-03-08 | End: 2024-03-09

## 2024-03-08 RX ADMIN — LIDOCAINE 1 PATCH: 4 CREAM TOPICAL at 23:17

## 2024-03-08 RX ADMIN — Medication 1300 MILLIGRAM(S): at 07:01

## 2024-03-08 RX ADMIN — HEPARIN SODIUM 5000 UNIT(S): 5000 INJECTION INTRAVENOUS; SUBCUTANEOUS at 18:21

## 2024-03-08 RX ADMIN — PIPERACILLIN AND TAZOBACTAM 25 GRAM(S): 4; .5 INJECTION, POWDER, LYOPHILIZED, FOR SOLUTION INTRAVENOUS at 14:39

## 2024-03-08 RX ADMIN — Medication 1300 MILLIGRAM(S): at 23:18

## 2024-03-08 RX ADMIN — PIPERACILLIN AND TAZOBACTAM 25 GRAM(S): 4; .5 INJECTION, POWDER, LYOPHILIZED, FOR SOLUTION INTRAVENOUS at 06:56

## 2024-03-08 RX ADMIN — Medication 1300 MILLIGRAM(S): at 18:22

## 2024-03-08 RX ADMIN — LIDOCAINE 1 PATCH: 4 CREAM TOPICAL at 13:06

## 2024-03-08 RX ADMIN — Medication 1300 MILLIGRAM(S): at 13:05

## 2024-03-08 RX ADMIN — Medication 25 MILLIGRAM(S): at 18:22

## 2024-03-08 RX ADMIN — PIPERACILLIN AND TAZOBACTAM 25 GRAM(S): 4; .5 INJECTION, POWDER, LYOPHILIZED, FOR SOLUTION INTRAVENOUS at 21:49

## 2024-03-08 RX ADMIN — CHLORHEXIDINE GLUCONATE 1 APPLICATION(S): 213 SOLUTION TOPICAL at 06:04

## 2024-03-08 RX ADMIN — HEPARIN SODIUM 5000 UNIT(S): 5000 INJECTION INTRAVENOUS; SUBCUTANEOUS at 07:00

## 2024-03-08 RX ADMIN — LIDOCAINE 1 PATCH: 4 CREAM TOPICAL at 19:47

## 2024-03-08 NOTE — BH CONSULTATION LIAISON PROGRESS NOTE - NSBHCHARTREVIEWVS_PSY_A_CORE FT
Vital Signs Last 24 Hrs  T(C): 36.9 (08 Mar 2024 06:14), Max: 36.9 (08 Mar 2024 06:14)  T(F): 98.5 (08 Mar 2024 06:14), Max: 98.5 (08 Mar 2024 06:14)  HR: 108 (08 Mar 2024 06:14) (60 - 115)  BP: 106/57 (08 Mar 2024 06:14) (105/68 - 110/78)  BP(mean): 73 (08 Mar 2024 06:14) (73 - 73)  RR: 20 (08 Mar 2024 06:14) (18 - 20)  SpO2: 100% (07 Mar 2024 20:00) (80% - 100%)    Parameters below as of 07 Mar 2024 17:54  Patient On (Oxygen Delivery Method): nasal cannula  O2 Flow (L/min): 2

## 2024-03-08 NOTE — BH CONSULTATION LIAISON PROGRESS NOTE - NSBHCONSULTRECOMMENDOTHER_PSY_A_CORE FT
1. We recommend continued bedside support for patient while he is on the medical floor   2. We recommend melatonin 5 –10 mg P.O bedtime to regulate sleep wake cycle   3. We recommend avoiding or cautious use of medications that can worsen delirium in this case such as benzodiazepines , anticholinergic agents, opioid pain medications   4. We recommend behavioral interventions, and environmental modifications to help patient's orientation and help her feel safe in addition to implementing delirium precautions as per nursing staff.   5. Consider pastoral care consult if patient is amenable and desires to this service , please obtain patient's consent before calling this consult   6. No further psychiatry intervention is indicated for now , please recall as needed

## 2024-03-08 NOTE — PROGRESS NOTE ADULT - SUBJECTIVE AND OBJECTIVE BOX
Nephrology progress note    Patient is seen and examined, events over the last 24 h noted .    Allergies:  No Known Allergies    Hospital Medications:   MEDICATIONS  (STANDING):  chlorhexidine 2% Cloths 1 Application(s) Topical <User Schedule>  heparin   Injectable 5000 Unit(s) SubCutaneous every 12 hours  iohexol 300 mG (iodine)/mL Oral Solution 30 milliLiter(s) Oral once  lidocaine   4% Patch 1 Patch Transdermal every 24 hours  metoprolol tartrate 25 milliGRAM(s) Oral two times a day  ondansetron Injectable 4 milliGRAM(s) IV Push once  piperacillin/tazobactam IVPB.. 3.375 Gram(s) IV Intermittent every 8 hours  potassium phosphate IVPB 30 milliMole(s) IV Intermittent once  sodium bicarbonate 1300 milliGRAM(s) Oral every 6 hours  sodium phosphate 30 milliMole(s)/500 mL IVPB 30 milliMole(s) IV Intermittent once        VITALS:  T(F): 98.5 (03-08-24 @ 06:14), Max: 98.5 (03-08-24 @ 06:14)  HR: 108 (03-08-24 @ 06:14)  BP: 106/57 (03-08-24 @ 06:14)  RR: 20 (03-08-24 @ 06:14)  SpO2: 100% (03-07-24 @ 20:00)  Wt(kg): --    03-06 @ 07:01  -  03-07 @ 07:00  --------------------------------------------------------  IN: 0 mL / OUT: 500 mL / NET: -500 mL    03-07 @ 07:01  -  03-08 @ 07:00  --------------------------------------------------------  IN: 300 mL / OUT: 500 mL / NET: -200 mL          PHYSICAL EXAM:  Constitutional: NAD  HEENT: anicteric sclera, oropharynx clear, MMM  Neck: No JVD  Respiratory: CTAB, no wheezes, rales or rhonchi  Cardiovascular: S1, S2, RRR  Gastrointestinal: BS+, soft, NT/ND  Extremities: No cyanosis or clubbing. No peripheral edema  Neurological: A/O x 3, no focal deficits  : No CVA tenderness. No castro.   Skin: No rashes  Vascular Access:    LABS:  03-08    144  |  112<H>  |  21<H>  ----------------------------<  129<H>  4.2   |  21  |  1.4  Creatinine Trend: 1.4<--, 1.6<--, 1.4<--, 1.4<--, 1.2<--, 1.3<--  Ca    7.5<L>      08 Mar 2024 07:03  Phos  1.7     03-08  Mg     1.9     03-08    TPro  5.9<L>  /  Alb  2.8<L>  /  TBili  0.5  /  DBili      /  AST  21  /  ALT  15  /  AlkPhos  60  03-08                          7.9    10.48 )-----------( 490      ( 08 Mar 2024 07:03 )             25.6       Urine Studies:  Urinalysis Basic - ( 08 Mar 2024 07:03 )    Color:  / Appearance:  / SG:  / pH:   Gluc: 129 mg/dL / Ketone:   / Bili:  / Urobili:    Blood:  / Protein:  / Nitrite:    Leuk Esterase:  / RBC:  / WBC    Sq Epi:  / Non Sq Epi:  / Bacteria:         RADIOLOGY & ADDITIONAL STUDIES:   Nephrology progress note    Patient is seen and examined, events over the last 24 h noted .  On PEG feeds  Allergies:  No Known Allergies    Hospital Medications:   MEDICATIONS  (STANDING):  chlorhexidine 2% Cloths 1 Application(s) Topical <User Schedule>  heparin   Injectable 5000 Unit(s) SubCutaneous every 12 hours  iohexol 300 mG (iodine)/mL Oral Solution 30 milliLiter(s) Oral once  lidocaine   4% Patch 1 Patch Transdermal every 24 hours  metoprolol tartrate 25 milliGRAM(s) Oral two times a day  ondansetron Injectable 4 milliGRAM(s) IV Push once  piperacillin/tazobactam IVPB.. 3.375 Gram(s) IV Intermittent every 8 hours  potassium phosphate IVPB 30 milliMole(s) IV Intermittent once  sodium bicarbonate 1300 milliGRAM(s) Oral every 6 hours  sodium phosphate 30 milliMole(s)/500 mL IVPB 30 milliMole(s) IV Intermittent once        VITALS:  T(F): 98.5 (03-08-24 @ 06:14), Max: 98.5 (03-08-24 @ 06:14)  HR: 108 (03-08-24 @ 06:14)  BP: 106/57 (03-08-24 @ 06:14)  RR: 20 (03-08-24 @ 06:14)  SpO2: 100% (03-07-24 @ 20:00)  Wt(kg): --    03-06 @ 07:01  -  03-07 @ 07:00  --------------------------------------------------------  IN: 0 mL / OUT: 500 mL / NET: -500 mL    03-07 @ 07:01  -  03-08 @ 07:00  --------------------------------------------------------  IN: 300 mL / OUT: 500 mL / NET: -200 mL          PHYSICAL EXAM:  Constitutional: NAD  HEENT: anicteric sclera,   Neck: No JVD  Respiratory: CTA  Cardiovascular: S1, S2, RRR  Gastrointestinal: BS+, soft, NT/ND  Extremities: No peripheral edema  Neurological: Awake alert  : No CVA tenderness. No castro.   Skin: No rashes  Vascular Access:    LABS:  03-08    144  |  112<H>  |  21<H>  ----------------------------<  129<H>  4.2   |  21  |  1.4  Creatinine Trend: 1.4<--, 1.6<--, 1.4<--, 1.4<--, 1.2<--, 1.3<--  Ca    7.5<L>      08 Mar 2024 07:03  Phos  1.7     03-08  Mg     1.9     03-08    TPro  5.9<L>  /  Alb  2.8<L>  /  TBili  0.5  /  DBili      /  AST  21  /  ALT  15  /  AlkPhos  60  03-08                          7.9    10.48 )-----------( 490      ( 08 Mar 2024 07:03 )             25.6       Urine Studies:  Urinalysis Basic - ( 08 Mar 2024 07:03 )    Color:  / Appearance:  / SG:  / pH:   Gluc: 129 mg/dL / Ketone:   / Bili:  / Urobili:    Blood:  / Protein:  / Nitrite:    Leuk Esterase:  / RBC:  / WBC    Sq Epi:  / Non Sq Epi:  / Bacteria:         RADIOLOGY & ADDITIONAL STUDIES:  < from: Xray Chest 1 View-PORTABLE IMMEDIATE (Xray Chest 1 View-PORTABLE IMMEDIATE .) (03.06.24 @ 16:35) >    Pneumoperitoneum. Correlation with post-op status suggested.    Left basilar and right perihilar opacities.    < end of copied text >  < from: US Kidney and Bladder (03.01.24 @ 18:42) >  IMPRESSION:  1.  Diffusely echogenic bilateral kidneys which can be seen with medical   renal disease.  2.  Moderate to severe left hydronephrosis with left renal parenchymal   thinning, as seen on prior CT scan.  3.  Large mass identified within the urinary bladder, measuring up to 7.9   cm, previously up to 7 cm.  No ureteral jets are seen bilaterally.  4.  Incidentally noted right pleural effusion.    < end of copied text >

## 2024-03-08 NOTE — PROGRESS NOTE ADULT - SUBJECTIVE AND OBJECTIVE BOX
HPI:  68-year-old male with past medical history of hypertension, chronic back pain, prostate cancer, and posterior mediastinal mass 6.5 cm seen on CT 11/2023, presents for 3 months of progressively difficulty swallowing associated with 10 pounds weight loss and odynophagia. Comes in today now unable to swallow his pills with water, resulting in 2 episode of emesis. He mentioned that he gets SOB during exertion but denies any exertional chest pain. As per patient he was treated at [facility] for prostate cancer and is s/p radiation therapy. He states that he has problem during walking and unable to maintain the posture.   Denies any abdominal pain, chest pain, dyspnea, diarrhea, fever, chills    Interval history  -Patient seen at bedside  -Denied any symptoms     ADVANCE DIRECTIVES:     [ x] Full Code [ ] DNR  MOLST  [ ]  Living Will  [ ]   DECISION MAKER(s):  [ ] Health Care Proxy(s)  [ x] Surrogate(s)  [ ] Guardian           Name(s): Phone Number(s):  Nobody      BASELINE (I)ADL(s) (prior to admission):    [place]: [ ]Total  [ ] Moderate [ ]Dependent  Palliative Performance Status Version 2:         %    http://npcrc.org/files/news/palliative_performance_scale_ppsv2.pdf    Allergies    No Known Allergies    Intolerances    MEDICATIONS  (STANDING):  chlorhexidine 2% Cloths 1 Application(s) Topical <User Schedule>  heparin   Injectable 5000 Unit(s) SubCutaneous every 12 hours  iohexol 300 mG (iodine)/mL Oral Solution 30 milliLiter(s) Oral once  lidocaine   4% Patch 1 Patch Transdermal every 24 hours  metoprolol tartrate 25 milliGRAM(s) Oral two times a day  ondansetron Injectable 4 milliGRAM(s) IV Push once  piperacillin/tazobactam IVPB.. 3.375 Gram(s) IV Intermittent every 8 hours  potassium phosphate IVPB 30 milliMole(s) IV Intermittent once  sodium bicarbonate 1300 milliGRAM(s) Oral every 6 hours  sodium phosphate 30 milliMole(s)/500 mL IVPB 30 milliMole(s) IV Intermittent once    MEDICATIONS  (PRN):    PRESENT SYMPTOMS: [ ]Unable to obtain due to poor mentation   Source if other than patient:  [ ]Family   [ ]Team     Pain: [ ]yes [ x]no  QOL impact -   Location -                    Aggravating factors -   Quality -   Radiation -   Timing-   Severity (0-10 scale):   Minimal acceptable level (0-10 scale):      CPOT:    https://[email or web address]/getattachment/[email or web address]/Critical-Care-Pain-Observation-Tool-(CPOT)    PAIN AD Score:   [email or web address]/painad.pdf (press ctrl +  left click to view)    Dyspnea:                           [x ]None[ ]Mild [ ]Moderate [ ]Severe     Respiratory Distress Observation Scale (RDOS):   A score of 0 to 2 signifies little or no respiratory distress, 3 signifies mild distress, scores 4 to 6 indicate moderate distress, and scores greater than 7 signify severe distress  [email or web address]/[email or web address]/files/[email or web address]-dgslhhfebzs-zeeozhkg-jcyayuijqhq-woiap.pdf    Anxiety:                             [ x]None[ ]Mild [ ]Moderate [ ]Severe   Fatigue:                             [ x]None[ ]Mild [ ]Moderate [ ]Severe   Nausea:                             [ x]None[ ]Mild [ ]Moderate [ ]Severe   Loss of appetite:              [ x]None[ ]Mild [ ]Moderate [ ]Severe   Constipation:                    [x ]None[ ]Mild [ ]Moderate [ ]Severe    Other Symptoms:  [x ]All other review of systems negative     Palliative Performance Status Version 2:         30%    http://[email or web address].org/files/news/palliative_performance_scale_ppsv2.pdf    PHYSICAL EXAM:  Vital Signs Last 24 Hrs  T(C): 36.4 (08 Mar 2024 13:37), Max: 36.9 (08 Mar 2024 06:14)  T(F): 97.6 (08 Mar 2024 13:37), Max: 98.5 (08 Mar 2024 06:14)  HR: 109 (08 Mar 2024 13:37) (60 - 115)  BP: 101/69 (08 Mar 2024 13:37) (101/69 - 110/78)  BP(mean): 80 (08 Mar 2024 13:37) (73 - 80)  RR: 20 (08 Mar 2024 13:37) (18 - 20)  SpO2: 100% (08 Mar 2024 13:37) (80% - 100%)    Parameters below as of 07 Mar 2024 17:54  Patient On (Oxygen Delivery Method): nasal cannula  O2 Flow (L/min): 2      GENERAL:  [ ] No acute distress [ ]Lethargic  [ ]Unarousable  [x ]Verbal  [ ]Non-Verbal [ ]Cachexia    BEHAVIORAL/PSYCH:  [x ]Alert and Oriented x1-2 ] Anxiety [ ] Delirium [ ] Agitation [x ] Calm   EYES: [x ] No scleral icterus [ ] Scleral icterus [ ] Closed  ENMT:  [ ]Dry mouth  [x ]No external oral lesions [ ] No external ear or nose lesions  CARDIOVASCULAR:  [ ]Regular [ ]Irregular [ ]Tachy [x ]Not Tachy  [ ]Rambo [ ] Edema [ ] No edema  PULMONARY:  [ ]Tachypnea  [ ]Audible excessive secretions [x ] No labored breathing [ ] labored breathing  GASTROINTESTINAL: [ ]Soft  [ ]Distended  [ x]Not distended [ ]Non tender [ ]Tender  MUSCULOSKELETAL: [ ]No clubbing [ ] clubbing  [ x] No cyanosis [ ] cyanosis  NEUROLOGIC: [ ]No focal deficits  [ x]Follows commands  [ ]Does not follow commands  [x ]Cognitive impairment  [ ]Dysphagia  [ ]Dysarthria  [ ]Paresis   SKIN: [ ] Jaundiced [ ] Non-jaundiced [ ]Rash [ ]No Rash [ ] Warm [ ] Dry  MISC/LINES: [ ] ET tube [ ] Trach [ ]NGT/OGT [ ]PEG [ ]Angel    LABS: reviewed by me                          7.9    10.48 )-----------( 490      ( 08 Mar 2024 07:03 )             25.6       03-08    144  |  112<H>  |  21<H>  ----------------------------<  129<H>  4.2   |  21  |  1.4    Ca    7.5<L>      08 Mar 2024 07:03  Phos  1.7     03-08  Mg     1.9     03-08    TPro  5.9<L>  /  Alb  2.8<L>  /  TBili  0.5  /  DBili  x   /  AST  21  /  ALT  15  /  AlkPhos  60  03-08          ABG - ( 06 Mar 2024 16:15 )  pH, Arterial: 7.41  pH, Blood: x     /  pCO2: 19    /  pO2: 68    / HCO3: 12    / Base Excess: -10.2 /  SaO2: 96.8                Urinalysis Basic - ( 08 Mar 2024 07:03 )    Color: x / Appearance: x / SG: x / pH: x  Gluc: 129 mg/dL / Ketone: x  / Bili: x / Urobili: x   Blood: x / Protein: x / Nitrite: x   Leuk Esterase: x / RBC: x / WBC x   Sq Epi: x / Non Sq Epi: x / Bacteria: x                  CAPILLARY BLOOD GLUCOSE                  RADIOLOGY & ADDITIONAL STUDIES: reviewed by me    < from: Xray Kidney Ureter Bladder (03.07.24 @ 03:22) >  Impression:    Contrast injected via gastrostomy tube opacifies stomach. Nonobstructive   bowel gas pattern. There appears to be previously administered contrast   within bowel loops. Osseous degenerative change.    < end of copied text >        EKG: reviewed by me    < from: 12 Lead ECG (03.06.24 @ 17:19) >    Ventricular Rate 112 BPM    Atrial Rate 112 BPM    P-R Interval 146 ms    QRS Duration 76 ms    Q-T Interval 356 ms    QTC Calculation(Bazett) 485 ms    P Axis 34 degrees    R Axis -19 degrees    T Axis 76 degrees    Diagnosis Line Sinus tachycardia  Otherwise normal ECG    < end of copied text >      PROTEIN CALORIE MALNUTRITION PRESENT: [ ]mild [ ]moderate [ ]severe [ ]underweight [ ]morbid obesity  https://www.andeal.org/vault/2440/web/files/ONC/Table_Clinical%20Characteristics%20to%20Document%20Malnutrition-White%20JV%20et%20al%202012.pdf    Height (cm): 185.4 (02-22-24 @ 14:44), 185.4 (09-15-23 @ 10:44)  Weight (kg): 77.1 (02-21-24 @ 14:55), 77.1 (11-22-23 @ 08:11), 78.5 (09-15-23 @ 10:44)  BMI (kg/m2): 22.4 (02-22-24 @ 14:44), 22.4 (02-21-24 @ 14:55), 22.4 (11-22-23 @ 08:11)  [ ]PPSV2 < or = to 30% [ ]significant weight loss  [ ]poor nutritional intake  [ ]anasarca      [ ]Artificial Nutrition      Palliative Care Spiritual/Emotional Screening Tool Question  Severity (0-4):                    OR                    [ x] Unable to determine. Will assess at later time if appropriate.  Score of 2 or greater indicates recommendation of Chaplaincy and/or SW referral  Chaplaincy Referral: [ ] Yes [ ] Refused [ ] Following     Caregiver [place]:  [ ] Yes [ ] No    OR    [x ] Unable to determine. Will assess at later time if appropriate.  Social Work Referral [ ]  Patient and Family Centered Care Referral [ ]    Anticipatory Grief Present: [ ] Yes [ ] No    OR     [ x] Unable to determine. Will assess at later time if appropriate.  Social Work Referral [ ]  Patient and Family Centered Care Referral [ ]    Patient discussed with primary medical team MD  Palliative care education provided to patient and/or family

## 2024-03-08 NOTE — BH CONSULTATION LIAISON PROGRESS NOTE - CURRENT MEDICATION
MEDICATIONS  (STANDING):  chlorhexidine 2% Cloths 1 Application(s) Topical <User Schedule>  heparin   Injectable 5000 Unit(s) SubCutaneous every 12 hours  iohexol 300 mG (iodine)/mL Oral Solution 30 milliLiter(s) Oral once  lidocaine   4% Patch 1 Patch Transdermal every 24 hours  metoprolol tartrate 25 milliGRAM(s) Oral two times a day  ondansetron Injectable 4 milliGRAM(s) IV Push once  piperacillin/tazobactam IVPB.. 3.375 Gram(s) IV Intermittent every 8 hours  potassium phosphate IVPB 30 milliMole(s) IV Intermittent once  sodium bicarbonate 1300 milliGRAM(s) Oral every 6 hours  sodium phosphate 30 milliMole(s)/500 mL IVPB 30 milliMole(s) IV Intermittent once    MEDICATIONS  (PRN):

## 2024-03-08 NOTE — CHART NOTE - NSCHARTNOTEFT_GEN_A_CORE
GI NUTRITION SUPPORT TEAM  -  PROGRESS NOTE     Interval Events:    Awake, alert, NAD  Tolerating PEG feeds well  Phos 1.7 this am, IV supplementation ordered    REVIEW OF SYSTEMS:  Negative except as noted above.     VITALS:  T(F): 98.5 (03-08 @ 06:14), Max: 98.5 (03-08 @ 06:14)  HR: 108 (03-08 @ 06:14) (108 - 108)  BP: 106/57 (03-08 @ 06:14) (106/57 - 106/57)  RR: 20 (03-08 @ 06:14) (20 - 20)  SpO2: --    HEIGHT/WEIGHT/BMI:   Height (cm): 185.4 (03-05), 185.4 (09-15)  Weight (kg): 77.1 (03-05), 77.1 (11-22), 78.5 (09-15)  BMI (kg/m2): 22.4 (03-05), 22.4 (11-22), 22.8 (09-15)    I/Os:     03-07-24 @ 07:01  -  03-08-24 @ 07:00  --------------------------------------------------------  IN:    Enteral Tube Flush: 60 mL    Jevity 1.2: 240 mL  Total IN: 300 mL    OUT:    Voided (mL): 500 mL  Total OUT: 500 mL    Total NET: -200 mL      MEDICATIONS:   chlorhexidine 2% Cloths 1 Application(s) Topical <User Schedule>  heparin   Injectable 5000 Unit(s) SubCutaneous every 12 hours  iohexol 300 mG (iodine)/mL Oral Solution 30 milliLiter(s) Oral once  lidocaine   4% Patch 1 Patch Transdermal every 24 hours  metoprolol tartrate 25 milliGRAM(s) Oral two times a day  ondansetron Injectable 4 milliGRAM(s) IV Push once  piperacillin/tazobactam IVPB.. 3.375 Gram(s) IV Intermittent every 8 hours  potassium phosphate IVPB 30 milliMole(s) IV Intermittent once  sodium bicarbonate 1300 milliGRAM(s) Oral every 6 hours  sodium phosphate 30 milliMole(s)/500 mL IVPB 30 milliMole(s) IV Intermittent once    LABS:                         7.9    10.48 )-----------( 490      ( 08 Mar 2024 07:03 )             25.6     144  |  112<H>  |  21<H>  ----------------------------<  129<H>          (03-08-24 @ 07:03)  4.2   |  21  |  1.4    Ca    7.5<L>          (03-08-24 @ 07:03)  Phos  1.7         (03-08-24 @ 07:03)  Mg     1.9         (03-08-24 @ 07:03)    TPro  5.9<L>  /  Alb  2.8<L>  /  TBili  0.5  /  DBili  x   /  AST  21  /  ALT  15  /  AlkPhos  60       03-08-24 @ 07:03      DIET:   Diet, NPO with Tube Feed:   Tube Feeding Modality: Gastrostomy  Jevity 1.2 Sam  Total Volume for 24 Hours (mL): 1440  Bolus  Total Volume of Bolus (mL):  360  Total # of Feeds: 4  Tube Feed Frequency: Every 6 hours   Tube Feed Start Time: 17:31  Bolus Feed Rate (mL per Hour): 720   Bolus Feed Duration (in Hours): 0.5  Free Water Flush Instructions:  flush peg with 50ml H2O before and after each feed (03-07-24 @ 17:31) [Active]      ASSESSMENT  68-year-old male with pmh of hypertension, chronic back pain, prostate cancer s/p RT, and posterior mediastinal mass 6.5 cm seen on CT 11/2023, presents for 3 months of progressively difficulty swallowing associated with 10 pounds weight loss and odynophagia. Presented with c/o being unable to swallow his pills with water, resulting in 2 episode of emesis. Nutrition called to evaluate for PN.    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  - prostate cancer s/p RT   - odynophagia with associated wt loss (amount unclear)   - anemia, hx VERONICA   - hyperkalemia  - severe protein calorie malnutrition  - refeeding syndrome, hypophosphatemia    Est nutrient needs: [id number] kcals (25-30kcals/kg, 77.1kg), 92-108g protein (1.2-1.4g/kg)  Fluids: 2100ml (25ml/kg)      PLAN  - phos supplemented IV this am   - cont PEG feeds with Jevity 1.2 1 hour after each attempted PO meal and qhs (4 feeds/day) given via gravity drip over 30 min  - 360ml Jevity 1.2 x 4 for now and monitor K, phos, Mg closely, when NL will further increase feeds  - flush peg with 50ml H2O before and 100ml H2O after each peg feed  - monitor bm's   - weekly weights   - d/c planning with peg feeds

## 2024-03-08 NOTE — BH CONSULTATION LIAISON PROGRESS NOTE - NSBHCONSULTMEDAGITATION_PSY_A_CORE FT
We recommend Haldol 2 mg P.O Q 6 hrs PRN for agitation. Please note that this medication can be given via the intramuscular route if the patient is severely agitated and is considered a danger to himself or others. Please ensure that QTC is < 500

## 2024-03-08 NOTE — BH CONSULTATION LIAISON PROGRESS NOTE - NSBHCONSULTDISCUSS_PSY_A_CORE
SCAR MASSAGE INSTRUCTIONS    As your body heals following an injury or surgery, it forms scar tissue. Scarring is good in that it closes wounds, but heavy, binding scar can prevent normal hand function. To gain a soft, supple scar that allows good movement of the hand, the scar must be stressed. You can stress and remodel your scar by performing a variety of scar massage techniques.     You can use a lotion or cream that contains vitamin E, cocoa butter, or aloe vera when performing the scar massage.    Rub scar with thumb or one or two fingers using circular, up and down and sideways motion with a firm, deep pressure. Perform massage slowly, allowing tissues to stretch. Do not just slide over the skin.    Separate skin from tissue below by pinching skin between fingers and thumb. Roll skin between fingers.    Scar shearing- while bending your fingers, push scar towards the tip of your finger. While straightening your fingers, pull scar towards your palm/wrist.    If provided with a gel scar pad, wear gel pad 8-10 hours a day. Decrease time if skin becomes macerated.    Perform scar massage for 5 minutes, 2-3 times per day.  
yes

## 2024-03-08 NOTE — BH CONSULTATION LIAISON PROGRESS NOTE - NSBHFUPINTERVALHXFT_PSY_A_CORE
Patient seen and interviewed ,   Upon approach, patient is observed to be lying on the hospital bed , is calm and cooperative , speaking very softly , barely audible at times . he reports that he has diarrhea and is not happy about it . he also reports that he was informed by the doctor that he has cancer . he reports that he is not happy about the diagnosis and is scared however he is wants to continue treatment and does not want to give up now . He denies being depressed , and denies being hopeless or helpless , he denies other symptoms of depression including suicidal thoughts , intent or plan. he denies acute symptoms of psychosis and gini.   Writer asked if there was anything we could do to make him comfortable and patient stated " nothing ".     According to the nurse taking care of patient , he is in good behavioral control, seems to understand that he has cancer however is currently irritable because of he has diarrhea .     According to the medical team, they met with patient with the ethics team and patient  spoke of his Anabaptism values and how they preclude him from being DNR/DNI. Because of this discussion, medical team reports that the outcome of this meeting with patient and the response preceded any concerns about his capacity to decide his code status and the plan is to keep him full code for now and once stable will plan for a nursing home disposition. Medical team report that the capacity evaluation to determine if patient can be decide his code status is moot for now.

## 2024-03-08 NOTE — PROGRESS NOTE ADULT - ATTENDING COMMENTS
Spoke to pt with Ethics this am and pt stated he is Quaker and so he would not want ot be DNR/DNI. Since his Anabaptist views would precede any acute changes in capacity, will continue full code and continue treatment with placement in NH. Continue on Zosyn for aspiration pna. Pt is having diarrhea in the setting of tube feeds. Continue to monitor.

## 2024-03-08 NOTE — PROGRESS NOTE ADULT - ASSESSMENT
68-year-old male with past medical history of hypertension, chronic back pain, prostate cancer, and posterior mediastinal mass 6.5 cm seen on CT 11/2023, presents for 3 months of progressively difficulty swallowing associated with 10 pounds weight loss and odynophagia. 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 Also found to be in sepsis secondary to GAS bacteremia. hospital stay complicated by DIMAS.  Assessment and plan  DIMAS on CKD 3/ stage 4 prostate cancer/ mediastinal mass s/p bx/ HTN  CHUCKIE may have contributed, received 2 iv contrast studies within 2 days period   DIMAS resolved.  Creat improved to baseline  renal ultrasound reviewed: "Diffusely echogenic bilateral kidneys which can be seen with medical  renal disease. 2.  Moderate to severe left hydronephrosis with left renal parenchymal  thinning, as seen on prior CT scan. 3.  Large mass identified within the urinary bladder, measuring up to 7.9  cm, previously up to 7 cm.  No ureteral jets are seen bilaterally." 4.  Incidentally noted right pleural effusion.-- needs  eval  cont po sodium bicarb  iv fluids to d5w with 3 amp of  NaHCO3 at 75 cc/h   phos at goal, maintain > 2  overall prognosis poor   will follow  68-year-old male with past medical history of hypertension, chronic back pain, prostate cancer, and posterior mediastinal mass 6.5 cm seen on CT 11/2023, presents for 3 months of progressively difficulty swallowing associated with 10 pounds weight loss and odynophagia. 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 Also found to be in sepsis secondary to GAS bacteremia. hospital stay complicated by DIMAS.    DIMAS on CKD 3/ stage 4 prostate cancer/ mediastinal mass s/p bx/ HTN  CHUCKIE may have contributed, received 2 iv contrast studies within 2 days period   DIMAS resolved.  Creat improved to baseline  renal ultrasound (3/1/24)  reviewed: "Diffusely echogenic bilateral kidneys which can be seen with medical  renal disease. 2.  Moderate to severe left hydronephrosis with left renal parenchymal  thinning, as seen on prior CT scan. 3.  Large mass identified within the urinary bladder, measuring up to 7.9  cm, previously up to 7 cm.  No ureteral jets are seen bilaterally." 4.  Incidentally noted right pleural effusion.-- needs  eval  cont po sodium bicarb  on enteral feeds now with Neutraphos suppl for low phos  phos at goal, maintain > 2  overall prognosis poor   will sign off - recall with any questions

## 2024-03-08 NOTE — PROGRESS NOTE ADULT - ASSESSMENT
68yMale with history of prostate cancer, posterior mediastinal mass seen on CT in November 2023, presents with difficulty swallowing associated with weight loss and odynophagia.  Patient with evidence of aspiration on arrival with concern for viral infection vs pneumonia, dysphagia with odynophagia, DIMAS. Palliative care consulted for GOC.    Patient seen by behavioral health, ethics committee and primary team today. They assess capacity and patient's clinical wishes.  Please see the behavioral health and primary team notes for full details. Plan for possible ethics meeting on 3/11. Will be available.    Education about palliative care provided to patient/family.  See Recs below.    Please call [phone] with questions or concerns 24/7.   We will continue to follow.

## 2024-03-08 NOTE — BH CONSULTATION LIAISON PROGRESS NOTE - NSBHCHARTREVIEWLAB_PSY_A_CORE FT
8.0    9.51  )-----------( 478      ( 08 Mar 2024 18:14 )             26.2       03-08    144  |  112<H>  |  21<H>  ----------------------------<  129<H>  4.2   |  21  |  1.4    Ca    7.5<L>      08 Mar 2024 07:03  Phos  1.7     03-08  Mg     1.9     03-08    TPro  5.9<L>  /  Alb  2.8<L>  /  TBili  0.5  /  DBili  x   /  AST  21  /  ALT  15  /  AlkPhos  60  03-08

## 2024-03-08 NOTE — BH CONSULTATION LIAISON PROGRESS NOTE - NSBHASSESSMENTFT_PSY_ALL_CORE
Mr Stallings is a 68 year old man, with no reported history of a psychiatric illness but has a history of Prostate cancer and recently diagnosed mediastinal mass who was  admitted to the medical service for dysphagia. Psychiatric consult was called to assess his capacity to refuse I.V antibiotics and PEG tube placement .   According to the medical team, patient is refusing much needed I.V antibiotics for bacteremia following the result of the blood culture. In addition, patient is said to be s/p esophageal stent placement for his dysphagia and is refusing the placement of a PEG tube  to receive adequate nutrition. According to the medical team, patient is denying that he was ever diagnosed with the mediastinal mass and does not think his difficulty swallowing has anything to do with the concern that the medical team has with his nutritional status.   Patient seems to have good understanding of his medical problems and the fact that he has cancer and after his discussion with the medical team and the ethicists seems to have articulated his desire for his end of life care . He also seems to understands the severity of his medical problems and has a significant feelings of dysphoria in the context of his medical diagnosis , his prognosis and the likely outcome . He however seems to be coping with this severe stressor with his dotty and belief that he should not give up.   For now, patients do not appear to have acute symptoms of psychosis, gini or depression. He denies having current suicidal ideations, intent or plan.   At this time, patient is not considered an imminent danger to himself or others and does not need inpatient psychiatric hospitalization.

## 2024-03-08 NOTE — PROGRESS NOTE ADULT - ASSESSMENT
68-year-old male with past medical history of hypertension, chronic back pain, prostate cancer, and posterior mediastinal mass 6.5 cm seen on CT 11/2023, presents for 3 months of progressively difficulty swallowing associated with 10 pounds weight loss and odynophagia. Comes in today now unable to swallow his pills with water, resulting in 2 episode of emesis. He mentioned that he gets SOB during exertion but denies any exertional chest pain. As per patient he was treated at [facility] for prostate cancer and is s/p radiation therapy. He states that he has problem during walking and unable to maintain the posture.     #aspiration PNA vs viral infection vs HAP   #GAS s pyogenes bacteremia   - SIRS positive (WBC increased to 16, febrile to 101 and tachy to 129 )   - CXR with worsening L-sided opacity   - Blood cx (2/24/24): positive for strep pyogenes  - repeat BCx 27,28,29,1,2  negative to date   - procal 7  - aspiration precautions   - patient cleared for pureed diet and thin liquids per SS -refusing food as patient reports inability to swallow   - on zosyn 3.375mg q8h switched to cefepime and clindamycin pending ID rec   -ID rec:  source potentially from translocation in setting of infiltrative masses, stop clindamycin , switch cefepime to ceftriaxone 2g daily   - continue ceftriaxone 2g daily end date 3/11 to complete 2 weeks from culture clearance ideally to finish with midline   -changed to zosyn yest   - TTE   1. Left ventricular ejection fraction, by visual estimation, is 65 to 70%.   2. Hyperdynamic global left ventricular systolic function.   3. Mildly increased LV wall thickness.   4. Spectral Doppler shows impaired relaxation pattern of left   ventricular myocardial filling (Grade I diastolic dysfunction).   5. Trace mitral valve regurgitation.    # Dysphagia with odynophagia  #Failure to thrive   # Hx of prostate cancer s/p radiation therapy  # Prostate cancer metastasis to mediastinum or primary mediastinal mass?  - CT Chest: Interval enlargement of the central/posterior mediastinal mass with development of more extensive lymphadenopathy. The mass encircles the descending thoracic aorta and has some mass effect upon the left atrium. The mid aspect of the esophagus is encircled by the mass and difficult to distinguish. Possible ovoid pill is noted within the mass and possible location of the mid esophagus appear. The maximal esophagus appears distended with layering debris within. Consideration may be given to esophageal stenting.  - CT abdomen and pelvis:  Increased upper abdominal bulky lymphadenopathy. Unchanged bulky retroperitoneal lymphadenopathy, large left bladder mass and perirectal soft tissue infiltrative mass. 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 Chronic moderate to severe left hydroureteronephrosis  - EGD with GI 2/21 -> stent placed and esophageal biopsies taken -pathology - Poorly differentiated carcinoma.  - CT surgery following -> no acute intervention   - AFP and CEA wnl - LDH and haptoglobin elevated - PSA total 220, PSA free is 22 - CA19 131-  57  - oncology recs appreciated ->patient used to follow with Dr. Kenia Irizarry, called and teams her and she is not picking up the phone calls, not reachable in any way   -service oncology consulted  - Esophageal biopsy: Cytopathology - Non Gyn Report: ACCESSION No:  [id number]. Final Diagnosis- MEDIASTINUM MASS; EUS GUIDED FNA (THINPREP AND CELL BLOCK): POSITIVE FOR MALIGNANT CELLS,  Metastatic poorly differentiated carcinoma. The morphology of the current tumor and the immunohistochemical profile along with the patient's history of prostatic cancer are consistent with a metastatic poorly differentiated carcinoma of prostatic origin.  - Esophageal ulcer biopsy shows poorly differentiated carcinoma, IHC staining pending. Discussed with Dr. Lewis, related to prostate cancer, not a second primary malignancy   - per onc note, patient had prior biopsy of site that demonstrated metastatic prostate adenocarcinoma and was started on Docetaxel, in January 2024 patient was started on Pembrolizumab and continued on keytruda injections.   - Dr. Irizarry is returning to the office on Monday 3/4 => cs rad/onco for palliative radiation , onco asked about new protocol since patient progressed , rec hospice and palliative   -refusing oral intake, IVF  -esophogram :Esophageal stent is visualized in the mid esophagus and extending below  the left hemidiaphragm. The lumen of the mid esophagus is severely   narrowed.A small amount of contrast is visualized passing through the mid esophagus.No evidence of contrast extravasation or fistula formation to suggest leak.  => advanced GI recalled again : EGD  replaced stent and PEG placed 3/5/24   -restart puree and PED feeding , monitor for refeeding  -air under diaphragm can be Normal finding after G Tube placement.=> CS GS and PEG studies  -Change CTX into Zosyn  -lacks capacity- psych and palliative following    #shoulder body irregularity   Xray Shoulder 2 Views, Left (03.05.24 @ 20:02)   Subacute/chronic appearing distal clavicular fracture with approximately   1.7 cm of inferior displacement of the distal fragment. No acute fracture   or dislocation.  Left-sided opacities/effusion. Esophageal stent with surgical clips    #CHUCKIE vs ATN induced DIMAS  - pt came in with a baseline of 1.5 and increased to 2.6 with contrast use  - c/w IVF  - renally dose medications  -crea not improving   -US Kidney and Bladder (03.01.24 @ 18:42) >  1.  Diffusely echogenic bilateral kidneys which can be seen with medical renal disease.  2.  Moderate to severe left hydronephrosis with left renal parenchymal thinning, as seen on prior CT scan.  3.  Large mass identified within the urinary bladder, measuring up to 7.9  cm, previously up to 7 cm.  No ureteral jets are seen bilaterally.  4.  Incidentally noted right pleural effusion.  -nephro cs   -C3 C4 levels normal  -bicarb   -no indications for RRT  -consider     # small b/l pleural effusions on CT chest   - s/p IV Lasix     #normocytic Anemia  - Hb 9-10  - f/u iron  studies noted % sat 27   - MCV normal, SPEP negative     #hypocalcemia  -replenished   -monitor     #Misc   #DVT PPx- heparin   #GI PPx- none   #Diet-  puree   #Activity- AAT,   PT consult recommended Home PT  - Would be unable to return to [facility]  #Dispo- Acute 68-year-old male with past medical history of hypertension, chronic back pain, prostate cancer, and posterior mediastinal mass 6.5 cm seen on CT 11/2023, presents for 3 months of progressively difficulty swallowing associated with 10 pounds weight loss and odynophagia. Comes in today now unable to swallow his pills with water, resulting in 2 episode of emesis. He mentioned that he gets SOB during exertion but denies any exertional chest pain. As per patient he was treated at [facility] for prostate cancer and is s/p radiation therapy. He states that he has problem during walking and unable to maintain the posture.     #aspiration PNA vs viral infection vs HAP   #GAS s pyogenes bacteremia   - SIRS positive (WBC increased to 16, febrile to 101 and tachy to 129 )   - CXR with worsening L-sided opacity   - Blood cx (2/24/24): positive for strep pyogenes  - repeat BCx 27,28,29,1,2  negative to date   - procal 7  - aspiration precautions   - patient cleared for pureed diet and thin liquids per SS -refusing food as patient reports inability to swallow   - on zosyn 3.375mg q8h switched to cefepime and clindamycin pending ID rec   -ID rec:  source potentially from translocation in setting of infiltrative masses, stop clindamycin , switch cefepime to ceftriaxone 2g daily   - continue ceftriaxone 2g daily end date 3/11 to complete 2 weeks from culture clearance ideally to finish with midline   -changed to zosyn post PEG   - TTE   1. Left ventricular ejection fraction, by visual estimation, is 65 to 70%.   2. Hyperdynamic global left ventricular systolic function.   3. Mildly increased LV wall thickness.   4. Spectral Doppler shows impaired relaxation pattern of left   ventricular myocardial filling (Grade I diastolic dysfunction).   5. Trace mitral valve regurgitation.    # Dysphagia with odynophagia  #Failure to thrive   # Hx of prostate cancer s/p radiation therapy  # Prostate cancer metastasis to mediastinum or primary mediastinal mass?  - CT Chest: Interval enlargement of the central/posterior mediastinal mass with development of more extensive lymphadenopathy. The mass encircles the descending thoracic aorta and has some mass effect upon the left atrium. The mid aspect of the esophagus is encircled by the mass and difficult to distinguish. Possible ovoid pill is noted within the mass and possible location of the mid esophagus appear. The maximal esophagus appears distended with layering debris within. Consideration may be given to esophageal stenting.  - CT abdomen and pelvis:  Increased upper abdominal bulky lymphadenopathy. Unchanged bulky retroperitoneal lymphadenopathy, large left bladder mass and perirectal soft tissue infiltrative mass. 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 Chronic moderate to severe left hydroureteronephrosis  - EGD with GI 2/21 -> stent placed and esophageal biopsies taken -pathology - Poorly differentiated carcinoma.  - CT surgery following -> no acute intervention   - AFP and CEA wnl - LDH and haptoglobin elevated - PSA total 220, PSA free is 22 - CA19 131-  57  - oncology recs appreciated ->patient used to follow with Dr. Kenia Irizarry, called and teams her and she is not picking up the phone calls, not reachable in any way   -service oncology consulted  - Esophageal biopsy: Cytopathology - Non Gyn Report: ACCESSION No:  [id number]. Final Diagnosis- MEDIASTINUM MASS; EUS GUIDED FNA (THINPREP AND CELL BLOCK): POSITIVE FOR MALIGNANT CELLS,  Metastatic poorly differentiated carcinoma. The morphology of the current tumor and the immunohistochemical profile along with the patient's history of prostatic cancer are consistent with a metastatic poorly differentiated carcinoma of prostatic origin.  - Esophageal ulcer biopsy shows poorly differentiated carcinoma, IHC staining pending. Discussed with Dr. Lewis, related to prostate cancer, not a second primary malignancy   - per onc note, patient had prior biopsy of site that demonstrated metastatic prostate adenocarcinoma and was started on Docetaxel, in January 2024 patient was started on Pembrolizumab and continued on keytruda injections.   - Dr. Irizarry is returning to the office on Monday 3/4 => cs rad/onco for palliative radiation , onco asked about new protocol since patient progressed , rec hospice and palliative   -refusing oral intake, IVF then peg feeding   -esophogram :Esophageal stent is visualized in the mid esophagus and extending below  the left hemidiaphragm. The lumen of the mid esophagus is severely   narrowed.A small amount of contrast is visualized passing through the mid esophagus.No evidence of contrast extravasation or fistula formation to suggest leak.  => advanced GI recalled again : EGD  replaced stent and PEG placed 3/5/24   -restart puree and PED feeding , monitor for refeeding  -air under diaphragm can be Normal finding after G Tube placement.=> CS GS and PEG studies then restarted feeding   -Change CTX into Zosyn  -lacks capacity- psych ethics  and palliative following    #shoulder body irregularity   Xray Shoulder 2 Views, Left (03.05.24 @ 20:02)   Subacute/chronic appearing distal clavicular fracture with approximately   1.7 cm of inferior displacement of the distal fragment. No acute fracture   or dislocation.  Left-sided opacities/effusion. Esophageal stent with surgical clips    #CHUCKIE vs ATN induced DIMAS  - pt came in with a baseline of 1.5 and increased to 2.6 with contrast use  - c/w IVF  - renally dose medications  -crea not improving   -US Kidney and Bladder (03.01.24 @ 18:42) >  1.  Diffusely echogenic bilateral kidneys which can be seen with medical renal disease.  2.  Moderate to severe left hydronephrosis with left renal parenchymal thinning, as seen on prior CT scan.  3.  Large mass identified within the urinary bladder, measuring up to 7.9  cm, previously up to 7 cm.  No ureteral jets are seen bilaterally.  4.  Incidentally noted right pleural effusion.  -nephro cs   -C3 C4 levels normal  -bicarb   -no indications for RRT  -consider  but crea is improving     # small b/l pleural effusions on CT chest   - s/p IV Lasix     #normocytic Anemia  - Hb 9-10  - f/u iron  studies noted % sat 27   - MCV normal, SPEP negative     #hypocalcemia  -replenished   -monitor     #Misc   #DVT PPx- heparin   #GI PPx- none   #Diet-  puree   #Activity- AAT,   PT consult recommended Home PT  - Would be unable to return to [facility]  #Dispo- Acute 68-year-old male with past medical history of hypertension, chronic back pain, prostate cancer, and posterior mediastinal mass 6.5 cm seen on CT 11/2023, presents for 3 months of progressively difficulty swallowing associated with 10 pounds weight loss and odynophagia. Comes in today now unable to swallow his pills with water, resulting in 2 episode of emesis. He mentioned that he gets SOB during exertion but denies any exertional chest pain. As per patient he was treated at [facility] for prostate cancer and is s/p radiation therapy. He states that he has problem during walking and unable to maintain the posture.     #aspiration PNA vs viral infection vs HAP   #GAS s pyogenes bacteremia   - SIRS positive (WBC increased to 16, febrile to 101 and tachy to 129 )   - CXR with worsening L-sided opacity   - Blood cx (2/24/24): positive for strep pyogenes  - repeat BCx 27,28,29,1,2  negative to date   - procal 7  - aspiration precautions   - patient cleared for pureed diet and thin liquids per SS -refusing food as patient reports inability to swallow   - on zosyn 3.375mg q8h switched to cefepime and clindamycin pending ID rec   -ID rec:  source potentially from translocation in setting of infiltrative masses, stop clindamycin , switch cefepime to ceftriaxone 2g daily   - continue ceftriaxone 2g daily end date 3/11 to complete 2 weeks from culture clearance ideally to finish with midline   -changed to zosyn post PEG   - TTE   1. Left ventricular ejection fraction, by visual estimation, is 65 to 70%.   2. Hyperdynamic global left ventricular systolic function.   3. Mildly increased LV wall thickness.   4. Spectral Doppler shows impaired relaxation pattern of left   ventricular myocardial filling (Grade I diastolic dysfunction).   5. Trace mitral valve regurgitation.    # Dysphagia with odynophagia  #Failure to thrive   # Hx of prostate cancer s/p radiation therapy  # Prostate cancer metastasis to mediastinum or primary mediastinal mass?  - CT Chest: Interval enlargement of the central/posterior mediastinal mass with development of more extensive lymphadenopathy. The mass encircles the descending thoracic aorta and has some mass effect upon the left atrium. The mid aspect of the esophagus is encircled by the mass and difficult to distinguish. Possible ovoid pill is noted within the mass and possible location of the mid esophagus appear. The maximal esophagus appears distended with layering debris within. Consideration may be given to esophageal stenting.  - CT abdomen and pelvis:  Increased upper abdominal bulky lymphadenopathy. Unchanged bulky retroperitoneal lymphadenopathy, large left bladder mass and perirectal soft tissue infiltrative mass. 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 Chronic moderate to severe left hydroureteronephrosis  - EGD with GI 2/21 -> stent placed and esophageal biopsies taken -pathology - Poorly differentiated carcinoma.  - CT surgery following -> no acute intervention   - AFP and CEA wnl - LDH and haptoglobin elevated - PSA total 220, PSA free is 22 - CA19 131-  57  - oncology recs appreciated ->patient used to follow with Dr. Kenia Irizarry, called and teams her and she is not picking up the phone calls, not reachable in any way   -service oncology consulted  - Esophageal biopsy: Cytopathology - Non Gyn Report: ACCESSION No:  [id number]. Final Diagnosis- MEDIASTINUM MASS; EUS GUIDED FNA (THINPREP AND CELL BLOCK): POSITIVE FOR MALIGNANT CELLS,  Metastatic poorly differentiated carcinoma. The morphology of the current tumor and the immunohistochemical profile along with the patient's history of prostatic cancer are consistent with a metastatic poorly differentiated carcinoma of prostatic origin.  - Esophageal ulcer biopsy shows poorly differentiated carcinoma, IHC staining pending. Discussed with Dr. Lewis, related to prostate cancer, not a second primary malignancy   - per onc note, patient had prior biopsy of site that demonstrated metastatic prostate adenocarcinoma and was started on Docetaxel, in January 2024 patient was started on Pembrolizumab and continued on keytruda injections.   - Dr. Irizarry is returning to the office on Monday 3/4 => cs rad/onco for palliative radiation , onco asked about new protocol since patient progressed , rec hospice and palliative   -refusing oral intake, IVF then peg feeding   -esophogram :Esophageal stent is visualized in the mid esophagus and extending below  the left hemidiaphragm. The lumen of the mid esophagus is severely   narrowed. A small amount of contrast is visualized passing through the mid esophagus.No evidence of contrast extravasation or fistula formation to suggest leak.  => advanced GI recalled again : EGD  replaced stent and PEG placed 3/5/24   -restart puree and PED feeding , monitor for refeeding  -air under diaphragm can be Normal finding after G Tube placement.=> CS GS and PEG studies then restarted feeding   -Change CTX into Zosyn  -lacks capacity- psych ethics  and palliative following    #shoulder body irregularity   Xray Shoulder 2 Views, Left (03.05.24 @ 20:02)   Subacute/chronic appearing distal clavicular fracture with approximately   1.7 cm of inferior displacement of the distal fragment. No acute fracture   or dislocation.  Left-sided opacities/effusion. Esophageal stent with surgical clips    #CHUCKIE vs ATN induced DIMAS  - pt came in with a baseline of 1.5 and increased to 2.6 with contrast use  - c/w IVF  - renally dose medications  -crea not improving   -US Kidney and Bladder (03.01.24 @ 18:42) >  1.  Diffusely echogenic bilateral kidneys which can be seen with medical renal disease.  2.  Moderate to severe left hydronephrosis with left renal parenchymal thinning, as seen on prior CT scan.  3.  Large mass identified within the urinary bladder, measuring up to 7.9  cm, previously up to 7 cm.  No ureteral jets are seen bilaterally.  4.  Incidentally noted right pleural effusion.  -nephro cs   -C3 C4 levels normal  -bicarb   -no indications for RRT  -consider  but crea is improving     # small b/l pleural effusions on CT chest   - s/p IV Lasix     #normocytic Anemia  - Hb 9-10  - f/u iron  studies noted % sat 27   - MCV normal, SPEP negative     #hypocalcemia  -replenished   -monitor     #Misc   #DVT PPx- heparin   #GI PPx- none   #Diet-  puree   #Activity- AAT,   PT consult recommended Home PT  - Would be unable to return to [facility]  #Dispo- Acute

## 2024-03-08 NOTE — PROGRESS NOTE ADULT - SUBJECTIVE AND OBJECTIVE BOX
24H events:    Today is hospital day 17d. This morning patient was seen and examined at bedside, resting comfortably in bed.    No acute or major events overnight.    PAST MEDICAL & SURGICAL HISTORY  HTN (hypertension)    Prostate cancer        SOCIAL HISTORY:  Social History:      ALLERGIES:  No Known Allergies      MEDICATIONS:  STANDING MEDICATIONS  chlorhexidine 2% Cloths 1 Application(s) Topical <User Schedule>  dextrose 5% 1000 milliLiter(s) IV Continuous <Continuous>  heparin   Injectable 5000 Unit(s) SubCutaneous every 12 hours  iohexol 300 mG (iodine)/mL Oral Solution 30 milliLiter(s) Oral once  lidocaine   4% Patch 1 Patch Transdermal every 24 hours  metoprolol tartrate 25 milliGRAM(s) Oral two times a day  ondansetron Injectable 4 milliGRAM(s) IV Push once  piperacillin/tazobactam IVPB.. 3.375 Gram(s) IV Intermittent every 8 hours  sodium bicarbonate 1300 milliGRAM(s) Oral every 6 hours  sodium phosphate 30 milliMole(s)/500 mL IVPB 30 milliMole(s) IV Intermittent once    PRN MEDICATIONS      VITALS:   T(C): 36.9 (03-08-24 @ 06:14), Max: 36.9 (03-08-24 @ 06:14)  HR: 108 (03-08-24 @ 06:14) (60 - 115)  BP: 106/57 (03-08-24 @ 06:14) (105/68 - 112/81)  RR: 20 (03-08-24 @ 06:14) (18 - 20)  SpO2: 100% (03-07-24 @ 20:00) (80% - 100%)  I&O's Summary    06 Mar 2024 07:01  -  07 Mar 2024 07:00  --------------------------------------------------------  IN: 0 mL / OUT: 500 mL / NET: -500 mL    07 Mar 2024 07:01  -  08 Mar 2024 06:44  --------------------------------------------------------  IN: 0 mL / OUT: 500 mL / NET: -500 mL          PHYSICAL EXAM:    GENERAL: NAD, non-toxic appearing, cachectic , poor dental hygiene   NECK: Supple, no stiffness, no JVD  HEART: Regular rate and rhythm, normal s1s2  LUNGS: Unlabored respirations, b/l air entry, no adventitious breath sounds   ABDOMEN: Soft, nontender, nondistended; +BS  EXTREMITIES: No clubbing, cyanosis, or edema;   NERVOUS SYSTEM: AOx3  SKIN: Warm and dry      LABS:                        8.2    14.39 )-----------( 490      ( 07 Mar 2024 06:09 )             27.4     03-07    146  |  111<H>  |  22<H>  ----------------------------<  124<H>  4.6   |  15<L>  |  1.6<H>    Ca    7.7<L>      07 Mar 2024 06:09  Phos  2.6     03-07  Mg     1.8     03-07    TPro  6.1  /  Alb  2.9<L>  /  TBili  0.5  /  DBili  x   /  AST  21  /  ALT  16  /  AlkPhos  64  03-07      Urinalysis Basic - ( 07 Mar 2024 06:09 )    Color: x / Appearance: x / SG: x / pH: x  Gluc: 124 mg/dL / Ketone: x  / Bili: x / Urobili: x   Blood: x / Protein: x / Nitrite: x   Leuk Esterase: x / RBC: x / WBC x   Sq Epi: x / Non Sq Epi: x / Bacteria: x      ABG - ( 06 Mar 2024 16:15 )  pH, Arterial: 7.41  pH, Blood: x     /  pCO2: 19    /  pO2: 68    / HCO3: 12    / Base Excess: -10.2 /  SaO2: 96.8                           24H events:    Today is hospital day 17d. This morning patient was seen and examined at bedside, resting comfortably in bed.  does not  understand what he refused yesterday and agreed on IV meds and meds per PEG.     PAST MEDICAL & SURGICAL HISTORY  HTN (hypertension)    Prostate cancer        SOCIAL HISTORY:  Social History:      ALLERGIES:  No Known Allergies      MEDICATIONS:  STANDING MEDICATIONS  chlorhexidine 2% Cloths 1 Application(s) Topical <User Schedule>  dextrose 5% 1000 milliLiter(s) IV Continuous <Continuous>  heparin   Injectable 5000 Unit(s) SubCutaneous every 12 hours  iohexol 300 mG (iodine)/mL Oral Solution 30 milliLiter(s) Oral once  lidocaine   4% Patch 1 Patch Transdermal every 24 hours  metoprolol tartrate 25 milliGRAM(s) Oral two times a day  ondansetron Injectable 4 milliGRAM(s) IV Push once  piperacillin/tazobactam IVPB.. 3.375 Gram(s) IV Intermittent every 8 hours  sodium bicarbonate 1300 milliGRAM(s) Oral every 6 hours  sodium phosphate 30 milliMole(s)/500 mL IVPB 30 milliMole(s) IV Intermittent once    PRN MEDICATIONS      VITALS:   T(C): 36.9 (03-08-24 @ 06:14), Max: 36.9 (03-08-24 @ 06:14)  HR: 108 (03-08-24 @ 06:14) (60 - 115)  BP: 106/57 (03-08-24 @ 06:14) (105/68 - 112/81)  RR: 20 (03-08-24 @ 06:14) (18 - 20)  SpO2: 100% (03-07-24 @ 20:00) (80% - 100%)  I&O's Summary    06 Mar 2024 07:01  -  07 Mar 2024 07:00  --------------------------------------------------------  IN: 0 mL / OUT: 500 mL / NET: -500 mL    07 Mar 2024 07:01  -  08 Mar 2024 06:44  --------------------------------------------------------  IN: 0 mL / OUT: 500 mL / NET: -500 mL          PHYSICAL EXAM:    GENERAL: NAD, non-toxic appearing, cachectic , poor dental hygiene   NECK: Supple, no stiffness, no JVD  HEART: Regular rate and rhythm, normal s1s2  LUNGS: Unlabored respirations, b/l air entry, no adventitious breath sounds   ABDOMEN: Soft, nontender, nondistended; +BS  EXTREMITIES: No clubbing, cyanosis, or edema;   NERVOUS SYSTEM: AOx2  SKIN: Warm and dry      LABS:                        8.2    14.39 )-----------( 490      ( 07 Mar 2024 06:09 )             27.4     03-07    146  |  111<H>  |  22<H>  ----------------------------<  124<H>  4.6   |  15<L>  |  1.6<H>    Ca    7.7<L>      07 Mar 2024 06:09  Phos  2.6     03-07  Mg     1.8     03-07    TPro  6.1  /  Alb  2.9<L>  /  TBili  0.5  /  DBili  x   /  AST  21  /  ALT  16  /  AlkPhos  64  03-07      Urinalysis Basic - ( 07 Mar 2024 06:09 )    Color: x / Appearance: x / SG: x / pH: x  Gluc: 124 mg/dL / Ketone: x  / Bili: x / Urobili: x   Blood: x / Protein: x / Nitrite: x   Leuk Esterase: x / RBC: x / WBC x   Sq Epi: x / Non Sq Epi: x / Bacteria: x      ABG - ( 06 Mar 2024 16:15 )  pH, Arterial: 7.41  pH, Blood: x     /  pCO2: 19    /  pO2: 68    / HCO3: 12    / Base Excess: -10.2 /  SaO2: 96.8

## 2024-03-09 LAB
ALBUMIN SERPL ELPH-MCNC: 2.7 G/DL — LOW (ref 3.5–5.2)
ALP SERPL-CCNC: 58 U/L — SIGNIFICANT CHANGE UP (ref 30–115)
ALT FLD-CCNC: 18 U/L — SIGNIFICANT CHANGE UP (ref 0–41)
ANION GAP SERPL CALC-SCNC: 10 MMOL/L — SIGNIFICANT CHANGE UP (ref 7–14)
AST SERPL-CCNC: 22 U/L — SIGNIFICANT CHANGE UP (ref 0–41)
BASOPHILS # BLD AUTO: 0.02 K/UL — SIGNIFICANT CHANGE UP (ref 0–0.2)
BASOPHILS NFR BLD AUTO: 0.2 % — SIGNIFICANT CHANGE UP (ref 0–1)
BILIRUB SERPL-MCNC: 0.3 MG/DL — SIGNIFICANT CHANGE UP (ref 0.2–1.2)
BUN SERPL-MCNC: 22 MG/DL — HIGH (ref 10–20)
CALCIUM SERPL-MCNC: 7.6 MG/DL — LOW (ref 8.4–10.5)
CHLORIDE SERPL-SCNC: 115 MMOL/L — HIGH (ref 98–110)
CO2 SERPL-SCNC: 22 MMOL/L — SIGNIFICANT CHANGE UP (ref 17–32)
CREAT SERPL-MCNC: 1.6 MG/DL — HIGH (ref 0.7–1.5)
EGFR: 46 ML/MIN/1.73M2 — LOW
EOSINOPHIL # BLD AUTO: 0.17 K/UL — SIGNIFICANT CHANGE UP (ref 0–0.7)
EOSINOPHIL NFR BLD AUTO: 2 % — SIGNIFICANT CHANGE UP (ref 0–8)
GLUCOSE SERPL-MCNC: 133 MG/DL — HIGH (ref 70–99)
HCT VFR BLD CALC: 24.2 % — LOW (ref 42–52)
HGB BLD-MCNC: 7.4 G/DL — LOW (ref 14–18)
IMM GRANULOCYTES NFR BLD AUTO: 0.6 % — HIGH (ref 0.1–0.3)
LYMPHOCYTES # BLD AUTO: 0.68 K/UL — LOW (ref 1.2–3.4)
LYMPHOCYTES # BLD AUTO: 7.9 % — LOW (ref 20.5–51.1)
MAGNESIUM SERPL-MCNC: 2 MG/DL — SIGNIFICANT CHANGE UP (ref 1.8–2.4)
MCHC RBC-ENTMCNC: 28 PG — SIGNIFICANT CHANGE UP (ref 27–31)
MCHC RBC-ENTMCNC: 30.6 G/DL — LOW (ref 32–37)
MCV RBC AUTO: 91.7 FL — SIGNIFICANT CHANGE UP (ref 80–94)
MONOCYTES # BLD AUTO: 0.74 K/UL — HIGH (ref 0.1–0.6)
MONOCYTES NFR BLD AUTO: 8.6 % — SIGNIFICANT CHANGE UP (ref 1.7–9.3)
NEUTROPHILS # BLD AUTO: 6.9 K/UL — HIGH (ref 1.4–6.5)
NEUTROPHILS NFR BLD AUTO: 80.7 % — HIGH (ref 42.2–75.2)
NRBC # BLD: 0 /100 WBCS — SIGNIFICANT CHANGE UP (ref 0–0)
PHOSPHATE SERPL-MCNC: 1.3 MG/DL — LOW (ref 2.1–4.9)
PLATELET # BLD AUTO: 451 K/UL — HIGH (ref 130–400)
PMV BLD: 9.7 FL — SIGNIFICANT CHANGE UP (ref 7.4–10.4)
POTASSIUM SERPL-MCNC: 3.9 MMOL/L — SIGNIFICANT CHANGE UP (ref 3.5–5)
POTASSIUM SERPL-SCNC: 3.9 MMOL/L — SIGNIFICANT CHANGE UP (ref 3.5–5)
PROT SERPL-MCNC: 5.6 G/DL — LOW (ref 6–8)
RBC # BLD: 2.64 M/UL — LOW (ref 4.7–6.1)
RBC # FLD: 15 % — HIGH (ref 11.5–14.5)
SODIUM SERPL-SCNC: 147 MMOL/L — HIGH (ref 135–146)
WBC # BLD: 8.56 K/UL — SIGNIFICANT CHANGE UP (ref 4.8–10.8)
WBC # FLD AUTO: 8.56 K/UL — SIGNIFICANT CHANGE UP (ref 4.8–10.8)

## 2024-03-09 PROCEDURE — 99232 SBSQ HOSP IP/OBS MODERATE 35: CPT

## 2024-03-09 RX ORDER — SODIUM CHLORIDE 9 MG/ML
1000 INJECTION, SOLUTION INTRAVENOUS
Refills: 0 | Status: DISCONTINUED | OUTPATIENT
Start: 2024-03-09 | End: 2024-03-09

## 2024-03-09 RX ORDER — POTASSIUM PHOSPHATE, MONOBASIC POTASSIUM PHOSPHATE, DIBASIC 236; 224 MG/ML; MG/ML
30 INJECTION, SOLUTION INTRAVENOUS ONCE
Refills: 0 | Status: COMPLETED | OUTPATIENT
Start: 2024-03-09 | End: 2024-03-09

## 2024-03-09 RX ORDER — SODIUM CHLORIDE 9 MG/ML
1000 INJECTION, SOLUTION INTRAVENOUS
Refills: 0 | Status: DISCONTINUED | OUTPATIENT
Start: 2024-03-09 | End: 2024-03-21

## 2024-03-09 RX ADMIN — SODIUM CHLORIDE 75 MILLILITER(S): 9 INJECTION, SOLUTION INTRAVENOUS at 17:44

## 2024-03-09 RX ADMIN — Medication 25 MILLIGRAM(S): at 17:42

## 2024-03-09 RX ADMIN — SODIUM CHLORIDE 100 MILLILITER(S): 9 INJECTION, SOLUTION INTRAVENOUS at 14:14

## 2024-03-09 RX ADMIN — Medication 1300 MILLIGRAM(S): at 11:24

## 2024-03-09 RX ADMIN — Medication 1300 MILLIGRAM(S): at 17:42

## 2024-03-09 RX ADMIN — Medication 1300 MILLIGRAM(S): at 05:58

## 2024-03-09 RX ADMIN — PIPERACILLIN AND TAZOBACTAM 25 GRAM(S): 4; .5 INJECTION, POWDER, LYOPHILIZED, FOR SOLUTION INTRAVENOUS at 12:50

## 2024-03-09 RX ADMIN — LIDOCAINE 1 PATCH: 4 CREAM TOPICAL at 12:11

## 2024-03-09 RX ADMIN — HEPARIN SODIUM 5000 UNIT(S): 5000 INJECTION INTRAVENOUS; SUBCUTANEOUS at 17:42

## 2024-03-09 RX ADMIN — PIPERACILLIN AND TAZOBACTAM 25 GRAM(S): 4; .5 INJECTION, POWDER, LYOPHILIZED, FOR SOLUTION INTRAVENOUS at 05:58

## 2024-03-09 RX ADMIN — Medication 25 MILLIGRAM(S): at 05:59

## 2024-03-09 RX ADMIN — HEPARIN SODIUM 5000 UNIT(S): 5000 INJECTION INTRAVENOUS; SUBCUTANEOUS at 05:59

## 2024-03-09 RX ADMIN — CHLORHEXIDINE GLUCONATE 1 APPLICATION(S): 213 SOLUTION TOPICAL at 05:57

## 2024-03-09 NOTE — PROGRESS NOTE ADULT - SUBJECTIVE AND OBJECTIVE BOX
24H events:    Today is hospital day 18d. This morning patient was seen and examined at bedside, resting comfortably in bed.    Refused labs, refused feeding. education given , doubt capacity.Diarrrhea overnight.     PAST MEDICAL & SURGICAL HISTORY  HTN (hypertension)    Prostate cancer        SOCIAL HISTORY:  Social History:      ALLERGIES:  No Known Allergies      MEDICATIONS:  STANDING MEDICATIONS  chlorhexidine 2% Cloths 1 Application(s) Topical <User Schedule>  heparin   Injectable 5000 Unit(s) SubCutaneous every 12 hours  iohexol 300 mG (iodine)/mL Oral Solution 30 milliLiter(s) Oral once  lidocaine   4% Patch 1 Patch Transdermal every 24 hours  metoprolol tartrate 25 milliGRAM(s) Oral two times a day  ondansetron Injectable 4 milliGRAM(s) IV Push once  piperacillin/tazobactam IVPB.. 3.375 Gram(s) IV Intermittent every 8 hours  potassium phosphate IVPB 30 milliMole(s) IV Intermittent once  sodium bicarbonate 1300 milliGRAM(s) Oral every 6 hours  sodium phosphate 30 milliMole(s)/500 mL IVPB 30 milliMole(s) IV Intermittent once    PRN MEDICATIONS      VITALS:   T(C): 35.6 (03-09-24 @ 05:42), Max: 36.8 (03-08-24 @ 21:38)  HR: 105 (03-09-24 @ 05:42) (104 - 110)  BP: 102/71 (03-09-24 @ 05:42) (101/69 - 105/71)  RR: 18 (03-09-24 @ 05:42) (18 - 20)  SpO2: 100% (03-08-24 @ 13:37) (100% - 100%)  I&O's Summary    07 Mar 2024 07:01  -  08 Mar 2024 07:00  --------------------------------------------------------  IN: 300 mL / OUT: 500 mL / NET: -200 mL    08 Mar 2024 07:01  -  09 Mar 2024 06:52  --------------------------------------------------------  IN: 920 mL / OUT: 0 mL / NET: 920 mL          PHYSICAL EXAM:  GENERAL: NAD, non-toxic appearing, cachectic , poor dental hygiene   NECK: Supple, no stiffness, no JVD  HEART: Regular rate and rhythm, normal s1s2  LUNGS: Unlabored respirations, b/l air entry, no adventitious breath sounds   ABDOMEN: Soft, nontender, nondistended; +BS  EXTREMITIES: No clubbing, cyanosis, or edema;   NERVOUS SYSTEM: AOx2  SKIN: Warm and dry    LABS:                        8.0    9.51  )-----------( 478      ( 08 Mar 2024 18:14 )             26.2     03-08    144  |  112<H>  |  21<H>  ----------------------------<  129<H>  4.2   |  21  |  1.4    Ca    7.5<L>      08 Mar 2024 07:03  Phos  1.7     03-08  Mg     1.9     03-08    TPro  5.9<L>  /  Alb  2.8<L>  /  TBili  0.5  /  DBili  x   /  AST  21  /  ALT  15  /  AlkPhos  60  03-08      Urinalysis Basic - ( 08 Mar 2024 07:03 )    Color: x / Appearance: x / SG: x / pH: x  Gluc: 129 mg/dL / Ketone: x  / Bili: x / Urobili: x   Blood: x / Protein: x / Nitrite: x   Leuk Esterase: x / RBC: x / WBC x   Sq Epi: x / Non Sq Epi: x / Bacteria: x

## 2024-03-09 NOTE — PROGRESS NOTE ADULT - ASSESSMENT
68-year-old male with past medical history of hypertension, chronic back pain, prostate cancer, and posterior mediastinal mass 6.5 cm seen on CT 11/2023, presents for 3 months of progressively difficulty swallowing associated with 10 pounds weight loss and odynophagia. Comes in today now unable to swallow his pills with water, resulting in 2 episode of emesis. He mentioned that he gets SOB during exertion but denies any exertional chest pain. As per patient he was treated at [facility] for prostate cancer and is s/p radiation therapy. He states that he has problem during walking and unable to maintain the posture.     #aspiration PNA vs viral infection vs HAP   #GAS s pyogenes bacteremia   - SIRS positive (WBC increased to 16, febrile to 101 and tachy to 129 )   - CXR with worsening L-sided opacity   - Blood cx (2/24/24): positive for strep pyogenes  - repeat BCx 27,28,29,1,2  negative to date   - procal 7  - aspiration precautions   - patient cleared for pureed diet and thin liquids per SS -refusing food as patient reports inability to swallow   - on zosyn 3.375mg q8h switched to cefepime and clindamycin pending ID rec   -ID rec:  source potentially from translocation in setting of infiltrative masses, stop clindamycin , switch cefepime to ceftriaxone 2g daily   - continue ceftriaxone 2g daily end date 3/11 to complete 2 weeks from culture clearance ideally to finish with midline   -changed to zosyn post PEG   - TTE   1. Left ventricular ejection fraction, by visual estimation, is 65 to 70%.   2. Hyperdynamic global left ventricular systolic function.   3. Mildly increased LV wall thickness.   4. Spectral Doppler shows impaired relaxation pattern of left   ventricular myocardial filling (Grade I diastolic dysfunction).   5. Trace mitral valve regurgitation.    # Dysphagia with odynophagia  #Failure to thrive   # Hx of prostate cancer s/p radiation therapy  # Prostate cancer metastasis to mediastinum or primary mediastinal mass?  - CT Chest: Interval enlargement of the central/posterior mediastinal mass with development of more extensive lymphadenopathy. The mass encircles the descending thoracic aorta and has some mass effect upon the left atrium. The mid aspect of the esophagus is encircled by the mass and difficult to distinguish. Possible ovoid pill is noted within the mass and possible location of the mid esophagus appear. The maximal esophagus appears distended with layering debris within. Consideration may be given to esophageal stenting.  - CT abdomen and pelvis:  Increased upper abdominal bulky lymphadenopathy. Unchanged bulky retroperitoneal lymphadenopathy, large left bladder mass and perirectal soft tissue infiltrative mass. 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 Chronic moderate to severe left hydroureteronephrosis  - EGD with GI 2/21 -> stent placed and esophageal biopsies taken -pathology - Poorly differentiated carcinoma.  - CT surgery following -> no acute intervention   - AFP and CEA wnl - LDH and haptoglobin elevated - PSA total 220, PSA free is 22 - CA19 131-  57  - oncology recs appreciated ->patient used to follow with Dr. Kenia Irizarry, called and teams her and she is not picking up the phone calls, not reachable in any way   -service oncology consulted  - Esophageal biopsy: Cytopathology - Non Gyn Report: ACCESSION No:  [id number]. Final Diagnosis- MEDIASTINUM MASS; EUS GUIDED FNA (THINPREP AND CELL BLOCK): POSITIVE FOR MALIGNANT CELLS,  Metastatic poorly differentiated carcinoma. The morphology of the current tumor and the immunohistochemical profile along with the patient's history of prostatic cancer are consistent with a metastatic poorly differentiated carcinoma of prostatic origin.  - Esophageal ulcer biopsy shows poorly differentiated carcinoma, IHC staining pending. Discussed with Dr. Lewis, related to prostate cancer, not a second primary malignancy   - per onc note, patient had prior biopsy of site that demonstrated metastatic prostate adenocarcinoma and was started on Docetaxel, in January 2024 patient was started on Pembrolizumab and continued on keytruda injections.   - Dr. Irizarry is returning to the office on Monday 3/4 => cs rad/onco for palliative radiation , onco asked about new protocol since patient progressed , rec hospice and palliative   -refusing oral intake, IVF then peg feeding   -esophogram :Esophageal stent is visualized in the mid esophagus and extending below  the left hemidiaphragm. The lumen of the mid esophagus is severely   narrowed. A small amount of contrast is visualized passing through the mid esophagus.No evidence of contrast extravasation or fistula formation to suggest leak.  => advanced GI recalled again : EGD  replaced stent and PEG placed 3/5/24   -restart puree and PED feeding , monitor for refeeding  -air under diaphragm can be Normal finding after G Tube placement.=> CS GS and PEG studies then restarted feeding   -Change CTX into Zosyn  -lacks capacity- psych ethics  and palliative following    #shoulder body irregularity   Xray Shoulder 2 Views, Left (03.05.24 @ 20:02)   Subacute/chronic appearing distal clavicular fracture with approximately   1.7 cm of inferior displacement of the distal fragment. No acute fracture   or dislocation.  Left-sided opacities/effusion. Esophageal stent with surgical clips    #CHUCKIE vs ATN induced DIMAS  - pt came in with a baseline of 1.5 and increased to 2.6 with contrast use  - c/w IVF  - renally dose medications  -crea not improving   -US Kidney and Bladder (03.01.24 @ 18:42) >  1.  Diffusely echogenic bilateral kidneys which can be seen with medical renal disease.  2.  Moderate to severe left hydronephrosis with left renal parenchymal thinning, as seen on prior CT scan.  3.  Large mass identified within the urinary bladder, measuring up to 7.9  cm, previously up to 7 cm.  No ureteral jets are seen bilaterally.  4.  Incidentally noted right pleural effusion.  -nephro cs   -C3 C4 levels normal  -bicarb   -no indications for RRT  -consider  but crea is improving     # small b/l pleural effusions on CT chest   - s/p IV Lasix     #normocytic Anemia  - Hb 9-10  - f/u iron  studies noted % sat 27   - MCV normal, SPEP negative     #hypocalcemia  -replenished   -monitor     #Misc   #DVT PPx- heparin   #GI PPx- none   #Diet-  puree   #Activity- AAT,   PT consult recommended Home PT  - Would be unable to return to [facility]  #Dispo- Acute   68-year-old male with past medical history of hypertension, chronic back pain, prostate cancer, and posterior mediastinal mass 6.5 cm seen on CT 11/2023, presents for 3 months of progressively difficulty swallowing associated with 10 pounds weight loss and odynophagia. Comes in today now unable to swallow his pills with water, resulting in 2 episode of emesis. He mentioned that he gets SOB during exertion but denies any exertional chest pain. As per patient he was treated at [facility] for prostate cancer and is s/p radiation therapy. He states that he has problem during walking and unable to maintain the posture.     #aspiration PNA vs viral infection vs HAP   #GAS s pyogenes bacteremia   - SIRS positive (WBC increased to 16, febrile to 101 and tachy to 129 )   - CXR with worsening L-sided opacity   - Blood cx (2/24/24): positive for strep pyogenes  - repeat BCx 27,28,29,1,2  negative to date   - procal 7  - aspiration precautions   - patient cleared for pureed diet and thin liquids per SS -refusing food as patient reports inability to swallow   - on zosyn 3.375mg q8h switched to cefepime and clindamycin pending ID rec   -ID rec:  source potentially from translocation in setting of infiltrative masses, stop clindamycin , switch cefepime to ceftriaxone 2g daily   - continue ceftriaxone 2g daily end date 3/11 to complete 2 weeks from culture clearance ideally to finish with midline   -changed to zosyn post PEG   - TTE   1. Left ventricular ejection fraction, by visual estimation, is 65 to 70%.   2. Hyperdynamic global left ventricular systolic function.   3. Mildly increased LV wall thickness.   4. Spectral Doppler shows impaired relaxation pattern of left   ventricular myocardial filling (Grade I diastolic dysfunction).   5. Trace mitral valve regurgitation.    # Dysphagia with odynophagia  #Failure to thrive   # Hx of prostate cancer s/p radiation therapy  # Prostate cancer metastasis to mediastinum or primary mediastinal mass?  - CT Chest: Interval enlargement of the central/posterior mediastinal mass with development of more extensive lymphadenopathy. The mass encircles the descending thoracic aorta and has some mass effect upon the left atrium. The mid aspect of the esophagus is encircled by the mass and difficult to distinguish. Possible ovoid pill is noted within the mass and possible location of the mid esophagus appear. The maximal esophagus appears distended with layering debris within. Consideration may be given to esophageal stenting.  - CT abdomen and pelvis:  Increased upper abdominal bulky lymphadenopathy. Unchanged bulky retroperitoneal lymphadenopathy, large left bladder mass and perirectal soft tissue infiltrative mass. 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 Chronic moderate to severe left hydroureteronephrosis  - EGD with GI 2/21 -> stent placed and esophageal biopsies taken -pathology - Poorly differentiated carcinoma.  - CT surgery following -> no acute intervention   - AFP and CEA wnl - LDH and haptoglobin elevated - PSA total 220, PSA free is 22 - CA19 131-  57  - oncology recs appreciated ->patient used to follow with Dr. Kenia Irizarry, called and teams her and she is not picking up the phone calls, not reachable in any way   -service oncology consulted  - Esophageal biopsy: Cytopathology - Non Gyn Report: ACCESSION No:  [id number]. Final Diagnosis- MEDIASTINUM MASS; EUS GUIDED FNA (THINPREP AND CELL BLOCK): POSITIVE FOR MALIGNANT CELLS,  Metastatic poorly differentiated carcinoma. The morphology of the current tumor and the immunohistochemical profile along with the patient's history of prostatic cancer are consistent with a metastatic poorly differentiated carcinoma of prostatic origin.  - Esophageal ulcer biopsy shows poorly differentiated carcinoma, IHC staining pending. Discussed with Dr. Lewis, related to prostate cancer, not a second primary malignancy   - per onc note, patient had prior biopsy of site that demonstrated metastatic prostate adenocarcinoma and was started on Docetaxel, in January 2024 patient was started on Pembrolizumab and continued on keytruda injections.   - Dr. Irizarry is returning to the office on Monday 3/4 => cs rad/onco for palliative radiation , onco asked about new protocol since patient progressed , rec hospice and palliative   -refusing oral intake, IVF then peg feeding   -esophogram :Esophageal stent is visualized in the mid esophagus and extending below  the left hemidiaphragm. The lumen of the mid esophagus is severely   narrowed. A small amount of contrast is visualized passing through the mid esophagus.No evidence of contrast extravasation or fistula formation to suggest leak.  => advanced GI recalled again : EGD  replaced stent and PEG placed 3/5/24   -restart puree and PED feeding , monitor for refeeding  -air under diaphragm can be Normal finding after G Tube placement.=> CS GS and PEG studies then restarted feeding   -Change CTX into Zosyn 3/6/24   -lacks capacity- psych ethics  and palliative following    #shoulder body irregularity   Xray Shoulder 2 Views, Left (03.05.24 @ 20:02)   Subacute/chronic appearing distal clavicular fracture with approximately   1.7 cm of inferior displacement of the distal fragment. No acute fracture   or dislocation.  Left-sided opacities/effusion. Esophageal stent with surgical clips    #CHUCKIE vs ATN induced DIMAS  - pt came in with a baseline of 1.5 and increased to 2.6 with contrast use  - c/w IVF  - renally dose medications  -crea not improving   -US Kidney and Bladder (03.01.24 @ 18:42) >  1.  Diffusely echogenic bilateral kidneys which can be seen with medical renal disease.  2.  Moderate to severe left hydronephrosis with left renal parenchymal thinning, as seen on prior CT scan.  3.  Large mass identified within the urinary bladder, measuring up to 7.9  cm, previously up to 7 cm.  No ureteral jets are seen bilaterally.  4.  Incidentally noted right pleural effusion.  -nephro cs   -C3 C4 levels normal  -bicarb   -no indications for RRT  -consider  but crea is improving     # small b/l pleural effusions on CT chest   - s/p IV Lasix     #normocytic Anemia  - Hb 9-10  - f/u iron  studies noted % sat 27   - MCV normal, SPEP negative     #hypocalcemia  -replenished   -monitor     #Misc   #DVT PPx- heparin   #GI PPx- none   #Diet-  puree   #Activity- AAT,   PT consult recommended Home PT  - Would be unable to return to [facility]  #Dispo- Acute

## 2024-03-09 NOTE — PROGRESS NOTE ADULT - SUBJECTIVE AND OBJECTIVE BOX
Recent events noted.  Old radiation records reviewed.  He received 30Gy/10 fx to the retroperitoneal nodes in September 2022.  This will be outside of the mediastinum.   Will review palliative radiation with patient and primary team once "his capacity" has been clarified.

## 2024-03-09 NOTE — PROGRESS NOTE ADULT - SUBJECTIVE AND OBJECTIVE BOX
24H events:    Today is hospital day 18d. This morning patient was seen and examined at bedside, resting comfortably in bed.    No acute or major events overnight.    PAST MEDICAL & SURGICAL HISTORY  HTN (hypertension)    Prostate cancer        SOCIAL HISTORY:  Social History:      ALLERGIES:  No Known Allergies      MEDICATIONS:  STANDING MEDICATIONS  chlorhexidine 2% Cloths 1 Application(s) Topical <User Schedule>  heparin   Injectable 5000 Unit(s) SubCutaneous every 12 hours  iohexol 300 mG (iodine)/mL Oral Solution 30 milliLiter(s) Oral once  lidocaine   4% Patch 1 Patch Transdermal every 24 hours  metoprolol tartrate 25 milliGRAM(s) Oral two times a day  ondansetron Injectable 4 milliGRAM(s) IV Push once  piperacillin/tazobactam IVPB.. 3.375 Gram(s) IV Intermittent every 8 hours  potassium phosphate IVPB 30 milliMole(s) IV Intermittent once  sodium bicarbonate 1300 milliGRAM(s) Oral every 6 hours  sodium phosphate 30 milliMole(s)/500 mL IVPB 30 milliMole(s) IV Intermittent once    PRN MEDICATIONS      VITALS:   T(C): 35.6 (03-09-24 @ 05:42), Max: 36.8 (03-08-24 @ 21:38)  HR: 105 (03-09-24 @ 05:42) (104 - 110)  BP: 102/71 (03-09-24 @ 05:42) (101/69 - 105/71)  RR: 18 (03-09-24 @ 05:42) (18 - 20)  SpO2: 100% (03-08-24 @ 13:37) (100% - 100%)  I&O's Summary    07 Mar 2024 07:01  -  08 Mar 2024 07:00  --------------------------------------------------------  IN: 300 mL / OUT: 500 mL / NET: -200 mL    08 Mar 2024 07:01  -  09 Mar 2024 06:52  --------------------------------------------------------  IN: 920 mL / OUT: 0 mL / NET: 920 mL          PHYSICAL EXAM:    LABS:                        8.0    9.51  )-----------( 478      ( 08 Mar 2024 18:14 )             26.2     03-08    144  |  112<H>  |  21<H>  ----------------------------<  129<H>  4.2   |  21  |  1.4    Ca    7.5<L>      08 Mar 2024 07:03  Phos  1.7     03-08  Mg     1.9     03-08    TPro  5.9<L>  /  Alb  2.8<L>  /  TBili  0.5  /  DBili  x   /  AST  21  /  ALT  15  /  AlkPhos  60  03-08      Urinalysis Basic - ( 08 Mar 2024 07:03 )    Color: x / Appearance: x / SG: x / pH: x  Gluc: 129 mg/dL / Ketone: x  / Bili: x / Urobili: x   Blood: x / Protein: x / Nitrite: x   Leuk Esterase: x / RBC: x / WBC x   Sq Epi: x / Non Sq Epi: x / Bacteria: x                     24H events:    Today is hospital day 18d. This morning patient was seen and examined at bedside, resting comfortably in bed.    Refused labs, refused feeding. education given , doubt capacity.Diarrrhea overnight.     PAST MEDICAL & SURGICAL HISTORY  HTN (hypertension)    Prostate cancer        SOCIAL HISTORY:  Social History:      ALLERGIES:  No Known Allergies      MEDICATIONS:  STANDING MEDICATIONS  chlorhexidine 2% Cloths 1 Application(s) Topical <User Schedule>  heparin   Injectable 5000 Unit(s) SubCutaneous every 12 hours  iohexol 300 mG (iodine)/mL Oral Solution 30 milliLiter(s) Oral once  lidocaine   4% Patch 1 Patch Transdermal every 24 hours  metoprolol tartrate 25 milliGRAM(s) Oral two times a day  ondansetron Injectable 4 milliGRAM(s) IV Push once  piperacillin/tazobactam IVPB.. 3.375 Gram(s) IV Intermittent every 8 hours  potassium phosphate IVPB 30 milliMole(s) IV Intermittent once  sodium bicarbonate 1300 milliGRAM(s) Oral every 6 hours  sodium phosphate 30 milliMole(s)/500 mL IVPB 30 milliMole(s) IV Intermittent once    PRN MEDICATIONS      VITALS:   T(C): 35.6 (03-09-24 @ 05:42), Max: 36.8 (03-08-24 @ 21:38)  HR: 105 (03-09-24 @ 05:42) (104 - 110)  BP: 102/71 (03-09-24 @ 05:42) (101/69 - 105/71)  RR: 18 (03-09-24 @ 05:42) (18 - 20)  SpO2: 100% (03-08-24 @ 13:37) (100% - 100%)  I&O's Summary    07 Mar 2024 07:01  -  08 Mar 2024 07:00  --------------------------------------------------------  IN: 300 mL / OUT: 500 mL / NET: -200 mL    08 Mar 2024 07:01  -  09 Mar 2024 06:52  --------------------------------------------------------  IN: 920 mL / OUT: 0 mL / NET: 920 mL          PHYSICAL EXAM:  GENERAL: NAD, non-toxic appearing, cachectic , poor dental hygiene   NECK: Supple, no stiffness, no JVD  HEART: Regular rate and rhythm, normal s1s2  LUNGS: Unlabored respirations, b/l air entry, no adventitious breath sounds   ABDOMEN: Soft, nontender, nondistended; +BS  EXTREMITIES: No clubbing, cyanosis, or edema;   NERVOUS SYSTEM: AOx2  SKIN: Warm and dry    LABS:                        8.0    9.51  )-----------( 478      ( 08 Mar 2024 18:14 )             26.2     03-08    144  |  112<H>  |  21<H>  ----------------------------<  129<H>  4.2   |  21  |  1.4    Ca    7.5<L>      08 Mar 2024 07:03  Phos  1.7     03-08  Mg     1.9     03-08    TPro  5.9<L>  /  Alb  2.8<L>  /  TBili  0.5  /  DBili  x   /  AST  21  /  ALT  15  /  AlkPhos  60  03-08      Urinalysis Basic - ( 08 Mar 2024 07:03 )    Color: x / Appearance: x / SG: x / pH: x  Gluc: 129 mg/dL / Ketone: x  / Bili: x / Urobili: x   Blood: x / Protein: x / Nitrite: x   Leuk Esterase: x / RBC: x / WBC x   Sq Epi: x / Non Sq Epi: x / Bacteria: x

## 2024-03-09 NOTE — PROGRESS NOTE ADULT - ATTENDING COMMENTS
Pt refusing medications. Explained to him that he has to take them and he agreed, but it looks like he later refused. Pt has been having diarrhea for 48 hrs now. Likely due to tube feeds. Added banana flakes. Continue zosyn. Maintain aspiration precautions.

## 2024-03-10 LAB
ALBUMIN SERPL ELPH-MCNC: 2.7 G/DL — LOW (ref 3.5–5.2)
ALP SERPL-CCNC: 54 U/L — SIGNIFICANT CHANGE UP (ref 30–115)
ALT FLD-CCNC: 15 U/L — SIGNIFICANT CHANGE UP (ref 0–41)
ANION GAP SERPL CALC-SCNC: 11 MMOL/L — SIGNIFICANT CHANGE UP (ref 7–14)
AST SERPL-CCNC: 18 U/L — SIGNIFICANT CHANGE UP (ref 0–41)
BASOPHILS # BLD AUTO: 0.02 K/UL — SIGNIFICANT CHANGE UP (ref 0–0.2)
BASOPHILS NFR BLD AUTO: 0.3 % — SIGNIFICANT CHANGE UP (ref 0–1)
BILIRUB SERPL-MCNC: 0.3 MG/DL — SIGNIFICANT CHANGE UP (ref 0.2–1.2)
BUN SERPL-MCNC: 16 MG/DL — SIGNIFICANT CHANGE UP (ref 10–20)
CALCIUM SERPL-MCNC: 7.3 MG/DL — LOW (ref 8.4–10.5)
CHLORIDE SERPL-SCNC: 112 MMOL/L — HIGH (ref 98–110)
CO2 SERPL-SCNC: 22 MMOL/L — SIGNIFICANT CHANGE UP (ref 17–32)
CREAT SERPL-MCNC: 1.3 MG/DL — SIGNIFICANT CHANGE UP (ref 0.7–1.5)
EGFR: 59 ML/MIN/1.73M2 — LOW
EOSINOPHIL # BLD AUTO: 0.14 K/UL — SIGNIFICANT CHANGE UP (ref 0–0.7)
EOSINOPHIL NFR BLD AUTO: 2.3 % — SIGNIFICANT CHANGE UP (ref 0–8)
GLUCOSE SERPL-MCNC: 116 MG/DL — HIGH (ref 70–99)
HCT VFR BLD CALC: 25.6 % — LOW (ref 42–52)
HGB BLD-MCNC: 7.6 G/DL — LOW (ref 14–18)
IMM GRANULOCYTES NFR BLD AUTO: 0.5 % — HIGH (ref 0.1–0.3)
LYMPHOCYTES # BLD AUTO: 0.63 K/UL — LOW (ref 1.2–3.4)
LYMPHOCYTES # BLD AUTO: 10.4 % — LOW (ref 20.5–51.1)
MAGNESIUM SERPL-MCNC: 1.9 MG/DL — SIGNIFICANT CHANGE UP (ref 1.8–2.4)
MCHC RBC-ENTMCNC: 27.9 PG — SIGNIFICANT CHANGE UP (ref 27–31)
MCHC RBC-ENTMCNC: 29.7 G/DL — LOW (ref 32–37)
MCV RBC AUTO: 94.1 FL — HIGH (ref 80–94)
MONOCYTES # BLD AUTO: 0.56 K/UL — SIGNIFICANT CHANGE UP (ref 0.1–0.6)
MONOCYTES NFR BLD AUTO: 9.2 % — SIGNIFICANT CHANGE UP (ref 1.7–9.3)
NEUTROPHILS # BLD AUTO: 4.68 K/UL — SIGNIFICANT CHANGE UP (ref 1.4–6.5)
NEUTROPHILS NFR BLD AUTO: 77.3 % — HIGH (ref 42.2–75.2)
NRBC # BLD: 0 /100 WBCS — SIGNIFICANT CHANGE UP (ref 0–0)
PHOSPHATE SERPL-MCNC: 1.9 MG/DL — LOW (ref 2.1–4.9)
PLATELET # BLD AUTO: 426 K/UL — HIGH (ref 130–400)
PMV BLD: 9.7 FL — SIGNIFICANT CHANGE UP (ref 7.4–10.4)
POTASSIUM SERPL-MCNC: 4.2 MMOL/L — SIGNIFICANT CHANGE UP (ref 3.5–5)
POTASSIUM SERPL-SCNC: 4.2 MMOL/L — SIGNIFICANT CHANGE UP (ref 3.5–5)
PROT SERPL-MCNC: 5.5 G/DL — LOW (ref 6–8)
RBC # BLD: 2.72 M/UL — LOW (ref 4.7–6.1)
RBC # FLD: 15.2 % — HIGH (ref 11.5–14.5)
SODIUM SERPL-SCNC: 145 MMOL/L — SIGNIFICANT CHANGE UP (ref 135–146)
WBC # BLD: 6.06 K/UL — SIGNIFICANT CHANGE UP (ref 4.8–10.8)
WBC # FLD AUTO: 6.06 K/UL — SIGNIFICANT CHANGE UP (ref 4.8–10.8)

## 2024-03-10 PROCEDURE — 99232 SBSQ HOSP IP/OBS MODERATE 35: CPT

## 2024-03-10 RX ORDER — METOPROLOL TARTRATE 50 MG
25 TABLET ORAL
Refills: 0 | Status: DISCONTINUED | OUTPATIENT
Start: 2024-03-10 | End: 2024-03-22

## 2024-03-10 RX ADMIN — Medication 1300 MILLIGRAM(S): at 12:22

## 2024-03-10 RX ADMIN — LIDOCAINE 1 PATCH: 4 CREAM TOPICAL at 12:20

## 2024-03-10 RX ADMIN — HEPARIN SODIUM 5000 UNIT(S): 5000 INJECTION INTRAVENOUS; SUBCUTANEOUS at 17:13

## 2024-03-10 RX ADMIN — SODIUM CHLORIDE 75 MILLILITER(S): 9 INJECTION, SOLUTION INTRAVENOUS at 12:52

## 2024-03-10 RX ADMIN — Medication 25 MILLIGRAM(S): at 17:23

## 2024-03-10 RX ADMIN — Medication 1300 MILLIGRAM(S): at 17:23

## 2024-03-10 RX ADMIN — PIPERACILLIN AND TAZOBACTAM 25 GRAM(S): 4; .5 INJECTION, POWDER, LYOPHILIZED, FOR SOLUTION INTRAVENOUS at 12:22

## 2024-03-10 RX ADMIN — Medication 85 MILLIMOLE(S): at 17:22

## 2024-03-10 NOTE — PROGRESS NOTE ADULT - SUBJECTIVE AND OBJECTIVE BOX
24H events:    Today is hospital day 18d. This morning patient was seen and examined at bedside, resting comfortably in bed.      Pt refusing medications and tube feeds. Spoke to him about it and he is upset and angry and does not want to discuss it. He says he is full, but has been refusing tube feeds. Asked RN to remove the water next to him as he has not been cleared for po by mouth and has recurrent aspiration. Reminded him he needs the strength to undergo any further treatment as he has desired, but he does not want to engage in further discussion.     PAST MEDICAL & SURGICAL HISTORY  HTN (hypertension)    Prostate cancer        SOCIAL HISTORY:  Social History:      ALLERGIES:  No Known Allergies      MEDICATIONS:  STANDING MEDICATIONS  chlorhexidine 2% Cloths 1 Application(s) Topical <User Schedule>  heparin   Injectable 5000 Unit(s) SubCutaneous every 12 hours  iohexol 300 mG (iodine)/mL Oral Solution 30 milliLiter(s) Oral once  lidocaine   4% Patch 1 Patch Transdermal every 24 hours  metoprolol tartrate 25 milliGRAM(s) Oral two times a day  ondansetron Injectable 4 milliGRAM(s) IV Push once  piperacillin/tazobactam IVPB.. 3.375 Gram(s) IV Intermittent every 8 hours  potassium phosphate IVPB 30 milliMole(s) IV Intermittent once  sodium bicarbonate 1300 milliGRAM(s) Oral every 6 hours  sodium phosphate 30 milliMole(s)/500 mL IVPB 30 milliMole(s) IV Intermittent once    PRN MEDICATIONS      VITALS:   T(C): 35.6 (03-09-24 @ 05:42), Max: 36.8 (03-08-24 @ 21:38)  HR: 105 (03-09-24 @ 05:42) (104 - 110)  BP: 102/71 (03-09-24 @ 05:42) (101/69 - 105/71)  RR: 18 (03-09-24 @ 05:42) (18 - 20)  SpO2: 100% (03-08-24 @ 13:37) (100% - 100%)  I&O's Summary    07 Mar 2024 07:01  -  08 Mar 2024 07:00  --------------------------------------------------------  IN: 300 mL / OUT: 500 mL / NET: -200 mL    08 Mar 2024 07:01  -  09 Mar 2024 06:52  --------------------------------------------------------  IN: 920 mL / OUT: 0 mL / NET: 920 mL          PHYSICAL EXAM:  GENERAL: NAD, non-toxic appearing, cachectic , poor dental hygiene   NECK: Supple, no stiffness, no JVD  HEART: Regular rate and rhythm, normal s1s2  LUNGS: Unlabored respirations, b/l air entry, no adventitious breath sounds   ABDOMEN: Soft, nontender, nondistended; +BS  EXTREMITIES: No clubbing, cyanosis, or edema;   NERVOUS SYSTEM: AOx2  SKIN: Warm and dry    LABS:                        8.0    9.51  )-----------( 478      ( 08 Mar 2024 18:14 )             26.2     03-08    144  |  112<H>  |  21<H>  ----------------------------<  129<H>  4.2   |  21  |  1.4    Ca    7.5<L>      08 Mar 2024 07:03  Phos  1.7     03-08  Mg     1.9     03-08    TPro  5.9<L>  /  Alb  2.8<L>  /  TBili  0.5  /  DBili  x   /  AST  21  /  ALT  15  /  AlkPhos  60  03-08      Urinalysis Basic - ( 08 Mar 2024 07:03 )    Color: x / Appearance: x / SG: x / pH: x  Gluc: 129 mg/dL / Ketone: x  / Bili: x / Urobili: x   Blood: x / Protein: x / Nitrite: x   Leuk Esterase: x / RBC: x / WBC x   Sq Epi: x / Non Sq Epi: x / Bacteria: x

## 2024-03-10 NOTE — PROGRESS NOTE ADULT - ASSESSMENT
68-year-old male with past medical history of hypertension, chronic back pain, prostate cancer, and posterior mediastinal mass 6.5 cm seen on CT 11/2023, presents for 3 months of progressively difficulty swallowing associated with 10 pounds weight loss and odynophagia. Comes in today now unable to swallow his pills with water, resulting in 2 episode of emesis. He mentioned that he gets SOB during exertion but denies any exertional chest pain. As per patient he was treated at [facility] for prostate cancer and is s/p radiation therapy. He states that he has problem during walking and unable to maintain the posture.     #aspiration PNA vs viral infection vs HAP   #GAS s pyogenes bacteremia   - SIRS positive (WBC increased to 16, febrile to 101 and tachy to 129 )   - CXR with worsening L-sided opacity   - Blood cx (2/24/24): positive for strep pyogenes  - repeat BCx 27,28,29,1,2  negative to date   - procal 7  - aspiration precautions   - patient cleared for pureed diet and thin liquids per SS -refusing food as patient reports inability to swallow   - pt was being treated with cefepime and then ceftriaxone for bacteremia, but then developed aspiration pna so resumed Zosyn  - Peg placed 3/5/24 and feeds started 3/6/24 and pt has been having diarrhea since  - Zosyn due to end on 3/13/24, however pt refusing IV abx at this time   - TTE   1. Left ventricular ejection fraction, by visual estimation, is 65 to 70%.   2. Hyperdynamic global left ventricular systolic function.   3. Mildly increased LV wall thickness.   4. Spectral Doppler shows impaired relaxation pattern of left   ventricular myocardial filling (Grade I diastolic dysfunction).   5. Trace mitral valve regurgitation.    # Dysphagia with odynophagia  #Failure to thrive   # Hx of prostate cancer s/p radiation therapy  # Prostate cancer metastasis to mediastinum or primary mediastinal mass?  - CT Chest: Interval enlargement of the central/posterior mediastinal mass with development of more extensive lymphadenopathy. The mass encircles the descending thoracic aorta and has some mass effect upon the left atrium. The mid aspect of the esophagus is encircled by the mass and difficult to distinguish. Possible ovoid pill is noted within the mass and possible location of the mid esophagus appear. The maximal esophagus appears distended with layering debris within. Consideration may be given to esophageal stenting.  - CT abdomen and pelvis:  Increased upper abdominal bulky lymphadenopathy. Unchanged bulky retroperitoneal lymphadenopathy, large left bladder mass and perirectal soft tissue infiltrative mass. 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 Chronic moderate to severe left hydroureteronephrosis  - EGD with GI 2/21 -> esophageal stent placed  - CT surgery following -> no acute intervention   - AFP and CEA wnl - LDH and haptoglobin elevated - PSA total 220, PSA free is 22 - CA19 131-  57  - Esophageal biopsy:  metastatic poorly differentiated carcinoma of prostatic origin.  - per onc note, patient had prior biopsy of site that demonstrated metastatic prostate adenocarcinoma and was started on Docetaxel, in January 2024 patient was started on Pembrolizumab and continued on keytruda injections.   - Dr. Irizarry (pt's oncologist): cs rad/onco for palliative radiation , onco asked about new protocol since patient progressed , rec hospice and palliative   - EGD replaced stent and PEG placed 3/5/24   -lacks capacity- psych ethics  and palliative following  - pt refusing DNR/DNI status based on [employer] beliefs, ethics agreed 2P consent would not be appropriate to make him DNR/DNI    #chronic clavicular fracture  Xray Shoulder 2 Views, Left (03.05.24 @ 20:02)   Subacute/chronic appearing distal clavicular fracture with approximately   1.7 cm of inferior displacement of the distal fragment. No acute fracture   or dislocation.  Left-sided opacities/effusion. Esophageal stent with surgical clips    #CHUCKIE vs ATN induced DIMAS  - pt came in with a baseline of 1.5 and increased to 2.6 with contrast use; improved to 1.3 but now unable to take po and refusing IVF  - renally dose medications  -US Kidney and Bladder:  1.  Diffusely echogenic bilateral kidneys which can be seen with medical renal disease.  2.  Moderate to severe left hydronephrosis with left renal parenchymal thinning, as seen on prior CT scan.  3.  Large mass identified within the urinary bladder, measuring up to 7.9  cm, previously up to 7 cm.  No ureteral jets are seen bilaterally.  4.  Incidentally noted right pleural effusion.  -nephro recs appreciated  -C3 C4 levels normal  -bicarb   -no indications for RRT    # small b/l pleural effusions on CT chest, resolved  - s/p IV Lasix     #normocytic Anemia  - Hb 9-10  - f/u iron  studies noted % sat 27   - MCV normal, SPEP negative       #Misc   #DVT PPx- heparin   #GI PPx- none   #Diet-  puree   #Activity- AAT,   PT consult recommended Home PT  - Would be unable to return to [facility]  #Dispo- Acute

## 2024-03-11 LAB
HCT VFR BLD CALC: 28.4 % — LOW (ref 42–52)
HGB BLD-MCNC: 8.6 G/DL — LOW (ref 14–18)
MCHC RBC-ENTMCNC: 27.7 PG — SIGNIFICANT CHANGE UP (ref 27–31)
MCHC RBC-ENTMCNC: 30.3 G/DL — LOW (ref 32–37)
MCV RBC AUTO: 91.3 FL — SIGNIFICANT CHANGE UP (ref 80–94)
NRBC # BLD: 0 /100 WBCS — SIGNIFICANT CHANGE UP (ref 0–0)
PLATELET # BLD AUTO: 362 K/UL — SIGNIFICANT CHANGE UP (ref 130–400)
PMV BLD: 9.3 FL — SIGNIFICANT CHANGE UP (ref 7.4–10.4)
RBC # BLD: 3.11 M/UL — LOW (ref 4.7–6.1)
RBC # FLD: 14.6 % — HIGH (ref 11.5–14.5)
WBC # BLD: 5 K/UL — SIGNIFICANT CHANGE UP (ref 4.8–10.8)
WBC # FLD AUTO: 5 K/UL — SIGNIFICANT CHANGE UP (ref 4.8–10.8)

## 2024-03-11 PROCEDURE — 99233 SBSQ HOSP IP/OBS HIGH 50: CPT

## 2024-03-11 PROCEDURE — 99232 SBSQ HOSP IP/OBS MODERATE 35: CPT

## 2024-03-11 RX ADMIN — Medication 1300 MILLIGRAM(S): at 06:55

## 2024-03-11 RX ADMIN — PIPERACILLIN AND TAZOBACTAM 25 GRAM(S): 4; .5 INJECTION, POWDER, LYOPHILIZED, FOR SOLUTION INTRAVENOUS at 14:00

## 2024-03-11 RX ADMIN — Medication 1300 MILLIGRAM(S): at 13:22

## 2024-03-11 RX ADMIN — CHLORHEXIDINE GLUCONATE 1 APPLICATION(S): 213 SOLUTION TOPICAL at 07:02

## 2024-03-11 RX ADMIN — HEPARIN SODIUM 5000 UNIT(S): 5000 INJECTION INTRAVENOUS; SUBCUTANEOUS at 06:55

## 2024-03-11 RX ADMIN — SODIUM CHLORIDE 75 MILLILITER(S): 9 INJECTION, SOLUTION INTRAVENOUS at 07:11

## 2024-03-11 RX ADMIN — PIPERACILLIN AND TAZOBACTAM 25 GRAM(S): 4; .5 INJECTION, POWDER, LYOPHILIZED, FOR SOLUTION INTRAVENOUS at 21:35

## 2024-03-11 RX ADMIN — LIDOCAINE 1 PATCH: 4 CREAM TOPICAL at 13:22

## 2024-03-11 RX ADMIN — Medication 1300 MILLIGRAM(S): at 21:39

## 2024-03-11 NOTE — PROGRESS NOTE ADULT - ASSESSMENT
68-year-old male with past medical history of hypertension, chronic back pain, prostate cancer, and posterior mediastinal mass 6.5 cm seen on CT 11/2023, presents for 3 months of progressively difficulty swallowing associated with 10 pounds weight loss and odynophagia. Comes in today now unable to swallow his pills with water, resulting in 2 episode of emesis. He mentioned that he gets SOB during exertion but denies any exertional chest pain. As per patient he was treated at [facility] for prostate cancer and is s/p radiation therapy. He states that he has problem during walking and unable to maintain the posture.     #aspiration PNA vs viral infection vs HAP   #GAS s pyogenes bacteremia   - SIRS positive (WBC increased to 16, febrile to 101 and tachy to 129 )   - CXR with worsening L-sided opacity   - Blood cx (2/24/24): positive for strep pyogenes  - repeat BCx 27,28,29,1,2  negative to date   - procal 7  - aspiration precautions   - patient cleared for pureed diet and thin liquids per SS -refusing food as patient reports inability to swallow   - pt was being treated with cefepime and then ceftriaxone for bacteremia, but then developed aspiration pna so resumed Zosyn  - Peg placed 3/5/24 and feeds started 3/6/24 and pt has been having diarrhea since  - Zosyn due to end on 3/13/24, however pt refusing IV abx at this time   - TTE   1. Left ventricular ejection fraction, by visual estimation, is 65 to 70%.   2. Hyperdynamic global left ventricular systolic function.   3. Mildly increased LV wall thickness.   4. Spectral Doppler shows impaired relaxation pattern of left   ventricular myocardial filling (Grade I diastolic dysfunction).   5. Trace mitral valve regurgitation.    #Dysphagia with odynophagia  #Failure to thrive   # Hx of prostate cancer s/p radiation therapy  # Prostate cancer metastasis to mediastinum or primary mediastinal mass?  - CT Chest: Interval enlargement of the central/posterior mediastinal mass with development of more extensive lymphadenopathy. The mass encircles the descending thoracic aorta and has some mass effect upon the left atrium. The mid aspect of the esophagus is encircled by the mass and difficult to distinguish. Possible ovoid pill is noted within the mass and possible location of the mid esophagus appear. The maximal esophagus appears distended with layering debris within. Consideration may be given to esophageal stenting.  - CT abdomen and pelvis:  Increased upper abdominal bulky lymphadenopathy. Unchanged bulky retroperitoneal lymphadenopathy, large left bladder mass and perirectal soft tissue infiltrative mass. 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 Chronic moderate to severe left hydroureteronephrosis  - EGD with GI 2/21 -> esophageal stent placed  - CT surgery following -> no acute intervention   - AFP and CEA wnl - LDH and haptoglobin elevated - PSA total 220, PSA free is 22 - CA19 131-  57  - Esophageal biopsy:  metastatic poorly differentiated carcinoma of prostatic origin.  - per onc note, patient had prior biopsy of site that demonstrated metastatic prostate adenocarcinoma and was started on Docetaxel, in January 2024 patient was started on Pembrolizumab and continued on keytruda injections.   - Dr. Irizarry (pt's oncologist): cs rad/onco for palliative radiation, onco asked about new protocol since patient progressed , rec hospice and palliative   - EGD replaced stent and PEG placed 3/5/24   -lacks capacity- psych, ethics and palliative following    - pt refusing DNR/DNI status based on Sikhism beliefs, ethics agreed 2P consent would not be appropriate to make him DNR/DNI     -Rad onc on Mar 10th: will review palliative radiation with patient once "capacity" clarified     -heme/onc on march 10th: requesting psych eval     -Evaluated by psych on march 8th    -ethics meeting possibly today     #chronic clavicular fracture  Xray Shoulder 2 Views, Left (03.05.24 @ 20:02)   Subacute/chronic appearing distal clavicular fracture with approximately 1.7 cm of inferior displacement of the distal fragment. No acute fracture or dislocation.  Left-sided opacities/effusion. Esophageal stent with surgical clips    #CHUCKIE vs ATN induced DIMAS  - pt came in with a baseline of 1.5 and increased to 2.6 with contrast use; improved to 1.3 but now unable to take po and refusing IVF  - renally dose medications  -US Kidney and Bladder:  1.  Diffusely echogenic bilateral kidneys which can be seen with medical renal disease.  2.  Moderate to severe left hydronephrosis with left renal parenchymal thinning, as seen on prior CT scan.  3.  Large mass identified within the urinary bladder, measuring up to 7.9  cm, previously up to 7 cm.  No ureteral jets are seen bilaterally.  4.  Incidentally noted right pleural effusion.  -nephro recs appreciated  -C3 C4 levels normal  -on NaHCO3 1300mg TID   -no indications for RRT    # small b/l pleural effusions on CT chest, resolved  - s/p IV Lasix     #normocytic Anemia  - Hb 7.6, stable   - f/u iron  studies noted % sat 27   - MCV normal, SPEP negative     Inpatient checklist:  #DVT PPx- heparin   #GI PPx- none   #Diet-  puree   #Activity- AAT,   PT consult recommended Home PT  - Would be unable to return to [facility]  #Dispo- Acute 68-year-old male with past medical history of hypertension, chronic back pain, prostate cancer, and posterior mediastinal mass 6.5 cm seen on CT 11/2023, presents for 3 months of progressively difficulty swallowing associated with 10 pounds weight loss and odynophagia. Comes in today now unable to swallow his pills with water, resulting in 2 episode of emesis. He mentioned that he gets SOB during exertion but denies any exertional chest pain. As per patient he was treated at [facility] for prostate cancer and is s/p radiation therapy. He states that he has problem during walking and unable to maintain the posture.     #aspiration PNA vs viral infection vs HAP   #GAS s pyogenes bacteremia   - SIRS positive (WBC increased to 16, febrile to 101 and tachy to 129 )   - CXR with worsening L-sided opacity   - Blood cx (2/24/24): positive for strep pyogenes  - repeat BCx 27,28,29,1,2  negative to date   - procal 7  - aspiration precautions   - patient cleared for pureed diet and thin liquids per SS -refusing food as patient reports inability to swallow   - pt was being treated with cefepime and then ceftriaxone for bacteremia, but then developed aspiration pna so resumed Zosyn  - Peg placed 3/5/24 and feeds started 3/6/24 and pt has been having diarrhea since  - Zosyn due to end on 3/13/24, however pt continuing to refuse IV abx    - TTE   1. Left ventricular ejection fraction, by visual estimation, is 65 to 70%.   2. Hyperdynamic global left ventricular systolic function.   3. Mildly increased LV wall thickness.   4. Spectral Doppler shows impaired relaxation pattern of left   ventricular myocardial filling (Grade I diastolic dysfunction).   5. Trace mitral valve regurgitation.    #Dysphagia with odynophagia  #Failure to thrive   # Hx of prostate cancer s/p radiation therapy  # Prostate cancer metastasis to mediastinum or primary mediastinal mass?  - CT Chest: Interval enlargement of the central/posterior mediastinal mass with development of more extensive lymphadenopathy. The mass encircles the descending thoracic aorta and has some mass effect upon the left atrium. The mid aspect of the esophagus is encircled by the mass and difficult to distinguish. Possible ovoid pill is noted within the mass and possible location of the mid esophagus appear. The maximal esophagus appears distended with layering debris within. Consideration may be given to esophageal stenting.  - CT abdomen and pelvis:  Increased upper abdominal bulky lymphadenopathy. Unchanged bulky retroperitoneal lymphadenopathy, large left bladder mass and perirectal soft tissue infiltrative mass. 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 Chronic moderate to severe left hydroureteronephrosis  - EGD with GI 2/21 -> esophageal stent placed  - CT surgery following -> no acute intervention   - AFP and CEA wnl - LDH and haptoglobin elevated - PSA total 220, PSA free is 22 - CA19 131-  57  - Esophageal biopsy:  metastatic poorly differentiated carcinoma of prostatic origin.  - per onc note, patient had prior biopsy of site that demonstrated metastatic prostate adenocarcinoma and was started on Docetaxel, in January 2024 patient was started on Pembrolizumab and continued on keytruda injections.   - Dr. Irizarry (pt's oncologist): cs rad/onco for palliative radiation, onco asked about new protocol since patient progressed , rec hospice and palliative   - EGD replaced stent and PEG placed 3/5/24   -lacks capacity- psych, ethics and palliative following    - pt refusing DNR/DNI status based on [employer]regational beliefs, ethics agreed 2P consent would not be appropriate to make him DNR/DNI     -Rad onc on Mar 10th: will review palliative radiation with patient once "capacity" clarified     -heme/onc on march 10th: requesting psych eval     -Evaluated by psych on march 8th    -ethics meeting possibly today     #chronic clavicular fracture  Xray Shoulder 2 Views, Left (03.05.24 @ 20:02)   Subacute/chronic appearing distal clavicular fracture with approximately 1.7 cm of inferior displacement of the distal fragment. No acute fracture or dislocation.  Left-sided opacities/effusion. Esophageal stent with surgical clips    #CHUCKIE vs ATN induced DIMAS  - pt came in with a baseline of 1.5 and increased to 2.6 with contrast use; improved to 1.3 but now unable to take po and refusing IVF  - renally dose medications  -US Kidney and Bladder:  1.  Diffusely echogenic bilateral kidneys which can be seen with medical renal disease.  2.  Moderate to severe left hydronephrosis with left renal parenchymal thinning, as seen on prior CT scan.  3.  Large mass identified within the urinary bladder, measuring up to 7.9  cm, previously up to 7 cm.  No ureteral jets are seen bilaterally.  4.  Incidentally noted right pleural effusion.  -nephro recs appreciated  -C3 C4 levels normal  -on NaHCO3 1300mg TID   -no indications for RRT    # small b/l pleural effusions on CT chest, resolved  - s/p IV Lasix     #normocytic Anemia  - Hb 7.6, stable   - f/u iron  studies noted % sat 27   - MCV normal, SPEP negative     Inpatient checklist:  #DVT PPx- heparin   #GI PPx- none   #Diet-  puree   #Activity- AAT,   PT consult recommended Home PT  - Would be unable to return to [facility]  #Dispo- Acute 68-year-old male with past medical history of hypertension, chronic back pain, prostate cancer, and posterior mediastinal mass 6.5 cm seen on CT 11/2023, presents for 3 months of progressively difficulty swallowing associated with 10 pounds weight loss and odynophagia. Comes in today now unable to swallow his pills with water, resulting in 2 episode of emesis. He mentioned that he gets SOB during exertion but denies any exertional chest pain. As per patient he was treated at [facility] for prostate cancer and is s/p radiation therapy. He states that he has problem during walking and unable to maintain the posture.     #aspiration PNA vs viral infection vs HAP   #GAS s pyogenes bacteremia   - SIRS positive (WBC increased to 16, febrile to 101 and tachy to 129 )   - CXR with worsening L-sided opacity   - Blood cx (2/24/24): positive for strep pyogenes  - repeat BCx 27,28,29,1,2  negative to date   - procal 7  - aspiration precautions   - patient cleared for pureed diet and thin liquids per SS -refusing food as patient reports inability to swallow   - pt was being treated with cefepime and then ceftriaxone for bacteremia, but then developed aspiration pna so resumed Zosyn  - Peg placed 3/5/24 and feeds started 3/6/24 and pt has been having diarrhea since  - Zosyn due to end on 3/13/24, however pt continuing to refuse IV abx    - TTE   1. Left ventricular ejection fraction, by visual estimation, is 65 to 70%.   2. Hyperdynamic global left ventricular systolic function.   3. Mildly increased LV wall thickness.   4. Spectral Doppler shows impaired relaxation pattern of left   ventricular myocardial filling (Grade I diastolic dysfunction).   5. Trace mitral valve regurgitation.    #Dysphagia with odynophagia  #Failure to thrive   # Hx of prostate cancer s/p radiation therapy  # Prostate cancer metastasis to mediastinum or primary mediastinal mass?  - CT Chest: Interval enlargement of the central/posterior mediastinal mass with development of more extensive lymphadenopathy. The mass encircles the descending thoracic aorta and has some mass effect upon the left atrium. The mid aspect of the esophagus is encircled by the mass and difficult to distinguish. Possible ovoid pill is noted within the mass and possible location of the mid esophagus appear. The maximal esophagus appears distended with layering debris within. Consideration may be given to esophageal stenting.  - CT abdomen and pelvis:  Increased upper abdominal bulky lymphadenopathy. Unchanged bulky retroperitoneal lymphadenopathy, large left bladder mass and perirectal soft tissue infiltrative mass. 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 Chronic moderate to severe left hydroureteronephrosis  - EGD with GI 2/21 -> esophageal stent placed  - CT surgery following -> no acute intervention   - AFP and CEA wnl - LDH and haptoglobin elevated - PSA total 220, PSA free is 22 - CA19 131-  57  - Esophageal biopsy:  metastatic poorly differentiated carcinoma of prostatic origin.  - per onc note, patient had prior biopsy of site that demonstrated metastatic prostate adenocarcinoma and was started on Docetaxel, in January 2024 patient was started on Pembrolizumab and continued on keytruda injections.   - Dr. Irizarry (pt's oncologist): cs rad/onco for palliative radiation, onco asked about new protocol since patient progressed , rec hospice and palliative   - EGD replaced stent and PEG placed 3/5/24   -lacks capacity- psych, ethics and palliative following    - pt refusing DNR/DNI status based on Mosque beliefs, ethics agreed 2P consent would not be appropriate to make him DNR/DNI     -Rad onc on Mar 10th: will review palliative radiation with patient once "capacity" clarified     -heme/onc: requesting psych eval; recommends palliative radiation and 2nd line chemo but pt has no capacity      -Evaluated by psych on march 8th    -ethics meeting possibly today     #chronic clavicular fracture  Xray Shoulder 2 Views, Left (03.05.24 @ 20:02)   Subacute/chronic appearing distal clavicular fracture with approximately 1.7 cm of inferior displacement of the distal fragment. No acute fracture or dislocation.  Left-sided opacities/effusion. Esophageal stent with surgical clips    #CHUCKIE vs ATN induced DIMAS  - pt came in with a baseline of 1.5 and increased to 2.6 with contrast use; improved to 1.3 but now unable to take po and refusing IVF  - renally dose medications  -US Kidney and Bladder:  1.  Diffusely echogenic bilateral kidneys which can be seen with medical renal disease.  2.  Moderate to severe left hydronephrosis with left renal parenchymal thinning, as seen on prior CT scan.  3.  Large mass identified within the urinary bladder, measuring up to 7.9  cm, previously up to 7 cm.  No ureteral jets are seen bilaterally.  4.  Incidentally noted right pleural effusion.  -nephro recs appreciated  -C3 C4 levels normal  -on NaHCO3 1300mg TID   -no indications for RRT    # small b/l pleural effusions on CT chest, resolved  - s/p IV Lasix     #normocytic Anemia  - Hb 7.6, stable   - f/u iron  studies noted % sat 27   - MCV normal, SPEP negative     Inpatient checklist:  #DVT PPx- heparin   #GI PPx- none   #Diet-  puree   #Activity- AAT,   PT consult recommended Home PT  - Would be unable to return to [facility]  #Dispo- Acute

## 2024-03-11 NOTE — PROGRESS NOTE ADULT - ASSESSMENT
metastatic prostate cancer  progressed   on treatmem  meed palliative radiation and 2nd line chemo but pt has no capacity   prognosis poor     kirti irving MD

## 2024-03-11 NOTE — PROGRESS NOTE ADULT - ASSESSMENT
68yMale with history of prostate cancer, posterior mediastinal mass seen on CT in November 2023, presents with difficulty swallowing associated with weight loss and odynophagia.  Patient with evidence of aspiration on arrival with concern for viral infection vs pneumonia, dysphagia with odynophagia, DIMAS. Palliative care consulted for GOC.    Awaiting follow up with primary team's Dr. Gomez and ethics. Will follow and be available as appropriate.    Education about palliative care provided to patient/family.  See Recs below.    Please call [phone] with questions or concerns 24/7.   We will continue to follow.

## 2024-03-11 NOTE — CHART NOTE - NSCHARTNOTEFT_GEN_A_CORE
visited patient earlier today. patient encountered resting in bed. awake and alert, more talkative today. He denied any pain or discomfort. He noted he is Protestant and is open to our palliative  following up with him. support rendered. will follow. [phone]

## 2024-03-11 NOTE — PROGRESS NOTE ADULT - ATTENDING COMMENTS
#Progress Note Handoff  Pending (specify):  as above, however, awaiting ethics meeting([facility]) as patient has no capacity to make decision  Disposition: Unknown at this time________

## 2024-03-11 NOTE — PROGRESS NOTE ADULT - SUBJECTIVE AND OBJECTIVE BOX
24H events:    Patient is a 68y old Male who presents with a chief complaint of Mediastinal Mass (10 Mar 2024 11:14)    Primary diagnosis of Mediastinal mass    Today is hospital day 20d. This morning patient was seen and examined at bedside, resting comfortably in bed.    No overnight events.   Pt refusing antibiotics. When asked why, pt keeps asking which antibiotics despite clarification       PAST MEDICAL & SURGICAL HISTORY  HTN (hypertension)    Prostate cancer      SOCIAL HISTORY:  Social History:      ALLERGIES:  No Known Allergies    MEDICATIONS:  STANDING MEDICATIONS  chlorhexidine 2% Cloths 1 Application(s) Topical <User Schedule>  dextrose 5% + sodium chloride 0.9%. 1000 milliLiter(s) IV Continuous <Continuous>  heparin   Injectable 5000 Unit(s) SubCutaneous every 12 hours  iohexol 300 mG (iodine)/mL Oral Solution 30 milliLiter(s) Oral once  lidocaine   4% Patch 1 Patch Transdermal every 24 hours  metoprolol tartrate 25 milliGRAM(s) Enteral Tube two times a day  ondansetron Injectable 4 milliGRAM(s) IV Push once  piperacillin/tazobactam IVPB.. 3.375 Gram(s) IV Intermittent every 8 hours  sodium bicarbonate 1300 milliGRAM(s) Oral every 6 hours    PRN MEDICATIONS    VITALS:   T(F): 97.1  HR: 77  BP: 104/77  RR: 20  SpO2: 100%    PHYSICAL EXAM:  GENERAL:   ( x ) NAD, lying in bed comfortably, on room air     HEAD:   ( x ) Atraumatic        HEART:  Rate -->     ( x ) normal rate     (  ) bradycardic     (  ) tachycardic  Rhythm -->     (x  ) regular     (  ) regularly irregular     (  ) irregularly irregular  Murmurs -->     ( x ) normal s1s2     (  ) systolic murmur     (  ) diastolic murmur     (  ) continuous murmur      (  ) S3 present     (  ) S4 present    LUNGS:   (xx  )Unlabored respirations     (  ) tachypnea  (x  ) B/L air entry     (  ) decreased breath sounds in:  (location     )    (x  ) no adventitious sound     (  ) crackles     (  ) wheezing      (  ) rhonchi      (specify location:       )  (  ) chest wall tenderness (specify location:       )    ABDOMEN:   ( x ) Soft     (  ) tense   |   (x  ) nondistended     (  ) distended   |   (  ) +BS     (  ) hypoactive bowel sounds     (  ) hyperactive bowel sounds  ( x ) nontender     (  ) RUQ tenderness     (  ) RLQ tenderness     (  ) LLQ tenderness     (  ) epigastric tenderness     (  ) diffuse tenderness  (  ) Splenomegaly      (  ) Hepatomegaly      (  ) Jaundice     (  ) ecchymosis     EXTREMITIES:  no LE edema     NERVOUS SYSTEM:    AOA x1     LABS:                        7.6    6.06  )-----------( 426      ( 10 Mar 2024 07:02 )             25.6     03-10    145  |  112<H>  |  16  ----------------------------<  116<H>  4.2   |  22  |  1.3    Ca    7.3<L>      10 Mar 2024 07:02  Phos  1.9     03-10  Mg     1.9     03-10    TPro  5.5<L>  /  Alb  2.7<L>  /  TBili  0.3  /  DBili  x   /  AST  18  /  ALT  15  /  AlkPhos  54  03-10      Urinalysis Basic - ( 10 Mar 2024 07:02 )    Color: x / Appearance: x / SG: x / pH: x  Gluc: 116 mg/dL / Ketone: x  / Bili: x / Urobili: x   Blood: x / Protein: x / Nitrite: x   Leuk Esterase: x / RBC: x / WBC x   Sq Epi: x / Non Sq Epi: x / Bacteria: x                RADIOLOGY:

## 2024-03-12 PROCEDURE — 99232 SBSQ HOSP IP/OBS MODERATE 35: CPT

## 2024-03-12 RX ADMIN — HEPARIN SODIUM 5000 UNIT(S): 5000 INJECTION INTRAVENOUS; SUBCUTANEOUS at 17:40

## 2024-03-12 RX ADMIN — LIDOCAINE 1 PATCH: 4 CREAM TOPICAL at 12:35

## 2024-03-12 RX ADMIN — Medication 25 MILLIGRAM(S): at 17:40

## 2024-03-12 RX ADMIN — Medication 1300 MILLIGRAM(S): at 12:35

## 2024-03-12 RX ADMIN — SODIUM CHLORIDE 75 MILLILITER(S): 9 INJECTION, SOLUTION INTRAVENOUS at 10:30

## 2024-03-12 RX ADMIN — PIPERACILLIN AND TAZOBACTAM 25 GRAM(S): 4; .5 INJECTION, POWDER, LYOPHILIZED, FOR SOLUTION INTRAVENOUS at 21:29

## 2024-03-12 RX ADMIN — SODIUM CHLORIDE 75 MILLILITER(S): 9 INJECTION, SOLUTION INTRAVENOUS at 17:43

## 2024-03-12 RX ADMIN — Medication 1300 MILLIGRAM(S): at 17:40

## 2024-03-12 RX ADMIN — LIDOCAINE 1 PATCH: 4 CREAM TOPICAL at 18:52

## 2024-03-12 RX ADMIN — Medication 1300 MILLIGRAM(S): at 21:30

## 2024-03-12 RX ADMIN — Medication 1300 MILLIGRAM(S): at 05:37

## 2024-03-12 RX ADMIN — CHLORHEXIDINE GLUCONATE 1 APPLICATION(S): 213 SOLUTION TOPICAL at 05:36

## 2024-03-12 RX ADMIN — PIPERACILLIN AND TAZOBACTAM 25 GRAM(S): 4; .5 INJECTION, POWDER, LYOPHILIZED, FOR SOLUTION INTRAVENOUS at 12:35

## 2024-03-12 RX ADMIN — PIPERACILLIN AND TAZOBACTAM 25 GRAM(S): 4; .5 INJECTION, POWDER, LYOPHILIZED, FOR SOLUTION INTRAVENOUS at 05:36

## 2024-03-12 RX ADMIN — Medication 85 MILLIMOLE(S): at 21:29

## 2024-03-12 NOTE — CONSULT NOTE ADULT - PROVIDER SPECIALTY LIST ADULT
CT Surgery
Infectious Disease
Palliative Care
Ethics
Gastroenterology
Nephrology
Surgery
Pulmonology
Rad Onc
Heme/Onc

## 2024-03-12 NOTE — PROGRESS NOTE ADULT - ASSESSMENT
68-year-old male with past medical history of hypertension, chronic back pain, prostate cancer, and posterior mediastinal mass 6.5 cm seen on CT 11/2023, presents for 3 months of progressively difficulty swallowing associated with 10 pounds weight loss and odynophagia. Comes in today now unable to swallow his pills with water, resulting in 2 episode of emesis. He mentioned that he gets SOB during exertion but denies any exertional chest pain. As per patient he was treated at [facility] for prostate cancer and is s/p radiation therapy. He states that he has problem during walking and unable to maintain the posture.     #aspiration PNA vs viral infection vs HAP   #GAS s pyogenes bacteremia   - SIRS positive (WBC increased to 16, febrile to 101 and tachy to 129 )   - CXR with worsening L-sided opacity   - Blood cx (2/24/24): positive for strep pyogenes  - repeat BCx 27,28,29,1,2  negative to date   - procal 7  - TTE   1. Left ventricular ejection fraction, by visual estimation, is 65 to 70%.   2. Hyperdynamic global left ventricular systolic function.   3. Mildly increased LV wall thickness.   4. Spectral Doppler shows impaired relaxation pattern of left ventricular myocardial filling (Grade I diastolic dysfunction).   5. Trace mitral valve regurgitation.  Plan:  - aspiration precautions   - patient cleared for pureed diet and thin liquids per SS -refusing food as patient reports inability to swallow -> - Peg placed 3/5/24 and feeds started 3/6/24 -> pt was having diarrhea after PEG, but now resolved   - pt was being treated with cefepime and then ceftriaxone for bacteremia, but then developed aspiration pna so resumed Zosyn  - Zosyn due to end on 3/13/24. Patient occasionally refuses doses of zosyn. Will continue to monitor what medications patient is taking and refusing.   -March 12: Currently with peg feeds and will add liquid diet to supplement.     #Dysphagia with odynophagia  #Failure to thrive   # Hx of prostate cancer s/p radiation therapy  # Prostate cancer metastasis to mediastinum or primary mediastinal mass?  - CT Chest: Interval enlargement of the central/posterior mediastinal mass with development of more extensive lymphadenopathy. The mass encircles the descending thoracic aorta and has some mass effect upon the left atrium. The mid aspect of the esophagus is encircled by the mass and difficult to distinguish. Possible ovoid pill is noted within the mass and possible location of the mid esophagus appear. The maximal esophagus appears distended with layering debris within. Consideration may be given to esophageal stenting.  - CT abdomen and pelvis:  Increased upper abdominal bulky lymphadenopathy. Unchanged bulky retroperitoneal lymphadenopathy, large left bladder mass and perirectal soft tissue infiltrative mass. 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 Chronic moderate to severe left hydroureteronephrosis  - EGD with GI 2/21 -> esophageal stent placed  - CT surgery following -> no acute intervention   - AFP and CEA wnl - LDH and haptoglobin elevated - PSA total 220, PSA free is 22 - CA19 131-  57  - Esophageal biopsy:  metastatic poorly differentiated carcinoma of prostatic origin.  - per onc note, patient had prior biopsy of site that demonstrated metastatic prostate adenocarcinoma and was started on Docetaxel, in January 2024 patient was started on Pembrolizumab and continued on keytruda injections.   - Dr. Irizarry (pt's oncologist): cs rad/onco for palliative radiation, onco asked about new protocol since patient progressed , rec hospice and palliative   - EGD replaced stent and PEG placed 3/5/24   -lacks capacity- psych, ethics and palliative following    - pt refusing DNR/DNI status based on [employer]ist beliefs, ethics agreed 2P consent would not be appropriate to make him DNR/DNI     -Rad onc on Mar 10th: will review palliative radiation with patient once "capacity" clarified     -heme/onc: requesting psych eval; recommends palliative radiation and 2nd line chemo but pt has no capacity      -Evaluated by psych on march 8th     -March 12: ethics updated recs regarding patient's capacity appreciated. Currently with peg feeds and will add liquid diet to supplement.       #chronic clavicular fracture  Xray Shoulder 2 Views, Left (03.05.24 @ 20:02)   Subacute/chronic appearing distal clavicular fracture with approximately 1.7 cm of inferior displacement of the distal fragment. No acute fracture or dislocation.  Left-sided opacities/effusion. Esophageal stent with surgical clips    #CHUCKIE vs ATN induced DIMAS  - pt came in with a baseline of 1.5 and increased to 2.6 with contrast use; improved to 1.3 but now unable to take po and refusing IVF  - renally dose medications  -US Kidney and Bladder:  1.  Diffusely echogenic bilateral kidneys which can be seen with medical renal disease.  2.  Moderate to severe left hydronephrosis with left renal parenchymal thinning, as seen on prior CT scan.  3.  Large mass identified within the urinary bladder, measuring up to 7.9  cm, previously up to 7 cm.  No ureteral jets are seen bilaterally.  4.  Incidentally noted right pleural effusion.  -nephro recs appreciated  -C3 C4 levels normal  -on NaHCO3 1300mg TID   -no indications for RRT    # small b/l pleural effusions on CT chest, resolved  - s/p IV Lasix     #normocytic Anemia  - Hb 7.6, stable   - f/u iron  studies noted % sat 27   - MCV normal, SPEP negative     Inpatient checklist:  #DVT PPx- heparin   #GI PPx- none   #Diet-  puree   #Activity- AAT,   PT consult recommended Home PT  - Would be unable to return to [facility]

## 2024-03-12 NOTE — CONSULT NOTE ADULT - SUBJECTIVE AND OBJECTIVE BOX
The patient is a 68 year old male with stage IV prostate cancer admitted to the hospital On February 20, 2024 for difficulty swallowing.  During the hospitalization the patient underwent an esophageal stent and G tube placement. Currently being treated for an infection. Ethics consult requested by Dr Gomez regarding DNR/DNI and referral to hospice. The patients capacity has been questioned by several providers, psychiatry has not stated the patient does not have capacity, rather it is situational and they can be called regarding each medical decision.     The patient is unrepresented.    The case was discussed with Dr Davis and Dr Gomez on March 8, 2024.    On March 8, 2024 I went to see Mr Stallings with Jason on two occasions. The patient would like further treatment and does not want to be DNR/DNI, based on his Mos[employer] beliefs, it is in Gods hands. Oncology is not offering any additional treatment. the patient refused pastoral care.  Based upon the principle of autonomy, the patient has the right to make decisions regarding his body, even if he does not have capacity. It would not be appropriate to send the patient to hospice given that he wants treatment.    Recommendations would be to honor the patient’s wishes and not be DNR/DNI and when the patient is medically stable to transfer to a facility that will be able to care for him with the G tube. The facility that he is transferred to can elect to obtain guardianship.    If you need further assistance I can be reached on teams.

## 2024-03-12 NOTE — PROGRESS NOTE ADULT - ATTENDING COMMENTS
68-year-old male with past medical history of hypertension, chronic back pain, prostate cancer, and posterior mediastinal mass 6.5 cm seen on CT 11/2023, presents for 3 months of progressively difficulty swallowing associated with 10 pounds weight loss and odynophagia. Comes in today now unable to swallow his pills with water, resulting in 2 episode of emesis. He mentioned that he gets SOB during exertion but denies any exertional chest pain. As per patient he was treated at [facility] for prostate cancer and is s/p radiation therapy. He states that he has problem during walking and unable to maintain the posture.     #aspiration PNA vs viral infection vs HAP   #GAS s pyogenes bacteremia   #SIRS  - CXR with worsening L-sided opacity   - Blood cx (2/24/24): positive for strep pyogenes  - repeat BCx neg  - pt was initially treated with cefepime for Bcx until neg  - Pt had multiple courses of zosyn for aspiration pna; due to end on 3/13  - pt is currently taking peg feeds and allowed full liquid per mouth      #Odynophagia and dysphagia due to prostate cancer metastasized to mediastinum  #Failure to thrive   - CT Chest: Interval enlargement of the central/posterior mediastinal mass with development of more extensive lymphadenopathy. The mass encircles the descending thoracic aorta and has some mass effect upon the left atrium. The mid aspect of the esophagus is encircled by the mass and difficult to distinguish. Possible ovoid pill is noted within the mass and possible location of the mid esophagus appear. The maximal esophagus appears distended with layering debris within. Consideration may be given to esophageal stenting.  - CT abdomen and pelvis:  Increased upper abdominal bulky lymphadenopathy. Unchanged bulky retroperitoneal lymphadenopathy, large left bladder mass and perirectal soft tissue infiltrative mass. 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 Chronic moderate to severe left hydroureteronephrosis  - EGD with GI 2/21 -> esophageal stent placed  - CT surgery following -> no acute intervention   - AFP and CEA wnl - LDH and haptoglobin elevated - PSA total 220, PSA free is 22 - CA19 131-  57  - Esophageal biopsy:  metastatic poorly differentiated carcinoma of prostatic origin  - per onc note, patient had prior biopsy of site that demonstrated metastatic prostate adenocarcinoma and was started on Docetaxel, in January 2024 patient was started on Pembrolizumab and continued on keytruda injections.   - Dr. Irizarry (pt's oncologist): cs rad/onco for palliative radiation and then initially stated pt would not be offered any chemotherapy or immunotherapy and then offered immunotherapy after a conversation I had with her on 3/12; unclear why she did not start it prior as she was unable to be reached  - EGD replaced stent and PEG placed 3/5/24   -lacks capacity- psych, ethics and palliative following  - pt refusing DNR/DNI status based on [employer] beliefs, ethics agreed 2P consent would not be appropriate to make him DNR/DNI  - tried to call rad onc as well on 3/12 and left my cell phone; no plan update  - unclear why capacity, which is a fluctuating state of mind should not delay treatment amy when both immunotherapy and radiation would be a palliative measure and pt has clearly stated that he wants everything to be done for himself based on Confucianism beliefs which predate any acute change in mental capacity  - ethics noted appreciated  - please f/u with oncology and rad onc  - Would be unable to return to [facility]

## 2024-03-12 NOTE — PROGRESS NOTE ADULT - ASSESSMENT
metastatic prostate cancer  progressed   on treatmem  meed palliative radiation and 2nd line chemo but pt has no capacity   prognosis poor     discussed with hospatilist today   as per hospatilist pt has capacity   pt progressed after docetaxol,nubeqa and lupron     will suggest start xtandi  by mouth 160 mg daily   also need palliative radiation to medistinal mass     will follow with you       kirit irving MD    metastatic prostate cancer  progressed   on treatmem  meed palliative radiation and 2nd line chemo but pt has no capacity   prognosis poor     discussed with hospatilist today   as per hospatilist pt has capacity   i discussed with pt today he just says yes for everything ,chemo yes ,radiation yes ,without asking any questions   asked if you have any questions he says no     pt progressed after docetaxol ,nubeqa [ and lupron ,had initially good response but now  progressed     will suggest start xtandi  by mouth 160 mg daily    also need palliative radiation to medistinal mass     will follow with you       kirit irving MD

## 2024-03-12 NOTE — CONSULT NOTE ADULT - REASON FOR ADMISSION
Mediastinal Mass

## 2024-03-12 NOTE — CONSULT NOTE ADULT - CONSULT REASON
DIMAS
Air under the diaphragm s/p PEG tube placement with GI
dysphagia
GAS bacteremia
Mediastinal Mass
mediastinal mass compressing esophagus
Evaluate for radiation therapy
GOC
Treatment decisions for a patient who is unrepresented and may not have capacity.
Mediastinal mass

## 2024-03-12 NOTE — PHARMACOTHERAPY INTERVENTION NOTE - COMMENTS
Pp=539, K=4.2., P=1.9.  Md was contacted clarifying Na Phos order vs K Phos.  Confirmed with dr. Amalia Mckay via teams md wants to keep Na Phos 30mmol in 500ml d5w over 6hrs.

## 2024-03-12 NOTE — PROGRESS NOTE ADULT - SUBJECTIVE AND OBJECTIVE BOX
24H events:    Patient is a 68y old Male who presents with a chief complaint of Mediastinal Mass (12 Mar 2024 10:02)    Primary diagnosis of Mediastinal mass    Today is hospital day 21d. This morning patient was seen and examined at bedside, resting comfortably in bed.    No acute or major events overnight.  Patient denies dyspnea, chest pain, abdo pain, n/v/d/c.  Took last 3 doses of zosyn. Was refusing PEG feeds overnight and this morning. Encouraged pt to take peg feed.    PAST MEDICAL & SURGICAL HISTORY  HTN (hypertension)    Prostate cancer      SOCIAL HISTORY:  Social History:      ALLERGIES:  No Known Allergies    MEDICATIONS:  STANDING MEDICATIONS  chlorhexidine 2% Cloths 1 Application(s) Topical <User Schedule>  dextrose 5% + sodium chloride 0.9%. 1000 milliLiter(s) IV Continuous <Continuous>  heparin   Injectable 5000 Unit(s) SubCutaneous every 12 hours  iohexol 300 mG (iodine)/mL Oral Solution 30 milliLiter(s) Oral once  lidocaine   4% Patch 1 Patch Transdermal every 24 hours  metoprolol tartrate 25 milliGRAM(s) Enteral Tube two times a day  ondansetron Injectable 4 milliGRAM(s) IV Push once  piperacillin/tazobactam IVPB.. 3.375 Gram(s) IV Intermittent every 8 hours  sodium bicarbonate 1300 milliGRAM(s) Oral every 6 hours  sodium phosphate 30 milliMole(s)/500 mL IVPB 30 milliMole(s) IV Intermittent once    PRN MEDICATIONS    VITALS:   T(F): 97.7  HR: 95  BP: 97/76  RR: 18  SpO2: --    PHYSICAL EXAM:  GENERAL:   (x  ) NAD, lying in bed comfortably  on room air     HEART:  Rate -->     ( x ) normal rate     (  ) bradycardic     (  ) tachycardic  Rhythm -->     (  x) regular     (  ) regularly irregular     (  ) irregularly irregular  Murmurs -->     ( x ) normal s1s2     (  ) systolic murmur     (  ) diastolic murmur     (  ) continuous murmur      (  ) S3 present     (  ) S4 present    LUNGS:   (  x)Unlabored respirations     (  ) tachypnea  ( x ) B/L air entry     (  ) decreased breath sounds in:  (location     )    (  x) no adventitious sound         ABDOMEN:   (x  ) Soft     (  ) tense   |   (x  ) nondistended     (  ) distended   |   (  ) +BS     (  ) hypoactive bowel sounds     (  ) hyperactive bowel sounds  (x  ) nontender     (  ) RUQ tenderness     (  ) RLQ tenderness     (  ) LLQ tenderness     (  ) epigastric tenderness     (  ) diffuse tenderness  (  ) Splenomegaly      (  ) Hepatomegaly      (  ) Jaundice     (  ) ecchymosis     EXTREMITIES:  no LE edema     NERVOUS SYSTEM:    ( x A&Ox3    LABS:                        8.6    5.00  )-----------( 362      ( 11 Mar 2024 18:24 )             28.4                         RADIOLOGY:

## 2024-03-12 NOTE — CONSULT NOTE ADULT - CONSULT REQUESTED BY NAME
Dr Gomez
Edie Leslie
Dr Leslie
Dr. Blankenship
Medicine team
Primary team
service
ED
Medicine
Medicine

## 2024-03-12 NOTE — CONSULT NOTE ADULT - CONSULT REQUESTED DATE/TIME
20-Feb-2024 15:30
21-Feb-2024 11:03
21-Feb-2024 09:41
22-Feb-2024 14:54
28-Feb-2024 11:42
26-Feb-2024 10:29
07-Mar-2024 03:23
12-Mar-2024 10:03
28-Feb-2024 09:28
04-Mar-2024 15:13

## 2024-03-13 PROCEDURE — 77290 THER RAD SIMULAJ FIELD CPLX: CPT | Mod: 26

## 2024-03-13 PROCEDURE — 77263 THER RADIOLOGY TX PLNG CPLX: CPT

## 2024-03-13 PROCEDURE — 99232 SBSQ HOSP IP/OBS MODERATE 35: CPT

## 2024-03-13 PROCEDURE — 77333 RADIATION TREATMENT AID(S): CPT | Mod: 26

## 2024-03-13 RX ORDER — ENZALUTAMIDE 80 MG/1
2 TABLET ORAL
Qty: 60 | Refills: 0
Start: 2024-03-13 | End: 2024-04-11

## 2024-03-13 RX ORDER — ENZALUTAMIDE 80 MG/1
160 TABLET ORAL DAILY
Refills: 0 | Status: DISCONTINUED | OUTPATIENT
Start: 2024-03-13 | End: 2024-03-15

## 2024-03-13 RX ADMIN — Medication 1300 MILLIGRAM(S): at 17:43

## 2024-03-13 RX ADMIN — Medication 25 MILLIGRAM(S): at 06:10

## 2024-03-13 RX ADMIN — SODIUM CHLORIDE 75 MILLILITER(S): 9 INJECTION, SOLUTION INTRAVENOUS at 12:03

## 2024-03-13 RX ADMIN — Medication 1300 MILLIGRAM(S): at 12:01

## 2024-03-13 RX ADMIN — Medication 25 MILLIGRAM(S): at 17:43

## 2024-03-13 RX ADMIN — PIPERACILLIN AND TAZOBACTAM 25 GRAM(S): 4; .5 INJECTION, POWDER, LYOPHILIZED, FOR SOLUTION INTRAVENOUS at 12:02

## 2024-03-13 RX ADMIN — CHLORHEXIDINE GLUCONATE 1 APPLICATION(S): 213 SOLUTION TOPICAL at 06:10

## 2024-03-13 RX ADMIN — HEPARIN SODIUM 5000 UNIT(S): 5000 INJECTION INTRAVENOUS; SUBCUTANEOUS at 17:43

## 2024-03-13 RX ADMIN — Medication 1300 MILLIGRAM(S): at 06:11

## 2024-03-13 RX ADMIN — PIPERACILLIN AND TAZOBACTAM 25 GRAM(S): 4; .5 INJECTION, POWDER, LYOPHILIZED, FOR SOLUTION INTRAVENOUS at 06:09

## 2024-03-13 RX ADMIN — LIDOCAINE 1 PATCH: 4 CREAM TOPICAL at 12:01

## 2024-03-13 NOTE — PROGRESS NOTE ADULT - ASSESSMENT
68-year-old male with past medical history of hypertension, chronic back pain, prostate cancer, and posterior mediastinal mass 6.5 cm seen on CT 11/2023, presents for 3 months of progressively difficulty swallowing associated with 10 pounds weight loss and odynophagia. Comes in today now unable to swallow his pills with water, resulting in 2 episode of emesis. He mentioned that he gets SOB during exertion but denies any exertional chest pain. As per patient he was treated at [facility] for prostate cancer and is s/p radiation therapy. He states that he has problem during walking and unable to maintain the posture.     #aspiration PNA vs viral infection vs HAP   #GAS s pyogenes bacteremia   - SIRS positive (WBC increased to 16, febrile to 101 and tachy to 129 )   - CXR with worsening L-sided opacity   - Blood cx (2/24/24): positive for strep pyogenes  - repeat BCx 27,28,29,1,2  negative to date   - procal 7  - TTE   1. Left ventricular ejection fraction, by visual estimation, is 65 to 70%.   2. Hyperdynamic global left ventricular systolic function.   3. Mildly increased LV wall thickness.   4. Spectral Doppler shows impaired relaxation pattern of left ventricular myocardial filling (Grade I diastolic dysfunction).   5. Trace mitral valve regurgitation.  Plan:  - aspiration precautions   - patient cleared for pureed diet and thin liquids per SS -refusing food as patient reports inability to swallow -> - Peg placed 3/5/24 and feeds started 3/6/24 -> pt was having diarrhea after PEG, but now resolved   - pt was being treated with cefepime and then ceftriaxone for bacteremia, but then developed aspiration pna so resumed Zosyn  - Zosyn due to end on 3/13/24. Patient occasionally refuses doses of zosyn. Will continue to monitor what medications patient is taking and refusing.   -March 12: Currently with peg feeds and will add liquid diet to supplement.   -March 13: patient has been refusing PEG feeds out of concern he will have diarrhea again. Assured patient that diarrhea if it occurs can be managed. Encouraging patient to take feeds. Last day of zosyn; patient has taken all doses March 12 and morning of March 13.     #Dysphagia with odynophagia  #Failure to thrive   # Hx of prostate cancer s/p radiation therapy  # Prostate cancer metastasis to mediastinum or primary mediastinal mass?  - CT Chest: Interval enlargement of the central/posterior mediastinal mass with development of more extensive lymphadenopathy. The mass encircles the descending thoracic aorta and has some mass effect upon the left atrium. The mid aspect of the esophagus is encircled by the mass and difficult to distinguish. Possible ovoid pill is noted within the mass and possible location of the mid esophagus appear. The maximal esophagus appears distended with layering debris within. Consideration may be given to esophageal stenting.  - CT abdomen and pelvis:  Increased upper abdominal bulky lymphadenopathy. Unchanged bulky retroperitoneal lymphadenopathy, large left bladder mass and perirectal soft tissue infiltrative mass. 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 Chronic moderate to severe left hydroureteronephrosis  - EGD with GI 2/21 -> esophageal stent placed  - CT surgery following -> no acute intervention   - AFP and CEA wnl - LDH and haptoglobin elevated - PSA total 220, PSA free is 22 - CA19 131-  57  - Esophageal biopsy:  metastatic poorly differentiated carcinoma of prostatic origin.  - per onc note, patient had prior biopsy of site that demonstrated metastatic prostate adenocarcinoma and was started on Docetaxel, in January 2024 patient was started on Pembrolizumab and continued on keytruda injections.   - Dr. Irizarry (pt's oncologist): cs rad/onco for palliative radiation, onco asked about new protocol since patient progressed , rec hospice and palliative   - EGD replaced stent and PEG placed 3/5/24   -lacks capacity- psych, ethics and palliative following:     - pt refusing DNR/DNI status based on [employer]ism beliefs, ethics agreed 2P consent would not be appropriate to make him DNR/DNI     -Rad onc on Mar 10th: will review palliative radiation with patient once "capacity" clarified     -heme/onc: requesting psych eval; recommends palliative radiation and 2nd line chemo but pt has no capacity      -Evaluated by psych on march 8th     -March 12: ethics updated recs regarding patient's capacity appreciated. Currently with peg feeds and will add liquid diet to supplement.      -March 13: reevaluated by heme/onc. Started on xtandi 160 mg daily; also recommending palliative radiation to mediastinal mass. Spoke with rad onc about palliative RT, they will follow up.     #chronic clavicular fracture  Xray Shoulder 2 Views, Left (03.05.24 @ 20:02)   Subacute/chronic appearing distal clavicular fracture with approximately 1.7 cm of inferior displacement of the distal fragment. No acute fracture or dislocation.  Left-sided opacities/effusion. Esophageal stent with surgical clips    #CHUCKIE vs ATN induced DIMAS  - pt came in with a baseline of 1.5 and increased to 2.6 with contrast use; improved to 1.3 but now unable to take po and refusing IVF  - renally dose medications  -US Kidney and Bladder:  1.  Diffusely echogenic bilateral kidneys which can be seen with medical renal disease.  2.  Moderate to severe left hydronephrosis with left renal parenchymal thinning, as seen on prior CT scan.  3.  Large mass identified within the urinary bladder, measuring up to 7.9  cm, previously up to 7 cm.  No ureteral jets are seen bilaterally.  4.  Incidentally noted right pleural effusion.  -nephro recs appreciated  -C3 C4 levels normal  -on NaHCO3 1300mg TID   -no indications for RRT    # small b/l pleural effusions on CT chest, resolved  - s/p IV Lasix     #normocytic Anemia  - Hb 7.6, stable   - f/u iron  studies noted % sat 27   - MCV normal, SPEP negative     Inpatient checklist:  #DVT PPx- heparin   #GI PPx- none   #Diet-  peg feeds + supplement liquid diet   #Activity- AAT,   PT consult recommended Home PT  - Would be unable to return to [facility]     68-year-old male with past medical history of hypertension, chronic back pain, prostate cancer, and posterior mediastinal mass 6.5 cm seen on CT 11/2023, presents for 3 months of progressively difficulty swallowing associated with 10 pounds weight loss and odynophagia. Comes in today now unable to swallow his pills with water, resulting in 2 episode of emesis. He mentioned that he gets SOB during exertion but denies any exertional chest pain. As per patient he was treated at [facility] for prostate cancer and is s/p radiation therapy. He states that he has problem during walking and unable to maintain the posture.     #aspiration PNA vs viral infection vs HAP   #GAS s pyogenes bacteremia   - SIRS positive (WBC increased to 16, febrile to 101 and tachy to 129 )   - CXR with worsening L-sided opacity   - Blood cx (2/24/24): positive for strep pyogenes  - repeat BCx 27,28,29,1,2  negative to date   - procal 7  - TTE   1. Left ventricular ejection fraction, by visual estimation, is 65 to 70%.   2. Hyperdynamic global left ventricular systolic function.   3. Mildly increased LV wall thickness.   4. Spectral Doppler shows impaired relaxation pattern of left ventricular myocardial filling (Grade I diastolic dysfunction).   5. Trace mitral valve regurgitation.  Plan:  - aspiration precautions   - patient cleared for pureed diet and thin liquids per SS -refusing food as patient reports inability to swallow -> - Peg placed 3/5/24 and feeds started 3/6/24 -> pt was having diarrhea after PEG, but now resolved   - pt was being treated with cefepime and then ceftriaxone for bacteremia, but then developed aspiration pna so resumed Zosyn  - Zosyn due to end on 3/13/24. Patient occasionally refuses doses of zosyn. Will continue to monitor what medications patient is taking and refusing.   -March 12: Currently with peg feeds and will add liquid diet to supplement.   -March 13: patient has been refusing PEG feeds out of concern he will have diarrhea again. Assured patient that diarrhea if it occurs can be managed. Encouraging patient to take feeds. Last day of zosyn; patient has taken all doses March 12 and morning of March 13.     #Dysphagia with odynophagia  #Failure to thrive   # Hx of prostate cancer s/p radiation therapy  # Prostate cancer metastasis to mediastinum or primary mediastinal mass?  - CT Chest: Interval enlargement of the central/posterior mediastinal mass with development of more extensive lymphadenopathy. The mass encircles the descending thoracic aorta and has some mass effect upon the left atrium. The mid aspect of the esophagus is encircled by the mass and difficult to distinguish. Possible ovoid pill is noted within the mass and possible location of the mid esophagus appear. The maximal esophagus appears distended with layering debris within. Consideration may be given to esophageal stenting.  - CT abdomen and pelvis:  Increased upper abdominal bulky lymphadenopathy. Unchanged bulky retroperitoneal lymphadenopathy, large left bladder mass and perirectal soft tissue infiltrative mass. 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 Chronic moderate to severe left hydroureteronephrosis  - EGD with GI 2/21 -> esophageal stent placed  - CT surgery following -> no acute intervention   - AFP and CEA wnl - LDH and haptoglobin elevated - PSA total 220, PSA free is 22 - CA19 131-  57  - Esophageal biopsy:  metastatic poorly differentiated carcinoma of prostatic origin.  - per onc note, patient had prior biopsy of site that demonstrated metastatic prostate adenocarcinoma and was started on Docetaxel, in January 2024 patient was started on Pembrolizumab and continued on keytruda injections.   - Dr. Irizarry (pt's oncologist): cs rad/onco for palliative radiation, onco asked about new protocol since patient progressed , rec hospice and palliative   - EGD replaced stent and PEG placed 3/5/24   -lacks capacity- psych, ethics and palliative following:     - pt refusing DNR/DNI status based on Adventism beliefs, ethics agreed 2P consent would not be appropriate to make him DNR/DNI     -Rad onc on Mar 10th: will review palliative radiation with patient once "capacity" clarified     -heme/onc: requesting psych eval; recommends palliative radiation and 2nd line chemo but pt has no capacity      -Evaluated by psych on march 8th     -March 12: ethics updated recs regarding patient's capacity appreciated. Currently with peg feeds and will add liquid diet to supplement.      -March 13: moving forward with cancer treatment. Reevaluated by heme/onc, will start on xtandi 160 mg daily once pharmacy orders it, and also recommending palliative radiation to mediastinal mass. The reeevaluated by Rad Onc for palliative RT -> CT sim for mapping and planning, followed by XRT.    #chronic clavicular fracture  Xray Shoulder 2 Views, Left (03.05.24 @ 20:02)   Subacute/chronic appearing distal clavicular fracture with approximately 1.7 cm of inferior displacement of the distal fragment. No acute fracture or dislocation.  Left-sided opacities/effusion. Esophageal stent with surgical clips    #CHUCKIE vs ATN induced DIMAS  - pt came in with a baseline of 1.5 and increased to 2.6 with contrast use; improved to 1.3 but now unable to take po and refusing IVF  - renally dose medications  -US Kidney and Bladder:  1.  Diffusely echogenic bilateral kidneys which can be seen with medical renal disease.  2.  Moderate to severe left hydronephrosis with left renal parenchymal thinning, as seen on prior CT scan.  3.  Large mass identified within the urinary bladder, measuring up to 7.9  cm, previously up to 7 cm.  No ureteral jets are seen bilaterally.  4.  Incidentally noted right pleural effusion.  -nephro recs appreciated  -C3 C4 levels normal  -on NaHCO3 1300mg TID   -no indications for RRT    # small b/l pleural effusions on CT chest, resolved  - s/p IV Lasix     #normocytic Anemia  - Hb 7.6, stable   - f/u iron  studies noted % sat 27   - MCV normal, SPEP negative     Inpatient checklist:  #DVT PPx- heparin   #GI PPx- none   #Diet-  peg feeds + supplement liquid diet   #Activity- AAT,   PT consult recommended Home PT  - Would be unable to return to [facility]     68-year-old male with past medical history of hypertension, chronic back pain, prostate cancer, and posterior mediastinal mass 6.5 cm seen on CT 11/2023, presents for 3 months of progressively difficulty swallowing associated with 10 pounds weight loss and odynophagia. Comes in today now unable to swallow his pills with water, resulting in 2 episode of emesis. He mentioned that he gets SOB during exertion but denies any exertional chest pain. As per patient he was treated at [facility] for prostate cancer and is s/p radiation therapy. He states that he has problem during walking and unable to maintain the posture.     #aspiration PNA vs viral infection vs HAP   #GAS s pyogenes bacteremia   - SIRS positive (WBC increased to 16, febrile to 101 and tachy to 129 )   - CXR with worsening L-sided opacity   - Blood cx (2/24/24): positive for strep pyogenes  - repeat BCx 27,28,29,1,2  negative to date   - procal 7  - TTE   1. Left ventricular ejection fraction, by visual estimation, is 65 to 70%.   2. Hyperdynamic global left ventricular systolic function.   3. Mildly increased LV wall thickness.   4. Spectral Doppler shows impaired relaxation pattern of left ventricular myocardial filling (Grade I diastolic dysfunction).   5. Trace mitral valve regurgitation.  Plan:  - aspiration precautions   - patient cleared for pureed diet and thin liquids per SS -refusing food as patient reports inability to swallow -> - Peg placed 3/5/24 and feeds started 3/6/24 -> pt was having diarrhea after PEG, but now resolved   - pt was being treated with cefepime and then ceftriaxone for bacteremia, but then developed aspiration pna so resumed Zosyn  - Zosyn due to end on 3/13/24. Patient occasionally refuses doses of zosyn. Will continue to monitor what medications patient is taking and refusing.   -March 12: Currently with peg feeds and will add liquid diet to supplement.   -March 13: patient has been refusing PEG feeds out of concern he will have diarrhea again. Assured patient that diarrhea if it occurs can be managed. Encouraging patient to take feeds. Last day of zosyn; patient has taken all doses March 12 and morning of March 13.     #Dysphagia with odynophagia  #Failure to thrive   # Hx of prostate cancer s/p radiation therapy  # Prostate cancer metastasis to mediastinum or primary mediastinal mass?  - CT Chest: Interval enlargement of the central/posterior mediastinal mass with development of more extensive lymphadenopathy. The mass encircles the descending thoracic aorta and has some mass effect upon the left atrium. The mid aspect of the esophagus is encircled by the mass and difficult to distinguish. Possible ovoid pill is noted within the mass and possible location of the mid esophagus appear. The maximal esophagus appears distended with layering debris within. Consideration may be given to esophageal stenting.  - CT abdomen and pelvis:  Increased upper abdominal bulky lymphadenopathy. Unchanged bulky retroperitoneal lymphadenopathy, large left bladder mass and perirectal soft tissue infiltrative mass. 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 Chronic moderate to severe left hydroureteronephrosis  - EGD with GI 2/21 -> esophageal stent placed  - CT surgery following -> no acute intervention   - AFP and CEA wnl - LDH and haptoglobin elevated - PSA total 220, PSA free is 22 - CA19 131-  57  - Esophageal biopsy:  metastatic poorly differentiated carcinoma of prostatic origin.  - per onc note, patient had prior biopsy of site that demonstrated metastatic prostate adenocarcinoma and was started on Docetaxel, in January 2024 patient was started on Pembrolizumab and continued on keytruda injections.   - Dr. Irizarry (pt's oncologist): cs rad/onco for palliative radiation, onco asked about new protocol since patient progressed , rec hospice and palliative   - EGD replaced stent and PEG placed 3/5/24   -lacks capacity- psych, ethics and palliative following:     - pt refusing DNR/DNI status based on Rastafari beliefs, ethics agreed 2P consent would not be appropriate to make him DNR/DNI     -Rad onc on Mar 10th: will review palliative radiation with patient once "capacity" clarified     -heme/onc: requesting psych eval; recommends palliative radiation and 2nd line chemo but pt has no capacity      -Evaluated by psych on march 8th     -March 12: ethics updated recs regarding patient's capacity appreciated. Currently with peg feeds and will add liquid diet to supplement.      -March 13: moving forward with cancer treatment. Reevaluated by heme/onc, will start on xtandi 160 mg daily. Pharmacy has to order med as it is non formulary; script sent to [facility]; will dispense med once available. Heme onc also recommending palliative radiation to mediastinal mass. Also reevaluated by Rad Onc for palliative RT -> CT sim for mapping and planning, followed by XRT.    #chronic clavicular fracture  Xray Shoulder 2 Views, Left (03.05.24 @ 20:02)   Subacute/chronic appearing distal clavicular fracture with approximately 1.7 cm of inferior displacement of the distal fragment. No acute fracture or dislocation.  Left-sided opacities/effusion. Esophageal stent with surgical clips    #CHUCKIE vs ATN induced DIMAS  - pt came in with a baseline of 1.5 and increased to 2.6 with contrast use; improved to 1.3 but now unable to take po and refusing IVF  - renally dose medications  -US Kidney and Bladder:  1.  Diffusely echogenic bilateral kidneys which can be seen with medical renal disease.  2.  Moderate to severe left hydronephrosis with left renal parenchymal thinning, as seen on prior CT scan.  3.  Large mass identified within the urinary bladder, measuring up to 7.9  cm, previously up to 7 cm.  No ureteral jets are seen bilaterally.  4.  Incidentally noted right pleural effusion.  -nephro recs appreciated  -C3 C4 levels normal  -on NaHCO3 1300mg TID   -no indications for RRT    # small b/l pleural effusions on CT chest, resolved  - s/p IV Lasix     #normocytic Anemia  - Hb 7.6, stable   - f/u iron  studies noted % sat 27   - MCV normal, SPEP negative     Inpatient checklist:  #DVT PPx- heparin   #GI PPx- none   #Diet-  peg feeds + supplement liquid diet   #Activity- AAT,   PT consult recommended Home PT  - Would be unable to return to [facility]

## 2024-03-13 NOTE — PROGRESS NOTE ADULT - ASSESSMENT
metastatic prostate cancer  progressed   on treatmem  meed palliative radiation and 2nd line chemo but pt has no capacity   prognosis poor     discussed with hospatilist today   as per hospatilist pt has capacity   i discussed with pt today he just says yes for everything ,chemo yes ,radiation yes ,without asking any questions   asked if you have any questions he says no     pt progressed after docetaxol ,nubeqa [ and lupron ,had initially good response but now  progressed     will suggest start xtandi  by mouth 160 mg daily    also need palliative radiation to medistinal mass     will follow with you       kirit irving MD

## 2024-03-13 NOTE — PROGRESS NOTE ADULT - SUBJECTIVE AND OBJECTIVE BOX
We discussed palliative radiation with the Patient, offered 5 fx, patient agrees.   Noted: The Ethics Committee consensus to proceed with XRT.  Pt is A & O X 3, no acute distress,    CT sim for mapping and planning ASAP, followed by XRT.  Obtain written consent in rad/onc.  d/w Dr. Caal

## 2024-03-13 NOTE — PROGRESS NOTE ADULT - SUBJECTIVE AND OBJECTIVE BOX
24H events:    Patient is a 68y old Male who presents with a chief complaint of Mediastinal Mass (12 Mar 2024 16:50)    Primary diagnosis of Mediastinal mass    Today is hospital day 22d. This morning patient was seen and examined at bedside, resting comfortably in bed.    No acute or major events overnight.  Patient took all doses of zosyn yesterday and this morning. Has been refusing peg feeds since yesterday night due to concern for diarrhea.     PAST MEDICAL & SURGICAL HISTORY  HTN (hypertension)    Prostate cancer      SOCIAL HISTORY:  Social History:      ALLERGIES:  No Known Allergies    MEDICATIONS:  STANDING MEDICATIONS  chlorhexidine 2% Cloths 1 Application(s) Topical <User Schedule>  dextrose 5% + sodium chloride 0.9%. 1000 milliLiter(s) IV Continuous <Continuous>  enzalutamide 160 milliGRAM(s) Oral daily  heparin   Injectable 5000 Unit(s) SubCutaneous every 12 hours  iohexol 300 mG (iodine)/mL Oral Solution 30 milliLiter(s) Oral once  lidocaine   4% Patch 1 Patch Transdermal every 24 hours  metoprolol tartrate 25 milliGRAM(s) Enteral Tube two times a day  ondansetron Injectable 4 milliGRAM(s) IV Push once  piperacillin/tazobactam IVPB.. 3.375 Gram(s) IV Intermittent every 8 hours  sodium bicarbonate 1300 milliGRAM(s) Oral every 6 hours    PRN MEDICATIONS    VITALS:   T(F): 97.9  HR: 89  BP: 114/75  RR: 18  SpO2: --    PHYSICAL EXAM:  GENERAL:   ( x ) NAD, lying in bed comfortably    on room air     HEART:  Rate -->     ( x ) normal rate     (  ) bradycardic     (  ) tachycardic  Rhythm -->     ( x) regular     (  ) regularly irregular     (  ) irregularly irregular  Murmurs -->     ( x ) normal s1s2     (  ) systolic murmur     (  ) diastolic murmur     (  ) continuous murmur      (  ) S3 present     (  ) S4 present    LUNGS:   ( x )Unlabored respirations     (  ) tachypnea  (  x) B/L air entry     (  ) decreased breath sounds in:  (location     )    (x  ) no adventitious sound         ABDOMEN:   soft, non tender, non distended; PEG in place     EXTREMITIES:  no LE edema     NERVOUS SYSTEM:    AOAx1, no FND     LABS:                        8.6    5.00  )-----------( 362      ( 11 Mar 2024 18:24 )             28.4                         RADIOLOGY:

## 2024-03-13 NOTE — PROGRESS NOTE ADULT - ATTENDING COMMENTS
68-year-old male with past medical history of hypertension, chronic back pain, prostate cancer, and posterior mediastinal mass 6.5 cm seen on CT 11/2023, presents for 3 months of progressively difficulty swallowing associated with 10 pounds weight loss and odynophagia. Comes in today now unable to swallow his pills with water, resulting in 2 episode of emesis. He mentioned that he gets SOB during exertion but denies any exertional chest pain. As per patient he was treated at [facility] for prostate cancer and is s/p radiation therapy. He states that he has problem during walking and unable to maintain the posture.     #aspiration PNA vs viral infection vs HAP   #GAS s pyogenes bacteremia   #SIRS  - CXR with worsening L-sided opacity   - Blood cx (2/24/24): positive for strep pyogenes  - repeat BCx neg  - pt was initially treated with cefepime for Bcx until neg  - finish course of IV zosyn - last day today 3/13      #Odynophagia and dysphagia due to prostate cancer metastasized to mediastinum  #Failure to thrive   - CT Chest: Interval enlargement of the central/posterior mediastinal mass with development of more extensive lymphadenopathy. The mass encircles the descending thoracic aorta and has some mass effect upon the left atrium. The mid aspect of the esophagus is encircled by the mass and difficult to distinguish. Possible ovoid pill is noted within the mass and possible location of the mid esophagus appear. The maximal esophagus appears distended with layering debris within. Consideration may be given to esophageal stenting.  - CT abdomen and pelvis:  Increased upper abdominal bulky lymphadenopathy. Unchanged bulky retroperitoneal lymphadenopathy, large left bladder mass and perirectal soft tissue infiltrative mass. 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 Chronic moderate to severe left hydroureteronephrosis  - EGD with GI 2/21 -> esophageal stent placed  - CT surgery following -> no acute intervention   - AFP and CEA wnl - LDH and haptoglobin elevated - PSA total 220, PSA free is 22 - CA19 131-  57  - Esophageal biopsy:  metastatic poorly differentiated carcinoma of prostatic origin  - per onc note, patient had prior biopsy of site that demonstrated metastatic prostate adenocarcinoma and was started on Docetaxel, in January 2024 patient was started on Pembrolizumab and continued on keytruda injections.   - EGD replaced stent and PEG placed 3/5/24 - tube feeds to be charted - bedside RN to inform medical intern if patient declines peg feeds - also discussed with Nutrition on the case   - Oncology follow up noted - starting treatment + Radiation oncology eval for palliative radiation (non-formulary to be filled out) - will need oncology reccs to assist with the case   - unsure if patient can return back to [facility] residence - ? RCC dispo - discussed with CM in rounds   - overall prognosis poor - full code     Attending Physician Dr. Erinn Leslie # [id number]

## 2024-03-14 PROCEDURE — 99232 SBSQ HOSP IP/OBS MODERATE 35: CPT

## 2024-03-14 RX ADMIN — Medication 25 MILLIGRAM(S): at 17:23

## 2024-03-14 RX ADMIN — Medication 1300 MILLIGRAM(S): at 05:57

## 2024-03-14 RX ADMIN — SODIUM CHLORIDE 75 MILLILITER(S): 9 INJECTION, SOLUTION INTRAVENOUS at 18:55

## 2024-03-14 RX ADMIN — CHLORHEXIDINE GLUCONATE 1 APPLICATION(S): 213 SOLUTION TOPICAL at 06:02

## 2024-03-14 RX ADMIN — Medication 25 MILLIGRAM(S): at 05:57

## 2024-03-14 RX ADMIN — Medication 1300 MILLIGRAM(S): at 17:23

## 2024-03-14 RX ADMIN — HEPARIN SODIUM 5000 UNIT(S): 5000 INJECTION INTRAVENOUS; SUBCUTANEOUS at 17:22

## 2024-03-14 RX ADMIN — Medication 1300 MILLIGRAM(S): at 23:19

## 2024-03-14 RX ADMIN — HEPARIN SODIUM 5000 UNIT(S): 5000 INJECTION INTRAVENOUS; SUBCUTANEOUS at 05:57

## 2024-03-14 NOTE — PROGRESS NOTE ADULT - ASSESSMENT
metastatic prostate cancer  progressed   on treatmem  meed palliative radiation and 2nd line chemo but pt has no capacity   prognosis poor     discussed with hospatilist today   as per hospatilist pt has capacity   i discussed with pt today he just says yes for everything ,chemo yes ,radiation yes ,without asking any questions   asked if you have any questions he says no     pt progressed after docetaxol ,nubeqa [ and lupron ,had initially good response but now  progressed     will suggest start xtandi  by mouth 160 mg daily    also need palliative radiation to medistinal mass     spoke to pharmacist ,xtandi will be ordered   palliative radiation will be started as per radiation note     will follow with you       kirit irving MD

## 2024-03-14 NOTE — PROGRESS NOTE ADULT - SUBJECTIVE AND OBJECTIVE BOX
24H events:    Patient is a 68y old Male who presents with a chief complaint of Mediastinal Mass (14 Mar 2024 08:11)    Primary diagnosis of Mediastinal mass    Today is hospital day 23d. This morning patient was seen and examined at bedside, resting comfortably in bed.    No acute or major events overnight.  Pt states he is taking his peg feeds. Reports mild mid abdo pain.     PAST MEDICAL & SURGICAL HISTORY  HTN (hypertension)    Prostate cancer      SOCIAL HISTORY:  Social History:      ALLERGIES:  No Known Allergies    MEDICATIONS:  STANDING MEDICATIONS  chlorhexidine 2% Cloths 1 Application(s) Topical <User Schedule>  dextrose 5% + sodium chloride 0.9%. 1000 milliLiter(s) IV Continuous <Continuous>  enzalutamide 160 milliGRAM(s) Oral daily  heparin   Injectable 5000 Unit(s) SubCutaneous every 12 hours  iohexol 300 mG (iodine)/mL Oral Solution 30 milliLiter(s) Oral once  lidocaine   4% Patch 1 Patch Transdermal every 24 hours  metoprolol tartrate 25 milliGRAM(s) Enteral Tube two times a day  ondansetron Injectable 4 milliGRAM(s) IV Push once  sodium bicarbonate 1300 milliGRAM(s) Oral every 6 hours    PRN MEDICATIONS    VITALS:   T(F): 98.1  HR: 85  BP: 119/83  RR: 18  SpO2: --    PHYSICAL EXAM:  GENERAL:   (  x) NAD, lying in bed comfortably  room air     HEART:  Rate -->     (  x) normal rate     (  ) bradycardic     (  ) tachycardic  Rhythm -->     (  x) regular     (  ) regularly irregular     (  ) irregularly irregular  Murmurs -->     (x  ) normal s1s2     (  ) systolic murmur     (  ) diastolic murmur     (  ) continuous murmur      (  ) S3 present     (  ) S4 present    LUNGS:   (x  )Unlabored respirations     (  ) tachypnea  (x  ) B/L air entry     (  ) decreased breath sounds in:  (location     )    ( x ) no adventitious sound        ABDOMEN:   (x  ) Soft  (  x) nondistended      (x  ) nontender      peg tube in place     EXTREMITIES:  no LE edema     NERVOUS SYSTEM:    AOA x2  LABS:                        RADIOLOGY:

## 2024-03-14 NOTE — PROGRESS NOTE ADULT - ATTENDING COMMENTS
68-year-old male with past medical history of hypertension, chronic back pain, prostate cancer, and posterior mediastinal mass 6.5 cm seen on CT 11/2023, presents for 3 months of progressively difficulty swallowing associated with 10 pounds weight loss and odynophagia. Comes in today now unable to swallow his pills with water, resulting in 2 episode of emesis. He mentioned that he gets SOB during exertion but denies any exertional chest pain. As per patient he was treated at [facility] for prostate cancer and is s/p radiation therapy. He states that he has problem during walking and unable to maintain the posture.     #aspiration PNA vs viral infection vs HAP   #GAS s pyogenes bacteremia   #SIRS  - CXR with worsening L-sided opacity   - Blood cx (2/24/24): positive for strep pyogenes  - repeat BCx neg  - pt was initially treated with cefepime for Bcx until neg  - finished course of IV zosyn       #Odynophagia and dysphagia due to prostate cancer metastasized to mediastinum  #Failure to thrive   - CT Chest: Interval enlargement of the central/posterior mediastinal mass with development of more extensive lymphadenopathy. The mass encircles the descending thoracic aorta and has some mass effect upon the left atrium. The mid aspect of the esophagus is encircled by the mass and difficult to distinguish. Possible ovoid pill is noted within the mass and possible location of the mid esophagus appear. The maximal esophagus appears distended with layering debris within. Consideration may be given to esophageal stenting.  - CT abdomen and pelvis:  Increased upper abdominal bulky lymphadenopathy. Unchanged bulky retroperitoneal lymphadenopathy, large left bladder mass and perirectal soft tissue infiltrative mass. 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 Chronic moderate to severe left hydroureteronephrosis  - EGD with GI 2/21 -> esophageal stent placed  - CT surgery following -> no acute intervention   - AFP and CEA wnl - LDH and haptoglobin elevated - PSA total 220, PSA free is 22 - CA19 131-  57  - Esophageal biopsy:  metastatic poorly differentiated carcinoma of prostatic origin  - per onc note, patient had prior biopsy of site that demonstrated metastatic prostate adenocarcinoma and was started on Docetaxel, in January 2024 patient was started on Pembrolizumab and continued on keytruda injections.   - EGD replaced stent and PEG placed 3/5/24 - tube feeds to be charted - bedside RN to inform medical intern if patient declines peg feeds - also discussed with Nutrition on the case   - Oncology follow up noted - starting treatment + Radiation oncology eval for palliative radiation (non-formulary filled out- awaiting for Xtandi) - will need continued oncology reccs to assist with the case   - unsure if patient can return back to [facility] residence - ? RCC dispo - discussed with CM in rounds   - overall prognosis poor - full code     Attending Physician Dr. Erinn Leslie # [id number]

## 2024-03-14 NOTE — PROGRESS NOTE ADULT - ASSESSMENT
68-year-old male with past medical history of hypertension, chronic back pain, prostate cancer, and posterior mediastinal mass 6.5 cm seen on CT 11/2023, presents for 3 months of progressively difficulty swallowing associated with 10 pounds weight loss and odynophagia. Comes in today now unable to swallow his pills with water, resulting in 2 episode of emesis. He mentioned that he gets SOB during exertion but denies any exertional chest pain. As per patient he was treated at [facility] for prostate cancer and is s/p radiation therapy. He states that he has problem during walking and unable to maintain the posture.     #aspiration PNA vs viral infection vs HAP   #GAS s pyogenes bacteremia   - SIRS positive (WBC increased to 16, febrile to 101 and tachy to 129 )   - CXR with worsening L-sided opacity   - Blood cx (2/24/24): positive for strep pyogenes  - repeat BCx 27,28,29,1,2  negative to date   - procal 7  - TTE   1. Left ventricular ejection fraction, by visual estimation, is 65 to 70%.   2. Hyperdynamic global left ventricular systolic function.   3. Mildly increased LV wall thickness.   4. Spectral Doppler shows impaired relaxation pattern of left ventricular myocardial filling (Grade I diastolic dysfunction).   5. Trace mitral valve regurgitation.  Plan:  - aspiration precautions   - patient cleared for pureed diet and thin liquids per SS -refusing food as patient reports inability to swallow -> - Peg placed 3/5/24 and feeds started 3/6/24 -> pt was having diarrhea after PEG, but now resolved   - pt was being treated with cefepime and then ceftriaxone for bacteremia, but then developed aspiration pna so resumed Zosyn  - Zosyn due to end on 3/13/24. Patient occasionally refuses doses of zosyn. Will continue to monitor what medications patient is taking and refusing.   -March 12: Currently with peg feeds and will add liquid diet to supplement.   -March 13: patient has been refusing PEG feeds out of concern he will have diarrhea again. Assured patient that diarrhea if it occurs can be managed. Encouraging patient to take feeds. Last day of zosyn; patient has taken all doses March 12 and morning of March 13.     #Dysphagia with odynophagia  #Failure to thrive   # Hx of prostate cancer s/p radiation therapy  # Prostate cancer metastasis to mediastinum or primary mediastinal mass?  - CT Chest: Interval enlargement of the central/posterior mediastinal mass with development of more extensive lymphadenopathy. The mass encircles the descending thoracic aorta and has some mass effect upon the left atrium. The mid aspect of the esophagus is encircled by the mass and difficult to distinguish. Possible ovoid pill is noted within the mass and possible location of the mid esophagus appear. The maximal esophagus appears distended with layering debris within. Consideration may be given to esophageal stenting.  - CT abdomen and pelvis:  Increased upper abdominal bulky lymphadenopathy. Unchanged bulky retroperitoneal lymphadenopathy, large left bladder mass and perirectal soft tissue infiltrative mass. 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 Chronic moderate to severe left hydroureteronephrosis  - EGD with GI 2/21 -> esophageal stent placed  - CT surgery following -> no acute intervention   - AFP and CEA wnl - LDH and haptoglobin elevated - PSA total 220, PSA free is 22 - CA19 131-  57  - Esophageal biopsy:  metastatic poorly differentiated carcinoma of prostatic origin.  - per onc note, patient had prior biopsy of site that demonstrated metastatic prostate adenocarcinoma and was started on Docetaxel, in January 2024 patient was started on Pembrolizumab and continued on keytruda injections.   - Dr. Irizarry (pt's oncologist): cs rad/onco for palliative radiation, onco asked about new protocol since patient progressed , rec hospice and palliative   - EGD replaced stent and PEG placed 3/5/24   -lacks capacity- psych, ethics and palliative following:     - pt refusing DNR/DNI status based on [employer] beliefs, ethics agreed 2P consent would not be appropriate to make him DNR/DNI     -Rad onc on Mar 10th: will review palliative radiation with patient once "capacity" clarified     -heme/onc: requesting psych eval; recommends palliative radiation and 2nd line chemo but pt has no capacity      -Evaluated by psych on march 8th     -March 12: ethics updated recs regarding patient's capacity appreciated. Currently with peg feeds and will add liquid diet to supplement.      -March 13: moving forward with cancer treatment. Reevaluated by heme/onc, will start on xtandi 160 mg daily. Pharmacy has to order med as it is non formulary; script sent to [facility]; will dispense med once available. Heme onc also recommending palliative radiation to mediastinal mass. Also reevaluated by Rad Onc for palliative RT -> CT sim for mapping and planning, followed by XRT.     -March 14: pending Xtandi from pharmacy    #chronic clavicular fracture  Xray Shoulder 2 Views, Left (03.05.24 @ 20:02)   Subacute/chronic appearing distal clavicular fracture with approximately 1.7 cm of inferior displacement of the distal fragment. No acute fracture or dislocation.  Left-sided opacities/effusion. Esophageal stent with surgical clips    #CHUCKIE vs ATN induced DIMAS  - pt came in with a baseline of 1.5 and increased to 2.6 with contrast use; improved to 1.3 but now unable to take po and refusing IVF  - renally dose medications  -US Kidney and Bladder:  1.  Diffusely echogenic bilateral kidneys which can be seen with medical renal disease.  2.  Moderate to severe left hydronephrosis with left renal parenchymal thinning, as seen on prior CT scan.  3.  Large mass identified within the urinary bladder, measuring up to 7.9  cm, previously up to 7 cm.  No ureteral jets are seen bilaterally.  4.  Incidentally noted right pleural effusion.  -nephro recs appreciated  -C3 C4 levels normal  -on NaHCO3 1300mg TID   -no indications for RRT    # small b/l pleural effusions on CT chest, resolved  - s/p IV Lasix     #normocytic Anemia  - Hb 7.6, stable   - f/u iron  studies noted % sat 27   - MCV normal, SPEP negative     Inpatient checklist:  #DVT PPx- heparin   #GI PPx- none   #Diet-  peg feeds + supplement liquid diet   #Activity- AAT,   PT consult recommended Home PT  - Would be unable to return to [facility]   68-year-old male with past medical history of hypertension, chronic back pain, prostate cancer, and posterior mediastinal mass 6.5 cm seen on CT 11/2023, presents for 3 months of progressively difficulty swallowing associated with 10 pounds weight loss and odynophagia. Comes in today now unable to swallow his pills with water, resulting in 2 episode of emesis. He mentioned that he gets SOB during exertion but denies any exertional chest pain. As per patient he was treated at [facility] for prostate cancer and is s/p radiation therapy. He states that he has problem during walking and unable to maintain the posture.     #aspiration PNA vs viral infection vs HAP   #GAS s pyogenes bacteremia   - SIRS positive (WBC increased to 16, febrile to 101 and tachy to 129 )   - CXR with worsening L-sided opacity   - Blood cx (2/24/24): positive for strep pyogenes  - repeat BCx 27,28,29,1,2  negative to date   - procal 7  - TTE   1. Left ventricular ejection fraction, by visual estimation, is 65 to 70%.   2. Hyperdynamic global left ventricular systolic function.   3. Mildly increased LV wall thickness.   4. Spectral Doppler shows impaired relaxation pattern of left ventricular myocardial filling (Grade I diastolic dysfunction).   5. Trace mitral valve regurgitation.  Plan:  - aspiration precautions   - patient cleared for pureed diet and thin liquids per SS -refusing food as patient reports inability to swallow -> - Peg placed 3/5/24 and feeds started 3/6/24 -> pt was having diarrhea after PEG, but now resolved   - pt was being treated with cefepime and then ceftriaxone for bacteremia, but then developed aspiration pna so resumed Zosyn  - Zosyn due to end on 3/13/24. Patient occasionally refuses doses of zosyn. Will continue to monitor what medications patient is taking and refusing.   -March 12: Currently with peg feeds and will add liquid diet to supplement.   -March 13: patient has been refusing PEG feeds out of concern he will have diarrhea again. Assured patient that diarrhea if it occurs can be managed. Encouraging patient to take feeds. Last day of zosyn; patient has taken all doses March 12 and morning of March 13.     #Dysphagia with odynophagia  #Failure to thrive   # Hx of prostate cancer s/p radiation therapy  # Prostate cancer metastasis to mediastinum or primary mediastinal mass?  - CT Chest: Interval enlargement of the central/posterior mediastinal mass with development of more extensive lymphadenopathy. The mass encircles the descending thoracic aorta and has some mass effect upon the left atrium. The mid aspect of the esophagus is encircled by the mass and difficult to distinguish. Possible ovoid pill is noted within the mass and possible location of the mid esophagus appear. The maximal esophagus appears distended with layering debris within. Consideration may be given to esophageal stenting.  - CT abdomen and pelvis:  Increased upper abdominal bulky lymphadenopathy. Unchanged bulky retroperitoneal lymphadenopathy, large left bladder mass and perirectal soft tissue infiltrative mass. 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 Chronic moderate to severe left hydroureteronephrosis  - EGD with GI 2/21 -> esophageal stent placed  - CT surgery following -> no acute intervention   - AFP and CEA wnl - LDH and haptoglobin elevated - PSA total 220, PSA free is 22 - CA19 131-  57  - Esophageal biopsy:  metastatic poorly differentiated carcinoma of prostatic origin.  - per onc note, patient had prior biopsy of site that demonstrated metastatic prostate adenocarcinoma and was started on Docetaxel, in January 2024 patient was started on Pembrolizumab and continued on keytruda injections.   - Dr. Irizarry (pt's oncologist): cs rad/onco for palliative radiation, onco asked about new protocol since patient progressed , rec hospice and palliative   - EGD replaced stent and PEG placed 3/5/24   -lacks capacity- psych, ethics and palliative following:     - pt refusing DNR/DNI status based on [employer] beliefs, ethics agreed 2P consent would not be appropriate to make him DNR/DNI     -Rad onc on Mar 10th: will review palliative radiation with patient once "capacity" clarified     -heme/onc: requesting psych eval; recommends palliative radiation and 2nd line chemo but pt has no capacity      -Evaluated by psych on march 8th     -March 12: ethics updated recs regarding patient's capacity appreciated. Currently with peg feeds and will add liquid diet to supplement.      -March 13: moving forward with cancer treatment. Reevaluated by heme/onc, will start on xtandi 160 mg daily. Pharmacy has to order med as it is non formulary; script sent to [facility]; will dispense med once available. Heme onc also recommending palliative radiation to mediastinal mass. Also reevaluated by Rad Onc for palliative RT -> CT sim for mapping and planning, followed by XRT.     -March 14: pending Xtandi from pharmacy. Refusing morning labs, encouraged pt to have labs drawn    #chronic clavicular fracture  Xray Shoulder 2 Views, Left (03.05.24 @ 20:02)   Subacute/chronic appearing distal clavicular fracture with approximately 1.7 cm of inferior displacement of the distal fragment. No acute fracture or dislocation.  Left-sided opacities/effusion. Esophageal stent with surgical clips    #CHUCKIE vs ATN induced DIMAS  - pt came in with a baseline of 1.5 and increased to 2.6 with contrast use; improved to 1.3 but now unable to take po and refusing IVF  - renally dose medications  -US Kidney and Bladder:  1.  Diffusely echogenic bilateral kidneys which can be seen with medical renal disease.  2.  Moderate to severe left hydronephrosis with left renal parenchymal thinning, as seen on prior CT scan.  3.  Large mass identified within the urinary bladder, measuring up to 7.9  cm, previously up to 7 cm.  No ureteral jets are seen bilaterally.  4.  Incidentally noted right pleural effusion.  -nephro recs appreciated  -C3 C4 levels normal  -on NaHCO3 1300mg TID   -no indications for RRT    # small b/l pleural effusions on CT chest, resolved  - s/p IV Lasix     #normocytic Anemia  - Hb 7.6, stable   - f/u iron  studies noted % sat 27   - MCV normal, SPEP negative     Inpatient checklist:  #DVT PPx- heparin   #GI PPx- none   #Diet-  peg feeds + supplement liquid diet   #Activity- AAT,   PT consult recommended Home PT  - Would be unable to return to [facility]   68-year-old male with past medical history of hypertension, chronic back pain, prostate cancer, and posterior mediastinal mass 6.5 cm seen on CT 11/2023, presents for 3 months of progressively difficulty swallowing associated with 10 pounds weight loss and odynophagia. Comes in today now unable to swallow his pills with water, resulting in 2 episode of emesis. He mentioned that he gets SOB during exertion but denies any exertional chest pain. As per patient he was treated at [facility] for prostate cancer and is s/p radiation therapy. He states that he has problem during walking and unable to maintain the posture.     #aspiration PNA vs viral infection vs HAP   #GAS s pyogenes bacteremia   - SIRS positive (WBC increased to 16, febrile to 101 and tachy to 129 )   - CXR with worsening L-sided opacity   - Blood cx (2/24/24): positive for strep pyogenes  - repeat BCx 27,28,29,1,2  negative to date   - procal 7  - TTE   1. Left ventricular ejection fraction, by visual estimation, is 65 to 70%.   2. Hyperdynamic global left ventricular systolic function.   3. Mildly increased LV wall thickness.   4. Spectral Doppler shows impaired relaxation pattern of left ventricular myocardial filling (Grade I diastolic dysfunction).   5. Trace mitral valve regurgitation.  Plan:  - aspiration precautions   - patient cleared for pureed diet and thin liquids per SS -refusing food as patient reports inability to swallow -> - Peg placed 3/5/24 and feeds started 3/6/24 -> pt was having diarrhea after PEG, but now resolved   - pt was being treated with cefepime and then ceftriaxone for bacteremia, but then developed aspiration pna so resumed Zosyn  - Zosyn due to end on 3/13/24. Patient occasionally refuses doses of zosyn. Will continue to monitor what medications patient is taking and refusing.   -March 12: Currently with peg feeds and will add liquid diet to supplement.   -March 13: patient has been refusing PEG feeds out of concern he will have diarrhea again. Assured patient that diarrhea if it occurs can be managed. Encouraging patient to take feeds. Last day of zosyn; patient has taken all doses March 12 and morning of March 13.     #Dysphagia with odynophagia  #Failure to thrive   # Hx of prostate cancer s/p radiation therapy  # Prostate cancer metastasis to mediastinum or primary mediastinal mass?  - CT Chest: Interval enlargement of the central/posterior mediastinal mass with development of more extensive lymphadenopathy. The mass encircles the descending thoracic aorta and has some mass effect upon the left atrium. The mid aspect of the esophagus is encircled by the mass and difficult to distinguish. Possible ovoid pill is noted within the mass and possible location of the mid esophagus appear. The maximal esophagus appears distended with layering debris within. Consideration may be given to esophageal stenting.  - CT abdomen and pelvis:  Increased upper abdominal bulky lymphadenopathy. Unchanged bulky retroperitoneal lymphadenopathy, large left bladder mass and perirectal soft tissue infiltrative mass. 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 Chronic moderate to severe left hydroureteronephrosis  - EGD with GI 2/21 -> esophageal stent placed  - CT surgery following -> no acute intervention   - AFP and CEA wnl - LDH and haptoglobin elevated - PSA total 220, PSA free is 22 - CA19 131-  57  - Esophageal biopsy:  metastatic poorly differentiated carcinoma of prostatic origin.  - per onc note, patient had prior biopsy of site that demonstrated metastatic prostate adenocarcinoma and was started on Docetaxel, in January 2024 patient was started on Pembrolizumab and continued on keytruda injections.   - Dr. Irizarry (pt's oncologist): cs rad/onco for palliative radiation, onco asked about new protocol since patient progressed , rec hospice and palliative   - EGD replaced stent and PEG placed 3/5/24   -lacks capacity- psych, ethics and palliative following:     - pt refusing DNR/DNI status based on [employer] beliefs, ethics agreed 2P consent would not be appropriate to make him DNR/DNI     -Rad onc on Mar 10th: will review palliative radiation with patient once "capacity" clarified     -heme/onc: requesting psych eval; recommends palliative radiation and 2nd line chemo but pt has no capacity      -Evaluated by psych on march 8th     -March 12: ethics updated recs regarding patient's capacity appreciated. Currently with peg feeds and will add liquid diet to supplement.      -March 13: moving forward with cancer treatment. Reevaluated by heme/onc, will start on xtandi 160 mg daily. Pharmacy has to order med as it is non formulary; script sent to [facility]; will dispense med once available. Heme onc also recommending palliative radiation to mediastinal mass. Also reevaluated by Rad Onc for palliative RT -> CT sim for mapping and planning, followed by XRT.     -March 14: pending Xtandi from pharmacy, which is available in inpatient pharmacy as per [facility]. Refusing morning labs, encouraged pt to have labs drawn    #chronic clavicular fracture  Xray Shoulder 2 Views, Left (03.05.24 @ 20:02)   Subacute/chronic appearing distal clavicular fracture with approximately 1.7 cm of inferior displacement of the distal fragment. No acute fracture or dislocation.  Left-sided opacities/effusion. Esophageal stent with surgical clips    #CHUCKIE vs ATN induced DIMAS  - pt came in with a baseline of 1.5 and increased to 2.6 with contrast use; improved to 1.3 but now unable to take po and refusing IVF  - renally dose medications  -US Kidney and Bladder:  1.  Diffusely echogenic bilateral kidneys which can be seen with medical renal disease.  2.  Moderate to severe left hydronephrosis with left renal parenchymal thinning, as seen on prior CT scan.  3.  Large mass identified within the urinary bladder, measuring up to 7.9  cm, previously up to 7 cm.  No ureteral jets are seen bilaterally.  4.  Incidentally noted right pleural effusion.  -nephro recs appreciated  -C3 C4 levels normal  -on NaHCO3 1300mg TID   -no indications for RRT    # small b/l pleural effusions on CT chest, resolved  - s/p IV Lasix     #normocytic Anemia  - Hb 7.6, stable   - f/u iron  studies noted % sat 27   - MCV normal, SPEP negative     Inpatient checklist:  #DVT PPx- heparin   #GI PPx- none   #Diet-  peg feeds + supplement liquid diet   #Activity- AAT,   PT consult recommended Home PT  - Would be unable to return to [facility]  Dispo: acute, obtaining Xtandi    68-year-old male with past medical history of hypertension, chronic back pain, prostate cancer, and posterior mediastinal mass 6.5 cm seen on CT 11/2023, presents for 3 months of progressively difficulty swallowing associated with 10 pounds weight loss and odynophagia. Comes in today now unable to swallow his pills with water, resulting in 2 episode of emesis. He mentioned that he gets SOB during exertion but denies any exertional chest pain. As per patient he was treated at [facility] for prostate cancer and is s/p radiation therapy. He states that he has problem during walking and unable to maintain the posture.     #aspiration PNA vs viral infection vs HAP   #GAS s pyogenes bacteremia   - SIRS positive (WBC increased to 16, febrile to 101 and tachy to 129 )   - CXR with worsening L-sided opacity   - Blood cx (2/24/24): positive for strep pyogenes  - repeat BCx 27,28,29,1,2  negative to date   - procal 7  - TTE   1. Left ventricular ejection fraction, by visual estimation, is 65 to 70%.   2. Hyperdynamic global left ventricular systolic function.   3. Mildly increased LV wall thickness.   4. Spectral Doppler shows impaired relaxation pattern of left ventricular myocardial filling (Grade I diastolic dysfunction).   5. Trace mitral valve regurgitation.  Plan:  - aspiration precautions   - patient cleared for pureed diet and thin liquids per SS -refusing food as patient reports inability to swallow -> - Peg placed 3/5/24 and feeds started 3/6/24 -> pt was having diarrhea after PEG, but now resolved   - pt was being treated with cefepime and then ceftriaxone for bacteremia, but then developed aspiration pna so resumed Zosyn  - Zosyn due to end on 3/13/24. Patient occasionally refuses doses of zosyn. Will continue to monitor what medications patient is taking and refusing.   -March 12: Currently with peg feeds and will add liquid diet to supplement.   -March 13: patient has been refusing PEG feeds out of concern he will have diarrhea again. Assured patient that diarrhea if it occurs can be managed. Encouraging patient to take feeds. Last day of zosyn; patient has taken all doses March 12 and morning of March 13.     #Dysphagia with odynophagia  #Failure to thrive   # Hx of prostate cancer s/p radiation therapy  # Prostate cancer metastasis to mediastinum or primary mediastinal mass?  - CT Chest: Interval enlargement of the central/posterior mediastinal mass with development of more extensive lymphadenopathy. The mass encircles the descending thoracic aorta and has some mass effect upon the left atrium. The mid aspect of the esophagus is encircled by the mass and difficult to distinguish. Possible ovoid pill is noted within the mass and possible location of the mid esophagus appear. The maximal esophagus appears distended with layering debris within. Consideration may be given to esophageal stenting.  - CT abdomen and pelvis:  Increased upper abdominal bulky lymphadenopathy. Unchanged bulky retroperitoneal lymphadenopathy, large left bladder mass and perirectal soft tissue infiltrative mass. 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 Chronic moderate to severe left hydroureteronephrosis  - EGD with GI 2/21 -> esophageal stent placed  - CT surgery following -> no acute intervention   - AFP and CEA wnl - LDH and haptoglobin elevated - PSA total 220, PSA free is 22 - CA19 131-  57  - Esophageal biopsy:  metastatic poorly differentiated carcinoma of prostatic origin.  - per onc note, patient had prior biopsy of site that demonstrated metastatic prostate adenocarcinoma and was started on Docetaxel, in January 2024 patient was started on Pembrolizumab and continued on keytruda injections.   - Dr. Irizarry (pt's oncologist): cs rad/onco for palliative radiation, onco asked about new protocol since patient progressed , rec hospice and palliative   - EGD replaced stent and PEG placed 3/5/24   -lacks capacity- psych, ethics and palliative following:     - pt refusing DNR/DNI status based on Muslim beliefs, ethics agreed 2P consent would not be appropriate to make him DNR/DNI     -Rad onc on Mar 10th: will review palliative radiation with patient once "capacity" clarified     -heme/onc: requesting psych eval; recommends palliative radiation and 2nd line chemo but pt has no capacity      -Evaluated by psych on march 8th     -March 12: ethics updated recs regarding patient's capacity appreciated. Currently with peg feeds and will add liquid diet to supplement.      -March 13: moving forward with cancer treatment. Reevaluated by heme/onc, will start on xtandi 160 mg daily. Pharmacy has to order med as it is non formulary; script sent to [facility]; will dispense med once available. Heme onc also recommending palliative radiation to mediastinal mass. Also reevaluated by Rad Onc for palliative RT -> CT sim for mapping and planning, followed by XRT.     -March 14: pharmacy is ordering xtandi. Refusing morning labs, encouraged pt to have labs drawn    #chronic clavicular fracture  Xray Shoulder 2 Views, Left   Subacute/chronic appearing distal clavicular fracture with approximately 1.7 cm of inferior displacement of the distal fragment. No acute fracture or dislocation.  Left-sided opacities/effusion. Esophageal stent with surgical clips    #CHUCKIE vs ATN induced DIMAS  - pt came in with a baseline of 1.5 and increased to 2.6 with contrast use; improved to 1.3 but now unable to take po and refusing IVF  - renally dose medications  -US Kidney and Bladder:  1.  Diffusely echogenic bilateral kidneys which can be seen with medical renal disease.  2.  Moderate to severe left hydronephrosis with left renal parenchymal thinning, as seen on prior CT scan.  3.  Large mass identified within the urinary bladder, measuring up to 7.9  cm, previously up to 7 cm.  No ureteral jets are seen bilaterally.  4.  Incidentally noted right pleural effusion.  -nephro recs appreciated  -C3 C4 levels normal  -on NaHCO3 1300mg TID   -no indications for RRT    # small b/l pleural effusions on CT chest, resolved  - s/p IV Lasix     #normocytic Anemia  - Hb 7.6, stable   - f/u iron  studies noted % sat 27   - MCV normal, SPEP negative     Inpatient checklist:  #DVT PPx- heparin   #GI PPx- none   #Diet-  peg feeds + supplement liquid diet   #Activity- AAT,   PT consult recommended Home PT  - Would be unable to return to [facility]  Dispo: acute, obtaining Xtandi    68-year-old male with past medical history of hypertension, chronic back pain, prostate cancer, and posterior mediastinal mass 6.5 cm seen on CT 11/2023, presents for 3 months of progressively difficulty swallowing associated with 10 pounds weight loss and odynophagia. Comes in today now unable to swallow his pills with water, resulting in 2 episode of emesis. He mentioned that he gets SOB during exertion but denies any exertional chest pain. As per patient he was treated at [facility] for prostate cancer and is s/p radiation therapy. He states that he has problem during walking and unable to maintain the posture.     #aspiration PNA vs viral infection vs HAP   #GAS s pyogenes bacteremia   - SIRS positive (WBC increased to 16, febrile to 101 and tachy to 129 )   - CXR with worsening L-sided opacity   - Blood cx (2/24/24): positive for strep pyogenes  - repeat BCx 27,28,29,1,2  negative to date   - procal 7  - TTE   1. Left ventricular ejection fraction, by visual estimation, is 65 to 70%.   2. Hyperdynamic global left ventricular systolic function.   3. Mildly increased LV wall thickness.   4. Spectral Doppler shows impaired relaxation pattern of left ventricular myocardial filling (Grade I diastolic dysfunction).   5. Trace mitral valve regurgitation.  Plan:  - aspiration precautions   - patient cleared for pureed diet and thin liquids per SS -refusing food as patient reports inability to swallow -> - Peg placed 3/5/24 and feeds started 3/6/24 -> pt was having diarrhea after PEG, but now resolved   - pt was being treated with cefepime and then ceftriaxone for bacteremia, but then developed aspiration pna so resumed Zosyn  - Zosyn due to end on 3/13/24. Patient occasionally refuses doses of zosyn. Will continue to monitor what medications patient is taking and refusing.   -March 12: Currently with peg feeds and will add liquid diet to supplement.   -March 13: patient has been refusing PEG feeds out of concern he will have diarrhea again. Assured patient that diarrhea if it occurs can be managed. Encouraging patient to take feeds. Last day of zosyn; patient has taken all doses March 12 and morning of March 13.     #Dysphagia with odynophagia  #Failure to thrive   # Hx of prostate cancer s/p radiation therapy  # Prostate cancer metastasis to mediastinum or primary mediastinal mass?  - CT Chest: Interval enlargement of the central/posterior mediastinal mass with development of more extensive lymphadenopathy. The mass encircles the descending thoracic aorta and has some mass effect upon the left atrium. The mid aspect of the esophagus is encircled by the mass and difficult to distinguish. Possible ovoid pill is noted within the mass and possible location of the mid esophagus appear. The maximal esophagus appears distended with layering debris within. Consideration may be given to esophageal stenting.  - CT abdomen and pelvis:  Increased upper abdominal bulky lymphadenopathy. Unchanged bulky retroperitoneal lymphadenopathy, large left bladder mass and perirectal soft tissue infiltrative mass. 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 Chronic moderate to severe left hydroureteronephrosis  - EGD with GI 2/21 -> esophageal stent placed  - CT surgery following -> no acute intervention   - AFP and CEA wnl - LDH and haptoglobin elevated - PSA total 220, PSA free is 22 - CA19 131-  57  - Esophageal biopsy:  metastatic poorly differentiated carcinoma of prostatic origin.  - per onc note, patient had prior biopsy of site that demonstrated metastatic prostate adenocarcinoma and was started on Docetaxel, in January 2024 patient was started on Pembrolizumab and continued on keytruda injections.   - Dr. Irizarry (pt's oncologist): cs rad/onco for palliative radiation, onco asked about new protocol since patient progressed , rec hospice and palliative   - EGD replaced stent and PEG placed 3/5/24   -lacks capacity- psych, ethics and palliative following:     - pt refusing DNR/DNI status based on Orthodox beliefs, ethics agreed 2P consent would not be appropriate to make him DNR/DNI     -Rad onc on Mar 10th: will review palliative radiation with patient once "capacity" clarified     -heme/onc: requesting psych eval; recommends palliative radiation and 2nd line chemo but pt has no capacity      -Evaluated by psych on march 8th     -March 12: ethics updated recs regarding patient's capacity appreciated. Currently with peg feeds and will add liquid diet to supplement.      -March 13: moving forward with cancer treatment. Reevaluated by heme/onc, will start on xtandi 160 mg daily. Pharmacy has to order med as it is non formulary; script sent to [facility]; will dispense med once available. Heme onc also recommending palliative radiation to mediastinal mass. Also reevaluated by Rad Onc for palliative RT -> CT sim for mapping and planning, followed by XRT.     -March 14: pharmacy is ordering xtandi. Refusing morning labs, encouraged pt to have labs drawn. Patient has been refusing PEG feeds for past couple days, despite encouragement and assurance of aiding patient if diarrhea reoccurs.     #chronic clavicular fracture  Xray Shoulder 2 Views, Left   Subacute/chronic appearing distal clavicular fracture with approximately 1.7 cm of inferior displacement of the distal fragment. No acute fracture or dislocation.  Left-sided opacities/effusion. Esophageal stent with surgical clips    #CHUCKIE vs ATN induced DIMAS  - pt came in with a baseline of 1.5 and increased to 2.6 with contrast use; improved to 1.3 but now unable to take po and refusing IVF  - renally dose medications  -US Kidney and Bladder:  1.  Diffusely echogenic bilateral kidneys which can be seen with medical renal disease.  2.  Moderate to severe left hydronephrosis with left renal parenchymal thinning, as seen on prior CT scan.  3.  Large mass identified within the urinary bladder, measuring up to 7.9  cm, previously up to 7 cm.  No ureteral jets are seen bilaterally.  4.  Incidentally noted right pleural effusion.  -nephro recs appreciated  -C3 C4 levels normal  -on NaHCO3 1300mg TID   -no indications for RRT    # small b/l pleural effusions on CT chest, resolved  - s/p IV Lasix     #normocytic Anemia  - Hb 7.6, stable   - f/u iron  studies noted % sat 27   - MCV normal, SPEP negative     Inpatient checklist:  #DVT PPx- heparin   #GI PPx- none   #Diet-  peg feeds + supplement liquid diet   #Activity- AAT,   PT consult recommended Home PT  - Would be unable to return to [facility]  Dispo: acute, obtaining Xtandi    68-year-old male with past medical history of hypertension, chronic back pain, prostate cancer, and posterior mediastinal mass 6.5 cm seen on CT 11/2023, presents for 3 months of progressively difficulty swallowing associated with 10 pounds weight loss and odynophagia. Comes in today now unable to swallow his pills with water, resulting in 2 episode of emesis. He mentioned that he gets SOB during exertion but denies any exertional chest pain. As per patient he was treated at [facility] for prostate cancer and is s/p radiation therapy. He states that he has problem during walking and unable to maintain the posture.     #aspiration PNA vs viral infection vs HAP   #GAS s pyogenes bacteremia   - SIRS positive (WBC increased to 16, febrile to 101 and tachy to 129 )   - CXR with worsening L-sided opacity   - Blood cx (2/24/24): positive for strep pyogenes  - repeat BCx 27,28,29,1,2  negative to date   - procal 7  - TTE   1. Left ventricular ejection fraction, by visual estimation, is 65 to 70%.   2. Hyperdynamic global left ventricular systolic function.   3. Mildly increased LV wall thickness.   4. Spectral Doppler shows impaired relaxation pattern of left ventricular myocardial filling (Grade I diastolic dysfunction).   5. Trace mitral valve regurgitation.  Plan:  - aspiration precautions   - patient cleared for pureed diet and thin liquids per SS -refusing food as patient reports inability to swallow -> - Peg placed 3/5/24 and feeds started 3/6/24 -> pt was having diarrhea after PEG, but now resolved   - pt was being treated with cefepime and then ceftriaxone for bacteremia, but then developed aspiration pna so resumed Zosyn  - Zosyn due to end on 3/13/24. Patient occasionally refuses doses of zosyn. Will continue to monitor what medications patient is taking and refusing.   -March 12: Currently with peg feeds and will add liquid diet to supplement.   -March 13: patient has been refusing PEG feeds out of concern he will have diarrhea again. Assured patient that diarrhea if it occurs can be managed. Encouraging patient to take feeds. Last day of zosyn; patient has taken all doses March 12 and morning of March 13.     #Dysphagia with odynophagia  #Failure to thrive   # Hx of prostate cancer s/p radiation therapy  # Prostate cancer metastasis to mediastinum or primary mediastinal mass?  - CT Chest: Interval enlargement of the central/posterior mediastinal mass with development of more extensive lymphadenopathy. The mass encircles the descending thoracic aorta and has some mass effect upon the left atrium. The mid aspect of the esophagus is encircled by the mass and difficult to distinguish. Possible ovoid pill is noted within the mass and possible location of the mid esophagus appear. The maximal esophagus appears distended with layering debris within. Consideration may be given to esophageal stenting.  - CT abdomen and pelvis:  Increased upper abdominal bulky lymphadenopathy. Unchanged bulky retroperitoneal lymphadenopathy, large left bladder mass and perirectal soft tissue infiltrative mass. 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 Chronic moderate to severe left hydroureteronephrosis  - EGD with GI 2/21 -> esophageal stent placed  - CT surgery following -> no acute intervention   - AFP and CEA wnl - LDH and haptoglobin elevated - PSA total 220, PSA free is 22 - CA19 131-  57  - Esophageal biopsy:  metastatic poorly differentiated carcinoma of prostatic origin.  - per onc note, patient had prior biopsy of site that demonstrated metastatic prostate adenocarcinoma and was started on Docetaxel, in January 2024 patient was started on Pembrolizumab and continued on keytruda injections.   - Dr. Irizarry (pt's oncologist): cs rad/onco for palliative radiation, onco asked about new protocol since patient progressed , rec hospice and palliative   - EGD replaced stent and PEG placed 3/5/24   -lacks capacity- psych, ethics and palliative following:     - pt refusing DNR/DNI status based on [employer] beliefs, ethics agreed 2P consent would not be appropriate to make him DNR/DNI     -Rad onc on Mar 10th: will review palliative radiation with patient once "capacity" clarified     -heme/onc: requesting psych eval; recommends palliative radiation and 2nd line chemo but pt has no capacity      -Evaluated by psych on march 8th     -March 12: ethics updated recs regarding patient's capacity appreciated. Currently with peg feeds and will add liquid diet to supplement.      -March 13: moving forward with cancer treatment. Reevaluated by heme/onc, will start on xtandi 160 mg daily. Pharmacy has to order med as it is non formulary; script sent to [facility]; will dispense med once available. Heme onc also recommending palliative radiation to mediastinal mass. Also reevaluated by Rad Onc for palliative RT -> CT sim for mapping and planning, followed by XRT.     -March 14: pharmacy will order xtandi. Refusing morning labs, encouraged pt to have labs drawn. Patient has been refusing PEG feeds for past couple days, despite encouragement and assurance of aiding patient if diarrhea reoccurs.     #chronic clavicular fracture  Xray Shoulder 2 Views, Left   Subacute/chronic appearing distal clavicular fracture with approximately 1.7 cm of inferior displacement of the distal fragment. No acute fracture or dislocation.  Left-sided opacities/effusion. Esophageal stent with surgical clips    #CHUCKIE vs ATN induced DIMAS  - pt came in with a baseline of 1.5 and increased to 2.6 with contrast use; improved to 1.3 but now unable to take po and refusing IVF  - renally dose medications  -US Kidney and Bladder:  1.  Diffusely echogenic bilateral kidneys which can be seen with medical renal disease.  2.  Moderate to severe left hydronephrosis with left renal parenchymal thinning, as seen on prior CT scan.  3.  Large mass identified within the urinary bladder, measuring up to 7.9  cm, previously up to 7 cm.  No ureteral jets are seen bilaterally.  4.  Incidentally noted right pleural effusion.  -nephro recs appreciated  -C3 C4 levels normal  -on NaHCO3 1300mg TID   -no indications for RRT    # small b/l pleural effusions on CT chest, resolved  - s/p IV Lasix     #normocytic Anemia  - Hb 7.6, stable   - f/u iron  studies noted % sat 27   - MCV normal, SPEP negative     Inpatient checklist:  #DVT PPx- heparin   #GI PPx- none   #Diet-  peg feeds + supplement liquid diet   #Activity- AAT,   PT consult recommended Home PT  - Would be unable to return to [facility]  Dispo: acute, obtaining Xtandi

## 2024-03-15 PROCEDURE — 99233 SBSQ HOSP IP/OBS HIGH 50: CPT

## 2024-03-15 PROCEDURE — 71045 X-RAY EXAM CHEST 1 VIEW: CPT | Mod: 26

## 2024-03-15 PROCEDURE — 99232 SBSQ HOSP IP/OBS MODERATE 35: CPT

## 2024-03-15 RX ADMIN — LIDOCAINE 1 PATCH: 4 CREAM TOPICAL at 20:00

## 2024-03-15 RX ADMIN — HEPARIN SODIUM 5000 UNIT(S): 5000 INJECTION INTRAVENOUS; SUBCUTANEOUS at 06:19

## 2024-03-15 RX ADMIN — Medication 1300 MILLIGRAM(S): at 18:15

## 2024-03-15 RX ADMIN — Medication 25 MILLIGRAM(S): at 18:24

## 2024-03-15 RX ADMIN — SODIUM CHLORIDE 75 MILLILITER(S): 9 INJECTION, SOLUTION INTRAVENOUS at 05:46

## 2024-03-15 RX ADMIN — Medication 1300 MILLIGRAM(S): at 05:46

## 2024-03-15 RX ADMIN — Medication 1300 MILLIGRAM(S): at 13:12

## 2024-03-15 RX ADMIN — HEPARIN SODIUM 5000 UNIT(S): 5000 INJECTION INTRAVENOUS; SUBCUTANEOUS at 18:14

## 2024-03-15 RX ADMIN — Medication 25 MILLIGRAM(S): at 05:46

## 2024-03-15 RX ADMIN — CHLORHEXIDINE GLUCONATE 1 APPLICATION(S): 213 SOLUTION TOPICAL at 06:19

## 2024-03-15 RX ADMIN — SODIUM CHLORIDE 75 MILLILITER(S): 9 INJECTION, SOLUTION INTRAVENOUS at 18:33

## 2024-03-15 RX ADMIN — LIDOCAINE 1 PATCH: 4 CREAM TOPICAL at 13:12

## 2024-03-15 NOTE — PROGRESS NOTE ADULT - ATTENDING COMMENTS
68-year-old male with past medical history of hypertension, chronic back pain, prostate cancer, and posterior mediastinal mass 6.5 cm seen on CT 11/2023, presents for 3 months of progressively difficulty swallowing associated with 10 pounds weight loss and odynophagia. Comes in today now unable to swallow his pills with water, resulting in 2 episode of emesis. He mentioned that he gets SOB during exertion but denies any exertional chest pain. As per patient he was treated at [facility] for prostate cancer and is s/p radiation therapy. He states that he has problem during walking and unable to maintain the posture.     Vital Signs Last 24 Hrs  T(C): 37 (15 Mar 2024 05:30), Max: 37 (15 Mar 2024 05:30)  T(F): 98.6 (15 Mar 2024 05:30), Max: 98.6 (15 Mar 2024 05:30)  HR: 85 (15 Mar 2024 05:30) (71 - 85)  BP: 141/90 (15 Mar 2024 05:30) (111/66 - 141/90)  BP(mean): 110 (15 Mar 2024 05:30) (110 - 110)  RR: 17 (15 Mar 2024 05:30) (17 - 20)    #aspiration PNA vs viral infection vs HAP   #GAS s pyogenes bacteremia   #SIRS  - CXR with worsening L-sided opacity   - Blood cx (2/24/24): positive for strep pyogenes  - repeat BCx neg  - pt was initially treated with cefepime for Bcx until neg  - finished course of IV zosyn       #Odynophagia and dysphagia due to prostate cancer metastasized to mediastinum  #Failure to thrive   - CT Chest: Interval enlargement of the central/posterior mediastinal mass with development of more extensive lymphadenopathy. The mass encircles the descending thoracic aorta and has some mass effect upon the left atrium. The mid aspect of the esophagus is encircled by the mass and difficult to distinguish. Possible ovoid pill is noted within the mass and possible location of the mid esophagus appear. The maximal esophagus appears distended with layering debris within. Consideration may be given to esophageal stenting.  - CT abdomen and pelvis:  Increased upper abdominal bulky lymphadenopathy. Unchanged bulky retroperitoneal lymphadenopathy, large left bladder mass and perirectal soft tissue infiltrative mass. 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 Chronic moderate to severe left hydroureteronephrosis  - EGD with GI 2/21 -> esophageal stent placed  - CT surgery following -> no acute intervention   - AFP and CEA wnl - LDH and haptoglobin elevated - PSA total 220, PSA free is 22 - CA19 131-  57  - Esophageal biopsy:  metastatic poorly differentiated carcinoma of prostatic origin  - per onc note, patient had prior biopsy of site that demonstrated metastatic prostate adenocarcinoma and was started on Docetaxel, in January 2024 patient was started on Pembrolizumab and continued on keytruda injections.   - EGD replaced stent and PEG placed 3/5/24 - tube feeds to be charted - bedside RN to inform medical intern if patient declines peg feeds - also discussed with Nutrition on the case - appears that patient is declining tube feeds   - Oncology follow up noted - starting treatment + Radiation oncology eval for palliative radiation (non-formulary filled out- awaiting for Xtandi) - will need continued oncology reccs to assist with the case   - unsure if patient can return back to [facility] - ? [facility] dispo - discussed with CM in rounds   - overall prognosis poor - full code - need Palliative care follow up to assist with GOC discussion ? patient declining lab draws, oral diet, peg tube feeds, medications - ? CMO     Attending Physician Dr. Erinn Leslie # [id number]

## 2024-03-15 NOTE — PROGRESS NOTE ADULT - PROBLEM SELECTOR PLAN 4
-trend lytes, creatinine, UOP  -avoid nephrotoxins.

## 2024-03-15 NOTE — PROGRESS NOTE ADULT - PROBLEM SELECTOR PLAN 2
-pureed diet  -f/u dietary, nutrition  -patient refusing PEG  -continue GOC
-pureed diet  -f/u dietary, nutrition.
-pureed diet  -f/u dietary, nutrition.  -GOC as appropriate
-s/p PEG  -f/u dietary, nutrition  -continue GOC
-s/p PEG  -f/u dietary, nutrition  -continue GOC
-s/p PEG  -f/u dietary, nutrition  -patient refusing feeds at time  -continue GOC
-s/p PEG  -f/u dietary, nutrition  -continue GOC

## 2024-03-15 NOTE — PROGRESS NOTE ADULT - ASSESSMENT
68yMale with history of prostate cancer, posterior mediastinal mass seen on CT in November 2023, presents with difficulty swallowing associated with weight loss and odynophagia.  Patient with evidence of aspiration on arrival with concern for viral infection vs pneumonia, dysphagia with odynophagia, DIMAS. Palliative care consulted for GOC.    Recalled by primary team on 3/15. Patient had been refusing labs and feeds, but still wanted ongoing aggressive management and full code. Patient seen at bedside. We discussed his treatment. He noted that he wanted to get treatment for his cancer and wished to be full code. We discussed that in order to tolerate cancer treatment and improve his clinical status, he needs to eat or get nutrition.  He noted that he would not take tube feeds because it would go right through him.  We discussed that he is only taking sips of water, and he will die/not be able to tolerate treatment if he doesn't get feeds. He continued to say that he wished to go back to a NH, but we noted that he couldn't be discharged to a NH if he wasn't getting tube feeds and wasn't eating at all. He did not express understanding. All questions answered.     Education about palliative care provided to patient/family.  See Recs below.    Please call [phone] with questions or concerns 24/7.   We will sign off

## 2024-03-15 NOTE — PROGRESS NOTE ADULT - SUBJECTIVE AND OBJECTIVE BOX
24H events:    Patient is a 68y old Male who presents with a chief complaint of Mediastinal Mass (14 Mar 2024 18:48)    Primary diagnosis of Mediastinal mass    Today is hospital day 24d. This morning patient was seen and examined at bedside, resting comfortably in bed.    No acute or major events overnight.  Patient refused peg feeds and labs again today.      PAST MEDICAL & SURGICAL HISTORY  HTN (hypertension)    Prostate cancer      SOCIAL HISTORY:  Social History:      ALLERGIES:  No Known Allergies    MEDICATIONS:  STANDING MEDICATIONS  chlorhexidine 2% Cloths 1 Application(s) Topical <User Schedule>  dextrose 5% + sodium chloride 0.9%. 1000 milliLiter(s) IV Continuous <Continuous>  enzalutamide 160 milliGRAM(s) Oral daily  heparin   Injectable 5000 Unit(s) SubCutaneous every 12 hours  iohexol 300 mG (iodine)/mL Oral Solution 30 milliLiter(s) Oral once  lidocaine   4% Patch 1 Patch Transdermal every 24 hours  metoprolol tartrate 25 milliGRAM(s) Enteral Tube two times a day  ondansetron Injectable 4 milliGRAM(s) IV Push once  sodium bicarbonate 1300 milliGRAM(s) Oral every 6 hours    PRN MEDICATIONS    VITALS:   T(F): 98.6  HR: 85  BP: 141/90  RR: 17  SpO2: --    PHYSICAL EXAM:  GENERAL:   (x  ) NAD, lying in bed comfortably    on room air     HEART:  Rate -->     (  x) normal rate     (  ) bradycardic     (  ) tachycardic  Rhythm -->     ( xx ) regular     (  ) regularly irregular     (  ) irregularly irregular  Murmurs -->     (  ) normal s1s2     (  ) systolic murmur     (  ) diastolic murmur     (  ) continuous murmur      (  ) S3 present     (  ) S4 present    LUNGS:   (x  )Unlabored respirations     (  ) tachypnea  ( x ) B/L air entry     (  ) decreased breath sounds in:  (location     )    ( x ) no adventitious sound        ABDOMEN:   (x  ) Soft     (  ) tense   |   (x  ) nondistended     (  ) distended   |   (  ) +BS     (  ) hypoactive bowel sounds     (  ) hyperactive bowel sounds  ( x) nontender      PEG in place      EXTREMITIES:  no LE edema     NERVOUS SYSTEM:    AOA x2 no FND     LABS:                        RADIOLOGY:

## 2024-03-15 NOTE — PROGRESS NOTE ADULT - SUBJECTIVE AND OBJECTIVE BOX
Patient was seen on 3/15, but note written on 3/18    HPI:  68-year-old male with past medical history of hypertension, chronic back pain, prostate cancer, and posterior mediastinal mass 6.5 cm seen on CT 11/2023, presents for 3 months of progressively difficulty swallowing associated with 10 pounds weight loss and odynophagia. Comes in today now unable to swallow his pills with water, resulting in 2 episode of emesis. He mentioned that he gets SOB during exertion but denies any exertional chest pain. As per patient he was treated at [facility] for prostate cancer and is s/p radiation therapy. He states that he has problem during walking and unable to maintain the posture.   Denies any abdominal pain, chest pain, dyspnea, diarrhea, fever, chills    Interval history  -reconsult for GOC  -Patient seen at bedside  -Denied any pain     ADVANCE DIRECTIVES:     [ x] Full Code [ ] DNR  MOLST  [ ]  Living Will  [ ]   DECISION MAKER(s):  [ ] Health Care Proxy(s)  [ x] Surrogate(s)  [ ] Guardian           Name(s): Phone Number(s):  Nobody      BASELINE (I)ADL(s) (prior to admission):    [place]: [ ]Total  [ ] Moderate [ ]Dependent  Palliative Performance Status Version 2:         %    http://npcrc.org/files/news/palliative_performance_scale_[email or web address].pdf    Allergies    No Known Allergies    Intolerances    MEDICATIONS  (STANDING):  chlorhexidine 2% Cloths 1 Application(s) Topical <User Schedule>  dextrose 5% + sodium chloride 0.9%. 1000 milliLiter(s) (75 mL/Hr) IV Continuous <Continuous>  enzalutamide 80 mg tab 160 milliGRAM(s) 160 milliGRAM(s) Oral <User Schedule>  heparin   Injectable 5000 Unit(s) SubCutaneous every 12 hours  iohexol 300 mG (iodine)/mL Oral Solution 30 milliLiter(s) Oral once  lidocaine   4% Patch 1 Patch Transdermal every 24 hours  metoprolol tartrate 25 milliGRAM(s) Enteral Tube two times a day  ondansetron Injectable 4 milliGRAM(s) IV Push once  sodium bicarbonate 1300 milliGRAM(s) Oral every 6 hours    MEDICATIONS  (PRN):        PRESENT SYMPTOMS: [ ]Unable to obtain due to poor mentation   Source if other than patient:  [ ]Family   [ ]Team     Pain: [ ]yes [ x]no  QOL impact -   Location -                    Aggravating factors -   Quality -   Radiation -   Timing-   Severity (0-10 scale):   Minimal acceptable level (0-10 scale):      CPOT:    https://www.[email or web address]/getattachment/[email or web address]/Critical-Care-Pain-Observation-Tool-(CPOT)    PAIN AD Score:   http://geriatrictoolkit.[email or web address]/painad.pdf (press ctrl +  left click to view)    Dyspnea:                           [x ]None[ ]Mild [ ]Moderate [ ]Severe     Respiratory Distress Observation Scale (RDOS):   A score of 0 to 2 signifies little or no respiratory distress, 3 signifies mild distress, scores 4 to 6 indicate moderate distress, and scores greater than 7 signify severe distress  [email or web address]://[email or web address]/[email or web address]/[email or web address]-iagltsutevo-vvwzcgrz-rxfqpkskchg-nwxjb.[email or web address]    Anxiety:                             [ x]None[ ]Mild [ ]Moderate [ ]Severe   Fatigue:                             [ x]None[ ]Mild [ ]Moderate [ ]Severe   Nausea:                             [ x]None[ ]Mild [ ]Moderate [ ]Severe   Loss of appetite:              [ x]None[ ]Mild [ ]Moderate [ ]Severe   Constipation:                    [x ]None[ ]Mild [ ]Moderate [ ]Severe    Other Symptoms:  [x ]All other review of systems negative     Palliative Performance Status Version 2:         30%    [email or web address]://[email or web address]/files/news/palliative_performance_scale_[email or web address].pdf    PHYSICAL EXAM:  Vital Signs Last 24 Hrs  T(C): 36.8 (18 Mar 2024 04:55), Max: 37.3 (17 Mar 2024 20:03)  T(F): 98.2 (18 Mar 2024 04:55), Max: 99.1 (17 Mar 2024 20:03)  HR: 96 (18 Mar 2024 04:55) (94 - 98)  BP: 150/98 (18 Mar 2024 04:55) (146/94 - 152/95)  BP(mean): 120 (18 Mar 2024 04:55) (114 - 120)  RR: 16 (18 Mar 2024 04:55) (16 - 18)  SpO2: --          GENERAL:  [ ] No acute distress [ ]Lethargic  [ ]Unarousable  [x ]Verbal  [ ]Non-Verbal [ ]Cachexia    BEHAVIORAL/PSYCH:  [x ]Alert and Oriented x1-2 [ ] Anxiety [ ] Delirium [ ] Agitation [x ] Calm   EYES: [x ] No scleral icterus [ ] Scleral icterus [ ] Closed  ENMT:  [ ]Dry mouth  [x ]No external oral lesions [ ] No external ear or nose lesions  CARDIOVASCULAR:  [ ]Regular [ ]Irregular [ ]Tachy [x ]Not Tachy  [ ]Rambo [ ] Edema [ ] No edema  PULMONARY:  [ ]Tachypnea  [ ]Audible excessive secretions [x ] No labored breathing [ ] labored breathing  GASTROINTESTINAL: [ ]Soft  [ ]Distended  [ x]Not distended [ ]Non tender [ ]Tender  MUSCULOSKELETAL: [ ]No clubbing [ ] clubbing  [ x] No cyanosis [ ] cyanosis  NEUROLOGIC: [ ]No focal deficits  [ x]Follows commands  [ ]Does not follow commands  [x ]Cognitive impairment  [ ]Dysphagia  [ ]Dysarthria  [ ]Paresis   SKIN: [ ] Jaundiced [ ] Non-jaundiced [ ]Rash [ ]No Rash [ ] Warm [ ] Dry  MISC/LINES: [ ] ET tube [ ] Trach [ ]NGT/OGT [ ]PEG [ ]Angel    LABS: reviewed by me                                      CAPILLARY BLOOD GLUCOSE                    RADIOLOGY & ADDITIONAL STUDIES: reviewed by me    < from: Xray Chest 1 View AP/PA (03.15.24 @ 16:27) >    IMPRESSION:    Increasing left pleural effusion.    < end of copied text >        EKG: reviewed by me    < from: 12 Lead ECG (03.06.24 @ 17:19) >  Ventricular Rate 112 BPM    Atrial Rate 112 BPM    P-R Interval 146 ms    QRS Duration 76 ms    Q-T Interval 356 ms    QTC Calculation(Bazett) 485 ms    P Axis 34 degrees    R Axis -19 degrees    T Axis 76 degrees    Diagnosis Line Sinus tachycardia  Otherwise normal ECG    < end of copied text >      PROTEIN CALORIE MALNUTRITION PRESENT: [ ]mild [ ]moderate [ ]severe [ ]underweight [ ]morbid obesity  https://www.[facility].org/vault/2440/web/files/ONC/Table_Clinical%20Characteristics%20to%20Document%20Malnutrition-White%20JV%20et%20al%202012.pdf    Height (cm): 185.4 (02-22-24 @ 14:44), 185.4 (09-15-23 @ 10:44)  Weight (kg): 77.1 (02-21-24 @ 14:55), 77.1 (11-22-23 @ 08:11), 78.5 (09-15-23 @ 10:44)  BMI (kg/m2): 22.4 (02-22-24 @ 14:44), 22.4 (02-21-24 @ 14:55), 22.4 (11-22-23 @ 08:11)  [ ]PPSV2 < or = to 30% [ ]significant weight loss  [ ]poor nutritional intake  [ ]anasarca      [ ]Artificial Nutrition      Palliative Care Spiritual/Emotional Screening Tool Question  Severity (0-4):                    OR                    [ x] Unable to determine. Will assess at later time if appropriate.  Score of 2 or greater indicates recommendation of Chaplaincy and/or SW referral  Chaplaincy Referral: [ ] Yes [ ] Refused [ ] Following     Caregiver [place]:  [ ] Yes [ ] No    OR    [x ] Unable to determine. Will assess at later time if appropriate.  Social Work Referral [ ]  Patient and Family Centered Care Referral [ ]    Anticipatory Grief Present: [ ] Yes [ ] No    OR     [ x] Unable to determine. Will assess at later time if appropriate.  Social Work Referral [ ]  Patient and Family Centered Care Referral [ ]    Patient discussed with primary medical team MD  Palliative care education provided to patient and/or family

## 2024-03-15 NOTE — PROGRESS NOTE ADULT - PROBLEM SELECTOR PROBLEM 4
DIMAS (acute kidney injury)

## 2024-03-15 NOTE — PROGRESS NOTE ADULT - PROBLEM/PLAN-3
DISPLAY PLAN FREE TEXT
no

## 2024-03-15 NOTE — PROGRESS NOTE ADULT - PROBLEM SELECTOR PLAN 5
-Full code  -Patient with limited understanding of medical condition  -Would benefit from NOK/surrogate/HCP or guardianship  -will follow.
-Full code  -Patient with limited understanding of medical condition  -Would benefit from NOK/surrogate/HCP or guardianship  -will follow.
-Full code  -Seen by , ethics  -will be available for GOC or additional discussions as appropriate  -will follow.
-Full code  -Seen by , ethics  -will be available for GOC or additional discussions as appropriate  -will follow.
-Full code  -Seen by , ethics  -will be available for GOC or additional discussions as appropriate  -at this time, patient doesn't have capacity, but has autonomy, per ethics  -any additional decisions about GOC should be discussed with ethics moving forward  -will sign off
-Full code  -Patient with limited understanding of medical condition - he does not have capacity  -Would benefit from  guardianship  -will follow.
-Full code  -Patient with limited understanding of medical condition - he does not have capacity  -Would benefit from  guardianship  -will follow.

## 2024-03-15 NOTE — PROGRESS NOTE ADULT - PROBLEM SELECTOR PLAN 3
Seen by Dr. Irizarry as outpatient  -awaiting additional oncology input  -f/u with oncology  -GOC as appropriate/if able after speaking with oncology
Seen by Dr. Irizarry as outpatient  -per chart review, outside biopsy showed metastatic prostate cancer  -f/u with oncology  -GOC as appropriate after speaking with oncology
Seen by Dr. Irizarry as outpatient  -f/u oncology, radiation oncology  -GOC as appropriate/if able
Seen by Dr. Irizarry as outpatient  -recommending possible radiation oncology and hospice  -f/u with oncology  -GOC as appropriate/if able
Seen by Dr. Irizarry as outpatient  -f/u oncology, radiation oncology  -plan for treatment as outpatient  -GOC as appropriate/if able
Seen by Dr. Irizarry as outpatient  -recommending possible radiation oncology and hospice/palliativve  -f/u with oncology  -GOC as appropriate/if able
Seen by Dr. Irizarry as outpatient  -per chart review, outside biopsy showed metastatic prostate cancer  -f/u with oncology  -GOC as appropriate.

## 2024-03-15 NOTE — PROGRESS NOTE ADULT - ASSESSMENT
68-year-old male with past medical history of hypertension, chronic back pain, prostate cancer, and posterior mediastinal mass 6.5 cm seen on CT 11/2023, presents for 3 months of progressively difficulty swallowing associated with 10 pounds weight loss and odynophagia. Comes in today now unable to swallow his pills with water, resulting in 2 episode of emesis. He mentioned that he gets SOB during exertion but denies any exertional chest pain. As per patient he was treated at [facility] for prostate cancer and is s/p radiation therapy. He states that he has problem during walking and unable to maintain the posture.     #aspiration PNA vs viral infection vs HAP   #GAS s pyogenes bacteremia   - SIRS positive (WBC increased to 16, febrile to 101 and tachy to 129 )   - CXR with worsening L-sided opacity   - Blood cx (2/24/24): positive for strep pyogenes  - repeat BCx 27,28,29,1,2  negative to date   - procal 7  - TTE   1. Left ventricular ejection fraction, by visual estimation, is 65 to 70%.   2. Hyperdynamic global left ventricular systolic function.   3. Mildly increased LV wall thickness.   4. Spectral Doppler shows impaired relaxation pattern of left ventricular myocardial filling (Grade I diastolic dysfunction).   5. Trace mitral valve regurgitation.  Plan:  - aspiration precautions   - patient cleared for pureed diet and thin liquids per SS -refusing food as patient reports inability to swallow -> - Peg placed 3/5/24 and feeds started 3/6/24 -> pt was having diarrhea after PEG, but now resolved   - pt was being treated with cefepime and then ceftriaxone for bacteremia, but then developed aspiration pna so resumed Zosyn  - Zosyn due to end on 3/13/24. Patient occasionally refuses doses of zosyn. Will continue to monitor what medications patient is taking and refusing.   -March 12: Currently with peg feeds and will add liquid diet to supplement.   -March 13: patient has been refusing PEG feeds out of concern he will have diarrhea again. Assured patient that diarrhea if it occurs can be managed. Encouraging patient to take feeds. Last day of zosyn; patient has taken all doses March 12 and morning of March 13.     #Dysphagia with odynophagia  #Failure to thrive   # Hx of prostate cancer s/p radiation therapy  # Prostate cancer metastasis to mediastinum or primary mediastinal mass?  - CT Chest: Interval enlargement of the central/posterior mediastinal mass with development of more extensive lymphadenopathy. The mass encircles the descending thoracic aorta and has some mass effect upon the left atrium. The mid aspect of the esophagus is encircled by the mass and difficult to distinguish. Possible ovoid pill is noted within the mass and possible location of the mid esophagus appear. The maximal esophagus appears distended with layering debris within. Consideration may be given to esophageal stenting.  - CT abdomen and pelvis:  Increased upper abdominal bulky lymphadenopathy. Unchanged bulky retroperitoneal lymphadenopathy, large left bladder mass and perirectal soft tissue infiltrative mass. 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 Chronic moderate to severe left hydroureteronephrosis  - EGD with GI 2/21 -> esophageal stent placed  - CT surgery following -> no acute intervention   - AFP and CEA wnl - LDH and haptoglobin elevated - PSA total 220, PSA free is 22 - CA19 131-  57  - Esophageal biopsy:  metastatic poorly differentiated carcinoma of prostatic origin.  - per onc note, patient had prior biopsy of site that demonstrated metastatic prostate adenocarcinoma and was started on Docetaxel, in January 2024 patient was started on Pembrolizumab and continued on keytruda injections.   - Dr. Irizarry (pt's oncologist): cs rad/onco for palliative radiation, onco asked about new protocol since patient progressed , rec hospice and palliative   - EGD replaced stent and PEG placed 3/5/24   -lacks capacity- psych, ethics and palliative following:     - pt refusing DNR/DNI status based on Zoroastrian beliefs, ethics agreed 2P consent would not be appropriate to make him DNR/DNI     -Rad onc on Mar 10th: will review palliative radiation with patient once "capacity" clarified     -heme/onc: requesting psych eval; recommends palliative radiation and 2nd line chemo but pt has no capacity      -Evaluated by psych on march 8th     -March 12: ethics updated recs regarding patient's capacity appreciated. Currently with peg feeds and will add liquid diet to supplement.      -March 13: moving forward with cancer treatment. Reevaluated by heme/onc, will start on xtandi 160 mg daily. Pharmacy has to order med as it is non formulary; script sent to [facility]; will dispense med once available. Heme onc also recommending palliative radiation to mediastinal mass. Also reevaluated by Rad Onc for palliative RT -> CT sim for mapping and planning, followed by XRT.     -March 14: pharmacy will order xtandi. Refusing morning labs, encouraged pt to have labs drawn. Patient has been refusing PEG feeds for past couple days, despite encouragement and assurance of aiding patient if diarrhea reoccurs.      -March 15: pharmacy ordered Xtandi, will let us know when its in. Refused morning labs and PEG feeds despite counselling. Will request palliative fu.     #chronic clavicular fracture  Xray Shoulder 2 Views, Left   Subacute/chronic appearing distal clavicular fracture with approximately 1.7 cm of inferior displacement of the distal fragment. No acute fracture or dislocation.  Left-sided opacities/effusion. Esophageal stent with surgical clips    #CHUCKIE vs ATN induced DIMAS  - pt came in with a baseline of 1.5 and increased to 2.6 with contrast use; improved to 1.3 but now unable to take po and refusing IVF  - renally dose medications  -US Kidney and Bladder:  1.  Diffusely echogenic bilateral kidneys which can be seen with medical renal disease.  2.  Moderate to severe left hydronephrosis with left renal parenchymal thinning, as seen on prior CT scan.  3.  Large mass identified within the urinary bladder, measuring up to 7.9  cm, previously up to 7 cm.  No ureteral jets are seen bilaterally.  4.  Incidentally noted right pleural effusion.  -nephro recs appreciated  -C3 C4 levels normal  -on NaHCO3 1300mg TID   -no indications for RRT    # small b/l pleural effusions on CT chest, resolved  - s/p IV Lasix     #normocytic Anemia  - Hb 7.6, stable   - f/u iron  studies noted % sat 27   - MCV normal, SPEP negative     Inpatient checklist:  #DVT PPx- heparin   #GI PPx- none   #Diet-  peg feeds + supplement liquid diet   #Activity- AAT,   PT consult recommended Home PT  - Would be unable to return to [facility]  Dispo: obtaining Xtandi      68-year-old male with past medical history of hypertension, chronic back pain, prostate cancer, and posterior mediastinal mass 6.5 cm seen on CT 11/2023, presents for 3 months of progressively difficulty swallowing associated with 10 pounds weight loss and odynophagia. Comes in today now unable to swallow his pills with water, resulting in 2 episode of emesis. He mentioned that he gets SOB during exertion but denies any exertional chest pain. As per patient he was treated at [facility] for prostate cancer and is s/p radiation therapy. He states that he has problem during walking and unable to maintain the posture.     #aspiration PNA vs viral infection vs HAP   #GAS s pyogenes bacteremia   - SIRS positive (WBC increased to 16, febrile to 101 and tachy to 129 )   - CXR with worsening L-sided opacity   - Blood cx (2/24/24): positive for strep pyogenes  - repeat BCx 27,28,29,1,2  negative to date   - procal 7  - TTE   1. Left ventricular ejection fraction, by visual estimation, is 65 to 70%.   2. Hyperdynamic global left ventricular systolic function.   3. Mildly increased LV wall thickness.   4. Spectral Doppler shows impaired relaxation pattern of left ventricular myocardial filling (Grade I diastolic dysfunction).   5. Trace mitral valve regurgitation.  Plan:  - aspiration precautions   - patient cleared for pureed diet and thin liquids per SS -refusing food as patient reports inability to swallow -> - Peg placed 3/5/24 and feeds started 3/6/24 -> pt was having diarrhea after PEG, but now resolved   - pt was being treated with cefepime and then ceftriaxone for bacteremia, but then developed aspiration pna so resumed Zosyn  - Zosyn due to end on 3/13/24. Patient occasionally refuses doses of zosyn. Will continue to monitor what medications patient is taking and refusing.   -March 12: Currently with peg feeds and will add liquid diet to supplement.   -March 13: patient has been refusing PEG feeds out of concern he will have diarrhea again. Assured patient that diarrhea if it occurs can be managed. Encouraging patient to take feeds. Last day of zosyn; patient has taken all doses March 12 and morning of March 13.     #Dysphagia with odynophagia  #Failure to thrive   # Hx of prostate cancer s/p radiation therapy  # Prostate cancer metastasis to mediastinum or primary mediastinal mass?  - CT Chest: Interval enlargement of the central/posterior mediastinal mass with development of more extensive lymphadenopathy. The mass encircles the descending thoracic aorta and has some mass effect upon the left atrium. The mid aspect of the esophagus is encircled by the mass and difficult to distinguish. Possible ovoid pill is noted within the mass and possible location of the mid esophagus appear. The maximal esophagus appears distended with layering debris within. Consideration may be given to esophageal stenting.  - CT abdomen and pelvis:  Increased upper abdominal bulky lymphadenopathy. Unchanged bulky retroperitoneal lymphadenopathy, large left bladder mass and perirectal soft tissue infiltrative mass. 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 Chronic moderate to severe left hydroureteronephrosis  - EGD with GI 2/21 -> esophageal stent placed  - CT surgery following -> no acute intervention   - AFP and CEA wnl - LDH and haptoglobin elevated - PSA total 220, PSA free is 22 - CA19 131-  57  - Esophageal biopsy:  metastatic poorly differentiated carcinoma of prostatic origin.  - per onc note, patient had prior biopsy of site that demonstrated metastatic prostate adenocarcinoma and was started on Docetaxel, in January 2024 patient was started on Pembrolizumab and continued on keytruda injections.   - Dr. Irizarry (pt's oncologist): cs rad/onco for palliative radiation, onco asked about new protocol since patient progressed , rec hospice and palliative   - EGD replaced stent and PEG placed 3/5/24   -lacks capacity- psych, ethics and palliative following:     - pt refusing DNR/DNI status based on Rastafarian beliefs, ethics agreed 2P consent would not be appropriate to make him DNR/DNI     -Rad onc on Mar 10th: will review palliative radiation with patient once "capacity" clarified     -heme/onc: requesting psych eval; recommends palliative radiation and 2nd line chemo but pt has no capacity      -Evaluated by psych on march 8th     -March 12: ethics updated recs regarding patient's capacity appreciated. Currently with peg feeds and will add liquid diet to supplement.      -March 13: moving forward with cancer treatment. Reevaluated by heme/onc, will start on xtandi 160 mg daily. Pharmacy has to order med as it is non formulary; script sent to [facility]; will dispense med once available. Heme onc also recommending palliative radiation to mediastinal mass. Also reevaluated by Rad Onc for palliative RT -> CT sim for mapping and planning, followed by XRT.     -March 14: pharmacy will order xtandi. Refusing morning labs, encouraged pt to have labs drawn. Patient has been refusing PEG feeds for past couple days, despite encouragement and assurance of aiding patient if diarrhea reoccurs.      -March 15: pharmacy ordered Xtandi, will let us know when its in. Refused morning labs and PEG feeds despite counselling. Palliative will fu.      #chronic clavicular fracture  Xray Shoulder 2 Views, Left   Subacute/chronic appearing distal clavicular fracture with approximately 1.7 cm of inferior displacement of the distal fragment. No acute fracture or dislocation.  Left-sided opacities/effusion. Esophageal stent with surgical clips    #CHUCKIE vs ATN induced DIMAS  - pt came in with a baseline of 1.5 and increased to 2.6 with contrast use; improved to 1.3 but now unable to take po and refusing IVF  - renally dose medications  -US Kidney and Bladder:  1.  Diffusely echogenic bilateral kidneys which can be seen with medical renal disease.  2.  Moderate to severe left hydronephrosis with left renal parenchymal thinning, as seen on prior CT scan.  3.  Large mass identified within the urinary bladder, measuring up to 7.9  cm, previously up to 7 cm.  No ureteral jets are seen bilaterally.  4.  Incidentally noted right pleural effusion.  -nephro recs appreciated  -C3 C4 levels normal  -on NaHCO3 1300mg TID   -no indications for RRT    # small b/l pleural effusions on CT chest, resolved  - s/p IV Lasix     #normocytic Anemia  - Hb 7.6, stable   - f/u iron  studies noted % sat 27   - MCV normal, SPEP negative     Inpatient checklist:  #DVT PPx- heparin   #GI PPx- none   #Diet-  peg feeds + supplement liquid diet   #Activity- AAT,   PT consult recommended Home PT  - Would be unable to return to [facility]  Dispo: obtaining Xtandi      68-year-old male with past medical history of hypertension, chronic back pain, prostate cancer, and posterior mediastinal mass 6.5 cm seen on CT 11/2023, presents for 3 months of progressively difficulty swallowing associated with 10 pounds weight loss and odynophagia. Comes in today now unable to swallow his pills with water, resulting in 2 episode of emesis. He mentioned that he gets SOB during exertion but denies any exertional chest pain. As per patient he was treated at [facility] for prostate cancer and is s/p radiation therapy. He states that he has problem during walking and unable to maintain the posture.     #aspiration PNA vs viral infection vs HAP   #GAS s pyogenes bacteremia   - SIRS positive (WBC increased to 16, febrile to 101 and tachy to 129 )   - CXR with worsening L-sided opacity   - Blood cx (2/24/24): positive for strep pyogenes  - repeat BCx 27,28,29,1,2  negative to date   - procal 7  - TTE   1. Left ventricular ejection fraction, by visual estimation, is 65 to 70%.   2. Hyperdynamic global left ventricular systolic function.   3. Mildly increased LV wall thickness.   4. Spectral Doppler shows impaired relaxation pattern of left ventricular myocardial filling (Grade I diastolic dysfunction).   5. Trace mitral valve regurgitation.  Plan:  - aspiration precautions   - patient cleared for pureed diet and thin liquids per SS -refusing food as patient reports inability to swallow -> - Peg placed 3/5/24 and feeds started 3/6/24 -> pt was having diarrhea after PEG, but now resolved   - pt was being treated with cefepime and then ceftriaxone for bacteremia, but then developed aspiration pna so resumed Zosyn  - Zosyn due to end on 3/13/24. Patient occasionally refuses doses of zosyn. Will continue to monitor what medications patient is taking and refusing.   -March 12: Currently with peg feeds and will add liquid diet to supplement.   -March 13: patient has been refusing PEG feeds out of concern he will have diarrhea again. Assured patient that diarrhea if it occurs can be managed. Encouraging patient to take feeds. Last day of zosyn; patient has taken all doses March 12 and morning of March 13.     #Dysphagia with odynophagia  #Failure to thrive   # Hx of prostate cancer s/p radiation therapy  # Prostate cancer metastasis to mediastinum or primary mediastinal mass?  - CT Chest: Interval enlargement of the central/posterior mediastinal mass with development of more extensive lymphadenopathy. The mass encircles the descending thoracic aorta and has some mass effect upon the left atrium. The mid aspect of the esophagus is encircled by the mass and difficult to distinguish. Possible ovoid pill is noted within the mass and possible location of the mid esophagus appear. The maximal esophagus appears distended with layering debris within. Consideration may be given to esophageal stenting.  - CT abdomen and pelvis:  Increased upper abdominal bulky lymphadenopathy. Unchanged bulky retroperitoneal lymphadenopathy, large left bladder mass and perirectal soft tissue infiltrative mass. 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 Chronic moderate to severe left hydroureteronephrosis  - EGD with GI 2/21 -> esophageal stent placed  - CT surgery following -> no acute intervention   - AFP and CEA wnl - LDH and haptoglobin elevated - PSA total 220, PSA free is 22 - CA19 131-  57  - Esophageal biopsy:  metastatic poorly differentiated carcinoma of prostatic origin.  - per onc note, patient had prior biopsy of site that demonstrated metastatic prostate adenocarcinoma and was started on Docetaxel, in January 2024 patient was started on Pembrolizumab and continued on keytruda injections.   - Dr. Irizarry (pt's oncologist): cs rad/onco for palliative radiation, onco asked about new protocol since patient progressed , rec hospice and palliative   - EGD replaced stent and PEG placed 3/5/24   -lacks capacity- psych, ethics and palliative following:     - pt refusing DNR/DNI status based on Orthodoxy beliefs, ethics agreed 2P consent would not be appropriate to make him DNR/DNI     -Rad onc on Mar 10th: will review palliative radiation with patient once "capacity" clarified     -heme/onc: requesting psych eval; recommends palliative radiation and 2nd line chemo but pt has no capacity      -Evaluated by psych on march 8th     -March 12: ethics updated recs regarding patient's capacity appreciated. Currently with peg feeds and will add liquid diet to supplement.      -March 13: moving forward with cancer treatment. Reevaluated by heme/onc, will start on xtandi 160 mg daily. Pharmacy has to order med as it is non formulary; script sent to [facility]; will dispense med once available. Heme onc also recommending palliative radiation to mediastinal mass. Also reevaluated by Rad Onc for palliative RT -> CT sim for mapping and planning, followed by XRT.     -March 14: pharmacy will order xtandi. Refusing morning labs, encouraged pt to have labs drawn. Patient has been refusing PEG feeds for past couple days, despite encouragement and assurance of aiding patient if diarrhea reoccurs.      -March 15: pharmacy ordered Xtandi, will let us know when its in. Refused morning labs and PEG feeds despite counselling. Ethics committee encouraging patient to take PEG feeds. Upgraded PO diet to encourage nutrition. CXR ordered. Palliative will fu.      #chronic clavicular fracture  Xray Shoulder 2 Views, Left   Subacute/chronic appearing distal clavicular fracture with approximately 1.7 cm of inferior displacement of the distal fragment. No acute fracture or dislocation.  Left-sided opacities/effusion. Esophageal stent with surgical clips    #CHUCKIE vs ATN induced DIMAS  - pt came in with a baseline of 1.5 and increased to 2.6 with contrast use; improved to 1.3 but now unable to take po and refusing IVF  - renally dose medications  -US Kidney and Bladder:  1.  Diffusely echogenic bilateral kidneys which can be seen with medical renal disease.  2.  Moderate to severe left hydronephrosis with left renal parenchymal thinning, as seen on prior CT scan.  3.  Large mass identified within the urinary bladder, measuring up to 7.9  cm, previously up to 7 cm.  No ureteral jets are seen bilaterally.  4.  Incidentally noted right pleural effusion.  -nephro recs appreciated  -C3 C4 levels normal  -on NaHCO3 1300mg TID   -no indications for RRT    # small b/l pleural effusions on CT chest, resolved  - s/p IV Lasix     #normocytic Anemia  - Hb 7.6, stable   - f/u iron  studies noted % sat 27   - MCV normal, SPEP negative     Inpatient checklist:  #DVT PPx- heparin   #GI PPx- none   #Diet-  peg feeds + supplement liquid diet   #Activity- AAT,   PT consult recommended Home PT  - Would be unable to return to [facility]  Dispo: obtaining Xtandi

## 2024-03-15 NOTE — PROGRESS NOTE ADULT - PROBLEM SELECTOR PLAN 1
Aspiration pneumonia vs viral infection vs HAP  -s/p antibiotics
Aspiration pneumonia vs viral infection vs HAP  -continue IV antibiotics as patient allows
Aspiration pneumonia vs viral infection vs HAP  -continue IV antibiotics
Aspiration pneumonia vs viral infection vs HAP  -continue IV antibiotics as patient allows
Aspiration pneumonia vs viral infection vs HAP  -continue IV antibiotics as patient allows  -transition to PO antibiotics if viable
Aspiration pneumonia vs viral infection vs HAP  -continue IV antibiotics  -f/u workup, CXR.
Aspiration pneumonia vs viral infection vs HAP  -continue IV antibiotics as patient allows  -transition to PO antibiotics if viable

## 2024-03-16 PROCEDURE — 99233 SBSQ HOSP IP/OBS HIGH 50: CPT

## 2024-03-16 RX ADMIN — Medication 25 MILLIGRAM(S): at 17:58

## 2024-03-16 RX ADMIN — Medication 1300 MILLIGRAM(S): at 12:29

## 2024-03-16 RX ADMIN — HEPARIN SODIUM 5000 UNIT(S): 5000 INJECTION INTRAVENOUS; SUBCUTANEOUS at 17:57

## 2024-03-16 RX ADMIN — HEPARIN SODIUM 5000 UNIT(S): 5000 INJECTION INTRAVENOUS; SUBCUTANEOUS at 05:19

## 2024-03-16 RX ADMIN — Medication 25 MILLIGRAM(S): at 05:18

## 2024-03-16 RX ADMIN — LIDOCAINE 1 PATCH: 4 CREAM TOPICAL at 19:00

## 2024-03-16 RX ADMIN — LIDOCAINE 1 PATCH: 4 CREAM TOPICAL at 12:29

## 2024-03-16 RX ADMIN — Medication 1300 MILLIGRAM(S): at 17:57

## 2024-03-16 RX ADMIN — CHLORHEXIDINE GLUCONATE 1 APPLICATION(S): 213 SOLUTION TOPICAL at 05:18

## 2024-03-16 RX ADMIN — Medication 1300 MILLIGRAM(S): at 05:18

## 2024-03-16 NOTE — SWALLOW BEDSIDE ASSESSMENT ADULT - COMMENTS
Previous SLP assessment on 3/7/24: recs for full liquids; pt declined solids no overt s/s of penetration/aspiration; no complaints of globus sensation

## 2024-03-16 NOTE — SWALLOW BEDSIDE ASSESSMENT ADULT - ORAL PHASE
Within functional limits
mildly prolonged mastication with regular solids, but functional

## 2024-03-16 NOTE — SWALLOW BEDSIDE ASSESSMENT ADULT - PHARYNGEAL PHASE
Within functional limits
min/Cough post oral intake
Within functional limits
Within functional limits

## 2024-03-16 NOTE — SWALLOW BEDSIDE ASSESSMENT ADULT - SWALLOW EVAL: RECOMMENDED DIET
ice chips/ small sips of water as tolerated
consider easy to chew, thin liquids when cleared from GI standpoint
Consider a regular consistency diet with thin liquids if cleared by GI given esophageal dysphagia/stent placement
PEG as 1' means of nutrition and hydration, can offer full liquids
Puree and thin liquids

## 2024-03-16 NOTE — SWALLOW BEDSIDE ASSESSMENT ADULT - SLP PERTINENT HISTORY OF CURRENT PROBLEM
Pt is a 67 y/o M w/ PMHx: HTN, chronic back pain, prostate ca, and posterior mediastinal mass 6.[address] 11/2023, presents for 3 months of progressive dysphagia a/w 10 pound weight loss and odynophagia. Pt currently being treated for enlarging mediastinal mass w/ central necrosis, mediastinal lymphadenopathy, retrocrural mass, retroperitoneal mass, urinary bladder mass, L renal hydronephrosis. ?Metastatic prostate ca or other primary tumor; pt may need EBUS for diagnostic purposes. CT chest-> Interval enlargement of the central/posterior mediastinal mass with development of more extensive lymphadenopathy. The mid aspect of the esophagus is encircled by the mass and difficult to distinguish. Possible ovoid pill is noted within the mass. The maximal esophagus appears distended with layering debris within. Consideration may be given to esophageal stenting. May need IR for mediastinal mass biopsy. CT surgery and GI c/s pending.
Pt is a 69 y/o M w/ PMHx: HTN, chronic back pain, prostate ca, and posterior mediastinal mass 6.[address] 11/2023, presents for 3 months of progressive dysphagia a/w 10 pound weight loss and odynophagia. Pt currently being treated for enlarging mediastinal mass w/ central necrosis, mediastinal lymphadenopathy, retrocrural mass, retroperitoneal mass, urinary bladder mass, L renal hydronephrosis. ?Metastatic prostate ca or other primary tumor; pt may need EBUS for diagnostic purposes. CT chest-> Interval enlargement of the central/posterior mediastinal mass with development of more extensive lymphadenopathy. The mid aspect of the esophagus is encircled by the mass and difficult to distinguish. Possible ovoid pill is noted within the mass. The maximal esophagus appears distended with layering debris within. Consideration may be given to esophageal stenting. May need IR for mediastinal mass biopsy. CT surgery and GI c/s pending.
Pt is a 67 y/o M w/ PMHx: HTN, chronic back pain, prostate ca, and posterior mediastinal mass 6.[address] 11/2023, presents for 3 months of progressive dysphagia a/w 10 pound weight loss and odynophagia. Pt currently being treated for enlarging mediastinal mass w/ central necrosis, mediastinal lymphadenopathy, retrocrural mass, retroperitoneal mass, urinary bladder mass, L renal hydronephrosis. ?Metastatic prostate ca or other primary tumor; pt may need EBUS for diagnostic purposes. CT chest-> Interval enlargement of the central/posterior mediastinal mass with development of more extensive lymphadenopathy. The mid aspect of the esophagus is encircled by the mass and difficult to distinguish. Possible ovoid pill is noted within the mass. The maximal esophagus appears distended with layering debris within. Consideration may be given to esophageal stenting. May need IR for mediastinal mass biopsy. CT surgery and GI c/s pending.
Pt is a 69 y/o M w/ PMHx: HTN, chronic back pain, prostate ca, and posterior mediastinal mass 6.[address] 11/2023, presents for 3 months of progressive dysphagia a/w 10 pound weight loss and odynophagia. Pt currently being treated for enlarging mediastinal mass w/ central necrosis, mediastinal lymphadenopathy, retrocrural mass, retroperitoneal mass, urinary bladder mass, L renal hydronephrosis. ?Metastatic prostate ca or other primary tumor; pt may need EBUS for diagnostic purposes. CT chest-> Interval enlargement of the central/posterior mediastinal mass with development of more extensive lymphadenopathy. The mid aspect of the esophagus is encircled by the mass and difficult to distinguish. Possible ovoid pill is noted within the mass. The maximal esophagus appears distended with layering debris within. Consideration may be given to esophageal stenting. May need IR for mediastinal mass biopsy. CT surgery and GI c/s pending.
Pt is a 67 y/o M w/ PMHx: HTN, chronic back pain, prostate ca, and posterior mediastinal mass 6.[address] seen [address] 11/2023, presents for 3 months of progressive dysphagia a/w 10 pound weight loss and odynophagia. Pt currently being treated for enlarging mediastinal mass w/ central necrosis, mediastinal lymphadenopathy, retrocrural mass, retroperitoneal mass, urinary bladder mass, L renal hydronephrosis. ?Metastatic prostate ca or other primary tumor; pt may need EBUS for diagnostic purposes. CT chest-> Interval enlargement of the central/posterior mediastinal mass with development of more extensive lymphadenopathy. The mid aspect of the esophagus is encircled by the mass and difficult to distinguish. Possible ovoid pill is noted within the mass. The maximal esophagus appears distended with layering debris within. Consideration may be given to esophageal stenting. May need IR for mediastinal mass biopsy. CT surgery and GI c/s pending.

## 2024-03-16 NOTE — PROGRESS NOTE ADULT - ASSESSMENT
68-year-old male with past medical history of hypertension, chronic back pain, prostate cancer, and posterior mediastinal mass 6.5 cm seen on CT 11/2023, presents for 3 months of progressively difficulty swallowing associated with 10 pounds weight loss and odynophagia. Comes in today now unable to swallow his pills with water, resulting in 2 episode of emesis. He mentioned that he gets SOB during exertion but denies any exertional chest pain. As per patient he was treated at [facility] for prostate cancer and is s/p radiation therapy. He states that he has problem during walking and unable to maintain the posture.     #aspiration PNA vs viral infection vs HAP   #GAS s pyogenes bacteremia   - SIRS positive (WBC increased to 16, febrile to 101 and tachy to 129 )   - CXR with worsening L-sided opacity   - Blood cx (2/24/24): positive for strep pyogenes  - repeat BCx 27,28,29,1,2  negative to date   - procal 7  - TTE   1. Left ventricular ejection fraction, by visual estimation, is 65 to 70%.   2. Hyperdynamic global left ventricular systolic function.   3. Mildly increased LV wall thickness.   4. Spectral Doppler shows impaired relaxation pattern of left ventricular myocardial filling (Grade I diastolic dysfunction).   5. Trace mitral valve regurgitation.  Plan:  - aspiration precautions   - patient cleared for pureed diet and thin liquids per SS -refusing food as patient reports inability to swallow -> - Peg placed 3/5/24 and feeds started 3/6/24 -> pt was having diarrhea after PEG, but now resolved   - pt was being treated with cefepime and then ceftriaxone for bacteremia, but then developed aspiration pna so resumed Zosyn  - Zosyn due to end on 3/13/24. Patient occasionally refuses doses of zosyn. Will continue to monitor what medications patient is taking and refusing.   -March 12: Currently with peg feeds and will add liquid diet to supplement.   -March 13: patient has been refusing PEG feeds out of concern he will have diarrhea again. Assured patient that diarrhea if it occurs can be managed. Encouraging patient to take feeds. Last day of zosyn; patient has taken all doses March 12 and morning of March 13.     #Dysphagia with odynophagia  #Hx of prostate cancer s/p radiation therapy  #Prostate cancer metastasis to mediastinum or primary mediastinal mass?  #Failure to thrive   - CT Chest: Interval enlargement of the central/posterior mediastinal mass with development of more extensive lymphadenopathy. The mass encircles the descending thoracic aorta and has some mass effect upon the left atrium. The mid aspect of the esophagus is encircled by the mass and difficult to distinguish. Possible ovoid pill is noted within the mass and possible location of the mid esophagus appear. The maximal esophagus appears distended with layering debris within. Consideration may be given to esophageal stenting.  - CT abdomen and pelvis:  Increased upper abdominal bulky lymphadenopathy. Unchanged bulky retroperitoneal lymphadenopathy, large left bladder mass and perirectal soft tissue infiltrative mass. 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 Chronic moderate to severe left hydroureteronephrosis  - EGD with GI 2/21 -> esophageal stent placed  - CT surgery following -> no acute intervention   - AFP and CEA wnl - LDH and haptoglobin elevated - PSA total 220, PSA free is 22 - CA19 131-  57  - Esophageal biopsy:  metastatic poorly differentiated carcinoma of prostatic origin.  - per onc note, patient had prior biopsy of site that demonstrated metastatic prostate adenocarcinoma and was started on Docetaxel, in January 2024 patient was started on Pembrolizumab and continued on keytruda injections.   - Dr. Irizarry (pt's oncologist): cs rad/onco for palliative radiation, onco asked about new protocol since patient progressed , rec hospice and palliative   - EGD replaced stent and PEG placed 3/5/24   -lacks capacity- psych, ethics and palliative following:     - pt refusing DNR/DNI status based on [employer] beliefs, ethics agreed 2P consent would not be appropriate to make him DNR/DNI     -Rad onc on Mar 10th: will review palliative radiation with patient once "capacity" clarified     -heme/onc: requesting psych eval; recommends palliative radiation and 2nd line chemo but pt has no capacity      -Evaluated by psych on march 8th     -March 12: ethics updated recs regarding patient's capacity appreciated. Currently with peg feeds and will add liquid diet to supplement.      -March 13: moving forward with cancer treatment. Reevaluated by heme/onc, will start on xtandi 160 mg daily. Pharmacy has to order med as it is non formulary; script sent to [facility]; will dispense med once available. Heme onc also recommending palliative radiation to mediastinal mass. Also reevaluated by Rad Onc for palliative RT -> CT sim for mapping and planning, followed by XRT.     -March 14: pharmacy will order xtandi. Refusing morning labs, encouraged pt to have labs drawn. Patient has been refusing PEG feeds for past couple days, despite encouragement and assurance of aiding patient if diarrhea reoccurs.      -March 15: pharmacy ordered Xtandi, will let us know when its in. Refused morning labs and PEG feeds despite counselling. Ethics committee encouraging patient to take PEG feeds. Upgraded PO diet to encourage nutrition. CXR ordered. Palliative will fu.    -March 16: patient continues to refuse majority of treatment -> refused PEG feeds despite encouragement and assurance that if diarrhea reoccurs, medical team will assess. Upgraded diet per S&S. Also refusing blood draws despite encouragement. Refused night meds. CXR shows diffuse opacity in L lung. Consulting pulm for thoracentesis. May not be able to undergo RT given how little lung is left without intervention.    #chronic clavicular fracture  Xray Shoulder 2 Views, Left   Subacute/chronic appearing distal clavicular fracture with approximately 1.7 cm of inferior displacement of the distal fragment. No acute fracture or dislocation.  Left-sided opacities/effusion. Esophageal stent with surgical clips    #CHUCKIE vs ATN induced DIMAS  - pt came in with a baseline of 1.5 and increased to 2.6 with contrast use; improved to 1.3 but now unable to take po and refusing IVF  - renally dose medications  -US Kidney and Bladder:  1.  Diffusely echogenic bilateral kidneys which can be seen with medical renal disease.  2.  Moderate to severe left hydronephrosis with left renal parenchymal thinning, as seen on prior CT scan.  3.  Large mass identified within the urinary bladder, measuring up to 7.9  cm, previously up to 7 cm.  No ureteral jets are seen bilaterally.  4.  Incidentally noted right pleural effusion.  -nephro recs appreciated  -C3 C4 levels normal  -on NaHCO3 1300mg TID   -no indications for RRT    # small b/l pleural effusions on CT chest, resolved  - s/p IV Lasix     #normocytic Anemia  - iron  studies noted % sat 27   - MCV normal, SPEP negative     Inpatient checklist:  #DVT PPx- heparin   #GI PPx- none   #Diet-  peg feeds + supplement liquid diet   #Activity- AAT,   PT consult recommended Home PT  - Would be unable to return to [facility]  Dispo: obtaining Xtandi, pulm consult for thoracentesis    68-year-old male with past medical history of hypertension, chronic back pain, prostate cancer, and posterior mediastinal mass 6.5 cm seen on CT 11/2023, presents for 3 months of progressively difficulty swallowing associated with 10 pounds weight loss and odynophagia. Comes in today now unable to swallow his pills with water, resulting in 2 episode of emesis. He mentioned that he gets SOB during exertion but denies any exertional chest pain. As per patient he was treated at [facility] for prostate cancer and is s/p radiation therapy. He states that he has problem during walking and unable to maintain the posture.     #aspiration PNA vs viral infection vs HAP   #GAS s pyogenes bacteremia   - SIRS positive (WBC increased to 16, febrile to 101 and tachy to 129 )   - CXR with worsening L-sided opacity   - Blood cx (2/24/24): positive for strep pyogenes  - repeat BCx 27,28,29,1,2  negative to date   - procal 7  - TTE   1. Left ventricular ejection fraction, by visual estimation, is 65 to 70%.   2. Hyperdynamic global left ventricular systolic function.   3. Mildly increased LV wall thickness.   4. Spectral Doppler shows impaired relaxation pattern of left ventricular myocardial filling (Grade I diastolic dysfunction).   5. Trace mitral valve regurgitation.  Plan:  - aspiration precautions   - patient cleared for pureed diet and thin liquids per SS -refusing food as patient reports inability to swallow -> - Peg placed 3/5/24 and feeds started 3/6/24 -> pt was having diarrhea after PEG, but now resolved   - pt was being treated with cefepime and then ceftriaxone for bacteremia, but then developed aspiration pna so resumed Zosyn  - Zosyn due to end on 3/13/24. Patient occasionally refuses doses of zosyn. Will continue to monitor what medications patient is taking and refusing.   -March 12: Currently with peg feeds and will add liquid diet to supplement.   -March 13: patient has been refusing PEG feeds out of concern he will have diarrhea again. Assured patient that diarrhea if it occurs can be managed. Encouraging patient to take feeds. Last day of zosyn; patient has taken all doses March 12 and morning of March 13.     #Dysphagia with odynophagia  #Hx of prostate cancer s/p radiation therapy  #Prostate cancer metastasis to mediastinum or primary mediastinal mass?  #Failure to thrive   - CT Chest: Interval enlargement of the central/posterior mediastinal mass with development of more extensive lymphadenopathy. The mass encircles the descending thoracic aorta and has some mass effect upon the left atrium. The mid aspect of the esophagus is encircled by the mass and difficult to distinguish. Possible ovoid pill is noted within the mass and possible location of the mid esophagus appear. The maximal esophagus appears distended with layering debris within. Consideration may be given to esophageal stenting.  - CT abdomen and pelvis:  Increased upper abdominal bulky lymphadenopathy. Unchanged bulky retroperitoneal lymphadenopathy, large left bladder mass and perirectal soft tissue infiltrative mass. 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 Chronic moderate to severe left hydroureteronephrosis  - EGD with GI 2/21 -> esophageal stent placed  - CT surgery following -> no acute intervention   - AFP and CEA wnl - LDH and haptoglobin elevated - PSA total 220, PSA free is 22 - CA19 131-  57  - Esophageal biopsy:  metastatic poorly differentiated carcinoma of prostatic origin.  - per onc note, patient had prior biopsy of site that demonstrated metastatic prostate adenocarcinoma and was started on Docetaxel, in January 2024 patient was started on Pembrolizumab and continued on keytruda injections.   - Dr. Irizarry (pt's oncologist): cs rad/onco for palliative radiation, onco asked about new protocol since patient progressed , rec hospice and palliative   - EGD replaced stent and PEG placed 3/5/24   -lacks capacity- psych, ethics and palliative following:     - pt refusing DNR/DNI status based on Muslim beliefs, ethics agreed 2P consent would not be appropriate to make him DNR/DNI     -Rad onc on Mar 10th: will review palliative radiation with patient once "capacity" clarified     -heme/onc: requesting psych eval; recommends palliative radiation and 2nd line chemo but pt has no capacity      -Evaluated by psych on march 8th     -March 12: ethics updated recs regarding patient's capacity appreciated. Currently with peg feeds and will add liquid diet to supplement.      -March 13: moving forward with cancer treatment. Reevaluated by heme/onc, will start on xtandi 160 mg daily. Pharmacy has to order med as it is non formulary; script sent to [facility]; will dispense med once available. Heme onc also recommending palliative radiation to mediastinal mass. Also reevaluated by Rad Onc for palliative RT -> CT sim for mapping and planning, followed by XRT.     -March 14: pharmacy will order xtandi. Refusing morning labs, encouraged pt to have labs drawn. Patient has been refusing PEG feeds for past couple days, despite encouragement and assurance of aiding patient if diarrhea reoccurs.      -March 15: pharmacy ordered Xtandi, will let us know when its in. Refused morning labs and PEG feeds despite counselling. Ethics committee encouraging patient to take PEG feeds. Upgraded PO diet to encourage nutrition. CXR ordered. Palliative will fu.    -March 16: patient continues to refuse majority of treatment -> refused PEG feeds despite encouragement and assurance that if diarrhea reoccurs, medical team will assess. Upgraded diet per S&S. Also refusing blood draws despite encouragement. Refused night meds. CXR shows diffuse opacity in L lung. Consulting pulm for thoracentesis. May not be able to undergo RT given how little lung is left without intervention. No update from pharmacy of when Xtandi will be delivered.     #chronic clavicular fracture  Xray Shoulder 2 Views, Left   Subacute/chronic appearing distal clavicular fracture with approximately 1.7 cm of inferior displacement of the distal fragment. No acute fracture or dislocation.  Left-sided opacities/effusion. Esophageal stent with surgical clips    #CHUCKIE vs ATN induced DIMAS  - pt came in with a baseline of 1.5 and increased to 2.6 with contrast use; improved to 1.3 but now unable to take po and refusing IVF  - renally dose medications  -US Kidney and Bladder:  1.  Diffusely echogenic bilateral kidneys which can be seen with medical renal disease.  2.  Moderate to severe left hydronephrosis with left renal parenchymal thinning, as seen on prior CT scan.  3.  Large mass identified within the urinary bladder, measuring up to 7.9  cm, previously up to 7 cm.  No ureteral jets are seen bilaterally.  4.  Incidentally noted right pleural effusion.  -nephro recs appreciated  -C3 C4 levels normal  -on NaHCO3 1300mg TID   -no indications for RRT    # small b/l pleural effusions on CT chest, resolved  - s/p IV Lasix     #normocytic Anemia  - iron  studies noted % sat 27   - MCV normal, SPEP negative     Inpatient checklist:  #DVT PPx- heparin   #GI PPx- none   #Diet-  peg feeds + supplement liquid diet   #Activity- AAT,   PT consult recommended Home PT  - Would be unable to return to [facility]  Dispo: obtaining Xtandi, pulm consult for thoracentesis, try to encourage peg feeds, lab draws, and meds    68-year-old male with past medical history of hypertension, chronic back pain, prostate cancer, and posterior mediastinal mass 6.5 cm seen on CT 11/2023, presents for 3 months of progressively difficulty swallowing associated with 10 pounds weight loss and odynophagia. Comes in today now unable to swallow his pills with water, resulting in 2 episode of emesis. He mentioned that he gets SOB during exertion but denies any exertional chest pain. As per patient he was treated at [facility] [place] for prostate cancer and is s/p radiation therapy. He states that he has problem during walking and unable to maintain the posture.     #aspiration PNA vs viral infection vs HAP   #GAS s pyogenes bacteremia   - SIRS positive (WBC increased to 16, febrile to 101 and tachy to 129 )   - CXR with worsening L-sided opacity   - Blood cx (2/24/24): positive for strep pyogenes  - repeat BCx 27,28,29,1,2  negative to date   - procal 7  - TTE   1. Left ventricular ejection fraction, by visual estimation, is 65 to 70%.   2. Hyperdynamic global left ventricular systolic function.   3. Mildly increased LV wall thickness.   4. Spectral Doppler shows impaired relaxation pattern of left ventricular myocardial filling (Grade I diastolic dysfunction).   5. Trace mitral valve regurgitation.  Plan:  - aspiration precautions   - patient cleared for pureed diet and thin liquids per SS -refusing food as patient reports inability to swallow -> - Peg placed 3/5/24 and feeds started 3/6/24 -> pt was having diarrhea after PEG, but now resolved   - pt was being treated with cefepime and then ceftriaxone for bacteremia, but then developed aspiration pna so resumed Zosyn  - Zosyn due to end on 3/13/24. Patient occasionally refuses doses of zosyn. Will continue to monitor what medications patient is taking and refusing.   -March 12: Currently with peg feeds and will add liquid diet to supplement.   -March 13: patient has been refusing PEG feeds out of concern he will have diarrhea again. Assured patient that diarrhea if it occurs can be managed. Encouraging patient to take feeds. Last day of zosyn; patient has taken all doses March 12 and morning of March 13.     #Dysphagia with odynophagia  #Hx of prostate cancer s/p radiation therapy  #Prostate cancer metastasis to mediastinum or primary mediastinal mass?  #Failure to thrive   - CT Chest: Interval enlargement of the central/posterior mediastinal mass with development of more extensive lymphadenopathy. The mass encircles the descending thoracic aorta and has some mass effect upon the left atrium. The mid aspect of the esophagus is encircled by the mass and difficult to distinguish. Possible ovoid pill is noted within the mass and possible location of the mid esophagus appear. The maximal esophagus appears distended with layering debris within. Consideration may be given to esophageal stenting.  - CT abdomen and pelvis:  Increased upper abdominal bulky lymphadenopathy. Unchanged bulky retroperitoneal lymphadenopathy, large left bladder mass and perirectal soft tissue infiltrative mass. 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 Chronic moderate to severe left hydroureteronephrosis  - EGD with GI 2/21 -> esophageal stent placed  - CT surgery following -> no acute intervention   - AFP and CEA wnl - LDH and haptoglobin elevated - PSA total 220, PSA free is 22 - CA19 131-  57  - Esophageal biopsy:  metastatic poorly differentiated carcinoma of prostatic origin.  - per onc note, patient had prior biopsy of site that demonstrated metastatic prostate adenocarcinoma and was started on Docetaxel, in January 2024 patient was started on Pembrolizumab and continued on keytruda injections.   - Dr. Irizarry (pt's oncologist): cs rad/onco for palliative radiation, onco asked about new protocol since patient progressed , rec hospice and palliative   - EGD replaced stent and PEG placed 3/5/24   -lacks capacity- psych, ethics and palliative following:     - pt refusing DNR/DNI status based on [employer] beliefs, ethics agreed 2P consent would not be appropriate to make him DNR/DNI     -Rad onc on Mar 10th: will review palliative radiation with patient once "capacity" clarified     -heme/onc: requesting psych eval; recommends palliative radiation and 2nd line chemo but pt has no capacity      -Evaluated by psych on march 8th     -March 12: ethics updated recs regarding patient's capacity appreciated. Currently with peg feeds and will add liquid diet to supplement.      -March 13: moving forward with cancer treatment. Reevaluated by heme/onc, will start on xtandi 160 mg daily. Pharmacy has to order med as it is non formulary; script sent to [facility]; will dispense med once available. Heme onc also recommending palliative radiation to mediastinal mass. Also reevaluated by Rad Onc for palliative RT -> CT sim for mapping and planning, followed by XRT.     -March 14: pharmacy will order xtandi. Refusing morning labs, encouraged pt to have labs drawn. Patient has been refusing PEG feeds for past couple days, despite encouragement and assurance of aiding patient if diarrhea reoccurs.      -March 15: pharmacy ordered Xtandi, will let us know when its in. Refused morning labs and PEG feeds despite counselling. Ethics committee encouraging patient to take PEG feeds. Upgraded PO diet to encourage nutrition. CXR ordered. Palliative will fu.    -March 16: patient continues to refuse majority of treatment -> refused PEG feeds despite encouragement and assurance that if diarrhea reoccurs, medical team will assess. Upgraded diet per S&S. Also refusing blood draws despite encouragement. Refused night meds. CXR shows diffuse opacity in L lung. Consulting pulm for thoracentesis. May not be able to undergo RT given how little lung is left without intervention. No update from pharmacy of when Xtandi will be delivered.     #chronic clavicular fracture  Xray Shoulder 2 Views, Left   Subacute/chronic appearing distal clavicular fracture with approximately 1.7 cm of inferior displacement of the distal fragment. No acute fracture or dislocation.  Left-sided opacities/effusion. Esophageal stent with surgical clips    #CHUCKIE vs ATN induced DIMAS  - pt came in with a baseline of 1.5 and increased to 2.6 with contrast use; improved to 1.3 but now unable to take po and refusing IVF  - renally dose medications  -US Kidney and Bladder:  1.  Diffusely echogenic bilateral kidneys which can be seen with medical renal disease.  2.  Moderate to severe left hydronephrosis with left renal parenchymal thinning, as seen on prior CT scan.  3.  Large mass identified within the urinary bladder, measuring up to 7.9  cm, previously up to 7 cm.  No ureteral jets are seen bilaterally.  4.  Incidentally noted right pleural effusion.  -nephro recs appreciated  -C3 C4 levels normal  -on NaHCO3 1300mg TID   -no indications for RRT    # small b/l pleural effusions on CT chest, resolved  - s/p IV Lasix     #normocytic Anemia  - iron  studies noted % sat 27   - MCV normal, SPEP negative   -pt has been refusing lab draws. Last Hb on March 12 showed Hb stable at 8.6    Inpatient checklist:  #DVT PPx- heparin   #GI PPx- none   #Diet-  peg feeds + supplement liquid diet   #Activity- AAT,   PT consult recommended Home PT  - Would be unable to return to [facility]  Dispo: obtaining Xtandi, pulm consult for thoracentesis, try to encourage peg feeds, lab draws, and meds

## 2024-03-16 NOTE — SWALLOW BEDSIDE ASSESSMENT ADULT - SWALLOW EVAL: RECOMMENDED FEEDING/EATING TECHNIQUES
crush medication (when feasible)/maintain upright posture during/after eating for 30 mins/position upright (90 degrees)/small sips/bites
crush medication (when feasible)/maintain upright posture during/after eating for 30 mins/position upright (90 degrees)/small sips/bites
oral hygiene/position upright (90 degrees)
position upright (90 degrees)/small sips/bites

## 2024-03-16 NOTE — PROGRESS NOTE ADULT - SUBJECTIVE AND OBJECTIVE BOX
24H events:    Patient is a 68y old Male who presents with a chief complaint of Mediastinal Mass (15 Mar 2024 10:20)    Primary diagnosis of Mediastinal mass  Today is hospital day 25d. This morning patient was seen and examined at bedside, resting comfortably in bed.    No acute or major events overnight.  Refused feeds, medications and blood draw overnight despite encouragement from the staff.       PAST MEDICAL & SURGICAL HISTORY  HTN (hypertension)    Prostate cancer      SOCIAL HISTORY:  Social History:      ALLERGIES:  No Known Allergies    MEDICATIONS:  STANDING MEDICATIONS  chlorhexidine 2% Cloths 1 Application(s) Topical <User Schedule>  dextrose 5% + sodium chloride 0.9%. 1000 milliLiter(s) IV Continuous <Continuous>  enzalutamide 80 mg tab 160 milliGRAM(s) 160 milliGRAM(s) Oral <User Schedule>  heparin   Injectable 5000 Unit(s) SubCutaneous every 12 hours  iohexol 300 mG (iodine)/mL Oral Solution 30 milliLiter(s) Oral once  lidocaine   4% Patch 1 Patch Transdermal every 24 hours  metoprolol tartrate 25 milliGRAM(s) Enteral Tube two times a day  ondansetron Injectable 4 milliGRAM(s) IV Push once  sodium bicarbonate 1300 milliGRAM(s) Oral every 6 hours    PRN MEDICATIONS    VITALS:   T(F): 96.8  HR: 85  BP: 137/98  RR: 18  SpO2: --    PHYSICAL EXAM:  GENERAL:   (x  ) NAD, lying in bed comfortably   on room air     HEART:  Rate -->     ( x ) normal rate     (  ) bradycardic     (  ) tachycardic  Rhythm -->     (x  ) regular     (  ) regularly irregular     (  ) irregularly irregular  Murmurs -->     (  x) normal s1s2     (  ) systolic murmur     (  ) diastolic murmur     (  ) continuous murmur      (  ) S3 present     (  ) S4 present    LUNGS:   (x  )Unlabored respirations     (  ) tachypnea  ( x ) B/L air entry     (  ) decreased breath sounds in:  (location     )    ( x ) no adventitious sound        ABDOMEN:   soft non tender nondistended PEG tube in place    EXTREMITIES:  no LE edema     NERVOUS SYSTEM:    AOA x2    LABS:                        RADIOLOGY:

## 2024-03-16 NOTE — SWALLOW BEDSIDE ASSESSMENT ADULT - SPECIFY REASON(S)
dysphagia evaluation
mediastinal mass, dysphagia
dysphagia eval
dysphagia f/u, pt seen in RAD during esophagram w/ RAD
mediastinal mass, dysphagia

## 2024-03-16 NOTE — PROVIDER CONTACT NOTE (OTHER) - ASSESSMENT
Patient A&O x4. RN educated patient regarding reason for sodium bicarbonate, and educated patient on the importance of tube feeding. Patient stated, "maybe tomorrow." RN attempted to reiterate the importance of tube feeding as it is the patients nutritional source as the patient is not eating orally. Patient still refused.

## 2024-03-16 NOTE — SWALLOW BEDSIDE ASSESSMENT ADULT - SLP GENERAL OBSERVATIONS
Pt received in RAD awake and alert on room air. See RAD esophagram report for further details Recommendations were discussed w/ medical team and GI.
Pt received in bed awake and alert, dysphonic.  s/p EGD w/ stent repositioning and PEG
Pt received at bedside, semi reclined in bed. Awake/alert, participated in assessment given encouragement. Stated, "I want a sandwich)
Pt found laying in bed alert and awake. Pt reports no pain while swallowing liquids but does have pain while swallowing soft chewables
pt received in bed asleep arousable w/ c/o R shoulder pain. RN aware. +room air; pt denies hx of regurgitation, states food and liquids takes a long time to go down

## 2024-03-16 NOTE — SWALLOW BEDSIDE ASSESSMENT ADULT - SWALLOW EVAL: DIAGNOSIS
+toleration of ~1.5 oz thin liquids, c/o pressure localizing to the sternum which cleared within 1 min, pt then refused and further po trials and reported he want to rely on PEG as only means of nutrition
+toleration for puree and thin liquids w/o overt s/s of aspiration/penetration. No further PO trials 2/2 pt states it is painful to swallow soft chewables
+no s/s of oropharyngeal dysphagia w/ thin liquids, +upon visual examination of esophagus during esophagram there was significant resistant to bolus flow above the stent
+toleration for thin liquids, puree, and regular solids w/o overt s/s aspiration/penetration; +c/o globus sensation w/ regular solids
+mildly prolonged mastication for regular solids but functional; no overt s/s of penetration/aspiration regular, soft and bite-sized solids, thin liquids

## 2024-03-16 NOTE — SWALLOW BEDSIDE ASSESSMENT ADULT - ADDITIONAL RECOMMENDATIONS
Advanced GI f/u, to determine of stent has migrated. If pt is not a candidate for any further intervention long term non-oral means of nutrition and hydration should be discussed w/ pt.
consider esophagram
SLP to d/c.
Advance diet as tolerated

## 2024-03-16 NOTE — PROGRESS NOTE ADULT - ATTENDING COMMENTS
68-year-old male with past medical history of hypertension, chronic back pain, prostate cancer, and posterior mediastinal mass 6.5 cm seen on CT 11/2023, presents for 3 months of progressively difficulty swallowing associated with 10 pounds weight loss and odynophagia. Comes in today now unable to swallow his pills with water, resulting in 2 episode of emesis. He mentioned that he gets SOB during exertion but denies any exertional chest pain. As per patient he was treated at [facility] for prostate cancer and is s/p radiation therapy. He states that he has problem during walking and unable to maintain the posture.     Vital Signs Last 24 Hrs  T(C): 36.7 (16 Mar 2024 12:43), Max: 36.7 (16 Mar 2024 12:43)  T(F): 98 (16 Mar 2024 12:43), Max: 98 (16 Mar 2024 12:43)  HR: 85 (16 Mar 2024 12:43) (85 - 87)  BP: 147/96 (16 Mar 2024 12:43) (136/87 - 147/96)  RR: 20 (16 Mar 2024 12:43) (18 - 20)    #aspiration PNA vs viral infection vs HAP   #GAS s pyogenes bacteremia   - CXR with worsening L-sided opacity   - Blood cx (2/24/24): positive for strep pyogenes  - repeat BCx neg  - pt was initially treated with cefepime for Bcx until neg  - finished course of IV zosyn       #Odynophagia and dysphagia due to prostate cancer metastasized to mediastinum  #Failure to thrive   #Pleural Effusion Left  - CT Chest: Interval enlargement of the central/posterior mediastinal mass with development of more extensive lymphadenopathy. The mass encircles the descending thoracic aorta and has some mass effect upon the left atrium. The mid aspect of the esophagus is encircled by the mass and difficult to distinguish. Possible ovoid pill is noted within the mass and possible location of the mid esophagus appear. The maximal esophagus appears distended with layering debris within. Consideration may be given to esophageal stenting.  - CT abdomen and pelvis:  Increased upper abdominal bulky lymphadenopathy. Unchanged bulky retroperitoneal lymphadenopathy, large left bladder mass and perirectal soft tissue infiltrative mass. 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 Chronic moderate to severe left hydroureteronephrosis  - EGD with GI 2/21 -> esophageal stent placed  - CT surgery following -> no acute intervention   - AFP and CEA wnl - LDH and haptoglobin elevated - PSA total 220, PSA free is 22 - CA19 131-  57  - Esophageal biopsy:  metastatic poorly differentiated carcinoma of prostatic origin  - per onc note, patient had prior biopsy of site that demonstrated metastatic prostate adenocarcinoma and was started on Docetaxel, in January 2024 patient was started on Pembrolizumab and continued on keytruda injections.   - EGD replaced stent and PEG placed 3/5/24 - patient is declining tube feeds - encourage oral intake - swallow re evak appreciated - advance diet as tolerated   - Oncology follow up noted - starting treatment + (non-formulary filled out for Xtandi) - will need continued oncology reccs to assist with the case   - discussed with Rad ONC 3/15 - currently not a candidate for RT to chest - with pleural effusion not enough lung capacity and may end up on ventilator   - pulm consulted for thoracentesis - currently not in resp distress   - unsure if patient can return back to [facility] residence - ? [facility] dispo - discussed with CM in rounds   - overall prognosis poor - full code - Palliative care and Ethics committee following the case     Attending Physician Dr. Erinn Leslie # [id number] 68-year-old male with past medical history of hypertension, chronic back pain, prostate cancer, and posterior mediastinal mass 6.5 cm seen on CT 11/2023, presents for 3 months of progressively difficulty swallowing associated with 10 pounds weight loss and odynophagia. Comes in today now unable to swallow his pills with water, resulting in 2 episode of emesis. He mentioned that he gets SOB during exertion but denies any exertional chest pain. As per patient he was treated at [facility] for prostate cancer and is s/p radiation therapy. He states that he has problem during walking and unable to maintain the posture.     Vital Signs Last 24 Hrs  T(C): 36.7 (16 Mar 2024 12:43), Max: 36.7 (16 Mar 2024 12:43)  T(F): 98 (16 Mar 2024 12:43), Max: 98 (16 Mar 2024 12:43)  HR: 85 (16 Mar 2024 12:43) (85 - 87)  BP: 147/96 (16 Mar 2024 12:43) (136/87 - 147/96)  RR: 20 (16 Mar 2024 12:43) (18 - 20)    #Left Pleural Effusion  - pulm consulted for thoracentesis - currently not in resp distress     #GAS s pyogenes bacteremia - resolved   - repeat BCx neg  - pt was initially treated with cefepime for Bcx until neg  - finished course of IV zosyn     #Odynophagia and dysphagia due to prostate cancer metastasized to mediastinum  #Failure to thrive     - CT Chest: Interval enlargement of the central/posterior mediastinal mass with development of more extensive lymphadenopathy. The mass encircles the descending thoracic aorta and has some mass effect upon the left atrium. The mid aspect of the esophagus is encircled by the mass and difficult to distinguish. Possible ovoid pill is noted within the mass and possible location of the mid esophagus appear. The maximal esophagus appears distended with layering debris within. Consideration may be given to esophageal stenting.  - CT abdomen and pelvis:  Increased upper abdominal bulky lymphadenopathy. Unchanged bulky retroperitoneal lymphadenopathy, large left bladder mass and perirectal soft tissue infiltrative mass. 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 Chronic moderate to severe left hydroureteronephrosis  - EGD with GI 2/21 -> esophageal stent placed  - CT surgery following -> no acute intervention   - AFP and CEA wnl - LDH and haptoglobin elevated - PSA total 220, PSA free is 22 - CA19 131-  57  - Esophageal biopsy:  metastatic poorly differentiated carcinoma of prostatic origin  - per onc note, patient had prior biopsy of site that demonstrated metastatic prostate adenocarcinoma and was started on Docetaxel, in January 2024 patient was started on Pembrolizumab and continued on keytruda injections.   - EGD replaced stent and PEG placed 3/5/24 - patient is declining tube feeds - encourage oral intake - swallow re evak appreciated - advance diet as tolerated   - Oncology follow up noted - starting treatment + (non-formulary filled out for Xtandi) - will need continued oncology reccs to assist with the case   - discussed with Rad ONC 3/15 - currently not a candidate for RT to chest - with pleural effusion, not enough lung capacity and may end up on ventilator   - unsure if patient can return back to [facility] residence - ? [facility] dispo - discussed with CM in rounds   - overall prognosis poor - full code - Palliative care and Ethics committee following the case     Attending Physician Dr. Erinn Leslie # [id number]

## 2024-03-16 NOTE — SWALLOW BEDSIDE ASSESSMENT ADULT - NS ASR SWALLOW FINDINGS DISCUS
Physician/Nursing/Patient
Physician/Nursing/Patient
RN Charlene, Dr. Leslie on unit/via teams/Physician/Nursing
Physician/Nursing/Patient
Physician/Nursing/Patient

## 2024-03-17 PROCEDURE — 99232 SBSQ HOSP IP/OBS MODERATE 35: CPT

## 2024-03-17 PROCEDURE — 93971 EXTREMITY STUDY: CPT | Mod: 26,LT

## 2024-03-17 RX ADMIN — Medication 1300 MILLIGRAM(S): at 12:53

## 2024-03-17 RX ADMIN — LIDOCAINE 1 PATCH: 4 CREAM TOPICAL at 19:00

## 2024-03-17 RX ADMIN — HEPARIN SODIUM 5000 UNIT(S): 5000 INJECTION INTRAVENOUS; SUBCUTANEOUS at 18:25

## 2024-03-17 RX ADMIN — HEPARIN SODIUM 5000 UNIT(S): 5000 INJECTION INTRAVENOUS; SUBCUTANEOUS at 05:24

## 2024-03-17 RX ADMIN — CHLORHEXIDINE GLUCONATE 1 APPLICATION(S): 213 SOLUTION TOPICAL at 05:25

## 2024-03-17 RX ADMIN — Medication 1300 MILLIGRAM(S): at 05:25

## 2024-03-17 RX ADMIN — LIDOCAINE 1 PATCH: 4 CREAM TOPICAL at 00:00

## 2024-03-17 RX ADMIN — LIDOCAINE 1 PATCH: 4 CREAM TOPICAL at 12:54

## 2024-03-17 RX ADMIN — Medication 25 MILLIGRAM(S): at 05:25

## 2024-03-17 RX ADMIN — Medication 1300 MILLIGRAM(S): at 18:24

## 2024-03-17 RX ADMIN — Medication 25 MILLIGRAM(S): at 18:24

## 2024-03-17 NOTE — PROGRESS NOTE ADULT - ASSESSMENT
#Left Upper Extremity swelling - superficial thrombophlebitis   -duplex performed - no dvt   -warm compress  -elevate the arm     #Left Pleural Effusion  - pulm consulted for thoracentesis - currently not in resp distress   - repeat chest xray in am     #GAS s pyogenes bacteremia - resolved   - repeat BCx neg  - pt was initially treated with cefepime for Bcx until neg  - finished course of IV zosyn     #Odynophagia and dysphagia due to prostate cancer metastasized to mediastinum  #Failure to thrive     - CT Chest: Interval enlargement of the central/posterior mediastinal mass with development of more extensive lymphadenopathy. The mass encircles the descending thoracic aorta and has some mass effect upon the left atrium. The mid aspect of the esophagus is encircled by the mass and difficult to distinguish. Possible ovoid pill is noted within the mass and possible location of the mid esophagus appear. The maximal esophagus appears distended with layering debris within. Consideration may be given to esophageal stenting.  - CT abdomen and pelvis:  Increased upper abdominal bulky lymphadenopathy. Unchanged bulky retroperitoneal lymphadenopathy, large left bladder mass and perirectal soft tissue infiltrative mass. 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 Chronic moderate to severe left hydroureteronephrosis  - EGD with GI 2/21 -> esophageal stent placed  - CT surgery following -> no acute intervention   - AFP and CEA wnl - LDH and haptoglobin elevated - PSA total 220, PSA free is 22 - CA19 131-  57  - Esophageal biopsy:  metastatic poorly differentiated carcinoma of prostatic origin  - per onc note, patient had prior biopsy of site that demonstrated metastatic prostate adenocarcinoma and was started on Docetaxel, in January 2024 patient was started on Pembrolizumab and continued on keytruda injections.   - EGD replaced stent and PEG placed 3/5/24 - patient is declining tube feeds - encourage oral intake - swallow re eval appreciated - advance diet as tolerated - start calorie count for documentation purpose (appears that patient is having 70% of his meal)  - Oncology follow up noted - starting treatment + (non-formulary filled out for Xtandi) - will need continued oncology reccs to assist with the case   - discussed with Rad ONC 3/15 - currently not a candidate for RT to chest - with pleural effusion, not enough lung capacity and may end up on ventilator   - overall prognosis poor - full code - Palliative care and Ethics committee following the case     DISPO: will be NH dispo - currently not discharge ready  -spoke to patient about his plan of care     Attending Physician Dr. Erinn Leslie # [id number].

## 2024-03-17 NOTE — PROGRESS NOTE ADULT - SUBJECTIVE AND OBJECTIVE BOX
RUFUS PICKARD  68y  Male      Patient is a 68y old  Male who presents with a chief complaint of Mediastinal Mass (16 Mar 2024 09:22)      INTERVAL HPI/OVERNIGHT EVENTS:    pt seen this am   no overnight events notified   -eating oral diet - start calorie count - patient declined tube feeds   -discussed with bedside RN    Vital Signs Last 24 Hrs  T(C): 36.7 (17 Mar 2024 12:38), Max: 36.7 (17 Mar 2024 12:38)  T(F): 98.1 (17 Mar 2024 12:38), Max: 98.1 (17 Mar 2024 12:38)  HR: 94 (17 Mar 2024 12:38) (83 - 94)  BP: 146/94 (17 Mar 2024 12:38) (137/93 - 151/95)  RR: 18 (17 Mar 2024 12:38) (18 - 20)    PHYSICAL EXAM:  GENERAL: Not in distress - comfortable in bed   HEAD:  Atraumatic, Normocephalic  EYES: EOMI, PERRLA, conjunctiva and sclera clear  NERVOUS SYSTEM:  Alert & Oriented X1  CHEST/LUNG: decreased BS   CV/HEART: Regular rate and rhythm  GI/ABDOMEN: Soft abdomen + peg tube       LAB:      no new labs       RADIOLOGY:    Imaging Personally visualized and Reviewed:  [ y ] YES  [ ] NO    HEALTH ISSUES - PROBLEM Dx:  Pneumonia    Dysphagia    Mediastinal mass    Palliative care by specialist    DIMAS (acute kidney injury)    Encounter for assessment of decision-making capacity          meds:  chlorhexidine 2% Cloths 1 Application(s) Topical <User Schedule>  dextrose 5% + sodium chloride 0.9%. 1000 milliLiter(s) IV Continuous <Continuous>  enzalutamide 80 mg tab 160 milliGRAM(s) 160 milliGRAM(s) Oral <User Schedule>  heparin   Injectable 5000 Unit(s) SubCutaneous every 12 hours  iohexol 300 mG (iodine)/mL Oral Solution 30 milliLiter(s) Oral once  lidocaine   4% Patch 1 Patch Transdermal every 24 hours  metoprolol tartrate 25 milliGRAM(s) Enteral Tube two times a day  ondansetron Injectable 4 milliGRAM(s) IV Push once  sodium bicarbonate 1300 milliGRAM(s) Oral every 6 hours       Subjective:       Patient ID: Sameera Pool is a 80 y.o. female.    Chief Complaint: Follow-up    HPI   The patient is a an 80-year-old who is here today for short-term follow-up.    At her last visit, we had started Toprol XL 12.5 mg once a day due to her palpitations with her Holter showing 38453 PACs (averaging 1399 PVCs per hour) and 242 PACs (averaging 5 PACs per hour).  Initially she did well with the Toprol and did not notice any side effects until 1 day when she was out working in the [employer].  On this day of her working in the [employer], she was working a large area for 2-3 hours and this was the most physical activity she had had in quite some time.  She was raking this large area and planning [employer].  During this work, she felt overall bad head to toe and she had chest tightness and shortness of breath.  She did not have any pain radiating to her neck or her arm.  She did not have any nausea or vomiting.  A friend's stopped by and saw her not looking well and suggested that she may be dehydrated although she had eaten and drank normally prior to her [employer] work and it was a very cold day outside.  She thought that these symptoms may be related to the Toprol and so she stopped the Toprol.  When she was off the Toprol, she started to notice that her heart was racing when she was going up the stairs and she had been going up the stairs quite a bit with [employer] decorations.  She has resumed the Toprol XL 12.5 mg once a day and has done fine with the Toprol since she resumed it.  She has not felt any palpitations.  She is not had any chest pain or shortness of breath with exertion but the most exertion she has done has been vacuuming.  She has been tracking her blood pressure and her readings have ranged from 128-155/74-93 since starting the Toprol    Review of Systems   Constitutional: Negative for appetite change, chills, diaphoresis, fatigue, fever and unexpected weight change.   HENT: Negative for  congestion, ear pain, postnasal drip, rhinorrhea, sinus pressure, sneezing, sore throat and trouble swallowing.    Eyes: Negative for pain, discharge and visual disturbance.   Respiratory: Negative for cough, chest tightness, shortness of breath and wheezing.    Cardiovascular: Negative for chest pain, palpitations and leg swelling.   Gastrointestinal: Negative for abdominal distention, abdominal pain, blood in stool, constipation, diarrhea, nausea and vomiting.   Skin: Negative for rash.       Objective:      Physical Exam  Constitutional:       General: She is not in acute distress.     Appearance: Normal appearance. She is well-developed.   HENT:      Head: Normocephalic and atraumatic.      Right Ear: Hearing, tympanic membrane, ear canal and external ear normal.      Left Ear: Hearing, tympanic membrane, ear canal and external ear normal.      Nose: Nose normal.      Mouth/Throat:      Mouth: No oral lesions.      Pharynx: No oropharyngeal exudate or posterior oropharyngeal erythema.   Eyes:      General: Lids are normal. No scleral icterus.     Extraocular Movements: Extraocular movements intact.      Conjunctiva/sclera: Conjunctivae normal.      Pupils: Pupils are equal, round, and reactive to light.   Neck:      Musculoskeletal: Normal range of motion and neck supple.      Thyroid: No thyroid mass or thyromegaly.      Vascular: No carotid bruit.   Cardiovascular:      Rate and Rhythm: Normal rate and regular rhythm.  No extrasystoles are present.     Chest Wall: PMI is not displaced.      Heart sounds: Normal heart sounds. No murmur. No gallop.    Pulmonary:      Effort: Pulmonary effort is normal. No accessory muscle usage or respiratory distress.      Breath sounds: Normal breath sounds.   Abdominal:      General: Bowel sounds are normal. There is no abdominal bruit.      Palpations: Abdomen is soft.      Tenderness: There is no abdominal tenderness. There is no rebound.   Lymphadenopathy:      Head:       Right side of head: No submental or submandibular adenopathy.      Left side of head: No submental or submandibular adenopathy.      Cervical:      Right cervical: No superficial, deep or posterior cervical adenopathy.     Left cervical: No superficial, deep or posterior cervical adenopathy.      Upper Body:      Right upper body: No supraclavicular adenopathy.      Left upper body: No supraclavicular adenopathy.   Skin:     General: Skin is warm and dry.   Neurological:      Mental Status: She is alert and oriented to person, place, and time.           A/P:  1) palpitations with PACs and PVCs.  Currently asymptomatic.  We will continue with the Toprol.  If she develops symptoms, she will let me know  2) elevated blood pressures.    We are going to increase the Toprol XL to 12.5 mg twice a day and see how she does with this higher dose of medication  3) exertional chest pain.  New.  We are going to proceed with a stress test.  She will not do anything overly exertional until we have her stress test results    4) elevated LFTs.  We will add LFTs to her upcoming oncology labs oncexiang has a date scheduled for that      I will see her back in 4 weeks or sooner if needed

## 2024-03-18 LAB
ALBUMIN SERPL ELPH-MCNC: 2.8 G/DL — LOW (ref 3.5–5.2)
ALP SERPL-CCNC: 68 U/L — SIGNIFICANT CHANGE UP (ref 30–115)
ALT FLD-CCNC: 29 U/L — SIGNIFICANT CHANGE UP (ref 0–41)
ANION GAP SERPL CALC-SCNC: 11 MMOL/L — SIGNIFICANT CHANGE UP (ref 7–14)
AST SERPL-CCNC: 47 U/L — HIGH (ref 0–41)
BILIRUB SERPL-MCNC: 0.4 MG/DL — SIGNIFICANT CHANGE UP (ref 0.2–1.2)
BUN SERPL-MCNC: 8 MG/DL — LOW (ref 10–20)
CALCIUM SERPL-MCNC: 6.5 MG/DL — LOW (ref 8.4–10.5)
CHLORIDE SERPL-SCNC: 110 MMOL/L — SIGNIFICANT CHANGE UP (ref 98–110)
CO2 SERPL-SCNC: 18 MMOL/L — SIGNIFICANT CHANGE UP (ref 17–32)
CREAT SERPL-MCNC: 0.9 MG/DL — SIGNIFICANT CHANGE UP (ref 0.7–1.5)
EGFR: 92 ML/MIN/1.73M2 — SIGNIFICANT CHANGE UP
GLUCOSE SERPL-MCNC: 85 MG/DL — SIGNIFICANT CHANGE UP (ref 70–99)
MAGNESIUM SERPL-MCNC: 1.6 MG/DL — LOW (ref 1.8–2.4)
PHOSPHATE SERPL-MCNC: 1.6 MG/DL — LOW (ref 2.1–4.9)
POTASSIUM SERPL-MCNC: 4.6 MMOL/L — SIGNIFICANT CHANGE UP (ref 3.5–5)
POTASSIUM SERPL-SCNC: 4.6 MMOL/L — SIGNIFICANT CHANGE UP (ref 3.5–5)
PROT SERPL-MCNC: 5.8 G/DL — LOW (ref 6–8)
SODIUM SERPL-SCNC: 139 MMOL/L — SIGNIFICANT CHANGE UP (ref 135–146)

## 2024-03-18 PROCEDURE — 99232 SBSQ HOSP IP/OBS MODERATE 35: CPT

## 2024-03-18 PROCEDURE — 71045 X-RAY EXAM CHEST 1 VIEW: CPT | Mod: 26

## 2024-03-18 RX ORDER — POTASSIUM PHOSPHATE, MONOBASIC POTASSIUM PHOSPHATE, DIBASIC 236; 224 MG/ML; MG/ML
30 INJECTION, SOLUTION INTRAVENOUS ONCE
Refills: 0 | Status: COMPLETED | OUTPATIENT
Start: 2024-03-18 | End: 2024-03-18

## 2024-03-18 RX ORDER — MAGNESIUM SULFATE 500 MG/ML
2 VIAL (ML) INJECTION ONCE
Refills: 0 | Status: COMPLETED | OUTPATIENT
Start: 2024-03-18 | End: 2024-03-18

## 2024-03-18 RX ORDER — MAGNESIUM OXIDE 400 MG ORAL TABLET 241.3 MG
800 TABLET ORAL ONCE
Refills: 0 | Status: COMPLETED | OUTPATIENT
Start: 2024-03-18 | End: 2024-03-19

## 2024-03-18 RX ADMIN — LIDOCAINE 1 PATCH: 4 CREAM TOPICAL at 00:00

## 2024-03-18 RX ADMIN — Medication 1300 MILLIGRAM(S): at 17:29

## 2024-03-18 RX ADMIN — HEPARIN SODIUM 5000 UNIT(S): 5000 INJECTION INTRAVENOUS; SUBCUTANEOUS at 17:29

## 2024-03-18 RX ADMIN — Medication 1300 MILLIGRAM(S): at 05:22

## 2024-03-18 RX ADMIN — CHLORHEXIDINE GLUCONATE 1 APPLICATION(S): 213 SOLUTION TOPICAL at 05:21

## 2024-03-18 RX ADMIN — Medication 1300 MILLIGRAM(S): at 12:33

## 2024-03-18 RX ADMIN — HEPARIN SODIUM 5000 UNIT(S): 5000 INJECTION INTRAVENOUS; SUBCUTANEOUS at 05:23

## 2024-03-18 RX ADMIN — Medication 25 MILLIGRAM(S): at 05:22

## 2024-03-18 RX ADMIN — Medication 25 MILLIGRAM(S): at 17:29

## 2024-03-18 RX ADMIN — LIDOCAINE 1 PATCH: 4 CREAM TOPICAL at 12:33

## 2024-03-18 RX ADMIN — Medication 1300 MILLIGRAM(S): at 23:16

## 2024-03-18 RX ADMIN — LIDOCAINE 1 PATCH: 4 CREAM TOPICAL at 20:08

## 2024-03-18 NOTE — PROGRESS NOTE ADULT - SUBJECTIVE AND OBJECTIVE BOX
Patient was simulated, but upon reviewing CT simulation images, we noted worsened bilateral pleural effusions, more than prior, with atelectasis in bilateral lung fields.  His radiation field for the mediastinal mass would be quite large and will treat a significant portion of the remaining aerated lung.  This will pose a high risk of pulmonary injury/respiratory distress.  Discussed with hospitalist (3/15) with options for thoracentesis before RT but the patient is refusing most procedures at this point.  His esophagus is patent at this point with the stent.  Will hold off on XRT at this point.

## 2024-03-18 NOTE — PROGRESS NOTE ADULT - ASSESSMENT

## 2024-03-18 NOTE — PROGRESS NOTE ADULT - ASSESSMENT
IMPRESSION:    Suspected MPE - Left  Metastatic poorly differentiated carcinoma of prostatic origin  Enlarging mediastinal mass with central necrosis  Mediastinal lymphadenopathy  Ho prostate cancer s/p radiation      PLAN:    POCUS  Rad-Onc/Heme-Onc evals noted  HOB @ 45 degrees.  Aspiration precautions   DVT prophylaxis. IMPRESSION:    Suspected MPE - Left  Metastatic poorly differentiated carcinoma of prostatic origin  Enlarging mediastinal mass with central necrosis  Mediastinal lymphadenopathy  Ho prostate cancer s/p radiation      PLAN:    POCUS w/ mod-large Rt pleural effusion.  Thoracentesis offered but patient refused.  Rad-Onc/Heme-Onc evals noted  HOB @ 45 degrees.  Aspiration precautions   DVT prophylaxis.

## 2024-03-18 NOTE — PROGRESS NOTE ADULT - ASSESSMENT
#Left Upper Extremity swelling - superficial thrombophlebitis   -duplex performed - no dvt   -warm compress  -elevate the arm     #Left Pleural Effusion  - pulm consulted for thoracentesis - currently not in resp distress   - repeat chest xray in am     #GAS s pyogenes bacteremia - resolved   - repeat BCx neg  - pt was initially treated with cefepime for Bcx until neg  - finished course of IV zosyn     #Odynophagia and dysphagia due to prostate cancer metastasized to mediastinum  #Failure to thrive     - CT Chest: Interval enlargement of the central/posterior mediastinal mass with development of more extensive lymphadenopathy. The mass encircles the descending thoracic aorta and has some mass effect upon the left atrium. The mid aspect of the esophagus is encircled by the mass and difficult to distinguish. Possible ovoid pill is noted within the mass and possible location of the mid esophagus appear. The maximal esophagus appears distended with layering debris within. Consideration may be given to esophageal stenting.  - CT abdomen and pelvis:  Increased upper abdominal bulky lymphadenopathy. Unchanged bulky retroperitoneal lymphadenopathy, large left bladder mass and perirectal soft tissue infiltrative mass. 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 Chronic moderate to severe left hydroureteronephrosis  - EGD with GI 2/21 -> esophageal stent placed  - CT surgery following -> no acute intervention   - AFP and CEA wnl - LDH and haptoglobin elevated - PSA total 220, PSA free is 22 - CA19 131-  57  - Esophageal biopsy:  metastatic poorly differentiated carcinoma of prostatic origin  - per onc note, patient had prior biopsy of site that demonstrated metastatic prostate adenocarcinoma and was started on Docetaxel, in January 2024 patient was started on Pembrolizumab and continued on keytruda injections.   - EGD replaced stent and PEG placed 3/5/24 - patient is declining tube feeds - encourage oral intake - swallow re eval appreciated - advance diet as tolerated - started calorie count for documentation purpose (appears that patient is having 70% of his meal but did report diarrhea this am after eating meals yest)  - Oncology follow up today - on immunotherapy Xtandi  - discussed with Rad ONC 3/15 - currently not a candidate for RT to chest - with pleural effusion, not enough lung capacity and may end up on ventilator - PULM consulted - patient declined thoracentesis  - overall prognosis extremely poor - full code - Palliative care and Ethics committee following the case     DISPO: will be NH dispo - currently not discharge ready  -spoke to patient about his plan of care     Attending Physician Dr. Erinn Leslie # [id number].

## 2024-03-18 NOTE — PROGRESS NOTE ADULT - ASSESSMENT
metastatic prostate cancer  progressed   on treatmem  meed palliative radiation and 2nd line chemo but pt has no capacity   prognosis poor     discussed with hospatilist today   as per hospatilist pt has capacity   i discussed with pt today he just says yes for everything ,chemo yes ,radiation yes ,without asking any questions   asked if you have any questions he says no     pt progressed after docetaxol ,nubeqa [ and lupron ,had initially good response but now  progressed     will suggest start xtandi  by mouth 160 mg daily    also need palliative radiation to medistinal mass     spoke to pharmacist ,xtandi will be ordered   palliative radiation will be started as per radiation note   pt seen today   as per rad onc note   pt refsing thoracocentesis as needed for them to start radiation  xtandi was started                will follow with you       kirit irving MD

## 2024-03-18 NOTE — PROGRESS NOTE ADULT - SUBJECTIVE AND OBJECTIVE BOX
Patient is a 68y old  Male who presents with a chief complaint of Mediastinal Mass (18 Mar 2024 08:19)        SUBJECTIVE:      REVIEW OF SYSTEMS:    All Pertinent ROS are negative except per HPI       PHYSICAL EXAM  Vital Signs Last 24 Hrs  T(C): 36.8 (18 Mar 2024 04:55), Max: 37.3 (17 Mar 2024 20:03)  T(F): 98.2 (18 Mar 2024 04:55), Max: 99.1 (17 Mar 2024 20:03)  HR: 96 (18 Mar 2024 04:55) (94 - 98)  BP: 150/98 (18 Mar 2024 04:55) (146/94 - 152/95)  BP(mean): 120 (18 Mar 2024 04:55) (114 - 120)  RR: 16 (18 Mar 2024 04:55) (16 - 18)  SpO2: --        CONSTITUTIONAL:  NAD    ENT:   Airway patent,   No thrush    CARDIAC:   Normal rate,   regular rhythm.    no edema      RESPIRATORY:   No Wheezing  Normal chest expansion  Not tachypneic,  No use of accessory muscles    GASTROINTESTINAL:  Abdomen soft,   non-tender,   no guarding,   + BS    MUSCULOSKELETAL:   range of motion is not limited,  no clubbing, cyanosis    NEUROLOGICAL:   Alert and oriented   no motor or deficits.    SKIN:   Skin normal color for race,   warm, dry   No evidence of rash.      03-17-24 @ 07:01  -  03-18-24 @ 07:00  --------------------------------------------------------  IN:  Total IN: 0 mL    OUT:    Jevity 1.2: 0 mL  Total OUT: 0 mL    Total NET: 0 mL          LABS:                                                                                                                                                                                                                                MEDICATIONS  (STANDING):  chlorhexidine 2% Cloths 1 Application(s) Topical <User Schedule>  dextrose 5% + sodium chloride 0.9%. 1000 milliLiter(s) (75 mL/Hr) IV Continuous <Continuous>  enzalutamide 80 mg tab 160 milliGRAM(s) 160 milliGRAM(s) Oral <User Schedule>  heparin   Injectable 5000 Unit(s) SubCutaneous every 12 hours  iohexol 300 mG (iodine)/mL Oral Solution 30 milliLiter(s) Oral once  lidocaine   4% Patch 1 Patch Transdermal every 24 hours  metoprolol tartrate 25 milliGRAM(s) Enteral Tube two times a day  ondansetron Injectable 4 milliGRAM(s) IV Push once  sodium bicarbonate 1300 milliGRAM(s) Oral every 6 hours    MEDICATIONS  (PRN):          X-Rays reviewed    CXR interpreted by me: Patient is a 68y old  Male who presents with a chief complaint of Mediastinal Mass (18 Mar 2024 08:19)        SUBJECTIVE: ASx      REVIEW OF SYSTEMS:    All Pertinent ROS are negative except per HPI       PHYSICAL EXAM  Vital Signs Last 24 Hrs  T(C): 36.8 (18 Mar 2024 04:55), Max: 37.3 (17 Mar 2024 20:03)  T(F): 98.2 (18 Mar 2024 04:55), Max: 99.1 (17 Mar 2024 20:03)  HR: 96 (18 Mar 2024 04:55) (94 - 98)  BP: 150/98 (18 Mar 2024 04:55) (146/94 - 152/95)  BP(mean): 120 (18 Mar 2024 04:55) (114 - 120)  RR: 16 (18 Mar 2024 04:55) (16 - 18)  SpO2: --        CONSTITUTIONAL:  NAD    ENT:   Airway patent,   No thrush    CARDIAC:   Normal rate,   regular rhythm.    no edema    RESPIRATORY:   Reduced BS left  Normal chest expansion  Not tachypneic,  No use of accessory muscles    GASTROINTESTINAL:  Abdomen soft,   non-tender,    MUSCULOSKELETAL:   range of motion is not limited,  no clubbing, cyanosis    NEUROLOGICAL:   Alert    SKIN:   Skin normal color for race      03-17-24 @ 07:01  -  03-18-24 @ 07:00  --------------------------------------------------------  IN:  Total IN: 0 mL    OUT:    Jevity 1.2: 0 mL  Total OUT: 0 mL    Total NET: 0 mL          LABS:                                                                                                                                                                                                                        MEDICATIONS  (STANDING):  chlorhexidine 2% Cloths 1 Application(s) Topical <User Schedule>  dextrose 5% + sodium chloride 0.9%. 1000 milliLiter(s) (75 mL/Hr) IV Continuous <Continuous>  enzalutamide 80 mg tab 160 milliGRAM(s) 160 milliGRAM(s) Oral <User Schedule>  heparin   Injectable 5000 Unit(s) SubCutaneous every 12 hours  iohexol 300 mG (iodine)/mL Oral Solution 30 milliLiter(s) Oral once  lidocaine   4% Patch 1 Patch Transdermal every 24 hours  metoprolol tartrate 25 milliGRAM(s) Enteral Tube two times a day  ondansetron Injectable 4 milliGRAM(s) IV Push once  sodium bicarbonate 1300 milliGRAM(s) Oral every 6 hours    MEDICATIONS  (PRN): Patient is a 68y old  Male who presents with a chief complaint of Mediastinal Mass (18 Mar 2024 08:19)        SUBJECTIVE:  denies complaints called to evaluate for pleural effusion      PHYSICAL EXAM  Vital Signs Last 24 Hrs  T(C): 36.8 (18 Mar 2024 04:55), Max: 37.3 (17 Mar 2024 20:03)  T(F): 98.2 (18 Mar 2024 04:55), Max: 99.1 (17 Mar 2024 20:03)  HR: 96 (18 Mar 2024 04:55) (94 - 98)  BP: 150/98 (18 Mar 2024 04:55) (146/94 - 152/95)  BP(mean): 120 (18 Mar 2024 04:55) (114 - 120)  RR: 16 (18 Mar 2024 04:55) (16 - 18)  SpO2: 95%        CONSTITUTIONAL:  ill looking    ENT:   Airway patent,   No thrush    CARDIAC:   GIANNA 2.6    RESPIRATORY:   Reduced BS left  Normal chest expansion  Not tachypneic,  No use of accessory muscles    GASTROINTESTINAL:  Abdomen soft,   non-tender,    MUSCULOSKELETAL:   range of motion is not limited,  no clubbing, cyanosis    NEUROLOGICAL:   Alert        03-17-24 @ 07:01  -  03-18-24 @ 07:00  --------------------------------------------------------  IN:  Total IN: 0 mL    OUT:    Jevity 1.2: 0 mL  Total OUT: 0 mL    Total NET: 0 mL          LABS:                                                                                                                                                                                                                        MEDICATIONS  (STANDING):  chlorhexidine 2% Cloths 1 Application(s) Topical <User Schedule>  dextrose 5% + sodium chloride 0.9%. 1000 milliLiter(s) (75 mL/Hr) IV Continuous <Continuous>  enzalutamide 80 mg tab 160 milliGRAM(s) 160 milliGRAM(s) Oral <User Schedule>  heparin   Injectable 5000 Unit(s) SubCutaneous every 12 hours  iohexol 300 mG (iodine)/mL Oral Solution 30 milliLiter(s) Oral once  lidocaine   4% Patch 1 Patch Transdermal every 24 hours  metoprolol tartrate 25 milliGRAM(s) Enteral Tube two times a day  ondansetron Injectable 4 milliGRAM(s) IV Push once  sodium bicarbonate 1300 milliGRAM(s) Oral every 6 hours    MEDICATIONS  (PRN):

## 2024-03-18 NOTE — PROGRESS NOTE ADULT - SUBJECTIVE AND OBJECTIVE BOX
SUBJECTIVE:    Patient is a 68y old Male who presents with a chief complaint of Mediastinal Mass (18 Mar 2024 08:34)    Currently admitted to medicine with the primary diagnosis of Mediastinal mass     Today is hospital day 27d. Patient seen and examined at bedside. No new issues or overnight events.     PAST MEDICAL & SURGICAL HISTORY  HTN (hypertension)    Prostate cancer        ALLERGIES:  No Known Allergies    MEDICATIONS:  STANDING MEDICATIONS  chlorhexidine 2% Cloths 1 Application(s) Topical <User Schedule>  dextrose 5% + sodium chloride 0.9%. 1000 milliLiter(s) IV Continuous <Continuous>  enzalutamide 80 mg tab 160 milliGRAM(s) 160 milliGRAM(s) Oral <User Schedule>  heparin   Injectable 5000 Unit(s) SubCutaneous every 12 hours  iohexol 300 mG (iodine)/mL Oral Solution 30 milliLiter(s) Oral once  lidocaine   4% Patch 1 Patch Transdermal every 24 hours  metoprolol tartrate 25 milliGRAM(s) Enteral Tube two times a day  ondansetron Injectable 4 milliGRAM(s) IV Push once  sodium bicarbonate 1300 milliGRAM(s) Oral every 6 hours    PRN MEDICATIONS    VITALS:   T(F): 98.2  HR: 96  BP: 150/98  RR: 16  SpO2: --    LABS:                        RADIOLOGY:    PHYSICAL EXAM:  GEN: No acute distress  PULM/CHEST: Decreased BS   CVS: Regular rate and rhythm, S1-S2, no murmurs  ABD: Soft, non-tender, non-distended, +BS, PEG in place   EXT: LUE edema  NEURO: AAOx3    Angel Catheter:

## 2024-03-18 NOTE — PROGRESS NOTE ADULT - SUBJECTIVE AND OBJECTIVE BOX
RUFUS PICKARD  68y  Male      Patient is a 68y old  Male who presents with a chief complaint of Mediastinal Mass (16 Mar 2024 09:22)      INTERVAL HPI/OVERNIGHT EVENTS:    pt seen this am   no overnight events notified   -started eating oral diet - chart calorie count - patient declined tube feeds (had diarrhea this am)  -pulm team at bedside and patient declined thoracentesis this am   -rad onc follow up appreciated   -discussed with bedside RN    Vital Signs Last 24 Hrs  T(C): 36.6 (18 Mar 2024 11:24), Max: 37.3 (17 Mar 2024 20:03)  T(F): 97.9 (18 Mar 2024 11:24), Max: 99.1 (17 Mar 2024 20:03)  HR: 81 (18 Mar 2024 11:24) (81 - 98)  BP: 148/86 (18 Mar 2024 11:24) (148/86 - 152/95)  BP(mean): 120 (18 Mar 2024 04:55) (114 - 120)  RR: 18 (18 Mar 2024 11:24) (16 - 18)  SpO2: --        PHYSICAL EXAM:  GENERAL: Not in distress - comfortable in bed   HEAD:  Atraumatic, Normocephalic  EYES: EOMI, PERRLA, conjunctiva and sclera clear  NERVOUS SYSTEM:  Alert & Oriented X1  CHEST/LUNG: decreased BS   CV/HEART: Regular rate and rhythm  GI/ABDOMEN: Soft abdomen + peg tube       LAB:      no new labs - patient keeps declining       RADIOLOGY:    Imaging Personally visualized and Reviewed:  [ y ] YES  [ ] NO    HEALTH ISSUES - PROBLEM Dx:  Pneumonia    Dysphagia    Mediastinal mass    Palliative care by specialist    DIMAS (acute kidney injury)    Encounter for assessment of decision-making capacity      MEDICATIONS  (STANDING):  chlorhexidine 2% Cloths 1 Application(s) Topical <User Schedule>  dextrose 5% + sodium chloride 0.9%. 1000 milliLiter(s) (75 mL/Hr) IV Continuous <Continuous>  enzalutamide 80 mg tab 160 milliGRAM(s) 160 milliGRAM(s) Oral <User Schedule>  heparin   Injectable 5000 Unit(s) SubCutaneous every 12 hours  iohexol 300 mG (iodine)/mL Oral Solution 30 milliLiter(s) Oral once  lidocaine   4% Patch 1 Patch Transdermal every 24 hours  metoprolol tartrate 25 milliGRAM(s) Enteral Tube two times a day  ondansetron Injectable 4 milliGRAM(s) IV Push once  sodium bicarbonate 1300 milliGRAM(s) Oral every 6 hours

## 2024-03-19 PROCEDURE — 99233 SBSQ HOSP IP/OBS HIGH 50: CPT

## 2024-03-19 RX ORDER — MAGNESIUM SULFATE 500 MG/ML
2 VIAL (ML) INJECTION ONCE
Refills: 0 | Status: COMPLETED | OUTPATIENT
Start: 2024-03-19 | End: 2024-03-19

## 2024-03-19 RX ORDER — CALCIUM GLUCONATE 100 MG/ML
1 VIAL (ML) INTRAVENOUS ONCE
Refills: 0 | Status: COMPLETED | OUTPATIENT
Start: 2024-03-19 | End: 2024-03-19

## 2024-03-19 RX ADMIN — Medication 1300 MILLIGRAM(S): at 12:38

## 2024-03-19 RX ADMIN — HEPARIN SODIUM 5000 UNIT(S): 5000 INJECTION INTRAVENOUS; SUBCUTANEOUS at 17:16

## 2024-03-19 RX ADMIN — Medication 85 MILLIMOLE(S): at 17:16

## 2024-03-19 RX ADMIN — Medication 25 MILLIGRAM(S): at 17:16

## 2024-03-19 RX ADMIN — CHLORHEXIDINE GLUCONATE 1 APPLICATION(S): 213 SOLUTION TOPICAL at 05:11

## 2024-03-19 RX ADMIN — SODIUM CHLORIDE 75 MILLILITER(S): 9 INJECTION, SOLUTION INTRAVENOUS at 13:18

## 2024-03-19 RX ADMIN — HEPARIN SODIUM 5000 UNIT(S): 5000 INJECTION INTRAVENOUS; SUBCUTANEOUS at 05:12

## 2024-03-19 RX ADMIN — LIDOCAINE 1 PATCH: 4 CREAM TOPICAL at 12:38

## 2024-03-19 RX ADMIN — Medication 1300 MILLIGRAM(S): at 17:16

## 2024-03-19 RX ADMIN — Medication 100 GRAM(S): at 16:23

## 2024-03-19 RX ADMIN — Medication 25 MILLIGRAM(S): at 05:12

## 2024-03-19 RX ADMIN — Medication 1300 MILLIGRAM(S): at 05:11

## 2024-03-19 RX ADMIN — LIDOCAINE 1 PATCH: 4 CREAM TOPICAL at 21:22

## 2024-03-19 RX ADMIN — MAGNESIUM OXIDE 400 MG ORAL TABLET 800 MILLIGRAM(S): 241.3 TABLET ORAL at 12:38

## 2024-03-19 RX ADMIN — Medication 25 GRAM(S): at 15:23

## 2024-03-19 NOTE — PROGRESS NOTE ADULT - SUBJECTIVE AND OBJECTIVE BOX
SUBJECTIVE:    Patient is a 68y old Male who presents with a chief complaint of Mediastinal Mass (19 Mar 2024 11:41)    Currently admitted to medicine with the primary diagnosis of Mediastinal mass     Today is hospital day 28d. Patient seen and examined at bedside. Refusing peg feeds and refusing thora. Reports no new issues or overnight events.     PAST MEDICAL & SURGICAL HISTORY  HTN (hypertension)    Prostate cancer        ALLERGIES:  No Known Allergies    MEDICATIONS:  STANDING MEDICATIONS  chlorhexidine 2% Cloths 1 Application(s) Topical <User Schedule>  dextrose 5% + sodium chloride 0.9%. 1000 milliLiter(s) IV Continuous <Continuous>  enzalutamide 80 mg tab 160 milliGRAM(s) 160 milliGRAM(s) Oral <User Schedule>  heparin   Injectable 5000 Unit(s) SubCutaneous every 12 hours  iohexol 300 mG (iodine)/mL Oral Solution 30 milliLiter(s) Oral once  lidocaine   4% Patch 1 Patch Transdermal every 24 hours  metoprolol tartrate 25 milliGRAM(s) Enteral Tube two times a day  ondansetron Injectable 4 milliGRAM(s) IV Push once  sodium bicarbonate 1300 milliGRAM(s) Oral every 6 hours    PRN MEDICATIONS    VITALS:   T(F): 98  HR: 85  BP: 144/77  RR: 18  SpO2: --    LABS:    03-18    139  |  110  |  8<L>  ----------------------------<  85  4.6   |  18  |  0.9    Ca    6.5<L>      18 Mar 2024 16:04  Phos  1.6     03-18  Mg     1.6     03-18    TPro  5.8<L>  /  Alb  2.8<L>  /  TBili  0.4  /  DBili  x   /  AST  47<H>  /  ALT  29  /  AlkPhos  68  03-18      Urinalysis Basic - ( 18 Mar 2024 16:04 )    Color: x / Appearance: x / SG: x / pH: x  Gluc: 85 mg/dL / Ketone: x  / Bili: x / Urobili: x   Blood: x / Protein: x / Nitrite: x   Leuk Esterase: x / RBC: x / WBC x   Sq Epi: x / Non Sq Epi: x / Bacteria: x                RADIOLOGY:    PHYSICAL EXAM:  GEN: No acute distress  PULM/CHEST: Clear to auscultation bilaterally, no rales, rhonchi or wheezes   CVS: Decreased b/l BS  ABD: Soft, non-tender, non-distended, +BS  EXT: No edema  NEURO: AAOx3    Angel Catheter:

## 2024-03-19 NOTE — PROGRESS NOTE ADULT - ASSESSMENT
#Left Upper Extremity swelling - superficial thrombophlebitis   -duplex performed - no dvt   -warm compress  -elevate the arm     #Left Pleural Effusion  - pulm consulted for thoracentesis - currently not in resp distress   - repeat chest xray in am     #GAS s pyogenes bacteremia - resolved   - repeat BCx neg  - pt was initially treated with cefepime for Bcx until neg  - finished course of IV zosyn     #Odynophagia and dysphagia due to prostate cancer metastasized to mediastinum  #Failure to thrive   # Hypophosphatemia / Hypocalcemia - replete   # suspected Mg2+ def - replete     - CT Chest: Interval enlargement of the central/posterior mediastinal mass with development of more extensive lymphadenopathy. The mass encircles the descending thoracic aorta and has some mass effect upon the left atrium. The mid aspect of the esophagus is encircled by the mass and difficult to distinguish. Possible ovoid pill is noted within the mass and possible location of the mid esophagus appear. The maximal esophagus appears distended with layering debris within. Consideration may be given to esophageal stenting.  - CT abdomen and pelvis:  Increased upper abdominal bulky lymphadenopathy. Unchanged bulky retroperitoneal lymphadenopathy, large left bladder mass and perirectal soft tissue infiltrative mass. 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 Chronic moderate to severe left hydroureteronephrosis  - EGD with GI 2/21 -> esophageal stent placed  - CT surgery following -> no acute intervention   - AFP and CEA wnl - LDH and haptoglobin elevated - PSA total 220, PSA free is 22 - CA19 131-  57  - Esophageal biopsy:  metastatic poorly differentiated carcinoma of prostatic origin  - per onc note, patient had prior biopsy of site that demonstrated metastatic prostate adenocarcinoma and was started on Docetaxel, in January 2024 patient was started on Pembrolizumab and continued on keytruda injections.   - EGD replaced stent and PEG placed 3/5/24 - patient is declining tube feeds - encourage oral intake - swallow re eval appreciated - advance diet as tolerated - started calorie count for documentation purpose (appears that patient is having 70% of his meal but did report diarrhea yesterday and this am after eating meals)  - Oncology follow up noted from last night - on immunotherapy Xtandi  - discussed with Rad ONC 3/15 - currently not a candidate for RT to chest - with pleural effusion, not enough lung capacity and may end up on ventilator - PULM consulted - patient declined thoracentesis  - overall prognosis extremely poor - full code - Palliative care and Ethics committee following the case     DISPO: NH dispo - currently not discharge ready - difficult case with d/c planning as patient with minimal oral intake, declining most treatment options; agreed to labs yesterday but refuses most of the time; needs IV access, declined thoracentesis and will be high risk for intubation/ resp distress if continues to decline - remains FULL CODE - case discussed with Ethics committee and Dr. Werner from Palliative care team   -spoke to patient about his plan of care   -discussed with case management in rounds     Attending Physician Dr. Erinn Leslie # [id number].

## 2024-03-19 NOTE — PROGRESS NOTE ADULT - SUBJECTIVE AND OBJECTIVE BOX
RUFUS PICKARD  68y  Male      Patient is a 68y old  Male who presents with a chief complaint of Mediastinal Mass (16 Mar 2024 09:22)      INTERVAL HPI/OVERNIGHT EVENTS:    pt seen this am   no overnight events notified   -started eating oral diet - chart calorie count - patient declined tube feeds   -with diarrhea   -pulm consult appreciated - patient declined thoracentesis   -rad onc follow up appreciated - no RTs for now   -Palliative care team and Ethics team on board   -discussed with bedside RN    Vital Signs Last 24 Hrs  T(C): 36.7 (19 Mar 2024 05:00), Max: 36.7 (19 Mar 2024 05:00)  T(F): 98 (19 Mar 2024 05:00), Max: 98 (19 Mar 2024 05:00)  HR: 85 (19 Mar 2024 05:00) (85 - 87)  BP: 144/77 (19 Mar 2024 05:00) (144/77 - 160/100)  RR: 18 (19 Mar 2024 05:00) (18 - 18)    PHYSICAL EXAM:  GENERAL: Not in distress - comfortable in bed   HEAD:  Atraumatic, Normocephalic  EYES: EOMI, PERRLA, conjunctiva and sclera clear  NERVOUS SYSTEM:  Alert & Oriented X1  CHEST/LUNG: decreased BS   CV/HEART: Regular rate and rhythm  GI/ABDOMEN: Soft abdomen + peg tube       LAB:    03-18    139  |  110  |  8<L>  ----------------------------<  85  4.6   |  18  |  0.9    Ca    6.5<L>      18 Mar 2024 16:04  Phos  1.6     03-18  Mg     1.6     03-18    TPro  5.8<L>  /  Alb  2.8<L>  /  TBili  0.4  /  DBili  x   /  AST  47<H>  /  ALT  29  /  AlkPhos  68  03-18    RADIOLOGY:    Imaging Personally visualized and Reviewed:  [ y ] YES  [ ] NO    HEALTH ISSUES - PROBLEM Dx:  Pneumonia    Dysphagia    Mediastinal mass    Palliative care by specialist    DIMAS (acute kidney injury)    Encounter for assessment of decision-making capacity      MEDICATIONS  (STANDING):  chlorhexidine 2% Cloths 1 Application(s) Topical <User Schedule>  dextrose 5% + sodium chloride 0.9%. 1000 milliLiter(s) (75 mL/Hr) IV Continuous <Continuous>  enzalutamide 80 mg tab 160 milliGRAM(s) 160 milliGRAM(s) Oral <User Schedule>  heparin   Injectable 5000 Unit(s) SubCutaneous every 12 hours  iohexol 300 mG (iodine)/mL Oral Solution 30 milliLiter(s) Oral once  lidocaine   4% Patch 1 Patch Transdermal every 24 hours  metoprolol tartrate 25 milliGRAM(s) Enteral Tube two times a day  ondansetron Injectable 4 milliGRAM(s) IV Push once  sodium bicarbonate 1300 milliGRAM(s) Oral every 6 hours

## 2024-03-19 NOTE — PROGRESS NOTE ADULT - ASSESSMENT
68-year-old male with past medical history of hypertension, chronic back pain, prostate cancer, and posterior mediastinal mass 6.5 cm seen on CT 11/2023, presents for 3 months of progressively difficulty swallowing associated with 10 pounds weight loss and odynophagia. As per patient he was treated at [facility] for prostate cancer and is s/p radiation therapy.     #GAS s pyogenes bacteremia   - SIRS positive (WBC increased to 16, febrile to 101 and tachy to 129 )   - CXR with worsening L-sided opacity   - Blood cx (2/24/24): positive for strep pyogenes  - repeat BCx 27,28,29,1,2  negative to date   - procal 7  - TTE: EF 65 to 70%.  - finished course of zosyn    # Dysphagia  - patient cleared for pureed diet and thin liquids per SS -refusing food as patient reports inability to swallow    - Peg placed 3/5/24 and feeds started 3/6/24 -> pt having diarrhea after feeds  - patient has been refusing PEG feeds out of concern he will have diarrhea again. Assured patient that diarrhea if it occurs can be managed. Encouraging patient to take feeds.    #Left pleural effusion  - Pulm consult for thoracentesis but pt still refusing  - Repeat xray chest   - On RA  - May need radiation after tap    #Hx of prostate cancer s/p radiation therapy  #Prostate cancer metastasis to mediastinum or primary mediastinal mass?  #Failure to thrive   - CT Chest: Interval enlargement of the central/posterior mediastinal mass with development of more extensive lymphadenopathy. The mass encircles the descending thoracic aorta and has some mass effect upon the left atrium. The mid aspect of the esophagus is encircled by the mass and difficult to distinguish. Possible ovoid pill is noted within the mass and possible location of the mid esophagus appear. The maximal esophagus appears distended with layering debris within. Consideration may be given to esophageal stenting.  - CT abdomen and pelvis:  Increased upper abdominal bulky lymphadenopathy. Unchanged bulky retroperitoneal lymphadenopathy, large left bladder mass and perirectal soft tissue infiltrative mass. 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 Chronic moderate to severe left hydroureteronephrosis  - EGD with GI 2/21 -> esophageal stent placed  - CT surgery following -> no acute intervention   - AFP and CEA wnl - LDH and haptoglobin elevated - PSA total 220, PSA free is 22 - CA19 131-  57  - Esophageal biopsy:  metastatic poorly differentiated carcinoma of prostatic origin.  - per onc note, patient had prior biopsy of site that demonstrated metastatic prostate adenocarcinoma and was started on Docetaxel, in January 2024 patient was started on Pembrolizumab and continued on keytruda injections.   - Dr. Irizarry (pt's oncologist): cs rad/onco for palliative radiation, onco asked about new protocol since patient progressed , rec hospice and palliative   - EGD replaced stent and PEG placed 3/5/24   - Reevaluated by heme/onc, will start on xtandi 160 mg daily. Pharmacy has to order med as it is non formulary; script sent to [facility]; will dispense med once available. Heme onc also recommending palliative radiation to mediastinal mass. Also reevaluated by Rad Onc for palliative RT -> CT sim for mapping and planning, followed by XRT.  - Rad/onc following for palliative RT -> CT sim for mapping and planning, followed by XRT.  May not be able to undergo RT given how little lung is left without intervention. Pending thora  -lacks capacity- psych, ethics and palliative following:     - pt refusing DNR/DNI status based on [employer] beliefs, ethics agreed 2P consent would not be appropriate to make him DNR/DNI    - Refusing morning labs, encouraged pt to have labs drawn. Patient has been refusing PEG feeds for past couple days, despite encouragement and assurance of aiding patient if diarrhea reoccurs. Ethics committee encouraging patient to take PEG feeds. Upgraded PO diet to encourage nutrition.    #chronic clavicular fracture  Xray Shoulder 2 Views, Left: Subacute/chronic appearing distal clavicular fracture with approximately 1.7 cm of inferior displacement of the distal fragment. No acute fracture or dislocation. Left-sided opacities/effusion. Esophageal stent with surgical clips    #CHUCKIE vs ATN induced DIMAS  - pt came in with a baseline of 1.5 and increased to 2.6 with contrast use; improved to 1.3 but now unable to take po and refusing IVF  - No recent labs  - renally dose medications  -US Kidney and Bladder reviewed  -nephro recs appreciated  -C3 C4 levels normal  -on NaHCO3 1300mg TID   -no indications for RRT    #normocytic Anemia  - iron  studies noted % sat 27   - MCV normal, SPEP negative   -pt has been refusing lab draws. Last Hb on March 11 showed Hb stable at 8.6    #DVT PPx- heparin   #GI PPx- none   #Diet-  peg feeds + supplement liquid diet   #Activity- AAT    Dispo:  PT consult recommended Home PT;  Would be unable to return to [facility]    Pending: Pulm cslt for thora but pt refusing, rad/onc for palliative radiation, hem/onc for treatement, encourage peg feeds, lab draws, and meds

## 2024-03-20 PROCEDURE — 71045 X-RAY EXAM CHEST 1 VIEW: CPT | Mod: 26

## 2024-03-20 PROCEDURE — 99232 SBSQ HOSP IP/OBS MODERATE 35: CPT

## 2024-03-20 RX ADMIN — Medication 25 MILLIGRAM(S): at 08:33

## 2024-03-20 RX ADMIN — Medication 1300 MILLIGRAM(S): at 08:34

## 2024-03-20 RX ADMIN — Medication 1300 MILLIGRAM(S): at 23:44

## 2024-03-20 RX ADMIN — HEPARIN SODIUM 5000 UNIT(S): 5000 INJECTION INTRAVENOUS; SUBCUTANEOUS at 17:42

## 2024-03-20 RX ADMIN — LIDOCAINE 1 PATCH: 4 CREAM TOPICAL at 12:47

## 2024-03-20 RX ADMIN — SODIUM CHLORIDE 75 MILLILITER(S): 9 INJECTION, SOLUTION INTRAVENOUS at 08:28

## 2024-03-20 RX ADMIN — Medication 1300 MILLIGRAM(S): at 17:28

## 2024-03-20 RX ADMIN — SODIUM CHLORIDE 75 MILLILITER(S): 9 INJECTION, SOLUTION INTRAVENOUS at 17:25

## 2024-03-20 RX ADMIN — Medication 25 MILLIGRAM(S): at 17:28

## 2024-03-20 RX ADMIN — HEPARIN SODIUM 5000 UNIT(S): 5000 INJECTION INTRAVENOUS; SUBCUTANEOUS at 08:33

## 2024-03-20 RX ADMIN — SODIUM CHLORIDE 75 MILLILITER(S): 9 INJECTION, SOLUTION INTRAVENOUS at 19:59

## 2024-03-20 RX ADMIN — LIDOCAINE 1 PATCH: 4 CREAM TOPICAL at 00:37

## 2024-03-20 RX ADMIN — Medication 1300 MILLIGRAM(S): at 12:48

## 2024-03-20 RX ADMIN — SODIUM CHLORIDE 75 MILLILITER(S): 9 INJECTION, SOLUTION INTRAVENOUS at 12:48

## 2024-03-20 NOTE — PROGRESS NOTE ADULT - ASSESSMENT
metastatic prostate cancer  progressed   on treatmem  meed palliative radiation and 2nd line chemo but pt has no capacity   prognosis poor       as per hospatilist pt has capacity   i discussed with pt today he just says yes for everything ,chemo yes ,radiation yes ,without asking any questions   asked if you have any questions he says no     pt progressed after docetaxol ,nubeqa [ and lupron ,had initially good response but now  progressed     will suggest start xtandi  by mouth 160 mg daily    also need palliative radiation to medistinal mass     spoke to pharmacist ,xtandi will be ord  pt seen today comfortable no complaints   rt not doing any radiation     as per rad onc note   pt refsing thoracocentesis as needed for them to start radiation  xtandi was started      apparently there is no other plan for pt as inpt   pt refusing all procedures                         kirit irving MD

## 2024-03-20 NOTE — PROGRESS NOTE ADULT - ASSESSMENT

## 2024-03-20 NOTE — PROGRESS NOTE ADULT - ATTENDING COMMENTS
Pt seen in the am. Refusing tube feeds and meds. Does not want to discuss. Will need to consult ethics/risk as he does not want a thoracocentesis. CXR shows lt sided pleural effusion. Repeat cxr. On room air. Will plan for STR if no intervention.

## 2024-03-20 NOTE — PROGRESS NOTE ADULT - SUBJECTIVE AND OBJECTIVE BOX
SUBJECTIVE:    Patient is a 68y old Male who presents with a chief complaint of Mediastinal Mass (19 Mar 2024 16:05)    Currently admitted to medicine with the primary diagnosis of Mediastinal mass    Today is hospital day 29d. Patient seen and examined at bedside. Refused tube feeds overnight. Reports no new issues or overnight events.     PAST MEDICAL & SURGICAL HISTORY  HTN (hypertension)    Prostate cancer        ALLERGIES:  No Known Allergies    MEDICATIONS:  STANDING MEDICATIONS  chlorhexidine 2% Cloths 1 Application(s) Topical <User Schedule>  dextrose 5% + sodium chloride 0.9%. 1000 milliLiter(s) IV Continuous <Continuous>  enzalutamide 80 mg tab 160 milliGRAM(s) 160 milliGRAM(s) Oral <User Schedule>  heparin   Injectable 5000 Unit(s) SubCutaneous every 12 hours  iohexol 300 mG (iodine)/mL Oral Solution 30 milliLiter(s) Oral once  lidocaine   4% Patch 1 Patch Transdermal every 24 hours  metoprolol tartrate 25 milliGRAM(s) Enteral Tube two times a day  ondansetron Injectable 4 milliGRAM(s) IV Push once  sodium bicarbonate 1300 milliGRAM(s) Oral every 6 hours    PRN MEDICATIONS    VITALS:   T(F): 97.7  HR: 86  BP: 122/76  RR: 18  SpO2: --    LABS:    03-18    139  |  110  |  8<L>  ----------------------------<  85  4.6   |  18  |  0.9    Ca    6.5<L>      18 Mar 2024 16:04  Phos  1.6     03-18  Mg     1.6     03-18    TPro  5.8<L>  /  Alb  2.8<L>  /  TBili  0.4  /  DBili  x   /  AST  47<H>  /  ALT  29  /  AlkPhos  68  03-18      Urinalysis Basic - ( 18 Mar 2024 16:04 )    Color: x / Appearance: x / SG: x / pH: x  Gluc: 85 mg/dL / Ketone: x  / Bili: x / Urobili: x   Blood: x / Protein: x / Nitrite: x   Leuk Esterase: x / RBC: x / WBC x   Sq Epi: x / Non Sq Epi: x / Bacteria: x                RADIOLOGY:    PHYSICAL EXAM:  GEN: No acute distress  PULM/CHEST: Clear to auscultation bilaterally, no rales, rhonchi or wheezes   CVS: Regular rate and rhythm, S1-S2, no murmurs  ABD: Soft, non-tender, non-distended, +BS  EXT: No edema  NEURO: AAOx3    Angel Catheter:

## 2024-03-20 NOTE — CHART NOTE - NSCHARTNOTEFT_GEN_A_CORE
GI NUTRITION SUPPORT TEAM  -  PROGRESS NOTE     Interval Events:        REVIEW OF SYSTEMS:  Negative except as noted above.     VITALS:  T(F): 97.7 (03-20 @ 05:08), Max: 97.7 (03-20 @ 05:08)  HR: 86 (03-20 @ 05:08) (86 - 86)  BP: 122/76 (03-20 @ 05:08) (122/76 - 122/76)  RR: 18 (03-20 @ 05:08) (18 - 18)  SpO2: --    HEIGHT/WEIGHT/BMI:   Height (cm): 185.4 (03-05), 185.4 (09-15)  Weight (kg): 77.1 (03-05), 77.1 (11-22), 78.5 (09-15)  BMI (kg/m2): 22.4 (03-05), 22.4 (11-22), 22.8 (09-15)    I/Os:     MEDICATIONS:   chlorhexidine 2% Cloths 1 Application(s) Topical <User Schedule>  dextrose 5% + sodium chloride 0.9%. 1000 milliLiter(s) (75 mL/Hr) IV Continuous <Continuous>  enzalutamide 80 mg tab 160 milliGRAM(s) 160 milliGRAM(s) Oral <User Schedule>  heparin   Injectable 5000 Unit(s) SubCutaneous every 12 hours  iohexol 300 mG (iodine)/mL Oral Solution 30 milliLiter(s) Oral once  lidocaine   4% Patch 1 Patch Transdermal every 24 hours  metoprolol tartrate 25 milliGRAM(s) Enteral Tube two times a day  ondansetron Injectable 4 milliGRAM(s) IV Push once  sodium bicarbonate 1300 milliGRAM(s) Oral every 6 hours    LABS:     139  |  110  |  8<L>  ----------------------------<  85          (03-18-24 @ 16:04)  4.6   |  18  |  0.9    Ca    6.5<L>          (03-18-24 @ 16:04)  Phos  1.6         (03-18-24 @ 16:04)  Mg     1.6         (03-18-24 @ 16:04)    TPro  5.8<L>  /  Alb  2.8<L>  /  TBili  0.4  /  DBili  x   /  AST  47<H>  /  ALT  29  /  AlkPhos  68       03-18-24 @ 16:04      DIET:   Diet, Soft and Bite Sized:   Tube Feeding Modality: Gastrostomy  Jevity 1.2 Sam (JEVITY1.2RTH)  Total Volume for 24 Hours (mL): 1440  Bolus  Total Volume of Bolus (mL):  360  Total # of Feeds: 4  Tube Feed Frequency: Every 6 hours   Tube Feed Start Time: 12:00  Bolus Feed Rate (mL per Hour): 360   Bolus Feed Duration (in Hours): 1  Banatrol TF     Qty per Day:  3 (03-20-24 @ 10:11) [Active]      ASSESSMENT  68-year-old male with pmh of hypertension, chronic back pain, prostate cancer s/p RT, and posterior mediastinal mass 6.5 cm seen on CT 11/2023, presents for 3 months of progressively difficulty swallowing associated with 10 pounds weight loss and odynophagia. Presented with c/o being unable to swallow his pills with water, resulting in 2 episode of emesis. Nutrition called to evaluate for PN.    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  - prostate cancer s/p RT   - odynophagia with associated wt loss (amount unclear)   - anemia, hx VERONICA   - hyperkalemia  - severe protein calorie malnutrition  - refeeding syndrome, hypophosphatemia    Est nutrient needs: 4085-6660 kcals (25-30kcals/kg, 77.1kg), 92-108g protein (1.2-1.4g/kg)  Fluids: 2100ml (25ml/kg)      PLAN  - goal peg feeds 360ml Jevity 1.2 x 4    - flush peg with 50ml H2O before and 50ml H2O after each peg feed  - monitor bm's   - weekly weights GI NUTRITION SUPPORT TEAM  -  PROGRESS NOTE     Interval Events:    Appears comfortable and offers no complaints  Refusing all peg feeds and c/o diarrhea after receiving feeds. Per nursing staff he refused feeds again yesterday and thus far today   d/w pt and suggested other formulas, slowing down formula, cutting volume and using banatrol/loperamide but he is adamantly refusing to allow peg to be used for feedings    REVIEW OF SYSTEMS:  Negative except as noted above.     VITALS:  T(F): 97.7 (03-20 @ 05:08), Max: 97.7 (03-20 @ 05:08)  HR: 86 (03-20 @ 05:08) (86 - 86)  BP: 122/76 (03-20 @ 05:08) (122/76 - 122/76)  RR: 18 (03-20 @ 05:08) (18 - 18)  SpO2: --    HEIGHT/WEIGHT/BMI:   Height (cm): 185.4 (03-05), 185.4 (09-15)  Weight (kg): 77.1 (03-05), 77.1 (11-22), 78.5 (09-15)  BMI (kg/m2): 22.4 (03-05), 22.4 (11-22), 22.8 (09-15)    I/Os:     MEDICATIONS:   chlorhexidine 2% Cloths 1 Application(s) Topical <User Schedule>  dextrose 5% + sodium chloride 0.9%. 1000 milliLiter(s) (75 mL/Hr) IV Continuous <Continuous>  enzalutamide 80 mg tab 160 milliGRAM(s) 160 milliGRAM(s) Oral <User Schedule>  heparin   Injectable 5000 Unit(s) SubCutaneous every 12 hours  iohexol 300 mG (iodine)/mL Oral Solution 30 milliLiter(s) Oral once  lidocaine   4% Patch 1 Patch Transdermal every 24 hours  metoprolol tartrate 25 milliGRAM(s) Enteral Tube two times a day  ondansetron Injectable 4 milliGRAM(s) IV Push once  sodium bicarbonate 1300 milliGRAM(s) Oral every 6 hours    LABS:     139  |  110  |  8<L>  ----------------------------<  85          (03-18-24 @ 16:04)  4.6   |  18  |  0.9    Ca    6.5<L>          (03-18-24 @ 16:04)  Phos  1.6         (03-18-24 @ 16:04)  Mg     1.6         (03-18-24 @ 16:04)    TPro  5.8<L>  /  Alb  2.8<L>  /  TBili  0.4  /  DBili  x   /  AST  47<H>  /  ALT  29  /  AlkPhos  68       03-18-24 @ 16:04      DIET:   Diet, Soft and Bite Sized:   Tube Feeding Modality: Gastrostomy  Jevity 1.2 Sam (JEVITY1.2RTH)  Total Volume for 24 Hours (mL): 1440  Bolus  Total Volume of Bolus (mL):  360  Total # of Feeds: 4  Tube Feed Frequency: Every 6 hours   Tube Feed Start Time: 12:00  Bolus Feed Rate (mL per Hour): 360   Bolus Feed Duration (in Hours): 1  Banatrol TF     Qty per Day:  3 (03-20-24 @ 10:11) [Active]      ASSESSMENT  68-year-old male with pmh of hypertension, chronic back pain, prostate cancer s/p RT, and posterior mediastinal mass 6.5 cm seen on CT 11/2023, presents for 3 months of progressively difficulty swallowing associated with 10 pounds weight loss and odynophagia. Presented with c/o being unable to swallow his pills with water, resulting in 2 episode of emesis. Nutrition called to evaluate for PN.    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  - prostate cancer s/p RT   - odynophagia with associated wt loss (amount unclear)   - anemia, hx VERONICA   - hyperkalemia  - severe protein calorie malnutrition  - refeeding syndrome, hypophosphatemia    Est nutrient needs: 7756-6286 kcals (25-30kcals/kg, 77.1kg), 92-108g protein (1.2-1.4g/kg)  Fluids: 2100ml (25ml/kg)      PLAN  - goal peg feeds 360ml Jevity 1.2 x 4, can infuse 1/2 volume slowly over 1 hour if pt allows  - banatrol 3 packs/d for now with feed   - monitor Mg, phos   - flush peg with 50ml H2O before and 50ml H2O after each peg feed  - monitor bm's   - weekly weights

## 2024-03-21 PROCEDURE — 99232 SBSQ HOSP IP/OBS MODERATE 35: CPT

## 2024-03-21 RX ORDER — SODIUM CHLORIDE 9 MG/ML
1000 INJECTION, SOLUTION INTRAVENOUS
Refills: 0 | Status: DISCONTINUED | OUTPATIENT
Start: 2024-03-21 | End: 2024-03-21

## 2024-03-21 RX ADMIN — LIDOCAINE 1 PATCH: 4 CREAM TOPICAL at 13:13

## 2024-03-21 RX ADMIN — Medication 25 MILLIGRAM(S): at 05:45

## 2024-03-21 RX ADMIN — Medication 1300 MILLIGRAM(S): at 05:45

## 2024-03-21 RX ADMIN — CHLORHEXIDINE GLUCONATE 1 APPLICATION(S): 213 SOLUTION TOPICAL at 05:45

## 2024-03-21 RX ADMIN — Medication 1300 MILLIGRAM(S): at 17:51

## 2024-03-21 RX ADMIN — Medication 1300 MILLIGRAM(S): at 13:13

## 2024-03-21 RX ADMIN — Medication 25 MILLIGRAM(S): at 17:51

## 2024-03-21 RX ADMIN — HEPARIN SODIUM 5000 UNIT(S): 5000 INJECTION INTRAVENOUS; SUBCUTANEOUS at 17:51

## 2024-03-21 RX ADMIN — Medication 1300 MILLIGRAM(S): at 23:14

## 2024-03-21 RX ADMIN — HEPARIN SODIUM 5000 UNIT(S): 5000 INJECTION INTRAVENOUS; SUBCUTANEOUS at 05:45

## 2024-03-21 NOTE — PROGRESS NOTE ADULT - REASON FOR ADMISSION
Mediastinal Mass

## 2024-03-21 NOTE — PROGRESS NOTE ADULT - SUBJECTIVE AND OBJECTIVE BOX
SUBJECTIVE:    Patient is a 68y old Male who presents with a chief complaint of Mediastinal Mass (20 Mar 2024 16:14)    Currently admitted to medicine with the primary diagnosis of Mediastinal mass.     Today is hospital day 30d. This morning he is resting comfortably in bed and reports no new issues or overnight events. Denies any diarrhea. Refused peg tubes and labs.     PAST MEDICAL & SURGICAL HISTORY  HTN (hypertension)    Prostate cancer        ALLERGIES:  No Known Allergies    MEDICATIONS:  STANDING MEDICATIONS  chlorhexidine 2% Cloths 1 Application(s) Topical <User Schedule>  enzalutamide 80 mg tab 160 milliGRAM(s) 160 milliGRAM(s) Oral <User Schedule>  heparin   Injectable 5000 Unit(s) SubCutaneous every 12 hours  iohexol 300 mG (iodine)/mL Oral Solution 30 milliLiter(s) Oral once  lidocaine   4% Patch 1 Patch Transdermal every 24 hours  metoprolol tartrate 25 milliGRAM(s) Enteral Tube two times a day  ondansetron Injectable 4 milliGRAM(s) IV Push once  sodium bicarbonate 1300 milliGRAM(s) Oral every 6 hours    PRN MEDICATIONS    VITALS:   T(F): 97.1  HR: 82  BP: 115/72  RR: 18  SpO2: --    LABS:                        RADIOLOGY:    PHYSICAL EXAM:  GEN: No acute distress  PULM/CHEST: Decreased breath sounds on the right  CVS: Regular rate and rhythm, S1-S2, no murmurs  ABD: Soft, non-tender, non-distended, +BS, PEG in place  EXT: No edema  NEURO: AAOx3    Angel Catheter:

## 2024-03-21 NOTE — PROGRESS NOTE ADULT - ASSESSMENT
metastatic prostate cancer  progressed   on treatmem  meed palliative radiation and 2nd line chemo but pt has no capacity   prognosis poor       as per hospatilist pt has capacity   i discussed with pt today he just says yes for everything ,chemo yes ,radiation yes ,without asking any questions   asked if you have any questions he says no     pt progressed after docetaxol ,nubeqa [ and lupron ,had initially good response but now  progressed     will suggest start xtandi  by mouth 160 mg daily    also need palliative radiation to medistinal mass     spoke to pharmacist ,xtandi will be ord  pt seen today comfortable no complaints   rt not doing any radiation     as per rad onc note   pt refsing thoracocentesis as needed for them to start radiation  xtandi was started      apparently there is no other plan for pt as inpt   pt refusing all procedures     apparently will be going back to his rehab place     WILL FOLLOW AS OUT PT      kirit irving MD

## 2024-03-21 NOTE — PROGRESS NOTE ADULT - ASSESSMENT
68-year-old male with past medical history of hypertension, chronic back pain, prostate cancer, and posterior mediastinal mass 6.5 cm seen on CT 11/2023, presents for 3 months of progressively difficulty swallowing associated with 10 pounds weight loss and odynophagia. As per patient he was treated at [facility] for prostate cancer and is s/p radiation therapy.     #GAS s pyogenes bacteremia -  resolved  - SIRS positive (WBC increased to 16, febrile to 101 and tachy to 129 )   - CXR with worsening L-sided opacity   - Blood cx (2/24/24): positive for strep pyogenes  - repeat BCx 27,28,29,1,2  negative to date   - procal 7  - TTE: EF 65 to 70%.  - finished course of zosyn    # Dysphagia  - patient cleared for pureed diet and thin liquids per SS  - Peg placed 3/5/24 and feeds started 3/6/24 -> pt having diarrhea after feeds  - patient has been refusing PEG feeds out of concern he will have diarrhea again. Assured patient that diarrhea if it occurs can be managed. Encouraging patient to take feeds.  - Nutrition following, added bana flakes but patient still refusing feeds.  - Today patient had his meals, well tolerated, denies diarrhea    #Left pleural effusion  - Pulm consult for thoracentesis but pt still refusing  - Repeat xray chest slightly improve  - On RA, stable  - No indication for radiation until after tap  - Hold on diuretics -  soft BP and low efficacity in malignant effusion    #Hx of prostate cancer s/p radiation therapy  #Prostate cancer metastasis to mediastinum or primary mediastinal mass?  #Failure to thrive   - CT Chest: Interval enlargement of the central/posterior mediastinal mass with development of more extensive lymphadenopathy. The mass encircles the descending thoracic aorta and has some mass effect upon the left atrium. The mid aspect of the esophagus is encircled by the mass and difficult to distinguish. Possible ovoid pill is noted within the mass and possible location of the mid esophagus appear. The maximal esophagus appears distended with layering debris within. Consideration may be given to esophageal stenting.  - CT abdomen and pelvis:  Increased upper abdominal bulky lymphadenopathy. Unchanged bulky retroperitoneal lymphadenopathy, large left bladder mass and perirectal soft tissue infiltrative mass. 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 Chronic moderate to severe left hydroureteronephrosis  - EGD with GI 2/21 -> esophageal stent placed  - CT surgery following -> no acute intervention   - AFP and CEA wnl - LDH and haptoglobin elevated - PSA total 220, PSA free is 22 - CA19 131-  57  - Esophageal biopsy:  metastatic poorly differentiated carcinoma of prostatic origin.  - per onc note, patient had prior biopsy of site that demonstrated metastatic prostate adenocarcinoma and was started on Docetaxel, in January 2024 patient was started on Pembrolizumab and continued on keytruda injections.   - Dr. Irizarry (pt's oncologist): cs rad/onco for palliative radiation, onco asked about new protocol since patient progressed , rec hospice and palliative   - EGD replaced stent and PEG placed 3/5/24   - Reevaluated by heme/onc, will start on xtandi 160 mg daily. Pharmacy has to order med as it is non formulary; script sent to [facility]; will dispense med once available. Heme onc also recommending palliative radiation to mediastinal mass. Also reevaluated by Rad Onc for palliative RT -> CT sim for mapping and planning, followed by XRT.  - Rad/onc following for palliative RT -> CT sim for mapping and planning, followed by XRT.  May not be able to undergo RT given how little lung is left without intervention. Pending thora  -lacks capacity- psych, ethics and palliative following:     - pt refusing DNR/DNI status based on Tenriism beliefs, ethics agreed 2P consent would not be appropriate to make him DNR/DNI    - Refusing morning labs, encouraged pt to have labs drawn. Patient has been refusing PEG feeds for past couple days, despite encouragement and assurance of aiding patient if diarrhea reoccurs. Ethics committee encouraging patient to take PEG feeds. Upgraded PO diet to encourage nutrition.    #chronic clavicular fracture  Xray Shoulder 2 Views, Left: Subacute/chronic appearing distal clavicular fracture with approximately 1.7 cm of inferior displacement of the distal fragment. No acute fracture or dislocation. Left-sided opacities/effusion. Esophageal stent with surgical clips    #CHUCKIE vs ATN induced DIMAS  - pt came in with a baseline of 1.5 and increased to 2.6 with contrast use; improved to 1.3 but now unable to take po and refusing IVF  - No recent labs  - renally dose medications  -US Kidney and Bladder reviewed  -nephro recs appreciated  -C3 C4 levels normal  -on NaHCO3 1300mg TID   -no indications for RRT    #normocytic Anemia  - iron  studies noted % sat 27   - MCV normal, SPEP negative   -pt has been refusing lab draws. Last Hb on March 11 showed Hb stable at 8.6    #DVT PPx- heparin   #GI PPx- none   #Diet-  peg feeds + supplement liquid diet   #Activity- AAT    Dispo:  PT consult recommended Home PT;  Would be unable to return to [facility]    Pending: c/w hem/onc for treatement, encourage peg feeds and oral melas, lab draws, and meds    dc planning if patient refusing all care and is stable.   68-year-old male with past medical history of hypertension, chronic back pain, prostate cancer, and posterior mediastinal mass 6.5 cm seen on CT 11/2023, presents for 3 months of progressively difficulty swallowing associated with 10 pounds weight loss and odynophagia. As per patient he was treated at [facility] for prostate cancer and is s/p radiation therapy.     #GAS s pyogenes bacteremia -  resolved  - SIRS positive (WBC increased to 16, febrile to 101 and tachy to 129 )   - CXR with worsening L-sided opacity   - Blood cx (2/24/24): positive for strep pyogenes  - repeat BCx 27,28,29,1,2  negative to date   - procal 7  - TTE: EF 65 to 70%.  - finished course of zosyn    # Dysphagia  - patient cleared for pureed diet and thin liquids per SS  - Peg placed 3/5/24 and feeds started 3/6/24 -> pt having diarrhea after feeds  - patient has been refusing PEG feeds out of concern he will have diarrhea again. Assured patient that diarrhea if it occurs can be managed. Encouraging patient to take feeds.  - Nutrition following, added bana flakes but patient still refusing feeds.  - Today patient had his meals, well tolerated, denies diarrhea    #Left pleural effusion  - Pulm consult for thoracentesis but pt still refusing  - Repeat xray chest slightly improve  - On RA, stable  - No indication for radiation until after tap  - Hold on diuretics -  soft BP and low efficacity in malignant effusion    #Hx of prostate cancer s/p radiation therapy  #Prostate cancer metastasis to mediastinum or primary mediastinal mass?  #Failure to thrive   - CT Chest: Interval enlargement of the central/posterior mediastinal mass with development of more extensive lymphadenopathy. The mass encircles the descending thoracic aorta and has some mass effect upon the left atrium. The mid aspect of the esophagus is encircled by the mass and difficult to distinguish. Possible ovoid pill is noted within the mass and possible location of the mid esophagus appear. The maximal esophagus appears distended with layering debris within. Consideration may be given to esophageal stenting.  - CT abdomen and pelvis:  Increased upper abdominal bulky lymphadenopathy. Unchanged bulky retroperitoneal lymphadenopathy, large left bladder mass and perirectal soft tissue infiltrative mass. 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 Chronic moderate to severe left hydroureteronephrosis  - EGD with GI 2/21 -> esophageal stent placed  - CT surgery following -> no acute intervention   - AFP and CEA wnl - LDH and haptoglobin elevated - PSA total 220, PSA free is 22 - CA19 131-  57  - Esophageal biopsy:  metastatic poorly differentiated carcinoma of prostatic origin.  - per onc note, patient had prior biopsy of site that demonstrated metastatic prostate adenocarcinoma and was started on Docetaxel, in January 2024 patient was started on Pembrolizumab and continued on keytruda injections.   - Dr. Irizarry (pt's oncologist): cs rad/onco for palliative radiation, onco asked about new protocol since patient progressed , rec hospice and palliative   - EGD replaced stent and PEG placed 3/5/24   - Reevaluated by heme/onc, will start on xtandi 160 mg daily. Pharmacy has to order med as it is non formulary; script sent to [facility]; will dispense med once available. Heme onc also recommending palliative radiation to mediastinal mass. Also reevaluated by Rad Onc for palliative RT -> CT sim for mapping and planning, followed by XRT.  - Rad/onc following for palliative RT -> CT sim for mapping and planning, followed by XRT.  May not be able to undergo RT given how little lung is left without intervention. Pending thora  -lacks capacity- psych, ethics and palliative following:     - pt refusing DNR/DNI status based on Restorationism beliefs, ethics agreed 2P consent would not be appropriate to make him DNR/DNI    - Refusing morning labs, encouraged pt to have labs drawn. Patient has been refusing PEG feeds for past couple days, despite encouragement and assurance of aiding patient if diarrhea reoccurs. Ethics committee encouraging patient to take PEG feeds. Upgraded PO diet to encourage nutrition.    #chronic clavicular fracture  Xray Shoulder 2 Views, Left: Subacute/chronic appearing distal clavicular fracture with approximately 1.7 cm of inferior displacement of the distal fragment. No acute fracture or dislocation. Left-sided opacities/effusion. Esophageal stent with surgical clips    #CHUCKIE vs ATN induced DIMAS  - pt came in with a baseline of 1.5 and increased to 2.6 with contrast use; improved to 1.3 but now unable to take po and refusing IVF  - No recent labs  - renally dose medications  -US Kidney and Bladder reviewed  -nephro recs appreciated  -C3 C4 levels normal  -on NaHCO3 1300mg TID   -no indications for RRT    #normocytic Anemia  - iron  studies noted % sat 27   - MCV normal, SPEP negative   -pt has been refusing lab draws. Last Hb on March 11 showed Hb stable at 8.6    #DVT PPx- heparin   #GI PPx- none   #Diet-  peg feeds + supplement liquid diet   #Activity- AAT    Dispo: d/c tomorrow    Of note: patient has been off 1:1 sit for > 48 hours 68-year-old male with past medical history of hypertension, chronic back pain, prostate cancer, and posterior mediastinal mass 6.5 cm seen on CT 11/2023, presents for 3 months of progressively difficulty swallowing associated with 10 pounds weight loss and odynophagia. As per patient he was treated at [facility] for prostate cancer and is s/p radiation therapy.     #GAS s pyogenes bacteremia -  resolved  - SIRS positive (WBC increased to 16, febrile to 101 and tachy to 129 )   - CXR with worsening L-sided opacity   - Blood cx (2/24/24): positive for strep pyogenes  - repeat BCx 27,28,29,1,2  negative to date   - procal 7  - TTE: EF 65 to 70%.  - finished course of zosyn    # Dysphagia  - patient cleared for pureed diet and thin liquids per SS  - Peg placed 3/5/24 and feeds started 3/6/24 -> pt having diarrhea after feeds  - patient has been refusing PEG feeds out of concern he will have diarrhea again. Assured patient that diarrhea if it occurs can be managed. Encouraging patient to take feeds.  - Nutrition following, added banana flakes but patient still refusing feeds.  - Today patient had his meals, well tolerated, denies diarrhea    #Left pleural effusion  - Pulm consult for thoracentesis but pt still refusing  - Repeat xray chest slightly improve  - On RA, stable  - No indication for radiation until after tap  - Hold on diuretics -  soft BP and low efficacity in malignant effusion    #Hx of prostate cancer s/p radiation therapy  #Prostate cancer metastasis to mediastinum or primary mediastinal mass?  #Failure to thrive   - CT Chest: Interval enlargement of the central/posterior mediastinal mass with development of more extensive lymphadenopathy. The mass encircles the descending thoracic aorta and has some mass effect upon the left atrium. The mid aspect of the esophagus is encircled by the mass and difficult to distinguish. Possible ovoid pill is noted within the mass and possible location of the mid esophagus appear. The maximal esophagus appears distended with layering debris within. Consideration may be given to esophageal stenting.  - CT abdomen and pelvis:  Increased upper abdominal bulky lymphadenopathy. Unchanged bulky retroperitoneal lymphadenopathy, large left bladder mass and perirectal soft tissue infiltrative mass. 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 Chronic moderate to severe left hydroureteronephrosis  - EGD with GI 2/21 -> esophageal stent placed  - CT surgery following -> no acute intervention   - AFP and CEA wnl - LDH and haptoglobin elevated - PSA total 220, PSA free is 22 - CA19 131-  57  - Esophageal biopsy:  metastatic poorly differentiated carcinoma of prostatic origin.  - per onc note, patient had prior biopsy of site that demonstrated metastatic prostate adenocarcinoma and was started on Docetaxel, in January 2024 patient was started on Pembrolizumab and continued on keytruda injections.   - Dr. Irizarry (pt's oncologist): cs rad/onco for palliative radiation, onco asked about new protocol since patient progressed , rec hospice and palliative   - EGD replaced stent and PEG placed 3/5/24   - Reevaluated by heme/onc, will start on xtandi 160 mg daily. Pharmacy has to order med as it is non formulary; script sent to [facility]; will dispense med once available. Heme onc also recommending palliative radiation to mediastinal mass. Also reevaluated by Rad Onc for palliative RT -> CT sim for mapping and planning, followed by XRT.  - Rad/onc following for palliative RT -> CT sim for mapping and planning, followed by XRT.  May not be able to undergo RT given how little lung is left without intervention. Pending thora  -lacks capacity- psych, ethics and palliative following:     - pt refusing DNR/DNI status based on Judaism beliefs, ethics agreed 2P consent would not be appropriate to make him DNR/DNI    - Refusing morning labs, encouraged pt to have labs drawn. Patient has been refusing PEG feeds for past couple days, despite encouragement and assurance of aiding patient if diarrhea reoccurs. Ethics committee encouraging patient to take PEG feeds. Upgraded PO diet to encourage nutrition.    #chronic clavicular fracture  Xray Shoulder 2 Views, Left: Subacute/chronic appearing distal clavicular fracture with approximately 1.7 cm of inferior displacement of the distal fragment. No acute fracture or dislocation. Left-sided opacities/effusion. Esophageal stent with surgical clips    #CHUCKIE vs ATN induced DIMAS  - pt came in with a baseline of 1.5 and increased to 2.6 with contrast use; improved to 1.3 but now unable to take po and refusing IVF  - No recent labs  - renally dose medications  -US Kidney and Bladder reviewed  -nephro recs appreciated  -C3 C4 levels normal  -on NaHCO3 1300mg TID   -no indications for RRT    #normocytic Anemia  - iron  studies noted % sat 27   - MCV normal, SPEP negative   -pt has been refusing lab draws. Last Hb on March 11 showed Hb stable at 8.6    #DVT PPx- heparin   #GI PPx- none   #Diet-  peg feeds + supplement liquid diet   #Activity- AAT    Dispo: d/c tomorrow    Of note: patient has been off 1:1 sit for > 48 hours

## 2024-03-21 NOTE — PROGRESS NOTE ADULT - ATTENDING SUPERVISION STATEMENT
Fellow
Resident
Fellow
Resident
Fellow
Resident

## 2024-03-21 NOTE — PROGRESS NOTE ADULT - ATTENDING COMMENTS
Pt examined this am. He wants to go to [facility]. Refusing labs and peg feeds. Eating po; denies odynophagia. Refusing thoracocentesis. Plan is to go to [facility] on 3/22.

## 2024-03-21 NOTE — PROGRESS NOTE ADULT - TIME BILLING
I have personally seen and examined this patient.    I have reviewed all pertinent clinical information and reviewed all relevant imaging and diagnostic studies personally.   I counseled the patient about diagnostic testing and treatment plan. All questions were answered.   I discussed recommendations with the primary team.
direct patient care and chart review  -coordinated current plan of care with medical staff on MDR
direct pt care
direct patient care and chart review  -coordinated current plan of care with medical staff on MDR
direct pt care
Time above includes time spent preparing to see the patient, obtaining and/or reviewing separately obtained history, performing a medically appropriate examination and/or evaluation, counseling and educating the patient/family/caregiver, referring and communicating with other health care professionals (when not separately reported), documenting clinical information in the electronic or other health record, independently interpreting results (not separately reported) and communicating results to the patient/family/caregiver, and/or care coordination (not separately reported).
Time above includes time spent preparing to see the patient, obtaining and/or reviewing separately obtained history, performing a medically appropriate examination and/or evaluation, counseling and educating the patient/family/caregiver, referring and communicating with other health care professionals (when not separately reported), documenting clinical information in the electronic or other health record, independently interpreting results (not separately reported) and communicating results to the patient/family/caregiver, and/or care coordination (not separately reported).
direct patient care and chart review  -coordinated current plan of care with medical staff on MDR
direct patient care and chart review  -coordinated current plan of care with medical staff on MDR
direct pt care
Time above includes time spent preparing to see the patient, obtaining and/or reviewing separately obtained history, performing a medically appropriate examination and/or evaluation, counseling and educating the patient/family/caregiver, referring and communicating with other health care professionals (when not separately reported), documenting clinical information in the electronic or other health record, independently interpreting results (not separately reported) and communicating results to the patient/family/caregiver, and/or care coordination (not separately reported).
direct patient care and chart review  -coordinated current plan of care with medical staff on MDR
direct pt care
direct pt care
direct patient care and chart review  -coordinated current plan of care with medical staff on MDR
direct patient care and chart review  -coordinated current plan of care with medical staff on MDR
direct pt care
direct pt care and chart review  -coordinated current plan of care with medical staff on MDR
direct pt care and chart review  -coordinated current plan of care with medical staff on MDR
direct patient care and chart review  -coordinated current plan of care with medical staff on MDR
direct pt care
direct pt care and chart review  -coordinated current plan of care with medical staff on MDR
direct patient care and chart review  -coordinated current plan of care with medical staff on MDR
direct patient care and chart review  -coordinated current plan of care with medical staff on MDR   -discussed with Psych about capacity   -discussed with ID for 2 PC
direct pt care
Time above includes time spent preparing to see the patient, obtaining and/or reviewing separately obtained history, performing a medically appropriate examination and/or evaluation, counseling and educating the patient/family/caregiver, referring and communicating with other health care professionals (when not separately reported), documenting clinical information in the electronic or other health record, independently interpreting results (not separately reported) and communicating results to the patient/family/caregiver, and/or care coordination (not separately reported).
direct pt care
direct pt care
direct pt
direct pt care and chart review  -coordinated current plan of care with medical staff on MDR

## 2024-03-21 NOTE — PROGRESS NOTE ADULT - PROVIDER SPECIALTY LIST ADULT
Heme/Onc
Hospitalist
Internal Medicine
Heme/Onc
Infectious Disease
Internal Medicine
Nephrology
Rad Onc
Thoracic Surgery
Gastroenterology
Heme/Onc
Heme/Onc
Hospitalist
Hospitalist
Internal Medicine
Nephrology
Pulmonology
Rad Onc
Rad Onc
Gastroenterology
Internal Medicine
Nephrology
Nephrology
Palliative Care
Surgery
Hospitalist
Hospitalist
Internal Medicine
Palliative Care

## 2024-03-22 ENCOUNTER — TRANSCRIPTION ENCOUNTER (OUTPATIENT)
Age: 69
End: 2024-03-22

## 2024-03-22 VITALS
SYSTOLIC BLOOD PRESSURE: 147 MMHG | HEART RATE: 80 BPM | RESPIRATION RATE: 18 BRPM | TEMPERATURE: 97 F | DIASTOLIC BLOOD PRESSURE: 86 MMHG

## 2024-03-22 PROCEDURE — 99239 HOSP IP/OBS DSCHRG MGMT >30: CPT

## 2024-03-22 RX ORDER — METOPROLOL TARTRATE 50 MG
1 TABLET ORAL
Qty: 0 | Refills: 0 | DISCHARGE
Start: 2024-03-22

## 2024-03-22 RX ORDER — IBUPROFEN 200 MG
1 TABLET ORAL
Refills: 0 | DISCHARGE

## 2024-03-22 RX ORDER — LIDOCAINE 4 G/100G
0 CREAM TOPICAL
Refills: 0 | DISCHARGE

## 2024-03-22 RX ORDER — SODIUM BICARBONATE 1 MEQ/ML
2 SYRINGE (ML) INTRAVENOUS
Qty: 0 | Refills: 0 | DISCHARGE
Start: 2024-03-22

## 2024-03-22 RX ORDER — AMLODIPINE BESYLATE 2.5 MG/1
1 TABLET ORAL
Refills: 0 | DISCHARGE

## 2024-03-22 RX ADMIN — CHLORHEXIDINE GLUCONATE 1 APPLICATION(S): 213 SOLUTION TOPICAL at 06:16

## 2024-03-22 RX ADMIN — LIDOCAINE 1 PATCH: 4 CREAM TOPICAL at 10:50

## 2024-03-22 RX ADMIN — Medication 1300 MILLIGRAM(S): at 06:09

## 2024-03-22 RX ADMIN — Medication 25 MILLIGRAM(S): at 06:09

## 2024-03-22 RX ADMIN — HEPARIN SODIUM 5000 UNIT(S): 5000 INJECTION INTRAVENOUS; SUBCUTANEOUS at 06:09

## 2024-03-22 RX ADMIN — Medication 1300 MILLIGRAM(S): at 10:50

## 2024-03-22 NOTE — DISCHARGE NOTE PROVIDER - CARE PROVIDERS DIRECT ADDRESSES
[email or web address]

## 2024-03-22 NOTE — DISCHARGE NOTE PROVIDER - NSDCMRMEDTOKEN_GEN_ALL_CORE_FT
Feosol 325 mg (65 mg elemental iron) oral tablet: 1 tab(s) orally 2 times a day  Imodium 2 mg oral capsule: 1 cap(s) orally 2 times a day  metoprolol tartrate 25 mg oral tablet: 1 tab(s) orally 2 times a day  sodium bicarbonate 650 mg oral tablet: 2 tab(s) orally every 6 hours  Tylenol 325 mg oral tablet: 2 tab(s) orally every 6 hours  Xtandi 80 mg oral tablet: 2 tab(s) orally once a day

## 2024-03-22 NOTE — DISCHARGE NOTE PROVIDER - NSDCCPCAREPLAN_GEN_ALL_CORE_FT
PRINCIPAL DISCHARGE DIAGNOSIS  Diagnosis: Adult failure to thrive  Assessment and Plan of Treatment: You were admitted for 3 months of progressively difficulty swallowing associated with 10 pounds weight loss and odynophagia. You had a PEG tube placed on 3/5/24, however you have not been tolerating the feeds because of the diarrhea. Please keep good oral intake to maintain good nutritional status. Please follow up with your hemalogy/oncology doctor as outpateint for optimal management of your condition. And follow up with GI doctor as outpatient for stent management.  .     PRINCIPAL DISCHARGE DIAGNOSIS  Diagnosis: Adult failure to thrive  Assessment and Plan of Treatment: You were admitted for 3 months of progressively difficulty swallowing associated with 10 pounds weight loss and odynophagia. You had a PEG tube placed on 3/5/24, however you have not been tolerating the feeds because of the diarrhea. Please keep good oral intake to maintain good nutritional status. Please follow up with your hemalogy/oncology doctor as outpateint for optimal management of your condition. And follow up with GI doctor as outpatient for stent management. Continue Xtandi 160mg qd  .

## 2024-03-22 NOTE — DISCHARGE NOTE PROVIDER - HOSPITAL COURSE
68-year-old male with past medical history of hypertension, chronic back pain, prostate cancer, and posterior mediastinal mass 6.5 cm seen on CT 11/2023, presents for 3 months of progressively difficulty swallowing associated with 10 pounds weight loss and odynophagia. As per patient he was treated at [facility] for prostate cancer and is s/p radiation therapy.   Patient had EGD on 3/5/2023 with Stent repositioned and PEG tube placement. However he refused the PEG feeding despite frequent and aggressive encouragement. Currently tolerating small oral intake. During his stay patient has been also refusing labs, refusing medical procedures such as thoracentesis and understands the risks.   Patient is currently stable, on RA, stable for discharge.    ASSESSMENT    #GAS s pyogenes bacteremia -  resolved  - SIRS positive (WBC increased to 16, febrile to 101 and tachy to 129 )   - CXR with worsening L-sided opacity   - Blood cx (2/24/24): positive for strep pyogenes  - repeat BCx 27,28,29,1,2  negative to date   - procal 7  - TTE: EF 65 to 70%.  - finished course of zosyn    # Dysphagia  - patient cleared for pureed diet and thin liquids per SS  - Peg placed 3/5/24 and feeds started 3/6/24 -> pt having diarrhea after feeds  - patient has been refusing PEG feeds out of concern he will have diarrhea again. Assured patient that diarrhea if it occurs can be managed. Encouraging patient to take feeds.  - Nutrition following, added banana flakes but patient still refusing feeds.  - Patient able to tolerate small oral intake.    #Left pleural effusion  - Pulm consult for thoracentesis but pt still refusing  - Repeat xray chest slightly improve  - On RA, stable  - No indication for radiation until after tap    #Hx of prostate cancer s/p radiation therapy  #Prostate cancer metastasis to mediastinum or primary mediastinal mass?  #Failure to thrive   - CT Chest: Interval enlargement of the central/posterior mediastinal mass with development of more extensive lymphadenopathy. The mass encircles the descending thoracic aorta and has some mass effect upon the left atrium. The mid aspect of the esophagus is encircled by the mass and difficult to distinguish. Possible ovoid pill is noted within the mass and possible location of the mid esophagus appear. The maximal esophagus appears distended with layering debris within. Consideration may be given to esophageal stenting.  - CT abdomen and pelvis:  Increased upper abdominal bulky lymphadenopathy. Unchanged bulky retroperitoneal lymphadenopathy, large left bladder mass and perirectal soft tissue infiltrative mass. 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 Chronic moderate to severe left hydroureteronephrosis  - EGD with GI 2/21 -> esophageal stent placed  - CT surgery following -> no acute intervention   - AFP and CEA wnl - LDH and haptoglobin elevated - PSA total 220, PSA free is 22 - CA19 131-  57  - Esophageal biopsy:  metastatic poorly differentiated carcinoma of prostatic origin.  - per onc note, patient had prior biopsy of site that demonstrated metastatic prostate adenocarcinoma and was started on Docetaxel, in January 2024 patient was started on Pembrolizumab and continued on keytruda injections.   - Dr. Irizarry (pt's oncologist): cs rad/onco for palliative radiation, onco asked about new protocol since patient progressed , rec hospice and palliative   - EGD replaced stent and PEG placed 3/5/24   - Reevaluated by heme/onc, will start on xtandi 160 mg daily. Pharmacy has to order med as it is non formulary; script sent to [facility]; will dispense med once available. Heme onc also recommending palliative radiation to mediastinal mass. Also reevaluated by Rad Onc for palliative RT -> CT sim for mapping and planning, followed by XRT.  - Rad/onc following for palliative RT -> CT sim for mapping and planning, followed by XRT.  May not be able to undergo RT given how little lung is left without intervention. Pending thora  -lacks capacity- psych, ethics and palliative following:     - pt refusing DNR/DNI status based on Jain beliefs, ethics agreed 2P consent would not be appropriate to make him DNR/DNI    - Refusing morning labs, encouraged pt to have labs drawn. Patient has been refusing PEG feeds for past couple days, despite encouragement and assurance of aiding patient if diarrhea reoccurs. Ethics committee encouraging patient to take PEG feeds.   - Upgraded PO diet to encourage nutrition.    #CHUCKIE vs ATN induced DIMAS  - pt came in with a baseline of 1.5 and increased to 2.6 with contrast use; improved to 1.3 but now unable to take po and refusing IVF  - No recent labs  - renally dose medications  -US Kidney and Bladder reviewed  -nephro recs appreciated  -C3 C4 levels normal  -on NaHCO3 1300mg TID   -no indications for RRT    #normocytic Anemia  - iron  studies noted % sat 27   - MCV normal, SPEP negative   -pt has been refusing lab draws. Last Hb on March 11 showed Hb stable at 8.6    Of note: patient has been off 1:1 sit for > 48 hours   68-year-old male with past medical history of hypertension, chronic back pain, prostate cancer, and posterior mediastinal mass 6.5 cm seen on CT 11/2023, presents for 3 months of progressively difficulty swallowing associated with 10 pounds weight loss and odynophagia. As per patient he was treated at [facility] for prostate cancer and is s/p radiation therapy.   Patient had EGD on 3/5/2023 with Stent repositioned and PEG tube placement. However he refused the PEG feeding despite frequent and aggressive encouragement. Currently tolerating small oral intake. During his stay patient has been also refusing labs, refusing medical procedures such as thoracentesis and understands the risks.   Patient is currently stable, on RA, stable for discharge.    ASSESSMENT    #GAS s pyogenes bacteremia -  resolved  - SIRS positive (WBC increased to 16, febrile to 101 and tachy to 129 )   - CXR with worsening L-sided opacity   - Blood cx (2/24/24): positive for strep pyogenes  - repeat BCx 27,28,29,1,2  negative to date   - procal 7  - TTE: EF 65 to 70%.  - finished multiple courses of zosyn for aspiration pna    # Dysphagia  - patient cleared for soft and bite sized and thin liquids per SS  - Peg placed 3/5/24 and feeds started 3/6/24 -> pt having diarrhea after feeds  - patient has been refusing PEG feeds out of concern he will have diarrhea again. Assured patient that diarrhea if it occurs can be managed. Encouraging patient to take feeds.  - Nutrition following, added banana flakes but patient still refusing feeds.  - Patient able to tolerate small oral intake.    #Left pleural effusion  - Pulm consult for thoracentesis but pt still refusing  - Repeat xray chest slightly improve  - On RA, stable  - No indication for radiation until after thoracocentesis but pt is refusing    #Hx of prostate cancer s/p radiation therapy  #Prostate cancer metastasis to mediastinum or primary mediastinal mass?  #Failure to thrive   - CT Chest: Interval enlargement of the central/posterior mediastinal mass with development of more extensive lymphadenopathy. The mass encircles the descending thoracic aorta and has some mass effect upon the left atrium. The mid aspect of the esophagus is encircled by the mass and difficult to distinguish. Possible ovoid pill is noted within the mass and possible location of the mid esophagus appear. The maximal esophagus appears distended with layering debris within. Consideration may be given to esophageal stenting.  - CT abdomen and pelvis:  Increased upper abdominal bulky lymphadenopathy. Unchanged bulky retroperitoneal lymphadenopathy, large left bladder mass and perirectal soft tissue infiltrative mass. 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 Chronic moderate to severe left hydroureteronephrosis  - EGD with GI 2/21 -> esophageal stent placed  - CT surgery following -> no acute intervention   - AFP and CEA wnl - LDH and haptoglobin elevated - PSA total 220, PSA free is 22 - CA19 131-  57  - Esophageal biopsy:  metastatic poorly differentiated carcinoma of prostatic origin.  - per onc note, patient had prior biopsy of site that demonstrated metastatic prostate adenocarcinoma and was started on Docetaxel, in January 2024 patient was started on Pembrolizumab and continued on keytruda injections.   - Dr. Irizarry (pt's oncologist): cs rad/onco for palliative radiation, onco asked about new protocol since patient progressed , rec hospice and palliative   - EGD replaced stent and PEG placed 3/5/24   - Reevaluated by heme/onc, will start on xtandi 160 mg daily. Pharmacy has to order med as it is non formulary; script sent to [facility]; will dispense med once available. Heme onc also recommending palliative radiation to mediastinal mass. Also reevaluated by Rad Onc for palliative RT -> CT sim for mapping and planning, followed by XRT.  - Rad/onc following for palliative RT -> CT sim for mapping and planning, followed by XRT.  May not be able to undergo RT given how little lung is left without intervention. Pending thora  - lacks capacity- psych, ethics and palliative following:     - pt refusing DNR/DNI status based on Jehovah's witness beliefs, ethics agreed 2P consent would not be appropriate to make him DNR/DNI   - Refusing morning labs, encouraged pt to have labs drawn. Patient has been refusing PEG feeds for past couple days, despite encouragement and assurance of aiding patient if diarrhea reoccurs. Ethics committee encouraging patient to take PEG feeds.   - Upgraded PO diet to encourage nutrition.    #CHUCKIE vs ATN induced DIMAS  - pt came in with a baseline of 1.5 and increased to 2.6 with contrast use; improved to 1.3 but now unable to take po and refusing IVF  - No recent labs as pt is refusing blood draws   - renally dose medications  -US Kidney and Bladder reviewed  -nephro recs appreciated  -C3 C4 levels normal  -on NaHCO3 1300mg TID   -no indications for RRT    #normocytic Anemia  - iron  studies noted % sat 27   - MCV normal, SPEP negative   -pt has been refusing lab draws. Last Hb on March 11 showed Hb stable at 8.6      Diet, Soft and Bite Sized:   Tube Feeding Modality: Gastrostomy  Jevity 1.2 Sam (JEVITY1.2RTH)  Total Volume for 24 Hours (mL): 1440  Bolus  Total Volume of Bolus (mL):  360  Total # of Feeds: 4  Tube Feed Frequency: Every 6 hours   Tube Feed Start Time: 12:00  Bolus Feed Rate (mL per Hour): 360   Bolus Feed Duration (in Hours): 1  Banatrol TF     Qty per Day:  3

## 2024-03-22 NOTE — DISCHARGE NOTE PROVIDER - CARE PROVIDER_API CALL
Wicho Irizarry  Internal Medicine  [address] [place] [address]  Phone: [phone]  Fax: [phone]  Follow Up Time: 2 weeks    Timmy Rutledge  Gastroenterology  [address] [place] [address]  Phone: [phone]  Fax: [phone]  Follow Up Time: 2 weeks    Jeronimo Villeda  Internal Medicine  [address] [place] [address]  Phone: [phone]  Fax: [phone]  Follow Up Time: 2 weeks

## 2024-03-22 NOTE — DISCHARGE NOTE NURSING/CASE MANAGEMENT/SOCIAL WORK - PATIENT PORTAL LINK FT
You can access the FollowMyHealth Patient Portal offered by [facility] by registering at the following website: [email or web address]. By joining [employer]’s FollowMyHealth portal, you will also be able to view your health information using other applications (apps) compatible with our system.

## 2024-03-22 NOTE — DISCHARGE NOTE PROVIDER - INSTRUCTIONS
Diet, Soft and Bite Sized    Tube Feeding Modality: Gastrostomy  Jevity 1.2 Sam (JEVITY1.2RTH)  Total Volume for 24 Hours (mL): 1440  Bolus  Total Volume of Bolus (mL):  360  Total # of Feeds: 4  Tube Feed Frequency: Every 6 hours   Tube Feed Start Time: 12:00  Bolus Feed Rate (mL per Hour): 360   Bolus Feed Duration (in Hours): 1  Banatrol TF     Qty per Day:  3 (03-20-24 @ 10:11) [Active]

## 2024-03-22 NOTE — DISCHARGE NOTE PROVIDER - PROVIDER TOKENS
PROVIDER:[TOKEN:[[id number]],FOLLOWUP:[2 weeks]],PROVIDER:[TOKEN:[[id number]],FOLLOWUP:[2 weeks]],PROVIDER:[TOKEN:[[id number]],FOLLOWUP:[2 weeks]]

## 2024-03-26 ENCOUNTER — EMERGENCY (EMERGENCY)
Facility: HOSPITAL | Age: 69
LOS: 0 days | Discharge: HOME | End: 2024-03-26
Attending: EMERGENCY MEDICINE
Payer: MEDICARE

## 2024-03-26 VITALS
SYSTOLIC BLOOD PRESSURE: 106 MMHG | HEART RATE: 88 BPM | DIASTOLIC BLOOD PRESSURE: 75 MMHG | OXYGEN SATURATION: 96 % | TEMPERATURE: 97 F | HEIGHT: 73 IN | RESPIRATION RATE: 18 BRPM

## 2024-03-26 DIAGNOSIS — D64.9 ANEMIA, UNSPECIFIED: ICD-10-CM

## 2024-03-26 DIAGNOSIS — R22.2 LOCALIZED SWELLING, MASS AND LUMP, TRUNK: ICD-10-CM

## 2024-03-26 DIAGNOSIS — K22.2 ESOPHAGEAL OBSTRUCTION: ICD-10-CM

## 2024-03-26 DIAGNOSIS — C78.89 SECONDARY MALIGNANT NEOPLASM OF OTHER DIGESTIVE ORGANS: ICD-10-CM

## 2024-03-26 DIAGNOSIS — C78.1 SECONDARY MALIGNANT NEOPLASM OF MEDIASTINUM: ICD-10-CM

## 2024-03-26 DIAGNOSIS — N13.1 HYDRONEPHROSIS WITH URETERAL STRICTURE, NOT ELSEWHERE CLASSIFIED: ICD-10-CM

## 2024-03-26 DIAGNOSIS — N17.9 ACUTE KIDNEY FAILURE, UNSPECIFIED: ICD-10-CM

## 2024-03-26 DIAGNOSIS — Z76.0 ENCOUNTER FOR ISSUE OF REPEAT PRESCRIPTION: ICD-10-CM

## 2024-03-26 DIAGNOSIS — R78.81 BACTEREMIA: ICD-10-CM

## 2024-03-26 DIAGNOSIS — E43 UNSPECIFIED SEVERE PROTEIN-CALORIE MALNUTRITION: ICD-10-CM

## 2024-03-26 DIAGNOSIS — J69.0 PNEUMONITIS DUE TO INHALATION OF FOOD AND VOMIT: ICD-10-CM

## 2024-03-26 DIAGNOSIS — A40.0 SEPSIS DUE TO STREPTOCOCCUS, GROUP A: ICD-10-CM

## 2024-03-26 DIAGNOSIS — C61 MALIGNANT NEOPLASM OF PROSTATE: ICD-10-CM

## 2024-03-26 DIAGNOSIS — R62.7 ADULT FAILURE TO THRIVE: ICD-10-CM

## 2024-03-26 PROCEDURE — 99284 EMERGENCY DEPT VISIT MOD MDM: CPT

## 2024-03-26 PROCEDURE — 99281 EMR DPT VST MAYX REQ PHY/QHP: CPT

## 2024-03-26 RX ORDER — ENZALUTAMIDE 80 MG/1
160 TABLET ORAL ONCE
Refills: 0 | Status: DISCONTINUED | OUTPATIENT
Start: 2024-03-26 | End: 2024-03-26

## 2024-03-26 RX ORDER — ENZALUTAMIDE 80 MG/1
4 TABLET ORAL
Qty: 120 | Refills: 3
Start: 2024-03-26 | End: 2024-07-23

## 2024-03-26 NOTE — PHARMACOTHERAPY INTERVENTION NOTE - COMMENTS
As per Dr Klerman, patient has a prescription at [facility]. Medication will be picked up and given to the patient before being discharged. Patient will use own med

## 2024-03-26 NOTE — ED ADULT TRIAGE NOTE - CHIEF COMPLAINT QUOTE
Pt returning to hospital for medication clarification. Pt was discharged here 3/22 with new chemo med, however facility never received the script from Discharge MD so facility sent patient back to hospital.

## 2024-03-26 NOTE — ED PROVIDER NOTE - CLINICAL SUMMARY MEDICAL DECISION MAKING FREE TEXT BOX
68yM metastatic prostate cancer presents for missing prescription for his oral chemo.  Pt hemodynamically stable and breathing comfortably on RA.  Reviewed w/ SNF medication issue - prescription for xtandi was canceled prior to dc and not rewritten.  Inpatient chart reviewed - pt was started on this oral chemo as per heme/onc while inpatient, with clear plan to continue as o/p.  Will resend prescription and pt already scheduled to see his o/p oncologist for f/u.  D/w pharmacy to get pt his first dose prior to dc.  Ok to dc with supportive care, PO xtandi, o/p f/u, return precautions.

## 2024-03-26 NOTE — ED PROVIDER NOTE - NSFOLLOWUPINSTRUCTIONS_ED_ALL_ED_FT
Please follow up with your oncologist Dr. Irizarry as scheduled.  Your prescription for xtandi was placed and the medication will be dispensed and sent back to your facility with you.

## 2024-03-26 NOTE — ED PROVIDER NOTE - PHYSICAL EXAMINATION
CONSTITUTIONAL: well developed; well nourished; well appearing in no acute distress  HEAD: normocephalic; atraumatic  EYES: no conjunctival injection, no scleral icterus  ENT: no nasal discharge; airway clear.  NECK: supple; non tender. + full passive ROM in all directions  CARD: warm and well perfused, not tachycardic  RESP: breathing comfortably on RA, speaking in full  ABD: soft; non-distended; non-tender. No rebound, no guarding, no pulsatile abdominal mass  EXT: moving all extremities spontaneously, normal ROM. No clubbing, cyanosis or edema  SKIN: warm and dry, no lesions noted  NEURO: alert, CN II-XII grossly intact, motor and sensory grossly intact, speech nonslurred, stable gait, no focal deficits. GCS 15  PSYCH: calm, cooperative, appropriate, good eye contact, logical thought process, no apparent danger to self or others

## 2024-03-26 NOTE — ED PROVIDER NOTE - PATIENT PORTAL LINK FT
You can access the FollowMyHealth Patient Portal offered by [facility] by registering at the following website: [email or web address]. By joining [employer]’s FollowMyHealth portal, you will also be able to view your health information using other applications (apps) compatible with our system.

## 2024-03-26 NOTE — ED PROVIDER NOTE - OBJECTIVE STATEMENT
68yM metastatic prostate cancer s/p palliative radiation followed by prolonged hospitalization at [facility] for mediastinal mass and started on xtandi as per heme/onc Dr. Irizarry.  Pt was discharged 4d ago w/ plan for o/p oral chemo and oncology f/u.  SNF staff reports that prescription for oral chemo (xtandi) was discontinued before they could get it from the pharmacy and they need a new prescription, but have been unable to reach anyone at the hospital who is able to resend the prescription.  Pt requesting recheck of his G tube but is unsure what is going on with his medications.

## 2024-04-16 ENCOUNTER — APPOINTMENT (OUTPATIENT)
Dept: PAIN MANAGEMENT | Facility: CLINIC | Age: 69
End: 2024-04-16

## 2024-04-19 ENCOUNTER — INPATIENT (INPATIENT)
Facility: HOSPITAL | Age: 69
LOS: 24 days | Discharge: NURSING, REHAB OR LONG-TERM CARE | DRG: 570 | End: 2024-05-14
Attending: STUDENT IN AN ORGANIZED HEALTH CARE EDUCATION/TRAINING PROGRAM | Admitting: STUDENT IN AN ORGANIZED HEALTH CARE EDUCATION/TRAINING PROGRAM
Payer: MEDICARE

## 2024-04-19 VITALS
DIASTOLIC BLOOD PRESSURE: 82 MMHG | OXYGEN SATURATION: 100 % | TEMPERATURE: 98 F | RESPIRATION RATE: 18 BRPM | SYSTOLIC BLOOD PRESSURE: 122 MMHG | HEART RATE: 110 BPM | HEIGHT: 73 IN | WEIGHT: 119.93 LBS

## 2024-04-19 DIAGNOSIS — L02.91 CUTANEOUS ABSCESS, UNSPECIFIED: ICD-10-CM

## 2024-04-19 LAB
ALBUMIN SERPL ELPH-MCNC: 2.9 G/DL — LOW (ref 3.5–5.2)
ALP SERPL-CCNC: 118 U/L — HIGH (ref 30–115)
ALT FLD-CCNC: <5 U/L — SIGNIFICANT CHANGE UP (ref 0–41)
ANION GAP SERPL CALC-SCNC: 11 MMOL/L — SIGNIFICANT CHANGE UP (ref 7–14)
APPEARANCE UR: ABNORMAL
AST SERPL-CCNC: 17 U/L — SIGNIFICANT CHANGE UP (ref 0–41)
BACTERIA # UR AUTO: NEGATIVE /HPF — SIGNIFICANT CHANGE UP
BASOPHILS # BLD AUTO: 0.01 K/UL — SIGNIFICANT CHANGE UP (ref 0–0.2)
BASOPHILS NFR BLD AUTO: 0.1 % — SIGNIFICANT CHANGE UP (ref 0–1)
BILIRUB SERPL-MCNC: 0.6 MG/DL — SIGNIFICANT CHANGE UP (ref 0.2–1.2)
BILIRUB UR-MCNC: NEGATIVE — SIGNIFICANT CHANGE UP
BUN SERPL-MCNC: 16 MG/DL — SIGNIFICANT CHANGE UP (ref 10–20)
CALCIUM SERPL-MCNC: 7.8 MG/DL — LOW (ref 8.4–10.5)
CAST: 0 /LPF — SIGNIFICANT CHANGE UP (ref 0–4)
CHLORIDE SERPL-SCNC: 109 MMOL/L — SIGNIFICANT CHANGE UP (ref 98–110)
CO2 SERPL-SCNC: 18 MMOL/L — SIGNIFICANT CHANGE UP (ref 17–32)
COLOR SPEC: YELLOW — SIGNIFICANT CHANGE UP
CREAT SERPL-MCNC: 0.9 MG/DL — SIGNIFICANT CHANGE UP (ref 0.7–1.5)
DIFF PNL FLD: NEGATIVE — SIGNIFICANT CHANGE UP
EGFR: 92 ML/MIN/1.73M2 — SIGNIFICANT CHANGE UP
EOSINOPHIL # BLD AUTO: 0.26 K/UL — SIGNIFICANT CHANGE UP (ref 0–0.7)
EOSINOPHIL NFR BLD AUTO: 2.5 % — SIGNIFICANT CHANGE UP (ref 0–8)
GLUCOSE SERPL-MCNC: 120 MG/DL — HIGH (ref 70–99)
GLUCOSE UR QL: NEGATIVE MG/DL — SIGNIFICANT CHANGE UP
HCT VFR BLD CALC: 26.5 % — LOW (ref 42–52)
HGB BLD-MCNC: 8.2 G/DL — LOW (ref 14–18)
IMM GRANULOCYTES NFR BLD AUTO: 0.9 % — HIGH (ref 0.1–0.3)
KETONES UR-MCNC: ABNORMAL MG/DL
LACTATE SERPL-SCNC: 1.8 MMOL/L — SIGNIFICANT CHANGE UP (ref 0.7–2)
LEUKOCYTE ESTERASE UR-ACNC: ABNORMAL
LYMPHOCYTES # BLD AUTO: 0.59 K/UL — LOW (ref 1.2–3.4)
LYMPHOCYTES # BLD AUTO: 5.8 % — LOW (ref 20.5–51.1)
MCHC RBC-ENTMCNC: 29 PG — SIGNIFICANT CHANGE UP (ref 27–31)
MCHC RBC-ENTMCNC: 30.9 G/DL — LOW (ref 32–37)
MCV RBC AUTO: 93.6 FL — SIGNIFICANT CHANGE UP (ref 80–94)
MONOCYTES # BLD AUTO: 1.08 K/UL — HIGH (ref 0.1–0.6)
MONOCYTES NFR BLD AUTO: 10.5 % — HIGH (ref 1.7–9.3)
NEUTROPHILS # BLD AUTO: 8.22 K/UL — HIGH (ref 1.4–6.5)
NEUTROPHILS NFR BLD AUTO: 80.2 % — HIGH (ref 42.2–75.2)
NITRITE UR-MCNC: NEGATIVE — SIGNIFICANT CHANGE UP
NRBC # BLD: 0 /100 WBCS — SIGNIFICANT CHANGE UP (ref 0–0)
PH UR: 7 — SIGNIFICANT CHANGE UP (ref 5–8)
PLATELET # BLD AUTO: 497 K/UL — HIGH (ref 130–400)
PMV BLD: 9.4 FL — SIGNIFICANT CHANGE UP (ref 7.4–10.4)
POTASSIUM SERPL-MCNC: 4.8 MMOL/L — SIGNIFICANT CHANGE UP (ref 3.5–5)
POTASSIUM SERPL-SCNC: 4.8 MMOL/L — SIGNIFICANT CHANGE UP (ref 3.5–5)
PROT SERPL-MCNC: 5.6 G/DL — LOW (ref 6–8)
PROT UR-MCNC: 300 MG/DL
RBC # BLD: 2.83 M/UL — LOW (ref 4.7–6.1)
RBC # FLD: 16.8 % — HIGH (ref 11.5–14.5)
RBC CASTS # UR COMP ASSIST: 18 /HPF — HIGH (ref 0–4)
SODIUM SERPL-SCNC: 138 MMOL/L — SIGNIFICANT CHANGE UP (ref 135–146)
SP GR SPEC: 1.03 — SIGNIFICANT CHANGE UP (ref 1–1.03)
SQUAMOUS # UR AUTO: 14 /HPF — HIGH (ref 0–5)
UROBILINOGEN FLD QL: 1 MG/DL — SIGNIFICANT CHANGE UP (ref 0.2–1)
WBC # BLD: 10.25 K/UL — SIGNIFICANT CHANGE UP (ref 4.8–10.8)
WBC # FLD AUTO: 10.25 K/UL — SIGNIFICANT CHANGE UP (ref 4.8–10.8)
WBC UR QL: 25 /HPF — HIGH (ref 0–5)

## 2024-04-19 PROCEDURE — 88305 TISSUE EXAM BY PATHOLOGIST: CPT

## 2024-04-19 PROCEDURE — 87102 FUNGUS ISOLATION CULTURE: CPT

## 2024-04-19 PROCEDURE — 82746 ASSAY OF FOLIC ACID SERUM: CPT

## 2024-04-19 PROCEDURE — 83605 ASSAY OF LACTIC ACID: CPT

## 2024-04-19 PROCEDURE — 86901 BLOOD TYPING SEROLOGIC RH(D): CPT

## 2024-04-19 PROCEDURE — 92526 ORAL FUNCTION THERAPY: CPT | Mod: GN

## 2024-04-19 PROCEDURE — 82962 GLUCOSE BLOOD TEST: CPT

## 2024-04-19 PROCEDURE — 87075 CULTR BACTERIA EXCEPT BLOOD: CPT

## 2024-04-19 PROCEDURE — 85025 COMPLETE CBC W/AUTO DIFF WBC: CPT

## 2024-04-19 PROCEDURE — 88342 IMHCHEM/IMCYTCHM 1ST ANTB: CPT

## 2024-04-19 PROCEDURE — 87070 CULTURE OTHR SPECIMN AEROBIC: CPT

## 2024-04-19 PROCEDURE — 80053 COMPREHEN METABOLIC PANEL: CPT

## 2024-04-19 PROCEDURE — 83880 ASSAY OF NATRIURETIC PEPTIDE: CPT

## 2024-04-19 PROCEDURE — 82330 ASSAY OF CALCIUM: CPT

## 2024-04-19 PROCEDURE — 82945 GLUCOSE OTHER FLUID: CPT

## 2024-04-19 PROCEDURE — 85018 HEMOGLOBIN: CPT

## 2024-04-19 PROCEDURE — 82270 OCCULT BLOOD FECES: CPT

## 2024-04-19 PROCEDURE — 87507 IADNA-DNA/RNA PROBE TQ 12-25: CPT

## 2024-04-19 PROCEDURE — 85014 HEMATOCRIT: CPT

## 2024-04-19 PROCEDURE — 82728 ASSAY OF FERRITIN: CPT

## 2024-04-19 PROCEDURE — 87641 MR-STAPH DNA AMP PROBE: CPT

## 2024-04-19 PROCEDURE — 83615 LACTATE (LD) (LDH) ENZYME: CPT

## 2024-04-19 PROCEDURE — 87045 FECES CULTURE AEROBIC BACT: CPT

## 2024-04-19 PROCEDURE — 86923 COMPATIBILITY TEST ELECTRIC: CPT

## 2024-04-19 PROCEDURE — P9040: CPT

## 2024-04-19 PROCEDURE — 89051 BODY FLUID CELL COUNT: CPT

## 2024-04-19 PROCEDURE — 87205 SMEAR GRAM STAIN: CPT

## 2024-04-19 PROCEDURE — 94660 CPAP INITIATION&MGMT: CPT

## 2024-04-19 PROCEDURE — 84157 ASSAY OF PROTEIN OTHER: CPT

## 2024-04-19 PROCEDURE — 99291 CRITICAL CARE FIRST HOUR: CPT | Mod: FS

## 2024-04-19 PROCEDURE — 80048 BASIC METABOLIC PNL TOTAL CA: CPT

## 2024-04-19 PROCEDURE — 74177 CT ABD & PELVIS W/CONTRAST: CPT | Mod: 26,MC

## 2024-04-19 PROCEDURE — 84145 PROCALCITONIN (PCT): CPT

## 2024-04-19 PROCEDURE — 86900 BLOOD TYPING SEROLOGIC ABO: CPT

## 2024-04-19 PROCEDURE — 85730 THROMBOPLASTIN TIME PARTIAL: CPT

## 2024-04-19 PROCEDURE — 36430 TRANSFUSION BLD/BLD COMPNT: CPT

## 2024-04-19 PROCEDURE — 84311 SPECTROPHOTOMETRY: CPT

## 2024-04-19 PROCEDURE — 82803 BLOOD GASES ANY COMBINATION: CPT

## 2024-04-19 PROCEDURE — 87640 STAPH A DNA AMP PROBE: CPT

## 2024-04-19 PROCEDURE — 83735 ASSAY OF MAGNESIUM: CPT

## 2024-04-19 PROCEDURE — 87046 STOOL CULTR AEROBIC BACT EA: CPT

## 2024-04-19 PROCEDURE — 85610 PROTHROMBIN TIME: CPT

## 2024-04-19 PROCEDURE — 74018 RADEX ABDOMEN 1 VIEW: CPT

## 2024-04-19 PROCEDURE — 83540 ASSAY OF IRON: CPT

## 2024-04-19 PROCEDURE — 87186 SC STD MICRODIL/AGAR DIL: CPT

## 2024-04-19 PROCEDURE — 94640 AIRWAY INHALATION TREATMENT: CPT

## 2024-04-19 PROCEDURE — 92610 EVALUATE SWALLOWING FUNCTION: CPT | Mod: GN

## 2024-04-19 PROCEDURE — 71045 X-RAY EXAM CHEST 1 VIEW: CPT

## 2024-04-19 PROCEDURE — 84100 ASSAY OF PHOSPHORUS: CPT

## 2024-04-19 PROCEDURE — 83036 HEMOGLOBIN GLYCOSYLATED A1C: CPT

## 2024-04-19 PROCEDURE — 93005 ELECTROCARDIOGRAM TRACING: CPT

## 2024-04-19 PROCEDURE — 88341 IMHCHEM/IMCYTCHM EA ADD ANTB: CPT

## 2024-04-19 PROCEDURE — 86850 RBC ANTIBODY SCREEN: CPT

## 2024-04-19 PROCEDURE — 82607 VITAMIN B-12: CPT

## 2024-04-19 PROCEDURE — 85027 COMPLETE CBC AUTOMATED: CPT

## 2024-04-19 PROCEDURE — 84132 ASSAY OF SERUM POTASSIUM: CPT

## 2024-04-19 PROCEDURE — 83986 ASSAY PH BODY FLUID NOS: CPT

## 2024-04-19 PROCEDURE — 84295 ASSAY OF SERUM SODIUM: CPT

## 2024-04-19 PROCEDURE — 88112 CYTOPATH CELL ENHANCE TECH: CPT

## 2024-04-19 PROCEDURE — 87077 CULTURE AEROBIC IDENTIFY: CPT

## 2024-04-19 PROCEDURE — 82042 OTHER SOURCE ALBUMIN QUAN EA: CPT

## 2024-04-19 PROCEDURE — 82247 BILIRUBIN TOTAL: CPT

## 2024-04-19 PROCEDURE — 36415 COLL VENOUS BLD VENIPUNCTURE: CPT

## 2024-04-19 PROCEDURE — 83550 IRON BINDING TEST: CPT

## 2024-04-19 PROCEDURE — 84478 ASSAY OF TRIGLYCERIDES: CPT

## 2024-04-19 RX ORDER — VANCOMYCIN HCL 1 G
1000 VIAL (EA) INTRAVENOUS ONCE
Refills: 0 | Status: COMPLETED | OUTPATIENT
Start: 2024-04-19 | End: 2024-04-19

## 2024-04-19 RX ORDER — METRONIDAZOLE 500 MG
500 TABLET ORAL ONCE
Refills: 0 | Status: COMPLETED | OUTPATIENT
Start: 2024-04-19 | End: 2024-04-19

## 2024-04-19 RX ORDER — SODIUM CHLORIDE 9 MG/ML
1000 INJECTION INTRAMUSCULAR; INTRAVENOUS; SUBCUTANEOUS ONCE
Refills: 0 | Status: COMPLETED | OUTPATIENT
Start: 2024-04-19 | End: 2024-04-19

## 2024-04-19 RX ORDER — CEFEPIME 1 G/1
2000 INJECTION, POWDER, FOR SOLUTION INTRAMUSCULAR; INTRAVENOUS ONCE
Refills: 0 | Status: COMPLETED | OUTPATIENT
Start: 2024-04-19 | End: 2024-04-19

## 2024-04-19 RX ADMIN — Medication 100 MILLIGRAM(S): at 16:55

## 2024-04-19 RX ADMIN — SODIUM CHLORIDE 1000 MILLILITER(S): 9 INJECTION INTRAMUSCULAR; INTRAVENOUS; SUBCUTANEOUS at 22:57

## 2024-04-19 RX ADMIN — Medication 250 MILLIGRAM(S): at 19:46

## 2024-04-19 RX ADMIN — CEFEPIME 100 MILLIGRAM(S): 1 INJECTION, POWDER, FOR SOLUTION INTRAMUSCULAR; INTRAVENOUS at 17:25

## 2024-04-19 NOTE — ED PROVIDER NOTE - OBJECTIVE STATEMENT
68 year old M with hx of mets prostate ca s/p palliative radiation, mediastinal mass on oral chemo (xtandi) brought in from SNF for evaluation. As per NH documentation pt has been refusing medications through PEG. Pt sts for the past 3 days after receiving feeds has episode of diarrhea which is why he is refusing feeds. Pt only c/o L upper buttock pain. He denies fever, chills, nausea, chest pain, sob, abd pain, urinary symptoms.

## 2024-04-19 NOTE — ED PROVIDER NOTE - NS ED ATTENDING STATEMENT MOD
This was a shared visit with the JACKSON. I reviewed and verified the documentation. I have personally provided the amount of critical care time documented below excluding time spent on separate procedures.

## 2024-04-19 NOTE — ED PROVIDER NOTE - PHYSICAL EXAMINATION
CONSTITUTIONAL: Well-appearing; well-nourished; in no apparent distress.   EYES: PERRL; EOM intact.   ENT: normal nose; no rhinorrhea; normal pharynx with no tonsillar hypertrophy.   NECK: Supple; non-tender; no cervical lymphadenopathy.   CARDIOVASCULAR: Normal S1, S2; no murmurs, rubs, or gallops. Equal radial pulses  RESPIRATORY: Normal chest excursion with respiration; breath sounds clear and equal bilaterally; no wheezes, rhonchi, or rales.  GI/: Normal bowel sounds; non-distended; non-tender; no palpable organomegaly. + PEG in place without any surrounding cellulitic changes  MS: No evidence of trauma or deformity. Normal ROM in all four extremities; non-tender to palpation; distal pulses are normal.   SKIN: + L upper buttocks noted with swelling/ttp with active purulent drainage noted  NEURO/PSYCH: A & O x 4; grossly unremarkable. mood and manner are appropriate. Grooming and personal hygiene are appropriate.

## 2024-04-19 NOTE — ED PROVIDER NOTE - CRITICAL CARE ATTENDING CONTRIBUTION TO CARE
I personally saw the patient. ROSS Khoury and I provided critical care for a total of 35 minutes. I provided a substantive portion of the care and the majority of the critical care time.

## 2024-04-19 NOTE — ED PROVIDER NOTE - CARE PLAN
Principal Discharge DX:	Abscess  Secondary Diagnosis:	Diarrhea  Secondary Diagnosis:	Pleural effusion   1

## 2024-04-19 NOTE — ED PROVIDER NOTE - CLINICAL SUMMARY MEDICAL DECISION MAKING FREE TEXT BOX
pt with sepsis 2/2 to draining abscess in buttock, abx, fluids, CT shows no ascending infection.    Independently interpretted by Christopher Salazar DO:  XR interpretation: Pleural effusion  EKG interpretation: no SANTIAGO, NSR    Appropriate medications for patient's presenting complaints were ordered and effects were reassessed.  Patient's external records were reviewed. Additional history was obtained from.    Escalation to admission/observation was considered.  At this time, patient requires inpatient hospitalization .

## 2024-04-19 NOTE — ED ADULT NURSE NOTE - NSFALLRISKINTERV_ED_ALL_ED
Assistance OOB with selected safe patient handling equipment if applicable/Assistance with ambulation/Communicate fall risk and risk factors to all staff, patient, and family/Monitor gait and stability/Provide visual cue: yellow wristband, yellow gown, etc/Reinforce activity limits and safety measures with patient and family/Call bell, personal items and telephone in reach/Instruct patient to call for assistance before getting out of bed/chair/stretcher/Non-slip footwear applied when patient is off stretcher/[place] to call system/Physically safe environment - no spills, clutter or unnecessary equipment/Purposeful Proactive Rounding/Room/bathroom lighting operational, light cord in reach

## 2024-04-20 LAB
ALBUMIN SERPL ELPH-MCNC: 2.5 G/DL — LOW (ref 3.5–5.2)
ALP SERPL-CCNC: 107 U/L — SIGNIFICANT CHANGE UP (ref 30–115)
ALT FLD-CCNC: <5 U/L — SIGNIFICANT CHANGE UP (ref 0–41)
ANION GAP SERPL CALC-SCNC: 12 MMOL/L — SIGNIFICANT CHANGE UP (ref 7–14)
AST SERPL-CCNC: 13 U/L — SIGNIFICANT CHANGE UP (ref 0–41)
BILIRUB SERPL-MCNC: 0.5 MG/DL — SIGNIFICANT CHANGE UP (ref 0.2–1.2)
BLD GP AB SCN SERPL QL: SIGNIFICANT CHANGE UP
BUN SERPL-MCNC: 14 MG/DL — SIGNIFICANT CHANGE UP (ref 10–20)
CALCIUM SERPL-MCNC: 7.4 MG/DL — LOW (ref 8.4–10.4)
CHLORIDE SERPL-SCNC: 109 MMOL/L — SIGNIFICANT CHANGE UP (ref 98–110)
CO2 SERPL-SCNC: 16 MMOL/L — LOW (ref 17–32)
CREAT SERPL-MCNC: 1 MG/DL — SIGNIFICANT CHANGE UP (ref 0.7–1.5)
CULTURE RESULTS: SIGNIFICANT CHANGE UP
EGFR: 81 ML/MIN/1.73M2 — SIGNIFICANT CHANGE UP
GLUCOSE BLDC GLUCOMTR-MCNC: 116 MG/DL — HIGH (ref 70–99)
GLUCOSE BLDC GLUCOMTR-MCNC: 118 MG/DL — HIGH (ref 70–99)
GLUCOSE SERPL-MCNC: 124 MG/DL — HIGH (ref 70–99)
HCT VFR BLD CALC: 24.7 % — LOW (ref 42–52)
HGB BLD-MCNC: 7.5 G/DL — LOW (ref 14–18)
MAGNESIUM SERPL-MCNC: 2.1 MG/DL — SIGNIFICANT CHANGE UP (ref 1.8–2.4)
MCHC RBC-ENTMCNC: 29.3 PG — SIGNIFICANT CHANGE UP (ref 27–31)
MCHC RBC-ENTMCNC: 30.4 G/DL — LOW (ref 32–37)
MCV RBC AUTO: 96.5 FL — HIGH (ref 80–94)
NRBC # BLD: 0 /100 WBCS — SIGNIFICANT CHANGE UP (ref 0–0)
PHOSPHATE SERPL-MCNC: 2.3 MG/DL — SIGNIFICANT CHANGE UP (ref 2.1–4.9)
PLATELET # BLD AUTO: 462 K/UL — HIGH (ref 130–400)
PMV BLD: 9.2 FL — SIGNIFICANT CHANGE UP (ref 7.4–10.4)
POTASSIUM SERPL-MCNC: 4.5 MMOL/L — SIGNIFICANT CHANGE UP (ref 3.5–5)
POTASSIUM SERPL-SCNC: 4.5 MMOL/L — SIGNIFICANT CHANGE UP (ref 3.5–5)
PROT SERPL-MCNC: 5 G/DL — LOW (ref 6–8)
RBC # BLD: 2.56 M/UL — LOW (ref 4.7–6.1)
RBC # FLD: 16.9 % — HIGH (ref 11.5–14.5)
SODIUM SERPL-SCNC: 137 MMOL/L — SIGNIFICANT CHANGE UP (ref 135–146)
SPECIMEN SOURCE: SIGNIFICANT CHANGE UP
WBC # BLD: 10.75 K/UL — SIGNIFICANT CHANGE UP (ref 4.8–10.8)
WBC # FLD AUTO: 10.75 K/UL — SIGNIFICANT CHANGE UP (ref 4.8–10.8)

## 2024-04-20 PROCEDURE — 99222 1ST HOSP IP/OBS MODERATE 55: CPT

## 2024-04-20 PROCEDURE — 93010 ELECTROCARDIOGRAM REPORT: CPT

## 2024-04-20 RX ORDER — LOPERAMIDE HCL 2 MG
1 TABLET ORAL
Refills: 0 | DISCHARGE

## 2024-04-20 RX ORDER — SODIUM BICARBONATE 1 MEQ/ML
1300 SYRINGE (ML) INTRAVENOUS EVERY 6 HOURS
Refills: 0 | Status: DISCONTINUED | OUTPATIENT
Start: 2024-04-20 | End: 2024-04-23

## 2024-04-20 RX ORDER — CEFEPIME 1 G/1
2000 INJECTION, POWDER, FOR SOLUTION INTRAMUSCULAR; INTRAVENOUS EVERY 8 HOURS
Refills: 0 | Status: DISCONTINUED | OUTPATIENT
Start: 2024-04-20 | End: 2024-04-20

## 2024-04-20 RX ORDER — ONDANSETRON 8 MG/1
4 TABLET, FILM COATED ORAL EVERY 8 HOURS
Refills: 0 | Status: DISCONTINUED | OUTPATIENT
Start: 2024-04-20 | End: 2024-04-29

## 2024-04-20 RX ORDER — ENZALUTAMIDE 80 MG/1
160 TABLET ORAL DAILY
Refills: 0 | Status: DISCONTINUED | OUTPATIENT
Start: 2024-04-20 | End: 2024-04-21

## 2024-04-20 RX ORDER — ASCORBIC ACID 60 MG
1 TABLET,CHEWABLE ORAL
Refills: 0 | DISCHARGE

## 2024-04-20 RX ORDER — ENOXAPARIN SODIUM 100 MG/ML
40 INJECTION SUBCUTANEOUS EVERY 24 HOURS
Refills: 0 | Status: DISCONTINUED | OUTPATIENT
Start: 2024-04-20 | End: 2024-05-14

## 2024-04-20 RX ORDER — FUROSEMIDE 40 MG
40 TABLET ORAL DAILY
Refills: 0 | Status: DISCONTINUED | OUTPATIENT
Start: 2024-04-20 | End: 2024-04-25

## 2024-04-20 RX ORDER — METOPROLOL TARTRATE 50 MG
25 TABLET ORAL EVERY 12 HOURS
Refills: 0 | Status: DISCONTINUED | OUTPATIENT
Start: 2024-04-20 | End: 2024-04-28

## 2024-04-20 RX ORDER — METRONIDAZOLE 500 MG
500 TABLET ORAL EVERY 8 HOURS
Refills: 0 | Status: DISCONTINUED | OUTPATIENT
Start: 2024-04-20 | End: 2024-04-20

## 2024-04-20 RX ORDER — LANOLIN ALCOHOL/MO/W.PET/CERES
3 CREAM (GRAM) TOPICAL AT BEDTIME
Refills: 0 | Status: DISCONTINUED | OUTPATIENT
Start: 2024-04-20 | End: 2024-04-29

## 2024-04-20 RX ORDER — INFLUENZA VIRUS VACCINE 15; 15; 15; 15 UG/.5ML; UG/.5ML; UG/.5ML; UG/.5ML
0.7 SUSPENSION INTRAMUSCULAR ONCE
Refills: 0 | Status: DISCONTINUED | OUTPATIENT
Start: 2024-04-20 | End: 2024-04-29

## 2024-04-20 RX ORDER — ENZALUTAMIDE 80 MG/1
4 TABLET ORAL
Refills: 0 | DISCHARGE

## 2024-04-20 RX ORDER — CEFAZOLIN SODIUM 1 G
2000 VIAL (EA) INJECTION EVERY 8 HOURS
Refills: 0 | Status: DISCONTINUED | OUTPATIENT
Start: 2024-04-20 | End: 2024-04-21

## 2024-04-20 RX ORDER — VANCOMYCIN HCL 1 G
750 VIAL (EA) INTRAVENOUS EVERY 12 HOURS
Refills: 0 | Status: DISCONTINUED | OUTPATIENT
Start: 2024-04-20 | End: 2024-04-20

## 2024-04-20 RX ORDER — FERROUS SULFATE 325(65) MG
5 TABLET ORAL
Refills: 0 | DISCHARGE

## 2024-04-20 RX ORDER — FERROUS SULFATE 325(65) MG
1 TABLET ORAL
Refills: 0 | DISCHARGE

## 2024-04-20 RX ORDER — LOPERAMIDE HCL 2 MG
2 TABLET ORAL EVERY 8 HOURS
Refills: 0 | Status: DISCONTINUED | OUTPATIENT
Start: 2024-04-20 | End: 2024-04-25

## 2024-04-20 RX ORDER — SODIUM BICARBONATE 1 MEQ/ML
2 SYRINGE (ML) INTRAVENOUS
Refills: 0 | DISCHARGE

## 2024-04-20 RX ORDER — ASCORBIC ACID 60 MG
500 TABLET,CHEWABLE ORAL DAILY
Refills: 0 | Status: DISCONTINUED | OUTPATIENT
Start: 2024-04-20 | End: 2024-05-14

## 2024-04-20 RX ORDER — FERROUS SULFATE 325(65) MG
300 TABLET ORAL DAILY
Refills: 0 | Status: DISCONTINUED | OUTPATIENT
Start: 2024-04-20 | End: 2024-05-14

## 2024-04-20 RX ORDER — ACETAMINOPHEN 500 MG
650 TABLET ORAL EVERY 6 HOURS
Refills: 0 | Status: DISCONTINUED | OUTPATIENT
Start: 2024-04-20 | End: 2024-05-14

## 2024-04-20 RX ORDER — MEGESTROL ACETATE 40 MG/ML
400 SUSPENSION ORAL DAILY
Refills: 0 | Status: DISCONTINUED | OUTPATIENT
Start: 2024-04-20 | End: 2024-04-23

## 2024-04-20 RX ADMIN — Medication 25 MILLIGRAM(S): at 18:34

## 2024-04-20 RX ADMIN — Medication 25 MILLIGRAM(S): at 08:11

## 2024-04-20 RX ADMIN — Medication 100 MILLIGRAM(S): at 13:41

## 2024-04-20 RX ADMIN — ENOXAPARIN SODIUM 40 MILLIGRAM(S): 100 INJECTION SUBCUTANEOUS at 21:22

## 2024-04-20 RX ADMIN — Medication 300 MILLIGRAM(S): at 13:51

## 2024-04-20 RX ADMIN — Medication 1 TABLET(S): at 13:52

## 2024-04-20 RX ADMIN — MEGESTROL ACETATE 400 MILLIGRAM(S): 40 SUSPENSION ORAL at 13:51

## 2024-04-20 RX ADMIN — Medication 500 MILLIGRAM(S): at 13:52

## 2024-04-20 RX ADMIN — Medication 100 MILLIGRAM(S): at 21:24

## 2024-04-20 RX ADMIN — Medication 100 MILLIGRAM(S): at 06:42

## 2024-04-20 NOTE — H&P ADULT - NSHPPHYSICALEXAM_GEN_ALL_CORE
VITALS:   Vital Signs Last 24 Hrs  T(C): 37.2 (20 Apr 2024 00:02), Max: 37.2 (20 Apr 2024 00:02)  T(F): 98.9 (20 Apr 2024 00:02), Max: 98.9 (20 Apr 2024 00:02)  HR: 99 (20 Apr 2024 00:02) (99 - 110)  BP: 120/76 (20 Apr 2024 00:02) (112/82 - 122/82)  BP(mean): --  RR: 18 (20 Apr 2024 00:02) (18 - 18)  SpO2: 99% (20 Apr 2024 00:02) (98% - 100%)    Parameters below as of 20 Apr 2024 00:02  Patient On (Oxygen Delivery Method): room air      I&O's Summary    CAPILLARY BLOOD GLUCOSE          PHYSICAL EXAM:  General: WN/WD NAD  HEENT: PERRLA, EOMI, moist mucous membranes  Neurology: A&Ox3, nonfocal, ZULUAGA x 4  Respiratory: CTA B/L, normal respiratory effort, no wheezes, crackles, rales  CV: RRR, S1S2, no murmurs, rubs or gallops  Abdominal: Soft, NT, ND +BS, Last BM  Extremities: No edema, + peripheral pulses VITALS:   Vital Signs Last 24 Hrs  T(C): 37.2 (20 Apr 2024 00:02), Max: 37.2 (20 Apr 2024 00:02)  T(F): 98.9 (20 Apr 2024 00:02), Max: 98.9 (20 Apr 2024 00:02)  HR: 99 (20 Apr 2024 00:02) (99 - 110)  BP: 120/76 (20 Apr 2024 00:02) (112/82 - 122/82)  RR: 18 (20 Apr 2024 00:02) (18 - 18)  SpO2: 99% (20 Apr 2024 00:02) (98% - 100%)      Parameters below as of 20 Apr 2024 00:02  Patient On (Oxygen Delivery Method): room air      PHYSICAL EXAM:  General: WN/WD NAD, chronically ill appearing, cachetic  HEENT: PERRLA, EOMI, dry mucous membranes  Neurology: A&Ox3  Respiratory: CTA B/L, normal respiratory effort, no wheezes, crackles, rales  CV: RRR, S1S2, no murmurs, rubs or gallops  Abdominal: Soft, NT, ND, +BS, no guarding or rebound, PEG In place w/o leakage  Extremities: No LE edema, +2 peripheral pulses in all 4 extremities, muscle wasting VITALS:   Vital Signs Last 24 Hrs  T(C): 37.2 (20 Apr 2024 00:02), Max: 37.2 (20 Apr 2024 00:02)  T(F): 98.9 (20 Apr 2024 00:02), Max: 98.9 (20 Apr 2024 00:02)  HR: 99 (20 Apr 2024 00:02) (99 - 110)  BP: 120/76 (20 Apr 2024 00:02) (112/82 - 122/82)  RR: 18 (20 Apr 2024 00:02) (18 - 18)  SpO2: 99% (20 Apr 2024 00:02) (98% - 100%)      Parameters below as of 20 Apr 2024 00:02  Patient On (Oxygen Delivery Method): room air      PHYSICAL EXAM:  General: WN/WD NAD, chronically ill appearing, cachetic  HEENT: PERRLA, EOMI, dry mucous membranes  Neurology: A&Ox3  Respiratory: CTA B/L, normal respiratory effort, no wheezes, crackles, rales  CV: RRR, S1S2, no murmurs, rubs or gallops  Abdominal: Soft, NT, ND, +BS, no guarding or rebound, PEG In place w/o leakage  Extremities: No LE edema, +2 peripheral pulses in all 4 extremities, muscle wasting  Back: LT gluteal tenderness, redness

## 2024-04-20 NOTE — H&P ADULT - NSHPLABSRESULTS_GEN_ALL_CORE
LABS:  cret                        8.2    10.25 )-----------( 497      ( 19 Apr 2024 17:37 )             26.5     04-19    138  |  109  |  16  ----------------------------<  120<H>  4.8   |  18  |  0.9    Ca    7.8<L>      19 Apr 2024 17:37    TPro  5.6<L>  /  Alb  2.9<L>  /  TBili  0.6  /  DBili  x   /  AST  17  /  ALT  <5  /  AlkPhos  118<H>  04-19

## 2024-04-20 NOTE — CONSULT NOTE ADULT - ASSESSMENT
ASSESSMENT  68M w/ PMHx Stage 4 Prostate Ca s/p palliative radiation, Mediastinal Mass on oral Chemo (xtandi), Chronic Anemia, HTN and Chronic Back Pain presents to ED from SNF for evaluation. As per NH documentation, pt has been refusing medications through PEG for the last 3x days d/t episode of diarrhea post feeds. Patient's only complaint is LT buttock pain. Denies fevers, chills, chest pain, SOB, n/v, abdominal pain or urinary symptoms.    IMPRESSION  #Left Buttock Pain   - CT Abdomen and Pelvis w/ IV Cont (04.19.24 @ 19:17): 1. New mild edema and enlargement of the left gluteus maximums  musculature with associated subcutaneous edema. No discrete abscess. 2. Metastatic disease in the abdomen and pelvis as above with slightly  increased upper abdominal adenopathy. No significant change approximately  in the large bladder mass and partially imaged mediastinal mass. Interval  placement of an esophageal stent. 3. Severe left hydronephrosis to the level of the proximal ureter, not   significantly changed. 4. New/increased large bilateral pleural effusions.    #Prostate Cancer s/p palliative radiation with mediastinal mass   #Severe Hydronephrosis   #Malnutrition BMI (kg/m2): 15.8  #Abx allergy: No Known Allergies    RECOMMENDATIONS  This is a preliminary incomplete pended note, all final recommendations to follow after interview and examination of the patient.    Please call or message on [employer] Teams if with any questions.  Spectra [phone]     ASSESSMENT  68M w/ PMHx Stage 4 Prostate Ca s/p palliative radiation, Mediastinal Mass on oral Chemo (xtandi), Chronic Anemia, HTN and Chronic Back Pain presents to ED from SNF for evaluation. As per NH documentation, pt has been refusing medications through PEG for the last 3x days d/t episode of diarrhea post feeds. Patient's only complaint is LT buttock pain. Denies fevers, chills, chest pain, SOB, n/v, abdominal pain or urinary symptoms.    IMPRESSION  #Left Buttock Pain   - CT Abdomen and Pelvis w/ IV Cont (04.19.24 @ 19:17): 1. New mild edema and enlargement of the left gluteus maximums  musculature with associated subcutaneous edema. No discrete abscess. 2. Metastatic disease in the abdomen and pelvis as above with slightly  increased upper abdominal adenopathy. No significant change approximately  in the large bladder mass and partially imaged mediastinal mass. Interval  placement of an esophageal stent. 3. Severe left hydronephrosis to the level of the proximal ureter, not   significantly changed. 4. New/increased large bilateral pleural effusions.    #Prostate Cancer s/p palliative radiation with mediastinal mass   #Hx of Group A strep bacteremia 2/24/24   #Severe Hydronephrosis   #Malnutrition BMI (kg/m2): 15.8  #Abx allergy: No Known Allergies    RECOMMENDATIONS  - on exam, area of induration and able to express purulent drainage   - burn evaluation for possible drainage and culture  - continue cefazolin -- decrease to 1g q 8 hours for soft tissue infection   - follow-up MRSA nares -- if positive, start PO doxycycline 100 mg BID empirically     Please call or message on [employer] Teams if with any questions.  Spectra [phone]

## 2024-04-20 NOTE — H&P ADULT - HISTORY OF PRESENT ILLNESS
68M w/ PMHx Stage 4 Prostate Ca s/p palliative radiation, Mediastinal Mass on oral Chemo (xtandi), Chronic Anemia, HTN and Chronic Back Pain presents to ED from SNF for evaluation. As per NH documentation, pt has been refusing medications through PEG for the last 3x days d/t episode of diarrhea post feeds. Patient's only complaint is LT buttock pain. Denies fevers, chills, chest pain, SOB, n/v, abdominal pain or urinary symptoms.    Vitals: Temp 98.3F, /82, , RR 18, SpO2 100% on RA    Labs: Hgb 8.2 (previously 8.6), Cr 0.9 (at baseline), Ca 7.8, Albumin 2.9,     Imagin. CT A/P shows:  - New mild edema and enlargement of the left gluteus maximums musculature with associated subcutaneous edema. No discrete abscess.  - Metastatic disease in the abdomen and pelvis as above with slightly increased upper abdominal adenopathy.  - No significant change approximately in the large bladder mass and partially imaged mediastinal mass.  - Interval placement of an esophageal stent.  - Severe left hydronephrosis to the level of the proximal ureter, not significantly changed.  - New/increased large bilateral pleural effusions.    In the ED:  - s/p Cefepime, Vanc, Flagyl IV x1  - s/p 1L NS bolus    Admitted to medicine.   68M w/ PMHx Stage 4 Prostate Ca s/p palliative radiation, Mediastinal Mass on oral Chemo (xtandi), Chronic Anemia, HTN and Chronic Back Pain presents to ED from SNF for evaluation. As per NH documentation, pt has been refusing medications through PEG for the last 3x days d/t episode of diarrhea post feeds. Patient's only complaint is LT buttock pain. Denies fevers, chills, chest pain, SOB, n/v, abdominal pain or urinary symptoms.    Vitals: Temp 98.3F, /82, , RR 18, SpO2 100% on RA    Labs: Hgb 8.2 (previously 8.6), Cr 0.9 (at baseline), Ca 7.8, Albumin 2.9,     Imagin. CT A/P shows:  - New mild edema and enlargement of the left gluteus maximums musculature with associated subcutaneous edema. No discrete abscess.  - Metastatic disease in the abdomen and pelvis as above with slightly increased upper abdominal adenopathy.  - No significant change approximately in the large bladder mass and partially imaged mediastinal mass.  - Interval placement of an esophageal stent.  - Severe left hydronephrosis to the level of the proximal ureter, not significantly changed.  - New/increased large bilateral pleural effusions.    In the ED:  - s/p Cefepime, Vanc, Flagyl IV x1  - s/p 1L NS bolus    Admitted to medicine.

## 2024-04-20 NOTE — H&P ADULT - ATTENDING COMMENTS
68M w/ PMHx Stage 4 Prostate Ca s/p palliative radiation, Mediastinal prostate metastatic mass) on oral Chemo (xtandi)-has esphogeal stent , Chronic Anemia, HTN and Chronic Back Pain presents to ED from SNF for evaluation. As per NH documentation, pt has been refusing medications through PEG for the last 3x days d/t episode of diarrhea post feeds. Patient's only complaint is LT buttock pain. Denies fevers, chills, chest pain, SOB, n/v, abdominal pain or urinary symptoms.    Vital Signs Last 24 Hrs  T(F): 98.3 (20 Apr 2024 08:01), Max: 98.9 (20 Apr 2024 00:02)  HR: 101 (20 Apr 2024 08:01) (99 - 110)  BP: 126/83 (20 Apr 2024 08:01) (112/82 - 135/82)  RR: 18 (20 Apr 2024 08:01) (18 - 18)  SpO2: 98% (20 Apr 2024 08:01) (98% - 100%)    PHYSICAL EXAM:  GENERAL: NAD, poorly-groomed, poorly-developed  HEAD:  Atraumatic, Normocephalic  EYES: EOMI, conjunctiva and sclera clear  ENMT: Moist mucous membranes, Good dentition, no thrush  NECK: Supple, No JVD  CHEST/LUNG: no breath sounds lower lobes, upper lobes crackles L>R  HEART: RRR; S1/S2, 2/6 systolic murmur   ABDOMEN: Soft, Nontender, Nondistended; Bowel sounds present  VASCULAR: Normal pulses, Normal capillary refill  EXTREMITIES: No calf tenderness, No cyanosis, No edema  LYMPH: Normal; No lymphadenopathy noted  SKIN: Warm, Intact  PSYCH: Normal mood, Normal affect  NERVOUS SYSTEM:  A/O x2, poor concentration, difficulty processing information     #LT Gluteal Cellulitis  - Vitals: Temp 98.3F, /82, , RR 18, SpO2 100% on RA  - CT A/P shows new mild edema and enlargement of the left gluteus maximums musculature with associated subcutaneous edema. No discrete abscess.  - s/p Cefepime, Vanc, Flagyl IV x1 in the ED  - start Cefazolin 2g q8hrs  - f/u BCx, urine culture in process -MRSA swab requested   - f/u ID c/s    #Inability to Tolerate PEG Feeds - Diarrhea (chronic issue)  #h/o Dysphagia s/p PEG  - s/p Esophageal Stent and PEG 3/5/24 -> pt having diarrhea after feeds -> here for the same issue  - patient cleared for pureed diet and thin liquids per SS on last admission  - patient has been refusing PEG feeds out of concern he will have diarrhea again.  - s/p 1L NS bolus  - resume PEG feeds w/ Peptamen  - c/w home Imodium 2mg q8hrs PRN for diarrhea  - f/u nutrition consult    #New b/l Large Pleural Effusion  - not SOB, satting well on RA  - previously had only Left Pleural Effusion  - CT A/P showed new/increased large bilateral pleural effusions.  - Pulm was consult for thoracentesis but had refused last visit  - see no utility in diuresing - can reconsider IV Lasix if patient becomes symptomatic  - patient is still refusing thoracentesis, if patient changes his mind, can consider pulm consult    #Stage 4 Prostate Ca s/p Radiation (on active PO chemo) w/ metastasis to mediastinum or primary mediastinal mass?  #Severe Left Hydronephrosis (unchanged from prior)  - CT A/P shows:  1. Metastatic disease in the abdomen and pelvis as above with slightly increased upper abdominal adenopathy.  2. No significant change approximately in the large bladder mass and partially imaged mediastinal mass.  3. Severe left hydronephrosis to the level of the proximal ureter, not significantly changed.  - previous AFP and CEA wnl  - previous LDH and haptoglobin elevated  - previous PSA total 220, PSA free is 22, CA19: 131, : 57  - Esophageal biopsy: metastatic poorly differentiated carcinoma of prostatic origin.  - per onc note, patient had prior biopsy of site that demonstrated metastatic prostate adenocarcinoma and was started on Docetaxel, in January 2024 patient was started on Pembrolizumab and continued on keytruda injections.   - Dr. Irizarry (pt's oncologist): cs rad/onco for palliative radiation, onco asked about new protocol since patient progressed, rec hospice and palliative   - Heme onc also recommending palliative radiation to mediastinal mass. Also reevaluated by Rad Onc for palliative RT -> CT sim for mapping and planning, followed by XRT.  - Rad/onc following for palliative RT -> CT sim for mapping and planning, followed by XRT.  May not be able to undergo RT given how little lung is left without intervention.  - Psych previously determined that patient lacks capacity, Pt refusing DNR/DNI status based on Worship beliefs, Ethics committee previously agreed 2P consent would not be appropriate to make him DNR/DNI  - c/w home Xtandi 160 mg daily (if pharmacy needs non-formulary, please submit in AM)    #Chronic Normocytic Anemia  - Hgb 8.2 (previously 8.6)  - iron  studies noted % sat 27  - monitor H&H, maintain active T&S, transfuse PRN, Keep hgb >7    #Recent GAS strep pyogenes Bacteremia s/p Treatment  - TTE: EF 65 to 70%  - finished course of zosyn during last admission    #DVT ppx: Lovenox SubQ  #GI ppx: PPI PO daily  #Diet: PEG - Peptamen  #Activity: IAT  #Code Status: Full Code  #Dispo: from NH, acute

## 2024-04-20 NOTE — CHART NOTE - NSCHARTNOTEFT_GEN_A_CORE
Consult received and chart reviewed; palliative care will formally evaluate patient Monday AM, please call [phone] in the interim for any acute needs. Thank you.   ______________  Buck Ponce MD  Palliative Medicine  [facility]   [facility]  [phone]

## 2024-04-20 NOTE — PATIENT PROFILE ADULT - FALL HARM RISK - RISK INTERVENTIONS

## 2024-04-20 NOTE — H&P ADULT - ASSESSMENT
68M w/ PMHx Stage 4 Prostate Ca s/p palliative radiation, Mediastinal Mass on oral Chemo (xtandi), Chronic Anemia, HTN and Chronic Back Pain presents to ED from SNF for evaluation. As per NH documentation, pt has been refusing medications through PEG for the last 3x days d/t episode of diarrhea post feeds. Patient's only complaint is LT buttock pain. Denies fevers, chills, chest pain, SOB, n/v, abdominal pain or urinary symptoms.    #LT Buttock Pain  - Vitals: Temp 98.3F, /82, , RR 18, SpO2 100% on RA  - CT A/P shows new mild edema and enlargement of the left gluteus maximums musculature with associated subcutaneous edema. No discrete abscess.  - s/p Cefepime, Vanc, Flagyl IV x1    #Inability to Tolerate PEG Feeds - Diarrhea (chronic issue)  #h/o Dysphagia s/p PEG  - s/p Esophageal Stent and PEG 3/5/24 -> pt having diarrhea after feeds -> here for the same issue  - patient cleared for pureed diet and thin liquids per SS on last admission  - patient has been refusing PEG feeds out of concern he will have diarrhea again.  - Assured patient that diarrhea if it occurs can be managed. Encouraging patient to take feeds.  - s/p 1L NS bolus    #New b/l Large Pleural Effusion  - not SOB  - CT A/P showed new/increased large bilateral pleural effusions.  - previously had only Left Pleural Effusion  - Pulm was consult for thoracentesis but had refused last visit  - diuretics? hold if BP soft    #Hx of prostate cancer s/p radiation therapy  #Prostate cancer metastasis to mediastinum or primary mediastinal mass?   - Ca 7.8, Albumin 2.9,   1. CT A/P shows:  - Metastatic disease in the abdomen and pelvis as above with slightly increased upper abdominal adenopathy.  - No significant change approximately in the large bladder mass and partially imaged mediastinal mass.  - Severe left hydronephrosis to the level of the proximal ureter, not significantly changed.  - AFP and CEA wnl - LDH and haptoglobin elevated - PSA total 220, PSA free is 22 - CA19 131-  57  - Esophageal biopsy:  metastatic poorly differentiated carcinoma of prostatic origin.  - per onc note, patient had prior biopsy of site that demonstrated metastatic prostate adenocarcinoma and was started on Docetaxel, in January 2024 patient was started on Pembrolizumab and continued on keytruda injections.   - Dr. Irizarry (pt's oncologist): cs rad/onco for palliative radiation, onco asked about new protocol since patient progressed , rec hospice and palliative   - EGD replaced stent and PEG placed 3/5/24   - Reevaluated by heme/onc, will start on xtandi 160 mg daily. Pharmacy has to order med as it is non formulary; script sent to [facility]; will dispense med once available. Heme onc also recommending palliative radiation to mediastinal mass. Also reevaluated by Rad Onc for palliative RT -> CT sim for mapping and planning, followed by XRT.  - Rad/onc following for palliative RT -> CT sim for mapping and planning, followed by XRT.  May not be able to undergo RT given how little lung is left without intervention. Pending thora  - lacks capacity - psych, ethics and palliative following:     - pt refusing DNR/DNI status based on Syn[employer] beliefs, ethics agreed 2P consent would not be appropriate to make him DNR/DNI    - Patient has been refusing PEG feeds for past couple days, despite encouragement and assurance of aiding patient if diarrhea reoccurs. Ethics committee encouraging patient to take PEG feeds. Upgraded PO diet to encourage nutrition.    #Chronic Normocytic Anemia  - Hgb 8.2 (previously 8.6)  - iron  studies noted % sat 27   - MCV normal, SPEP negative    #Recent GAS strep pyogenes Bacteremia s/p Treatment  - TTE: EF 65 to 70%.  - finished course of zosyn during last admission    #DVT ppx:   #GI ppx: n/a  #Diet: PEG + Diet?  #Activity: IAT  #Code Status: Full Code  #Dispo: from NH, acute 68M w/ PMHx Stage 4 Prostate Ca s/p palliative radiation, Mediastinal Mass on oral Chemo (xtandi), Chronic Anemia, HTN and Chronic Back Pain presents to ED from SNF for evaluation. As per NH documentation, pt has been refusing medications through PEG for the last 3x days d/t episode of diarrhea post feeds. Patient's only complaint is LT buttock pain. Denies fevers, chills, chest pain, SOB, n/v, abdominal pain or urinary symptoms.    #LT Buttock Pain / Cellulitis  - Vitals: Temp 98.3F, /82, , RR 18, SpO2 100% on RA  - CT A/P shows new mild edema and enlargement of the left gluteus maximums musculature with associated subcutaneous edema. No discrete abscess.  - c/w Cefepime, Vanc, Flagyl IV  - f/u BCx, MRSA swab  - f/u Burn c/s and ID c/s    #Inability to Tolerate PEG Feeds - Diarrhea (chronic issue)  #h/o Dysphagia s/p PEG  - s/p Esophageal Stent and PEG 3/5/24 -> pt having diarrhea after feeds -> here for the same issue  - patient cleared for pureed diet and thin liquids per SS on last admission  - patient has been refusing PEG feeds out of concern he will have diarrhea again.  - s/p 1L NS bolus  - resume PEG feeds w/ Peptamen  - c/w home Imodium 2mg q8hrs PRN for diarrhea    #New b/l Large Pleural Effusion  - not SOB, satting well on RA  - previously had only Left Pleural Effusion  - CT A/P showed new/increased large bilateral pleural effusions.  - Pulm was consult for thoracentesis but had refused last visit  - see no utility in diuresing - can reconsider IV Lasix if patient becomes symptomatic  - patient is still refusing thoracentesis, if patient changes his mind, can consider pulm consult    #Hx of prostate cancer s/p radiation therapy  #Prostate cancer metastasis to mediastinum or primary mediastinal mass?   1. CT A/P shows:  - Metastatic disease in the abdomen and pelvis as above with slightly increased upper abdominal adenopathy.  - No significant change approximately in the large bladder mass and partially imaged mediastinal mass.  - Severe left hydronephrosis to the level of the proximal ureter, not significantly changed.  - AFP and CEA wnl - LDH and haptoglobin elevated - PSA total 220, PSA free is 22 - CA19 131-  57  - Esophageal biopsy:  metastatic poorly differentiated carcinoma of prostatic origin.  - per onc note, patient had prior biopsy of site that demonstrated metastatic prostate adenocarcinoma and was started on Docetaxel, in January 2024 patient was started on Pembrolizumab and continued on keytruda injections.   - Dr. Irizarry (pt's oncologist): cs rad/onco for palliative radiation, onco asked about new protocol since patient progressed , rec hospice and palliative   - EGD replaced stent and PEG placed 3/5/24   - Reevaluated by heme/onc, will start on xtandi 160 mg daily. Pharmacy has to order med as it is non formulary; script sent to [facility]; will dispense med once available. Heme onc also recommending palliative radiation to mediastinal mass. Also reevaluated by Rad Onc for palliative RT -> CT sim for mapping and planning, followed by XRT.  - Rad/onc following for palliative RT -> CT sim for mapping and planning, followed by XRT.  May not be able to undergo RT given how little lung is left without intervention. Pending thora  - lacks capacity - psych, ethics and palliative following:     - pt refusing DNR/DNI status based on Buddhist beliefs, ethics agreed 2P consent would not be appropriate to make him DNR/DNI    - Patient has been refusing PEG feeds for past couple days, despite encouragement and assurance of aiding patient if diarrhea reoccurs. Ethics committee encouraging patient to take PEG feeds. Upgraded PO diet to encourage nutrition.    #Chronic Normocytic Anemia  - Hgb 8.2 (previously 8.6)  - iron  studies noted % sat 27   - MCV normal, SPEP negative    #Recent GAS strep pyogenes Bacteremia s/p Treatment  - TTE: EF 65 to 70%.  - finished course of zosyn during last admission    #DVT ppx: Lovenox SubQ  #GI ppx: PPI PO daily  #Diet: PEG - Peptamen  #Activity: IAT  #Code Status: Full Code  #Dispo: from NH, acute 68M w/ PMHx Stage 4 Prostate Ca s/p palliative radiation, Mediastinal Mass on oral Chemo (xtandi), Chronic Anemia, HTN and Chronic Back Pain presents to ED from SNF for evaluation. As per NH documentation, pt has been refusing medications through PEG for the last 3x days d/t episode of diarrhea post feeds. Patient's only complaint is LT buttock pain. Denies fevers, chills, chest pain, SOB, n/v, abdominal pain or urinary symptoms.    #LT Gluteal Cellulitis  - Vitals: Temp 98.3F, /82, , RR 18, SpO2 100% on RA  - CT A/P shows new mild edema and enlargement of the left gluteus maximums musculature with associated subcutaneous edema. No discrete abscess.  - s/p Cefepime, Vanc, Flagyl IV x1 in the ED  - start Cefazolin 2g q8hrs  - f/u BCx, MRSA swab  - f/u ID c/s    #Inability to Tolerate PEG Feeds - Diarrhea (chronic issue)  #h/o Dysphagia s/p PEG  - s/p Esophageal Stent and PEG 3/5/24 -> pt having diarrhea after feeds -> here for the same issue  - patient cleared for pureed diet and thin liquids per SS on last admission  - patient has been refusing PEG feeds out of concern he will have diarrhea again.  - s/p 1L NS bolus  - resume PEG feeds w/ Peptamen  - c/w home Imodium 2mg q8hrs PRN for diarrhea  - f/u nutrition consult    #New b/l Large Pleural Effusion  - not SOB, satting well on RA  - previously had only Left Pleural Effusion  - CT A/P showed new/increased large bilateral pleural effusions.  - Pulm was consult for thoracentesis but had refused last visit  - see no utility in diuresing - can reconsider IV Lasix if patient becomes symptomatic  - patient is still refusing thoracentesis, if patient changes his mind, can consider pulm consult    #Stage 4 Prostate Ca s/p Radiation (on active PO chemo) w/ metastasis to mediastinum or primary mediastinal mass?  #Severe Left Hydronephrosis (unchanged from prior)  - CT A/P shows:  1. Metastatic disease in the abdomen and pelvis as above with slightly increased upper abdominal adenopathy.  2. No significant change approximately in the large bladder mass and partially imaged mediastinal mass.  3. Severe left hydronephrosis to the level of the proximal ureter, not significantly changed.  - previous AFP and CEA wnl  - LDH and haptoglobin elevated  - PSA total 220, PSA free is 22, CA19: 131, : 57  - Esophageal biopsy:  metastatic poorly differentiated carcinoma of prostatic origin.  - per onc note, patient had prior biopsy of site that demonstrated metastatic prostate adenocarcinoma and was started on Docetaxel, in January 2024 patient was started on Pembrolizumab and continued on keytruda injections.   - Dr. Irizarry (pt's oncologist): cs rad/onco for palliative radiation, onco asked about new protocol since patient progressed, rec hospice and palliative   - Reevaluated by heme/onc, will start on xtandi 160 mg daily. Pharmacy has to order med as it is non formulary; script sent to [facility]; will dispense med once available. Heme onc also recommending palliative radiation to mediastinal mass. Also reevaluated by Rad Onc for palliative RT -> CT sim for mapping and planning, followed by XRT.  - Rad/onc following for palliative RT -> CT sim for mapping and planning, followed by XRT.  May not be able to undergo RT given how little lung is left without intervention.  - lacks capacity - psych, ethics and palliative following:     - pt refusing DNR/DNI status based on Gnosticist beliefs, ethics agreed 2P consent would not be appropriate to make him DNR/DNI    #Chronic Normocytic Anemia  - Hgb 8.2 (previously 8.6)  - iron  studies noted % sat 27  - monitor H&H, maintain active T&S, transfuse PRN, Keep hgb >7    #Recent GAS strep pyogenes Bacteremia s/p Treatment  - TTE: EF 65 to 70%  - finished course of zosyn during last admission    #DVT ppx: Lovenox SubQ  #GI ppx: PPI PO daily  #Diet: PEG - Peptamen  #Activity: IAT  #Code Status: Full Code  #Dispo: from NH, acute 68M w/ PMHx Stage 4 Prostate Ca s/p palliative radiation, Mediastinal Mass on oral Chemo (xtandi), Chronic Anemia, HTN and Chronic Back Pain presents to ED from SNF for evaluation. As per NH documentation, pt has been refusing medications through PEG for the last 3x days d/t episode of diarrhea post feeds. Patient's only complaint is LT buttock pain. Denies fevers, chills, chest pain, SOB, n/v, abdominal pain or urinary symptoms.    #LT Gluteal Cellulitis  - Vitals: Temp 98.3F, /82, , RR 18, SpO2 100% on RA  - CT A/P shows new mild edema and enlargement of the left gluteus maximums musculature with associated subcutaneous edema. No discrete abscess.  - s/p Cefepime, Vanc, Flagyl IV x1 in the ED  - start Cefazolin 2g q8hrs  - f/u BCx, MRSA swab  - f/u ID c/s    #Inability to Tolerate PEG Feeds - Diarrhea (chronic issue)  #h/o Dysphagia s/p PEG  - s/p Esophageal Stent and PEG 3/5/24 -> pt having diarrhea after feeds -> here for the same issue  - patient cleared for pureed diet and thin liquids per SS on last admission  - patient has been refusing PEG feeds out of concern he will have diarrhea again.  - s/p 1L NS bolus  - resume PEG feeds w/ Peptamen  - c/w home Imodium 2mg q8hrs PRN for diarrhea  - f/u nutrition consult    #New b/l Large Pleural Effusion  - not SOB, satting well on RA  - previously had only Left Pleural Effusion  - CT A/P showed new/increased large bilateral pleural effusions.  - Pulm was consult for thoracentesis but had refused last visit  - see no utility in diuresing - can reconsider IV Lasix if patient becomes symptomatic  - patient is still refusing thoracentesis, if patient changes his mind, can consider pulm consult    #Stage 4 Prostate Ca s/p Radiation (on active PO chemo) w/ metastasis to mediastinum or primary mediastinal mass?  #Severe Left Hydronephrosis (unchanged from prior)  - CT A/P shows:  1. Metastatic disease in the abdomen and pelvis as above with slightly increased upper abdominal adenopathy.  2. No significant change approximately in the large bladder mass and partially imaged mediastinal mass.  3. Severe left hydronephrosis to the level of the proximal ureter, not significantly changed.  - previous AFP and CEA wnl  - previous LDH and haptoglobin elevated  - previous PSA total 220, PSA free is 22, CA19: 131, : 57  - Esophageal biopsy: metastatic poorly differentiated carcinoma of prostatic origin.  - per onc note, patient had prior biopsy of site that demonstrated metastatic prostate adenocarcinoma and was started on Docetaxel, in January 2024 patient was started on Pembrolizumab and continued on keytruda injections.   - Dr. Irizarry (pt's oncologist): cs rad/onco for palliative radiation, onco asked about new protocol since patient progressed, rec hospice and palliative   - Heme onc also recommending palliative radiation to mediastinal mass. Also reevaluated by Rad Onc for palliative RT -> CT sim for mapping and planning, followed by XRT.  - Rad/onc following for palliative RT -> CT sim for mapping and planning, followed by XRT.  May not be able to undergo RT given how little lung is left without intervention.  - Psych previously determined that patient lacks capacity, Pt refusing DNR/DNI status based on Latter-day beliefs, Ethics committee previously agreed 2P consent would not be appropriate to make him DNR/DNI  - c/w home Xtandi 160 mg daily (if pharmacy needs non-formulary, please submit in AM)    #Chronic Normocytic Anemia  - Hgb 8.2 (previously 8.6)  - iron  studies noted % sat 27  - monitor H&H, maintain active T&S, transfuse PRN, Keep hgb >7    #Recent GAS strep pyogenes Bacteremia s/p Treatment  - TTE: EF 65 to 70%  - finished course of zosyn during last admission    #DVT ppx: Lovenox SubQ  #GI ppx: PPI PO daily  #Diet: PEG - Peptamen  #Activity: IAT  #Code Status: Full Code  #Dispo: from NH, acute 68M w/ PMHx Stage 4 Prostate Ca s/p palliative radiation, Mediastinal Mass on oral Chemo (xtandi), Chronic Anemia, HTN and Chronic Back Pain presents to ED from SNF for evaluation. As per NH documentation, pt has been refusing medications through PEG for the last 3x days d/t episode of diarrhea post feeds. Patient's only complaint is LT buttock pain. Denies fevers, chills, chest pain, SOB, n/v, abdominal pain or urinary symptoms.    #LT Gluteal Cellulitis  - Vitals: Temp 98.3F, /82, , RR 18, SpO2 100% on RA  - CT A/P shows new mild edema and enlargement of the left gluteus maximums musculature with associated subcutaneous edema. No discrete abscess.  - s/p Cefepime, Vanc, Flagyl IV x1 in the ED  - start Cefazolin 2g q8hrs  - f/u BCx, MRSA swab  - f/u ID c/s    #Inability to Tolerate PEG Feeds - Diarrhea (chronic issue)  #h/o Dysphagia s/p PEG  - s/p Esophageal Stent and PEG 3/5/24 -> pt having diarrhea after feeds -> here for the same issue  - patient cleared for pureed diet and thin liquids per SS on last admission  - patient has been refusing PEG feeds out of concern he will have diarrhea again.  - s/p 1L NS bolus  - resume PEG feeds w/ Peptamen  - c/w home Imodium 2mg q8hrs PRN for diarrhea  - f/u nutrition consult  - pt continues to be hesitant on allowing tube feeds     #New b/l Large Pleural Effusion  - not SOB, satting well on RA  - previously had only Left Pleural Effusion  - CT A/P showed new/increased large bilateral pleural effusions.  - Pulm consulted - currently agreeint to thoracentesis   - starting IV lasix 40mg qday     #Stage 4 Prostate Ca s/p Radiation (on active PO chemo) w/ metastasis to mediastinum  #Severe Left Hydronephrosis (unchanged from prior)  - CT A/P shows:  1. Metastatic disease in the abdomen and pelvis as above with slightly increased upper abdominal adenopathy.  2. No significant change approximately in the large bladder mass and partially imaged mediastinal mass.  3. Severe left hydronephrosis to the level of the proximal ureter, not significantly changed.  - previous AFP and CEA wnl  - previous LDH and haptoglobin elevated  - previous PSA total 220, PSA free is 22, CA19: 131, : 57  - Esophageal biopsy: metastatic poorly differentiated carcinoma of prostatic origin.  - per onc note, patient had prior biopsy of site that demonstrated metastatic prostate adenocarcinoma and was started on Docetaxel, in January 2024 patient was started on Pembrolizumab and continued on keytruda injections.   - Dr. Irizarry (pt's oncologist): cs rad/onco for palliative radiation, onco asked about new protocol since patient progressed, rec hospice and palliative   - Heme onc also recommending palliative radiation to mediastinal mass. Also reevaluated by Rad Onc for palliative RT -> CT sim for mapping and planning, followed by XRT.  - Rad/onc following for palliative RT -> CT sim for mapping and planning, followed by XRT.  May not be able to undergo RT given how little lung is left without intervention.  - Psych previously determined that patient lacks capacity, Pt refusing DNR/DNI status based on Scientologist beliefs, Ethics committee previously agreed 2P consent would not be appropriate to make him DNR/DNI  - c/w home Xtandi 160 mg daily (if pharmacy needs non-formulary, please submit in AM)    #Acute on Chronic Normocytic Anemia  - Hgb 8.2 (previously 8.6)  - iron  studies noted % sat 27  - monitor H&H, maintain active T&S, transfuse PRN, Keep hgb >7  - guaic ordered  - consider gi consult     #Recent GAS strep pyogenes Bacteremia s/p Treatment  - TTE: EF 65 to 70%  - finished course of zosyn during last admission    #GOC  -there is a copy of a MOLST signed 4/19/24 stating the pt wants dnr/dni  -4/20 pt states he wants full code  -pt to be full code currently  -palliative care consulted     #DVT ppx: Lovenox SubQ  #GI ppx: PPI PO daily  #Diet: PEG - Peptamen  #Activity: IAT  #Code Status: Full Code  #Dispo: from NH, acute  #Pending: pallitive consult, pulm consult, ID consult, nutrition consult

## 2024-04-20 NOTE — CONSULT NOTE ADULT - SUBJECTIVE AND OBJECTIVE BOX
RUFUS PICKARD  68y, Male  Allergy: No Known Allergies      CHIEF COMPLAINT: LT Gluteal Pain / Diarrhea (2024 01:33)      LOS  1d    HPI:  68M w/ PMHx Stage 4 Prostate Ca s/p palliative radiation, Mediastinal Mass on oral Chemo (xtandi), Chronic Anemia, HTN and Chronic Back Pain presents to ED from SNF for evaluation. As per NH documentation, pt has been refusing medications through PEG for the last 3x days d/t episode of diarrhea post feeds. Patient's only complaint is LT buttock pain. Denies fevers, chills, chest pain, SOB, n/v, abdominal pain or urinary symptoms.    Vitals: Temp 98.3F, /82, , RR 18, SpO2 100% on RA    Labs: Hgb 8.2 (previously 8.6), Cr 0.9 (at baseline), Ca 7.8, Albumin 2.9,     Imagin. CT A/P shows:  - New mild edema and enlargement of the left gluteus maximums musculature with associated subcutaneous edema. No discrete abscess.  - Metastatic disease in the abdomen and pelvis as above with slightly increased upper abdominal adenopathy.  - No significant change approximately in the large bladder mass and partially imaged mediastinal mass.  - Interval placement of an esophageal stent.  - Severe left hydronephrosis to the level of the proximal ureter, not significantly changed.  - New/increased large bilateral pleural effusions.    In the ED:  - s/p Cefepime, Vanc, Flagyl IV x1  - s/p 1L NS bolus    Admitted to medicine. (2024 01:33)      INFECTIOUS DISEASE HISTORY:  History as above.    PAST MEDICAL & SURGICAL HISTORY:  HTN (hypertension)      Prostate cancer          FAMILY HISTORY  Family history reviewed and non-contributory      SOCIAL HISTORY  Social History:        ROS  General: Denies rigors, nightsweats  HEENT: Denies headache, rhinorrhea, sore throat, eye pain  CV: Denies CP, palpitations  PULM: Denies wheezing, hemoptysis  GI: Denies hematemesis, hematochezia, melena  : Denies discharge, hematuria  MSK: Denies arthralgias, myalgias  SKIN: Denies rash, lesions  NEURO: Denies paresthesias, weakness  PSYCH: Denies depression, anxiety    VITALS:  T(F): 98.3, Max: 98.9 (04-20-24 @ 00:02)  HR: 101  BP: 126/83  RR: 18Vital Signs Last 24 Hrs  T(C): 36.8 (2024 08:01), Max: 37.2 (2024 00:02)  T(F): 98.3 (2024 08:01), Max: 98.9 (2024 00:02)  HR: 101 (2024 08:01) (99 - 110)  BP: 126/83 (2024 08:01) (112/82 - 135/82)  BP(mean): --  RR: 18 (2024 08:01) (18 - 18)  SpO2: 98% (2024 08:) (98% - 100%)    Parameters below as of 2024 00:02  Patient On (Oxygen Delivery Method): room air        PHYSICAL EXAM:  Gen: NAD, resting in bed  HEENT: Normocephalic, atraumatic  Neck: supple, no lymphadenopathy  CV: Regular rate & regular rhythm  Lungs: decreased BS at bases, no fremitus  Abdomen: Soft, BS present  Ext: Warm, well perfused  Neuro: non focal, awake  Skin: no rash, no erythema  Lines: no phlebitis    TESTS & MEASUREMENTS:                        7.5    10.75 )-----------( 462      ( 2024 07:30 )             24.7     04-20    137  |  109  |  14  ----------------------------<  124<H>  4.5   |  16<L>  |  1.0    Ca    7.4<L>      2024 07:30  Phos  2.3     04-20  Mg     2.1     04-20    TPro  5.0<L>  /  Alb  2.5<L>  /  TBili  0.5  /  DBili  x   /  AST  13  /  ALT  <5  /  AlkPhos  107  04-20      LIVER FUNCTIONS - ( 2024 07:30 )  Alb: 2.5 g/dL / Pro: 5.0 g/dL / ALK PHOS: 107 U/L / ALT: <5 U/L / AST: 13 U/L / GGT: x           Urinalysis Basic - ( 2024 07:30 )    Color: x / Appearance: x / SG: x / pH: x  Gluc: 124 mg/dL / Ketone: x  / Bili: x / Urobili: x   Blood: x / Protein: x / Nitrite: x   Leuk Esterase: x / RBC: x / WBC x   Sq Epi: x / Non Sq Epi: x / Bacteria: x        Urinalysis with Rflx Culture (collected 24 @ 18:24)    Culture - Blood (collected 24 @ 07:22)  Source: .Blood None  Final Report (24 @ 17:00):    No growth at 5 days    Culture - Blood (collected 24 @ 07:31)  Source: .Blood None  Final Report (24 @ 23:00):    No growth at 5 days    Culture - Blood (collected 24 @ 12:44)  Source: .Blood Blood  Final Report (24 @ 22:00):    No growth at 5 days    Culture - Blood (collected 24 @ 07:58)  Source: .Blood None  Final Report (24 @ 23:00):    No growth at 5 days    Culture - Blood (collected 24 @ 09:04)  Source: .Blood Blood-Peripheral  Final Report (24 @ 14:01):    No growth at 5 days    Culture - Blood (collected 24 @ 11:26)  Source: .Blood Blood-Peripheral  Final Report (24 @ 23:00):    No growth at 5 days    Culture - Blood (collected 24 @ 11:26)  Source: .Blood Blood  Final Report (24 @ 23:00):    No growth at 5 days    Culture - Blood (collected 24 @ 12:14)  Source: .Blood None  Gram Stain (24 @ 21:23):    Growth in aerobic bottle: Gram positive cocci in pairs    Growth in anaerobic bottle: Gram positive cocci in pairs  Final Report (24 @ 12:01):    Growth in aerobic and anaerobic bottles: Streptococcus pyogenes (Group A)    Direct identification is available within approximately 3-5    hours either by Blood Panel Multiplexed PCR or Direct    MALDI-TOF. Details: [email or web address]  Organism: Blood Culture PCR  Streptococcus pyogenes (Group A) (24 @ 12:01)  Organism: Streptococcus pyogenes (Group A) (24 @ 12:01)      Method Type: MIREYA      -  Ceftriaxone: S <=0.25      -  Penicillin: S <=0.03 Predicts results for ampicillin, amoxicillin, amoxicillin/clavulanate, ampicillin/sulbactam, 1st, 2nd and 3rd generation cephalosporins and carbapenems.      -  Vancomycin: S 0.5  Organism: Blood Culture PCR (24 @ 12:01)      Method Type: PCR      -  Streptococcus pyogenes (Group A): Detec    Culture - Urine (collected 23 @ 09:16)  Source: Clean Catch Clean Catch (Midstream)  Final Report (23 @ 22:36):    <10,000 CFU/mL Normal Urogenital Leigh Ann        Lactate, Blood: 1.8 mmol/L (24 @ 17:37)      INFECTIOUS DISEASES TESTING  Procalcitonin, Serum: 7.32 ng/mL (24 @ 12:14)      RADIOLOGY & ADDITIONAL TESTS:  I have personally reviewed the last Chest xray  CXR      CT  CT Abdomen and Pelvis w/ IV Cont:   ACC: [id number] EXAM:  CT ABDOMEN AND PELVIS IC   ORDERED BY: JULIO FIGUEREDO     PROCEDURE DATE:  2024          INTERPRETATION:  CLINICAL STATEMENT: Left buttocks abscess.    TECHNIQUE: Contiguous axial CT images were obtained from the lower chest   to the pubic symphysis following administration of intravenous contrast.    95 cc administered of Omnipaque 350 (5 cc discarded). Oral contrast was   not administered. Reformatted images in the coronal and sagittal planes   were acquired.    COMPARISON : CT abdomen/pelvis 2024.    FINDINGS:    LOWER CHEST: Bilateral large pleural effusions with adjacent compressive   atelectasis, increased compared to prior CT. Mediastinal mass measuring   approximately 5.0 x 3.2 cm with interval placement of esophageal stent,   partially visualized.    HEPATOBILIARY: Unremarkable.    SPLEEN: Unremarkable.    PANCREAS: Unremarkable.    ADRENAL GLANDS: Left adrenal gland metastasis measuring approximately 2.1   x 2.2 cm, no significant change from prior CT.    KIDNEYS: Stable severe left hydronephrosis secondary to obstruction at   the level of the proximal ureter, not significantly changed. Right renal   cysts and other low attenuating lesions in the right kidney too small to   characterize.    ABDOMINOPELVIC NODES: Increased bulky heterogeneous and partially   necrotic upper abdominal lymphadenopathy; for example, a large   gastrohepatic xavier mass measures 4.4 x 6.0 cm, slightly increased in   size compared to prior exam. No significant change in the remaining   adenopathy throughout the abdomen/pelvis.    PELVIC ORGANS: Large 7 cm bladder mass, unchanged since prior CT.    PERITONEUM/MESENTERY/BOWEL: Infiltrative mesorectal soft tissue   inseparable from the prostate gland and the rectum. No bowel obstruction,   ascites or pneumoperitoneum. Large left inguinal hernia containing fat   and nonobstructed colonic loops. Percutaneous gastric tube in the   stomach. No free air or ascites.    BONES/SOFT TISSUES: Multilevel degenerative changes of the spine noted.   No suspicious osseous lesions. New mild edema and enlargement of the left   gluteus maximums musculature with associated subcutaneous edema. No   discrete abscess.    OTHER: Atherosclerosis of the abdominal aorta and its branches.      IMPRESSION:    1. New mild edema and enlargement of the left gluteus maximums   musculature with associated subcutaneous edema. No discrete abscess.    2. Metastatic disease in the abdomen and pelvis as above with slightly   increased upper abdominal adenopathy. No significant change approximately   in the large bladder mass and partially imaged mediastinal mass. Interval   placement of an esophageal stent.    3. Severe left hydronephrosis to the level of the proximal ureter, not   significantly changed.    4. New/increased large bilateral pleural effusions.    --- End of Report ---          ALEJANDRO PLUNKETT MD; Resident Radiologist  This document has been electronically signed.  ANGI WATKINS MD; Attending Radiologist  Thisdocument has been electronically signed. 2024  9:37PM (24 @ 19:17)      CARDIOLOGY TESTING      MEDICATIONS  ascorbic acid 500 Oral daily  ceFAZolin   IVPB 2000 IV Intermittent every 8 hours  enoxaparin Injectable 40 SubCutaneous every 24 hours  enzalutamide 160 Oral daily  ferrous    sulfate Liquid 300 Enteral Tube daily  furosemide   Injectable 40 IV Push daily  influenza  Vaccine (HIGH DOSE) 0.7 IntraMuscular once  megestrol Suspension 400 Oral daily  metoprolol tartrate 25 Enteral Tube every 12 hours  multivitamin 1 Oral daily      Weight  Weight (kg): 54.4 (24 @ 15:56)    ANTIBIOTICS:  ceFAZolin   IVPB 2000 milliGRAM(s) IV Intermittent every 8 hours      ALLERGIES:  No Known Allergies       RUFUS PICKARD  68y, Male  Allergy: No Known Allergies      CHIEF COMPLAINT: LT Gluteal Pain / Diarrhea (2024 01:33)      LOS  1d    HPI:  68M w/ PMHx Stage 4 Prostate Ca s/p palliative radiation, Mediastinal Mass on oral Chemo (xtandi), Chronic Anemia, HTN and Chronic Back Pain presents to ED from SNF for evaluation. As per NH documentation, pt has been refusing medications through PEG for the last 3x days d/t episode of diarrhea post feeds. Patient's only complaint is LT buttock pain. Denies fevers, chills, chest pain, SOB, n/v, abdominal pain or urinary symptoms.    Vitals: Temp 98.3F, /82, , RR 18, SpO2 100% on RA    Labs: Hgb 8.2 (previously 8.6), Cr 0.9 (at baseline), Ca 7.8, Albumin 2.9,     Imagin. CT A/P shows:  - New mild edema and enlargement of the left gluteus maximums musculature with associated subcutaneous edema. No discrete abscess.  - Metastatic disease in the abdomen and pelvis as above with slightly increased upper abdominal adenopathy.  - No significant change approximately in the large bladder mass and partially imaged mediastinal mass.  - Interval placement of an esophageal stent.  - Severe left hydronephrosis to the level of the proximal ureter, not significantly changed.  - New/increased large bilateral pleural effusions.    In the ED:  - s/p Cefepime, Vanc, Flagyl IV x1  - s/p 1L NS bolus    Admitted to medicine. (2024 01:33)      INFECTIOUS DISEASE HISTORY:  History as above.    PAST MEDICAL & SURGICAL HISTORY:  HTN (hypertension)      Prostate cancer          FAMILY HISTORY  Family history reviewed and non-contributory      SOCIAL HISTORY  Social History:        ROS  General: Denies rigors, nightsweats  HEENT: Denies headache, rhinorrhea, sore throat, eye pain  CV: Denies CP, palpitations  PULM: Denies wheezing, hemoptysis  GI: Denies hematemesis, hematochezia, melena  : Denies discharge, hematuria  MSK: Denies arthralgias, myalgias  SKIN: Denies rash, lesions  NEURO: Denies paresthesias, weakness  PSYCH: Denies depression, anxiety    VITALS:  T(F): 98.3, Max: 98.9 (04-20-24 @ 00:02)  HR: 101  BP: 126/83  RR: 18Vital Signs Last 24 Hrs  T(C): 36.8 (2024 08:01), Max: 37.2 (2024 00:02)  T(F): 98.3 (2024 08:01), Max: 98.9 (2024 00:02)  HR: 101 (2024 08:01) (99 - 110)  BP: 126/83 (2024 08:01) (112/82 - 135/82)  BP(mean): --  RR: 18 (2024 08:01) (18 - 18)  SpO2: 98% (2024 08:) (98% - 100%)    Parameters below as of 2024 00:02  Patient On (Oxygen Delivery Method): room air        PHYSICAL EXAM:  Gen: NAD, resting in bed  HEENT: Normocephalic, atraumatic  Neck: supple, no lymphadenopathy  CV: Regular rate & regular rhythm  Lungs: decreased BS at bases, no fremitus  Abdomen: Soft, BS present  Ext: Warm, well perfused  Neuro: non focal, awake  Skin: left gluteal tenderness -- area of induration and able to express purulent drainage   Lines: no phlebitis    TESTS & MEASUREMENTS:                        7.5    10.75 )-----------( 462      ( 2024 07:30 )             24.7     04-20    137  |  109  |  14  ----------------------------<  124<H>  4.5   |  16<L>  |  1.0    Ca    7.4<L>      2024 07:30  Phos  2.3     04-20  Mg     2.1     04-20    TPro  5.0<L>  /  Alb  2.5<L>  /  TBili  0.5  /  DBili  x   /  AST  13  /  ALT  <5  /  AlkPhos  107  04-20      LIVER FUNCTIONS - ( 2024 07:30 )  Alb: 2.5 g/dL / Pro: 5.0 g/dL / ALK PHOS: 107 U/L / ALT: <5 U/L / AST: 13 U/L / GGT: x           Urinalysis Basic - ( 2024 07:30 )    Color: x / Appearance: x / SG: x / pH: x  Gluc: 124 mg/dL / Ketone: x  / Bili: x / Urobili: x   Blood: x / Protein: x / Nitrite: x   Leuk Esterase: x / RBC: x / WBC x   Sq Epi: x / Non Sq Epi: x / Bacteria: x        Urinalysis with Rflx Culture (collected 24 @ 18:24)    Culture - Blood (collected 24 @ 07:22)  Source: .Blood None  Final Report (24 @ 17:00):    No growth at 5 days    Culture - Blood (collected 24 @ 07:31)  Source: .Blood None  Final Report (24 @ 23:00):    No growth at 5 days    Culture - Blood (collected 24 @ 12:44)  Source: .Blood Blood  Final Report (24 @ 22:00):    No growth at 5 days    Culture - Blood (collected 24 @ 07:58)  Source: .Blood None  Final Report (24 @ 23:00):    No growth at 5 days    Culture - Blood (collected 24 @ 09:04)  Source: .Blood Blood-Peripheral  Final Report (24 @ 14:01):    No growth at 5 days    Culture - Blood (collected 24 @ 11:26)  Source: .Blood Blood-Peripheral  Final Report (24 @ 23:00):    No growth at 5 days    Culture - Blood (collected 24 @ 11:26)  Source: .Blood Blood  Final Report (24 @ 23:00):    No growth at 5 days    Culture - Blood (collected 24 @ 12:14)  Source: .Blood None  Gram Stain (24 @ 21:23):    Growth in aerobic bottle: Gram positive cocci in pairs    Growth in anaerobic bottle: Gram positive cocci in pairs  Final Report (24 @ 12:01):    Growth in aerobic and anaerobic bottles: Streptococcus pyogenes (Group A)    Direct identification is available within approximately 3-5    hours either by Blood Panel Multiplexed PCR or Direct    MALDI-TOF. Details: [email or web address]  Organism: Blood Culture PCR  Streptococcus pyogenes (Group A) (24 @ 12:01)  Organism: Streptococcus pyogenes (Group A) (24 @ 12:01)      Method Type: MIREYA      -  Ceftriaxone: S <=0.25      -  Penicillin: S <=0.03 Predicts results for ampicillin, amoxicillin, amoxicillin/clavulanate, ampicillin/sulbactam, 1st, 2nd and 3rd generation cephalosporins and carbapenems.      -  Vancomycin: S 0.5  Organism: Blood Culture PCR (24 @ 12:01)      Method Type: PCR      -  Streptococcus pyogenes (Group A): Detec    Culture - Urine (collected 23 @ 09:16)  Source: Clean Catch Clean Catch (Midstream)  Final Report (23 @ 22:36):    <10,000 CFU/mL Normal Urogenital Leigh Ann        Lactate, Blood: 1.8 mmol/L (24 @ 17:37)      INFECTIOUS DISEASES TESTING  Procalcitonin, Serum: 7.32 ng/mL (24 @ 12:14)      RADIOLOGY & ADDITIONAL TESTS:  I have personally reviewed the last Chest xray  CXR      CT  CT Abdomen and Pelvis w/ IV Cont:   ACC: [id number] EXAM:  CT ABDOMEN AND PELVIS IC   ORDERED BY: JULIO FIGUEREDO     PROCEDURE DATE:  2024          INTERPRETATION:  CLINICAL STATEMENT: Left buttocks abscess.    TECHNIQUE: Contiguous axial CT images were obtained from the lower chest   to the pubic symphysis following administration of intravenous contrast.    95 cc administered of Omnipaque 350 (5 cc discarded). Oral contrast was   not administered. Reformatted images in the coronal and sagittal planes   were acquired.    COMPARISON : CT abdomen/pelvis 2024.    FINDINGS:    LOWER CHEST: Bilateral large pleural effusions with adjacent compressive   atelectasis, increased compared to prior CT. Mediastinal mass measuring   approximately 5.0 x 3.2 cm with interval placement of esophageal stent,   partially visualized.    HEPATOBILIARY: Unremarkable.    SPLEEN: Unremarkable.    PANCREAS: Unremarkable.    ADRENAL GLANDS: Left adrenal gland metastasis measuring approximately 2.1   x 2.2 cm, no significant change from prior CT.    KIDNEYS: Stable severe left hydronephrosis secondary to obstruction at   the level of the proximal ureter, not significantly changed. Right renal   cysts and other low attenuating lesions in the right kidney too small to   characterize.    ABDOMINOPELVIC NODES: Increased bulky heterogeneous and partially   necrotic upper abdominal lymphadenopathy; for example, a large   gastrohepatic xavier mass measures 4.4 x 6.0 cm, slightly increased in   size compared to prior exam. No significant change in the remaining   adenopathy throughout the abdomen/pelvis.    PELVIC ORGANS: Large 7 cm bladder mass, unchanged since prior CT.    PERITONEUM/MESENTERY/BOWEL: Infiltrative mesorectal soft tissue   inseparable from the prostate gland and the rectum. No bowel obstruction,   ascites or pneumoperitoneum. Large left inguinal hernia containing fat   and nonobstructed colonic loops. Percutaneous gastric tube in the   stomach. No free air or ascites.    BONES/SOFT TISSUES: Multilevel degenerative changes of the spine noted.   No suspicious osseous lesions. New mild edema and enlargement of the left   gluteus maximums musculature with associated subcutaneous edema. No   discrete abscess.    OTHER: Atherosclerosis of the abdominal aorta and its branches.      IMPRESSION:    1. New mild edema and enlargement of the left gluteus maximums   musculature with associated subcutaneous edema. No discrete abscess.    2. Metastatic disease in the abdomen and pelvis as above with slightly   increased upper abdominal adenopathy. No significant change approximately   in the large bladder mass and partially imaged mediastinal mass. Interval   placement of an esophageal stent.    3. Severe left hydronephrosis to the level of the proximal ureter, not   significantly changed.    4. New/increased large bilateral pleural effusions.    --- End of Report ---          ALEJANDRO PLUNKETT MD; Resident Radiologist  This document has been electronically signed.  ANGI WATKINS MD; Attending Radiologist  Thisdocument has been electronically signed. 2024  9:37PM (24 @ 19:17)      CARDIOLOGY TESTING      MEDICATIONS  ascorbic acid 500 Oral daily  ceFAZolin   IVPB 2000 IV Intermittent every 8 hours  enoxaparin Injectable 40 SubCutaneous every 24 hours  enzalutamide 160 Oral daily  ferrous    sulfate Liquid 300 Enteral Tube daily  furosemide   Injectable 40 IV Push daily  influenza  Vaccine (HIGH DOSE) 0.7 IntraMuscular once  megestrol Suspension 400 Oral daily  metoprolol tartrate 25 Enteral Tube every 12 hours  multivitamin 1 Oral daily      Weight  Weight (kg): 54.4 (24 @ 15:56)    ANTIBIOTICS:  ceFAZolin   IVPB 2000 milliGRAM(s) IV Intermittent every 8 hours      ALLERGIES:  No Known Allergies

## 2024-04-21 LAB
ALBUMIN SERPL ELPH-MCNC: 2.6 G/DL — LOW (ref 3.5–5.2)
ALP SERPL-CCNC: 111 U/L — SIGNIFICANT CHANGE UP (ref 30–115)
ALT FLD-CCNC: <5 U/L — SIGNIFICANT CHANGE UP (ref 0–41)
ANION GAP SERPL CALC-SCNC: 15 MMOL/L — HIGH (ref 7–14)
AST SERPL-CCNC: 13 U/L — SIGNIFICANT CHANGE UP (ref 0–41)
BASOPHILS # BLD AUTO: 0.01 K/UL — SIGNIFICANT CHANGE UP (ref 0–0.2)
BASOPHILS NFR BLD AUTO: 0.1 % — SIGNIFICANT CHANGE UP (ref 0–1)
BILIRUB SERPL-MCNC: 0.4 MG/DL — SIGNIFICANT CHANGE UP (ref 0.2–1.2)
BUN SERPL-MCNC: 13 MG/DL — SIGNIFICANT CHANGE UP (ref 10–20)
CALCIUM SERPL-MCNC: 7.6 MG/DL — LOW (ref 8.4–10.4)
CHLORIDE SERPL-SCNC: 108 MMOL/L — SIGNIFICANT CHANGE UP (ref 98–110)
CO2 SERPL-SCNC: 17 MMOL/L — SIGNIFICANT CHANGE UP (ref 17–32)
CREAT SERPL-MCNC: 1 MG/DL — SIGNIFICANT CHANGE UP (ref 0.7–1.5)
EGFR: 81 ML/MIN/1.73M2 — SIGNIFICANT CHANGE UP
EOSINOPHIL # BLD AUTO: 0.17 K/UL — SIGNIFICANT CHANGE UP (ref 0–0.7)
EOSINOPHIL NFR BLD AUTO: 1.9 % — SIGNIFICANT CHANGE UP (ref 0–8)
GI PCR PANEL: SIGNIFICANT CHANGE UP
GLUCOSE SERPL-MCNC: 115 MG/DL — HIGH (ref 70–99)
HCT VFR BLD CALC: 25.7 % — LOW (ref 42–52)
HGB BLD-MCNC: 7.8 G/DL — LOW (ref 14–18)
IMM GRANULOCYTES NFR BLD AUTO: 0.6 % — HIGH (ref 0.1–0.3)
LYMPHOCYTES # BLD AUTO: 0.46 K/UL — LOW (ref 1.2–3.4)
LYMPHOCYTES # BLD AUTO: 5.3 % — LOW (ref 20.5–51.1)
MAGNESIUM SERPL-MCNC: 2.1 MG/DL — SIGNIFICANT CHANGE UP (ref 1.8–2.4)
MCHC RBC-ENTMCNC: 29.7 PG — SIGNIFICANT CHANGE UP (ref 27–31)
MCHC RBC-ENTMCNC: 30.4 G/DL — LOW (ref 32–37)
MCV RBC AUTO: 97.7 FL — HIGH (ref 80–94)
MONOCYTES # BLD AUTO: 0.89 K/UL — HIGH (ref 0.1–0.6)
MONOCYTES NFR BLD AUTO: 10.2 % — HIGH (ref 1.7–9.3)
MRSA PCR RESULT.: POSITIVE
NEUTROPHILS # BLD AUTO: 7.17 K/UL — HIGH (ref 1.4–6.5)
NEUTROPHILS NFR BLD AUTO: 81.9 % — HIGH (ref 42.2–75.2)
NRBC # BLD: 0 /100 WBCS — SIGNIFICANT CHANGE UP (ref 0–0)
OB PNL STL: NEGATIVE — SIGNIFICANT CHANGE UP
PHOSPHATE SERPL-MCNC: 2.5 MG/DL — SIGNIFICANT CHANGE UP (ref 2.1–4.9)
PLATELET # BLD AUTO: 512 K/UL — HIGH (ref 130–400)
PMV BLD: 9.2 FL — SIGNIFICANT CHANGE UP (ref 7.4–10.4)
POTASSIUM SERPL-MCNC: 4.1 MMOL/L — SIGNIFICANT CHANGE UP (ref 3.5–5)
POTASSIUM SERPL-SCNC: 4.1 MMOL/L — SIGNIFICANT CHANGE UP (ref 3.5–5)
PROT SERPL-MCNC: 5.4 G/DL — LOW (ref 6–8)
RBC # BLD: 2.63 M/UL — LOW (ref 4.7–6.1)
RBC # FLD: 16.8 % — HIGH (ref 11.5–14.5)
SODIUM SERPL-SCNC: 140 MMOL/L — SIGNIFICANT CHANGE UP (ref 135–146)
WBC # BLD: 8.75 K/UL — SIGNIFICANT CHANGE UP (ref 4.8–10.8)
WBC # FLD AUTO: 8.75 K/UL — SIGNIFICANT CHANGE UP (ref 4.8–10.8)

## 2024-04-21 PROCEDURE — 99223 1ST HOSP IP/OBS HIGH 75: CPT

## 2024-04-21 PROCEDURE — 99232 SBSQ HOSP IP/OBS MODERATE 35: CPT

## 2024-04-21 RX ORDER — CEFAZOLIN SODIUM 1 G
1000 VIAL (EA) INJECTION EVERY 8 HOURS
Refills: 0 | Status: DISCONTINUED | OUTPATIENT
Start: 2024-04-21 | End: 2024-04-22

## 2024-04-21 RX ADMIN — Medication 40 MILLIGRAM(S): at 05:33

## 2024-04-21 RX ADMIN — Medication 500 MILLIGRAM(S): at 12:16

## 2024-04-21 RX ADMIN — ENOXAPARIN SODIUM 40 MILLIGRAM(S): 100 INJECTION SUBCUTANEOUS at 21:23

## 2024-04-21 RX ADMIN — Medication 300 MILLIGRAM(S): at 12:16

## 2024-04-21 RX ADMIN — Medication 25 MILLIGRAM(S): at 18:19

## 2024-04-21 RX ADMIN — MEGESTROL ACETATE 400 MILLIGRAM(S): 40 SUSPENSION ORAL at 12:15

## 2024-04-21 RX ADMIN — Medication 100 MILLIGRAM(S): at 21:23

## 2024-04-21 RX ADMIN — Medication 100 MILLIGRAM(S): at 05:33

## 2024-04-21 RX ADMIN — Medication 25 MILLIGRAM(S): at 05:33

## 2024-04-21 RX ADMIN — Medication 1 TABLET(S): at 12:16

## 2024-04-21 RX ADMIN — Medication 100 MILLIGRAM(S): at 18:19

## 2024-04-21 NOTE — DIETITIAN INITIAL EVALUATION ADULT - ORAL INTAKE PTA/DIET HISTORY
The patient reports following a regular diet; consumed three meals at >75%; denies MVI use. The patient reports his usual body weight is 200# but experienced an 80# weight loss in one year.

## 2024-04-21 NOTE — DIETITIAN INITIAL EVALUATION ADULT - NSFNSGIIOFT_GEN_A_CORE
Dx: 67y/o male with h/o stage 4 prostate cancer s/p palliative radiation, mediastinal prostate metastatic mass on oral Chemo (xtandi)-has esophageal stent, dysphagia, chronic anemia, HTN and chronic back pain, presented to ED from SNF for evaluation. As per NH documentation, the patient has been refusing medications through PEG for the last 3x days due to episode of diarrhea post feeds. Noted to have left gluteal cellulitis, inability to tolerate PEG Tube feeds (diarrhea - chronic issue) and new bilateral large pleural effusion.

## 2024-04-21 NOTE — DIETITIAN INITIAL EVALUATION ADULT - NSFNSNUTRCHEWSWALLOWFT_GEN_A_CORE
S/p swallow evaluation (4/21), recs include provide a pureed diet with thin liquids and will likely require continued use of PEG Tube.

## 2024-04-21 NOTE — DIETITIAN INITIAL EVALUATION ADULT - OTHER CALCULATIONS
Estimated Calorie Needs: [id number] x AF 1.4-1.6=8817-8143udwp/day vs 1904-2176kcal/day (35-40kcal/kg of admit weight) -Due PCM, metastatic cancer and weight loss  Estimated Protein Needs: 82-109grams/day (1.5-2grams/kg of admit weight) -Due to age, PCM, metastatic cancer and weight loss  Estimated Fluid Needs: 1908-2453mL/day (1mL/kcal)

## 2024-04-21 NOTE — SWALLOW BEDSIDE ASSESSMENT ADULT - DIET PRIOR TO ADMI
Documents from NH report soft bite sized w/ thin liquids diet w/ PEG feeds until changed to NPO on 4/18.  Pt reports that he was tolerating PO without difficulty.

## 2024-04-21 NOTE — CONSULT NOTE ADULT - ASSESSMENT
Impression:     Bilateral effusions  Metastatic prostate CA   Mediastinal mass - necrotic   Buttock cellulitis/purulent drainage   PEG tube placement     Plan:     CXR similar to that done in March   Effusions are bilateral and large on CT   Patient however on room air and denies shortness of breath  Left sided thoracentesis could be offered for diagnostic purpose and dyspnea relief - denies dyspnea however   When I spoke to him and suggested it he declined to consent; please clarify if patient indeed consents to thoracentesis  Check BNP; G1DD on previous echo; continue with diuretics   Abx management per ID; Burn eval   DVT ppx   On room air   Will follow

## 2024-04-21 NOTE — CONSULT NOTE ADULT - SUBJECTIVE AND OBJECTIVE BOX
Patient is a 68y old  Male who presents with a chief complaint of LT Gluteal Pain / Diarrhea (2024 14:09)      HPI:  68M w/ PMHx Stage 4 Prostate Ca s/p palliative radiation, Mediastinal Mass on oral Chemo (xtandi), Chronic Anemia, HTN and Chronic Back Pain presents to ED from SNF for evaluation. As per NH documentation, pt has been refusing medications through PEG for the last 3x days d/t episode of diarrhea post feeds. Patient's only complaint is LT buttock pain. Denies fevers, chills, chest pain, SOB, n/v, abdominal pain or urinary symptoms.    Vitals: Temp 98.3F, /82, , RR 18, SpO2 100% on RA    Labs: Hgb 8.2 (previously 8.6), Cr 0.9 (at baseline), Ca 7.8, Albumin 2.9,     Imagin. CT A/P shows:  - New mild edema and enlargement of the left gluteus maximums musculature with associated subcutaneous edema. No discrete abscess.  - Metastatic disease in the abdomen and pelvis as above with slightly increased upper abdominal adenopathy.  - No significant change approximately in the large bladder mass and partially imaged mediastinal mass.  - Interval placement of an esophageal stent.  - Severe left hydronephrosis to the level of the proximal ureter, not significantly changed.  - New/increased large bilateral pleural effusions.    In the ED:  - s/p Cefepime, Vanc, Flagyl IV x1  - s/p 1L NS bolus    Admitted to medicine. (2024 01:33)      PAST MEDICAL & SURGICAL HISTORY:  HTN (hypertension)      Prostate cancer            FAMILY HISTORY:  :  No known cardiovacular family hisotry     ROS:  See HPI     Allergies    No Known Allergies    Intolerances          PHYSICAL EXAM    ICU Vital Signs Last 24 Hrs  T(C): 36.7 (2024 00:00), Max: 37.3 (2024 15:00)  T(F): 98 (2024 00:00), Max: 99.2 (2024 15:00)  HR: 88 (2024 05:59) (87 - 101)  BP: 121/67 (2024 05:59) (115/74 - 129/61)  BP(mean): --  ABP: --  ABP(mean): --  RR: 18 (2024 00:00) (18 - 18)  SpO2: 98% (2024 18:28) (98% - 98%)    O2 Parameters below as of 2024 18:28  Patient On (Oxygen Delivery Method): room air            General: In NAD   HEENT:  SAKINA              Lymphatic system: No cervical LN   Lungs: Bilateral BS, clear anteriorly   Cardiovascular: Regular  Gastrointestinal: Soft, Positive BS  Musculoskeletal: No clubbing.  Moves all extremities.  Full range of motion   Skin: Warm.  Intact  Neurological: No motor or sensory deficit       24 @ 07:01  -  24 @ 07:00  --------------------------------------------------------  IN:    Enteral Tube Flush: 80 mL    Free Water: 150 mL  Total IN: 230 mL    OUT:    Voided (mL): 625 mL  Total OUT: 625 mL    Total NET: -395 mL          LABS:                          7.5    10.75 )-----------( 462      ( 2024 07:30 )             24.7                                               04-20    137  |  109  |  14  ----------------------------<  124<H>  4.5   |  16<L>  |  1.0    Ca    7.4<L>      2024 07:30  Phos  2.3     04-20  Mg     2.1     04-20    TPro  5.0<L>  /  Alb  2.5<L>  /  TBili  0.5  /  DBili  x   /  AST  13  /  ALT  <5  /  AlkPhos  107  04-20                                             Urinalysis Basic - ( 2024 07:30 )    Color: x / Appearance: x / SG: x / pH: x  Gluc: 124 mg/dL / Ketone: x  / Bili: x / Urobili: x   Blood: x / Protein: x / Nitrite: x   Leuk Esterase: x / RBC: x / WBC x   Sq Epi: x / Non Sq Epi: x / Bacteria: x                                                  LIVER FUNCTIONS - ( 2024 07:30 )  Alb: 2.5 g/dL / Pro: 5.0 g/dL / ALK PHOS: 107 U/L / ALT: <5 U/L / AST: 13 U/L / GGT: x                                                  Urinalysis with Rflx Culture (collected 2024 18:24)    Culture - Urine (collected 2024 18:24)  Source: Clean Catch None  Final Report (2024 23:20):    <10,000 CFU/mL Normal Urogenital Leigh Ann    Culture - Blood (collected 2024 17:37)  Source: .Blood Blood  Preliminary Report (2024 23:05):    No growth at 24 hours                                                                                           X-Rays                                                                                     ECHO    MEDICATIONS  (STANDING):  ascorbic acid 500 milliGRAM(s) Oral daily  ceFAZolin   IVPB 2000 milliGRAM(s) IV Intermittent every 8 hours  enoxaparin Injectable 40 milliGRAM(s) SubCutaneous every 24 hours  enzalutamide 160 milliGRAM(s) Oral daily  ferrous    sulfate Liquid 300 milliGRAM(s) Enteral Tube daily  furosemide   Injectable 40 milliGRAM(s) IV Push daily  influenza  Vaccine (HIGH DOSE) 0.7 milliLiter(s) IntraMuscular once  megestrol Suspension 400 milliGRAM(s) Oral daily  metoprolol tartrate 25 milliGRAM(s) Enteral Tube every 12 hours  multivitamin 1 Tablet(s) Oral daily    MEDICATIONS  (PRN):  acetaminophen     Tablet .. 650 milliGRAM(s) Oral every 6 hours PRN Temp greater or equal to 38C (100.4F), Mild Pain (1 - 3)  aluminum hydroxide/magnesium hydroxide/simethicone Suspension 30 milliLiter(s) Oral every 4 hours PRN Dyspepsia  loperamide 2 milliGRAM(s) Oral every 8 hours PRN for diarrhea  melatonin 3 milliGRAM(s) Oral at bedtime PRN Insomnia  ondansetron Injectable 4 milliGRAM(s) IV Push every 8 hours PRN Nausea and/or Vomiting  sodium bicarbonate 1300 milliGRAM(s) Oral every 6 hours PRN GERD

## 2024-04-21 NOTE — DIETITIAN NUTRITION RISK NOTIFICATION - TREATMENT: THE FOLLOWING DIET HAS BEEN RECOMMENDED
Diet, Pureed:   Tube Feeding Modality: Gastrostomy  Osmolite 1.5 Sam (OSMOLITE1.5)  Total Volume for 24 Hours (mL): 1600  Bolus  Total Volume of Bolus (mL):  400  Tube Feed Frequency: Every 6 hours   Tube Feed Start Time: 19:00  Bolus Feed Rate (mL per Hour): 67   Bolus Feed Duration (in Hours): 24  Free Water Flush  Bolus   Total Volume per Flush (mL): 150   Frequency: Every 6 Hours    Start Time: 19:00 (04-21-24 @ 18:08) [Pending Verification By Attending]  Diet, Pureed:   Tube Feeding Modality: Gastrostomy  Peptamen A.F. Formula (PEPTAMENAFRTH)  Total Volume for 24 Hours (mL): 1000  Bolus  Total Volume of Bolus (mL):  250  Tube Feed Frequency: Every 6 hours   Tube Feed Start Time: 06:00  Bolus Feed Rate (mL per Hour): 250   Bolus Feed Duration (in Hours): 1  Free Water Flush  Bolus   Total Volume per Flush (mL): 60   Frequency: Every 6 Hours (04-21-24 @ 13:05) [Active]

## 2024-04-21 NOTE — DIETITIAN INITIAL EVALUATION ADULT - ENTERAL
1.Recommend change TF formula to Osmolite 1.5 at 400mL Qhrs with water flush with 150mL Q6hrs, to provide (2400kcal, 101gm protein, 1216mL free H2O)

## 2024-04-21 NOTE — PROGRESS NOTE ADULT - ASSESSMENT
68M w/ PMHx Stage 4 Prostate Ca s/p palliative radiation, Mediastinal prostate metastatic mass) on oral Chemo (xtandi)-has esphogeal stent , Chronic Anemia, HTN and Chronic Back Pain presents to ED from SNF for evaluation. As per NH documentation, pt has been refusing medications through PEG for the last 3x days d/t episode of diarrhea post feeds. Patient's only complaint is LT buttock pain. Denies fevers, chills, chest pain, SOB, n/v, abdominal pain or urinary symptoms.    #LT Gluteal Cellulitis  - Vitals: Temp 98.3F, /82, , RR 18, SpO2 100% on RA  - CT A/P shows new mild edema and enlargement of the left gluteus maximums musculature with associated subcutaneous edema. No discrete abscess.  - Cefazolin 1g q8hrs + doxy 100mg bid   - f/u BCx, urine culture in process -MRSA swab positive   - ID consult appreciated  - Burn consult pending    #Inability to Tolerate PEG Feeds - Diarrhea (chronic issue)  #h/o Dysphagia s/p PEG  - s/p Esophageal Stent and PEG 3/5/24 -> pt having diarrhea after feeds -> here for the same issue  - patient cleared for pureed diet and thin liquids per SS on last admission  - patient has been refusing PEG feeds out of concern he will have diarrhea again.  - s/p 1L NS bolus  - resume PEG feeds w/ Peptamen  - c/w home Imodium 2mg q8hrs PRN for diarrhea  - f/u nutrition consult  - gi pcr, stool culture       #New b/l Large Pleural Effusion  - not SOB, satting well on RA  - previously had only Left Pleural Effusion  - CT A/P showed new/increased large bilateral pleural effusions.  - Pulm consulted - currently agreeint to thoracentesis   - starting IV lasix 40mg qday     #Stage 4 Prostate Ca s/p Radiation (on active PO chemo) w/ metastasis to mediastinum or primary mediastinal mass?  #Severe Left Hydronephrosis (unchanged from prior)  - CT A/P shows:  1. Metastatic disease in the abdomen and pelvis as above with slightly increased upper abdominal adenopathy.  2. No significant change approximately in the large bladder mass and partially imaged mediastinal mass.  3. Severe left hydronephrosis to the level of the proximal ureter, not significantly changed.  - previous AFP and CEA wnl  - previous LDH and haptoglobin elevated  - previous PSA total 220, PSA free is 22, CA19: 131, : 57  - Esophageal biopsy: metastatic poorly differentiated carcinoma of prostatic origin.  - per onc note, patient had prior biopsy of site that demonstrated metastatic prostate adenocarcinoma and was started on Docetaxel, in January 2024 patient was started on Pembrolizumab and continued on keytruda injections.   - Dr. Irizarry (pt's oncologist): cs rad/onco for palliative radiation, onco asked about new protocol since patient progressed, rec hospice and palliative   - Heme onc also recommending palliative radiation to mediastinal mass. Also reevaluated by Rad Onc for palliative RT -> CT sim for mapping and planning, followed by XRT.  - Rad/onc following for palliative RT -> CT sim for mapping and planning, followed by XRT.  May not be able to undergo RT given how little lung is left without intervention.  - Psych previously determined that patient lacks capacity, Pt refusing DNR/DNI status based on [employer] beliefs, Ethics committee previously agreed 2P consent would not be appropriate to make him DNR/DNI  - c/w home Xtandi 160 mg daily- nonformulary was submitted please follow up on     #Chronic Normocytic Anemia  - Hgb 8.2 (previously 8.6)  - iron  studies noted % sat 27  - monitor H&H, maintain active T&S, transfuse PRN, Keep hgb >7    #GOC  -there is a copy of a MOLST signed 4/19/24 stating the pt wants dnr/dni  -4/20 pt states he wants full code  -pt to be full code currently  -palliative care consulted     #DVT ppx: Lovenox SubQ  #GI ppx: PPI PO daily  #Diet: PEG - Peptamen  #Activity: IAT  #Code Status: Full Code  #Dispo: from NH, acute.  #Pending: palliative consult, burn , stool studies, trend diarrhea  68M w/ PMHx Stage 4 Prostate Ca s/p palliative radiation, Mediastinal prostate metastatic mass) on oral Chemo (xtandi)-has esphogeal stent , Chronic Anemia, HTN and Chronic Back Pain presents to ED from SNF for evaluation. As per NH documentation, pt has been refusing medications through PEG for the last 3x days d/t episode of diarrhea post feeds. Patient's only complaint is LT buttock pain. Denies fevers, chills, chest pain, SOB, n/v, abdominal pain or urinary symptoms.    #LT Gluteal Cellulitis  - Vitals: Temp 98.3F, /82, , RR 18, SpO2 100% on RA  - CT A/P shows new mild edema and enlargement of the left gluteus maximums musculature with associated subcutaneous edema. No discrete abscess.  - Cefazolin 1g q8hrs + doxy 100mg bid   - f/u BCx, urine culture in process -MRSA swab positive   - ID consult appreciated  - Burn consult pending    #Inability to Tolerate PEG Feeds - Diarrhea (chronic issue)  #h/o Dysphagia s/p PEG  - s/p Esophageal Stent and PEG 3/5/24 -> pt having diarrhea after feeds -> here for the same issue  - patient cleared for pureed diet and thin liquids per SS on last admission  - patient has been refusing PEG feeds out of concern he will have diarrhea again.  - s/p 1L NS bolus  - resume PEG feeds w/ Peptamen  - c/w home Imodium 2mg q8hrs PRN for diarrhea  - f/u nutrition consult  - gi pcr, stool culture       #New b/l Large Pleural Effusion  - not SOB, satting well on RA  - previously had only Left Pleural Effusion  - CT A/P showed new/increased large bilateral pleural effusions.  - Pulm consulted - appreciate consult - pt is now refusing thoracentesis   - continueIV lasix 40mg qday     #Stage 4 Prostate Ca s/p Radiation (on active PO chemo) w/ metastasis to mediastinum or primary mediastinal mass?  #Severe Left Hydronephrosis (unchanged from prior)  - CT A/P shows:  1. Metastatic disease in the abdomen and pelvis as above with slightly increased upper abdominal adenopathy.  2. No significant change approximately in the large bladder mass and partially imaged mediastinal mass.  3. Severe left hydronephrosis to the level of the proximal ureter, not significantly changed.  - previous AFP and CEA wnl  - previous LDH and haptoglobin elevated  - previous PSA total 220, PSA free is 22, CA19: 131, : 57  - Esophageal biopsy: metastatic poorly differentiated carcinoma of prostatic origin.  - per onc note, patient had prior biopsy of site that demonstrated metastatic prostate adenocarcinoma and was started on Docetaxel, in January 2024 patient was started on Pembrolizumab and continued on keytruda injections.   - Dr. Irizarry (pt's oncologist): cs rad/onco for palliative radiation, onco asked about new protocol since patient progressed, rec hospice and palliative   - Heme onc also recommending palliative radiation to mediastinal mass. Also reevaluated by Rad Onc for palliative RT -> CT sim for mapping and planning, followed by XRT.  - Rad/onc following for palliative RT -> CT sim for mapping and planning, followed by XRT.  May not be able to undergo RT given how little lung is left without intervention.  - Psych previously determined that patient lacks capacity, Pt refusing DNR/DNI status based on [employer] beliefs, Ethics committee previously agreed 2P consent would not be appropriate to make him DNR/DNI  - c/w home Xtandi 160 mg daily- nonformulary was submitted please follow up on     #Chronic Normocytic Anemia  - Hgb 8.2 (previously 8.6)  - iron  studies noted % sat 27  - monitor H&H, maintain active T&S, transfuse PRN, Keep hgb >7    #GOC  -there is a copy of a MOLST signed 4/19/24 stating the pt wants dnr/dni  -4/20 pt states he wants full code  -pt to be full code currently  -palliative care consulted     #DVT ppx: Lovenox SubQ  #GI ppx: PPI PO daily  #Diet: PEG - Peptamen  #Activity: IAT  #Code Status: Full Code  #Dispo: from NH, acute.  #Pending: palliative consult, burn , stool studies, trend diarrhea

## 2024-04-21 NOTE — DIETITIAN INITIAL EVALUATION ADULT - NAME AND PHONE
Intervention: 1.Meals and Snacks 2.Enteral Nutrition 3.Coordination of Care  Monitor/Evaluate: Diet order, energy intake, nutrition focused physical findings, anemia profile

## 2024-04-21 NOTE — DIETITIAN INITIAL EVALUATION ADULT - PERTINENT LABORATORY DATA
04-21    140  |  108  |  13  ----------------------------<  115<H>  4.1   |  17  |  1.0    Ca    7.6<L>      21 Apr 2024 07:20  Phos  2.5     04-21  Mg     2.1     04-21    TPro  5.4<L>  /  Alb  2.6<L>  /  TBili  0.4  /  DBili  x   /  AST  13  /  ALT  <5  /  AlkPhos  111  04-21

## 2024-04-21 NOTE — PROGRESS NOTE ADULT - SUBJECTIVE AND OBJECTIVE BOX
Patient is a 68y old  Male who presents with a chief complaint of LT Gluteal Pain / Diarrhea (21 Apr 2024 07:13)      Patient seen and examined at bedside.  Patient denies any chest pain or shortness of breath   ALLERGIES:  No Known Allergies    MEDICATIONS:  acetaminophen     Tablet .. 650 milliGRAM(s) Oral every 6 hours PRN  aluminum hydroxide/magnesium hydroxide/simethicone Suspension 30 milliLiter(s) Oral every 4 hours PRN  ascorbic acid 500 milliGRAM(s) Oral daily  ceFAZolin   IVPB 2000 milliGRAM(s) IV Intermittent every 8 hours  enoxaparin Injectable 40 milliGRAM(s) SubCutaneous every 24 hours  ferrous    sulfate Liquid 300 milliGRAM(s) Enteral Tube daily  furosemide   Injectable 40 milliGRAM(s) IV Push daily  influenza  Vaccine (HIGH DOSE) 0.7 milliLiter(s) IntraMuscular once  loperamide 2 milliGRAM(s) Oral every 8 hours PRN  megestrol Suspension 400 milliGRAM(s) Oral daily  melatonin 3 milliGRAM(s) Oral at bedtime PRN  metoprolol tartrate 25 milliGRAM(s) Enteral Tube every 12 hours  multivitamin 1 Tablet(s) Oral daily  ondansetron Injectable 4 milliGRAM(s) IV Push every 8 hours PRN  sodium bicarbonate 1300 milliGRAM(s) Oral every 6 hours PRN    Vital Signs Last 24 Hrs  T(F): 97.8 (21 Apr 2024 07:55), Max: 99.2 (20 Apr 2024 15:00)  HR: 78 (21 Apr 2024 07:55) (78 - 97)  BP: 102/72 (21 Apr 2024 07:55) (102/72 - 129/61)  RR: 18 (21 Apr 2024 07:55) (18 - 18)  SpO2: 98% (21 Apr 2024 07:55) (98% - 98%)  I&O's Summary    20 Apr 2024 07:01  -  21 Apr 2024 07:00  --------------------------------------------------------  IN: 230 mL / OUT: 625 mL / NET: -395 mL    21 Apr 2024 07:01  -  21 Apr 2024 14:33  --------------------------------------------------------  IN: 150 mL / OUT: 500 mL / NET: -350 mL        PHYSICAL EXAM:  General: NAD, A/O x 2  ENT: MMM  Neck: Supple, No JVD  Lungs: Clear to auscultation bilaterally  Cardio: RRR, S1/S2, 2/6 systolic murmur   Abdomen: Soft, Nontender, Nondistended; Bowel sounds present  Extremities: No cyanosis, No edema, muscle atrophy     LABS:                        7.8    8.75  )-----------( 512      ( 21 Apr 2024 07:20 )             25.7     04-21    140  |  108  |  13  ----------------------------<  115  4.1   |  17  |  1.0    Ca    7.6      21 Apr 2024 07:20  Phos  2.5     04-21  Mg     2.1     04-21    TPro  5.4  /  Alb  2.6  /  TBili  0.4  /  DBili  x   /  AST  13  /  ALT  <5  /  AlkPhos  111  04-21        Lactate, Blood: 1.8 mmol/L (04-19 @ 17:37)                          Urinalysis Basic - ( 21 Apr 2024 07:20 )    Color: x / Appearance: x / SG: x / pH: x  Gluc: 115 mg/dL / Ketone: x  / Bili: x / Urobili: x   Blood: x / Protein: x / Nitrite: x   Leuk Esterase: x / RBC: x / WBC x   Sq Epi: x / Non Sq Epi: x / Bacteria: x        Culture - Urine (collected 19 Apr 2024 18:24)  Source: Clean Catch None  Final Report (20 Apr 2024 23:20):    <10,000 CFU/mL Normal Urogenital Leigh Ann    Culture - Blood (collected 19 Apr 2024 17:37)  Source: .Blood Blood  Preliminary Report (20 Apr 2024 23:05):    No growth at 24 hours          RADIOLOGY & ADDITIONAL TESTS:    Care Discussed with Consultants/Other Providers:

## 2024-04-21 NOTE — SWALLOW BEDSIDE ASSESSMENT ADULT - COMMENTS
Pt seen by SLP dept during recent admissions. Most recent GI note in chart from 3/7 included a recommendation for pureed foods w/ thin liquids.  Pt initially preferred to use the PEG as a primary means of nutrition/hydration but tolerated consumption of clear liquids.  During a subsequent admission, pt tolerated trials of solid foods & SLP recommended solid food diet if approved by GI.

## 2024-04-21 NOTE — DIETITIAN INITIAL EVALUATION ADULT - NS FNS DIET ORDER
Diet, Pureed:   Tube Feeding Modality: Gastrostomy  Peptamen A.F. Formula (PEPTAMENAFRTH)  Total Volume for 24 Hours (mL): 1000  Bolus  Total Volume of Bolus (mL):  250  Tube Feed Frequency: Every 6 hours   Tube Feed Start Time: 06:00  Bolus Feed Rate (mL per Hour): 250   Bolus Feed Duration (in Hours): 1  Free Water Flush  Bolus   Total Volume per Flush (mL): 60   Frequency: Every 6 Hours (04-21-24 @ 13:05)

## 2024-04-21 NOTE — SWALLOW BEDSIDE ASSESSMENT ADULT - SLP PERTINENT HISTORY OF CURRENT PROBLEM
68M w/ PMHx Stage 4 Prostate Ca s/p palliative radiation, Mediastinal Mass on oral Chemo (xtandi), esophageal stent & PEG placed 3/5/24, Chronic Anemia, HTN and Chronic Back Pain presented to ED from SNF for evaluation. As per NH documentation, pt had been refusing medications through PEG for 3x days prior to adm d/t episode of diarrhea post feeds. Patient's only complaint was LT buttock pain.  CT A/P: New mild edema & enlargement of the left gluteus christy musculature with associated subcutaneous edema, no discrete abscess; Metastatic disease in the abdomen and pelvis as above with slightly increased upper abdominal adenopathy; Interval placement of an esophageal stent; Severe left hydronephrosis to the level of the proximal ureter, not significantly changed; New/increased large bilateral pleural effusions.

## 2024-04-22 DIAGNOSIS — C61 MALIGNANT NEOPLASM OF PROSTATE: ICD-10-CM

## 2024-04-22 DIAGNOSIS — J90 PLEURAL EFFUSION, NOT ELSEWHERE CLASSIFIED: ICD-10-CM

## 2024-04-22 DIAGNOSIS — Z51.5 ENCOUNTER FOR PALLIATIVE CARE: ICD-10-CM

## 2024-04-22 DIAGNOSIS — L03.90 CELLULITIS, UNSPECIFIED: ICD-10-CM

## 2024-04-22 DIAGNOSIS — R13.10 DYSPHAGIA, UNSPECIFIED: ICD-10-CM

## 2024-04-22 LAB
ALBUMIN SERPL ELPH-MCNC: 2.8 G/DL — LOW (ref 3.5–5.2)
ALP SERPL-CCNC: 112 U/L — SIGNIFICANT CHANGE UP (ref 30–115)
ALT FLD-CCNC: <5 U/L — SIGNIFICANT CHANGE UP (ref 0–41)
ANION GAP SERPL CALC-SCNC: 17 MMOL/L — HIGH (ref 7–14)
AST SERPL-CCNC: 14 U/L — SIGNIFICANT CHANGE UP (ref 0–41)
BASOPHILS # BLD AUTO: 0.01 K/UL — SIGNIFICANT CHANGE UP (ref 0–0.2)
BASOPHILS NFR BLD AUTO: 0.1 % — SIGNIFICANT CHANGE UP (ref 0–1)
BILIRUB SERPL-MCNC: 0.4 MG/DL — SIGNIFICANT CHANGE UP (ref 0.2–1.2)
BUN SERPL-MCNC: 13 MG/DL — SIGNIFICANT CHANGE UP (ref 10–20)
CALCIUM SERPL-MCNC: 7.4 MG/DL — LOW (ref 8.4–10.5)
CHLORIDE SERPL-SCNC: 104 MMOL/L — SIGNIFICANT CHANGE UP (ref 98–110)
CO2 SERPL-SCNC: 14 MMOL/L — LOW (ref 17–32)
CREAT SERPL-MCNC: 1 MG/DL — SIGNIFICANT CHANGE UP (ref 0.7–1.5)
EGFR: 81 ML/MIN/1.73M2 — SIGNIFICANT CHANGE UP
EOSINOPHIL # BLD AUTO: 0.08 K/UL — SIGNIFICANT CHANGE UP (ref 0–0.7)
EOSINOPHIL NFR BLD AUTO: 1 % — SIGNIFICANT CHANGE UP (ref 0–8)
GLUCOSE SERPL-MCNC: 144 MG/DL — HIGH (ref 70–99)
HCT VFR BLD CALC: 25.9 % — LOW (ref 42–52)
HGB BLD-MCNC: 7.9 G/DL — LOW (ref 14–18)
IMM GRANULOCYTES NFR BLD AUTO: 0.8 % — HIGH (ref 0.1–0.3)
LYMPHOCYTES # BLD AUTO: 0.46 K/UL — LOW (ref 1.2–3.4)
LYMPHOCYTES # BLD AUTO: 5.9 % — LOW (ref 20.5–51.1)
MAGNESIUM SERPL-MCNC: 2.1 MG/DL — SIGNIFICANT CHANGE UP (ref 1.8–2.4)
MCHC RBC-ENTMCNC: 28.9 PG — SIGNIFICANT CHANGE UP (ref 27–31)
MCHC RBC-ENTMCNC: 30.5 G/DL — LOW (ref 32–37)
MCV RBC AUTO: 94.9 FL — HIGH (ref 80–94)
MONOCYTES # BLD AUTO: 0.67 K/UL — HIGH (ref 0.1–0.6)
MONOCYTES NFR BLD AUTO: 8.6 % — SIGNIFICANT CHANGE UP (ref 1.7–9.3)
NEUTROPHILS # BLD AUTO: 6.47 K/UL — SIGNIFICANT CHANGE UP (ref 1.4–6.5)
NEUTROPHILS NFR BLD AUTO: 83.6 % — HIGH (ref 42.2–75.2)
NRBC # BLD: 0 /100 WBCS — SIGNIFICANT CHANGE UP (ref 0–0)
PHOSPHATE SERPL-MCNC: 2 MG/DL — LOW (ref 2.1–4.9)
PLATELET # BLD AUTO: 429 K/UL — HIGH (ref 130–400)
PMV BLD: 10.1 FL — SIGNIFICANT CHANGE UP (ref 7.4–10.4)
POTASSIUM SERPL-MCNC: 3.9 MMOL/L — SIGNIFICANT CHANGE UP (ref 3.5–5)
POTASSIUM SERPL-SCNC: 3.9 MMOL/L — SIGNIFICANT CHANGE UP (ref 3.5–5)
PROT SERPL-MCNC: 5.5 G/DL — LOW (ref 6–8)
RBC # BLD: 2.73 M/UL — LOW (ref 4.7–6.1)
RBC # FLD: 16.6 % — HIGH (ref 11.5–14.5)
SODIUM SERPL-SCNC: 135 MMOL/L — SIGNIFICANT CHANGE UP (ref 135–146)
WBC # BLD: 7.75 K/UL — SIGNIFICANT CHANGE UP (ref 4.8–10.8)
WBC # FLD AUTO: 7.75 K/UL — SIGNIFICANT CHANGE UP (ref 4.8–10.8)

## 2024-04-22 PROCEDURE — 90792 PSYCH DIAG EVAL W/MED SRVCS: CPT | Mod: 2W

## 2024-04-22 PROCEDURE — 99221 1ST HOSP IP/OBS SF/LOW 40: CPT | Mod: FS

## 2024-04-22 PROCEDURE — 99232 SBSQ HOSP IP/OBS MODERATE 35: CPT

## 2024-04-22 PROCEDURE — 99223 1ST HOSP IP/OBS HIGH 75: CPT

## 2024-04-22 PROCEDURE — 99497 ADVNCD CARE PLAN 30 MIN: CPT | Mod: 25

## 2024-04-22 RX ORDER — CEFPODOXIME PROXETIL 100 MG
200 TABLET ORAL EVERY 12 HOURS
Refills: 0 | Status: DISCONTINUED | OUTPATIENT
Start: 2024-04-22 | End: 2024-04-23

## 2024-04-22 RX ORDER — SODIUM,POTASSIUM PHOSPHATES 278-250MG
2 POWDER IN PACKET (EA) ORAL THREE TIMES A DAY
Refills: 0 | Status: DISCONTINUED | OUTPATIENT
Start: 2024-04-22 | End: 2024-04-22

## 2024-04-22 RX ORDER — POTASSIUM PHOSPHATE, MONOBASIC POTASSIUM PHOSPHATE, DIBASIC 236; 224 MG/ML; MG/ML
30 INJECTION, SOLUTION INTRAVENOUS ONCE
Refills: 0 | Status: COMPLETED | OUTPATIENT
Start: 2024-04-22 | End: 2024-04-22

## 2024-04-22 RX ORDER — MUPIROCIN 20 MG/G
1 OINTMENT TOPICAL
Refills: 0 | Status: COMPLETED | OUTPATIENT
Start: 2024-04-22 | End: 2024-04-26

## 2024-04-22 RX ORDER — SODIUM,POTASSIUM PHOSPHATES 278-250MG
2 POWDER IN PACKET (EA) ORAL THREE TIMES A DAY
Refills: 0 | Status: DISCONTINUED | OUTPATIENT
Start: 2024-04-22 | End: 2024-04-28

## 2024-04-22 RX ADMIN — Medication 40 MILLIGRAM(S): at 05:55

## 2024-04-22 RX ADMIN — Medication 100 MILLIGRAM(S): at 17:45

## 2024-04-22 RX ADMIN — Medication 25 MILLIGRAM(S): at 05:56

## 2024-04-22 RX ADMIN — MUPIROCIN 1 APPLICATION(S): 20 OINTMENT TOPICAL at 17:45

## 2024-04-22 RX ADMIN — Medication 100 MILLIGRAM(S): at 05:55

## 2024-04-22 RX ADMIN — Medication 200 MILLIGRAM(S): at 18:05

## 2024-04-22 RX ADMIN — Medication 500 MILLIGRAM(S): at 12:27

## 2024-04-22 RX ADMIN — Medication 25 MILLIGRAM(S): at 17:45

## 2024-04-22 RX ADMIN — MEGESTROL ACETATE 400 MILLIGRAM(S): 40 SUSPENSION ORAL at 14:53

## 2024-04-22 RX ADMIN — Medication 1 TABLET(S): at 12:27

## 2024-04-22 RX ADMIN — Medication 1 TABLET(S): at 14:54

## 2024-04-22 RX ADMIN — Medication 300 MILLIGRAM(S): at 12:27

## 2024-04-22 NOTE — BH CONSULTATION LIAISON ASSESSMENT NOTE - HPI (INCLUDE ILLNESS QUALITY, SEVERITY, DURATION, TIMING, CONTEXT, MODIFYING FACTORS, ASSOCIATED SIGNS AND SYMPTOMS)
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  Psych consult called for pt telling nurse that he wanted to die, per Dr. Davies.  Upon approach, patient was observer to be clam, cooperative , well oriented to time, place , person.  He spoke with a low volume and at times seeming a whisper.  He participated in the interview nonetheless.    He denied suicidal ideation intent or plan.   He denied feelings of depression.  He said that he is unable to talk, or walk, and as a result it is difficult for him  He said he is agreeable to receiving treatment and feedings through PEG.    Patient denies acute symptoms suggestive of depression, psychosis , or gini. He denies current suicidal thoughts , intent or plan.   patient has not had any episodes of agitation.

## 2024-04-22 NOTE — CONSULT NOTE ADULT - SUBJECTIVE AND OBJECTIVE BOX
CC:   refusing meds through PEG    HPI:  68M w/ PMHx Stage 4 Prostate Ca s/p palliative radiation, Mediastinal Mass on oral Chemo (xtandi), Chronic Anemia, HTN and Chronic Back Pain presents to ED from SNF for evaluation. As per NH documentation, pt has been refusing medications through PEG for the last 3x days d/t episode of diarrhea post feeds. Patient's only complaint is LT buttock pain. Denies fevers, chills, chest pain, SOB, n/v, abdominal pain or urinary symptoms.    Vitals: Temp 98.3F, /82, , RR 18, SpO2 100% on RA    Labs: Hgb 8.2 (previously 8.6), Cr 0.9 (at baseline), Ca 7.8, Albumin 2.9,     Imagin. CT A/P shows:  - New mild edema and enlargement of the left gluteus maximums musculature with associated subcutaneous edema. No discrete abscess.  - Metastatic disease in the abdomen and pelvis as above with slightly increased upper abdominal adenopathy.  - No significant change approximately in the large bladder mass and partially imaged mediastinal mass.  - Interval placement of an esophageal stent.  - Severe left hydronephrosis to the level of the proximal ureter, not significantly changed.  - New/increased large bilateral pleural effusions.    In the ED:  - s/p Cefepime, Vanc, Flagyl IV x1  - s/p 1L NS bolus    Admitted to medicine. (2024 01:33)    PERTINENT PM/SXH:   HTN (hypertension)    Prostate cancer        FAMILY HISTORY:  None pertinent    ITEMS NOT CHECKED ARE NOT PRESENT    SOCIAL HISTORY:   Significant other/partner[ ]  Children[ ]  Cheondoism/Spirituality:  Substance hx:  [ ]   Tobacco hx:  [ ]   Alcohol hx: [ ]   Living Situation: [ ]Home  [ x]Long term care  [ ]Rehab [ ]Other  Home Services: [ ] HHA [ ] Domi RN [ ] Hospice  Occupation:  Home Opioid hx:  [ ] Y [ ] N [x ] I-Stop Reference No:    Reference #: [id number] - no meds     ADVANCE DIRECTIVES:     [x] Full Code [ ] DNR  MOLST  [ ]  Living Will  [ ]   DECISION MAKER(s):  [ ] Health Care Proxy(s)  [x ] Surrogate(s)  [ ] Guardian           Name(s): Phone Number(s): None      BASELINE (I)ADL(s) (prior to admission):    [place]: [ ]Total  [ ] Moderate [ ]Dependent  Palliative Performance Status Version 2:         %    http://npcrc.org/files/news/palliative_performance_scale_ppsv2.pdf    Allergies    No Known Allergies    Intolerances    MEDICATIONS  (STANDING):  ascorbic acid 500 milliGRAM(s) Oral daily  cefpodoxime 200 milliGRAM(s) Oral every 12 hours  doxycycline monohydrate Capsule 100 milliGRAM(s) Oral every 12 hours  enoxaparin Injectable 40 milliGRAM(s) SubCutaneous every 24 hours  ferrous    sulfate Liquid 300 milliGRAM(s) Enteral Tube daily  furosemide   Injectable 40 milliGRAM(s) IV Push daily  influenza  Vaccine (HIGH DOSE) 0.7 milliLiter(s) IntraMuscular once  megestrol Suspension 400 milliGRAM(s) Oral daily  metoprolol tartrate 25 milliGRAM(s) Enteral Tube every 12 hours  multivitamin 1 Tablet(s) Oral daily  multivitamin  Chewable 1 Tablet(s) Oral daily  mupirocin 2% Nasal 1 Application(s) Both Nostrils two times a day    MEDICATIONS  (PRN):  acetaminophen     Tablet .. 650 milliGRAM(s) Oral every 6 hours PRN Temp greater or equal to 38C (100.4F), Mild Pain (1 - 3)  aluminum hydroxide/magnesium hydroxide/simethicone Suspension 30 milliLiter(s) Oral every 4 hours PRN Dyspepsia  loperamide 2 milliGRAM(s) Oral every 8 hours PRN for diarrhea  melatonin 3 milliGRAM(s) Oral at bedtime PRN Insomnia  ondansetron Injectable 4 milliGRAM(s) IV Push every 8 hours PRN Nausea and/or Vomiting  sodium bicarbonate 1300 milliGRAM(s) Oral every 6 hours PRN GERD    PRESENT SYMPTOMS: [ ]Unable to obtain due to poor mentation   Source if other than patient:  [ ]Family   [ ]Team     Pain: [ ]yes [ ]no  ALL PAIN ASSESSMENT COMPONENTS WERE ASKED ABOUT UNLESS OTHERWISE NOTED  QOL impact -   Location -                    Aggravating factors -  Quality -  Radiation -  Timing-  Severity (0-10 scale):  Minimal acceptable level (0-10 scale):     CPOT:    https://www.[facility].org/getattachment/[email or web address]/Critical-Care-Pain-Observation-Tool-(CPOT)    PAIN AD Score:   http://geriatrictoolkit.[facility]/cog/painad.pdf (press ctrl +  left click to view)    Dyspnea:                           [ ]None[ ]Mild [ ]Moderate [ ]Severe     Respiratory Distress Observation Scale (RDOS):   A score of 0 to 2 signifies little or no respiratory distress, 3 signifies mild distress, scores 4 to 6 indicate moderate distress, and scores greater than 7 signify severe distress  https://www.[facility].ca/sites/default/files/PDFS/290185-vtxgmrpvcqg-boxxlaqv-yksjxbvizhf-uywhl.pdf    Anxiety:                             [ ]None[ ]Mild [ ]Moderate [ ]Severe   Fatigue:                             [ ]None[ ]Mild [ ]Moderate [ ]Severe   Nausea:                             [ ]None[ ]Mild [ ]Moderate [ ]Severe   Loss of appetite:              [ ]None[ ]Mild [ ]Moderate [ ]Severe   Constipation:                    [ ]None[ ]Mild [ ]Moderate [ ]Severe    Other Symptoms:  [ ]All other review of systems negative     Palliative Performance Status Version 2:         %    http://npcrc.org/files/news/palliative_performance_scale_ppsv2.pdf    PHYSICAL EXAM:  Vital Signs Last 24 Hrs  T(C): 35.7 (2024 08:00), Max: 37.3 (2024 16:21)  T(F): 96.3 (2024 08:00), Max: 99.2 (2024 16:21)  HR: 79 (2024 08:00) (79 - 102)  BP: 136/95 (2024 08:00) (105/70 - 136/95)  BP(mean): --  RR: 18 (2024 08:00) (18 - 18)  SpO2: 98% (2024 16:21) (98% - 98%)    Parameters below as of 2024 16:21  Patient On (Oxygen Delivery Method): room air     I&O's Summary    2024 07:01  -  2024 07:00  --------------------------------------------------------  IN: 650 mL / OUT: 500 mL / NET: 150 mL        GENERAL:  [ ] No acute distress [ ]Lethargic  [ ]Unarousable  [ ]Verbal  [ ]Non-Verbal [ ]Cachexia    BEHAVIORAL/PSYCH:  [ ]Alert and Oriented x  [ ] Anxiety [ ] Delirium [ ] Agitation [ ] Calm   EYES: [ ] No scleral icterus [ ] Scleral icterus [ ] Closed  ENMT:  [ ]Dry mouth  [ ]No external oral lesions [ ] No external ear or nose lesions  CARDIOVASCULAR:  [ ]Regular [ ]Irregular [ ]Tachy [ ]Not Tachy  [ ]Rambo [ ] Edema [ ] No edema  PULMONARY:  [ ]Tachypnea  [ ]Audible excessive secretions [ ] No labored breathing [ ] labored breathing  GASTROINTESTINAL: [ ]Soft  [ ]Distended  [ ]Not distended [ ]Non tender [ ]Tender  MUSCULOSKELETAL: [ ]No clubbing [ ] clubbing  [ ] No cyanosis [ ] cyanosis  NEUROLOGIC: [ ]No focal deficits  [ ]Follows commands  [ ]Does not follow commands  [ ]Cognitive impairment  [ ]Dysphagia  [ ]Dysarthria  [ ]Paresis   SKIN: [ ] Jaundiced [ ] Non-jaundiced [ ]Rash [ ]No Rash [ ] Warm [ ] Dry  MISC/LINES: [ ] ET tube [ ] Trach [ ]NGT/OGT [ ]PEG [ ]Angel    LABS: reviewed by me                        7.9    7.75  )-----------( 429      ( 2024 07:30 )             25.9   04-22    135  |  104  |  13  ----------------------------<  144<H>  3.9   |  14<L>  |  1.0    Ca    7.4<L>      2024 07:30  Phos  2.0     -  Mg     2.1         TPro  5.5<L>  /  Alb  2.8<L>  /  TBili  0.4  /  DBili  x   /  AST  14  /  ALT  <5  /  AlkPhos  112  -      Urinalysis Basic - ( 2024 07:30 )    Color: x / Appearance: x / SG: x / pH: x  Gluc: 144 mg/dL / Ketone: x  / Bili: x / Urobili: x   Blood: x / Protein: x / Nitrite: x   Leuk Esterase: x / RBC: x / WBC x   Sq Epi: x / Non Sq Epi: x / Bacteria: x      RADIOLOGY & ADDITIONAL STUDIES: reviewed by me    EKG: reviewed by me      PROTEIN CALORIE MALNUTRITION PRESENT: [ ]mild [ ]moderate [ ]severe [ ]underweight [ ]morbid obesity  https://www.andeal.org/vault/2440/web/files/ONC/Table_Clinical%20Characteristics%20to%20Document%20Malnutrition-White%20JV%20et%20al%2020.pdf    Height (cm): 185.4 (24 @ 17:28), 185.4 (24 @ 15:34), 185.4 (24 @ 16:39)  Weight (kg): 54.4 (24 @ 15:56), 77.1 (24 @ 16:39), 77.1 (23 @ 08:11)  BMI (kg/m2): 15.8 (24 @ 17:28), 15.8 (24 @ 15:56), 22.4 (24 @ 15:34)  [ ]PPSV2 < or = to 30% [ ]significant weight loss  [ ]poor nutritional intake  [ ]anasarca      [ ]Artificial Nutrition      Palliative Care Spiritual/Emotional Screening Tool Question  Severity (0-4):                    OR                    [ x] Unable to determine. Will assess at later time if appropriate.  Score of 2 or greater indicates recommendation of Chaplaincy and/or SW referral  Chaplaincy Referral: [ ] Yes [ ] Refused [ ] Following     Caregiver [place]:  [ ] Yes [ ] No    OR    [x ] Unable to determine. Will assess at later time if appropriate.  Social Work Referral [ ]  Patient and Family Centered Care Referral [ ]    Anticipatory Grief Present: [ ] Yes [ ] No    OR     [ x] Unable to determine. Will assess at later time if appropriate.  Social Work Referral [ ]  Patient and Family Centered Care Referral [ ]    Patient discussed with primary medical team MD  Palliative care education provided to patient and/or family   CC:   refusing meds through PEG    HPI:  68M w/ PMHx Stage 4 Prostate Ca s/p palliative radiation, Mediastinal Mass on oral Chemo (xtandi), Chronic Anemia, HTN and Chronic Back Pain presents to ED from SNF for evaluation. As per NH documentation, pt has been refusing medications through PEG for the last 3x days d/t episode of diarrhea post feeds. Patient's only complaint is LT buttock pain. Denies fevers, chills, chest pain, SOB, n/v, abdominal pain or urinary symptoms.    Vitals: Temp 98.3F, /82, , RR 18, SpO2 100% on RA    Labs: Hgb 8.2 (previously 8.6), Cr 0.9 (at baseline), Ca 7.8, Albumin 2.9,     Imagin. CT A/P shows:  - New mild edema and enlargement of the left gluteus maximums musculature with associated subcutaneous edema. No discrete abscess.  - Metastatic disease in the abdomen and pelvis as above with slightly increased upper abdominal adenopathy.  - No significant change approximately in the large bladder mass and partially imaged mediastinal mass.  - Interval placement of an esophageal stent.  - Severe left hydronephrosis to the level of the proximal ureter, not significantly changed.  - New/increased large bilateral pleural effusions.    In the ED:  - s/p Cefepime, Vanc, Flagyl IV x1  - s/p 1L NS bolus    Admitted to medicine. (2024 01:33)    PERTINENT PM/SXH:   HTN (hypertension)    Prostate cancer        FAMILY HISTORY:  None pertinent    ITEMS NOT CHECKED ARE NOT PRESENT    SOCIAL HISTORY:   Significant other/partner[ ]  Children[ ]  Judaism/Spirituality:  Substance hx:  [ ]   Tobacco hx:  [ ]   Alcohol hx: [ ]   Living Situation: [ ]Home  [ x]Long term care  [ ]Rehab [ ]Other  Home Services: [ ] HHA [ ] Domi RN [ ] Hospice  Occupation:  Home Opioid hx:  [ ] Y [ ] N [x ] I-Stop Reference No:    Reference #: [id number] - no meds     ADVANCE DIRECTIVES:     [x] Full Code [ ] DNR  MOLST  [ ]  Living Will  [ ]   DECISION MAKER(s):  [ ] Health Care Proxy(s)  [x ] Surrogate(s)  [ ] Guardian           Name(s): Phone Number(s): None      BASELINE (I)ADL(s) (prior to admission):    [place]: [ ]Total  [ ] Moderate [ ]Dependent  Palliative Performance Status Version 2:         %    http://npcrc.org/files/news/palliative_performance_scale_ppsv2.pdf    Allergies    No Known Allergies    Intolerances    MEDICATIONS  (STANDING):  ascorbic acid 500 milliGRAM(s) Oral daily  cefpodoxime 200 milliGRAM(s) Oral every 12 hours  doxycycline monohydrate Capsule 100 milliGRAM(s) Oral every 12 hours  enoxaparin Injectable 40 milliGRAM(s) SubCutaneous every 24 hours  ferrous    sulfate Liquid 300 milliGRAM(s) Enteral Tube daily  furosemide   Injectable 40 milliGRAM(s) IV Push daily  influenza  Vaccine (HIGH DOSE) 0.7 milliLiter(s) IntraMuscular once  megestrol Suspension 400 milliGRAM(s) Oral daily  metoprolol tartrate 25 milliGRAM(s) Enteral Tube every 12 hours  multivitamin 1 Tablet(s) Oral daily  multivitamin  Chewable 1 Tablet(s) Oral daily  mupirocin 2% Nasal 1 Application(s) Both Nostrils two times a day    MEDICATIONS  (PRN):  acetaminophen     Tablet .. 650 milliGRAM(s) Oral every 6 hours PRN Temp greater or equal to 38C (100.4F), Mild Pain (1 - 3)  aluminum hydroxide/magnesium hydroxide/simethicone Suspension 30 milliLiter(s) Oral every 4 hours PRN Dyspepsia  loperamide 2 milliGRAM(s) Oral every 8 hours PRN for diarrhea  melatonin 3 milliGRAM(s) Oral at bedtime PRN Insomnia  ondansetron Injectable 4 milliGRAM(s) IV Push every 8 hours PRN Nausea and/or Vomiting  sodium bicarbonate 1300 milliGRAM(s) Oral every 6 hours PRN GERD    PRESENT SYMPTOMS: [ ]Unable to obtain due to poor mentation   Source if other than patient:  [ ]Family   [ ]Team     Pain: [ ]yes [x ]no  ALL PAIN ASSESSMENT COMPONENTS WERE ASKED ABOUT UNLESS OTHERWISE NOTED  QOL impact -   Location -                    Aggravating factors -  Quality -  Radiation -  Timing-  Severity (0-10 scale):  Minimal acceptable level (0-10 scale):     CPOT:    https://www.[facility].org/getattachment/[email or web address]/Critical-Care-Pain-Observation-Tool-(CPOT)    PAIN AD Score:   http://geriatrictoolkit.[facility]/cog/painad.pdf (press ctrl +  left click to view)    Dyspnea:                           [x ]None[ ]Mild [ ]Moderate [ ]Severe     Respiratory Distress Observation Scale (RDOS):   A score of 0 to 2 signifies little or no respiratory distress, 3 signifies mild distress, scores 4 to 6 indicate moderate distress, and scores greater than 7 signify severe distress  https://www.[facility].ca/sites/default/files/PDFS/197466-mqcpwlcydyr-cmtdecdf-iuruwuuaimh-fyvfn.pdf    Anxiety:                             [ x]None[ ]Mild [ ]Moderate [ ]Severe   Fatigue:                             [ x]None[ ]Mild [ ]Moderate [ ]Severe   Nausea:                             [ x]None[ ]Mild [ ]Moderate [ ]Severe   Loss of appetite:              [ x]None[ ]Mild [ ]Moderate [ ]Severe   Constipation:                    [x]None[ ]Mild [ ]Moderate [ ]Severe    Other Symptoms:  [ ]All other review of systems negative     Palliative Performance Status Version 2:         30%    http://[facility].org/files/news/palliative_performance_scale_ppsv2.pdf    PHYSICAL EXAM:  Vital Signs Last 24 Hrs  T(C): 35.7 (2024 08:00), Max: 37.3 (2024 16:21)  T(F): 96.3 (2024 08:00), Max: 99.2 (2024 16:21)  HR: 79 (2024 08:00) (79 - 102)  BP: 136/95 (2024 08:00) (105/70 - 136/95)  BP(mean): --  RR: 18 (2024 08:00) (18 - 18)  SpO2: 98% (2024 16:21) (98% - 98%)    Parameters below as of 2024 16:21  Patient On (Oxygen Delivery Method): room air     I&O's Summary    2024 07:01  -  2024 07:00  --------------------------------------------------------  IN: 650 mL / OUT: 500 mL / NET: 150 mL        GENERAL:  [ ] No acute distress [ ]Lethargic  [ ]Unarousable  [ x]Verbal  [ ]Non-Verbal [ ]Cachexia    BEHAVIORAL/PSYCH:  [x ]Alert and Oriented x3  [ ] Anxiety [ ] Delirium [ ] Agitation [x ] Calm   EYES: [ ] No scleral icterus [ ] Scleral icterus [ ] Closed  ENMT:  [ ]Dry mouth  [x ]No external oral lesions [ ] No external ear or nose lesions  CARDIOVASCULAR:  [ ]Regular [ ]Irregular [ ]Tachy [ x]Not Tachy  [ ]Rambo [ ] Edema [ ] No edema  PULMONARY:  [ ]Tachypnea  [ ]Audible excessive secretions [x ] No labored breathing [ ] labored breathing  GASTROINTESTINAL: [ ]Soft  [ ]Distended  [ ]Not distended [ ]Non tender [ ]Tender  MUSCULOSKELETAL: [ ]No clubbing [ ] clubbing  [x ] No cyanosis [ ] cyanosis  NEUROLOGIC: [ ]No focal deficits  [ x]Follows commands  [ ]Does not follow commands  [ ]Cognitive impairment  [ ]Dysphagia  [ ]Dysarthria  [ ]Paresis   SKIN: [ ] Jaundiced [x ] Non-jaundiced [ ]Rash [ ]No Rash [ ] Warm [ ] Dry  MISC/LINES: [ ] ET tube [ ] Trach [ ]NGT/OGT [ ]PEG [ ]Angel    LABS: reviewed by me                        7.9    7.75  )-----------( 429      ( 2024 07:30 )             25.9   04-    135  |  104  |  13  ----------------------------<  144<H>  3.9   |  14<L>  |  1.0    Ca    7.4<L>      2024 07:30  Phos  2.0     -  Mg     2.1         TPro  5.5<L>  /  Alb  2.8<L>  /  TBili  0.4  /  DBili  x   /  AST  14  /  ALT  <5  /  AlkPhos  112  -      Urinalysis Basic - ( 2024 07:30 )    Color: x / Appearance: x / SG: x / pH: x  Gluc: 144 mg/dL / Ketone: x  / Bili: x / Urobili: x   Blood: x / Protein: x / Nitrite: x   Leuk Esterase: x / RBC: x / WBC x   Sq Epi: x / Non Sq Epi: x / Bacteria: x      RADIOLOGY & ADDITIONAL STUDIES: reviewed by me    < from: CT Abdomen and Pelvis w/ IV Cont (24 @ 19:17) >    1. New mild edema and enlargement of the left gluteus maximums   musculature with associated subcutaneous edema. No discrete abscess.    2. Metastatic disease in the abdomen and pelvis as above with slightly   increased upper abdominal adenopathy. No significant change approximately   in the large bladder mass and partially imaged mediastinal mass. Interval   placement of an esophageal stent.    3. Severe left hydronephrosis to the level of the proximal ureter, not   significantly changed.    4. New/increased large bilateral pleural effusions.    < end of copied text >      EKG: reviewed by me    < from: 12 Lead ECG (24 @ 09:33) >  Ventricular Rate 84 BPM    Atrial Rate 84 BPM    P-R Interval 160 ms    QRS Duration 58 ms    Q-T Interval 410 ms    QTC Calculation(Bazett) 484 ms    P Axis 35 degrees    R Axis 51 degrees    T Axis 49 degrees    Diagnosis Line Sinus rhythm with Premature atrial complexes  Septal infarct , age undetermined  Abnormal ECG    < end of copied text >      PROTEIN CALORIE MALNUTRITION PRESENT: [ ]mild [ ]moderate [ ]severe [ ]underweight [ ]morbid obesity  https://www.andeal.org/vault/2440/web/files/ONC/Table_Clinical%20Characteristics%20to%20Document%20Malnutrition-White%20JV%20et%20al%966262.pdf    Height (cm): 185.4 (24 @ 17:28), 185.4 (24 @ 15:34), 185.4 (24 @ 16:39)  Weight (kg): 54.4 (24 @ 15:56), 77.1 (24 @ 16:39), 77.1 (23 @ 08:11)  BMI (kg/m2): 15.8 (24 @ 17:28), 15.8 (24 @ 15:56), 22.4 (24 @ 15:34)  [ ]PPSV2 < or = to 30% [ ]significant weight loss  [ ]poor nutritional intake  [ ]anasarca      [ ]Artificial Nutrition      Palliative Care Spiritual/Emotional Screening Tool Question  Severity (0-4):                    OR                    [ x] Unable to determine. Will assess at later time if appropriate.  Score of 2 or greater indicates recommendation of Chaplaincy and/or SW referral  Chaplaincy Referral: [ ] Yes [ ] Refused [ ] Following     Caregiver [place]:  [ ] Yes [ ] No    OR    [x ] Unable to determine. Will assess at later time if appropriate.  Social Work Referral [ ]  Patient and Family Centered Care Referral [ ]    Anticipatory Grief Present: [ ] Yes [ ] No    OR     [ x] Unable to determine. Will assess at later time if appropriate.  Social Work Referral [ ]  Patient and Family Centered Care Referral [ ]    Patient discussed with primary medical team MD  Palliative care education provided to patient and/or family

## 2024-04-22 NOTE — PROGRESS NOTE ADULT - ASSESSMENT
68M w/ PMHx Stage 4 Prostate Ca s/p palliative radiation, Mediastinal Mass on oral Chemo (xtandi), Chronic Anemia, HTN and Chronic Back Pain presents to ED from SNF for evaluation. As per NH documentation, pt has been refusing medications through PEG for the last 3x days d/t episode of diarrhea post feeds. Patient's only complaint is LT buttock pain. Denies fevers, chills, chest pain, SOB, n/v, abdominal pain or urinary symptoms.    #LT Gluteal Cellulitis  - Vitals: Temp 98.3F, /82, , RR 18, SpO2 100% on RA  - CT A/P shows new mild edema and enlargement of the left gluteus maximums musculature with associated subcutaneous edema. No discrete abscess.  - s/p Cefepime, Vanc, Flagyl IV x1 in the ED  - started Cefazolin 2g q8hrs  -c/w vantin 200 mg BID PO and doxycycline 100 mg BID PO  - f/u BCx (- 4/19), Urine Cx (- 4/19), MRSA swab (+ 4/21)  - f/u ID c/s    #Inability to Tolerate PEG Feeds - Diarrhea (chronic issue)  #h/o Dysphagia s/p PEG  - s/p Esophageal Stent and PEG 3/5/24 -> pt having diarrhea after feeds -> here for the same issue  - patient cleared for pureed diet and thin liquids per SS on last admission  - patient has been refusing PEG feeds out of concern he will have diarrhea again.  - s/p 1L NS bolus  - resume PEG feeds w/ Peptamen  - c/w home Imodium 2mg q8hrs PRN for diarrhea  - f/u nutrition consult    #Suicidal Ideation  -pt told RN that he wants to die  -inquired about this w/ pt who denied stating that he wanted to die  -psych consulted/contacted    #New b/l Large Pleural Effusion  - not SOB, satting well on RA  - previously had only Left Pleural Effusion  - CT A/P showed new/increased large bilateral pleural effusions.  - Pulm was consult for thoracentesis but had refused last visit  - see no utility in diuresing - can reconsider IV Lasix if patient becomes symptomatic  - patient is still refusing thoracentesis, if patient changes his mind, can consider pulm consult    #Stage 4 Prostate Ca s/p Radiation (on active PO chemo) w/ metastasis to mediastinum or primary mediastinal mass?  #Severe Left Hydronephrosis (unchanged from prior)  - CT A/P shows:  1. Metastatic disease in the abdomen and pelvis as above with slightly increased upper abdominal adenopathy.  2. No significant change approximately in the large bladder mass and partially imaged mediastinal mass.  3. Severe left hydronephrosis to the level of the proximal ureter, not significantly changed.  - previous AFP and CEA wnl  - previous LDH and haptoglobin elevated  - previous PSA total 220, PSA free is 22, CA19: 131, : 57  - Esophageal biopsy: metastatic poorly differentiated carcinoma of prostatic origin.  - per onc note, patient had prior biopsy of site that demonstrated metastatic prostate adenocarcinoma and was started on Docetaxel, in January 2024 patient was started on Pembrolizumab and continued on keytruda injections.   - Dr. Irizarry (pt's oncologist): cs rad/onco for palliative radiation, onco asked about new protocol since patient progressed, rec hospice and palliative   - Heme onc also recommending palliative radiation to mediastinal mass. Also reevaluated by Rad Onc for palliative RT -> CT sim for mapping and planning, followed by XRT.  - Rad/onc following for palliative RT -> CT sim for mapping and planning, followed by XRT.  May not be able to undergo RT given how little lung is left without intervention.  - Psych previously determined that patient lacks capacity, Pt refusing DNR/DNI status based on Orthodox beliefs, Ethics committee previously agreed 2P consent would not be appropriate to make him DNR/DNI  - c/w home Xtandi 160 mg daily (if pharmacy needs non-formulary, please submit in AM)    #Chronic Normocytic Anemia  - Hgb 7.9  (previously 8.6)  - iron  studies noted % sat 27  - monitor H&H, maintain active T&S, transfuse PRN, Keep hgb >7    #Recent GAS strep pyogenes Bacteremia s/p Treatment  - TTE: EF 65 to 70%  - finished course of zosyn during last admission    #DVT ppx: Lovenox SubQ  #GI ppx: PPI PO daily  #Diet: PEG - Peptamen  #Activity: IAT  #Code Status: Full Code  #Dispo: from NH, acute

## 2024-04-22 NOTE — BH CONSULTATION LIAISON ASSESSMENT NOTE - SUMMARY
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  Psych consult called for pt telling nurse that he wanted to die, per Dr. Davies.  Upon approach, patient was observer to be clam, cooperative , well oriented to time, place , person.  He spoke with a low volume and at times seeming a whisper.  He participated in the interview nonetheless.    ON evaluation pt denies suicidal ideation intent or plan.    There is no indication for IPP or CO, and no elevated psychiatric risk at this time.

## 2024-04-22 NOTE — CONSULT NOTE ADULT - SUBJECTIVE AND OBJECTIVE BOX
Patient is a 68y old  Male who presents with a chief complaint of LT Gluteal Pain / Diarrhea (2024 12:49)      HPI:  68M w/ PMHx Stage 4 Prostate Ca s/p palliative radiation, Mediastinal Mass on oral Chemo (xtandi), Chronic Anemia, HTN and Chronic Back Pain presents to ED from SNF for evaluation. As per NH documentation, pt has been refusing medications through PEG for the last 3x days d/t episode of diarrhea post feeds. Patient's only complaint is LT buttock pain. Denies fevers, chills, chest pain, SOB, n/v, abdominal pain or urinary symptoms.    Vitals: Temp 98.3F, /82, , RR 18, SpO2 100% on RA    Labs: Hgb 8.2 (previously 8.6), Cr 0.9 (at baseline), Ca 7.8, Albumin 2.9,     Imagin. CT A/P shows:  - New mild edema and enlargement of the left gluteus maximums musculature with associated subcutaneous edema. No discrete abscess.  - Metastatic disease in the abdomen and pelvis as above with slightly increased upper abdominal adenopathy.  - No significant change approximately in the large bladder mass and partially imaged mediastinal mass.  - Interval placement of an esophageal stent.  - Severe left hydronephrosis to the level of the proximal ureter, not significantly changed.  - New/increased large bilateral pleural effusions.        PAST MEDICAL & SURGICAL HISTORY:  HTN (hypertension      Allergies    No Known Allergies        ICU Vital Signs Last 24 Hrs  T(C): 35.7 (2024 08:00), Max: 37.3 (2024 16:21)  T(F): 96.3 (2024 08:00), Max: 99.2 (2024 16:21)  HR: 79 (2024 08:00) (79 - 102)  BP: 136/95 (2024 08:00) (105/70 - 136/95)  BP(mean): --  ABP: --  ABP(mean): --  RR: 18 (2024 08:00) (18 - 18)  SpO2: 98% (2024 16:21) (98% - 98%)    O2 Parameters below as of 2024 16:21  Patient On (Oxygen Delivery Method): room air            PHYSICAL EXAM:  GENERAL: NAD, well-developed  HEAD:  Atraumatic, Normocephalic  EYES: EOMI, PERRLA, conjunctiva and sclera clear  NECK: Supple, No JVD  CHEST/LUNG: Clear to auscultation bilaterally; No wheeze  HEART: Regular rate and rhythm; No murmurs, rubs, or gallops  ABDOMEN: Soft, Nontender, Nondistended; Bowel sounds present  EXTREMITIES:  2+ Peripheral Pulses, No clubbing, cyanosis, or edema  PSYCH: AAOx3  NEUROLOGY: non-focal  SKIN: *******        IMPRESSION:      PLAN:    CNS:    HEENT:    PULMONARY:    CARDIOVASCULAR:    GI: GI prophylaxis.  Feeding     RENAL:    INFECTIOUS DISEASE:    HEMATOLOGICAL:  DVT prophylaxis.    ENDOCRINE:  Follow up FS.  Insulin protocol if needed.    MUSCULOSKELETAL:    Wound care:  Patient is a 68y old  Male who presents with a chief complaint of LT Gluteal Pain / Diarrhea (2024 12:49)      HPI:  68M w/ PMHx Stage 4 Prostate Ca s/p palliative radiation, Mediastinal Mass on oral Chemo (xtandi), Chronic Anemia, HTN and Chronic Back Pain presents to ED from SNF for evaluation. As per NH documentation, pt has been refusing medications through PEG for the last 3x days d/t episode of diarrhea post feeds. Patient's only complaint is LT buttock pain. Denies fevers, chills, chest pain, SOB, n/v, abdominal pain or urinary symptoms.    Vitals: Temp 98.3F, /82, , RR 18, SpO2 100% on RA    Labs: Hgb 8.2 (previously 8.6), Cr 0.9 (at baseline), Ca 7.8, Albumin 2.9,     Imagin. CT A/P shows:  - New mild edema and enlargement of the left gluteus maximums musculature with associated subcutaneous edema. No discrete abscess.  - Metastatic disease in the abdomen and pelvis as above with slightly increased upper abdominal adenopathy.  - No significant change approximately in the large bladder mass and partially imaged mediastinal mass.  - Interval placement of an esophageal stent.  - Severe left hydronephrosis to the level of the proximal ureter, not significantly changed.  - New/increased large bilateral pleural effusions.    Burn consulted for evaluation of left buttock cellulitis    PAST MEDICAL & SURGICAL HISTORY:  HTN (hypertension    Allergies  No Known Allergies    Vital Signs Last 24 Hrs  T(C): 35.7 (2024 08:00), Max: 37.3 (2024 16:21)  T(F): 96.3 (2024 08:00), Max: 99.2 (2024 16:21)  HR: 79 (2024 08:00) (79 - 102)  BP: 136/95 (2024 08:00) (105/70 - 136/95)  BP(mean): --  ABP: --  ABP(mean): --  RR: 18 (2024 08:00) (18 - 18)  SpO2: 98% (2024 16:21) (98% - 98%)    O2 Parameters below as of 2024 16:21  Patient On (Oxygen Delivery Method): room air      PHYSICAL EXAM:    General: Patient laying in bed, in NAD  Wound: left buttock with erythematous area of induration, with opening draining pus. No malodor or active bleeding noted

## 2024-04-22 NOTE — CONSULT NOTE ADULT - ASSESSMENT
Left buttock abscess    Plan    - Plan for possible surgical debridement this week if medically stable  - Local wound care: allevyn pad  - wcx taken 4/22, f/u  - IV abx  - Remainder of care per primary care team Left buttock abscess    Plan    - Plan for possible surgical debridement this week if medically cleared  - Local wound care: allevyn pad  - wcx taken 4/22, f/u  - IV abx  - Remainder of care per primary care team

## 2024-04-22 NOTE — CONSULT NOTE ADULT - PROBLEM SELECTOR RECOMMENDATION 2
followed by Dr. Irizarry, on active chemo  -f/u heme onc  -f/u rad onc  -GOC as appropriate  -continue home Xtandi

## 2024-04-22 NOTE — BH CONSULTATION LIAISON ASSESSMENT NOTE - NSBHCHARTREVIEWINVESTIGATE_PSY_A_CORE FT
CT Abd/Plevis  2/21/24  IMPRESSION:  1.  Since November 22, 2023, increase dupper abdominal bulky   lymphadenopathy.  2.  Unchanged bulky retroperitoneal lymphadenopathy, large left bladder   mass and perirectal soft tissue infiltrative mass.  3.  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  4.  Chronic moderate to severe left hydroureteronephrosis.    CT Chest ( 2/20/24)   IMPRESSION:  No evidence of pulmonary embolus  Interval enlargement of the central/posterior mediastinal mass with   development of more extensive lymphadenopathy. The mass encircles the   descending thoracic aorta and has some mass effect upon the left atrium.   The mid aspect of the esophagus is encircled by the mass and difficult to   distinguish. Possible ovoid pill is noted within the mass and possible   location of the mid esophagus appear. The maximal esophagus appears   distended with layering debris within. Consideration may be given to   esophageal stenting.

## 2024-04-22 NOTE — PROGRESS NOTE ADULT - SUBJECTIVE AND OBJECTIVE BOX
RUFUS PICKARD 68y Male  MRN#: [id number]   Hospital Day: 3d    SUBJECTIVE  Patient is a 68y old Male who presents with a chief complaint of LT Gluteal Pain / Diarrhea (22 Apr 2024 11:52)  Currently admitted to medicine with the primary diagnosis of Abscess      INTERVAL HPI AND OVERNIGHT EVENTS:  Patient was examined and seen at bedside. This morning he is resting comfortably in bed. No issues or overnight events.    OBJECTIVE  PAST MEDICAL & SURGICAL HISTORY  HTN (hypertension)    Prostate cancer      ALLERGIES:  No Known Allergies    MEDICATIONS:  STANDING MEDICATIONS  ascorbic acid 500 milliGRAM(s) Oral daily  cefpodoxime 200 milliGRAM(s) Oral every 12 hours  doxycycline monohydrate Capsule 100 milliGRAM(s) Oral every 12 hours  enoxaparin Injectable 40 milliGRAM(s) SubCutaneous every 24 hours  ferrous    sulfate Liquid 300 milliGRAM(s) Enteral Tube daily  furosemide   Injectable 40 milliGRAM(s) IV Push daily  influenza  Vaccine (HIGH DOSE) 0.7 milliLiter(s) IntraMuscular once  megestrol Suspension 400 milliGRAM(s) Oral daily  metoprolol tartrate 25 milliGRAM(s) Enteral Tube every 12 hours  multivitamin 1 Tablet(s) Oral daily  multivitamin  Chewable 1 Tablet(s) Oral daily  mupirocin 2% Nasal 1 Application(s) Both Nostrils two times a day    PRN MEDICATIONS  acetaminophen     Tablet .. 650 milliGRAM(s) Oral every 6 hours PRN  aluminum hydroxide/magnesium hydroxide/simethicone Suspension 30 milliLiter(s) Oral every 4 hours PRN  loperamide 2 milliGRAM(s) Oral every 8 hours PRN  melatonin 3 milliGRAM(s) Oral at bedtime PRN  ondansetron Injectable 4 milliGRAM(s) IV Push every 8 hours PRN  sodium bicarbonate 1300 milliGRAM(s) Oral every 6 hours PRN      VITAL SIGNS: Last 24 Hours  T(C): 35.7 (22 Apr 2024 08:00), Max: 37.3 (21 Apr 2024 16:21)  T(F): 96.3 (22 Apr 2024 08:00), Max: 99.2 (21 Apr 2024 16:21)  HR: 79 (22 Apr 2024 08:00) (79 - 102)  BP: 136/95 (22 Apr 2024 08:00) (105/70 - 136/95)  BP(mean): --  RR: 18 (22 Apr 2024 08:00) (18 - 18)  SpO2: 98% (21 Apr 2024 16:21) (98% - 98%)    LABS:                        7.9    7.75  )-----------( 429      ( 22 Apr 2024 07:30 )             25.9     04-22    135  |  104  |  13  ----------------------------<  144<H>  3.9   |  14<L>  |  1.0    Ca    7.4<L>      22 Apr 2024 07:30  Phos  2.0     04-22  Mg     2.1     04-22    TPro  5.5<L>  /  Alb  2.8<L>  /  TBili  0.4  /  DBili  x   /  AST  14  /  ALT  <5  /  AlkPhos  112  04-22      Urinalysis Basic - ( 22 Apr 2024 07:30 )    Color: x / Appearance: x / SG: x / pH: x  Gluc: 144 mg/dL / Ketone: x  / Bili: x / Urobili: x   Blood: x / Protein: x / Nitrite: x   Leuk Esterase: x / RBC: x / WBC x   Sq Epi: x / Non Sq Epi: x / Bacteria: x            Urinalysis with Rflx Culture (collected 19 Apr 2024 18:24)    Culture - Urine (collected 19 Apr 2024 18:24)  Source: Clean Catch None  Final Report (20 Apr 2024 23:20):    <10,000 CFU/mL Normal Urogenital Leigh Ann    Culture - Blood (collected 19 Apr 2024 17:37)  Source: .Blood Blood  Preliminary Report (21 Apr 2024 23:02):    No growth at 48 Hours          RADIOLOGY:      PHYSICAL EXAM:  CONSTITUTIONAL: No acute distress, cachectic, frail elderly male  HEAD: Atraumatic, normocephalic  EYES: conjunctiva and sclera clear  ENT: Supple, no masses, no thyromegaly, no bruits, no JVD; moist mucous membranes  PULMONARY: Clear to auscultation bilaterally; no wheezes, rales, or rhonchi  CARDIOVASCULAR: Regular rate and rhythm; no murmurs, rubs, or gallops  GASTROINTESTINAL: Soft, non-tender, scaphoid; bowel sounds present; PEG site clean/intact  MUSCULOSKELETAL: 2+ peripheral pulses; no clubbing, no cyanosis, mild edema left UE (infiltrated IV)   NEUROLOGY: non-focal  SKIN: No rashes or lesions; warm and dry

## 2024-04-22 NOTE — CONSULT NOTE ADULT - ASSESSMENT
68yMale with history of metstatic prostate cancer s/p pall radiation, mediastinal mass on oral chemo presents with patient refusing meds through PEG.  Palliative care consulted for [facility].    Spoke with patient at bedside. Palliative  care introduced.  He was able to provide a medical history and hospital course.  We discussed that he had previously refused meds through his PEG. He noted he was willing to have PEG feeds and meds through his PEG again.  We also discussed code status. He had been DNR/DNI prior to hospitalization and then rescinded his DNR/DNI on arrival.  Discussed DNR/DNI again at length. He wishes to be DNR/DNI. All questions answered.     Education about palliative care provided to patient/family.  See Recs below.    Please call [phone] with questions or concerns 24/7.   We will continue to follow.

## 2024-04-22 NOTE — CONSULT NOTE ADULT - CONVERSATION DETAILS
Spoke with patient at bedside. Palliative  care introduced.  He was able to provide a medical history and hospital course.  We discussed that he had previously refused meds through his PEG. He noted he was willing to have PEG feeds and meds through his PEG again.  We also discussed code status. He had been DNR/DNI prior to hospitalization and then rescinded his DNR/DNI on arrival.  Discussed DNR/DNI again at length. He wishes to be DNR/DNI. All questions answered.

## 2024-04-23 LAB
ALBUMIN SERPL ELPH-MCNC: 2.8 G/DL — LOW (ref 3.5–5.2)
ALP SERPL-CCNC: 114 U/L — SIGNIFICANT CHANGE UP (ref 30–115)
ALT FLD-CCNC: <5 U/L — SIGNIFICANT CHANGE UP (ref 0–41)
ANION GAP SERPL CALC-SCNC: 16 MMOL/L — HIGH (ref 7–14)
AST SERPL-CCNC: 22 U/L — SIGNIFICANT CHANGE UP (ref 0–41)
BASOPHILS # BLD AUTO: 0.01 K/UL — SIGNIFICANT CHANGE UP (ref 0–0.2)
BASOPHILS NFR BLD AUTO: 0.1 % — SIGNIFICANT CHANGE UP (ref 0–1)
BILIRUB SERPL-MCNC: 0.5 MG/DL — SIGNIFICANT CHANGE UP (ref 0.2–1.2)
BLD GP AB SCN SERPL QL: SIGNIFICANT CHANGE UP
BUN SERPL-MCNC: 14 MG/DL — SIGNIFICANT CHANGE UP (ref 10–20)
CALCIUM SERPL-MCNC: 7.7 MG/DL — LOW (ref 8.4–10.5)
CHLORIDE SERPL-SCNC: 107 MMOL/L — SIGNIFICANT CHANGE UP (ref 98–110)
CO2 SERPL-SCNC: 15 MMOL/L — LOW (ref 17–32)
CREAT SERPL-MCNC: 1.1 MG/DL — SIGNIFICANT CHANGE UP (ref 0.7–1.5)
CULTURE RESULTS: SIGNIFICANT CHANGE UP
EGFR: 72 ML/MIN/1.73M2 — SIGNIFICANT CHANGE UP
EOSINOPHIL # BLD AUTO: 0.14 K/UL — SIGNIFICANT CHANGE UP (ref 0–0.7)
EOSINOPHIL NFR BLD AUTO: 1.7 % — SIGNIFICANT CHANGE UP (ref 0–8)
GLUCOSE SERPL-MCNC: 139 MG/DL — HIGH (ref 70–99)
HCT VFR BLD CALC: 27.9 % — LOW (ref 42–52)
HGB BLD-MCNC: 8.7 G/DL — LOW (ref 14–18)
IMM GRANULOCYTES NFR BLD AUTO: 0.9 % — HIGH (ref 0.1–0.3)
LYMPHOCYTES # BLD AUTO: 0.43 K/UL — LOW (ref 1.2–3.4)
LYMPHOCYTES # BLD AUTO: 5.2 % — LOW (ref 20.5–51.1)
MAGNESIUM SERPL-MCNC: 2.2 MG/DL — SIGNIFICANT CHANGE UP (ref 1.8–2.4)
MCHC RBC-ENTMCNC: 29.3 PG — SIGNIFICANT CHANGE UP (ref 27–31)
MCHC RBC-ENTMCNC: 31.2 G/DL — LOW (ref 32–37)
MCV RBC AUTO: 93.9 FL — SIGNIFICANT CHANGE UP (ref 80–94)
MONOCYTES # BLD AUTO: 0.96 K/UL — HIGH (ref 0.1–0.6)
MONOCYTES NFR BLD AUTO: 11.7 % — HIGH (ref 1.7–9.3)
NEUTROPHILS # BLD AUTO: 6.6 K/UL — HIGH (ref 1.4–6.5)
NEUTROPHILS NFR BLD AUTO: 80.4 % — HIGH (ref 42.2–75.2)
NRBC # BLD: 0 /100 WBCS — SIGNIFICANT CHANGE UP (ref 0–0)
PLATELET # BLD AUTO: 536 K/UL — HIGH (ref 130–400)
PMV BLD: 8.8 FL — SIGNIFICANT CHANGE UP (ref 7.4–10.4)
POTASSIUM SERPL-MCNC: 4.5 MMOL/L — SIGNIFICANT CHANGE UP (ref 3.5–5)
POTASSIUM SERPL-SCNC: 4.5 MMOL/L — SIGNIFICANT CHANGE UP (ref 3.5–5)
PROT SERPL-MCNC: 5.6 G/DL — LOW (ref 6–8)
RBC # BLD: 2.97 M/UL — LOW (ref 4.7–6.1)
RBC # FLD: 16.7 % — HIGH (ref 11.5–14.5)
SODIUM SERPL-SCNC: 138 MMOL/L — SIGNIFICANT CHANGE UP (ref 135–146)
SPECIMEN SOURCE: SIGNIFICANT CHANGE UP
WBC # BLD: 8.21 K/UL — SIGNIFICANT CHANGE UP (ref 4.8–10.8)
WBC # FLD AUTO: 8.21 K/UL — SIGNIFICANT CHANGE UP (ref 4.8–10.8)

## 2024-04-23 PROCEDURE — 71045 X-RAY EXAM CHEST 1 VIEW: CPT | Mod: 26

## 2024-04-23 PROCEDURE — 99233 SBSQ HOSP IP/OBS HIGH 50: CPT

## 2024-04-23 RX ORDER — FUROSEMIDE 40 MG
40 TABLET ORAL ONCE
Refills: 0 | Status: COMPLETED | OUTPATIENT
Start: 2024-04-24 | End: 2024-04-24

## 2024-04-23 RX ORDER — SODIUM BICARBONATE 1 MEQ/ML
650 SYRINGE (ML) INTRAVENOUS THREE TIMES A DAY
Refills: 0 | Status: DISCONTINUED | OUTPATIENT
Start: 2024-04-23 | End: 2024-04-26

## 2024-04-23 RX ORDER — CEPHALEXIN 500 MG
500 CAPSULE ORAL
Refills: 0 | Status: DISCONTINUED | OUTPATIENT
Start: 2024-04-23 | End: 2024-04-26

## 2024-04-23 RX ADMIN — Medication 2 PACKET(S): at 05:46

## 2024-04-23 RX ADMIN — Medication 1 TABLET(S): at 12:49

## 2024-04-23 RX ADMIN — Medication 650 MILLIGRAM(S): at 22:03

## 2024-04-23 RX ADMIN — Medication 650 MILLIGRAM(S): at 05:51

## 2024-04-23 RX ADMIN — Medication 25 MILLIGRAM(S): at 05:51

## 2024-04-23 RX ADMIN — MUPIROCIN 1 APPLICATION(S): 20 OINTMENT TOPICAL at 17:52

## 2024-04-23 RX ADMIN — Medication 500 MILLIGRAM(S): at 17:52

## 2024-04-23 RX ADMIN — Medication 200 MILLIGRAM(S): at 05:47

## 2024-04-23 RX ADMIN — Medication 100 MILLIGRAM(S): at 17:52

## 2024-04-23 RX ADMIN — MUPIROCIN 1 APPLICATION(S): 20 OINTMENT TOPICAL at 05:48

## 2024-04-23 RX ADMIN — Medication 25 MILLIGRAM(S): at 22:07

## 2024-04-23 RX ADMIN — Medication 500 MILLIGRAM(S): at 23:42

## 2024-04-23 RX ADMIN — Medication 100 MILLIGRAM(S): at 05:48

## 2024-04-23 RX ADMIN — Medication 500 MILLIGRAM(S): at 12:49

## 2024-04-23 RX ADMIN — Medication 650 MILLIGRAM(S): at 14:37

## 2024-04-23 RX ADMIN — Medication 40 MILLIGRAM(S): at 05:47

## 2024-04-23 RX ADMIN — Medication 2 PACKET(S): at 13:01

## 2024-04-23 RX ADMIN — Medication 300 MILLIGRAM(S): at 12:49

## 2024-04-23 NOTE — PROGRESS NOTE ADULT - SUBJECTIVE AND OBJECTIVE BOX
HPI  Patient is a 68y old Male who presents with a chief complaint of LT Gluteal Pain / Diarrhea (22 Apr 2024 14:26)    Currently admitted to medicine with the primary diagnosis of Abscess       Today is hospital day 4d.     INTERVAL HPI / OVERNIGHT EVENTS:  Patient was seen and examined at bedside    Patient Feels okay  no new complaints  states he had diarrhea and that is why he refused feed last night  Understands need for debridement for his infection and abscess and also regarding antibiotics  Discussed need for thoracentesis        PAST MEDICAL & SURGICAL HISTORY  HTN (hypertension)    Prostate cancer      ALLERGIES  No Known Allergies    MEDICATIONS  STANDING MEDICATIONS  ascorbic acid 500 milliGRAM(s) Oral daily  cephalexin 500 milliGRAM(s) Oral four times a day  doxycycline monohydrate Capsule 100 milliGRAM(s) Oral every 12 hours  enoxaparin Injectable 40 milliGRAM(s) SubCutaneous every 24 hours  ferrous    sulfate Liquid 300 milliGRAM(s) Enteral Tube daily  furosemide   Injectable 40 milliGRAM(s) IV Push daily  influenza  Vaccine (HIGH DOSE) 0.7 milliLiter(s) IntraMuscular once  metoprolol tartrate 25 milliGRAM(s) Enteral Tube every 12 hours  multivitamin 1 Tablet(s) Oral daily  multivitamin  Chewable 1 Tablet(s) Oral daily  mupirocin 2% Nasal 1 Application(s) Both Nostrils two times a day  potassium phosphate / sodium phosphate Powder (PHOS-NaK) 2 Packet(s) Oral three times a day    PRN MEDICATIONS  acetaminophen     Tablet .. 650 milliGRAM(s) Oral every 6 hours PRN  aluminum hydroxide/magnesium hydroxide/simethicone Suspension 30 milliLiter(s) Oral every 4 hours PRN  loperamide 2 milliGRAM(s) Oral every 8 hours PRN  melatonin 3 milliGRAM(s) Oral at bedtime PRN  ondansetron Injectable 4 milliGRAM(s) IV Push every 8 hours PRN  sodium bicarbonate 1300 milliGRAM(s) Oral every 6 hours PRN    VITALS:  T(F): 97.8  HR: 88  BP: 112/80  RR: 18  SpO2: --    PHYSICAL EXAM  GEN: no distress, comfortable  PULM: BS heard b/l equal, No wheezing  CVS: S1S2 present, no rubs or gallops  ABD: Soft, non-distended, no guarding; non-tender  EXT: No lower extremity edema  NEURO: A&Ox3, moving all extremities    LABS                        8.7    8.21  )-----------( 536      ( 23 Apr 2024 07:09 )             27.9     04-23    138  |  107  |  14  ----------------------------<  139<H>  4.5   |  15<L>  |  1.1    Ca    7.7<L>      23 Apr 2024 07:09  Phos  2.0     04-22  Mg     2.2     04-23    TPro  5.6<L>  /  Alb  2.8<L>  /  TBili  0.5  /  DBili  x   /  AST  22  /  ALT  <5  /  AlkPhos  114  04-23      Urinalysis Basic - ( 23 Apr 2024 07:09 )    Color: x / Appearance: x / SG: x / pH: x  Gluc: 139 mg/dL / Ketone: x  / Bili: x / Urobili: x   Blood: x / Protein: x / Nitrite: x   Leuk Esterase: x / RBC: x / WBC x   Sq Epi: x / Non Sq Epi: x / Bacteria: x            Culture - Stool (collected 21 Apr 2024 12:11)  Source: .Stool Feces  Preliminary Report (22 Apr 2024 18:15):    No enteric pathogens to date: Final culture pending          RADIOLOGY

## 2024-04-23 NOTE — PROGRESS NOTE ADULT - ASSESSMENT
68M w/ PMHx Stage 4 Prostate Ca s/p palliative radiation, Mediastinal Mass on oral Chemo (xtandi), Chronic Anemia, HTN and Chronic Back Pain presents to ED from SNF for evaluation. As per NH documentation, pt has been refusing medications through PEG for the last 3x days d/t episode of diarrhea post feeds. Patient's only complaint is LT buttock pain. Denies fevers, chills, chest pain, SOB, n/v, abdominal pain or urinary symptoms.    #LT Gluteal Cellulitis  - Vitals: Temp 98.3F, /82, , RR 18, SpO2 100% on RA  - CT A/P shows new mild edema and enlargement of the left gluteus maximums musculature with associated subcutaneous edema. No discrete abscess.  - s/p Cefepime, Vanc, Flagyl IV x1 in the ED  - started Cefazolin 2g q8hrs  -c/w vantin 200 mg BID PO and doxycycline 100 mg BID PO  - f/u BCx (- 4/19), Urine Cx (- 4/19), MRSA swab (+ 4/21)  - f/u ID c/s  -Burn; wound cx 4/22 and may take for surgery  -requires pre-op risk assessment by Medicine/Pulm (consult placed)    #Inability to Tolerate PEG Feeds - Diarrhea (chronic issue)  #h/o Dysphagia s/p PEG  - s/p Esophageal Stent and PEG 3/5/24 -> pt having diarrhea after feeds -> here for the same issue  - patient cleared for pureed diet and thin liquids per SS on last admission  - patient has been refusing PEG feeds out of concern he will have diarrhea again.  - s/p 1L NS bolus  - resume PEG feeds w/ Peptamen  - c/w home Imodium 2mg q8hrs PRN for diarrhea  - f/u dietary consult (pt refusing feeds)    #Suicidal Ideation  -pt told RN that he wants to die  -inquired about this w/ pt who denied stating that he wanted to die  -psych consulted/contacted; per psych no major concern for suicide     #New b/l Large Pleural Effusion  - not SOB, satting well on RA  - previously had only Left Pleural Effusion  - CT A/P showed new/increased large bilateral pleural effusions.  - Pulm was consult for thoracentesis but had refused last visit  - see no utility in diuresing - can reconsider IV Lasix if patient becomes symptomatic  - patient was refusing thoracentesis. Pt may consent to procedure if necessary     #Stage 4 Prostate Ca s/p Radiation (on active PO chemo) w/ metastasis to mediastinum or primary mediastinal mass?  #Severe Left Hydronephrosis (unchanged from prior)  - CT A/P shows:  1. Metastatic disease in the abdomen and pelvis as above with slightly increased upper abdominal adenopathy.  2. No significant change approximately in the large bladder mass and partially imaged mediastinal mass.  3. Severe left hydronephrosis to the level of the proximal ureter, not significantly changed.  - previous AFP and CEA wnl  - previous LDH and haptoglobin elevated  - previous PSA total 220, PSA free is 22, CA19: 131, : 57  - Esophageal biopsy: metastatic poorly differentiated carcinoma of prostatic origin.  - per onc note, patient had prior biopsy of site that demonstrated metastatic prostate adenocarcinoma and was started on Docetaxel, in January 2024 patient was started on Pembrolizumab and continued on keytruda injections.   - Dr. Irizarry (pt's oncologist): cs rad/onco for palliative radiation, onco asked about new protocol since patient progressed, rec hospice and palliative   - Heme onc also recommending palliative radiation to mediastinal mass. Also reevaluated by Rad Onc for palliative RT -> CT sim for mapping and planning, followed by XRT.  - Rad/onc following for palliative RT -> CT sim for mapping and planning, followed by XRT.  May not be able to undergo RT given how little lung is left without intervention.  - Psych previously determined that patient lacks capacity, Pt refusing DNR/DNI status based on Faith beliefs, Ethics committee previously agreed 2P consent would not be appropriate to make him DNR/DNI  - c/w home Xtandi 160 mg daily (getting non-formulary)     #Chronic Normocytic Anemia  - Hgb 8.7 (previously 8.6)  - iron  studies noted % sat 27  - monitor H&H, maintain active T&S, transfuse PRN, Keep hgb >7    #Recent GAS strep pyogenes Bacteremia s/p Treatment  - TTE: EF 65 to 70%  - finished course of zosyn during last admission    #DVT ppx: Lovenox SubQ  #GI ppx: PPI PO daily  #Diet: PEG - Peptamen  #Activity: IAT  #Code Status: Full Code  #Dispo: from NH, acute 68M w/ PMHx Stage 4 Prostate Ca s/p palliative radiation, Mediastinal Mass on oral Chemo (xtandi), Chronic Anemia, HTN and Chronic Back Pain presents to ED from SNF for evaluation. As per NH documentation, pt has been refusing medications through PEG for the last 3x days d/t episode of diarrhea post feeds. Patient's only complaint is LT buttock pain. Denies fevers, chills, chest pain, SOB, n/v, abdominal pain or urinary symptoms.    #LT Gluteal Cellulitis  - Vitals: Temp 98.3F, /82, , RR 18, SpO2 100% on RA  - CT A/P shows new mild edema and enlargement of the left gluteus maximums musculature with associated subcutaneous edema. No discrete abscess.  - s/p Cefepime, Vanc, Flagyl IV x1 in the ED  - started Cefazolin 2g q8hrs  -c/w cephalexin 500 mg q6h PO and doxycycline 100 mg BID PO  - f/u BCx (- 4/19), Urine Cx (- 4/19), MRSA swab (+ 4/21)  - f/u ID c/s  -Burn; wound cx 4/22 and may take for surgery  -requires pre-op risk assessment by Medicine/Pulm (consult placed)    #Inability to Tolerate PEG Feeds - Diarrhea (chronic issue)  #h/o Dysphagia s/p PEG  - s/p Esophageal Stent and PEG 3/5/24 -> pt having diarrhea after feeds -> here for the same issue  - patient cleared for pureed diet and thin liquids per SS on last admission  - patient has been refusing PEG feeds out of concern he will have diarrhea again.  - s/p 1L NS bolus  - resume PEG feeds w/ Peptamen  - c/w home Imodium 2mg q8hrs PRN for diarrhea  - f/u dietary consult (pt refusing feeds)    #Suicidal Ideation  -pt told RN that he wants to die  -inquired about this w/ pt who denied stating that he wanted to die  -psych consulted/contacted; per psych no major concern for suicide     #New b/l Large Pleural Effusion  - not SOB, satting well on RA  - previously had only Left Pleural Effusion  - CT A/P showed new/increased large bilateral pleural effusions.  - Pulm was consult for thoracentesis but had refused last visit  - see no utility in diuresing - can reconsider IV Lasix if patient becomes symptomatic  - patient was refusing thoracentesis. Pt may consent to procedure if necessary   -c/w lasix (may be transudative effusion)    #Stage 4 Prostate Ca s/p Radiation (on active PO chemo) w/ metastasis to mediastinum or primary mediastinal mass?  #Severe Left Hydronephrosis (unchanged from prior)  - CT A/P shows:  1. Metastatic disease in the abdomen and pelvis as above with slightly increased upper abdominal adenopathy.  2. No significant change approximately in the large bladder mass and partially imaged mediastinal mass.  3. Severe left hydronephrosis to the level of the proximal ureter, not significantly changed.  - previous AFP and CEA wnl  - previous LDH and haptoglobin elevated  - previous PSA total 220, PSA free is 22, CA19: 131, : 57  - Esophageal biopsy: metastatic poorly differentiated carcinoma of prostatic origin.  - per onc note, patient had prior biopsy of site that demonstrated metastatic prostate adenocarcinoma and was started on Docetaxel, in January 2024 patient was started on Pembrolizumab and continued on keytruda injections.   - Dr. Irizarry (pt's oncologist): cs rad/onco for palliative radiation, onco asked about new protocol since patient progressed, rec hospice and palliative   - Heme onc also recommending palliative radiation to mediastinal mass. Also reevaluated by Rad Onc for palliative RT -> CT sim for mapping and planning, followed by XRT.  - Rad/onc following for palliative RT -> CT sim for mapping and planning, followed by XRT.  May not be able to undergo RT given how little lung is left without intervention.  - Psych previously determined that patient lacks capacity, Pt refusing DNR/DNI status based on Muslim beliefs, Ethics committee previously agreed 2P consent would not be appropriate to make him DNR/DNI  - c/w home Xtandi 160 mg daily (getting non-formulary)     #Chronic Normocytic Anemia  - Hgb 8.7 (previously 8.6)  - iron  studies noted % sat 27  - monitor H&H, maintain active T&S, transfuse PRN, Keep hgb >7    #Recent GAS strep pyogenes Bacteremia s/p Treatment  - TTE: EF 65 to 70%  - finished course of zosyn during last admission    #DVT ppx: Lovenox SubQ  #GI ppx: PPI PO daily  #Diet: PEG - Peptamen  #Activity: IAT  #Code Status: Full Code  #Dispo: from NH, acute

## 2024-04-23 NOTE — CHART NOTE - NSCHARTNOTEFT_GEN_A_CORE
Pt was very happy to see a . Pt's daughter were at bedside. I was a comforting Pastoral presence. I will follow up for support.

## 2024-04-23 NOTE — PROGRESS NOTE ADULT - SUBJECTIVE AND OBJECTIVE BOX
HPI:  68M w/ PMHx Stage 4 Prostate Ca s/p palliative radiation, Mediastinal Mass on oral Chemo (xtandi), Chronic Anemia, HTN and Chronic Back Pain presents to ED from SNF for evaluation. As per NH documentation, pt has been refusing medications through PEG for the last 3x days d/t episode of diarrhea post feeds. Patient's only complaint is LT buttock pain. Denies fevers, chills, chest pain, SOB, n/v, abdominal pain or urinary symptoms.    Interval history  -Patient seen at bedside  -denied any pain or SOB     ADVANCE DIRECTIVES:     [] Full Code [ x] DNR  MOLST  [ ]  Living Will  [ ]   DECISION MAKER(s):  [ ] Health Care Proxy(s)  [x ] Surrogate(s)  [ ] Guardian           Name(s): Phone Number(s): None      BASELINE (I)ADL(s) (prior to admission):    [place]: [ ]Total  [ ] Moderate [ ]Dependent  Palliative Performance Status Version 2:         %    http://npcrc.org/files/news/palliative_performance_scale_ppsv2.pdf    Allergies    No Known Allergies    Intolerances    MEDICATIONS  (STANDING):  ascorbic acid 500 milliGRAM(s) Oral daily  cephalexin 500 milliGRAM(s) Oral four times a day  doxycycline monohydrate Capsule 100 milliGRAM(s) Oral every 12 hours  enoxaparin Injectable 40 milliGRAM(s) SubCutaneous every 24 hours  ferrous    sulfate Liquid 300 milliGRAM(s) Enteral Tube daily  furosemide   Injectable 40 milliGRAM(s) IV Push daily  influenza  Vaccine (HIGH DOSE) 0.7 milliLiter(s) IntraMuscular once  metoprolol tartrate 25 milliGRAM(s) Enteral Tube every 12 hours  multivitamin 1 Tablet(s) Oral daily  multivitamin  Chewable 1 Tablet(s) Oral daily  mupirocin 2% Nasal 1 Application(s) Both Nostrils two times a day  potassium phosphate / sodium phosphate Powder (PHOS-NaK) 2 Packet(s) Oral three times a day  sodium bicarbonate 650 milliGRAM(s) Oral three times a day    MEDICATIONS  (PRN):  acetaminophen     Tablet .. 650 milliGRAM(s) Oral every 6 hours PRN Temp greater or equal to 38C (100.4F), Mild Pain (1 - 3)  aluminum hydroxide/magnesium hydroxide/simethicone Suspension 30 milliLiter(s) Oral every 4 hours PRN Dyspepsia  loperamide 2 milliGRAM(s) Oral every 8 hours PRN for diarrhea  melatonin 3 milliGRAM(s) Oral at bedtime PRN Insomnia  ondansetron Injectable 4 milliGRAM(s) IV Push every 8 hours PRN Nausea and/or Vomiting      PRESENT SYMPTOMS: [ ]Unable to obtain due to poor mentation   Source if other than patient:  [ ]Family   [ ]Team     Pain: [ ]yes [x ]no  ALL PAIN ASSESSMENT COMPONENTS WERE ASKED ABOUT UNLESS OTHERWISE NOTED  QOL impact -   Location -                    Aggravating factors -  Quality -  Radiation -  Timing-  Severity (0-10 scale):  Minimal acceptable level (0-10 scale):     CPOT:    https://[email or web address]/getattachment/[email or web address]/Critical-Care-Pain-Observation-Tool-(CPOT)    PAIN AD Score:   http://[email or web address]/painad.pdf (press ctrl +  left click to view)    Dyspnea:                           [x ]None[ ]Mild [ ]Moderate [ ]Severe     Respiratory Distress Observation Scale (RDOS):   A score of 0 to 2 signifies little or no respiratory distress, 3 signifies mild distress, scores 4 to 6 indicate moderate distress, and scores greater than 7 signify severe distress  [email or web address]-dgdgisrqlxd-[email or web address]-[email or web address]    Anxiety:                             [ x]None[ ]Mild [ ]Moderate [ ]Severe   Fatigue:                             [ x]None[ ]Mild [ ]Moderate [ ]Severe   Nausea:                             [ x]None[ ]Mild [ ]Moderate [ ]Severe   Loss of appetite:              [ x]None[ ]Mild [ ]Moderate [ ]Severe   Constipation:                    [x]None[ ]Mild [ ]Moderate [ ]Severe    Other Symptoms:  [ ]All other review of systems negative     Palliative Performance Status Version 2:         30%    http://npcrc.org/files/news/palliative_performance_scale_ppsv2.pdf    PHYSICAL EXAM:  Vital Signs Last 24 Hrs  T(C): 37.2 (23 Apr 2024 15:00), Max: 37.6 (23 Apr 2024 00:00)  T(F): 99 (23 Apr 2024 15:00), Max: 99.6 (23 Apr 2024 00:00)  HR: 101 (23 Apr 2024 15:00) (85 - 101)  BP: 100/64 (23 Apr 2024 15:00) (100/64 - 112/80)  BP(mean): --  RR: 20 (23 Apr 2024 15:00) (18 - 20)  SpO2: --    Parameters below as of 23 Apr 2024 00:00  Patient On (Oxygen Delivery Method): room air      GENERAL:  [ ] No acute distress [ ]Lethargic  [ ]Unarousable  [ x]Verbal  [ ]Non-Verbal [ ]Cachexia    BEHAVIORAL/PSYCH:  [x ]Alert and Oriented x3  [ ] Anxiety [ ] Delirium [ ] Agitation [x ] Calm   EYES: [ x] No scleral icterus [ ] Scleral icterus [ ] Closed  ENMT:  [ ]Dry mouth  [x ]No external oral lesions [ ] No external ear or nose lesions  CARDIOVASCULAR:  [ ]Regular [ ]Irregular [ ]Tachy [ x]Not Tachy  [ ]Rambo [ ] Edema [ ] No edema  PULMONARY:  [ ]Tachypnea  [ ]Audible excessive secretions [x ] No labored breathing [ ] labored breathing  GASTROINTESTINAL: [ ]Soft  [ ]Distended  [ ]Not distended [ ]Non tender [ ]Tender  MUSCULOSKELETAL: [ ]No clubbing [ ] clubbing  [x ] No cyanosis [ ] cyanosis  NEUROLOGIC: [ ]No focal deficits  [ x]Follows commands  [ ]Does not follow commands  [ ]Cognitive impairment  [ ]Dysphagia  [ ]Dysarthria  [ ]Paresis   SKIN: [ ] Jaundiced [x ] Non-jaundiced [ ]Rash [ ]No Rash [ ] Warm [ ] Dry  MISC/LINES: [ ] ET tube [ ] Trach [ ]NGT/OGT [ ]PEG [ ]Angel    LABS: reviewed by me                                   8.7    8.21  )-----------( 536      ( 23 Apr 2024 07:09 )             27.9       04-23    138  |  107  |  14  ----------------------------<  139<H>  4.5   |  15<L>  |  1.1    Ca    7.7<L>      23 Apr 2024 07:09  Phos  2.0     04-22  Mg     2.2     04-23    TPro  5.6<L>  /  Alb  2.8<L>  /  TBili  0.5  /  DBili  x   /  AST  22  /  ALT  <5  /  AlkPhos  114  04-23              Urinalysis Basic - ( 23 Apr 2024 07:09 )    Color: x / Appearance: x / SG: x / pH: x  Gluc: 139 mg/dL / Ketone: x  / Bili: x / Urobili: x   Blood: x / Protein: x / Nitrite: x   Leuk Esterase: x / RBC: x / WBC x   Sq Epi: x / Non Sq Epi: x / Bacteria: x                  CAPILLARY BLOOD GLUCOSE                    RADIOLOGY & ADDITIONAL STUDIES: reviewed by me    < from: Xray Chest 1 View- PORTABLE-Urgent (Xray Chest 1 View- PORTABLE-Urgent .) (04.23.24 @ 09:21) >    IMPRESSION:    Bibasilar opacities/effusions.    < end of copied text >        EKG: reviewed by me    < from: 12 Lead ECG (04.20.24 @ 09:33) >  Ventricular Rate 84 BPM    Atrial Rate 84 BPM    P-R Interval 160 ms    QRS Duration 58 ms    Q-T Interval 410 ms    QTC Calculation(Bazett) 484 ms    P Axis 35 degrees    R Axis 51 degrees    T Axis 49 degrees    Diagnosis Line Sinus rhythm with Premature atrial complexes  Septal infarct , age undetermined  Abnormal ECG    < end of copied text >      PROTEIN CALORIE MALNUTRITION PRESENT: [ ]mild [ ]moderate [ ]severe [ ]underweight [ ]morbid obesity  https://www.andeal.org/vault/2440/web/files/ONC/Table_Clinical%20Characteristics%20to%20Document%20Malnutrition-White%20JV%20et%20al%888755.pdf    Height (cm): 185.4 (04-21-24 @ 17:28), 185.4 (03-26-24 @ 15:34), 185.4 (03-05-24 @ 16:39)  Weight (kg): 54.4 (04-19-24 @ 15:56), 77.1 (03-05-24 @ 16:39), 77.1 (11-22-23 @ 08:11)  BMI (kg/m2): 15.8 (04-21-24 @ 17:28), 15.8 (04-19-24 @ 15:56), 22.4 (03-26-24 @ 15:34)  [ ]PPSV2 < or = to 30% [ ]significant weight loss  [ ]poor nutritional intake  [ ]anasarca      [ ]Artificial Nutrition      Palliative Care Spiritual/Emotional Screening Tool Question  Severity (0-4):                    OR                    [ x] Unable to determine. Will assess at later time if appropriate.  Score of 2 or greater indicates recommendation of Chaplaincy and/or SW referral  Chaplaincy Referral: [ ] Yes [ ] Refused [ ] Following     Caregiver [place]:  [ ] Yes [ ] No    OR    [x ] Unable to determine. Will assess at later time if appropriate.  Social Work Referral [ ]  Patient and Family Centered Care Referral [ ]    Anticipatory Grief Present: [ ] Yes [ ] No    OR     [ x] Unable to determine. Will assess at later time if appropriate.  Social Work Referral [ ]  Patient and Family Centered Care Referral [ ]    Patient discussed with primary medical team MD  Palliative care education provided to patient and/or family

## 2024-04-23 NOTE — PROGRESS NOTE ADULT - SUBJECTIVE AND OBJECTIVE BOX
RUFUS PICKARD 68y Male  MRN#: [id number]   Hospital Day: 4d    SUBJECTIVE  Patient is a 68y old Male who presents with a chief complaint of LT Gluteal Pain / Diarrhea (23 Apr 2024 12:44)  Currently admitted to medicine with the primary diagnosis of Abscess      INTERVAL HPI AND OVERNIGHT EVENTS:  Patient was examined and seen at bedside. This morning he is resting comfortably in bed. No issues or overnight events. Pt will require medical/Pulm risk stratification prior to left gluteal abscess debridement.     OBJECTIVE  PAST MEDICAL & SURGICAL HISTORY  HTN (hypertension)    Prostate cancer      ALLERGIES:  No Known Allergies    MEDICATIONS:  STANDING MEDICATIONS  ascorbic acid 500 milliGRAM(s) Oral daily  cephalexin 500 milliGRAM(s) Oral four times a day  doxycycline monohydrate Capsule 100 milliGRAM(s) Oral every 12 hours  enoxaparin Injectable 40 milliGRAM(s) SubCutaneous every 24 hours  ferrous    sulfate Liquid 300 milliGRAM(s) Enteral Tube daily  furosemide   Injectable 40 milliGRAM(s) IV Push daily  influenza  Vaccine (HIGH DOSE) 0.7 milliLiter(s) IntraMuscular once  metoprolol tartrate 25 milliGRAM(s) Enteral Tube every 12 hours  multivitamin 1 Tablet(s) Oral daily  multivitamin  Chewable 1 Tablet(s) Oral daily  mupirocin 2% Nasal 1 Application(s) Both Nostrils two times a day  potassium phosphate / sodium phosphate Powder (PHOS-NaK) 2 Packet(s) Oral three times a day  sodium bicarbonate 650 milliGRAM(s) Oral three times a day    PRN MEDICATIONS  acetaminophen     Tablet .. 650 milliGRAM(s) Oral every 6 hours PRN  aluminum hydroxide/magnesium hydroxide/simethicone Suspension 30 milliLiter(s) Oral every 4 hours PRN  loperamide 2 milliGRAM(s) Oral every 8 hours PRN  melatonin 3 milliGRAM(s) Oral at bedtime PRN  ondansetron Injectable 4 milliGRAM(s) IV Push every 8 hours PRN      VITAL SIGNS: Last 24 Hours  T(C): 36.6 (23 Apr 2024 08:00), Max: 37.6 (23 Apr 2024 00:00)  T(F): 97.8 (23 Apr 2024 08:00), Max: 99.6 (23 Apr 2024 00:00)  HR: 88 (23 Apr 2024 08:00) (85 - 94)  BP: 112/80 (23 Apr 2024 08:00) (102/79 - 112/80)  BP(mean): --  RR: 18 (23 Apr 2024 08:00) (18 - 18)  SpO2: --    LABS:                        8.7    8.21  )-----------( 536      ( 23 Apr 2024 07:09 )             27.9     04-23    138  |  107  |  14  ----------------------------<  139<H>  4.5   |  15<L>  |  1.1    Ca    7.7<L>      23 Apr 2024 07:09  Phos  2.0     04-22  Mg     2.2     04-23    TPro  5.6<L>  /  Alb  2.8<L>  /  TBili  0.5  /  DBili  x   /  AST  22  /  ALT  <5  /  AlkPhos  114  04-23      Urinalysis Basic - ( 23 Apr 2024 07:09 )    Color: x / Appearance: x / SG: x / pH: x  Gluc: 139 mg/dL / Ketone: x  / Bili: x / Urobili: x   Blood: x / Protein: x / Nitrite: x   Leuk Esterase: x / RBC: x / WBC x   Sq Epi: x / Non Sq Epi: x / Bacteria: x            Culture - Stool (collected 21 Apr 2024 12:11)  Source: .Stool Feces  Preliminary Report (22 Apr 2024 18:15):    No enteric pathogens to date: Final culture pending          RADIOLOGY:      PHYSICAL EXAM:  CONSTITUTIONAL: No acute distress, cachectic, frail elderly male  HEAD: Atraumatic, normocephalic  EYES: conjunctiva and sclera clear  ENT: Supple, no masses, no thyromegaly, no bruits, no JVD; moist mucous membranes  PULMONARY: Clear to auscultation bilaterally; no wheezes, rales, or rhonchi  CARDIOVASCULAR: Regular rate and rhythm; no murmurs, rubs, or gallops  GASTROINTESTINAL: Soft, non-tender, scaphoid; bowel sounds present; PEG site clean/intact  MUSCULOSKELETAL: 2+ peripheral pulses; no clubbing, no cyanosis  NEUROLOGY: non-focal  SKIN: No rashes or lesions; warm and dry

## 2024-04-23 NOTE — PROGRESS NOTE ADULT - ASSESSMENT
68yMale with history of metstatic prostate cancer s/p pall radiation, mediastinal mass on oral chemo presents with patient refusing meds through PEG.  Palliative care consulted for GOC.    Spoke with patient at bedside. Denied pain or SOB.    Education about palliative care provided to patient/family.  See Recs below.    Please call [phone] with questions or concerns 24/7.   We will continue to follow.

## 2024-04-23 NOTE — PROGRESS NOTE ADULT - ASSESSMENT
68M w/ PMHx Stage 4 Prostate Ca s/p palliative radiation, Mediastinal prostate metastatic mass) on oral Chemo (xtandi)-has esphogeal stent , Chronic Anemia, HTN and Chronic Back Pain presents to ED from SNF for evaluation. As per NH documentation, pt has been refusing medications through PEG for the last 3x days d/t episode of diarrhea post feeds. Patient's only complaint is LT buttock pain. Denies fevers, chills, chest pain, SOB, n/v, abdominal pain or urinary symptoms.    # LT Gluteal Cellulitis  - CT A/P shows new mild edema and enlargement of the left gluteus maximums musculature with associated subcutaneous edema. No discrete abscess.  - f/u BCx, urine culture in process -MRSA swab positive   - ID evaluated- Cefazolin 1g q8hrs + doxy 100mg bid ; patient does not have IV access and hence continued to keflex and doxy  - Burn evaluated and planning for debridement->  denies any cardiac history; no h/o stroke or DM, or renal dysfunction; RCRI- 0 , MET score <4;  patient with echo 3/24- showing EF 65%; EKG- sinus with PAC; patient at acceptable cardiac risk for planned procedure; but has moderate to large bilateral pleural effusion  - pulmonary follow up for pulm risk stratification and possible thoracentesis    #h/o Dysphagia s/p PEG  - s/p Esophageal Stent and PEG 3/5/24 -> pt having diarrhea after feeds -> here for the same issue  - patient cleared for pureed diet and thin liquids per SS on last admission  - patient has been refusing PEG feeds out of concern he will have diarrhea again.  - resume PEG feeds w/ Peptamen, dietician consulted  - c/w home Imodium 2mg q8hrs PRN for diarrhea  - gi pcr, stool culture     #New b/l Large Pleural Effusion  - not SOB, satting well on RA  - previously had only Left Pleural Effusion  - CT A/P showed new/increased large bilateral pleural effusions.  - continueIV lasix 40mg qday   - pulm follow up    #Stage 4 Prostate Ca s/p Radiation (on active PO chemo) w/ metastasis to mediastinum or primary mediastinal mass?  #Severe Left Hydronephrosis (unchanged from prior)  - CT A/P shows:  1. Metastatic disease in the abdomen and pelvis as above with slightly increased upper abdominal adenopathy.  2. No significant change approximately in the large bladder mass and partially imaged mediastinal mass.  3. Severe left hydronephrosis to the level of the proximal ureter, not significantly changed.  - previous AFP and CEA wnl  - previous LDH and haptoglobin elevated  - previous PSA total 220, PSA free is 22, CA19: 131, : 57  - Esophageal biopsy: metastatic poorly differentiated carcinoma of prostatic origin.  - per onc note, patient had prior biopsy of site that demonstrated metastatic prostate adenocarcinoma and was started on Docetaxel, in January 2024 patient was started on Pembrolizumab and continued on keytruda injections.   - Dr. Irizarry (pt's oncologist): cs rad/onco for palliative radiation, onco asked about new protocol since patient progressed, rec hospice and palliative   - Heme onc also recommending palliative radiation to mediastinal mass. Also reevaluated by Rad Onc for palliative RT -> CT sim for mapping and planning, followed by XRT.  - Rad/onc following for palliative RT -> CT sim for mapping and planning, followed by XRT.  May not be able to undergo RT given how little lung is left without intervention.  - c/w home Xtandi 160 mg daily- nonformulary submitted- awaiting approval; follow up    # Severe left hydronephrosis to the level of the proximal ureter, not significantly changed.  stable renal function    #Chronic Normocytic Anemia  - Hgb 8.2 (previously 8.6)  - iron  studies noted % sat 27  - monitor H&H, maintain active T&S, transfuse PRN, Keep hgb >7    #GOC- DNR and NI  palliative evaluated    DVT ppx: Lovenox SubQ    Pending: pulm follow up;  burn plans; continuation of antibiotics, diet tolerance

## 2024-04-24 LAB
-  CLINDAMYCIN: SIGNIFICANT CHANGE UP
-  DAPTOMYCIN: SIGNIFICANT CHANGE UP
-  ERYTHROMYCIN: SIGNIFICANT CHANGE UP
-  GENTAMICIN: SIGNIFICANT CHANGE UP
-  LINEZOLID: SIGNIFICANT CHANGE UP
-  OXACILLIN: SIGNIFICANT CHANGE UP
-  PENICILLIN: SIGNIFICANT CHANGE UP
-  RIFAMPIN: SIGNIFICANT CHANGE UP
-  TETRACYCLINE: SIGNIFICANT CHANGE UP
-  TRIMETHOPRIM/SULFAMETHOXAZOLE: SIGNIFICANT CHANGE UP
-  VANCOMYCIN: SIGNIFICANT CHANGE UP
ALBUMIN SERPL ELPH-MCNC: 2.6 G/DL — LOW (ref 3.5–5.2)
ALP SERPL-CCNC: 112 U/L — SIGNIFICANT CHANGE UP (ref 30–115)
ALT FLD-CCNC: <5 U/L — SIGNIFICANT CHANGE UP (ref 0–41)
ANION GAP SERPL CALC-SCNC: 14 MMOL/L — SIGNIFICANT CHANGE UP (ref 7–14)
APTT BLD: 36.5 SEC — SIGNIFICANT CHANGE UP (ref 27–39.2)
AST SERPL-CCNC: 18 U/L — SIGNIFICANT CHANGE UP (ref 0–41)
BASOPHILS # BLD AUTO: 0.01 K/UL — SIGNIFICANT CHANGE UP (ref 0–0.2)
BASOPHILS NFR BLD AUTO: 0.1 % — SIGNIFICANT CHANGE UP (ref 0–1)
BILIRUB SERPL-MCNC: 0.5 MG/DL — SIGNIFICANT CHANGE UP (ref 0.2–1.2)
BUN SERPL-MCNC: 16 MG/DL — SIGNIFICANT CHANGE UP (ref 10–20)
CALCIUM SERPL-MCNC: 7.6 MG/DL — LOW (ref 8.4–10.5)
CHLORIDE SERPL-SCNC: 109 MMOL/L — SIGNIFICANT CHANGE UP (ref 98–110)
CO2 SERPL-SCNC: 16 MMOL/L — LOW (ref 17–32)
CREAT SERPL-MCNC: 1 MG/DL — SIGNIFICANT CHANGE UP (ref 0.7–1.5)
CULTURE RESULTS: ABNORMAL
CULTURE RESULTS: SIGNIFICANT CHANGE UP
EGFR: 81 ML/MIN/1.73M2 — SIGNIFICANT CHANGE UP
EOSINOPHIL # BLD AUTO: 0.05 K/UL — SIGNIFICANT CHANGE UP (ref 0–0.7)
EOSINOPHIL NFR BLD AUTO: 0.5 % — SIGNIFICANT CHANGE UP (ref 0–8)
GLUCOSE SERPL-MCNC: 136 MG/DL — HIGH (ref 70–99)
HCT VFR BLD CALC: 25.6 % — LOW (ref 42–52)
HGB BLD-MCNC: 7.9 G/DL — LOW (ref 14–18)
IMM GRANULOCYTES NFR BLD AUTO: 0.7 % — HIGH (ref 0.1–0.3)
INR BLD: 1.29 RATIO — SIGNIFICANT CHANGE UP (ref 0.65–1.3)
LYMPHOCYTES # BLD AUTO: 0.46 K/UL — LOW (ref 1.2–3.4)
LYMPHOCYTES # BLD AUTO: 4.7 % — LOW (ref 20.5–51.1)
MAGNESIUM SERPL-MCNC: 2.1 MG/DL — SIGNIFICANT CHANGE UP (ref 1.8–2.4)
MCHC RBC-ENTMCNC: 29.6 PG — SIGNIFICANT CHANGE UP (ref 27–31)
MCHC RBC-ENTMCNC: 30.9 G/DL — LOW (ref 32–37)
MCV RBC AUTO: 95.9 FL — HIGH (ref 80–94)
METHOD TYPE: SIGNIFICANT CHANGE UP
MONOCYTES # BLD AUTO: 1.09 K/UL — HIGH (ref 0.1–0.6)
MONOCYTES NFR BLD AUTO: 11.1 % — HIGH (ref 1.7–9.3)
NEUTROPHILS # BLD AUTO: 8.13 K/UL — HIGH (ref 1.4–6.5)
NEUTROPHILS NFR BLD AUTO: 82.9 % — HIGH (ref 42.2–75.2)
NRBC # BLD: 0 /100 WBCS — SIGNIFICANT CHANGE UP (ref 0–0)
ORGANISM # SPEC MICROSCOPIC CNT: ABNORMAL
ORGANISM # SPEC MICROSCOPIC CNT: SIGNIFICANT CHANGE UP
PLATELET # BLD AUTO: 535 K/UL — HIGH (ref 130–400)
PMV BLD: 9 FL — SIGNIFICANT CHANGE UP (ref 7.4–10.4)
POTASSIUM SERPL-MCNC: 4.2 MMOL/L — SIGNIFICANT CHANGE UP (ref 3.5–5)
POTASSIUM SERPL-SCNC: 4.2 MMOL/L — SIGNIFICANT CHANGE UP (ref 3.5–5)
PROT SERPL-MCNC: 5.4 G/DL — LOW (ref 6–8)
PROTHROM AB SERPL-ACNC: 14.7 SEC — HIGH (ref 9.95–12.87)
RBC # BLD: 2.67 M/UL — LOW (ref 4.7–6.1)
RBC # FLD: 16.8 % — HIGH (ref 11.5–14.5)
SODIUM SERPL-SCNC: 139 MMOL/L — SIGNIFICANT CHANGE UP (ref 135–146)
SPECIMEN SOURCE: SIGNIFICANT CHANGE UP
SPECIMEN SOURCE: SIGNIFICANT CHANGE UP
WBC # BLD: 9.81 K/UL — SIGNIFICANT CHANGE UP (ref 4.8–10.8)
WBC # FLD AUTO: 9.81 K/UL — SIGNIFICANT CHANGE UP (ref 4.8–10.8)

## 2024-04-24 PROCEDURE — 99233 SBSQ HOSP IP/OBS HIGH 50: CPT

## 2024-04-24 RX ORDER — HYDROMORPHONE HYDROCHLORIDE 2 MG/ML
1 INJECTION INTRAMUSCULAR; INTRAVENOUS; SUBCUTANEOUS ONCE
Refills: 0 | Status: DISCONTINUED | OUTPATIENT
Start: 2024-04-24 | End: 2024-04-25

## 2024-04-24 RX ORDER — ENZALUTAMIDE 80 MG/1
160 TABLET ORAL DAILY
Refills: 0 | Status: DISCONTINUED | OUTPATIENT
Start: 2024-04-24 | End: 2024-05-14

## 2024-04-24 RX ADMIN — Medication 500 MILLIGRAM(S): at 11:20

## 2024-04-24 RX ADMIN — Medication 650 MILLIGRAM(S): at 21:35

## 2024-04-24 RX ADMIN — MUPIROCIN 1 APPLICATION(S): 20 OINTMENT TOPICAL at 17:11

## 2024-04-24 RX ADMIN — Medication 1 TABLET(S): at 11:19

## 2024-04-24 RX ADMIN — Medication 1 TABLET(S): at 11:20

## 2024-04-24 RX ADMIN — Medication 100 MILLIGRAM(S): at 17:11

## 2024-04-24 RX ADMIN — Medication 650 MILLIGRAM(S): at 13:04

## 2024-04-24 RX ADMIN — Medication 500 MILLIGRAM(S): at 05:51

## 2024-04-24 RX ADMIN — Medication 100 MILLIGRAM(S): at 05:38

## 2024-04-24 RX ADMIN — MUPIROCIN 1 APPLICATION(S): 20 OINTMENT TOPICAL at 05:38

## 2024-04-24 RX ADMIN — Medication 650 MILLIGRAM(S): at 05:38

## 2024-04-24 RX ADMIN — Medication 300 MILLIGRAM(S): at 11:19

## 2024-04-24 RX ADMIN — Medication 25 MILLIGRAM(S): at 17:12

## 2024-04-24 RX ADMIN — Medication 500 MILLIGRAM(S): at 17:11

## 2024-04-24 RX ADMIN — ENOXAPARIN SODIUM 40 MILLIGRAM(S): 100 INJECTION SUBCUTANEOUS at 21:36

## 2024-04-24 NOTE — PROGRESS NOTE ADULT - SUBJECTIVE AND OBJECTIVE BOX
RFUUS PICKARD 68y Male  MRN#: [id number]   Hospital Day: 5d    SUBJECTIVE  Patient is a 68y old Male who presents with a chief complaint of LT Gluteal Pain / Diarrhea (24 Apr 2024 11:49)  Currently admitted to medicine with the primary diagnosis of Abscess      INTERVAL HPI AND OVERNIGHT EVENTS:  Patient was examined and seen at bedside. This morning he is resting comfortably in bed. No issues or overnight events.    OBJECTIVE  PAST MEDICAL & SURGICAL HISTORY  HTN (hypertension)    Prostate cancer      ALLERGIES:  No Known Allergies    MEDICATIONS:  STANDING MEDICATIONS  ascorbic acid 500 milliGRAM(s) Oral daily  cephalexin 500 milliGRAM(s) Oral four times a day  doxycycline monohydrate Capsule 100 milliGRAM(s) Oral every 12 hours  enoxaparin Injectable 40 milliGRAM(s) SubCutaneous every 24 hours  ferrous    sulfate Liquid 300 milliGRAM(s) Enteral Tube daily  furosemide   Injectable 40 milliGRAM(s) IV Push daily  influenza  Vaccine (HIGH DOSE) 0.7 milliLiter(s) IntraMuscular once  metoprolol tartrate 25 milliGRAM(s) Enteral Tube every 12 hours  multivitamin 1 Tablet(s) Oral daily  multivitamin  Chewable 1 Tablet(s) Oral daily  mupirocin 2% Nasal 1 Application(s) Both Nostrils two times a day  potassium phosphate / sodium phosphate Powder (PHOS-NaK) 2 Packet(s) Oral three times a day  sodium bicarbonate 650 milliGRAM(s) Oral three times a day    PRN MEDICATIONS  acetaminophen     Tablet .. 650 milliGRAM(s) Oral every 6 hours PRN  aluminum hydroxide/magnesium hydroxide/simethicone Suspension 30 milliLiter(s) Oral every 4 hours PRN  loperamide 2 milliGRAM(s) Oral every 8 hours PRN  melatonin 3 milliGRAM(s) Oral at bedtime PRN  ondansetron Injectable 4 milliGRAM(s) IV Push every 8 hours PRN      VITAL SIGNS: Last 24 Hours  T(C): 37.2 (24 Apr 2024 07:06), Max: 37.3 (24 Apr 2024 00:17)  T(F): 98.9 (24 Apr 2024 07:06), Max: 99.1 (24 Apr 2024 00:17)  HR: 99 (24 Apr 2024 07:06) (92 - 112)  BP: 93/69 (24 Apr 2024 07:06) (89/68 - 110/84)  BP(mean): --  RR: 18 (24 Apr 2024 07:06) (18 - 20)  SpO2: 97% (24 Apr 2024 03:48) (97% - 97%)    LABS:                        7.9    9.81  )-----------( 535      ( 24 Apr 2024 07:47 )             25.6     04-24    139  |  109  |  16  ----------------------------<  136<H>  4.2   |  16<L>  |  1.0    Ca    7.6<L>      24 Apr 2024 07:47  Mg     2.1     04-24    TPro  5.4<L>  /  Alb  2.6<L>  /  TBili  0.5  /  DBili  x   /  AST  18  /  ALT  <5  /  AlkPhos  112  04-24      Urinalysis Basic - ( 24 Apr 2024 07:47 )    Color: x / Appearance: x / SG: x / pH: x  Gluc: 136 mg/dL / Ketone: x  / Bili: x / Urobili: x   Blood: x / Protein: x / Nitrite: x   Leuk Esterase: x / RBC: x / WBC x   Sq Epi: x / Non Sq Epi: x / Bacteria: x            Culture - Other (collected 22 Apr 2024 14:39)  Source: .Other left buttock abscess  Preliminary Report (23 Apr 2024 17:06):    Numerous Staphylococcus aureus          RADIOLOGY:      PHYSICAL EXAM:  CONSTITUTIONAL: No acute distress, cachectic, frail elderly male  HEAD: Atraumatic, normocephalic  EYES: conjunctiva and sclera clear  ENT: Supple, no masses, no thyromegaly, no bruits, no JVD; moist mucous membranes  PULMONARY: Clear to auscultation bilaterally; no wheezes, rales, or rhonchi  CARDIOVASCULAR: Regular rate and rhythm; no murmurs, rubs, or gallops  GASTROINTESTINAL: Soft, non-tender, scaphoid; bowel sounds present; PEG site clean/intact  MUSCULOSKELETAL: 2+ peripheral pulses; no clubbing, no cyanosis  NEUROLOGY: non-focal  SKIN: No rashes or lesions; warm and dry

## 2024-04-24 NOTE — PROGRESS NOTE ADULT - ASSESSMENT
68M w/ PMHx Stage 4 Prostate Ca s/p palliative radiation, Mediastinal prostate metastatic mass) on oral Chemo (xtandi)-has esphogeal stent , Chronic Anemia, HTN and Chronic Back Pain presents to ED from SNF for evaluation. As per NH documentation, pt has been refusing medications through PEG for the last 3x days d/t episode of diarrhea post feeds. Patient's only complaint is LT buttock pain. Denies fevers, chills, chest pain, SOB, n/v, abdominal pain or urinary symptoms.       A/P  # LT Gluteal Cellulitis  - consulted by burn, scheduled for debridement today   - f/u BCx, urine culture in process -MRSA swab positive   - ID evaluated- Cefazolin 1g q8hrs + doxy 100mg bid ; patient does not have IV access, continued on  keflex and doxy  - c/w local wound care  - change position Q 2 hours, off load pressure points   - pain meds PRN   - pulmonary followed  up for pulm risk stratification, recommendations noted, planned for  thoracentesis    #h/o Dysphagia s/p PEG  - s/p Esophageal Stent and PEG 3/5/24   - patient cleared for pureed diet and thin liquids per SS on last admission  - patient has been refusing PEG feeds out of concern he will have diarrhea again  -  may start low rate feedings after debridement  w/ Peptamen, dietician consulted  - c/w  Imodium 2mg q8hrs PRN for diarrhea  - start probiotics     #New b/l Large Pleural Effusion  - CT A/P showed new/increased large bilateral pleural effusions.  - continueIV lasix 40mg qday   - pulm planning thoracenteses after debridement     #Stage 4 Prostate Ca s/p Radiation (on active PO chemo) w/ metastasis to mediastinum or primary mediastinal mass?/ Severe Left Hydronephrosis   - previous AFP and CEA wnl  - previous LDH and haptoglobin elevated  - previous PSA total 220, PSA free is 22, CA19: 131, : 57  - Esophageal biopsy: metastatic poorly differentiated carcinoma of prostatic origin.  - per onc note, patient had prior biopsy of site that demonstrated metastatic prostate adenocarcinoma and was started on Docetaxel, in January 2024 patient was started on Pembrolizumab and continued on keytruda injections.   - Dr. Irizarry (pt's oncologist): cs rad/onco for palliative radiation, onco asked about new protocol since patient progressed, rec hospice and palliative   - Heme onc also recommending palliative radiation to mediastinal mass. Also reevaluated by Rad Onc for palliative RT -> CT sim for mapping and planning, followed by XRT.  - Rad/onc following for palliative RT -> CT sim for mapping and planning, followed by XRT.  May not be able to undergo RT given how little lung is left without intervention.  - c/w home Xtandi 160 mg daily- nonformulary ( please, reach out to oncology fellow to fill out prescription)   - monitor BUN/cr and urine output, stable for now     #Chronic Normocytic Anemia  - monitor H/H, keep Hb above 7.5  - no active bleeding noted   - iron  studies noted % sat 27      #GOC- DNR and NI, consulted by palliative care     DVT ppx: Lovenox SubQ    #Progress Note Handoff  Pending (specify):  NPO for debridement by burn today, may resume  low rate for PEG tube feeding after debridement, likely thoracentesis tomorrow, supportive care , reach out to oncology fellow to request non formulary. chemodrug   Family discussion: n/a, I spoke with pt, he agreed with a plan of care   Disposition: TBD

## 2024-04-24 NOTE — PROGRESS NOTE ADULT - ASSESSMENT
68M w/ PMHx Stage 4 Prostate Ca s/p palliative radiation, Mediastinal Mass on oral Chemo (xtandi), Chronic Anemia, HTN and Chronic Back Pain presents to ED from SNF for evaluation. As per NH documentation, pt has been refusing medications through PEG for the last 3x days d/t episode of diarrhea post feeds. Patient's only complaint is LT buttock pain. Denies fevers, chills, chest pain, SOB, n/v, abdominal pain or urinary symptoms.    #LT Gluteal Cellulitis  - Vitals: Temp 98.3F, /82, , RR 18, SpO2 100% on RA  - CT A/P shows new mild edema and enlargement of the left gluteus maximums musculature with associated subcutaneous edema. No discrete abscess.  - s/p Cefepime, Vanc, Flagyl IV x1 in the ED  - started Cefazolin 2g q8hrs  -c/w cephalexin 500 mg q6h PO and doxycycline 100 mg BID PO  - BCx (- 4/19), Urine Cx (- 4/19), MRSA swab (+ 4/21), wound cx ( + 4/22 many s. aureus; awaiting sensitivities)   - f/u ID c/s  -Burn to take pt for srgy today  -requires pre-op risk assessment by Medicine (done)  -Pulm: intermediate risk; may perform thoracentesis after debridement    #Inability to Tolerate PEG Feeds - Diarrhea (chronic issue)  #h/o Dysphagia s/p PEG  - s/p Esophageal Stent and PEG 3/5/24 -> pt having diarrhea after feeds -> here for the same issue  - patient cleared for pureed diet and thin liquids per SS on last admission  - patient has been refusing PEG feeds out of concern he will have diarrhea again.  - s/p 1L NS bolus  - resume PEG feeds w/ Peptamen  - c/w home Imodium 2mg q8hrs PRN for diarrhea  - f/u dietary consult (pt refusing feeds)    #Suicidal Ideation  -pt told RN that he wants to die  -inquired about this w/ pt who denied stating that he wanted to die  -psych consulted/contacted; per psych no major concern for suicide     #New b/l Large Pleural Effusion  - not SOB, satting well on RA  - previously had only Left Pleural Effusion  - CT A/P showed new/increased large bilateral pleural effusions.  - Pulm was consult for thoracentesis but had refused last visit  - see no utility in diuresing - can reconsider IV Lasix if patient becomes symptomatic  - patient was refusing thoracentesis. Pt may consent to procedure if necessary   -c/w lasix (may be transudative effusion)    #Stage 4 Prostate Ca s/p Radiation (on active PO chemo) w/ metastasis to mediastinum or primary mediastinal mass?  #Severe Left Hydronephrosis (unchanged from prior)  - CT A/P shows:  1. Metastatic disease in the abdomen and pelvis as above with slightly increased upper abdominal adenopathy.  2. No significant change approximately in the large bladder mass and partially imaged mediastinal mass.  3. Severe left hydronephrosis to the level of the proximal ureter, not significantly changed.  - previous AFP and CEA wnl  - previous LDH and haptoglobin elevated  - previous PSA total 220, PSA free is 22, CA19: 131, : 57  - Esophageal biopsy: metastatic poorly differentiated carcinoma of prostatic origin.  - per onc note, patient had prior biopsy of site that demonstrated metastatic prostate adenocarcinoma and was started on Docetaxel, in January 2024 patient was started on Pembrolizumab and continued on keytruda injections.   - Dr. Irizarry (pt's oncologist): cs rad/onco for palliative radiation, onco asked about new protocol since patient progressed, rec hospice and palliative   - Heme onc also recommending palliative radiation to mediastinal mass. Also reevaluated by Rad Onc for palliative RT -> CT sim for mapping and planning, followed by XRT.  - Rad/onc following for palliative RT -> CT sim for mapping and planning, followed by XRT.  May not be able to undergo RT given how little lung is left without intervention.  - Psych previously determined that patient lacks capacity, Pt refusing DNR/DNI status based on Jewish beliefs, Ethics committee previously agreed 2P consent would not be appropriate to make him DNR/DNI  - c/w home Xtandi 160 mg daily (will need chemo script)     #Chronic Normocytic Anemia  - Hgb 8.7 (previously 8.6)  - iron  studies noted % sat 27  - monitor H&H, maintain active T&S, transfuse PRN, Keep hgb >7    #Recent GAS strep pyogenes Bacteremia s/p Treatment  - TTE: EF 65 to 70%  - finished course of zosyn during last admission    #DVT ppx: Lovenox SubQ  #GI ppx: PPI PO daily  #Diet: PEG - Peptamen  #Activity: IAT  #Code Status: Full Code  #Dispo: from NH, acute

## 2024-04-24 NOTE — PROGRESS NOTE ADULT - SUBJECTIVE AND OBJECTIVE BOX
68M w/ PMHx Stage 4 Prostate Ca s/p palliative radiation, Mediastinal prostate metastatic mass) on oral Chemo (xtandi)-has esphogeal stent , Chronic Anemia, HTN and Chronic Back Pain presents to ED from SNF for evaluation. As per NH documentation, pt has been refusing medications through PEG for the last 3x days d/t episode of diarrhea post feeds. Patient's only complaint is LT buttock pain. Denies fevers, chills, chest pain, SOB, n/v, abdominal pain or urinary symptoms.  Pt was consulted by burn, debridement was planned for today. In addition pt was consulted by pulmonary, thoracocentesis recommended likely after debridement.   Today pt is c/o buttock pain while in supine position, weak, denies any other complaints.        PAST MEDICAL & SURGICAL HISTORY  HTN (hypertension)    Prostate cancer      ALLERGIES  No Known Allergies      Vital Signs Last 24 Hrs  T(C): 37.2 (24 Apr 2024 07:06), Max: 37.3 (24 Apr 2024 00:17)  T(F): 98.9 (24 Apr 2024 07:06), Max: 99.1 (24 Apr 2024 00:17)  HR: 99 (24 Apr 2024 07:06) (92 - 112)  BP: 93/69 (24 Apr 2024 07:06) (89/68 - 110/84)  BP(mean): --  RR: 18 (24 Apr 2024 07:06) (18 - 20)  SpO2: 97% (24 Apr 2024 03:48) (97% - 97%)      PHYSICAL EXAM  GEN: no distress, comfortable, cachectic with temporal muscle waisting   NECK: supple, no JVD   PULM: decreased BS at bases  CVS: S1S2 present, no rubs or gallops  ABD: Soft, non-distended, no guarding; non-tender  EXT: No lower extremity edema  NEURO: A&Ox3, moving all extremities    LABS                                   7.9    9.81  )-----------( 535      ( 24 Apr 2024 07:47 )             25.6   04-24    139  |  109  |  16  ----------------------------<  136<H>  4.2   |  16<L>  |  1.0    Ca    7.6<L>      24 Apr 2024 07:47  Mg     2.1     04-24    TPro  5.4<L>  /  Alb  2.6<L>  /  TBili  0.5  /  DBili  x   /  AST  18  /  ALT  <5  /  AlkPhos  112  04-24    Urinalysis Basic - ( 23 Apr 2024 07:09 )    Color: x / Appearance: x / SG: x / pH: x  Gluc: 139 mg/dL / Ketone: x  / Bili: x / Urobili: x   Blood: x / Protein: x / Nitrite: x   Leuk Esterase: x / RBC: x / WBC x   Sq Epi: x / Non Sq Epi: x / Bacteria: x    Culture - Stool (collected 21 Apr 2024 12:11)  Source: .Stool Feces  Preliminary Report (22 Apr 2024 18:15):    No enteric pathogens to date: Final culture pending      Culture - Other (04.22.24 @ 14:39)   Specimen Source: .Other left buttock abscess  Culture Results:   Numerous Staphylococcus aureus    Culture - Stool (04.21.24 @ 12:11)   Specimen Source: .Stool Feces  Culture Results:   No enteric pathogens isolated.     RADIOLOGY:    < from: Xray Chest 1 View- PORTABLE-Urgent (Xray Chest 1 View- PORTABLE-Urgent .) (04.23.24 @ 09:21) >    IMPRESSION:    Bibasilar opacities/effusions.      < from: CT Abdomen and Pelvis w/ IV Cont (04.19.24 @ 19:17) >  IMPRESSION:    1. New mild edema and enlargement of the left gluteus maximums   musculature with associated subcutaneous edema. No discrete abscess.    2. Metastatic disease in the abdomen and pelvis as above with slightly   increased upper abdominal adenopathy. No significant change approximately   in the large bladder mass and partially imaged mediastinal mass. Interval   placement of an esophageal stent.    3. Severe left hydronephrosis to the level of the proximal ureter, not   significantly changed.    4. New/increased large bilateral pleural effusions.    < from: TTE Echo Complete w/o Contrast w/ Doppler (03.01.24 @ 21:39) >    Summary:   1. Left ventricular ejection fraction, by visual estimation, is 65 to   70%.   2. Hyperdynamic global left ventricular systolic function.   3. Mildly increased LV wall thickness.   4. Spectral Doppler shows impaired relaxation pattern of left   ventricular myocardial filling (Grade I diastolic dysfunction).   5. Trace mitral valve regurgitation.    MEDICATIONS  (STANDING):  ascorbic acid 500 milliGRAM(s) Oral daily  cephalexin 500 milliGRAM(s) Oral four times a day  doxycycline monohydrate Capsule 100 milliGRAM(s) Oral every 12 hours  enoxaparin Injectable 40 milliGRAM(s) SubCutaneous every 24 hours  ferrous    sulfate Liquid 300 milliGRAM(s) Enteral Tube daily  furosemide   Injectable 40 milliGRAM(s) IV Push daily  influenza  Vaccine (HIGH DOSE) 0.7 milliLiter(s) IntraMuscular once  metoprolol tartrate 25 milliGRAM(s) Enteral Tube every 12 hours  multivitamin 1 Tablet(s) Oral daily  multivitamin  Chewable 1 Tablet(s) Oral daily  mupirocin 2% Nasal 1 Application(s) Both Nostrils two times a day  potassium phosphate / sodium phosphate Powder (PHOS-NaK) 2 Packet(s) Oral three times a day  sodium bicarbonate 650 milliGRAM(s) Oral three times a day        acetaminophen     Tablet .. 650 milliGRAM(s) Oral every 6 hours PRN Temp greater or equal to 38C (100.4F), Mild Pain (1 - 3)  aluminum hydroxide/magnesium hydroxide/simethicone Suspension 30 milliLiter(s) Oral every 4 hours PRN Dyspepsia  loperamide 2 milliGRAM(s) Oral every 8 hours PRN for diarrhea  melatonin 3 milliGRAM(s) Oral at bedtime PRN Insomnia  ondansetron Injectable 4 milliGRAM(s) IV Push every 8 hours PRN Nausea and/or Vomiting

## 2024-04-24 NOTE — CHART NOTE - NSCHARTNOTEFT_GEN_A_CORE
Registered Dietitian Follow-Up     Patient Profile Reviewed                           Yes [x]   No []     Nutrition History Previously Obtained        Yes [x]  No []       Pertinent Subjective Information:  Consult received for adjustment of TF formula  RD spoke with RN - patient experiences diarrhea after every feed.     Pertinent Medical Interventions:  LT Gluteral Cellulitis; h/o Dysphagia s/p PEG - 3/5/24 - patient has been refusing PEG feeds out of concern he will have diarrhea again; New b/l large pleural effusion - CT A/P showed new increased large bilateral pleural effusions; Stage 4 Prostate CA s/p Radiation (on active PO chemo) w/ metastasis to mediastinum or primary mediastinal mass? / Severe left hydronephrosis;     NPO for debridement by burn today, may resume  low rate for PEG tube feeding after debridement    Diet order:   Diet, NPO:   NPO for Procedure/Test     NPO Start Date: 24-Apr-2024,   NPO Start Time: 08:00  Except Medications (04-24-24 @ 08:39) [Active]  Diet, NPO after Midnight:      NPO Start Date: 23-Apr-2024,   NPO Start Time: 23:59  Except Medications (04-23-24 @ 10:35) [Active]  Diet, Pureed:   Tube Feeding Modality: Gastrostomy  Osmolite 1.5 Sam (OSMOLITE1.5)  Total Volume for 24 Hours (mL): 1600  Bolus  Total Volume of Bolus (mL):  400  Tube Feed Frequency: Every 6 hours   Tube Feed Start Time: 19:00  Bolus Feed Rate (mL per Hour): 67   Bolus Feed Duration (in Hours): 24  Free Water Flush  Bolus   Total Volume per Flush (mL): 150   Frequency: Every 6 Hours    Start Time: 19:00 (04-21-24 @ 18:08) [Pending Verification By Attending]  Diet, Pureed:   Tube Feeding Modality: Gastrostomy  Peptamen A.F. Formula (PEPTAMENAFRTH)  Total Volume for 24 Hours (mL): 1000  Bolus  Total Volume of Bolus (mL):  250  Tube Feed Frequency: Every 6 hours   Tube Feed Start Time: 06:00  Bolus Feed Rate (mL per Hour): 250   Bolus Feed Duration (in Hours): 1  Free Water Flush  Bolus   Total Volume per Flush (mL): 60   Frequency: Every 6 Hours (04-21-24 @ 13:05) [Active]      Anthropometrics:  Height (cm): 185.4 (04-24-24 @ 03:48)  Weight (kg): 54.4 (04-24-24 @ 03:48)  BMI (kg/m2): 15.8 (04-24-24 @ 03:48)  IBW: 83.6 kg     MEDICATIONS  (STANDING):  ascorbic acid 500 milliGRAM(s) Oral daily  cephalexin 500 milliGRAM(s) Oral four times a day  doxycycline monohydrate Capsule 100 milliGRAM(s) Oral every 12 hours  enoxaparin Injectable 40 milliGRAM(s) SubCutaneous every 24 hours  ferrous    sulfate Liquid 300 milliGRAM(s) Enteral Tube daily  furosemide   Injectable 40 milliGRAM(s) IV Push daily  influenza  Vaccine (HIGH DOSE) 0.7 milliLiter(s) IntraMuscular once  metoprolol tartrate 25 milliGRAM(s) Enteral Tube every 12 hours  multivitamin 1 Tablet(s) Oral daily  multivitamin  Chewable 1 Tablet(s) Oral daily  mupirocin 2% Nasal 1 Application(s) Both Nostrils two times a day  potassium phosphate / sodium phosphate Powder (PHOS-NaK) 2 Packet(s) Oral three times a day  sodium bicarbonate 650 milliGRAM(s) Oral three times a day    MEDICATIONS  (PRN):  acetaminophen     Tablet .. 650 milliGRAM(s) Oral every 6 hours PRN Temp greater or equal to 38C (100.4F), Mild Pain (1 - 3)  aluminum hydroxide/magnesium hydroxide/simethicone Suspension 30 milliLiter(s) Oral every 4 hours PRN Dyspepsia  loperamide 2 milliGRAM(s) Oral every 8 hours PRN for diarrhea  melatonin 3 milliGRAM(s) Oral at bedtime PRN Insomnia  ondansetron Injectable 4 milliGRAM(s) IV Push every 8 hours PRN Nausea and/or Vomiting    Pertinent Labs: 04-24 @ 07:47: Na 139, BUN 16, Cr 1.0, <H>, K+ 4.2, Phos --, Mg 2.1, Alk Phos 112, ALT/SGPT <5, AST/SGOT 18, HbA1c --    Finger Sticks:    Physical Findings:  - Appearance: disoriented x2   - GI function: diarrhea after feeds   - Tubes: +PEG   - Oral/Mouth cavity: NPO with EN via PEG   - Skin: no pressure injuries documented   - Edema: no edema documented      Nutrition Requirements:  Weight Used: 54.4 kg      Estimated Energy Needs    Continue [x]  Adjust []  1904-2176kcal/day (35-40kcal/kg)     Estimated Protein Needs    Continue [x]  Adjust []  82-109grams/day (1.5-2grams/kg of admit weight)     Estimated Fluid Needs        Continue []  Adjust [x]  9429-2756 (1 mL/kcal)     Nutrient Intake:  Currently NPO for debridement, but Current EN regimen provides:  1000 mL total volume, 1200 kcal, 78 gm Protein, 810 mL free water from formula + 60 mL water flushes q6hrs = 1050 mL total water      [x] Previous Nutrition Diagnosis:  Severe PCM             [x] Ongoing          [] Resolved     Nutrition Intervention:  EN, coordination of care     Goal/Expected Outcome:   EN to meet >85% and <105% of estimated nutrient needs within 3-5 days      Indicator/Monitoring:   EN tolerance, BM, GI s/s, weight, labs, skin status, NFPE     Recommendation:  1) Would recommend the following TF regimen when restarting feeds:    Day 1  - start feeds at 120 mL q6hrs  Day 2 - advance feeds to 240 mL q6hrs as tolerated  Day 3 - advance feeds to goal rate of 360 mL q6hrs    This will provide 1440 mL total volume, 1728 kcal, 112 gm Protein, 1166.4 mL free water from formula + recommend 75 mL flushes pre/post feeds = 1766.4 mL total water     This will meet 90% of estimated energy needs (lower end) and 103% of estimated protein needs    2) Monitor BM, GI s/s  3) Monitor electrolytes, BG, renal profile    Patient is at high nutrition risk, RD to f/u in 3-5 days or PRN   RD to remain available: Stefanie Riojas RD [phone] or via Teams Registered Dietitian Follow-Up     Patient Profile Reviewed                           Yes [x]   No []     Nutrition History Previously Obtained        Yes [x]  No []       Pertinent Subjective Information:  Consult received for adjustment of TF formula  RD spoke with RN - patient experiences diarrhea after every feed.     Pertinent Medical Interventions:  LT Gluteral Cellulitis; h/o Dysphagia s/p PEG - 3/5/24 - patient has been refusing PEG feeds out of concern he will have diarrhea again; New b/l large pleural effusion - CT A/P showed new increased large bilateral pleural effusions; Stage 4 Prostate CA s/p Radiation (on active PO chemo) w/ metastasis to mediastinum or primary mediastinal mass? / Severe left hydronephrosis;     NPO for debridement by burn today, may resume  low rate for PEG tube feeding after debridement    Diet order:   Diet, NPO:   NPO for Procedure/Test     NPO Start Date: 24-Apr-2024,   NPO Start Time: 08:00  Except Medications (04-24-24 @ 08:39) [Active]  Diet, NPO after Midnight:      NPO Start Date: 23-Apr-2024,   NPO Start Time: 23:59  Except Medications (04-23-24 @ 10:35) [Active]  Diet, Pureed:   Tube Feeding Modality: Gastrostomy  Osmolite 1.5 Sam (OSMOLITE1.5)  Total Volume for 24 Hours (mL): 1600  Bolus  Total Volume of Bolus (mL):  400  Tube Feed Frequency: Every 6 hours   Tube Feed Start Time: 19:00  Bolus Feed Rate (mL per Hour): 67   Bolus Feed Duration (in Hours): 24  Free Water Flush  Bolus   Total Volume per Flush (mL): 150   Frequency: Every 6 Hours    Start Time: 19:00 (04-21-24 @ 18:08) [Pending Verification By Attending]  Diet, Pureed:   Tube Feeding Modality: Gastrostomy  Peptamen A.F. Formula (PEPTAMENAFRTH)  Total Volume for 24 Hours (mL): 1000  Bolus  Total Volume of Bolus (mL):  250  Tube Feed Frequency: Every 6 hours   Tube Feed Start Time: 06:00  Bolus Feed Rate (mL per Hour): 250   Bolus Feed Duration (in Hours): 1  Free Water Flush  Bolus   Total Volume per Flush (mL): 60   Frequency: Every 6 Hours (04-21-24 @ 13:05) [Active]      Anthropometrics:  Height (cm): 185.4 (04-24-24 @ 03:48)  Weight (kg): 54.4 (04-24-24 @ 03:48)  BMI (kg/m2): 15.8 (04-24-24 @ 03:48)  IBW: 83.6 kg     MEDICATIONS  (STANDING):  ascorbic acid 500 milliGRAM(s) Oral daily  cephalexin 500 milliGRAM(s) Oral four times a day  doxycycline monohydrate Capsule 100 milliGRAM(s) Oral every 12 hours  enoxaparin Injectable 40 milliGRAM(s) SubCutaneous every 24 hours  ferrous    sulfate Liquid 300 milliGRAM(s) Enteral Tube daily  furosemide   Injectable 40 milliGRAM(s) IV Push daily  influenza  Vaccine (HIGH DOSE) 0.7 milliLiter(s) IntraMuscular once  metoprolol tartrate 25 milliGRAM(s) Enteral Tube every 12 hours  multivitamin 1 Tablet(s) Oral daily  multivitamin  Chewable 1 Tablet(s) Oral daily  mupirocin 2% Nasal 1 Application(s) Both Nostrils two times a day  potassium phosphate / sodium phosphate Powder (PHOS-NaK) 2 Packet(s) Oral three times a day  sodium bicarbonate 650 milliGRAM(s) Oral three times a day    MEDICATIONS  (PRN):  acetaminophen     Tablet .. 650 milliGRAM(s) Oral every 6 hours PRN Temp greater or equal to 38C (100.4F), Mild Pain (1 - 3)  aluminum hydroxide/magnesium hydroxide/simethicone Suspension 30 milliLiter(s) Oral every 4 hours PRN Dyspepsia  loperamide 2 milliGRAM(s) Oral every 8 hours PRN for diarrhea  melatonin 3 milliGRAM(s) Oral at bedtime PRN Insomnia  ondansetron Injectable 4 milliGRAM(s) IV Push every 8 hours PRN Nausea and/or Vomiting    Pertinent Labs: 04-24 @ 07:47: Na 139, BUN 16, Cr 1.0, <H>, K+ 4.2, Phos --, Mg 2.1, Alk Phos 112, ALT/SGPT <5, AST/SGOT 18, HbA1c --    Finger Sticks:    Physical Findings:  - Appearance: disoriented x2   - GI function: diarrhea after feeds   - Tubes: +PEG   - Oral/Mouth cavity: NPO with EN via PEG   - Skin: no pressure injuries documented   - Edema: no edema documented      Nutrition Requirements:  Weight Used: 54.4 kg      Estimated Energy Needs    Continue [x]  Adjust []  1904-2176kcal/day (35-40kcal/kg)     Estimated Protein Needs    Continue [x]  Adjust []  82-109grams/day (1.5-2grams/kg of admit weight)     Estimated Fluid Needs        Continue []  Adjust [x]  1370-8988 (1 mL/kcal)     Nutrient Intake:  Currently NPO for debridement, but Current EN regimen provides:  1000 mL total volume, 1200 kcal, 78 gm Protein, 810 mL free water from formula + 60 mL water flushes q6hrs = 1050 mL total water      [x] Previous Nutrition Diagnosis:  Severe PCM             [x] Ongoing          [] Resolved     Nutrition Intervention:  EN, coordination of care     Goal/Expected Outcome:   EN to meet >80% and <105% of estimated nutrient needs within 3-5 days      Indicator/Monitoring:   EN tolerance, BM, GI s/s, weight, labs, skin status, NFPE     Recommendation:  1) Would recommend the following TF regimen when restarting feeds:  Using Osmolite 1.5:    Day 1  - start feeds at 120 mL q6hrs  Day 2 - advance feeds to 240 mL q6hrs as tolerated  Day 3 - advance feeds to goal rate of 360 mL q6hrs    This will provide 1440 mL total volume, 2160 kcal, 90 gm Protein, 1094.4 mL free water from formula + recommend 75 mL flushes pre/post feeds = 1694.4 mL total water     This will meet 99% of estimated energy needs and 82% of estimated protein needs    2) Monitor BM, GI s/s  3) Monitor electrolytes, BG, renal profile    Patient is at high nutrition risk, RD to f/u in 3-5 days or PRN   RD to remain available: Stefanie Riojas RD [phone] or via Teams

## 2024-04-24 NOTE — PROGRESS NOTE ADULT - SUBJECTIVE AND OBJECTIVE BOX
Patient is a 68y old  Male who presents with a chief complaint of LT Gluteal Pain / Diarrhea (23 Apr 2024 13:29)        SUBJECTIVE:      REVIEW OF SYSTEMS:    All Pertinent ROS are negative except per HPI       PHYSICAL EXAM  Vital Signs Last 24 Hrs  T(C): 37.2 (24 Apr 2024 07:06), Max: 37.3 (24 Apr 2024 00:17)  T(F): 98.9 (24 Apr 2024 07:06), Max: 99.1 (24 Apr 2024 00:17)  HR: 99 (24 Apr 2024 07:06) (92 - 112)  BP: 93/69 (24 Apr 2024 07:06) (89/68 - 110/84)  BP(mean): --  RR: 18 (24 Apr 2024 07:06) (18 - 20)  SpO2: 97% (24 Apr 2024 03:48) (97% - 97%)        CONSTITUTIONAL:  NAD    ENT:   Airway patent,   No thrush    CARDIAC:   Normal rate,   regular rhythm.    no edema      RESPIRATORY:   No Wheezing  Normal chest expansion  Not tachypneic,  No use of accessory muscles    GASTROINTESTINAL:  Abdomen soft,   non-tender,   no guarding,   + BS    MUSCULOSKELETAL:   range of motion is not limited,  no clubbing, cyanosis    NEUROLOGICAL:   Alert and oriented   no motor or deficits.    SKIN:   Skin normal color for race,   warm, dry   No evidence of rash.      04-23-24 @ 07:01  -  04-24-24 @ 07:00  --------------------------------------------------------  IN:    Free Water: 180 mL  Total IN: 180 mL    OUT:    Voided (mL): 660 mL  Total OUT: 660 mL    Total NET: -480 mL          LABS:                          8.7    8.21  )-----------( 536      ( 23 Apr 2024 07:09 )             27.9                                               04-23    138  |  107  |  14  ----------------------------<  139<H>  4.5   |  15<L>  |  1.1    Ca    7.7<L>      23 Apr 2024 07:09  Mg     2.2     04-23    TPro  5.6<L>  /  Alb  2.8<L>  /  TBili  0.5  /  DBili  x   /  AST  22  /  ALT  <5  /  AlkPhos  114  04-23                                             Urinalysis Basic - ( 23 Apr 2024 07:09 )    Color: x / Appearance: x / SG: x / pH: x  Gluc: 139 mg/dL / Ketone: x  / Bili: x / Urobili: x   Blood: x / Protein: x / Nitrite: x   Leuk Esterase: x / RBC: x / WBC x   Sq Epi: x / Non Sq Epi: x / Bacteria: x                                              LIVER FUNCTIONS - ( 23 Apr 2024 07:09 )  Alb: 2.8 g/dL / Pro: 5.6 g/dL / ALK PHOS: 114 U/L / ALT: <5 U/L / AST: 22 U/L / GGT: x                                                  Culture - Other (collected 22 Apr 2024 14:39)  Source: .Other left buttock abscess  Preliminary Report (23 Apr 2024 17:06):    Numerous Staphylococcus aureus    Culture - Stool (collected 21 Apr 2024 12:11)  Source: .Stool Feces  Final Report (23 Apr 2024 15:30):    No enteric pathogens isolated.    (Stool culture examined for Salmonella,    Shigella, Campylobacter, Aeromonas, Plesiomonas,    Vibrio, E.coli O157 and Yersinia)                                                    MEDICATIONS  (STANDING):  ascorbic acid 500 milliGRAM(s) Oral daily  cephalexin 500 milliGRAM(s) Oral four times a day  doxycycline monohydrate Capsule 100 milliGRAM(s) Oral every 12 hours  enoxaparin Injectable 40 milliGRAM(s) SubCutaneous every 24 hours  ferrous    sulfate Liquid 300 milliGRAM(s) Enteral Tube daily  furosemide   Injectable 40 milliGRAM(s) IV Push daily  influenza  Vaccine (HIGH DOSE) 0.7 milliLiter(s) IntraMuscular once  metoprolol tartrate 25 milliGRAM(s) Enteral Tube every 12 hours  multivitamin 1 Tablet(s) Oral daily  multivitamin  Chewable 1 Tablet(s) Oral daily  mupirocin 2% Nasal 1 Application(s) Both Nostrils two times a day  potassium phosphate / sodium phosphate Powder (PHOS-NaK) 2 Packet(s) Oral three times a day  sodium bicarbonate 650 milliGRAM(s) Oral three times a day    MEDICATIONS  (PRN):  acetaminophen     Tablet .. 650 milliGRAM(s) Oral every 6 hours PRN Temp greater or equal to 38C (100.4F), Mild Pain (1 - 3)  aluminum hydroxide/magnesium hydroxide/simethicone Suspension 30 milliLiter(s) Oral every 4 hours PRN Dyspepsia  loperamide 2 milliGRAM(s) Oral every 8 hours PRN for diarrhea  melatonin 3 milliGRAM(s) Oral at bedtime PRN Insomnia  ondansetron Injectable 4 milliGRAM(s) IV Push every 8 hours PRN Nausea and/or Vomiting          X-Rays reviewed    CXR interpreted by me: Patient is a 68y old  Male who presents with a chief complaint of LT Gluteal Pain / Diarrhea (23 Apr 2024 13:29)        SUBJECTIVE: Denies SOB, chest pain.      REVIEW OF SYSTEMS:    All Pertinent ROS are negative except per HPI       PHYSICAL EXAM  Vital Signs Last 24 Hrs  T(C): 37.2 (24 Apr 2024 07:06), Max: 37.3 (24 Apr 2024 00:17)  T(F): 98.9 (24 Apr 2024 07:06), Max: 99.1 (24 Apr 2024 00:17)  HR: 99 (24 Apr 2024 07:06) (92 - 112)  BP: 93/69 (24 Apr 2024 07:06) (89/68 - 110/84)  BP(mean): --  RR: 18 (24 Apr 2024 07:06) (18 - 20)  SpO2: 97% (24 Apr 2024 03:48) (97% - 97%)        CONSTITUTIONAL:  NAD    ENT:   Airway patent    CARDIAC:   Normal rate,   regular rhythm.    no edema    RESPIRATORY:   Reduced bibasilar BS  Normal chest expansion  Not tachypneic,  No use of accessory muscles    GASTROINTESTINAL:  Abdomen soft,   non-tender,   no guarding    MUSCULOSKELETAL:   range of motion is not limited,  no clubbing, cyanosis    NEUROLOGICAL:   Alert    SKIN:   Skin normal color for race,   warm, dry   No evidence of rash.      04-23-24 @ 07:01  -  04-24-24 @ 07:00  --------------------------------------------------------  IN:    Free Water: 180 mL  Total IN: 180 mL    OUT:    Voided (mL): 660 mL  Total OUT: 660 mL    Total NET: -480 mL          LABS:                          8.7    8.21  )-----------( 536      ( 23 Apr 2024 07:09 )             27.9                                               04-23    138  |  107  |  14  ----------------------------<  139<H>  4.5   |  15<L>  |  1.1    Ca    7.7<L>      23 Apr 2024 07:09  Mg     2.2     04-23    TPro  5.6<L>  /  Alb  2.8<L>  /  TBili  0.5  /  DBili  x   /  AST  22  /  ALT  <5  /  AlkPhos  114  04-23                                             Urinalysis Basic - ( 23 Apr 2024 07:09 )    Color: x / Appearance: x / SG: x / pH: x  Gluc: 139 mg/dL / Ketone: x  / Bili: x / Urobili: x   Blood: x / Protein: x / Nitrite: x   Leuk Esterase: x / RBC: x / WBC x   Sq Epi: x / Non Sq Epi: x / Bacteria: x                                              LIVER FUNCTIONS - ( 23 Apr 2024 07:09 )  Alb: 2.8 g/dL / Pro: 5.6 g/dL / ALK PHOS: 114 U/L / ALT: <5 U/L / AST: 22 U/L / GGT: x                                                  Culture - Other (collected 22 Apr 2024 14:39)  Source: .Other left buttock abscess  Preliminary Report (23 Apr 2024 17:06):    Numerous Staphylococcus aureus    Culture - Stool (collected 21 Apr 2024 12:11)  Source: .Stool Feces  Final Report (23 Apr 2024 15:30):    No enteric pathogens isolated.    (Stool culture examined for Salmonella,    Shigella, Campylobacter, Aeromonas, Plesiomonas,    Vibrio, E.coli O157 and Yersinia)                                                    MEDICATIONS  (STANDING):  ascorbic acid 500 milliGRAM(s) Oral daily  cephalexin 500 milliGRAM(s) Oral four times a day  doxycycline monohydrate Capsule 100 milliGRAM(s) Oral every 12 hours  enoxaparin Injectable 40 milliGRAM(s) SubCutaneous every 24 hours  ferrous    sulfate Liquid 300 milliGRAM(s) Enteral Tube daily  furosemide   Injectable 40 milliGRAM(s) IV Push daily  influenza  Vaccine (HIGH DOSE) 0.7 milliLiter(s) IntraMuscular once  metoprolol tartrate 25 milliGRAM(s) Enteral Tube every 12 hours  multivitamin 1 Tablet(s) Oral daily  multivitamin  Chewable 1 Tablet(s) Oral daily  mupirocin 2% Nasal 1 Application(s) Both Nostrils two times a day  potassium phosphate / sodium phosphate Powder (PHOS-NaK) 2 Packet(s) Oral three times a day  sodium bicarbonate 650 milliGRAM(s) Oral three times a day    MEDICATIONS  (PRN):  acetaminophen     Tablet .. 650 milliGRAM(s) Oral every 6 hours PRN Temp greater or equal to 38C (100.4F), Mild Pain (1 - 3)  aluminum hydroxide/magnesium hydroxide/simethicone Suspension 30 milliLiter(s) Oral every 4 hours PRN Dyspepsia  loperamide 2 milliGRAM(s) Oral every 8 hours PRN for diarrhea  melatonin 3 milliGRAM(s) Oral at bedtime PRN Insomnia  ondansetron Injectable 4 milliGRAM(s) IV Push every 8 hours PRN Nausea and/or Vomiting

## 2024-04-24 NOTE — PROGRESS NOTE ADULT - ATTENDING COMMENTS
Impression and plan above have been altered and are my own.    Remains with no respiratory issues   Bilateral effusions   Now agreeable to thoracentesis   Will follow

## 2024-04-24 NOTE — PROGRESS NOTE ADULT - ASSESSMENT
Impression:     Bilateral effusions - ASx  Metastatic prostate CA   Mediastinal mass - necrotic   Buttock cellulitis/purulent drainage   PEG tube placement   Pre-op risk stratification    Plan:     CXR similar to that done in March   Effusions are bilateral and large on CT   Patient however on room air and denies shortness of breath  Left sided thoracentesis could be offered for diagnostic purpose and dyspnea relief - denies dyspnea however   When I spoke to him and suggested it he declined to consent; please clarify if patient indeed consents to thoracentesis  Check BNP; G1DD on previous echo; continue with diuretics   Intermediate risk for procedure  Standard post-op precautions: incentive spirometry q10 mins while awake, adequate analgesia to allow for deep breathing, early ambulation, PT/OT evaluation.  Abx management per ID; Burn eval   DVT ppx   On room air   Will follow  Impression:     Bilateral effusions - ASx  Metastatic prostate CA   Mediastinal mass - necrotic   Buttock cellulitis/purulent drainage   PEG tube placement   Pre-op risk stratification    Plan:     CXR similar to that done in March   CT reviewed: Large B/L Effusions   Patient on room air and denies shortness of breath  Left sided thoracentesis could be offered for diagnostic purpose and dyspnea relief - denies dyspnea however   Patient appears agreeable to thoracentesis today - will touch base after debridement  Check BNP; G1DD on previous echo; continue with diuretics   Intermediate risk for procedure  Standard post-op precautions: incentive spirometry q10 mins while awake, adequate analgesia to allow for deep breathing, early ambulation, PT/OT evaluation.  Abx management per ID; Burn eval   DVT ppx   On room air   Will follow  Impression:     Bilateral effusions - ASx  Metastatic prostate CA   Mediastinal mass - necrotic   Buttock cellulitis/purulent drainage   PEG tube placement   Pre-op risk stratification    Plan:     CXR stable   CT reviewed: Large B/L Effusions   Patient on room air and denies shortness of breath  Left or right sided thoracentesis could be offered for diagnostic purpose and dyspnea relief - denies dyspnea however   Patient appears agreeable to thoracentesis today - will touch base after debridement  Check BNP; G1DD on previous echo; continue with diuretics   Intermediate risk for procedure  Standard post-op precautions: incentive spirometry q10 mins while awake, adequate analgesia to allow for deep breathing, early ambulation, PT/OT evaluation.  Abx management per ID; Burn following  DVT ppx   On room air   Will follow

## 2024-04-25 ENCOUNTER — RESULT REVIEW (OUTPATIENT)
Age: 69
End: 2024-04-25

## 2024-04-25 LAB
ALBUMIN SERPL ELPH-MCNC: 2.7 G/DL — LOW (ref 3.5–5.2)
ALBUMIN SERPL ELPH-MCNC: 2.8 G/DL — LOW (ref 3.5–5.2)
ALP SERPL-CCNC: 111 U/L — SIGNIFICANT CHANGE UP (ref 30–115)
ALP SERPL-CCNC: 113 U/L — SIGNIFICANT CHANGE UP (ref 30–115)
ALT FLD-CCNC: <5 U/L — SIGNIFICANT CHANGE UP (ref 0–41)
ALT FLD-CCNC: <5 U/L — SIGNIFICANT CHANGE UP (ref 0–41)
ANION GAP SERPL CALC-SCNC: 16 MMOL/L — HIGH (ref 7–14)
ANION GAP SERPL CALC-SCNC: 19 MMOL/L — HIGH (ref 7–14)
AST SERPL-CCNC: 16 U/L — SIGNIFICANT CHANGE UP (ref 0–41)
AST SERPL-CCNC: 16 U/L — SIGNIFICANT CHANGE UP (ref 0–41)
B PERT IGG+IGM PNL SER: CLEAR — SIGNIFICANT CHANGE UP
BASOPHILS # BLD AUTO: 0 K/UL — SIGNIFICANT CHANGE UP (ref 0–0.2)
BASOPHILS # BLD AUTO: 0.02 K/UL — SIGNIFICANT CHANGE UP (ref 0–0.2)
BASOPHILS NFR BLD AUTO: 0 % — SIGNIFICANT CHANGE UP (ref 0–1)
BASOPHILS NFR BLD AUTO: 0.1 % — SIGNIFICANT CHANGE UP (ref 0–1)
BILIRUB SERPL-MCNC: 0.5 MG/DL — SIGNIFICANT CHANGE UP (ref 0.2–1.2)
BILIRUB SERPL-MCNC: 0.6 MG/DL — SIGNIFICANT CHANGE UP (ref 0.2–1.2)
BUN SERPL-MCNC: 17 MG/DL — SIGNIFICANT CHANGE UP (ref 10–20)
BUN SERPL-MCNC: 25 MG/DL — HIGH (ref 10–20)
CALCIUM SERPL-MCNC: 7.4 MG/DL — LOW (ref 8.4–10.5)
CALCIUM SERPL-MCNC: 7.8 MG/DL — LOW (ref 8.4–10.5)
CHLORIDE SERPL-SCNC: 105 MMOL/L — SIGNIFICANT CHANGE UP (ref 98–110)
CHLORIDE SERPL-SCNC: 108 MMOL/L — SIGNIFICANT CHANGE UP (ref 98–110)
CO2 SERPL-SCNC: 13 MMOL/L — LOW (ref 17–32)
CO2 SERPL-SCNC: 16 MMOL/L — LOW (ref 17–32)
COLOR FLD: YELLOW — SIGNIFICANT CHANGE UP
CREAT SERPL-MCNC: 1 MG/DL — SIGNIFICANT CHANGE UP (ref 0.7–1.5)
CREAT SERPL-MCNC: 1.5 MG/DL — SIGNIFICANT CHANGE UP (ref 0.7–1.5)
EGFR: 50 ML/MIN/1.73M2 — LOW
EGFR: 81 ML/MIN/1.73M2 — SIGNIFICANT CHANGE UP
EOSINOPHIL # BLD AUTO: 0 K/UL — SIGNIFICANT CHANGE UP (ref 0–0.7)
EOSINOPHIL # BLD AUTO: 0.03 K/UL — SIGNIFICANT CHANGE UP (ref 0–0.7)
EOSINOPHIL NFR BLD AUTO: 0 % — SIGNIFICANT CHANGE UP (ref 0–8)
EOSINOPHIL NFR BLD AUTO: 0.4 % — SIGNIFICANT CHANGE UP (ref 0–8)
FLUID INTAKE SUBSTANCE CLASS: SIGNIFICANT CHANGE UP
GAS PNL BLDA: SIGNIFICANT CHANGE UP
GLUCOSE SERPL-MCNC: 135 MG/DL — HIGH (ref 70–99)
GLUCOSE SERPL-MCNC: 257 MG/DL — HIGH (ref 70–99)
GRAM STN FLD: SIGNIFICANT CHANGE UP
HCT VFR BLD CALC: 24.9 % — LOW (ref 42–52)
HCT VFR BLD CALC: 29.7 % — LOW (ref 42–52)
HGB BLD-MCNC: 7.7 G/DL — LOW (ref 14–18)
HGB BLD-MCNC: 9 G/DL — LOW (ref 14–18)
IMM GRANULOCYTES NFR BLD AUTO: 0.7 % — HIGH (ref 0.1–0.3)
IMM GRANULOCYTES NFR BLD AUTO: 0.9 % — HIGH (ref 0.1–0.3)
LYMPHOCYTES # BLD AUTO: 0.42 K/UL — LOW (ref 1.2–3.4)
LYMPHOCYTES # BLD AUTO: 0.43 K/UL — LOW (ref 1.2–3.4)
LYMPHOCYTES # BLD AUTO: 2.6 % — LOW (ref 20.5–51.1)
LYMPHOCYTES # BLD AUTO: 5.2 % — LOW (ref 20.5–51.1)
LYMPHOCYTES # FLD: 56 — SIGNIFICANT CHANGE UP
MAGNESIUM SERPL-MCNC: 2 MG/DL — SIGNIFICANT CHANGE UP (ref 1.8–2.4)
MCHC RBC-ENTMCNC: 29 PG — SIGNIFICANT CHANGE UP (ref 27–31)
MCHC RBC-ENTMCNC: 29.6 PG — SIGNIFICANT CHANGE UP (ref 27–31)
MCHC RBC-ENTMCNC: 30.3 G/DL — LOW (ref 32–37)
MCHC RBC-ENTMCNC: 30.9 G/DL — LOW (ref 32–37)
MCV RBC AUTO: 95.8 FL — HIGH (ref 80–94)
MCV RBC AUTO: 95.8 FL — HIGH (ref 80–94)
MESOTHL CELL # FLD: 3 % — SIGNIFICANT CHANGE UP
MONOCYTES # BLD AUTO: 0.83 K/UL — HIGH (ref 0.1–0.6)
MONOCYTES # BLD AUTO: 0.9 K/UL — HIGH (ref 0.1–0.6)
MONOCYTES NFR BLD AUTO: 10 % — HIGH (ref 1.7–9.3)
MONOCYTES NFR BLD AUTO: 5.5 % — SIGNIFICANT CHANGE UP (ref 1.7–9.3)
MONOS+MACROS # FLD: 14 % — SIGNIFICANT CHANGE UP
NEUTROPHILS # BLD AUTO: 14.98 K/UL — HIGH (ref 1.4–6.5)
NEUTROPHILS # BLD AUTO: 6.94 K/UL — HIGH (ref 1.4–6.5)
NEUTROPHILS NFR BLD AUTO: 83.7 % — HIGH (ref 42.2–75.2)
NEUTROPHILS NFR BLD AUTO: 90.9 % — HIGH (ref 42.2–75.2)
NEUTROPHILS-BODY FLUID: 27 % — SIGNIFICANT CHANGE UP
NRBC # BLD: 0 /100 WBCS — SIGNIFICANT CHANGE UP (ref 0–0)
NRBC # BLD: 0 /100 WBCS — SIGNIFICANT CHANGE UP (ref 0–0)
NT-PROBNP SERPL-SCNC: 1838 PG/ML — HIGH (ref 0–300)
PLATELET # BLD AUTO: 550 K/UL — HIGH (ref 130–400)
PLATELET # BLD AUTO: 600 K/UL — HIGH (ref 130–400)
PMV BLD: 9.1 FL — SIGNIFICANT CHANGE UP (ref 7.4–10.4)
PMV BLD: 9.7 FL — SIGNIFICANT CHANGE UP (ref 7.4–10.4)
POTASSIUM SERPL-MCNC: 4.1 MMOL/L — SIGNIFICANT CHANGE UP (ref 3.5–5)
POTASSIUM SERPL-MCNC: 4.8 MMOL/L — SIGNIFICANT CHANGE UP (ref 3.5–5)
POTASSIUM SERPL-SCNC: 4.1 MMOL/L — SIGNIFICANT CHANGE UP (ref 3.5–5)
POTASSIUM SERPL-SCNC: 4.8 MMOL/L — SIGNIFICANT CHANGE UP (ref 3.5–5)
PROT SERPL-MCNC: 5.5 G/DL — LOW (ref 6–8)
PROT SERPL-MCNC: 5.5 G/DL — LOW (ref 6–8)
RBC # BLD: 2.6 M/UL — LOW (ref 4.7–6.1)
RBC # BLD: 3.1 M/UL — LOW (ref 4.7–6.1)
RBC # FLD: 16.7 % — HIGH (ref 11.5–14.5)
RBC # FLD: 16.8 % — HIGH (ref 11.5–14.5)
RCV VOL RI: 1000 /UL — HIGH (ref 0–0)
SODIUM SERPL-SCNC: 137 MMOL/L — SIGNIFICANT CHANGE UP (ref 135–146)
SODIUM SERPL-SCNC: 140 MMOL/L — SIGNIFICANT CHANGE UP (ref 135–146)
SPECIMEN SOURCE: SIGNIFICANT CHANGE UP
TOTAL NUCLEATED CELL COUNT, BODY FLUID: 457 /UL — SIGNIFICANT CHANGE UP
TUBE TYPE: SIGNIFICANT CHANGE UP
WBC # BLD: 16.47 K/UL — HIGH (ref 4.8–10.8)
WBC # BLD: 8.29 K/UL — SIGNIFICANT CHANGE UP (ref 4.8–10.8)
WBC # FLD AUTO: 16.47 K/UL — HIGH (ref 4.8–10.8)
WBC # FLD AUTO: 8.29 K/UL — SIGNIFICANT CHANGE UP (ref 4.8–10.8)

## 2024-04-25 PROCEDURE — 99231 SBSQ HOSP IP/OBS SF/LOW 25: CPT | Mod: FS

## 2024-04-25 PROCEDURE — 71045 X-RAY EXAM CHEST 1 VIEW: CPT | Mod: 26,77

## 2024-04-25 PROCEDURE — 74018 RADEX ABDOMEN 1 VIEW: CPT | Mod: 26

## 2024-04-25 PROCEDURE — 99233 SBSQ HOSP IP/OBS HIGH 50: CPT

## 2024-04-25 PROCEDURE — 88342 IMHCHEM/IMCYTCHM 1ST ANTB: CPT | Mod: 26

## 2024-04-25 PROCEDURE — 88112 CYTOPATH CELL ENHANCE TECH: CPT | Mod: 26

## 2024-04-25 PROCEDURE — 88305 TISSUE EXAM BY PATHOLOGIST: CPT | Mod: 26

## 2024-04-25 PROCEDURE — 71045 X-RAY EXAM CHEST 1 VIEW: CPT | Mod: 26

## 2024-04-25 PROCEDURE — 99291 CRITICAL CARE FIRST HOUR: CPT | Mod: 25

## 2024-04-25 PROCEDURE — 88341 IMHCHEM/IMCYTCHM EA ADD ANTB: CPT | Mod: 26

## 2024-04-25 RX ORDER — LACTOBACILLUS ACIDOPHILUS 100MM CELL
1 CAPSULE ORAL
Refills: 0 | Status: DISCONTINUED | OUTPATIENT
Start: 2024-04-25 | End: 2024-05-14

## 2024-04-25 RX ORDER — MEROPENEM 1 G/30ML
1000 INJECTION INTRAVENOUS ONCE
Refills: 0 | Status: COMPLETED | OUTPATIENT
Start: 2024-04-25 | End: 2024-04-25

## 2024-04-25 RX ORDER — SODIUM CHLORIDE 9 MG/ML
500 INJECTION, SOLUTION INTRAVENOUS ONCE
Refills: 0 | Status: COMPLETED | OUTPATIENT
Start: 2024-04-25 | End: 2024-04-25

## 2024-04-25 RX ORDER — VANCOMYCIN HCL 1 G
750 VIAL (EA) INTRAVENOUS ONCE
Refills: 0 | Status: COMPLETED | OUTPATIENT
Start: 2024-04-25 | End: 2024-04-25

## 2024-04-25 RX ORDER — MIDODRINE HYDROCHLORIDE 2.5 MG/1
10 TABLET ORAL ONCE
Refills: 0 | Status: COMPLETED | OUTPATIENT
Start: 2024-04-25 | End: 2024-04-25

## 2024-04-25 RX ADMIN — Medication 650 MILLIGRAM(S): at 05:37

## 2024-04-25 RX ADMIN — Medication 300 MILLIGRAM(S): at 13:15

## 2024-04-25 RX ADMIN — MEROPENEM 100 MILLIGRAM(S): 1 INJECTION INTRAVENOUS at 23:53

## 2024-04-25 RX ADMIN — Medication 500 MILLIGRAM(S): at 13:17

## 2024-04-25 RX ADMIN — Medication 1 TABLET(S): at 13:18

## 2024-04-25 RX ADMIN — MUPIROCIN 1 APPLICATION(S): 20 OINTMENT TOPICAL at 18:08

## 2024-04-25 RX ADMIN — ENOXAPARIN SODIUM 40 MILLIGRAM(S): 100 INJECTION SUBCUTANEOUS at 22:15

## 2024-04-25 RX ADMIN — Medication 500 MILLIGRAM(S): at 13:14

## 2024-04-25 RX ADMIN — MUPIROCIN 1 APPLICATION(S): 20 OINTMENT TOPICAL at 05:33

## 2024-04-25 RX ADMIN — Medication 40 MILLIGRAM(S): at 05:37

## 2024-04-25 RX ADMIN — Medication 25 MILLIGRAM(S): at 05:37

## 2024-04-25 RX ADMIN — Medication 500 MILLIGRAM(S): at 05:31

## 2024-04-25 RX ADMIN — Medication 650 MILLIGRAM(S): at 13:16

## 2024-04-25 RX ADMIN — Medication 100 MILLIGRAM(S): at 18:07

## 2024-04-25 RX ADMIN — Medication 100 MILLIGRAM(S): at 05:32

## 2024-04-25 RX ADMIN — Medication 500 MILLIGRAM(S): at 23:08

## 2024-04-25 RX ADMIN — Medication 650 MILLIGRAM(S): at 20:10

## 2024-04-25 RX ADMIN — Medication 500 MILLIGRAM(S): at 18:08

## 2024-04-25 RX ADMIN — Medication 650 MILLIGRAM(S): at 22:15

## 2024-04-25 RX ADMIN — SODIUM CHLORIDE 1000 MILLILITER(S): 9 INJECTION, SOLUTION INTRAVENOUS at 22:16

## 2024-04-25 RX ADMIN — Medication 600 MILLIGRAM(S): at 18:21

## 2024-04-25 RX ADMIN — Medication 250 MILLIGRAM(S): at 23:53

## 2024-04-25 RX ADMIN — ONDANSETRON 4 MILLIGRAM(S): 8 TABLET, FILM COATED ORAL at 09:19

## 2024-04-25 RX ADMIN — Medication 1 TABLET(S): at 18:17

## 2024-04-25 RX ADMIN — Medication 1 TABLET(S): at 13:15

## 2024-04-25 RX ADMIN — MIDODRINE HYDROCHLORIDE 10 MILLIGRAM(S): 2.5 TABLET ORAL at 18:19

## 2024-04-25 RX ADMIN — Medication 500 MILLIGRAM(S): at 05:24

## 2024-04-25 NOTE — PROGRESS NOTE ADULT - ASSESSMENT
ASSESSMENT  68M w/ PMHx Stage 4 Prostate Ca s/p palliative radiation, Mediastinal Mass on oral Chemo (xtandi), Chronic Anemia, HTN and Chronic Back Pain presents to ED from SNF for evaluation. As per NH documentation, pt has been refusing medications through PEG for the last 3x days d/t episode of diarrhea post feeds. Patient's only complaint is LT buttock pain. Denies fevers, chills, chest pain, SOB, n/v, abdominal pain or urinary symptoms.    IMPRESSION  #Left Buttock Pain   - CT Abdomen and Pelvis w/ IV Cont (04.19.24 @ 19:17): 1. New mild edema and enlargement of the left gluteus maximums  musculature with associated subcutaneous edema. No discrete abscess. 2. Metastatic disease in the abdomen and pelvis as above with slightly  increased upper abdominal adenopathy. No significant change approximately  in the large bladder mass and partially imaged mediastinal mass. Interval  placement of an esophageal stent. 3. Severe left hydronephrosis to the level of the proximal ureter, not  significantly changed. 4. New/increased large bilateral pleural effusions.  - s/p debridement 4.24 - fat necrosis     #Prostate Cancer s/p palliative radiation with mediastinal mass   #Hx of Group A strep bacteremia 2/24/24   #Severe Hydronephrosis   #Malnutrition BMI (kg/m2): 15.8  #Abx allergy: No Known Allergies    RECOMMENDATIONS  - s/p debridement 4/24 -- Wound Cx 4/22 with MRSA   - stop cefazolin  - continue doxycycline 100 mg BID -- plan for 2 weeks from debridement (4/24-5/8)     Please call or message on [employer] Teams if with any questions.  Spectra [phone]

## 2024-04-25 NOTE — RAPID RESPONSE TEAM SUMMARY - NSOTHERINTERVENTIONSRRT_GEN_ALL_CORE
Attending attestation:   Agree with above.   ABG noted, doubt true acute hypoxic respiratory failure.   Possible left sided pneumonia/pneumonitis. Give josafat/vanc x 1 and f/u ID.   Critical care time: 50 minutes

## 2024-04-25 NOTE — PROGRESS NOTE ADULT - SUBJECTIVE AND OBJECTIVE BOX
PICKARDRUFUS SEAMAN  68y, Male  Allergy: No Known Allergies      LOS  6d    CHIEF COMPLAINT: LT Gluteal Pain / Diarrhea (25 Apr 2024 14:29)      INTERVAL EVENTS/HPI  - No acute events overnight  - T(F): , Max: 97.9 (04-25-24 @ 00:00)  - s/p debridement - Wound Cx MRSA   - WBC Count: 8.29 (04-25-24 @ 08:22)  WBC Count: 9.81 (04-24-24 @ 07:47)     - Creatinine: 1.0 (04-25-24 @ 08:22)  Creatinine: 1.0 (04-24-24 @ 07:47)       ROS  General: Denies rigors, nightsweats  HEENT: Denies headache, rhinorrhea, sore throat, eye pain  CV: Denies CP, palpitations  PULM: Denies wheezing, hemoptysis  GI: Denies hematemesis, hematochezia, melena  : Denies discharge, hematuria  MSK: Denies arthralgias, myalgias  SKIN: Denies rash, lesions  NEURO: Denies paresthesias, weakness  PSYCH: Denies depression, anxiety    VITALS:  T(F): 97.7, Max: 97.9 (04-25-24 @ 00:00)  HR: 84  BP: 93/63  RR: 18Vital Signs Last 24 Hrs  T(C): 36.5 (25 Apr 2024 07:15), Max: 36.6 (25 Apr 2024 00:00)  T(F): 97.7 (25 Apr 2024 07:15), Max: 97.9 (25 Apr 2024 00:00)  HR: 84 (25 Apr 2024 07:15) (84 - 96)  BP: 93/63 (25 Apr 2024 07:15) (93/63 - 102/63)  BP(mean): --  RR: 18 (25 Apr 2024 07:15) (18 - 18)  SpO2: --        PHYSICAL EXAM:  Gen: NAD, resting in bed  HEENT: Normocephalic, atraumatic  Neck: supple, no lymphadenopathy  CV: Regular rate & regular rhythm  Lungs: decreased BS at bases, no fremitus  Abdomen: Soft, BS present  Ext: Warm, well perfused  Neuro: non focal, awake  Skin: wound dresed  Lines: no phlebitis    FH: Non-contributory  Social Hx: Non-contributory    TESTS & MEASUREMENTS:                        7.7    8.29  )-----------( 550      ( 25 Apr 2024 08:22 )             24.9     04-25    140  |  108  |  17  ----------------------------<  135<H>  4.1   |  16<L>  |  1.0    Ca    7.4<L>      25 Apr 2024 08:22  Mg     2.0     04-25    TPro  5.5<L>  /  Alb  2.7<L>  /  TBili  0.6  /  DBili  x   /  AST  16  /  ALT  <5  /  AlkPhos  111  04-25      LIVER FUNCTIONS - ( 25 Apr 2024 08:22 )  Alb: 2.7 g/dL / Pro: 5.5 g/dL / ALK PHOS: 111 U/L / ALT: <5 U/L / AST: 16 U/L / GGT: x           Urinalysis Basic - ( 25 Apr 2024 08:22 )    Color: x / Appearance: x / SG: x / pH: x  Gluc: 135 mg/dL / Ketone: x  / Bili: x / Urobili: x   Blood: x / Protein: x / Nitrite: x   Leuk Esterase: x / RBC: x / WBC x   Sq Epi: x / Non Sq Epi: x / Bacteria: x        Culture - Tissue with Gram Stain (collected 04-24-24 @ 15:51)  Source: .Tissue Other, left buttock abscess  Gram Stain (04-25-24 @ 07:58):    Rare polymorphonuclear leukocytes seen per low power field    No organisms seen per oil power field    Culture - Other (collected 04-22-24 @ 14:39)  Source: .Other left buttock abscess  Final Report (04-24-24 @ 18:52):    Numerous Methicillin Resistant Staphylococcus aureus  Organism: Methicillin resistant Staphylococcus aureus (04-24-24 @ 18:52)  Organism: Methicillin resistant Staphylococcus aureus (04-24-24 @ 18:52)      -  Clindamycin: R >4      -  Oxacillin: R >2      -  Gentamicin: S <=1 Should not be used as monotherapy      -  Daptomycin: S 1      -  Linezolid: S 2      -  Vancomycin: S 2      -  Tetracycline: S <=1      Method Type: MIREYA      -  Penicillin: R >8      -  Rifampin: S <=1 Should not be used as monotherapy      -  Erythromycin: R >4      -  Trimethoprim/Sulfamethoxazole: S <=0.5/9.5    Culture - Stool (collected 04-21-24 @ 12:11)  Source: .Stool Feces  Final Report (04-23-24 @ 15:30):    No enteric pathogens isolated.    (Stool culture examined for Salmonella,    Shigella, Campylobacter, Aeromonas, Plesiomonas,    Vibrio, E.coli O157 and Yersinia)    Urinalysis with Rflx Culture (collected 04-19-24 @ 18:24)    Culture - Urine (collected 04-19-24 @ 18:24)  Source: Clean Catch None  Final Report (04-20-24 @ 23:20):    <10,000 CFU/mL Normal Urogenital Leigh Ann    Culture - Blood (collected 04-19-24 @ 17:37)  Source: .Blood Blood  Final Report (04-24-24 @ 23:07):    No growth at 5 days            INFECTIOUS DISEASES TESTING  MRSA PCR Result.: Positive (04-21-24 @ 06:25)  Procalcitonin, Serum: 7.32 (02-24-24 @ 12:14)      INFLAMMATORY MARKERS      RADIOLOGY & ADDITIONAL TESTS:  I have personally reviewed the last available Chest xray  CXR  Xray Chest 1 View- PORTABLE-Urgent:   ACC: [id number] EXAM:  XR CHEST PORTABLE URGENT 1V   ORDERED BY: RADHA REYES     PROCEDURE DATE:  04/23/2024          INTERPRETATION:  CLINICAL HISTORY: Pleural effusion.    COMPARISON: Chest radiograph 3/20/2024, CT abdomen and pelvis 4/19/2024.    TECHNIQUE: Frontal chest radiograph.    FINDINGS:    Support devices: Esophageal stent.    Cardiac/mediastinum/hilum: Unchanged.    Lung parenchyma/Pleura: Bibasilar opacities/effusions. No pneumothorax.    Skeleton/soft tissues: Unchanged.      IMPRESSION:    Bibasilar opacities/effusions.    --- End of Report ---          ROSALINDA LUCAS MD; Resident Radiologist  This document has been electronically signed.  ELSLEY RUEDA MD; Attending Radiologist  This document has been electronically signed. Apr 23 2024  9:27AM (04-23-24 @ 09:21)      CT      CARDIOLOGY TESTING  12 Lead ECG:   Ventricular Rate 84 BPM    Atrial Rate 84 BPM    P-R Interval 160 ms    QRS Duration 58 ms    Q-T Interval 410 ms    QTC Calculation(Bazett) 484 ms    P Axis 35 degrees    R Axis 51 degrees    T Axis 49 degrees    Diagnosis Line Sinus rhythm with Premature atrial complexes  Septal infarct , age undetermined  Abnormal ECG    Confirmed by Drea Craven MD ([id number]) on 4/22/2024 11:14:59 AM (04-20-24 @ 09:33)      MEDICATIONS  ascorbic acid 500 Oral daily  cephalexin 500 Oral four times a day  doxycycline IVPB 100 IV Intermittent every 12 hours  enoxaparin Injectable 40 SubCutaneous every 24 hours  enzalutamide 160 Oral daily  ferrous    sulfate Liquid 300 Enteral Tube daily  furosemide   Injectable 40 IV Push daily  influenza  Vaccine (HIGH DOSE) 0.7 IntraMuscular once  lactobacillus acidophilus 1 Oral three times a day with meals  metoprolol tartrate 25 Enteral Tube every 12 hours  multivitamin 1 Oral daily  multivitamin  Chewable 1 Oral daily  mupirocin 2% Nasal 1 Both Nostrils two times a day  potassium phosphate / sodium phosphate Powder (PHOS-NaK) 2 Oral three times a day  sodium bicarbonate 650 Oral three times a day      WEIGHT  Weight (kg): 54.4 (04-25-24 @ 12:13)  Creatinine: 1.0 mg/dL (04-25-24 @ 08:22)      ANTIBIOTICS:  cephalexin 500 milliGRAM(s) Oral four times a day  doxycycline IVPB 100 milliGRAM(s) IV Intermittent every 12 hours      All available historical records have been reviewed

## 2024-04-25 NOTE — PROCEDURAL SAFETY CHECKLIST WITH OR WITHOUT SEDATION - FIRE RISK ASSESSMENT ADDRESSED
Problem: Adult Inpatient Plan of Care  Goal: Plan of Care Review  Outcome: Ongoing, Progressing  Goal: Patient-Specific Goal (Individualized)  Outcome: Ongoing, Progressing  Goal: Absence of Hospital-Acquired Illness or Injury  Outcome: Ongoing, Progressing  Goal: Optimal Comfort and Wellbeing  Outcome: Ongoing, Progressing  Goal: Readiness for Transition of Care  Outcome: Ongoing, Progressing     Problem: Fluid and Electrolyte Imbalance (Acute Kidney Injury/Impairment)  Goal: Fluid and Electrolyte Balance  Outcome: Ongoing, Progressing     Problem: Oral Intake Inadequate (Acute Kidney Injury/Impairment)  Goal: Optimal Nutrition Intake  Outcome: Ongoing, Progressing     Problem: Renal Function Impairment (Acute Kidney Injury/Impairment)  Goal: Effective Renal Function  Outcome: Ongoing, Progressing     Problem: Skin Injury Risk Increased  Goal: Skin Health and Integrity  Outcome: Ongoing, Progressing     Problem: Impaired Wound Healing  Goal: Optimal Wound Healing  Outcome: Ongoing, Progressing     Problem: Fall Injury Risk  Goal: Absence of Fall and Fall-Related Injury  Outcome: Ongoing, Progressing      n/a

## 2024-04-25 NOTE — PROGRESS NOTE ADULT - SUBJECTIVE AND OBJECTIVE BOX
RUFUS PICKARD 68y Male  MRN#: [id number]   Hospital Day: 6d    SUBJECTIVE  Patient is a 68y old Male who presents with a chief complaint of LT Gluteal Pain / Diarrhea (25 Apr 2024 12:13)  Currently admitted to medicine with the primary diagnosis of Abscess      INTERVAL HPI AND OVERNIGHT EVENTS:  Patient was examined and seen at bedside. This morning he is resting comfortably in bed. No issues or overnight events.    OBJECTIVE  PAST MEDICAL & SURGICAL HISTORY  HTN (hypertension)    Prostate cancer      ALLERGIES:  No Known Allergies    MEDICATIONS:  STANDING MEDICATIONS  ascorbic acid 500 milliGRAM(s) Oral daily  cephalexin 500 milliGRAM(s) Oral four times a day  doxycycline IVPB 100 milliGRAM(s) IV Intermittent every 12 hours  enoxaparin Injectable 40 milliGRAM(s) SubCutaneous every 24 hours  enzalutamide 160 milliGRAM(s) Oral daily  ferrous    sulfate Liquid 300 milliGRAM(s) Enteral Tube daily  furosemide   Injectable 40 milliGRAM(s) IV Push daily  influenza  Vaccine (HIGH DOSE) 0.7 milliLiter(s) IntraMuscular once  metoprolol tartrate 25 milliGRAM(s) Enteral Tube every 12 hours  multivitamin 1 Tablet(s) Oral daily  multivitamin  Chewable 1 Tablet(s) Oral daily  mupirocin 2% Nasal 1 Application(s) Both Nostrils two times a day  potassium phosphate / sodium phosphate Powder (PHOS-NaK) 2 Packet(s) Oral three times a day  sodium bicarbonate 650 milliGRAM(s) Oral three times a day    PRN MEDICATIONS  acetaminophen     Tablet .. 650 milliGRAM(s) Oral every 6 hours PRN  aluminum hydroxide/magnesium hydroxide/simethicone Suspension 30 milliLiter(s) Oral every 4 hours PRN  loperamide 2 milliGRAM(s) Oral every 8 hours PRN  melatonin 3 milliGRAM(s) Oral at bedtime PRN  ondansetron Injectable 4 milliGRAM(s) IV Push every 8 hours PRN      VITAL SIGNS: Last 24 Hours  T(C): 36.5 (25 Apr 2024 07:15), Max: 36.9 (24 Apr 2024 15:00)  T(F): 97.7 (25 Apr 2024 07:15), Max: 98.5 (24 Apr 2024 15:00)  HR: 84 (25 Apr 2024 07:15) (84 - 107)  BP: 93/63 (25 Apr 2024 07:15) (93/63 - 104/74)  BP(mean): --  RR: 18 (25 Apr 2024 07:15) (18 - 18)  SpO2: --    LABS:                        7.7    8.29  )-----------( 550      ( 25 Apr 2024 08:22 )             24.9     04-25    140  |  108  |  17  ----------------------------<  135<H>  4.1   |  16<L>  |  1.0    Ca    7.4<L>      25 Apr 2024 08:22  Mg     2.0     04-25    TPro  5.5<L>  /  Alb  2.7<L>  /  TBili  0.6  /  DBili  x   /  AST  16  /  ALT  <5  /  AlkPhos  111  04-25    PT/INR - ( 24 Apr 2024 11:35 )   PT: 14.70 sec;   INR: 1.29 ratio         PTT - ( 24 Apr 2024 11:35 )  PTT:36.5 sec  Urinalysis Basic - ( 25 Apr 2024 08:22 )    Color: x / Appearance: x / SG: x / pH: x  Gluc: 135 mg/dL / Ketone: x  / Bili: x / Urobili: x   Blood: x / Protein: x / Nitrite: x   Leuk Esterase: x / RBC: x / WBC x   Sq Epi: x / Non Sq Epi: x / Bacteria: x            Culture - Tissue with Gram Stain (collected 24 Apr 2024 15:51)  Source: .Tissue Other, left buttock abscess  Gram Stain (25 Apr 2024 07:58):    Rare polymorphonuclear leukocytes seen per low power field    No organisms seen per oil power field    Culture - Other (collected 22 Apr 2024 14:39)  Source: .Other left buttock abscess  Final Report (24 Apr 2024 18:52):    Numerous Methicillin Resistant Staphylococcus aureus  Organism: Methicillin resistant Staphylococcus aureus (24 Apr 2024 18:52)  Organism: Methicillin resistant Staphylococcus aureus (24 Apr 2024 18:52)          RADIOLOGY:      PHYSICAL EXAM:  CONSTITUTIONAL: No acute distress, well-developed, well-groomed, AAOx3  HEAD: Atraumatic, normocephalic  EYES: EOM intact, PERRLA, conjunctiva and sclera clear  ENT: Supple, no masses, no thyromegaly, no bruits, no JVD; moist mucous membranes  PULMONARY: Clear to auscultation bilaterally; no wheezes, rales, or rhonchi  CARDIOVASCULAR: Regular rate and rhythm; no murmurs, rubs, or gallops  GASTROINTESTINAL: Soft, non-tender, non-distended; bowel sounds present  MUSCULOSKELETAL: 2+ peripheral pulses; no clubbing, no cyanosis, no edema  NEUROLOGY: non-focal  SKIN: No rashes or lesions; warm and dry   RUFUS PICKARD 68y Male  MRN#: [id number]   Hospital Day: 6d    SUBJECTIVE  Patient is a 68y old Male who presents with a chief complaint of LT Gluteal Pain / Diarrhea (25 Apr 2024 12:13)  Currently admitted to medicine with the primary diagnosis of Abscess      INTERVAL HPI AND OVERNIGHT EVENTS:  Patient was examined and seen at bedside. This morning he is complaining of severe abdominal pain and nausea/emesis. No overnight events.    OBJECTIVE  PAST MEDICAL & SURGICAL HISTORY  HTN (hypertension)    Prostate cancer      ALLERGIES:  No Known Allergies    MEDICATIONS:  STANDING MEDICATIONS  ascorbic acid 500 milliGRAM(s) Oral daily  cephalexin 500 milliGRAM(s) Oral four times a day  doxycycline IVPB 100 milliGRAM(s) IV Intermittent every 12 hours  enoxaparin Injectable 40 milliGRAM(s) SubCutaneous every 24 hours  enzalutamide 160 milliGRAM(s) Oral daily  ferrous    sulfate Liquid 300 milliGRAM(s) Enteral Tube daily  furosemide   Injectable 40 milliGRAM(s) IV Push daily  influenza  Vaccine (HIGH DOSE) 0.7 milliLiter(s) IntraMuscular once  metoprolol tartrate 25 milliGRAM(s) Enteral Tube every 12 hours  multivitamin 1 Tablet(s) Oral daily  multivitamin  Chewable 1 Tablet(s) Oral daily  mupirocin 2% Nasal 1 Application(s) Both Nostrils two times a day  potassium phosphate / sodium phosphate Powder (PHOS-NaK) 2 Packet(s) Oral three times a day  sodium bicarbonate 650 milliGRAM(s) Oral three times a day    PRN MEDICATIONS  acetaminophen     Tablet .. 650 milliGRAM(s) Oral every 6 hours PRN  aluminum hydroxide/magnesium hydroxide/simethicone Suspension 30 milliLiter(s) Oral every 4 hours PRN  loperamide 2 milliGRAM(s) Oral every 8 hours PRN  melatonin 3 milliGRAM(s) Oral at bedtime PRN  ondansetron Injectable 4 milliGRAM(s) IV Push every 8 hours PRN      VITAL SIGNS: Last 24 Hours  T(C): 36.5 (25 Apr 2024 07:15), Max: 36.9 (24 Apr 2024 15:00)  T(F): 97.7 (25 Apr 2024 07:15), Max: 98.5 (24 Apr 2024 15:00)  HR: 84 (25 Apr 2024 07:15) (84 - 107)  BP: 93/63 (25 Apr 2024 07:15) (93/63 - 104/74)  BP(mean): --  RR: 18 (25 Apr 2024 07:15) (18 - 18)  SpO2: --    LABS:                        7.7    8.29  )-----------( 550      ( 25 Apr 2024 08:22 )             24.9     04-25    140  |  108  |  17  ----------------------------<  135<H>  4.1   |  16<L>  |  1.0    Ca    7.4<L>      25 Apr 2024 08:22  Mg     2.0     04-25    TPro  5.5<L>  /  Alb  2.7<L>  /  TBili  0.6  /  DBili  x   /  AST  16  /  ALT  <5  /  AlkPhos  111  04-25    PT/INR - ( 24 Apr 2024 11:35 )   PT: 14.70 sec;   INR: 1.29 ratio         PTT - ( 24 Apr 2024 11:35 )  PTT:36.5 sec  Urinalysis Basic - ( 25 Apr 2024 08:22 )    Color: x / Appearance: x / SG: x / pH: x  Gluc: 135 mg/dL / Ketone: x  / Bili: x / Urobili: x   Blood: x / Protein: x / Nitrite: x   Leuk Esterase: x / RBC: x / WBC x   Sq Epi: x / Non Sq Epi: x / Bacteria: x            Culture - Tissue with Gram Stain (collected 24 Apr 2024 15:51)  Source: .Tissue Other, left buttock abscess  Gram Stain (25 Apr 2024 07:58):    Rare polymorphonuclear leukocytes seen per low power field    No organisms seen per oil power field    Culture - Other (collected 22 Apr 2024 14:39)  Source: .Other left buttock abscess  Final Report (24 Apr 2024 18:52):    Numerous Methicillin Resistant Staphylococcus aureus  Organism: Methicillin resistant Staphylococcus aureus (24 Apr 2024 18:52)  Organism: Methicillin resistant Staphylococcus aureus (24 Apr 2024 18:52)          RADIOLOGY:      PHYSICAL EXAM:  CONSTITUTIONAL: No acute distress, cachectic, frail elderly male  HEAD: Atraumatic, normocephalic  EYES: conjunctiva and sclera clear  ENT: Supple, no masses, no thyromegaly, no bruits, no JVD; moist mucous membranes  PULMONARY: Clear to auscultation bilaterally; no wheezes, rales, or rhonchi  CARDIOVASCULAR: Regular rate and rhythm; no murmurs, rubs, or gallops  GASTROINTESTINAL: Soft, tender to palpation, scaphoid; bowel sounds present; PEG site clean/intact  MUSCULOSKELETAL: 2+ peripheral pulses; no clubbing, no cyanosis  NEUROLOGY: non-focal  SKIN: No rashes or lesions; warm and dry  Genitourinary: Edematous Scrotum

## 2024-04-25 NOTE — CHART NOTE - NSCHARTNOTEFT_GEN_A_CORE
Called by nurse for hypotension. MAP 60, BP 80s/60s. HR 70-80s. Saturating 91%. S/p 1.7L thoracentesis today. Ordered midodrine 10 mg. Called by nurse for hypotension. MAP 60, BP 80s/60s. . Saturating 91% on 2L via NC. S/p 1.7L thoracentesis today. Ordered midodrine 10 mg. Repeat vitals 114/68, HR 90, saturating 99% on 2L.

## 2024-04-25 NOTE — PROGRESS NOTE ADULT - SUBJECTIVE AND OBJECTIVE BOX
Pt seen by the burn team for left buttock abscess. Pt s/p I & D of left buttock abscess on 4/25.    AM rounds  Pt seen at bedside  Left buttock I&D side evaluated  Dressing cahnged  No further debridement is required    Vital Signs Last 24 Hrs  T(C): 36.5 (25 Apr 2024 07:15), Max: 36.9 (24 Apr 2024 15:00)  T(F): 97.7 (25 Apr 2024 07:15), Max: 98.5 (24 Apr 2024 15:00)  HR: 84 (25 Apr 2024 07:15) (84 - 107)  BP: 93/63 (25 Apr 2024 07:15) (93/63 - 104/74)  RR: 18 (25 Apr 2024 07:15) (18 - 18)      Parameters below as of 24 Apr 2024 15:00  Patient On (Oxygen Delivery Method): room air                            7.7    8.29  )-----------( 550      ( 25 Apr 2024 08:22 )             24.9     PE: Left buttock incision site about 5jea2ml down to subcutaneous fat mostly pink, serosanguinous discharge noted on dressing, no pus, no malodor, no bleeding    Dressing change done by the burn team

## 2024-04-25 NOTE — PROCEDURE NOTE - NSICDXPROCEDURE_GEN_ALL_CORE_FT
PROCEDURES:  Thoracentesis, left pleural cavity, with US guidance 25-Apr-2024 15:59:12  Adelina Pruitt

## 2024-04-25 NOTE — PROGRESS NOTE ADULT - ASSESSMENT
Pt seen by the burn team for left buttock abscess. Pt s/p I & D of left buttock abscess on 4/25.  -no further debridement needed  -continue dressing changes with xeroform packing  -follow up wound cultures  -antibiotics as per ID  -reconsult when needed

## 2024-04-25 NOTE — RAPID RESPONSE TEAM SUMMARY - NSSITUATIONBACKGROUNDRRT_GEN_ALL_CORE
RR called for desaturation to 70s-80s. /83 on R leg. Pt reports feeling SOB. On exam, pt is AOAx3, cool extremities, weak pulse, dry. ABG showed pH 7.37, PCO2 <19, lactate 3.5, bicarb 10. CXR showed worsened opacities on L lung fields. Pt placed on non rebreather and given 500cc bolus. Pt symptoms improved ans shortly after taken off non rebreather satting well on NC. DC'd lasix. CBC, CMP, procal, and BNP ordered.

## 2024-04-25 NOTE — PROCEDURE NOTE - NSINFORMCONSENT_GEN_A_CORE
Benefits, risks, and possible complications of procedure explained to patient/caregiver who verbalized understanding and gave written consent.
Statement Selected

## 2024-04-25 NOTE — PROGRESS NOTE ADULT - ASSESSMENT
68M w/ PMHx Stage 4 Prostate Ca s/p palliative radiation, Mediastinal prostate metastatic mass) on oral Chemo (xtandi)-has esphogeal stent , Chronic Anemia, HTN and Chronic Back Pain presents to ED from SNF for evaluation. As per NH documentation, pt has been refusing medications through PEG for the last 3x days d/t episode of diarrhea post feeds. Patient's only complaint is LT buttock pain. Denies fevers, chills, chest pain, SOB, n/v, abdominal pain or urinary symptoms.      # LT Gluteal Cellulitis  - s/p bedside debridement with burn on 4/24  - ID following - continue doxy  - Wound culture MRSA - contact isolation   - turn and position q2hrs  - offloading to prevent pressure sores   - continue local wound care - burn vs. RN  - continue vitamin c    #h/o Dysphagia s/p PEG  #Nausea and vomiting 4/25  - s/p Esophageal Stent and PEG 3/5/24   - patient cleared for pureed diet and thin liquids per SS on last admission and is tolerating - had eggs today for breakfast  - refusing PEG feeds due to episodes of diarrhea   - started acidophilus today  - check KUB today  - antiemetics PRN    #New b/l Large Pleural Effusions  - on IV lasix 40mg daily    - pulm planning thoracenteses today   - not requiring supplemental oxygen    #Stage IV Prostate Ca s/p Radiation (on active PO chemo) w/ metastasis to mediastinum or primary mediastinal mass?/ Severe Left Hydronephrosis   - reaching out to patient's oncologist for plan of care  - c/w home Xtandi 160 mg daily  - monitor labs    #metabolic acidosis  -on sodium bicarb    #Chronic Normocytic Anemia  - likely due to malignancy  - continue ferrous sulfate     #GOC- DNR/DNI  overall poor prognosis     DVT ppx: Lovenox SubQ    Progress Note Handoff  Pending Consults: oncology, ID, pulm  Pending Tests: labs  Pending Results: labs, kub  Family Discussion: Discussed labs, meds, kub, vomiting and overall plan of care with pt and medical staff. All questions answered.   Disposition: Home_____/SNF______/Other_____/Unknown at this time_x____  Spent over 55 min reviewing chart, speaking with patient/family and on coordinating patient care during interdisciplinary rounds

## 2024-04-25 NOTE — PROGRESS NOTE ADULT - ASSESSMENT
68M w/ PMHx Stage 4 Prostate Ca s/p palliative radiation, Mediastinal Mass on oral Chemo (xtandi), Chronic Anemia, HTN and Chronic Back Pain presents to ED from SNF for evaluation. As per NH documentation, pt has been refusing medications through PEG for the last 3x days d/t episode of diarrhea post feeds. Patient's only complaint is LT buttock pain. Denies fevers, chills, chest pain, SOB, n/v, abdominal pain or urinary symptoms.    #LT Gluteal Cellulitis  #MRSA + wound cx 4/22  - Vitals: Temp 98.3F, /82, , RR 18, SpO2 100% on RA  - CT A/P shows new mild edema and enlargement of the left gluteus maximums musculature with associated subcutaneous edema. No discrete abscess.  - s/p Cefepime, Vanc, Flagyl IV x1 in the ED  - started Cefazolin 2g q8hrs  -c/w cephalexin 500 mg q6h PO and doxycycline 100 mg BID PO  - BCx (- 4/19), Urine Cx (- 4/19), MRSA swab (+ 4/21), wound cx ( + 4/22 many s. aureus; awaiting sensitivities)   - f/u ID c/s  -Burn to take pt for srgy today  -requires pre-op risk assessment by Medicine (done)  -Pulm: intermediate risk; may perform thoracentesis after debridement  -cefazolin dc'd   - continue doxycycline 100 mg BID -- plan for 2 weeks from debridement (4/24-5/8)         #Inability to Tolerate PEG Feeds - Diarrhea (chronic issue)  #h/o Dysphagia s/p PEG  - s/p Esophageal Stent and PEG 3/5/24 -> pt having diarrhea after feeds -> here for the same issue  - patient cleared for pureed diet and thin liquids per SS on last admission  - patient has been refusing PEG feeds out of concern he will have diarrhea again.  - s/p 1L NS bolus  - resume PEG feeds w/ Peptamen  - c/w home Imodium 2mg q8hrs PRN for diarrhea  - f/u dietary consult (pt refusing feeds)    #Suicidal Ideation  -pt told RN that he wants to die  -inquired about this w/ pt who denied stating that he wanted to die  -psych consulted/contacted; per psych no major concern for suicide     #New b/l Large Pleural Effusion  - not SOB, satting well on RA  - previously had only Left Pleural Effusion  - CT A/P showed new/increased large bilateral pleural effusions.  - Pulm was consult for thoracentesis but had refused last visit  - see no utility in diuresing - can reconsider IV Lasix if patient becomes symptomatic  - patient was refusing thoracentesis. Pt may consent to procedure if necessary   -c/w lasix (may be transudative effusion)  -s/p thoracentesis (1800 cc); labs ordered   -getting CXR urgent for possible pnx risk     #Nausea  #abdominal pain  -    #Stage 4 Prostate Ca s/p Radiation (on active PO chemo) w/ metastasis to mediastinum or primary mediastinal mass?  #Severe Left Hydronephrosis (unchanged from prior)  - CT A/P shows:  1. Metastatic disease in the abdomen and pelvis as above with slightly increased upper abdominal adenopathy.  2. No significant change approximately in the large bladder mass and partially imaged mediastinal mass.  3. Severe left hydronephrosis to the level of the proximal ureter, not significantly changed.  - previous AFP and CEA wnl  - previous LDH and haptoglobin elevated  - previous PSA total 220, PSA free is 22, CA19: 131, : 57  - Esophageal biopsy: metastatic poorly differentiated carcinoma of prostatic origin.  - per onc note, patient had prior biopsy of site that demonstrated metastatic prostate adenocarcinoma and was started on Docetaxel, in January 2024 patient was started on Pembrolizumab and continued on keytruda injections.   - Dr. Irizarry (pt's oncologist): cs rad/onco for palliative radiation, onco asked about new protocol since patient progressed, rec hospice and palliative   - Heme onc also recommending palliative radiation to mediastinal mass. Also reevaluated by Rad Onc for palliative RT -> CT sim for mapping and planning, followed by XRT.  - Rad/onc following for palliative RT -> CT sim for mapping and planning, followed by XRT.  May not be able to undergo RT given how little lung is left without intervention.  - Psych previously determined that patient lacks capacity, Pt refusing DNR/DNI status based on Spiritism beliefs, Ethics committee previously agreed 2P consent would not be appropriate to make him DNR/DNI  - Dr. Irizarry/[facility] consult [phone]    #Chronic Normocytic Anemia  - Hgb 7.7 (previously 8.6)  - iron  studies noted % sat 27  - monitor H&H, maintain active T&S, transfuse PRN, Keep hgb >7    #Recent GAS strep pyogenes Bacteremia s/p Treatment  - TTE: EF 65 to 70%  - finished course of zosyn during last admission    #DVT ppx: Lovenox SubQ  #GI ppx: PPI PO daily  #Diet: PEG - Peptamen  #Activity: IAT  #Code Status: Full Code  #Dispo: from NH, acute

## 2024-04-25 NOTE — PROGRESS NOTE ADULT - SUBJECTIVE AND OBJECTIVE BOX
68M w/ PMHx Stage 4 Prostate Ca s/p palliative radiation, Mediastinal prostate metastatic mass) on oral Chemo (xtandi)-has esphogeal stent , Chronic Anemia, HTN and Chronic Back Pain presents to ED from SNF for evaluation. As per NH documentation, pt has been refusing medications through PEG for the last 3x days d/t episode of diarrhea post feeds. Patient's only complaint is LT buttock pain. Denies fevers, chills, chest pain, SOB, n/v, abdominal pain or urinary symptoms.  Pt was consulted by burn, bedside debridement was done on 4/24.  In addition pt was consulted by pulmonary, thoracocentesis recommended likely today.         PAST MEDICAL & SURGICAL HISTORY  HTN (hypertension)    Prostate cancer      Vital Signs Last 24 Hrs  T(C): 36.5 (25 Apr 2024 07:15), Max: 36.9 (24 Apr 2024 15:00)  T(F): 97.7 (25 Apr 2024 07:15), Max: 98.5 (24 Apr 2024 15:00)  HR: 84 (25 Apr 2024 07:15) (84 - 107)  BP: 93/63 (25 Apr 2024 07:15) (93/63 - 104/74)  BP(mean): --  RR: 18 (25 Apr 2024 07:15) (18 - 18)  SpO2: --    Parameters below as of 24 Apr 2024 15:00  Patient On (Oxygen Delivery Method): room air        PHYSICAL EXAM  GEN: no distress, comfortable, cachectic with temporal muscle waisting, chronically ill appearing  NECK: supple, no JVD   PULM: decreased BS at bases  CVS: S1S2 present, no rubs or gallops  ABD: Soft, mildly distended, no guarding; non-tender, +PEG, scrotal edema +  EXT: No lower extremity edema  NEURO: A&Ox3, moving all extremities      LABS                                   7.7    8.29  )-----------( 550      ( 25 Apr 2024 08:22 )             24.9     04-25    140  |  108  |  17  ----------------------------<  135<H>  4.1   |  16<L>  |  1.0    Ca    7.4<L>      25 Apr 2024 08:22  Mg     2.0     04-25    TPro  5.5<L>  /  Alb  2.7<L>  /  TBili  0.6  /  DBili  x   /  AST  16  /  ALT  <5  /  AlkPhos  111  04-25    Urinalysis Basic - ( 23 Apr 2024 07:09 )    Color: x / Appearance: x / SG: x / pH: x  Gluc: 139 mg/dL / Ketone: x  / Bili: x / Urobili: x   Blood: x / Protein: x / Nitrite: x   Leuk Esterase: x / RBC: x / WBC x   Sq Epi: x / Non Sq Epi: x / Bacteria: x    Culture - Stool (collected 21 Apr 2024 12:11)  Source: .Stool Feces  Preliminary Report (22 Apr 2024 18:15):    No enteric pathogens to date: Final culture pending      Culture - Other (04.22.24 @ 14:39)   Specimen Source: .Other left buttock abscess  Culture Results:   Numerous Staphylococcus aureus    Culture - Stool (04.21.24 @ 12:11)   Specimen Source: .Stool Feces  Culture Results:   No enteric pathogens isolated.     RADIOLOGY:    < from: Xray Chest 1 View- PORTABLE-Urgent (Xray Chest 1 View- PORTABLE-Urgent .) (04.23.24 @ 09:21) >    IMPRESSION:    Bibasilar opacities/effusions.      < from: CT Abdomen and Pelvis w/ IV Cont (04.19.24 @ 19:17) >  IMPRESSION:    1. New mild edema and enlargement of the left gluteus maximums   musculature with associated subcutaneous edema. No discrete abscess.    2. Metastatic disease in the abdomen and pelvis as above with slightly   increased upper abdominal adenopathy. No significant change approximately   in the large bladder mass and partially imaged mediastinal mass. Interval   placement of an esophageal stent.    3. Severe left hydronephrosis to the level of the proximal ureter, not   significantly changed.    4. New/increased large bilateral pleural effusions.    < from: TTE Echo Complete w/o Contrast w/ Doppler (03.01.24 @ 21:39) >    Summary:   1. Left ventricular ejection fraction, by visual estimation, is 65 to   70%.   2. Hyperdynamic global left ventricular systolic function.   3. Mildly increased LV wall thickness.   4. Spectral Doppler shows impaired relaxation pattern of left   ventricular myocardial filling (Grade I diastolic dysfunction).   5. Trace mitral valve regurgitation.      MEDICATIONS  (STANDING):  MEDICATIONS  (STANDING):  ascorbic acid 500 milliGRAM(s) Oral daily  cephalexin 500 milliGRAM(s) Oral four times a day  doxycycline IVPB 100 milliGRAM(s) IV Intermittent every 12 hours  enoxaparin Injectable 40 milliGRAM(s) SubCutaneous every 24 hours  enzalutamide 160 milliGRAM(s) Oral daily  ferrous    sulfate Liquid 300 milliGRAM(s) Enteral Tube daily  furosemide   Injectable 40 milliGRAM(s) IV Push daily  influenza  Vaccine (HIGH DOSE) 0.7 milliLiter(s) IntraMuscular once  metoprolol tartrate 25 milliGRAM(s) Enteral Tube every 12 hours  multivitamin 1 Tablet(s) Oral daily  multivitamin  Chewable 1 Tablet(s) Oral daily  mupirocin 2% Nasal 1 Application(s) Both Nostrils two times a day  potassium phosphate / sodium phosphate Powder (PHOS-NaK) 2 Packet(s) Oral three times a day  sodium bicarbonate 650 milliGRAM(s) Oral three times a day    MEDICATIONS  (PRN):  acetaminophen     Tablet .. 650 milliGRAM(s) Oral every 6 hours PRN Temp greater or equal to 38C (100.4F), Mild Pain (1 - 3)  aluminum hydroxide/magnesium hydroxide/simethicone Suspension 30 milliLiter(s) Oral every 4 hours PRN Dyspepsia  loperamide 2 milliGRAM(s) Oral every 8 hours PRN for diarrhea  melatonin 3 milliGRAM(s) Oral at bedtime PRN Insomnia  ondansetron Injectable 4 milliGRAM(s) IV Push every 8 hours PRN Nausea and/or Vomiting

## 2024-04-25 NOTE — PROCEDURAL SAFETY CHECKLIST WITH OR WITHOUT SEDATION - NSPOSTCOMMENTFT_GEN_ALL_CORE
Procedure complete, 1800 mL removed, catheter removed, dressing in place C/D/I.  Pt. is A&O, no acute distress noted at end of procedure.

## 2024-04-26 LAB
ALBUMIN FLD-MCNC: 1.8 G/DL — SIGNIFICANT CHANGE UP
ALBUMIN SERPL ELPH-MCNC: 2.5 G/DL — LOW (ref 3.5–5.2)
ALP SERPL-CCNC: 102 U/L — SIGNIFICANT CHANGE UP (ref 30–115)
ALT FLD-CCNC: <5 U/L — SIGNIFICANT CHANGE UP (ref 0–41)
ANION GAP SERPL CALC-SCNC: 22 MMOL/L — HIGH (ref 7–14)
AST SERPL-CCNC: 16 U/L — SIGNIFICANT CHANGE UP (ref 0–41)
BASOPHILS # BLD AUTO: 0 K/UL — SIGNIFICANT CHANGE UP (ref 0–0.2)
BASOPHILS NFR BLD AUTO: 0 % — SIGNIFICANT CHANGE UP (ref 0–1)
BILIRUB FLD-MCNC: 0.4 MG/DL — SIGNIFICANT CHANGE UP
BILIRUB SERPL-MCNC: 0.4 MG/DL — SIGNIFICANT CHANGE UP (ref 0.2–1.2)
BUN SERPL-MCNC: 29 MG/DL — HIGH (ref 10–20)
CALCIUM SERPL-MCNC: 7.3 MG/DL — LOW (ref 8.4–10.4)
CHLORIDE SERPL-SCNC: 105 MMOL/L — SIGNIFICANT CHANGE UP (ref 98–110)
CHOLEST FLD-MCNC: 41 MG/DL — SIGNIFICANT CHANGE UP
CO2 SERPL-SCNC: 10 MMOL/L — LOW (ref 17–32)
CREAT SERPL-MCNC: 1.6 MG/DL — HIGH (ref 0.7–1.5)
EGFR: 46 ML/MIN/1.73M2 — LOW
EOSINOPHIL # BLD AUTO: 0 K/UL — SIGNIFICANT CHANGE UP (ref 0–0.7)
EOSINOPHIL NFR BLD AUTO: 0 % — SIGNIFICANT CHANGE UP (ref 0–8)
GLUCOSE BLDC GLUCOMTR-MCNC: 156 MG/DL — HIGH (ref 70–99)
GLUCOSE BLDC GLUCOMTR-MCNC: 204 MG/DL — HIGH (ref 70–99)
GLUCOSE BLDC GLUCOMTR-MCNC: 209 MG/DL — HIGH (ref 70–99)
GLUCOSE BLDC GLUCOMTR-MCNC: 223 MG/DL — HIGH (ref 70–99)
GLUCOSE BLDC GLUCOMTR-MCNC: 230 MG/DL — HIGH (ref 70–99)
GLUCOSE FLD-MCNC: 169 MG/DL — SIGNIFICANT CHANGE UP
GLUCOSE SERPL-MCNC: 274 MG/DL — HIGH (ref 70–99)
GRAM STN FLD: ABNORMAL
GRAM STN FLD: SIGNIFICANT CHANGE UP
HCT VFR BLD CALC: 28.9 % — LOW (ref 42–52)
HGB BLD-MCNC: 8.8 G/DL — LOW (ref 14–18)
IMM GRANULOCYTES NFR BLD AUTO: 0.9 % — HIGH (ref 0.1–0.3)
LACTATE SERPL-SCNC: 2.8 MMOL/L — HIGH (ref 0.7–2)
LACTATE SERPL-SCNC: 3.7 MMOL/L — HIGH (ref 0.7–2)
LDH SERPL L TO P-CCNC: 210 U/L — SIGNIFICANT CHANGE UP
LDH SERPL L TO P-CCNC: 256 U/L — HIGH (ref 50–242)
LYMPHOCYTES # BLD AUTO: 0.48 K/UL — LOW (ref 1.2–3.4)
LYMPHOCYTES # BLD AUTO: 5 % — LOW (ref 20.5–51.1)
MAGNESIUM SERPL-MCNC: 2 MG/DL — SIGNIFICANT CHANGE UP (ref 1.8–2.4)
MCHC RBC-ENTMCNC: 29.2 PG — SIGNIFICANT CHANGE UP (ref 27–31)
MCHC RBC-ENTMCNC: 30.4 G/DL — LOW (ref 32–37)
MCV RBC AUTO: 96 FL — HIGH (ref 80–94)
MONOCYTES # BLD AUTO: 0.53 K/UL — SIGNIFICANT CHANGE UP (ref 0.1–0.6)
MONOCYTES NFR BLD AUTO: 5.6 % — SIGNIFICANT CHANGE UP (ref 1.7–9.3)
NEUTROPHILS # BLD AUTO: 8.42 K/UL — HIGH (ref 1.4–6.5)
NEUTROPHILS NFR BLD AUTO: 88.5 % — HIGH (ref 42.2–75.2)
NRBC # BLD: 0 /100 WBCS — SIGNIFICANT CHANGE UP (ref 0–0)
PH FLD: 7.7 — SIGNIFICANT CHANGE UP
PLATELET # BLD AUTO: 553 K/UL — HIGH (ref 130–400)
PMV BLD: 9.2 FL — SIGNIFICANT CHANGE UP (ref 7.4–10.4)
POTASSIUM SERPL-MCNC: 4.1 MMOL/L — SIGNIFICANT CHANGE UP (ref 3.5–5)
POTASSIUM SERPL-SCNC: 4.1 MMOL/L — SIGNIFICANT CHANGE UP (ref 3.5–5)
PROCALCITONIN SERPL-MCNC: 0.35 NG/ML — HIGH (ref 0.02–0.1)
PROT FLD-MCNC: 2.9 G/DL — SIGNIFICANT CHANGE UP
PROT SERPL-MCNC: 4.9 G/DL — LOW (ref 6–8)
RBC # BLD: 3.01 M/UL — LOW (ref 4.7–6.1)
RBC # FLD: 16.5 % — HIGH (ref 11.5–14.5)
SODIUM SERPL-SCNC: 137 MMOL/L — SIGNIFICANT CHANGE UP (ref 135–146)
SPECIMEN SOURCE: SIGNIFICANT CHANGE UP
TRIGL FLD-MCNC: 24 MG/DL — SIGNIFICANT CHANGE UP
WBC # BLD: 9.52 K/UL — SIGNIFICANT CHANGE UP (ref 4.8–10.8)
WBC # FLD AUTO: 9.52 K/UL — SIGNIFICANT CHANGE UP (ref 4.8–10.8)

## 2024-04-26 PROCEDURE — 99233 SBSQ HOSP IP/OBS HIGH 50: CPT

## 2024-04-26 PROCEDURE — 71045 X-RAY EXAM CHEST 1 VIEW: CPT | Mod: 26

## 2024-04-26 RX ORDER — IPRATROPIUM/ALBUTEROL SULFATE 18-103MCG
3 AEROSOL WITH ADAPTER (GRAM) INHALATION EVERY 6 HOURS
Refills: 0 | Status: DISCONTINUED | OUTPATIENT
Start: 2024-04-26 | End: 2024-05-02

## 2024-04-26 RX ORDER — CHLORHEXIDINE GLUCONATE 213 G/1000ML
1 SOLUTION TOPICAL
Refills: 0 | Status: DISCONTINUED | OUTPATIENT
Start: 2024-04-26 | End: 2024-05-14

## 2024-04-26 RX ORDER — DEXTROSE 50 % IN WATER 50 %
12.5 SYRINGE (ML) INTRAVENOUS ONCE
Refills: 0 | Status: DISCONTINUED | OUTPATIENT
Start: 2024-04-26 | End: 2024-05-14

## 2024-04-26 RX ORDER — SODIUM BICARBONATE 1 MEQ/ML
0.21 SYRINGE (ML) INTRAVENOUS
Qty: 150 | Refills: 0 | Status: DISCONTINUED | OUTPATIENT
Start: 2024-04-26 | End: 2024-04-29

## 2024-04-26 RX ORDER — SODIUM CHLORIDE 9 MG/ML
1000 INJECTION, SOLUTION INTRAVENOUS
Refills: 0 | Status: DISCONTINUED | OUTPATIENT
Start: 2024-04-26 | End: 2024-04-29

## 2024-04-26 RX ORDER — DEXTROSE 10 % IN WATER 10 %
125 INTRAVENOUS SOLUTION INTRAVENOUS ONCE
Refills: 0 | Status: DISCONTINUED | OUTPATIENT
Start: 2024-04-26 | End: 2024-04-29

## 2024-04-26 RX ORDER — DEXTROSE 50 % IN WATER 50 %
25 SYRINGE (ML) INTRAVENOUS ONCE
Refills: 0 | Status: DISCONTINUED | OUTPATIENT
Start: 2024-04-26 | End: 2024-05-14

## 2024-04-26 RX ORDER — GLUCAGON INJECTION, SOLUTION 0.5 MG/.1ML
1 INJECTION, SOLUTION SUBCUTANEOUS ONCE
Refills: 0 | Status: DISCONTINUED | OUTPATIENT
Start: 2024-04-26 | End: 2024-04-29

## 2024-04-26 RX ORDER — INSULIN LISPRO 100/ML
VIAL (ML) SUBCUTANEOUS
Refills: 0 | Status: DISCONTINUED | OUTPATIENT
Start: 2024-04-26 | End: 2024-05-10

## 2024-04-26 RX ORDER — DEXTROSE 50 % IN WATER 50 %
15 SYRINGE (ML) INTRAVENOUS ONCE
Refills: 0 | Status: DISCONTINUED | OUTPATIENT
Start: 2024-04-26 | End: 2024-05-14

## 2024-04-26 RX ORDER — MORPHINE SULFATE 50 MG/1
2 CAPSULE, EXTENDED RELEASE ORAL ONCE
Refills: 0 | Status: DISCONTINUED | OUTPATIENT
Start: 2024-04-26 | End: 2024-04-26

## 2024-04-26 RX ORDER — SODIUM CHLORIDE 9 MG/ML
1000 INJECTION INTRAMUSCULAR; INTRAVENOUS; SUBCUTANEOUS ONCE
Refills: 0 | Status: COMPLETED | OUTPATIENT
Start: 2024-04-26 | End: 2024-04-26

## 2024-04-26 RX ORDER — SODIUM CHLORIDE 9 MG/ML
1000 INJECTION INTRAMUSCULAR; INTRAVENOUS; SUBCUTANEOUS
Refills: 0 | Status: DISCONTINUED | OUTPATIENT
Start: 2024-04-26 | End: 2024-04-26

## 2024-04-26 RX ADMIN — Medication 500 MILLIGRAM(S): at 06:19

## 2024-04-26 RX ADMIN — Medication 1 TABLET(S): at 12:10

## 2024-04-26 RX ADMIN — Medication 100 MILLIGRAM(S): at 06:19

## 2024-04-26 RX ADMIN — SODIUM CHLORIDE 1000 MILLILITER(S): 9 INJECTION INTRAMUSCULAR; INTRAVENOUS; SUBCUTANEOUS at 20:30

## 2024-04-26 RX ADMIN — MUPIROCIN 1 APPLICATION(S): 20 OINTMENT TOPICAL at 18:10

## 2024-04-26 RX ADMIN — Medication 650 MILLIGRAM(S): at 13:18

## 2024-04-26 RX ADMIN — Medication 2: at 17:55

## 2024-04-26 RX ADMIN — ENZALUTAMIDE 160 MILLIGRAM(S): 80 TABLET ORAL at 17:55

## 2024-04-26 RX ADMIN — Medication 300 MILLIGRAM(S): at 12:11

## 2024-04-26 RX ADMIN — ENOXAPARIN SODIUM 40 MILLIGRAM(S): 100 INJECTION SUBCUTANEOUS at 20:30

## 2024-04-26 RX ADMIN — Medication 600 MILLIGRAM(S): at 18:08

## 2024-04-26 RX ADMIN — Medication 10 MILLIGRAM(S): at 17:54

## 2024-04-26 RX ADMIN — Medication 650 MILLIGRAM(S): at 06:19

## 2024-04-26 RX ADMIN — Medication 600 MILLIGRAM(S): at 06:19

## 2024-04-26 RX ADMIN — CHLORHEXIDINE GLUCONATE 1 APPLICATION(S): 213 SOLUTION TOPICAL at 12:06

## 2024-04-26 RX ADMIN — Medication 3 MILLILITER(S): at 17:58

## 2024-04-26 RX ADMIN — Medication 1 TABLET(S): at 17:54

## 2024-04-26 RX ADMIN — MUPIROCIN 1 APPLICATION(S): 20 OINTMENT TOPICAL at 06:19

## 2024-04-26 RX ADMIN — Medication 3 MILLILITER(S): at 20:39

## 2024-04-26 RX ADMIN — Medication 25 MILLIGRAM(S): at 17:54

## 2024-04-26 RX ADMIN — Medication 500 MILLIGRAM(S): at 12:11

## 2024-04-26 NOTE — PROGRESS NOTE ADULT - SUBJECTIVE AND OBJECTIVE BOX
24H events:    Patient is a 68y old Male who presents with a chief complaint of LT Gluteal Pain / Diarrhea (25 Apr 2024 15:15)    Primary diagnosis of Abscess        Today is hospital day 7d. This morning patient was seen and examined at bedside, resting comfortably in bed.    No acute or major events overnight.    Code Status:    Family communication:  Contact date:  Name of person contacted:  Relationship to patient:  Communication details:  What matters most:    PAST MEDICAL & SURGICAL HISTORY  HTN (hypertension)    Prostate cancer      SOCIAL HISTORY:  Social History:      ALLERGIES:  No Known Allergies    MEDICATIONS:  STANDING MEDICATIONS  ascorbic acid 500 milliGRAM(s) Oral daily  cephalexin 500 milliGRAM(s) Oral four times a day  doxycycline IVPB 100 milliGRAM(s) IV Intermittent every 12 hours  enoxaparin Injectable 40 milliGRAM(s) SubCutaneous every 24 hours  enzalutamide 160 milliGRAM(s) Oral daily  ferrous    sulfate Liquid 300 milliGRAM(s) Enteral Tube daily  guaiFENesin  milliGRAM(s) Oral every 12 hours  influenza  Vaccine (HIGH DOSE) 0.7 milliLiter(s) IntraMuscular once  lactobacillus acidophilus 1 Tablet(s) Oral three times a day with meals  metoprolol tartrate 25 milliGRAM(s) Enteral Tube every 12 hours  multivitamin 1 Tablet(s) Oral daily  multivitamin  Chewable 1 Tablet(s) Oral daily  mupirocin 2% Nasal 1 Application(s) Both Nostrils two times a day  potassium phosphate / sodium phosphate Powder (PHOS-NaK) 2 Packet(s) Oral three times a day  sodium bicarbonate 650 milliGRAM(s) Oral three times a day    PRN MEDICATIONS  acetaminophen     Tablet .. 650 milliGRAM(s) Oral every 6 hours PRN  aluminum hydroxide/magnesium hydroxide/simethicone Suspension 30 milliLiter(s) Oral every 4 hours PRN  guaiFENesin Oral Liquid (Sugar-Free) 200 milliGRAM(s) Oral once PRN  melatonin 3 milliGRAM(s) Oral at bedtime PRN  ondansetron Injectable 4 milliGRAM(s) IV Push every 8 hours PRN    VITALS:   T(F): 96.9  HR: 100  BP: 98/62  RR: 18  SpO2: --    PHYSICAL EXAM:  GENERAL:   ( x ) NAD, lying in bed comfortably     (  ) obtunded     (  ) lethargic     (  ) somnolent    HEAD:   ( x ) Atraumatic     (  ) hematoma     (  ) laceration (specify location:       )     NECK:  ( x ) Supple     (  ) neck stiffness     (  ) nuchal rigidity     (  )  no JVD     (  ) JVD present ( -- cm)    HEART:  Rate -->     (  ) normal rate     (  ) bradycardic     (  ) tachycardic  Rhythm -->     ( x ) regular     (  ) regularly irregular     (  ) irregularly irregular  Murmurs -->     (  ) normal s1s2     (  ) systolic murmur     (  ) diastolic murmur     (  ) continuous murmur      (  ) S3 present     (  ) S4 present    LUNGS:   (x  )Unlabored respirations     (  ) tachypnea  ( x ) B/L air entry     (  ) decreased breath sounds in:  (location     )    (  ) no adventitious sound     (  ) crackles     (  ) wheezing      (  ) rhonchi      (specify location:       )  (  ) chest wall tenderness (specify location:       )    ABDOMEN:   ( x ) Soft     (  ) tense   |   (  x) nondistended     (  ) distended   |   (  ) +BS     (  ) hypoactive bowel sounds     (  ) hyperactive bowel sounds  ( x ) nontender     (  ) RUQ tenderness     (  ) RLQ tenderness     (  ) LLQ tenderness     (  ) epigastric tenderness     (  ) diffuse tenderness  (  ) Splenomegaly      (  ) Hepatomegaly      (  ) Jaundice     (  ) ecchymosis     EXTREMITIES:  (  ) Normal     (  ) Rash     (  ) ecchymosis     (  ) varicose veins      (  ) pitting edema     (  ) non-pitting edema   (  ) ulceration     (  ) gangrene:     (location:     )    NERVOUS SYSTEM:    (  ) A&Ox3     (  ) confused     (  ) lethargic  CN II-XII:     (  ) Intact     (  ) deficits found     (Specify:     )   Upper extremities:     (  ) no sensorimotor deficits     (  ) weakness     (  ) loss of proprioception/vibration     (  ) loss of touch/temperature (specify:    )  Lower extremities:     (  ) no sensorimotor deficits     (  ) weakness     (  ) loss of proprioception/vibration     (  ) loss of touch/temperature (specify:    )    SKIN:   (  ) No rashes or lesions     (  ) maculopapular rash     (  ) pustules     (  ) vesicles     (  ) ulcer     (  ) ecchymosis     (specify location:     )    AMPAC score:    (  ) Indwelling Angel Catheter:   Date insterted:    Reason (  ) Critical illness     (  ) urinary retention    (  ) Accurate Ins/Outs Monitoring     (  ) CMO patient    (  ) Central Line:   Date inserted:  Location: (  ) Right IJ     (  ) Left IJ     (  ) Right Fem     (  ) Left Fem    (  ) SPC        (  ) pigtail       (  ) PEG tube       (  ) colostomy       (  ) jejunostomy  (  ) U-Dall    LABS:                        8.8    9.52  )-----------( 553      ( 26 Apr 2024 07:09 )             28.9     04-26    137  |  105  |  29<H>  ----------------------------<  274<H>  4.1   |  10<L>  |  1.6<H>    Ca    7.3<L>      26 Apr 2024 07:09  Mg     2.0     04-26    TPro  4.9<L>  /  Alb  2.5<L>  /  TBili  0.4  /  DBili  x   /  AST  16  /  ALT  <5  /  AlkPhos  102  04-26    PT/INR - ( 24 Apr 2024 11:35 )   PT: 14.70 sec;   INR: 1.29 ratio         PTT - ( 24 Apr 2024 11:35 )  PTT:36.5 sec  Urinalysis Basic - ( 26 Apr 2024 07:09 )    Color: x / Appearance: x / SG: x / pH: x  Gluc: 274 mg/dL / Ketone: x  / Bili: x / Urobili: x   Blood: x / Protein: x / Nitrite: x   Leuk Esterase: x / RBC: x / WBC x   Sq Epi: x / Non Sq Epi: x / Bacteria: x      ABG - ( 25 Apr 2024 22:10 )  pH, Arterial: 7.37  pH, Blood: x     /  pCO2: <19   /  pO2: 373   / HCO3: 10    / Base Excess: -13.5 /  SaO2: 100.0             Lactate, Blood: 2.8 mmol/L *H* (04-26-24 @ 07:09)      Culture - Fungal, Body Fluid (collected 25 Apr 2024 16:45)  Source: Pleural Fl Pleural Fluid  Preliminary Report (26 Apr 2024 07:37):    Testing in progress    Culture - Body Fluid with Gram Stain (collected 25 Apr 2024 16:41)  Source: Pleural Fl Pleural Fluid  Gram Stain (26 Apr 2024 04:09):    polymorphonuclear leukocytes seen    No organisms seen    by cytocentrifuge    Culture - Tissue with Gram Stain (collected 24 Apr 2024 15:51)  Source: .Tissue Other, left buttock abscess  Gram Stain (25 Apr 2024 07:58):    Rare polymorphonuclear leukocytes seen per low power field    No organisms seen per oil power field  Preliminary Report (25 Apr 2024 20:40):    No growth to date.          RADIOLOGY:          < from: Xray Chest 1 View- PORTABLE-Urgent (Xray Chest 1 View- PORTABLE-Urgent .) (04.25.24 @ 22:32) >  Impression:    Low lung volume.    Right lung opacity, unchanged and worse left lung opacity.    Esophageal stent.    --- End of Report ---    < end of copied text >  < from: Xray Chest 1 View-PORTABLE IMMEDIATE (Xray Chest 1 View-PORTABLE IMMEDIATE .) (04.25.24 @ 15:56) >    IMPRESSION:    Bibasilar opacities/effusions, right greater than left.    --- End of Report ---    < end of copied text >  < from: Xray Chest 1 View- PORTABLE-Urgent (Xray Chest 1 View- PORTABLE-Urgent .) (04.23.24 @ 09:21) >    IMPRESSION:    Bibasilar opacities/effusions.    --- End of Report ---    < end of copied text >  < from: CT Abdomen and Pelvis w/ IV Cont (04.19.24 @ 19:17) >    IMPRESSION:    1. New mild edema and enlargement of the left gluteus maximums   musculature with associated subcutaneous edema. No discrete abscess.    2. Metastatic disease in the abdomen and pelvis as above with slightly   increased upper abdominal adenopathy. No significant change approximately   in the large bladder mass and partially imaged mediastinal mass. Interval   placement of an esophageal stent.    3. Severe left hydronephrosis to the level of the proximal ureter, not   significantly changed.    4. New/increased large bilateral pleural effusions.    --- End of Report ---    < end of copied text >      ASSESSMENT/PLAN:   Assessment:     68 year old male with PMH fo Stage 4 Prostatic Ca s/p palliative radiation, medistinal mass on oral chemo (Xtandi)< chronic anemia, HTN, chronic  back pain presents to ED from SNF for evaluation. Per NH, has been refusing medications via PEG x 3 days d/t episodes of diarrhea post-feeds. Patient was only complaining of left buttock pain, s/p debridement by burn on 4/24, s/p thoracentesis by pul on 4/25 for pleural effusion removed 1.7L.     #Left GLuteal Cellulitis  #MRSA (+) Wound Cx 4/22  - vitals: Afebrile, /892, , SpO2 100% on RA  - CT shows new mild edema and enlargement of left gluteus maximums musculature with associated subcutaneous edema.   - s/p Cefepime, Vanc, Flagyl  - currently on Cefedirocol & Unasyn  - Bcx (4/19)  - UCx (4/19)  - MRSA swab (4/21)  - Wound Cx (4/22): awaiting sensitivities (S. aureus)  - Contact isolation  - continue local wound care, off-loading  - c/w vitamin c    #H/o Dysphagia s/p PEG  #N/V 4/25  - s/p esophageal stent & PEG 3/5/24  - patient cleared for pureed diet and thin liquids per SS on last admission  - refusing PEG feeds d/t diarrhea  - c/w acidophilus  - antiemetics PRN    #New B/l Pleural Effusions  - d/c IV lasix  - s/p thoracentesis with pulm on 4/25, removed 1.7L  - requiring 2-3L via NC    #Stage 4 Prostatic Cancer s/p Radiation (on active PO chemo) w metastasis to mediastinum   #Severe Left Hydronephrosis  - c/w home Xtandi 160 mg qd  - monitor labs    #Metabolic acidosis  - c/w sodium bicarb    #Chronic Normocytic Anemia  - likely 2/2 malignancy  - c/w ferrous sulfate    #GOC- DNR/DNI                                           --------------------------------------------------------------    # DVT prophylaxis: Lovenox  # GI prophylaxis: PPI  # Diet:   # Activity:   # Code status: Full Code  # Disposition:    Pending:  24H events:    Patient is a 68y old Male who presents with a chief complaint of LT Gluteal Pain / Diarrhea (2024 15:15)    Primary diagnosis of Abscess        Today is hospital day 7d. This morning patient was seen and examined at bedside, resting comfortably in bed.    Rapid response overnight for hypoxia, desaturating down to 70-80%. Placed on NRB, given 500 NS bolus, d/c lasix. CXR slightly worse. Given 1 dose cefepime/vanc.       Code Status:    Family communication:  Contact date:  Name of person contacted:  Relationship to patient:  Communication details:  What matters most:    PAST MEDICAL & SURGICAL HISTORY  HTN (hypertension)    Prostate cancer      SOCIAL HISTORY:  Social History:      ALLERGIES:  No Known Allergies    MEDICATIONS:  STANDING MEDICATIONS  ascorbic acid 500 milliGRAM(s) Oral daily  cephalexin 500 milliGRAM(s) Oral four times a day  doxycycline IVPB 100 milliGRAM(s) IV Intermittent every 12 hours  enoxaparin Injectable 40 milliGRAM(s) SubCutaneous every 24 hours  enzalutamide 160 milliGRAM(s) Oral daily  ferrous    sulfate Liquid 300 milliGRAM(s) Enteral Tube daily  guaiFENesin  milliGRAM(s) Oral every 12 hours  influenza  Vaccine (HIGH DOSE) 0.7 milliLiter(s) IntraMuscular once  lactobacillus acidophilus 1 Tablet(s) Oral three times a day with meals  metoprolol tartrate 25 milliGRAM(s) Enteral Tube every 12 hours  multivitamin 1 Tablet(s) Oral daily  multivitamin  Chewable 1 Tablet(s) Oral daily  mupirocin 2% Nasal 1 Application(s) Both Nostrils two times a day  potassium phosphate / sodium phosphate Powder (PHOS-NaK) 2 Packet(s) Oral three times a day  sodium bicarbonate 650 milliGRAM(s) Oral three times a day    PRN MEDICATIONS  acetaminophen     Tablet .. 650 milliGRAM(s) Oral every 6 hours PRN  aluminum hydroxide/magnesium hydroxide/simethicone Suspension 30 milliLiter(s) Oral every 4 hours PRN  guaiFENesin Oral Liquid (Sugar-Free) 200 milliGRAM(s) Oral once PRN  melatonin 3 milliGRAM(s) Oral at bedtime PRN  ondansetron Injectable 4 milliGRAM(s) IV Push every 8 hours PRN    VITALS:   T(F): 96.9  HR: 100  BP: 98/62  RR: 18  SpO2: --    PHYSICAL EXAM:  GENERAL:   ( x ) NAD, lying in bed comfortably     (  ) obtunded     (  ) lethargic     (  ) somnolent    HEAD:   ( x ) Atraumatic     (  ) hematoma     (  ) laceration (specify location:       )     NECK:  ( x ) Supple     (  ) neck stiffness     (  ) nuchal rigidity     (  )  no JVD     (  ) JVD present ( -- cm)    HEART:  Rate -->     (  ) normal rate     (  ) bradycardic     (  ) tachycardic  Rhythm -->     ( x ) regular     (  ) regularly irregular     (  ) irregularly irregular  Murmurs -->     (  ) normal s1s2     (  ) systolic murmur     (  ) diastolic murmur     (  ) continuous murmur      (  ) S3 present     (  ) S4 present    LUNGS:   (x  )Unlabored respirations     (  ) tachypnea  ( x ) B/L air entry     (  ) decreased breath sounds in:  (location     )    (  ) no adventitious sound     (  ) crackles     (  ) wheezing      (  ) rhonchi      (specify location:       )  (  ) chest wall tenderness (specify location:       )    ABDOMEN:   ( x ) Soft     (  ) tense   |   (  x) nondistended     (  ) distended   |   (  ) +BS     (  ) hypoactive bowel sounds     (  ) hyperactive bowel sounds  ( x ) nontender     (  ) RUQ tenderness     (  ) RLQ tenderness     (  ) LLQ tenderness     (  ) epigastric tenderness     (  ) diffuse tenderness  (  ) Splenomegaly      (  ) Hepatomegaly      (  ) Jaundice     (  ) ecchymosis     EXTREMITIES:  (  ) Normal     (  ) Rash     (  ) ecchymosis     (  ) varicose veins      (  ) pitting edema     (  ) non-pitting edema   (  ) ulceration     (  ) gangrene:     (location:     )    NERVOUS SYSTEM:    (  ) A&Ox3     (  ) confused     (  ) lethargic  CN II-XII:     (  ) Intact     (  ) deficits found     (Specify:     )   Upper extremities:     (  ) no sensorimotor deficits     (  ) weakness     (  ) loss of proprioception/vibration     (  ) loss of touch/temperature (specify:    )  Lower extremities:     (  ) no sensorimotor deficits     (  ) weakness     (  ) loss of proprioception/vibration     (  ) loss of touch/temperature (specify:    )    SKIN:   (  ) No rashes or lesions     (  ) maculopapular rash     (  ) pustules     (  ) vesicles     (  ) ulcer     (  ) ecchymosis     (specify location:     )    AMPAC score:    (  ) Indwelling Angel Catheter:   Date insterted:    Reason (  ) Critical illness     (  ) urinary retention    (  ) Accurate Ins/Outs Monitoring     (  ) CMO patient    (  ) Central Line:   Date inserted:  Location: (  ) Right IJ     (  ) Left IJ     (  ) Right Fem     (  ) Left Fem    (  ) SPC        (  ) pigtail       (  ) PEG tube       (  ) colostomy       (  ) jejunostomy  (  ) U-Dall    LABS:                        8.8    9.52  )-----------( 553      ( 2024 07:09 )             28.9         137  |  105  |  29<H>  ----------------------------<  274<H>  4.1   |  10<L>  |  1.6<H>    Ca    7.3<L>      2024 07:09  Mg     2.0         TPro  4.9<L>  /  Alb  2.5<L>  /  TBili  0.4  /  DBili  x   /  AST  16  /  ALT  <5  /  AlkPhos  102      PT/INR - ( 2024 11:35 )   PT: 14.70 sec;   INR: 1.29 ratio         PTT - ( 2024 11:35 )  PTT:36.5 sec  Urinalysis Basic - ( 2024 07:09 )    Color: x / Appearance: x / SG: x / pH: x  Gluc: 274 mg/dL / Ketone: x  / Bili: x / Urobili: x   Blood: x / Protein: x / Nitrite: x   Leuk Esterase: x / RBC: x / WBC x   Sq Epi: x / Non Sq Epi: x / Bacteria: x      ABG - ( 2024 22:10 )  pH, Arterial: 7.37  pH, Blood: x     /  pCO2: <19   /  pO2: 373   / HCO3: 10    / Base Excess: -13.5 /  SaO2: 100.0             Lactate, Blood: 2.8 mmol/L *H* (24 @ 07:09)      Culture - Fungal, Body Fluid (collected 2024 16:45)  Source: Pleural Fl Pleural Fluid  Preliminary Report (2024 07:37):    Testing in progress    Culture - Body Fluid with Gram Stain (collected 2024 16:41)  Source: Pleural Fl Pleural Fluid  Gram Stain (2024 04:09):    polymorphonuclear leukocytes seen    No organisms seen    by cytocentrifuge    Culture - Tissue with Gram Stain (collected 2024 15:51)  Source: .Tissue Other, left buttock abscess  Gram Stain (2024 07:58):    Rare polymorphonuclear leukocytes seen per low power field    No organisms seen per oil power field  Preliminary Report (2024 20:40):    No growth to date.          RADIOLOGY:          < from: Xray Chest 1 View- PORTABLE-Urgent (Xray Chest 1 View- PORTABLE-Urgent .) (24 @ 22:32) >  Impression:    Low lung volume.    Right lung opacity, unchanged and worse left lung opacity.    Esophageal stent.    --- End of Report ---    < end of copied text >  < from: Xray Chest 1 View-PORTABLE IMMEDIATE (Xray Chest 1 View-PORTABLE IMMEDIATE .) (24 @ 15:56) >    IMPRESSION:    Bibasilar opacities/effusions, right greater than left.    --- End of Report ---    < end of copied text >  < from: Xray Chest 1 View- PORTABLE-Urgent (Xray Chest 1 View- PORTABLE-Urgent .) (24 @ 09:21) >    IMPRESSION:    Bibasilar opacities/effusions.    --- End of Report ---    < end of copied text >  < from: CT Abdomen and Pelvis w/ IV Cont (24 @ 19:17) >    IMPRESSION:    1. New mild edema and enlargement of the left gluteus maximums   musculature with associated subcutaneous edema. No discrete abscess.    2. Metastatic disease in the abdomen and pelvis as above with slightly   increased upper abdominal adenopathy. No significant change approximately   in the large bladder mass and partially imaged mediastinal mass. Interval   placement of an esophageal stent.    3. Severe left hydronephrosis to the level of the proximal ureter, not   significantly changed.    4. New/increased large bilateral pleural effusions.    --- End of Report ---    < end of copied text >      ASSESSMENT/PLAN:   Assessment:     68 year old male with PMH fo Stage 4 Prostatic Ca s/p palliative radiation, medistinal mass on oral chemo (Xtandi)< chronic anemia, HTN, chronic  back pain presents to ED from SNF for evaluation. Per NH, has been refusing medications via PEG x 3 days d/t episodes of diarrhea post-feeds. Patient was only complaining of left buttock pain, s/p debridement by burn on , s/p thoracentesis by pul on  for pleural effusion removed 1.7L.     #Left GLuteal Cellulitis  #MRSA (+) Wound Cx   - vitals: Afebrile, /892, , SpO2 100% on RA  - CT shows new mild edema and enlargement of left gluteus maximums musculature with associated subcutaneous edema.   - s/p Cefepime, Vanc, Flagyl  - currently on Cefedirocol & Unasyn  - Bcx ()  - UCx ()  - MRSA swab ()  - Wound Cx (): awaiting sensitivities (S. aureus)  - Contact isolation  - continue local wound care, off-loading  - c/w vitamin c    #H/o Dysphagia s/p PEG  #N/V   - s/p esophageal stent & PEG 3/5/24  - patient cleared for pureed diet and thin liquids per SS on last admission  - refusing PEG feeds d/t diarrhea  - c/w acidophilus  - antiemetics PRN  - Cr rising (likely prerenal 2/2) dehydration/poor PO intake  - Lactate 2.8, f/u repeat      #New B/l Pleural Effusions  - d/c IV lasix  - s/p thoracentesis with pulm on , removed 1.7L  - requiring 2-3L via NC    #Stage 4 Prostatic Cancer s/p Radiation (on active PO chemo) w metastasis to mediastinum   #Severe Left Hydronephrosis  - c/w home Xtandi 160 mg qd  - monitor labs    #Metabolic acidosis  - c/w sodium bicarb    #Chronic Normocytic Anemia  - likely 2/2 malignancy  - c/w ferrous sulfate    #GOC- DNR/DNI                                           --------------------------------------------------------------    # DVT prophylaxis: Lovenox  # GI prophylaxis: PPI  # Diet:   # Activity:   # Code status: Full Code  # Disposition:    Pendin) Repeat lactate 11 AM  2) Pulm f/u  3) Encourage PO intake   24H events:    Patient is a 68y old Male who presents with a chief complaint of LT Gluteal Pain / Diarrhea (2024 15:15)    Primary diagnosis of Abscess        Today is hospital day 7d. This morning patient was seen and examined at bedside, resting comfortably in bed.    Rapid response overnight for hypoxia, desaturating down to 70-80%. Placed on NRB, given 500 NS bolus, d/c lasix. CXR slightly worse. Given 1 dose cefepime/vanc.       Code Status:    Family communication:  Contact date:  Name of person contacted:  Relationship to patient:  Communication details:  What matters most:    PAST MEDICAL & SURGICAL HISTORY  HTN (hypertension)    Prostate cancer      SOCIAL HISTORY:  Social History:      ALLERGIES:  No Known Allergies    MEDICATIONS:  STANDING MEDICATIONS  ascorbic acid 500 milliGRAM(s) Oral daily  cephalexin 500 milliGRAM(s) Oral four times a day  doxycycline IVPB 100 milliGRAM(s) IV Intermittent every 12 hours  enoxaparin Injectable 40 milliGRAM(s) SubCutaneous every 24 hours  enzalutamide 160 milliGRAM(s) Oral daily  ferrous    sulfate Liquid 300 milliGRAM(s) Enteral Tube daily  guaiFENesin  milliGRAM(s) Oral every 12 hours  influenza  Vaccine (HIGH DOSE) 0.7 milliLiter(s) IntraMuscular once  lactobacillus acidophilus 1 Tablet(s) Oral three times a day with meals  metoprolol tartrate 25 milliGRAM(s) Enteral Tube every 12 hours  multivitamin 1 Tablet(s) Oral daily  multivitamin  Chewable 1 Tablet(s) Oral daily  mupirocin 2% Nasal 1 Application(s) Both Nostrils two times a day  potassium phosphate / sodium phosphate Powder (PHOS-NaK) 2 Packet(s) Oral three times a day  sodium bicarbonate 650 milliGRAM(s) Oral three times a day    PRN MEDICATIONS  acetaminophen     Tablet .. 650 milliGRAM(s) Oral every 6 hours PRN  aluminum hydroxide/magnesium hydroxide/simethicone Suspension 30 milliLiter(s) Oral every 4 hours PRN  guaiFENesin Oral Liquid (Sugar-Free) 200 milliGRAM(s) Oral once PRN  melatonin 3 milliGRAM(s) Oral at bedtime PRN  ondansetron Injectable 4 milliGRAM(s) IV Push every 8 hours PRN    VITALS:   T(F): 96.9  HR: 100  BP: 98/62  RR: 18  SpO2: --    PHYSICAL EXAM:  GENERAL:   ( x ) NAD, lying in bed comfortably     (  ) obtunded     (  ) lethargic     (  ) somnolent    HEAD:   ( x ) Atraumatic     (  ) hematoma     (  ) laceration (specify location:       )     NECK:  ( x ) Supple     (  ) neck stiffness     (  ) nuchal rigidity     (  )  no JVD     (  ) JVD present ( -- cm)    HEART:  Rate -->     (  ) normal rate     (  ) bradycardic     (  ) tachycardic  Rhythm -->     ( x ) regular     (  ) regularly irregular     (  ) irregularly irregular  Murmurs -->     (  ) normal s1s2     (  ) systolic murmur     (  ) diastolic murmur     (  ) continuous murmur      (  ) S3 present     (  ) S4 present    LUNGS:   (x  )Unlabored respirations     (  ) tachypnea  ( x ) B/L air entry     (  ) decreased breath sounds in:  (location     )    (  ) no adventitious sound     (  ) crackles     (  ) wheezing      (  ) rhonchi      (specify location:       )  (  ) chest wall tenderness (specify location:       )    ABDOMEN:   ( x ) Soft     (  ) tense   |   (  x) nondistended     (  ) distended   |   (  ) +BS     (  ) hypoactive bowel sounds     (  ) hyperactive bowel sounds  ( x ) nontender     (  ) RUQ tenderness     (  ) RLQ tenderness     (  ) LLQ tenderness     (  ) epigastric tenderness     (  ) diffuse tenderness  (  ) Splenomegaly      (  ) Hepatomegaly      (  ) Jaundice     (  ) ecchymosis     EXTREMITIES:  (  ) Normal     (  ) Rash     (  ) ecchymosis     (  ) varicose veins      (  ) pitting edema     (  ) non-pitting edema   (  ) ulceration     (  ) gangrene:     (location:     )    NERVOUS SYSTEM:    (  ) A&Ox3     (  ) confused     (  ) lethargic  CN II-XII:     (  ) Intact     (  ) deficits found     (Specify:     )   Upper extremities:     (  ) no sensorimotor deficits     (  ) weakness     (  ) loss of proprioception/vibration     (  ) loss of touch/temperature (specify:    )  Lower extremities:     (  ) no sensorimotor deficits     (  ) weakness     (  ) loss of proprioception/vibration     (  ) loss of touch/temperature (specify:    )    SKIN:   (  ) No rashes or lesions     (  ) maculopapular rash     (  ) pustules     (  ) vesicles     (  ) ulcer     (  ) ecchymosis     (specify location:     )    AMPAC score:    (  ) Indwelling Angel Catheter:   Date insterted:    Reason (  ) Critical illness     (  ) urinary retention    (  ) Accurate Ins/Outs Monitoring     (  ) CMO patient    (  ) Central Line:   Date inserted:  Location: (  ) Right IJ     (  ) Left IJ     (  ) Right Fem     (  ) Left Fem    (  ) SPC        (  ) pigtail       (  ) PEG tube       (  ) colostomy       (  ) jejunostomy  (  ) U-Dall    LABS:                        8.8    9.52  )-----------( 553      ( 2024 07:09 )             28.9         137  |  105  |  29<H>  ----------------------------<  274<H>  4.1   |  10<L>  |  1.6<H>    Ca    7.3<L>      2024 07:09  Mg     2.0         TPro  4.9<L>  /  Alb  2.5<L>  /  TBili  0.4  /  DBili  x   /  AST  16  /  ALT  <5  /  AlkPhos  102      PT/INR - ( 2024 11:35 )   PT: 14.70 sec;   INR: 1.29 ratio         PTT - ( 2024 11:35 )  PTT:36.5 sec  Urinalysis Basic - ( 2024 07:09 )    Color: x / Appearance: x / SG: x / pH: x  Gluc: 274 mg/dL / Ketone: x  / Bili: x / Urobili: x   Blood: x / Protein: x / Nitrite: x   Leuk Esterase: x / RBC: x / WBC x   Sq Epi: x / Non Sq Epi: x / Bacteria: x      ABG - ( 2024 22:10 )  pH, Arterial: 7.37  pH, Blood: x     /  pCO2: <19   /  pO2: 373   / HCO3: 10    / Base Excess: -13.5 /  SaO2: 100.0             Lactate, Blood: 2.8 mmol/L *H* (24 @ 07:09)      Culture - Fungal, Body Fluid (collected 2024 16:45)  Source: Pleural Fl Pleural Fluid  Preliminary Report (2024 07:37):    Testing in progress    Culture - Body Fluid with Gram Stain (collected 2024 16:41)  Source: Pleural Fl Pleural Fluid  Gram Stain (2024 04:09):    polymorphonuclear leukocytes seen    No organisms seen    by cytocentrifuge    Culture - Tissue with Gram Stain (collected 2024 15:51)  Source: .Tissue Other, left buttock abscess  Gram Stain (2024 07:58):    Rare polymorphonuclear leukocytes seen per low power field    No organisms seen per oil power field  Preliminary Report (2024 20:40):    No growth to date.          RADIOLOGY:          < from: Xray Chest 1 View- PORTABLE-Urgent (Xray Chest 1 View- PORTABLE-Urgent .) (24 @ 22:32) >  Impression:    Low lung volume.    Right lung opacity, unchanged and worse left lung opacity.    Esophageal stent.    --- End of Report ---    < end of copied text >  < from: Xray Chest 1 View-PORTABLE IMMEDIATE (Xray Chest 1 View-PORTABLE IMMEDIATE .) (24 @ 15:56) >    IMPRESSION:    Bibasilar opacities/effusions, right greater than left.    --- End of Report ---    < end of copied text >  < from: Xray Chest 1 View- PORTABLE-Urgent (Xray Chest 1 View- PORTABLE-Urgent .) (24 @ 09:21) >    IMPRESSION:    Bibasilar opacities/effusions.    --- End of Report ---    < end of copied text >  < from: CT Abdomen and Pelvis w/ IV Cont (24 @ 19:17) >    IMPRESSION:    1. New mild edema and enlargement of the left gluteus maximums   musculature with associated subcutaneous edema. No discrete abscess.    2. Metastatic disease in the abdomen and pelvis as above with slightly   increased upper abdominal adenopathy. No significant change approximately   in the large bladder mass and partially imaged mediastinal mass. Interval   placement of an esophageal stent.    3. Severe left hydronephrosis to the level of the proximal ureter, not   significantly changed.    4. New/increased large bilateral pleural effusions.    --- End of Report ---    < end of copied text >      ASSESSMENT/PLAN:   Assessment:     68 year old male with PMH fo Stage 4 Prostatic Ca s/p palliative radiation, medistinal mass on oral chemo (Xtandi)< chronic anemia, HTN, chronic  back pain presents to ED from SNF for evaluation. Per NH, has been refusing medications via PEG x 3 days d/t episodes of diarrhea post-feeds. Patient was only complaining of left buttock pain, s/p debridement by burn on , s/p thoracentesis by pul on  for pleural effusion removed 1.7L.     #Left GLuteal Cellulitis  #MRSA (+) Wound Cx   - vitals: Afebrile, /892, , SpO2 100% on RA  - CT shows new mild edema and enlargement of left gluteus maximums musculature with associated subcutaneous edema.   - s/p Cefepime, Vanc, Flagyl  - currently on Cefedirocol & Unasyn  - Bcx ()  - UCx ()  - MRSA swab ()  - Wound Cx (): awaiting sensitivities (S. aureus)  - Contact isolation  - continue local wound care, off-loading  - c/w vitamin c  - Per burn, no further plans for debridement  - Per ID, continue Doxycycline for 2 weeks post debridement (-)     #H/o Dysphagia s/p PEG  #N/V   - s/p esophageal stent & PEG 3/5/24  - patient cleared for pureed diet and thin liquids per SS on last admission  - refusing PEG feeds d/t diarrhea  - c/w acidophilus  - antiemetics PRN  - Cr rising (likely prerenal 2/2) dehydration/poor PO intake  - Lactate 2.8, f/u repeat      #New B/l Pleural Effusions  - d/c IV lasix  - s/p thoracentesis with pulm on , removed 1.7L  - requiring 2-3L via NC    #Stage 4 Prostatic Cancer s/p Radiation (on active PO chemo) w metastasis to mediastinum   #Severe Left Hydronephrosis  - c/w home Xtandi 160 mg qd  - monitor labs    #Metabolic acidosis  - c/w sodium bicarb    #Chronic Normocytic Anemia  - likely 2/2 malignancy  - c/w ferrous sulfate    #GOC- DNR/DNI                                           --------------------------------------------------------------    # DVT prophylaxis: Lovenox  # GI prophylaxis: PPI  # Diet:   # Activity:   # Code status: Full Code  # Disposition:    Pendin) Repeat lactate 11 AM  2) Pulm f/u  3) Encourage PO intake   24H events:    Patient is a 68y old Male who presents with a chief complaint of LT Gluteal Pain / Diarrhea (2024 15:15)    Primary diagnosis of Abscess        Today is hospital day 7d. This morning patient was seen and examined at bedside, resting comfortably in bed.    Rapid response overnight for hypoxia, desaturating down to 70-80%. Placed on NRB, given 500 NS bolus, d/c lasix. CXR slightly worse. Given 1 dose cefepime/vanc.       Code Status:    Family communication:  Contact date:  Name of person contacted:  Relationship to patient:  Communication details:  What matters most:    PAST MEDICAL & SURGICAL HISTORY  HTN (hypertension)    Prostate cancer      SOCIAL HISTORY:  Social History:      ALLERGIES:  No Known Allergies    MEDICATIONS:  STANDING MEDICATIONS  ascorbic acid 500 milliGRAM(s) Oral daily  cephalexin 500 milliGRAM(s) Oral four times a day  doxycycline IVPB 100 milliGRAM(s) IV Intermittent every 12 hours  enoxaparin Injectable 40 milliGRAM(s) SubCutaneous every 24 hours  enzalutamide 160 milliGRAM(s) Oral daily  ferrous    sulfate Liquid 300 milliGRAM(s) Enteral Tube daily  guaiFENesin  milliGRAM(s) Oral every 12 hours  influenza  Vaccine (HIGH DOSE) 0.7 milliLiter(s) IntraMuscular once  lactobacillus acidophilus 1 Tablet(s) Oral three times a day with meals  metoprolol tartrate 25 milliGRAM(s) Enteral Tube every 12 hours  multivitamin 1 Tablet(s) Oral daily  multivitamin  Chewable 1 Tablet(s) Oral daily  mupirocin 2% Nasal 1 Application(s) Both Nostrils two times a day  potassium phosphate / sodium phosphate Powder (PHOS-NaK) 2 Packet(s) Oral three times a day  sodium bicarbonate 650 milliGRAM(s) Oral three times a day    PRN MEDICATIONS  acetaminophen     Tablet .. 650 milliGRAM(s) Oral every 6 hours PRN  aluminum hydroxide/magnesium hydroxide/simethicone Suspension 30 milliLiter(s) Oral every 4 hours PRN  guaiFENesin Oral Liquid (Sugar-Free) 200 milliGRAM(s) Oral once PRN  melatonin 3 milliGRAM(s) Oral at bedtime PRN  ondansetron Injectable 4 milliGRAM(s) IV Push every 8 hours PRN    VITALS:   T(F): 96.9  HR: 100  BP: 98/62  RR: 18  SpO2: --    PHYSICAL EXAM:  GENERAL:   ( x ) NAD, lying in bed comfortably     (  ) obtunded     (  ) lethargic     (  ) somnolent    HEAD:   ( x ) Atraumatic     (  ) hematoma     (  ) laceration (specify location:       )     NECK:  ( x ) Supple     (  ) neck stiffness     (  ) nuchal rigidity     (  )  no JVD     (  ) JVD present ( -- cm)    HEART:  Rate -->     (  ) normal rate     (  ) bradycardic     (  ) tachycardic  Rhythm -->     ( x ) regular     (  ) regularly irregular     (  ) irregularly irregular  Murmurs -->     (  ) normal s1s2     (  ) systolic murmur     (  ) diastolic murmur     (  ) continuous murmur      (  ) S3 present     (  ) S4 present    LUNGS:   (x  )Unlabored respirations     (  ) tachypnea  ( x ) B/L air entry     (  ) decreased breath sounds in:  (location     )    (  ) no adventitious sound     (  ) crackles     (  ) wheezing      (  ) rhonchi      (specify location:       )  (  ) chest wall tenderness (specify location:       )    ABDOMEN:   ( x ) Soft     (  ) tense   |   (  x) nondistended     (  ) distended   |   (  ) +BS     (  ) hypoactive bowel sounds     (  ) hyperactive bowel sounds  ( x ) nontender     (  ) RUQ tenderness     (  ) RLQ tenderness     (  ) LLQ tenderness     (  ) epigastric tenderness     (  ) diffuse tenderness  (  ) Splenomegaly      (  ) Hepatomegaly      (  ) Jaundice     (  ) ecchymosis     EXTREMITIES:  (  ) Normal     (  ) Rash     (  ) ecchymosis     (  ) varicose veins      (  ) pitting edema     (  ) non-pitting edema   (  ) ulceration     (  ) gangrene:     (location:     )    NERVOUS SYSTEM:    (  ) A&Ox3     (  ) confused     (  ) lethargic  CN II-XII:     (  ) Intact     (  ) deficits found     (Specify:     )   Upper extremities:     (  ) no sensorimotor deficits     (  ) weakness     (  ) loss of proprioception/vibration     (  ) loss of touch/temperature (specify:    )  Lower extremities:     (  ) no sensorimotor deficits     (  ) weakness     (  ) loss of proprioception/vibration     (  ) loss of touch/temperature (specify:    )    SKIN:   (  ) No rashes or lesions     (  ) maculopapular rash     (  ) pustules     (  ) vesicles     (  ) ulcer     (  ) ecchymosis     (specify location:     )    AMPAC score:    (  ) Indwelling Angel Catheter:   Date insterted:    Reason (  ) Critical illness     (  ) urinary retention    (  ) Accurate Ins/Outs Monitoring     (  ) CMO patient    (  ) Central Line:   Date inserted:  Location: (  ) Right IJ     (  ) Left IJ     (  ) Right Fem     (  ) Left Fem    (  ) SPC        (  ) pigtail       (  ) PEG tube       (  ) colostomy       (  ) jejunostomy  (  ) U-Dall    LABS:                        8.8    9.52  )-----------( 553      ( 2024 07:09 )             28.9         137  |  105  |  29<H>  ----------------------------<  274<H>  4.1   |  10<L>  |  1.6<H>    Ca    7.3<L>      2024 07:09  Mg     2.0         TPro  4.9<L>  /  Alb  2.5<L>  /  TBili  0.4  /  DBili  x   /  AST  16  /  ALT  <5  /  AlkPhos  102      PT/INR - ( 2024 11:35 )   PT: 14.70 sec;   INR: 1.29 ratio         PTT - ( 2024 11:35 )  PTT:36.5 sec  Urinalysis Basic - ( 2024 07:09 )    Color: x / Appearance: x / SG: x / pH: x  Gluc: 274 mg/dL / Ketone: x  / Bili: x / Urobili: x   Blood: x / Protein: x / Nitrite: x   Leuk Esterase: x / RBC: x / WBC x   Sq Epi: x / Non Sq Epi: x / Bacteria: x      ABG - ( 2024 22:10 )  pH, Arterial: 7.37  pH, Blood: x     /  pCO2: <19   /  pO2: 373   / HCO3: 10    / Base Excess: -13.5 /  SaO2: 100.0             Lactate, Blood: 2.8 mmol/L *H* (24 @ 07:09)      Culture - Fungal, Body Fluid (collected 2024 16:45)  Source: Pleural Fl Pleural Fluid  Preliminary Report (2024 07:37):    Testing in progress    Culture - Body Fluid with Gram Stain (collected 2024 16:41)  Source: Pleural Fl Pleural Fluid  Gram Stain (2024 04:09):    polymorphonuclear leukocytes seen    No organisms seen    by cytocentrifuge    Culture - Tissue with Gram Stain (collected 2024 15:51)  Source: .Tissue Other, left buttock abscess  Gram Stain (2024 07:58):    Rare polymorphonuclear leukocytes seen per low power field    No organisms seen per oil power field  Preliminary Report (2024 20:40):    No growth to date.          RADIOLOGY:          < from: Xray Chest 1 View- PORTABLE-Urgent (Xray Chest 1 View- PORTABLE-Urgent .) (24 @ 22:32) >  Impression:    Low lung volume.    Right lung opacity, unchanged and worse left lung opacity.    Esophageal stent.    --- End of Report ---    < end of copied text >  < from: Xray Chest 1 View-PORTABLE IMMEDIATE (Xray Chest 1 View-PORTABLE IMMEDIATE .) (24 @ 15:56) >    IMPRESSION:    Bibasilar opacities/effusions, right greater than left.    --- End of Report ---    < end of copied text >  < from: Xray Chest 1 View- PORTABLE-Urgent (Xray Chest 1 View- PORTABLE-Urgent .) (24 @ 09:21) >    IMPRESSION:    Bibasilar opacities/effusions.    --- End of Report ---    < end of copied text >  < from: CT Abdomen and Pelvis w/ IV Cont (24 @ 19:17) >    IMPRESSION:    1. New mild edema and enlargement of the left gluteus maximums   musculature with associated subcutaneous edema. No discrete abscess.    2. Metastatic disease in the abdomen and pelvis as above with slightly   increased upper abdominal adenopathy. No significant change approximately   in the large bladder mass and partially imaged mediastinal mass. Interval   placement of an esophageal stent.    3. Severe left hydronephrosis to the level of the proximal ureter, not   significantly changed.    4. New/increased large bilateral pleural effusions.    --- End of Report ---    < end of copied text >      ASSESSMENT/PLAN:   Assessment:     68 year old male with PMH fo Stage 4 Prostatic Ca s/p palliative radiation, medistinal mass on oral chemo (Xtandi)< chronic anemia, HTN, chronic  back pain presents to ED from SNF for evaluation. Per NH, has been refusing medications via PEG x 3 days d/t episodes of diarrhea post-feeds. Patient was only complaining of left buttock pain, s/p debridement by burn on , s/p thoracentesis by pul on  for pleural effusion removed 1.7L.     #Left GLuteal Cellulitis  #MRSA (+) Wound Cx   - vitals: Afebrile, /892, , SpO2 100% on RA  - CT shows new mild edema and enlargement of left gluteus maximums musculature with associated subcutaneous edema.   - s/p Cefepime, Vanc, Flagyl  - currently on Cefedirocol & Unasyn  - Bcx ()  - UCx ()  - MRSA swab ()  - Wound Cx (): awaiting sensitivities (S. aureus)  - Contact isolation  - continue local wound care, off-loading  - c/w vitamin c  - Per burn, no further plans for debridement  - Per ID, continue Doxycycline for 2 weeks post debridement (-)     #H/o Dysphagia s/p PEG  #N/V   - s/p esophageal stent & PEG 3/  - patient cleared for pureed diet and thin liquids per SS on last admission  - refusing PEG feeds d/t diarrhea  - c/w acidophilus  - antiemetics PRN  - Cr rising (likely prerenal 2/2) dehydration/poor PO intake  - Lactate 2.8, f/u repeat   - KUB (): normal gas pattern      #New B/l Pleural Effusions  - d/c IV lasix  - s/p thoracentesis with pulm on , removed 1.7L  - requiring 2-3L via NC    #Stage 4 Prostatic Cancer s/p Radiation (on active PO chemo) w metastasis to mediastinum   #Severe Left Hydronephrosis  - c/w home Xtandi 160 mg qd  - monitor labs    #Metabolic acidosis  - c/w sodium bicarb    #Chronic Normocytic Anemia  - likely 2/2 malignancy  - c/w ferrous sulfate    #GOC- DNR/DNI                                           --------------------------------------------------------------    # DVT prophylaxis: Lovenox  # GI prophylaxis: PPI  # Diet:   # Activity:   # Code status: Full Code  # Disposition:    Pendin) Repeat lactate 11 AM  2) Pulm f/u  3) Encourage PO intake

## 2024-04-26 NOTE — PROGRESS NOTE ADULT - SUBJECTIVE AND OBJECTIVE BOX
24H events:    Patient is a 68y old Male who presents with a chief complaint of LT Gluteal Pain / Diarrhea (2024 15:15)    Primary diagnosis of Abscess        Today is hospital day 7d. This morning patient was seen and examined at bedside, resting comfortably in bed.    Rapid response overnight for hypoxia, desaturating down to 70-80%. Placed on NRB, given 500 NS bolus, d/c lasix. CXR slightly worse. Given 1 dose cefepime/vanc.       Code Status:    Family communication:  Contact date:  Name of person contacted:  Relationship to patient:  Communication details:  What matters most:    PAST MEDICAL & SURGICAL HISTORY  HTN (hypertension)    Prostate cancer      SOCIAL HISTORY:  Social History:      ALLERGIES:  No Known Allergies    MEDICATIONS:  STANDING MEDICATIONS  ascorbic acid 500 milliGRAM(s) Oral daily  cephalexin 500 milliGRAM(s) Oral four times a day  doxycycline IVPB 100 milliGRAM(s) IV Intermittent every 12 hours  enoxaparin Injectable 40 milliGRAM(s) SubCutaneous every 24 hours  enzalutamide 160 milliGRAM(s) Oral daily  ferrous    sulfate Liquid 300 milliGRAM(s) Enteral Tube daily  guaiFENesin  milliGRAM(s) Oral every 12 hours  influenza  Vaccine (HIGH DOSE) 0.7 milliLiter(s) IntraMuscular once  lactobacillus acidophilus 1 Tablet(s) Oral three times a day with meals  metoprolol tartrate 25 milliGRAM(s) Enteral Tube every 12 hours  multivitamin 1 Tablet(s) Oral daily  multivitamin  Chewable 1 Tablet(s) Oral daily  mupirocin 2% Nasal 1 Application(s) Both Nostrils two times a day  potassium phosphate / sodium phosphate Powder (PHOS-NaK) 2 Packet(s) Oral three times a day  sodium bicarbonate 650 milliGRAM(s) Oral three times a day    PRN MEDICATIONS  acetaminophen     Tablet .. 650 milliGRAM(s) Oral every 6 hours PRN  aluminum hydroxide/magnesium hydroxide/simethicone Suspension 30 milliLiter(s) Oral every 4 hours PRN  guaiFENesin Oral Liquid (Sugar-Free) 200 milliGRAM(s) Oral once PRN  melatonin 3 milliGRAM(s) Oral at bedtime PRN  ondansetron Injectable 4 milliGRAM(s) IV Push every 8 hours PRN    VITALS:   T(F): 96.9  HR: 100  BP: 98/62  RR: 18  SpO2: --    PHYSICAL EXAM:  GENERAL:   ( x ) NAD, lying in bed comfortably     (  ) obtunded     (  ) lethargic     (  ) somnolent    HEAD:   ( x ) Atraumatic     (  ) hematoma     (  ) laceration (specify location:       )     NECK:  ( x ) Supple     (  ) neck stiffness     (  ) nuchal rigidity     (  )  no JVD     (  ) JVD present ( -- cm)    HEART:  Rate -->     (  ) normal rate     (  ) bradycardic     (  ) tachycardic  Rhythm -->     ( x ) regular     (  ) regularly irregular     (  ) irregularly irregular  Murmurs -->     (  ) normal s1s2     (  ) systolic murmur     (  ) diastolic murmur     (  ) continuous murmur      (  ) S3 present     (  ) S4 present    LUNGS:   (x  )Unlabored respirations     (  ) tachypnea  ( x ) B/L air entry     (  ) decreased breath sounds in:  (location     )    (  ) no adventitious sound     (  ) crackles     (  ) wheezing      (  ) rhonchi      (specify location:       )  (  ) chest wall tenderness (specify location:       )    ABDOMEN:   ( x ) Soft     (  ) tense   |   (  x) nondistended     (  ) distended   |   (  ) +BS     (  ) hypoactive bowel sounds     (  ) hyperactive bowel sounds  ( x ) nontender     (  ) RUQ tenderness     (  ) RLQ tenderness     (  ) LLQ tenderness     (  ) epigastric tenderness     (  ) diffuse tenderness  (  ) Splenomegaly      (  ) Hepatomegaly      (  ) Jaundice     (  ) ecchymosis     EXTREMITIES:  (  ) Normal     (  ) Rash     (  ) ecchymosis     (  ) varicose veins      (  ) pitting edema     (  ) non-pitting edema   (  ) ulceration     (  ) gangrene:     (location:     )    NERVOUS SYSTEM:    (  ) A&Ox3     (  ) confused     (  ) lethargic  CN II-XII:     (  ) Intact     (  ) deficits found     (Specify:     )   Upper extremities:     (  ) no sensorimotor deficits     (  ) weakness     (  ) loss of proprioception/vibration     (  ) loss of touch/temperature (specify:    )  Lower extremities:     (  ) no sensorimotor deficits     (  ) weakness     (  ) loss of proprioception/vibration     (  ) loss of touch/temperature (specify:    )    SKIN:   (  ) No rashes or lesions     (  ) maculopapular rash     (  ) pustules     (  ) vesicles     (  ) ulcer     (  ) ecchymosis     (specify location:     )    AMPAC score:    (  ) Indwelling Angel Catheter:   Date insterted:    Reason (  ) Critical illness     (  ) urinary retention    (  ) Accurate Ins/Outs Monitoring     (  ) CMO patient    (  ) Central Line:   Date inserted:  Location: (  ) Right IJ     (  ) Left IJ     (  ) Right Fem     (  ) Left Fem    (  ) SPC        (  ) pigtail       (  ) PEG tube       (  ) colostomy       (  ) jejunostomy  (  ) U-Dall    LABS:                        8.8    9.52  )-----------( 553      ( 2024 07:09 )             28.9         137  |  105  |  29<H>  ----------------------------<  274<H>  4.1   |  10<L>  |  1.6<H>    Ca    7.3<L>      2024 07:09  Mg     2.0         TPro  4.9<L>  /  Alb  2.5<L>  /  TBili  0.4  /  DBili  x   /  AST  16  /  ALT  <5  /  AlkPhos  102      PT/INR - ( 2024 11:35 )   PT: 14.70 sec;   INR: 1.29 ratio         PTT - ( 2024 11:35 )  PTT:36.5 sec  Urinalysis Basic - ( 2024 07:09 )    Color: x / Appearance: x / SG: x / pH: x  Gluc: 274 mg/dL / Ketone: x  / Bili: x / Urobili: x   Blood: x / Protein: x / Nitrite: x   Leuk Esterase: x / RBC: x / WBC x   Sq Epi: x / Non Sq Epi: x / Bacteria: x      ABG - ( 2024 22:10 )  pH, Arterial: 7.37  pH, Blood: x     /  pCO2: <19   /  pO2: 373   / HCO3: 10    / Base Excess: -13.5 /  SaO2: 100.0             Lactate, Blood: 2.8 mmol/L *H* (24 @ 07:09)      Culture - Fungal, Body Fluid (collected 2024 16:45)  Source: Pleural Fl Pleural Fluid  Preliminary Report (2024 07:37):    Testing in progress    Culture - Body Fluid with Gram Stain (collected 2024 16:41)  Source: Pleural Fl Pleural Fluid  Gram Stain (2024 04:09):    polymorphonuclear leukocytes seen    No organisms seen    by cytocentrifuge    Culture - Tissue with Gram Stain (collected 2024 15:51)  Source: .Tissue Other, left buttock abscess  Gram Stain (2024 07:58):    Rare polymorphonuclear leukocytes seen per low power field    No organisms seen per oil power field  Preliminary Report (2024 20:40):    No growth to date.          RADIOLOGY:          < from: Xray Chest 1 View- PORTABLE-Urgent (Xray Chest 1 View- PORTABLE-Urgent .) (24 @ 22:32) >  Impression:    Low lung volume.    Right lung opacity, unchanged and worse left lung opacity.    Esophageal stent.    --- End of Report ---    < end of copied text >  < from: Xray Chest 1 View-PORTABLE IMMEDIATE (Xray Chest 1 View-PORTABLE IMMEDIATE .) (24 @ 15:56) >    IMPRESSION:    Bibasilar opacities/effusions, right greater than left.    --- End of Report ---    < end of copied text >  < from: Xray Chest 1 View- PORTABLE-Urgent (Xray Chest 1 View- PORTABLE-Urgent .) (24 @ 09:21) >    IMPRESSION:    Bibasilar opacities/effusions.    --- End of Report ---    < end of copied text >  < from: CT Abdomen and Pelvis w/ IV Cont (24 @ 19:17) >    IMPRESSION:    1. New mild edema and enlargement of the left gluteus maximums   musculature with associated subcutaneous edema. No discrete abscess.    2. Metastatic disease in the abdomen and pelvis as above with slightly   increased upper abdominal adenopathy. No significant change approximately   in the large bladder mass and partially imaged mediastinal mass. Interval   placement of an esophageal stent.    3. Severe left hydronephrosis to the level of the proximal ureter, not   significantly changed.    4. New/increased large bilateral pleural effusions.    --- End of Report ---    < end of copied text >      ASSESSMENT/PLAN:   Assessment:     68 year old male with PMH fo Stage 4 Prostatic Ca s/p palliative radiation, medistinal mass on oral chemo (Xtandi)< chronic anemia, HTN, chronic  back pain presents to ED from SNF for evaluation. Per NH, has been refusing medications via PEG x 3 days d/t episodes of diarrhea post-feeds. Patient was only complaining of left buttock pain, s/p debridement by burn on , s/p thoracentesis by pul on  for pleural effusion removed 1.7L.     #Left GLuteal Cellulitis  #MRSA (+) Wound Cx   - vitals: Afebrile, /892, , SpO2 100% on RA  - CT shows new mild edema and enlargement of left gluteus maximums musculature with associated subcutaneous edema.   - s/p Cefepime, Vanc, Flagyl  - currently on Cefedirocol & Unasyn  - Bcx ()  - UCx ()  - MRSA swab ()  - Wound Cx (): awaiting sensitivities (S. aureus)  - Contact isolation  - continue local wound care, off-loading  - c/w vitamin c  - Per burn, no further plans for debridement  - Per ID, continue Doxycycline for 2 weeks post debridement (-)     #H/o Dysphagia s/p PEG  #N/V   - s/p esophageal stent & PEG 3/  - patient cleared for pureed diet and thin liquids per SS on last admission  - refusing PEG feeds d/t diarrhea  - c/w acidophilus  - antiemetics PRN  - Cr rising (likely prerenal 2/2) dehydration/poor PO intake  - Lactate 2.8, f/u repeat   - KUB (): normal gas pattern      #New B/l Pleural Effusions  - d/c IV lasix  - s/p thoracentesis with pulm on , removed 1.7L  - requiring 2-3L via NC    #Stage 4 Prostatic Cancer s/p Radiation (on active PO chemo) w metastasis to mediastinum   #Severe Left Hydronephrosis  - c/w home Xtandi 160 mg qd  - monitor labs    #Metabolic acidosis  - c/w sodium bicarb    #Chronic Normocytic Anemia  - likely 2/2 malignancy  - c/w ferrous sulfate    #GOC- DNR/DNI                                           --------------------------------------------------------------    # DVT prophylaxis: Lovenox  # GI prophylaxis: PPI  # Diet:   # Activity:   # Code status: Full Code  # Disposition:    Pendin) Repeat lactate 11 AM  2) Pulm f/u  3) Encourage PO intake

## 2024-04-26 NOTE — CHART NOTE - NSCHARTNOTEFT_GEN_A_CORE
met with patient today. patient encountered resting in bed. awake and alert. he denied any pain or discomfort.     patient does not have any family/friends/NOK. patient from SNF-LT and made himself DNR/DNI.     as goals are clear, palliative team will sign off. please re-consult PRN. [phone]

## 2024-04-26 NOTE — PROGRESS NOTE ADULT - SUBJECTIVE AND OBJECTIVE BOX
Patient is a 68y old  Male who presents with a chief complaint of LT Gluteal Pain / Diarrhea (26 Apr 2024 08:35)    Patient was seen and examined.  As per staff pt refised oral and PEG tube feeds  Pt was a RR last night - desaturated  Pt awake, alert  c/o abd pain, denies diarrhea  All systems reviewed positive history as mentioned above.    PAST MEDICAL & SURGICAL HISTORY:  HTN (hypertension)  Prostate cancer    Allergies  No Known Allergies    MEDICATIONS  (STANDING):  ascorbic acid 500 milliGRAM(s) Oral daily  cephalexin 500 milliGRAM(s) Oral four times a day  chlorhexidine 2% Cloths 1 Application(s) Topical <User Schedule>  dicyclomine 10 milliGRAM(s) Oral once  dicyclomine 10 milliGRAM(s) Oral three times a day before meals  doxycycline IVPB 100 milliGRAM(s) IV Intermittent every 12 hours  enoxaparin Injectable 40 milliGRAM(s) SubCutaneous every 24 hours  enzalutamide 160 milliGRAM(s) Oral daily  ferrous    sulfate Liquid 300 milliGRAM(s) Enteral Tube daily  guaiFENesin  milliGRAM(s) Oral every 12 hours  influenza  Vaccine (HIGH DOSE) 0.7 milliLiter(s) IntraMuscular once  lactobacillus acidophilus 1 Tablet(s) Oral three times a day with meals  metoprolol tartrate 25 milliGRAM(s) Enteral Tube every 12 hours  multivitamin 1 Tablet(s) Oral daily  multivitamin  Chewable 1 Tablet(s) Oral daily  mupirocin 2% Nasal 1 Application(s) Both Nostrils two times a day  potassium phosphate / sodium phosphate Powder (PHOS-NaK) 2 Packet(s) Oral three times a day  sodium bicarbonate 650 milliGRAM(s) Oral three times a day    MEDICATIONS  (PRN):  acetaminophen     Tablet .. 650 milliGRAM(s) Oral every 6 hours PRN Temp greater or equal to 38C (100.4F), Mild Pain (1 - 3)  aluminum hydroxide/magnesium hydroxide/simethicone Suspension 30 milliLiter(s) Oral every 4 hours PRN Dyspepsia  guaiFENesin Oral Liquid (Sugar-Free) 200 milliGRAM(s) Oral once PRN Cough  melatonin 3 milliGRAM(s) Oral at bedtime PRN Insomnia  ondansetron Injectable 4 milliGRAM(s) IV Push every 8 hours PRN Nausea and/or Vomiting    Vital Signs Last 24 Hrs  T(C): 36.1  T(F): 96.9  HR: 100  BP: 98/62  BP(mean): --  RR: 18  SpO2: --    O/E:  Awake, alert, not in distress.  HEENT: atraumatic, EOMI.  Chest: decrease breath sounds at bases  CVS: SIS2 +, no murmur.  P/A: Soft, BS+, PEG+  CNS: awake, alert  Ext: no edema feet.  Skin: Left buttock dressing+  All systems reviewed positive findings as above.    POCT Blood Glucose.: 209 mg/dL (26 Apr 2024 11:09)  POCT Blood Glucose.: 204 mg/dL (26 Apr 2024 08:58)  POCT Blood Glucose.: 223 mg/dL (25 Apr 2024 21:48)                            8.8<L>  9.52  )-----------( 553<H>    ( 26 Apr 2024 07:09 )             28.9<L>                        9.0<L>  16.47<H> )-----------( 600<H>    ( 25 Apr 2024 22:57 )             29.7<L>    04-26    137  |  105  |  29<H>  ----------------------------<  274<H>  4.1   |  10<L>  |  1.6<H>  04-25    137  |  105  |  25<H>  ----------------------------<  257<H>  4.8   |  13<L>  |  1.5    Ca    7.3<L>      26 Apr 2024 07:09  Ca    7.8<L>      25 Apr 2024 22:57  Ca    7.4<L>      25 Apr 2024 08:22  Mg     2.0     04-26    TPro  4.9<L>  /  Alb  2.5<L>  /  TBili  0.4  /  DBili  x   /  AST  16  /  ALT  <5  /  AlkPhos  102  04-26  TPro  5.5<L>  /  Alb  2.8<L>  /  TBili  0.5  /  DBili  x   /  AST  16  /  ALT  <5  /  AlkPhos  113  04-25  TPro  5.5<L>  /  Alb  2.7<L>  /  TBili  0.6  /  DBili  x   /  AST  16  /  ALT  <5  /  AlkPhos  111  04-25            Urinalysis Basic - ( 26 Apr 2024 07:09 )    Color: x / Appearance: x / SG: x / pH: x  Gluc: 274 mg/dL / Ketone: x  / Bili: x / Urobili: x   Blood: x / Protein: x / Nitrite: x   Leuk Esterase: x / RBC: x / WBC x   Sq Epi: x / Non Sq Epi: x / Bacteria: x        Culture - Fungal, Body Fluid (collected 25 Apr 2024 16:45)  Source: Pleural Fl Pleural Fluid  Preliminary Report (26 Apr 2024 07:37):    Testing in progress    Culture - Body Fluid with Gram Stain (collected 25 Apr 2024 16:41)  Source: Pleural Fl Pleural Fluid  Gram Stain (26 Apr 2024 04:09):    polymorphonuclear leukocytes seen    No organisms seen    by cytocentrifuge    Culture - Tissue with Gram Stain (collected 24 Apr 2024 15:51)  Source: .Tissue Other, left buttock abscess  Gram Stain (25 Apr 2024 07:58):    Rare polymorphonuclear leukocytes seen per low power field    No organisms seen per oil power field  Preliminary Report (25 Apr 2024 20:40):    No growth to date.

## 2024-04-26 NOTE — PROGRESS NOTE ADULT - ASSESSMENT
68M w/ PMHx Stage 4 Prostate Ca s/p palliative radiation, Mediastinal Mass on oral Chemo (xtandi), Chronic Anemia, HTN and Chronic Back Pain presents to ED from SNF for evaluation. As per NH documentation, pt has been refusing medications through PEG for the last 3x days d/t episode of diarrhea post feeds. Patient's only complaint is LT buttock pain. Denies fevers, chills, chest pain, SOB, n/v, abdominal pain or urinary symptoms.    # Acute Hypoxic resp failure  # B/l  pleural effusions  -  Xray Chest 1 View- PORTABLE-Urgent (Xray Chest 1 View- PORTABLE-Urgent .) (04.25.24 @ 22:32) >Low lung volume. Right lung opacity, unchanged and worse left lung opacity.  - S/p left thoracentesis on 4/25 --> 1800 milliLiter drained  - pulm f/u  - BIPAP at HS  - Maintain oxygen sat > 92    # Metabolic acidosis  -  Start IV bicarb    # Left gluteal cellulitis  - CT Abdomen and Pelvis w/ IV Cont (04.19.24 @ 19:17) >New mild edema and enlargement of the left gluteus maximums musculature with associated subcutaneous edema. No discrete abscess. Metastatic disease in the abdomen and pelvis  with slightly increased upper abdominal adenopathy. No significant change approximately in the large bladder mass and partially imaged mediastinal mass. Interval placement of an esophageal stent. Severe left hydronephrosis to the level of the proximal ureter, not   significantly changed. New/increased large bilateral pleural effusions.  - blood Culture Results: No growth at 5 days (04.19.24 @ 17:37)  - s/p debridement 4.24 - -->  Wound Cx 4/22 with MRSA   - dc  cefazolin  - As per ID c/w  doxycycline 100 mg BID -- plan for 2 weeks from debridement (4/24-5/8)   - continue dressing changes with xeroform packing    # Acute kidney injury, prerenal- refusing PEG tubre feeds and oral feed  # Lactic acidosis  - : CT Abdomen and Pelvis w/ IV Cont (04.19.24 @ 19:17) >KIDNEYS: Stable severe left hydronephrosis secondary to obstruction at the level of the proximal ureter, not significantly changed. Right renal cysts and other low attenuating lesions in the right kidney too small to characterize. PELVIC ORGANS: Large 7 cm bladder mass, unchanged since prior CT.  - Lactate, Blood: 2.8: Elevated lactate. Consider ordering follow-up lactate to trend. mmol/L (04.26.24 @ 07:09)  - monitor I/o  - trend lactate  - monitor BMP    # Hyperglycemia  - HBA1C  - monitor FS  Start insulin    # H/o Dysphagia  - s/p esophageal stent and PEG 3/5/24  - swallow eval: puree w/ thin liquids, will likely require continued use of PEG    # Metastatic prostate cancer stage 4  # Mediastinal mass  - S/p palliative radiation  - on  Xtandi 160 mg qd    #  Normocytic Anemia  - c/w ferrous sulfate    # DVT prophylaxis    # Poor prognosis    #  DNR/DNI/ trial of NIV    # Pending: monitor BMP, Pulm f/u  # Disposition: RCC when stable

## 2024-04-26 NOTE — PROGRESS NOTE ADULT - ASSESSMENT
pt with advanced stage metastatic prostate cancer  pt refusing blood work and feedings   pt has no insight and for every thing answers i am ok   looks has lost lot of wt since last admission here   bilat pleural effusions  much bigger now  pt on oxygen      pt has no capacity   has no family    prognosis very poor  consider palliative care/hospice     kirit irving MD

## 2024-04-26 NOTE — CHART NOTE - NSCHARTNOTEFT_GEN_A_CORE
In light of patient's rising lactate, 500cc bolus was ordered. However, IV needed to be replaced. Pt refusing IV placement thus far this evening. Expressed to pt that fluid bolus would help to improve lactate level and without it it may lead to worsening perfusion to organs. Pt expressed understanding, but refusing at this time. He is agreeable to trying later on in the night. In light of patient's rising lactate, 500cc bolus was ordered. However, IV needed to be replaced. Pt refusing IV placement thus far this evening. Expressed to pt that fluid bolus would help to improve lactate level and without it it may lead to worsening perfusion to organs. Pt expressed understanding, but refusing at this time. He is agreeable to trying later on in the night.  Also refused 11:30pm blood draw to check lactate. Will reorder for 4am draw. In light of patient's rising lactate, 500cc bolus was ordered. However, IV needed to be replaced. Pt refusing IV placement thus far this evening. Expressed to pt that fluid bolus would help to improve lactate level and without it it may lead to worsening perfusion to organs. Pt expressed understanding, but refusing at this time. He is agreeable to trying later on in the night.  Also refused 11:30pm blood draw to check lactate. Will reorder for 4am draw.  Update: pt allowed for US guided IV to be placed. Will order 500cc bolus again after. In light of patient's rising lactate, 500cc bolus was ordered. However, IV needed to be replaced. Pt refusing IV placement thus far this evening. Expressed to pt that fluid bolus would help to improve lactate level and without it it may lead to worsening perfusion to organs. Pt expressed understanding, but refusing at this time. He is agreeable to trying later on in the night.  Also refused 11:30pm blood draw to check lactate.  Update: pt allowed for US guided IV to be placed. Will order 500cc bolus again after. Fu morning and 11am lactate levels.

## 2024-04-27 LAB
-  CLINDAMYCIN: SIGNIFICANT CHANGE UP
-  DAPTOMYCIN: SIGNIFICANT CHANGE UP
-  ERYTHROMYCIN: SIGNIFICANT CHANGE UP
-  GENTAMICIN: SIGNIFICANT CHANGE UP
-  LINEZOLID: SIGNIFICANT CHANGE UP
-  OXACILLIN: SIGNIFICANT CHANGE UP
-  PENICILLIN: SIGNIFICANT CHANGE UP
-  RIFAMPIN: SIGNIFICANT CHANGE UP
-  TETRACYCLINE: SIGNIFICANT CHANGE UP
-  TRIMETHOPRIM/SULFAMETHOXAZOLE: SIGNIFICANT CHANGE UP
-  VANCOMYCIN: SIGNIFICANT CHANGE UP
ALBUMIN SERPL ELPH-MCNC: 2.7 G/DL — LOW (ref 3.5–5.2)
ALP SERPL-CCNC: 130 U/L — HIGH (ref 30–115)
ALT FLD-CCNC: <5 U/L — SIGNIFICANT CHANGE UP (ref 0–41)
ANION GAP SERPL CALC-SCNC: 23 MMOL/L — HIGH (ref 7–14)
AST SERPL-CCNC: 25 U/L — SIGNIFICANT CHANGE UP (ref 0–41)
BASOPHILS # BLD AUTO: 0.01 K/UL — SIGNIFICANT CHANGE UP (ref 0–0.2)
BASOPHILS NFR BLD AUTO: 0.1 % — SIGNIFICANT CHANGE UP (ref 0–1)
BILIRUB SERPL-MCNC: 0.4 MG/DL — SIGNIFICANT CHANGE UP (ref 0.2–1.2)
BUN SERPL-MCNC: 46 MG/DL — HIGH (ref 10–20)
CALCIUM SERPL-MCNC: 7 MG/DL — LOW (ref 8.4–10.5)
CHLORIDE SERPL-SCNC: 105 MMOL/L — SIGNIFICANT CHANGE UP (ref 98–110)
CO2 SERPL-SCNC: 14 MMOL/L — LOW (ref 17–32)
CREAT SERPL-MCNC: 2.1 MG/DL — HIGH (ref 0.7–1.5)
EGFR: 33 ML/MIN/1.73M2 — LOW
EOSINOPHIL # BLD AUTO: 0 K/UL — SIGNIFICANT CHANGE UP (ref 0–0.7)
EOSINOPHIL NFR BLD AUTO: 0 % — SIGNIFICANT CHANGE UP (ref 0–8)
GLUCOSE BLDC GLUCOMTR-MCNC: 167 MG/DL — HIGH (ref 70–99)
GLUCOSE BLDC GLUCOMTR-MCNC: 190 MG/DL — HIGH (ref 70–99)
GLUCOSE BLDC GLUCOMTR-MCNC: 247 MG/DL — HIGH (ref 70–99)
GLUCOSE BLDC GLUCOMTR-MCNC: 282 MG/DL — HIGH (ref 70–99)
GLUCOSE SERPL-MCNC: 293 MG/DL — HIGH (ref 70–99)
HCT VFR BLD CALC: 25.3 % — LOW (ref 42–52)
HGB BLD-MCNC: 8.1 G/DL — LOW (ref 14–18)
IMM GRANULOCYTES NFR BLD AUTO: 0.8 % — HIGH (ref 0.1–0.3)
LACTATE SERPL-SCNC: 2.4 MMOL/L — HIGH (ref 0.7–2)
LYMPHOCYTES # BLD AUTO: 0.32 K/UL — LOW (ref 1.2–3.4)
LYMPHOCYTES # BLD AUTO: 2.4 % — LOW (ref 20.5–51.1)
MAGNESIUM SERPL-MCNC: 2.3 MG/DL — SIGNIFICANT CHANGE UP (ref 1.8–2.4)
MCHC RBC-ENTMCNC: 29.5 PG — SIGNIFICANT CHANGE UP (ref 27–31)
MCHC RBC-ENTMCNC: 32 G/DL — SIGNIFICANT CHANGE UP (ref 32–37)
MCV RBC AUTO: 92 FL — SIGNIFICANT CHANGE UP (ref 80–94)
METHOD TYPE: SIGNIFICANT CHANGE UP
MONOCYTES # BLD AUTO: 0.78 K/UL — HIGH (ref 0.1–0.6)
MONOCYTES NFR BLD AUTO: 5.9 % — SIGNIFICANT CHANGE UP (ref 1.7–9.3)
NEUTROPHILS # BLD AUTO: 11.97 K/UL — HIGH (ref 1.4–6.5)
NEUTROPHILS NFR BLD AUTO: 90.8 % — HIGH (ref 42.2–75.2)
NRBC # BLD: 0 /100 WBCS — SIGNIFICANT CHANGE UP (ref 0–0)
PLATELET # BLD AUTO: 530 K/UL — HIGH (ref 130–400)
PMV BLD: 9 FL — SIGNIFICANT CHANGE UP (ref 7.4–10.4)
POTASSIUM SERPL-MCNC: 4.1 MMOL/L — SIGNIFICANT CHANGE UP (ref 3.5–5)
POTASSIUM SERPL-SCNC: 4.1 MMOL/L — SIGNIFICANT CHANGE UP (ref 3.5–5)
PROT SERPL-MCNC: 5.4 G/DL — LOW (ref 6–8)
RBC # BLD: 2.75 M/UL — LOW (ref 4.7–6.1)
RBC # FLD: 16.3 % — HIGH (ref 11.5–14.5)
SODIUM SERPL-SCNC: 142 MMOL/L — SIGNIFICANT CHANGE UP (ref 135–146)
WBC # BLD: 13.19 K/UL — HIGH (ref 4.8–10.8)
WBC # FLD AUTO: 13.19 K/UL — HIGH (ref 4.8–10.8)

## 2024-04-27 PROCEDURE — 71045 X-RAY EXAM CHEST 1 VIEW: CPT | Mod: 26

## 2024-04-27 PROCEDURE — 99233 SBSQ HOSP IP/OBS HIGH 50: CPT

## 2024-04-27 RX ORDER — BENZOCAINE 10 %
1 GEL (GRAM) MUCOUS MEMBRANE THREE TIMES A DAY
Refills: 0 | Status: DISCONTINUED | OUTPATIENT
Start: 2024-04-27 | End: 2024-05-11

## 2024-04-27 RX ORDER — SODIUM CHLORIDE 9 MG/ML
500 INJECTION INTRAMUSCULAR; INTRAVENOUS; SUBCUTANEOUS
Refills: 0 | Status: DISCONTINUED | OUTPATIENT
Start: 2024-04-27 | End: 2024-04-28

## 2024-04-27 RX ORDER — BENZOCAINE 10 %
1 GEL (GRAM) MUCOUS MEMBRANE THREE TIMES A DAY
Refills: 0 | Status: DISCONTINUED | OUTPATIENT
Start: 2024-04-27 | End: 2024-04-27

## 2024-04-27 RX ADMIN — Medication 75 MEQ/KG/HR: at 06:40

## 2024-04-27 RX ADMIN — Medication 300 MILLIGRAM(S): at 11:54

## 2024-04-27 RX ADMIN — Medication 1 TABLET(S): at 11:54

## 2024-04-27 RX ADMIN — Medication 3 MILLILITER(S): at 20:56

## 2024-04-27 RX ADMIN — ENOXAPARIN SODIUM 40 MILLIGRAM(S): 100 INJECTION SUBCUTANEOUS at 22:39

## 2024-04-27 RX ADMIN — Medication 25 MILLIGRAM(S): at 17:16

## 2024-04-27 RX ADMIN — Medication 10 MILLIGRAM(S): at 08:57

## 2024-04-27 RX ADMIN — Medication 600 MILLIGRAM(S): at 17:15

## 2024-04-27 RX ADMIN — Medication 1 TABLET(S): at 08:56

## 2024-04-27 RX ADMIN — Medication 3: at 12:26

## 2024-04-27 RX ADMIN — Medication 10 MILLIGRAM(S): at 12:00

## 2024-04-27 RX ADMIN — Medication 2: at 08:57

## 2024-04-27 RX ADMIN — Medication 1 SPRAY(S): at 22:39

## 2024-04-27 RX ADMIN — ENZALUTAMIDE 160 MILLIGRAM(S): 80 TABLET ORAL at 11:53

## 2024-04-27 RX ADMIN — SODIUM CHLORIDE 500 MILLILITER(S): 9 INJECTION INTRAMUSCULAR; INTRAVENOUS; SUBCUTANEOUS at 06:42

## 2024-04-27 RX ADMIN — Medication 100 MILLIGRAM(S): at 17:13

## 2024-04-27 RX ADMIN — Medication 1: at 17:15

## 2024-04-27 RX ADMIN — Medication 500 MILLIGRAM(S): at 11:54

## 2024-04-27 NOTE — PROGRESS NOTE ADULT - SUBJECTIVE AND OBJECTIVE BOX
24H events:    Patient is a 68y old Male who presents with a chief complaint of LT Gluteal Pain / Diarrhea (2024 17:15)    Primary diagnosis of Abscess        Today is hospital day 8d. This morning patient was seen and examined at bedside, resting comfortably in bed.    No acute or major events overnight.    Code Status:    Family communication:  Contact date:  Name of person contacted:  Relationship to patient:  Communication details:  What matters most:    PAST MEDICAL & SURGICAL HISTORY  HTN (hypertension)    Prostate cancer      SOCIAL HISTORY:  Social History:      ALLERGIES:  No Known Allergies    MEDICATIONS:  STANDING MEDICATIONS  albuterol/ipratropium for Nebulization 3 milliLiter(s) Nebulizer every 6 hours  albuterol/ipratropium for Nebulization 3 milliLiter(s) Nebulizer every 6 hours  ascorbic acid 500 milliGRAM(s) Oral daily  chlorhexidine 2% Cloths 1 Application(s) Topical <User Schedule>  dextrose 10% Bolus 125 milliLiter(s) IV Bolus once  dextrose 5%. 1000 milliLiter(s) IV Continuous <Continuous>  dextrose 5%. 1000 milliLiter(s) IV Continuous <Continuous>  dextrose 50% Injectable 25 Gram(s) IV Push once  dextrose 50% Injectable 12.5 Gram(s) IV Push once  dicyclomine 10 milliGRAM(s) Oral three times a day before meals  doxycycline IVPB 100 milliGRAM(s) IV Intermittent every 12 hours  enoxaparin Injectable 40 milliGRAM(s) SubCutaneous every 24 hours  enzalutamide 160 milliGRAM(s) Oral daily  ferrous    sulfate Liquid 300 milliGRAM(s) Enteral Tube daily  glucagon  Injectable 1 milliGRAM(s) IntraMuscular once  guaiFENesin  milliGRAM(s) Oral every 12 hours  influenza  Vaccine (HIGH DOSE) 0.7 milliLiter(s) IntraMuscular once  insulin lispro (ADMELOG) corrective regimen sliding scale   SubCutaneous three times a day before meals  lactobacillus acidophilus 1 Tablet(s) Oral three times a day with meals  metoprolol tartrate 25 milliGRAM(s) Enteral Tube every 12 hours  multivitamin 1 Tablet(s) Oral daily  multivitamin  Chewable 1 Tablet(s) Oral daily  potassium phosphate / sodium phosphate Powder (PHOS-NaK) 2 Packet(s) Oral three times a day  sodium bicarbonate  Infusion 0.207 mEq/kG/Hr IV Continuous <Continuous>  sodium chloride 0.9%. 500 milliLiter(s) IV Continuous <Continuous>    PRN MEDICATIONS  acetaminophen     Tablet .. 650 milliGRAM(s) Oral every 6 hours PRN  aluminum hydroxide/magnesium hydroxide/simethicone Suspension 30 milliLiter(s) Oral every 4 hours PRN  dextrose Oral Gel 15 Gram(s) Oral once PRN  melatonin 3 milliGRAM(s) Oral at bedtime PRN  ondansetron Injectable 4 milliGRAM(s) IV Push every 8 hours PRN    VITALS:   T(F): 96.9  HR: 95  BP: 131/71  RR: 18  SpO2: --    PHYSICAL EXAM:  GENERAL:   ( x ) NAD, lying in bed comfortably     (  ) obtunded     (  ) lethargic     (  ) somnolent    HEAD:   ( x ) Atraumatic     (  ) hematoma     (  ) laceration (specify location:       )     NECK:  ( x ) Supple     (  ) neck stiffness     (  ) nuchal rigidity     (  )  no JVD     (  ) JVD present ( -- cm)    HEART:  Rate -->     (  ) normal rate     (  ) bradycardic     (  ) tachycardic  Rhythm -->     ( x ) regular     (  ) regularly irregular     (  ) irregularly irregular  Murmurs -->     (  ) normal s1s2     (  ) systolic murmur     (  ) diastolic murmur     (  ) continuous murmur      (  ) S3 present     (  ) S4 present    LUNGS:   (x  )Unlabored respirations     (  ) tachypnea  ( x ) B/L air entry     (  ) decreased breath sounds in:  (location     )    (  ) no adventitious sound     (  ) crackles     (  ) wheezing      (  ) rhonchi      (specify location:       )  (  ) chest wall tenderness (specify location:       )    ABDOMEN:   ( x ) Soft     (  ) tense   |   (  x) nondistended     (  ) distended   |   (  ) +BS     (  ) hypoactive bowel sounds     (  ) hyperactive bowel sounds  ( x ) nontender     (  ) RUQ tenderness     (  ) RLQ tenderness     (  ) LLQ tenderness     (  ) epigastric tenderness     (  ) diffuse tenderness  (  ) Splenomegaly      (  ) Hepatomegaly      (  ) Jaundice     (  ) ecchymosis     EXTREMITIES:  (  ) Normal     (  ) Rash     (  ) ecchymosis     (  ) varicose veins      (  ) pitting edema     (  ) non-pitting edema   (  ) ulceration     (  ) gangrene:     (location:     )    NERVOUS SYSTEM:    (  ) A&Ox3     (  ) confused     (  ) lethargic  CN II-XII:     (  ) Intact     (  ) deficits found     (Specify:     )   Upper extremities:     (  ) no sensorimotor deficits     (  ) weakness     (  ) loss of proprioception/vibration     (  ) loss of touch/temperature (specify:    )  Lower extremities:     (  ) no sensorimotor deficits     (  ) weakness     (  ) loss of proprioception/vibration     (  ) loss of touch/temperature (specify:    )    SKIN:   (  ) No rashes or lesions     (  ) maculopapular rash     (  ) pustules     (  ) vesicles     (  ) ulcer     (  ) ecchymosis     (specify location:     )    AMPAC score:    (  ) Indwelling Angel Catheter:   Date insterted:    Reason (  ) Critical illness     (  ) urinary retention    (  ) Accurate Ins/Outs Monitoring     (  ) CMO patient    (  ) Central Line:   Date inserted:  Location: (  ) Right IJ     (  ) Left IJ     (  ) Right Fem     (  ) Left Fem    (  ) SPC        (  ) pigtail       (  ) PEG tube       (  ) colostomy       (  ) jejunostomy  (  ) U-Dall    LABS:                        8.8    9.52  )-----------( 553      ( 2024 07:09 )             28.9         137  |  105  |  29<H>  ----------------------------<  274<H>  4.1   |  10<L>  |  1.6<H>    Ca    7.3<L>      2024 07:09  Mg     2.0         TPro  4.9<L>  /  Alb  2.5<L>  /  TBili  0.4  /  DBili  x   /  AST  16  /  ALT  <5  /  AlkPhos  102        Urinalysis Basic - ( 2024 07:09 )    Color: x / Appearance: x / SG: x / pH: x  Gluc: 274 mg/dL / Ketone: x  / Bili: x / Urobili: x   Blood: x / Protein: x / Nitrite: x   Leuk Esterase: x / RBC: x / WBC x   Sq Epi: x / Non Sq Epi: x / Bacteria: x      ABG - ( 2024 22:10 )  pH, Arterial: 7.37  pH, Blood: x     /  pCO2: <19   /  pO2: 373   / HCO3: 10    / Base Excess: -13.5 /  SaO2: 100.0             Lactate, Blood: 3.7 mmol/L *H* (24 @ 16:00)      Culture - Fungal, Body Fluid (collected 2024 16:45)  Source: Pleural Fl Pleural Fluid  Preliminary Report (2024 07:37):    Testing in progress    Culture - Body Fluid with Gram Stain (collected 2024 16:41)  Source: Pleural Fl Pleural Fluid  Gram Stain (2024 04:09):    polymorphonuclear leukocytes seen    No organisms seen    by cytocentrifuge  Preliminary Report (2024 17:33):    No growth to date.    Culture - Tissue with Gram Stain (collected 2024 15:51)  Source: .Tissue Other, left buttock abscess  Gram Stain (2024 19:18):    Rare polymorphonuclear leukocytes seen per low power field    No organisms seen per oil power field  Preliminary Report (2024 19:18):    Rare Staphylococcus aureus          RADIOLOGY:            < from: Xray Chest 1 View- PORTABLE-Urgent (Xray Chest 1 View- PORTABLE-Urgent .) (24 @ 22:32) >  Impression:    Low lung volume.    Right lung opacity, unchanged and worse left lung opacity.    Esophageal stent.    --- End of Report ---    < end of copied text >  < from: Xray Chest 1 View-PORTABLE IMMEDIATE (Xray Chest 1 View-PORTABLE IMMEDIATE .) (24 @ 15:56) >    IMPRESSION:    Bibasilar opacities/effusions, right greater than left.    --- End of Report ---    < end of copied text >  < from: Xray Chest 1 View- PORTABLE-Urgent (Xray Chest 1 View- PORTABLE-Urgent .) (24 @ 09:21) >    IMPRESSION:    Bibasilar opacities/effusions.    --- End of Report ---    < end of copied text >  < from: CT Abdomen and Pelvis w/ IV Cont (24 @ 19:17) >    IMPRESSION:    1. New mild edema and enlargement of the left gluteus maximums   musculature with associated subcutaneous edema. No discrete abscess.    2. Metastatic disease in the abdomen and pelvis as above with slightly   increased upper abdominal adenopathy. No significant change approximately   in the large bladder mass and partially imaged mediastinal mass. Interval   placement of an esophageal stent.    3. Severe left hydronephrosis to the level of the proximal ureter, not   significantly changed.    4. New/increased large bilateral pleural effusions.    --- End of Report ---    < end of copied text >      ASSESSMENT/PLAN:   Assessment:     68 year old male with PMH fo Stage 4 Prostatic Ca s/p palliative radiation, medistinal mass on oral chemo (Xtandi)< chronic anemia, HTN, chronic  back pain presents to ED from SNF for evaluation. Per NH, has been refusing medications via PEG x 3 days d/t episodes of diarrhea post-feeds. Patient was only complaining of left buttock pain, s/p debridement by burn on , s/p thoracentesis by pul on  for pleural effusion removed 1.7L.     #Left GLuteal Cellulitis  #MRSA (+) Wound Cx   - vitals: Afebrile, /892, , SpO2 100% on RA  - CT shows new mild edema and enlargement of left gluteus maximums musculature with associated subcutaneous edema.   - s/p Cefepime, Vanc, Flagyl  - currently on Cefedirocol & Unasyn  - Bcx ()  - UCx ()  - MRSA swab ()  - Wound Cx (): awaiting sensitivities (S. aureus)  - Contact isolation  - continue local wound care, off-loading  - c/w vitamin c  - Per burn, no further plans for debridement  - Per ID, continue Doxycycline for 2 weeks post debridement (-)     #H/o Dysphagia s/p PEG  #N/V   - s/p esophageal stent & PEG 3/5/24  - patient cleared for pureed diet and thin liquids per SS on last admission  - refusing PEG feeds d/t diarrhea  - c/w acidophilus  - antiemetics PRN  - Cr rising (likely prerenal 2/2) dehydration/poor PO intake  - Lactate 2.8, f/u repeat   - KUB (): normal gas pattern    #New B/l Pleural Effusions  - d/c IV lasix  - s/p thoracentesis with pulm on , removed 1.7L  - requiring 2-3L via NC    #Stage 4 Prostatic Cancer s/p Radiation (on active PO chemo) w metastasis to mediastinum   #Severe Left Hydronephrosis  - c/w home Xtandi 160 mg qd  - monitor labs    #Metabolic acidosis  - c/w sodium bicarb    #Chronic Normocytic Anemia  - likely 2/2 malignancy  - c/w ferrous sulfate    #GOC- DNR/DNI                                           --------------------------------------------------------------    # DVT prophylaxis: Lovenox  # GI prophylaxis: PPI  # Diet:   # Activity:   # Code status: Full Code  # Disposition:    Pendin) Repeat lactate 11 AM  2) Pulm f/u  3) Encourage PO intake

## 2024-04-27 NOTE — PROGRESS NOTE ADULT - SUBJECTIVE AND OBJECTIVE BOX
Patient is a 68y old  Male who presents with a chief complaint of LT Gluteal Pain / Diarrhea (26 Apr 2024 08:35)    Patient was seen and examined.  As per staff pt continues to refuse oral and PEG tube feeds  Pt awake, alert    PAST MEDICAL & SURGICAL HISTORY:  HTN (hypertension)  Prostate cancer    Allergies  No Known Allergies    MEDICATIONS  (STANDING):  albuterol/ipratropium for Nebulization 3 milliLiter(s) Nebulizer every 6 hours  albuterol/ipratropium for Nebulization 3 milliLiter(s) Nebulizer every 6 hours  ascorbic acid 500 milliGRAM(s) Oral daily  benzocaine 20%/menthol 0.5% Spray 1 Spray(s) Topical three times a day  dicyclomine 10 milliGRAM(s) Oral three times a day before meals  doxycycline IVPB 100 milliGRAM(s) IV Intermittent every 12 hours  enoxaparin Injectable 40 milliGRAM(s) SubCutaneous every 24 hours  enzalutamide 160 milliGRAM(s) Oral daily  ferrous    sulfate Liquid 300 milliGRAM(s) Enteral Tube daily  glucagon  Injectable 1 milliGRAM(s) IntraMuscular once  guaiFENesin  milliGRAM(s) Oral every 12 hours  influenza  Vaccine (HIGH DOSE) 0.7 milliLiter(s) IntraMuscular once  insulin lispro (ADMELOG) corrective regimen sliding scale   SubCutaneous three times a day before meals  lactobacillus acidophilus 1 Tablet(s) Oral three times a day with meals  metoprolol tartrate 25 milliGRAM(s) Enteral Tube every 12 hours  multivitamin 1 Tablet(s) Oral daily  multivitamin  Chewable 1 Tablet(s) Oral daily  potassium phosphate / sodium phosphate Powder (PHOS-NaK) 2 Packet(s) Oral three times a day  sodium bicarbonate  Infusion 0.207 mEq/kG/Hr (75 mL/Hr) IV Continuous <Continuous>  sodium chloride 0.9%. 500 milliLiter(s) (500 mL/Hr) IV Continuous <Continuous>    MEDICATIONS  (PRN):  acetaminophen     Tablet .. 650 milliGRAM(s) Oral every 6 hours PRN Temp greater or equal to 38C (100.4F), Mild Pain (1 - 3)  aluminum hydroxide/magnesium hydroxide/simethicone Suspension 30 milliLiter(s) Oral every 4 hours PRN Dyspepsia  dextrose Oral Gel 15 Gram(s) Oral once PRN Blood Glucose LESS THAN 70 milliGRAM(s)/deciliter  melatonin 3 milliGRAM(s) Oral at bedtime PRN Insomnia  ondansetron Injectable 4 milliGRAM(s) IV Push every 8 hours PRN Nausea and/or Vomiting    T(C): 36.1 (04-27-24 @ 07:00), Max: 36.1 (04-26-24 @ 15:39)  HR: 95 (04-27-24 @ 07:00) (95 - 104)  BP: 131/71 (04-27-24 @ 07:00) (101/68 - 131/71)  RR: 18 (04-27-24 @ 00:00) (18 - 18)  SpO2: --    O/E:  Awake, alert, not in distress.  HEENT: atraumatic, EOMI.  Chest: decrease breath sounds at bases  CVS: SIS2 +, no murmur.  P/A: Soft, BS+, PEG+  CNS: awake, alert  Ext: no edema feet.  Skin: Left buttock dressing+  All systems reviewed positive findings as above.                          8.1<L>  13.19<H> )-----------( 530<H>    ( 27 Apr 2024 12:23 )             25.3<L>                        8.8<L>  9.52  )-----------( 553<H>    ( 26 Apr 2024 07:09 )             28.9<L>  04-27    142  |  105  |  46<H>  ----------------------------<  293<H>  4.1   |  14<L>  |  2.1<H>  04-26    137  |  105  |  29<H>  ----------------------------<  274<H>  4.1   |  10<L>  |  1.6<H>    Ca    7.0<L>      27 Apr 2024 12:23  Ca    7.3<L>      26 Apr 2024 07:09  Ca    7.8<L>      25 Apr 2024 22:57  Mg     2.3     04-27    TPro  5.4<L>  /  Alb  2.7<L>  /  TBili  0.4  /  DBili  x   /  AST  25  /  ALT  <5  /  AlkPhos  130<H>  04-27  TPro  4.9<L>  /  Alb  2.5<L>  /  TBili  0.4  /  DBili  x   /  AST  16  /  ALT  <5  /  AlkPhos  102  04-26  TPro  5.5<L>  /  Alb  2.8<L>  /  TBili  0.5  /  DBili  x   /  AST  16  /  ALT  <5  /  AlkPhos  113  04-25

## 2024-04-27 NOTE — PROGRESS NOTE ADULT - ASSESSMENT
68M w/ PMHx Stage 4 Prostate Ca s/p palliative radiation, Mediastinal Mass on oral Chemo (xtandi), Chronic Anemia, HTN and Chronic Back Pain presents to ED from SNF for evaluation. As per NH documentation, pt has been refusing medications through PEG for the last 3x days d/t episode of diarrhea post feeds. Patient's only complaint is LT buttock pain. Denies fevers, chills, chest pain, SOB, n/v, abdominal pain or urinary symptoms.    # Acute Hypoxic resp failure  # B/l  pleural effusions  -  Xray Chest 1 View-PORTABLE IMMEDIATE (Xray Chest 1 View-PORTABLE IMMEDIATE .) (04.26.24 @ 11:55) >Left greater than right bibasal opacities are without significant change.   No pneumothorax  - S/p left thoracentesis on 4/25 --> 1800 milliLiter drained  - pulm f/u  - BIPAP at HS  - Maintain oxygen sat > 92    # Metabolic acidosis  - c/w  IV bicarb  - monitor BMP    # Left gluteal cellulitis  - CT Abdomen and Pelvis w/ IV Cont (04.19.24 @ 19:17) >New mild edema and enlargement of the left gluteus maximums musculature with associated subcutaneous edema. No discrete abscess. Metastatic disease in the abdomen and pelvis  with slightly increased upper abdominal adenopathy. No significant change approximately in the large bladder mass and partially imaged mediastinal mass. Interval placement of an esophageal stent. Severe left hydronephrosis to the level of the proximal ureter, not   significantly changed. New/increased large bilateral pleural effusions.  - blood Culture Results: No growth at 5 days (04.19.24 @ 17:37)  - s/p debridement 4.24 - -->  Wound Cx 4/22 with MRSA   - S/p  cefazolin  - As per ID c/w  doxycycline 100 mg BID -- plan for 2 weeks from debridement (4/24-5/8)   - continue dressing changes with xeroform packing    # Acute kidney injury, prerenal- refusing PEG tube feeds and oral feed  # Lactic acidosis  - : CT Abdomen and Pelvis w/ IV Cont (04.19.24 @ 19:17) >KIDNEYS: Stable severe left hydronephrosis secondary to obstruction at the level of the proximal ureter, not significantly changed. Right renal cysts and other low attenuating lesions in the right kidney too small to characterize. PELVIC ORGANS: Large 7 cm bladder mass, unchanged since prior CT.  - Lactate, Blood: 2.8: Elevated lactate. Consider ordering follow-up lactate to trend. mmol/L (04.26.24 @ 07:09)  - monitor I/o  - trend lactate  - monitor BMP    # Hyperglycemia  - F/u HBA1C  - monitor FS  - c/w  insulin    # H/o Dysphagia  - s/p esophageal stent and PEG 3/5/24  - swallow eval: puree w/ thin liquids, will likely require continued use of PEG    # Metastatic prostate cancer stage 4  # Mediastinal mass  - S/p palliative radiation  - on  Xtandi 160 mg qd    #  Normocytic Anemia  - c/w ferrous sulfate    # DVT prophylaxis    # Poor prognosis    #  DNR/DNI/ trial of NIV    # Pending: monitor BMP, HBA1C Pulm f/u, palliative f/u  # Disposition: [facility] when stable

## 2024-04-28 LAB
ADENOSINE DEAMINASE PLR-CCNC: 8 U/L — SIGNIFICANT CHANGE UP (ref 0–30)
ALBUMIN SERPL ELPH-MCNC: 2.4 G/DL — LOW (ref 3.5–5.2)
ALP SERPL-CCNC: 123 U/L — HIGH (ref 30–115)
ALT FLD-CCNC: <5 U/L — SIGNIFICANT CHANGE UP (ref 0–41)
ANION GAP SERPL CALC-SCNC: 17 MMOL/L — HIGH (ref 7–14)
AST SERPL-CCNC: 18 U/L — SIGNIFICANT CHANGE UP (ref 0–41)
BASOPHILS # BLD AUTO: 0.01 K/UL — SIGNIFICANT CHANGE UP (ref 0–0.2)
BASOPHILS NFR BLD AUTO: 0.1 % — SIGNIFICANT CHANGE UP (ref 0–1)
BILIRUB SERPL-MCNC: 0.5 MG/DL — SIGNIFICANT CHANGE UP (ref 0.2–1.2)
BUN SERPL-MCNC: 56 MG/DL — HIGH (ref 10–20)
CALCIUM SERPL-MCNC: 6.7 MG/DL — LOW (ref 8.4–10.5)
CHLORIDE SERPL-SCNC: 105 MMOL/L — SIGNIFICANT CHANGE UP (ref 98–110)
CO2 SERPL-SCNC: 14 MMOL/L — LOW (ref 17–32)
CREAT SERPL-MCNC: 2.2 MG/DL — HIGH (ref 0.7–1.5)
EGFR: 31 ML/MIN/1.73M2 — LOW
EOSINOPHIL # BLD AUTO: 0 K/UL — SIGNIFICANT CHANGE UP (ref 0–0.7)
EOSINOPHIL NFR BLD AUTO: 0 % — SIGNIFICANT CHANGE UP (ref 0–8)
GLUCOSE BLDC GLUCOMTR-MCNC: 144 MG/DL — HIGH (ref 70–99)
GLUCOSE BLDC GLUCOMTR-MCNC: 186 MG/DL — HIGH (ref 70–99)
GLUCOSE BLDC GLUCOMTR-MCNC: 244 MG/DL — HIGH (ref 70–99)
GLUCOSE BLDC GLUCOMTR-MCNC: 323 MG/DL — HIGH (ref 70–99)
GLUCOSE SERPL-MCNC: 311 MG/DL — HIGH (ref 70–99)
HCT VFR BLD CALC: 21.4 % — LOW (ref 42–52)
HCT VFR BLD CALC: 21.5 % — LOW (ref 42–52)
HGB BLD-MCNC: 6.9 G/DL — CRITICAL LOW (ref 14–18)
HGB BLD-MCNC: 7 G/DL — LOW (ref 14–18)
IMM GRANULOCYTES NFR BLD AUTO: 1.3 % — HIGH (ref 0.1–0.3)
LYMPHOCYTES # BLD AUTO: 0.27 K/UL — LOW (ref 1.2–3.4)
LYMPHOCYTES # BLD AUTO: 1.9 % — LOW (ref 20.5–51.1)
MAGNESIUM SERPL-MCNC: 2.4 MG/DL — SIGNIFICANT CHANGE UP (ref 1.8–2.4)
MCHC RBC-ENTMCNC: 29 PG — SIGNIFICANT CHANGE UP (ref 27–31)
MCHC RBC-ENTMCNC: 29.2 PG — SIGNIFICANT CHANGE UP (ref 27–31)
MCHC RBC-ENTMCNC: 32.2 G/DL — SIGNIFICANT CHANGE UP (ref 32–37)
MCHC RBC-ENTMCNC: 32.6 G/DL — SIGNIFICANT CHANGE UP (ref 32–37)
MCV RBC AUTO: 89.2 FL — SIGNIFICANT CHANGE UP (ref 80–94)
MCV RBC AUTO: 90.7 FL — SIGNIFICANT CHANGE UP (ref 80–94)
MONOCYTES # BLD AUTO: 0.96 K/UL — HIGH (ref 0.1–0.6)
MONOCYTES NFR BLD AUTO: 6.7 % — SIGNIFICANT CHANGE UP (ref 1.7–9.3)
NEUTROPHILS # BLD AUTO: 12.99 K/UL — HIGH (ref 1.4–6.5)
NEUTROPHILS NFR BLD AUTO: 90 % — HIGH (ref 42.2–75.2)
NRBC # BLD: 0 /100 WBCS — SIGNIFICANT CHANGE UP (ref 0–0)
NRBC # BLD: 0 /100 WBCS — SIGNIFICANT CHANGE UP (ref 0–0)
PLATELET # BLD AUTO: 449 K/UL — HIGH (ref 130–400)
PLATELET # BLD AUTO: 451 K/UL — HIGH (ref 130–400)
PMV BLD: 8.9 FL — SIGNIFICANT CHANGE UP (ref 7.4–10.4)
PMV BLD: 9.1 FL — SIGNIFICANT CHANGE UP (ref 7.4–10.4)
POTASSIUM SERPL-MCNC: 3.1 MMOL/L — LOW (ref 3.5–5)
POTASSIUM SERPL-SCNC: 3.1 MMOL/L — LOW (ref 3.5–5)
PROT SERPL-MCNC: 4.8 G/DL — LOW (ref 6–8)
RBC # BLD: 2.36 M/UL — LOW (ref 4.7–6.1)
RBC # BLD: 2.41 M/UL — LOW (ref 4.7–6.1)
RBC # FLD: 16.5 % — HIGH (ref 11.5–14.5)
RBC # FLD: 16.5 % — HIGH (ref 11.5–14.5)
SODIUM SERPL-SCNC: 136 MMOL/L — SIGNIFICANT CHANGE UP (ref 135–146)
WBC # BLD: 13.73 K/UL — HIGH (ref 4.8–10.8)
WBC # BLD: 14.42 K/UL — HIGH (ref 4.8–10.8)
WBC # FLD AUTO: 13.73 K/UL — HIGH (ref 4.8–10.8)
WBC # FLD AUTO: 14.42 K/UL — HIGH (ref 4.8–10.8)

## 2024-04-28 PROCEDURE — 71045 X-RAY EXAM CHEST 1 VIEW: CPT | Mod: 26

## 2024-04-28 PROCEDURE — 99233 SBSQ HOSP IP/OBS HIGH 50: CPT

## 2024-04-28 RX ORDER — SODIUM BICARBONATE 1 MEQ/ML
650 SYRINGE (ML) INTRAVENOUS EVERY 8 HOURS
Refills: 0 | Status: DISCONTINUED | OUTPATIENT
Start: 2024-04-28 | End: 2024-04-29

## 2024-04-28 RX ORDER — POTASSIUM CHLORIDE 20 MEQ
20 PACKET (EA) ORAL ONCE
Refills: 0 | Status: COMPLETED | OUTPATIENT
Start: 2024-04-28 | End: 2024-04-28

## 2024-04-28 RX ADMIN — Medication 600 MILLIGRAM(S): at 07:04

## 2024-04-28 RX ADMIN — Medication 1 TABLET(S): at 11:41

## 2024-04-28 RX ADMIN — Medication 100 MILLIGRAM(S): at 07:03

## 2024-04-28 RX ADMIN — Medication 1 TABLET(S): at 17:52

## 2024-04-28 RX ADMIN — Medication 1 TABLET(S): at 08:24

## 2024-04-28 RX ADMIN — CHLORHEXIDINE GLUCONATE 1 APPLICATION(S): 213 SOLUTION TOPICAL at 08:01

## 2024-04-28 RX ADMIN — Medication 10 MILLIGRAM(S): at 08:28

## 2024-04-28 RX ADMIN — ENOXAPARIN SODIUM 40 MILLIGRAM(S): 100 INJECTION SUBCUTANEOUS at 22:18

## 2024-04-28 RX ADMIN — Medication 100 MILLIGRAM(S): at 17:52

## 2024-04-28 RX ADMIN — Medication 650 MILLIGRAM(S): at 15:59

## 2024-04-28 RX ADMIN — Medication 25 MILLIGRAM(S): at 07:04

## 2024-04-28 RX ADMIN — Medication 3 MILLILITER(S): at 08:35

## 2024-04-28 RX ADMIN — Medication 10 MILLIGRAM(S): at 11:41

## 2024-04-28 RX ADMIN — Medication 3 MILLILITER(S): at 20:57

## 2024-04-28 RX ADMIN — Medication 2: at 08:25

## 2024-04-28 RX ADMIN — Medication 50 MILLIEQUIVALENT(S): at 15:59

## 2024-04-28 RX ADMIN — ENZALUTAMIDE 160 MILLIGRAM(S): 80 TABLET ORAL at 17:43

## 2024-04-28 RX ADMIN — Medication 650 MILLIGRAM(S): at 22:18

## 2024-04-28 RX ADMIN — Medication 1 TABLET(S): at 11:49

## 2024-04-28 RX ADMIN — Medication 500 MILLIGRAM(S): at 11:49

## 2024-04-28 RX ADMIN — Medication 4: at 11:45

## 2024-04-28 RX ADMIN — Medication 10 MILLIGRAM(S): at 17:52

## 2024-04-28 RX ADMIN — Medication 600 MILLIGRAM(S): at 17:52

## 2024-04-28 RX ADMIN — Medication 300 MILLIGRAM(S): at 11:49

## 2024-04-28 NOTE — PROGRESS NOTE ADULT - SUBJECTIVE AND OBJECTIVE BOX
Patient is a 68y old  Male who presents with a chief complaint of LT Gluteal Pain / Diarrhea (26 Apr 2024 08:35)    Patient was seen and examined.  Pt taking oral feeds, refuses tube feeds  Refuses BIPAP  Pt awake, alert    PAST MEDICAL & SURGICAL HISTORY:  HTN (hypertension)  Prostate cancer    Allergies  No Known Allergies    MEDICATIONS  (STANDING):  albuterol/ipratropium for Nebulization 3 milliLiter(s) Nebulizer every 6 hours  ascorbic acid 500 milliGRAM(s) Oral daily  benzocaine 20% Spray 1 Spray(s) Topical three times a day  dicyclomine 10 milliGRAM(s) Oral three times a day before meals  doxycycline IVPB 100 milliGRAM(s) IV Intermittent every 12 hours  enoxaparin Injectable 40 milliGRAM(s) SubCutaneous every 24 hours  enzalutamide 160 milliGRAM(s) Oral daily  ferrous    sulfate Liquid 300 milliGRAM(s) Enteral Tube daily  glucagon  Injectable 1 milliGRAM(s) IntraMuscular once  guaiFENesin  milliGRAM(s) Oral every 12 hours  influenza  Vaccine (HIGH DOSE) 0.7 milliLiter(s) IntraMuscular once  insulin lispro (ADMELOG) corrective regimen sliding scale   SubCutaneous three times a day before meals  lactobacillus acidophilus 1 Tablet(s) Oral three times a day with meals  metoprolol tartrate 25 milliGRAM(s) Enteral Tube every 12 hours  multivitamin 1 Tablet(s) Oral daily  multivitamin  Chewable 1 Tablet(s) Oral daily  potassium phosphate / sodium phosphate Powder (PHOS-NaK) 2 Packet(s) Oral three times a day  sodium bicarbonate  Infusion 0.207 mEq/kG/Hr (75 mL/Hr) IV Continuous <Continuous>>    MEDICATIONS  (PRN):  acetaminophen     Tablet .. 650 milliGRAM(s) Oral every 6 hours PRN Temp greater or equal to 38C (100.4F), Mild Pain (1 - 3)  aluminum hydroxide/magnesium hydroxide/simethicone Suspension 30 milliLiter(s) Oral every 4 hours PRN Dyspepsia  dextrose Oral Gel 15 Gram(s) Oral once PRN Blood Glucose LESS THAN 70 milliGRAM(s)/deciliter  melatonin 3 milliGRAM(s) Oral at bedtime PRN Insomnia  ondansetron Injectable 4 milliGRAM(s) IV Push every 8 hours PRN Nausea and/or Vomiting    T(C): 36.1 (04-28-24 @ 07:00), Max: 37.1 (04-28-24 @ 06:59)  HR: 93 (04-28-24 @ 09:12) (93 - 103)  BP: 90/60 (04-28-24 @ 09:12) (90/55 - 111/65)  RR: 17 (04-28-24 @ 07:00) (16 - 19)  SpO2: 94% (04-27-24 @ 16:38) (94% - 94%)    O/E:  Awake, alert, not in distress.  HEENT: atraumatic, EOMI.  Chest:  coarse breath sounds b/l  CVS: SIS2 +, no murmur.  P/A: Soft, BS+, PEG+  CNS: awake, alert  Ext: no edema feet.  Skin: Left buttock dressing+  All systems reviewed positive findings as above.                            6.9<LL>  14.42<H> )-----------( 449<H>    ( 28 Apr 2024 10:13 )             21.4<L>                        8.1<L>  13.19<H> )-----------( 530<H>    ( 27 Apr 2024 12:23 )             25.3<L>  04-28    136  |  105  |  56<H>  ----------------------------<  311<H>  3.1<L>   |  14<L>  |  2.2<H>  04-27    142  |  105  |  46<H>  ----------------------------<  293<H>  4.1   |  14<L>  |  2.1<H>    Ca    6.7<L>      28 Apr 2024 10:13  Ca    7.0<L>      27 Apr 2024 12:23  Mg     2.4     04-28    TPro  4.8<L>  /  Alb  2.4<L>  /  TBili  0.5  /  DBili  x   /  AST  18  /  ALT  <5  /  AlkPhos  123<H>  04-28  TPro  5.4<L>  /  Alb  2.7<L>  /  TBili  0.4  /  DBili  x   /  AST  25  /  ALT  <5  /  AlkPhos  130<H>  04-27

## 2024-04-28 NOTE — PROGRESS NOTE ADULT - ASSESSMENT
68M w/ PMHx Stage 4 Prostate Ca s/p palliative radiation, Mediastinal Mass on oral Chemo (xtandi), Chronic Anemia, HTN and Chronic Back Pain presents to ED from SNF for evaluation. As per NH documentation, pt has been refusing medications through PEG for the last 3x days d/t episode of diarrhea post feeds. Patient's only complaint is LT buttock pain. Denies fevers, chills, chest pain, SOB, n/v, abdominal pain or urinary symptoms.    # Acute Hypoxic resp failure  # B/l  pleural effusions  -  Xray Chest 1 View- PORTABLE-Routine (Xray Chest 1 View- PORTABLE-Routine in AM.) (04.27.24 @ 07:51) >Stable bilateral opacities.  - S/p left thoracentesis on 4/25 --> 1800 milliLiter drained  - BIPAP at HS- pt refuses  - Maintain oxygen sat > 92    # Hypotension  - dc metoprolol  - monitor BP    # Metabolic acidosis  - c/w  IV bicarb  - monitor BMP    # Left gluteal cellulitis  - CT Abdomen and Pelvis w/ IV Cont (04.19.24 @ 19:17) >New mild edema and enlargement of the left gluteus maximums musculature with associated subcutaneous edema. No discrete abscess. Metastatic disease in the abdomen and pelvis  with slightly increased upper abdominal adenopathy. No significant change approximately in the large bladder mass and partially imaged mediastinal mass. Interval placement of an esophageal stent. Severe left hydronephrosis to the level of the proximal ureter, not   significantly changed. New/increased large bilateral pleural effusions.  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 Right renal cysts and other low attenuating lesions in the right kidney too small to characterize. PELVIC ORGANS: Large 7 cm bladder mass, unchanged since prior CT.  - Lactate, Blood: 2.8: Elevated lactate. Consider ordering follow-up lactate to trend. mmol/L (04.26.24 @ 07:09)  - monitor I/o  - trend lactate  - monitor BMP    # Hyperglycemia  - F/u HBA1C  - monitor FS  - c/w  insulin    # H/o Dysphagia  - s/p esophageal stent and PEG 3/5/24  - swallow eval: puree w/ thin liquids, will likely require continued use of PEG    # Metastatic prostate cancer stage 4  # Mediastinal mass  - S/p palliative radiation  - on  Xtandi 160 mg qd    # Anemia  - transfuse with 1 unit PRBC  - c/w ferrous sulfate  - monitor cbc    # Noncompliant with medication, treatment care    # DVT prophylaxis    # Poor prognosis    #  DNR/DNI/ trial of NIV    # Pending: monitor cbc,  BMP, HBA1C , monitor BP,  palliative f/u  # Disposition: [facility] when stable

## 2024-04-28 NOTE — PROVIDER CONTACT NOTE (OTHER) - ACTION/TREATMENT ORDERED:
No interventions at this time.
No interventions at this time. Labs ordered for 11am
MD to place another IV, first awaiting CBC and T&S for possible transfusion.

## 2024-04-28 NOTE — PROVIDER CONTACT NOTE (OTHER) - BACKGROUND
pt removed ultrasound guided AC IV that was placed this afternoon because "it was in an uncomfortable spot." Patient made aware by writer that he will need a new IV placed
BP 90/60, HR 93, pt is awake & alert, denies pain or discomfort at this time. Received Lopressor this am.
Pt refusing for RN to use PEG tube for feeds and is refusing to eat Puree breakfast this am. Pt also refusing to have labs drawn despite education and encouragement.

## 2024-04-29 LAB
A1C WITH ESTIMATED AVERAGE GLUCOSE RESULT: 5.3 % — SIGNIFICANT CHANGE UP (ref 4–5.6)
A1C WITH ESTIMATED AVERAGE GLUCOSE RESULT: 5.4 % — SIGNIFICANT CHANGE UP (ref 4–5.6)
ALBUMIN SERPL ELPH-MCNC: 2.6 G/DL — LOW (ref 3.5–5.2)
ALP SERPL-CCNC: 134 U/L — HIGH (ref 30–115)
ALT FLD-CCNC: 6 U/L — SIGNIFICANT CHANGE UP (ref 0–41)
ANION GAP SERPL CALC-SCNC: 25 MMOL/L — HIGH (ref 7–14)
AST SERPL-CCNC: 22 U/L — SIGNIFICANT CHANGE UP (ref 0–41)
BASOPHILS # BLD AUTO: 0.01 K/UL — SIGNIFICANT CHANGE UP (ref 0–0.2)
BASOPHILS NFR BLD AUTO: 0.1 % — SIGNIFICANT CHANGE UP (ref 0–1)
BILIRUB SERPL-MCNC: 0.6 MG/DL — SIGNIFICANT CHANGE UP (ref 0.2–1.2)
BUN SERPL-MCNC: 59 MG/DL — HIGH (ref 10–20)
CALCIUM SERPL-MCNC: 6.8 MG/DL — LOW (ref 8.4–10.4)
CHLORIDE SERPL-SCNC: 108 MMOL/L — SIGNIFICANT CHANGE UP (ref 98–110)
CO2 SERPL-SCNC: 10 MMOL/L — LOW (ref 17–32)
CREAT SERPL-MCNC: 2.4 MG/DL — HIGH (ref 0.7–1.5)
CULTURE RESULTS: ABNORMAL
EGFR: 28 ML/MIN/1.73M2 — LOW
EOSINOPHIL # BLD AUTO: 0 K/UL — SIGNIFICANT CHANGE UP (ref 0–0.7)
EOSINOPHIL NFR BLD AUTO: 0 % — SIGNIFICANT CHANGE UP (ref 0–8)
ESTIMATED AVERAGE GLUCOSE: 105 MG/DL — SIGNIFICANT CHANGE UP (ref 68–114)
ESTIMATED AVERAGE GLUCOSE: 108 MG/DL — SIGNIFICANT CHANGE UP (ref 68–114)
GLUCOSE BLDC GLUCOMTR-MCNC: 256 MG/DL — HIGH (ref 70–99)
GLUCOSE BLDC GLUCOMTR-MCNC: 282 MG/DL — HIGH (ref 70–99)
GLUCOSE BLDC GLUCOMTR-MCNC: 292 MG/DL — HIGH (ref 70–99)
GLUCOSE BLDC GLUCOMTR-MCNC: 298 MG/DL — HIGH (ref 70–99)
GLUCOSE SERPL-MCNC: 228 MG/DL — HIGH (ref 70–99)
HCT VFR BLD CALC: 22.3 % — LOW (ref 42–52)
HGB BLD-MCNC: 7.3 G/DL — LOW (ref 14–18)
IMM GRANULOCYTES NFR BLD AUTO: 1 % — HIGH (ref 0.1–0.3)
LYMPHOCYTES # BLD AUTO: 0.34 K/UL — LOW (ref 1.2–3.4)
LYMPHOCYTES # BLD AUTO: 2.5 % — LOW (ref 20.5–51.1)
MAGNESIUM SERPL-MCNC: 2.4 MG/DL — SIGNIFICANT CHANGE UP (ref 1.8–2.4)
MCHC RBC-ENTMCNC: 29.9 PG — SIGNIFICANT CHANGE UP (ref 27–31)
MCHC RBC-ENTMCNC: 32.7 G/DL — SIGNIFICANT CHANGE UP (ref 32–37)
MCV RBC AUTO: 91.4 FL — SIGNIFICANT CHANGE UP (ref 80–94)
MONOCYTES # BLD AUTO: 1.04 K/UL — HIGH (ref 0.1–0.6)
MONOCYTES NFR BLD AUTO: 7.7 % — SIGNIFICANT CHANGE UP (ref 1.7–9.3)
NEUTROPHILS # BLD AUTO: 11.94 K/UL — HIGH (ref 1.4–6.5)
NEUTROPHILS NFR BLD AUTO: 88.7 % — HIGH (ref 42.2–75.2)
NRBC # BLD: 0 /100 WBCS — SIGNIFICANT CHANGE UP (ref 0–0)
ORGANISM # SPEC MICROSCOPIC CNT: ABNORMAL
ORGANISM # SPEC MICROSCOPIC CNT: SIGNIFICANT CHANGE UP
PLATELET # BLD AUTO: 431 K/UL — HIGH (ref 130–400)
PMV BLD: 9.6 FL — SIGNIFICANT CHANGE UP (ref 7.4–10.4)
POTASSIUM SERPL-MCNC: 3.5 MMOL/L — SIGNIFICANT CHANGE UP (ref 3.5–5)
POTASSIUM SERPL-SCNC: 3.5 MMOL/L — SIGNIFICANT CHANGE UP (ref 3.5–5)
PROT SERPL-MCNC: 5.1 G/DL — LOW (ref 6–8)
RBC # BLD: 2.44 M/UL — LOW (ref 4.7–6.1)
RBC # FLD: 16.7 % — HIGH (ref 11.5–14.5)
SODIUM SERPL-SCNC: 143 MMOL/L — SIGNIFICANT CHANGE UP (ref 135–146)
SPECIMEN SOURCE: SIGNIFICANT CHANGE UP
WBC # BLD: 13.46 K/UL — HIGH (ref 4.8–10.8)
WBC # FLD AUTO: 13.46 K/UL — HIGH (ref 4.8–10.8)

## 2024-04-29 PROCEDURE — 99233 SBSQ HOSP IP/OBS HIGH 50: CPT

## 2024-04-29 RX ORDER — SODIUM BICARBONATE 1 MEQ/ML
1300 SYRINGE (ML) INTRAVENOUS EVERY 8 HOURS
Refills: 0 | Status: DISCONTINUED | OUTPATIENT
Start: 2024-04-29 | End: 2024-05-14

## 2024-04-29 RX ORDER — SODIUM BICARBONATE 1 MEQ/ML
0.21 SYRINGE (ML) INTRAVENOUS
Qty: 150 | Refills: 0 | Status: DISCONTINUED | OUTPATIENT
Start: 2024-04-29 | End: 2024-05-10

## 2024-04-29 RX ADMIN — Medication 3: at 07:59

## 2024-04-29 RX ADMIN — Medication 600 MILLIGRAM(S): at 05:36

## 2024-04-29 RX ADMIN — Medication 1 TABLET(S): at 08:00

## 2024-04-29 RX ADMIN — Medication 1 TABLET(S): at 17:38

## 2024-04-29 RX ADMIN — Medication 650 MILLIGRAM(S): at 17:35

## 2024-04-29 RX ADMIN — Medication 300 MILLIGRAM(S): at 11:31

## 2024-04-29 RX ADMIN — Medication 1 TABLET(S): at 11:33

## 2024-04-29 RX ADMIN — Medication 500 MILLIGRAM(S): at 11:31

## 2024-04-29 RX ADMIN — Medication 3 MILLILITER(S): at 08:02

## 2024-04-29 RX ADMIN — Medication 3: at 11:31

## 2024-04-29 RX ADMIN — Medication 1300 MILLIGRAM(S): at 21:29

## 2024-04-29 RX ADMIN — Medication 1 SPRAY(S): at 21:29

## 2024-04-29 RX ADMIN — ENOXAPARIN SODIUM 40 MILLIGRAM(S): 100 INJECTION SUBCUTANEOUS at 21:29

## 2024-04-29 RX ADMIN — Medication 600 MILLIGRAM(S): at 11:31

## 2024-04-29 RX ADMIN — Medication 3 MILLILITER(S): at 14:16

## 2024-04-29 RX ADMIN — Medication 1 TABLET(S): at 11:32

## 2024-04-29 RX ADMIN — Medication 3: at 17:34

## 2024-04-29 RX ADMIN — Medication 650 MILLIGRAM(S): at 05:36

## 2024-04-29 RX ADMIN — Medication 10 MILLIGRAM(S): at 17:34

## 2024-04-29 RX ADMIN — CHLORHEXIDINE GLUCONATE 1 APPLICATION(S): 213 SOLUTION TOPICAL at 05:40

## 2024-04-29 NOTE — PROGRESS NOTE ADULT - SUBJECTIVE AND OBJECTIVE BOX
24H events:    Patient is a 68y old Male who presents with a chief complaint of LT Gluteal Pain / Diarrhea (28 Apr 2024 14:26)    Primary diagnosis of Abscess    Today is hospital day 10d. This morning patient was seen and examined at bedside, resting comfortably in bed.    No acute or major events overnight.    PAST MEDICAL & SURGICAL HISTORY  HTN (hypertension)    Prostate cancer      SOCIAL HISTORY:  Social History:      ALLERGIES:  No Known Allergies    MEDICATIONS:  STANDING MEDICATIONS  albuterol/ipratropium for Nebulization 3 milliLiter(s) Nebulizer every 6 hours  albuterol/ipratropium for Nebulization 3 milliLiter(s) Nebulizer every 6 hours  ascorbic acid 500 milliGRAM(s) Oral daily  benzocaine 20% Spray 1 Spray(s) Topical three times a day  chlorhexidine 2% Cloths 1 Application(s) Topical <User Schedule>  dextrose 10% Bolus 125 milliLiter(s) IV Bolus once  dextrose 5%. 1000 milliLiter(s) IV Continuous <Continuous>  dextrose 5%. 1000 milliLiter(s) IV Continuous <Continuous>  dextrose 50% Injectable 25 Gram(s) IV Push once  dextrose 50% Injectable 12.5 Gram(s) IV Push once  dicyclomine 10 milliGRAM(s) Oral three times a day before meals  doxycycline IVPB 100 milliGRAM(s) IV Intermittent every 12 hours  enoxaparin Injectable 40 milliGRAM(s) SubCutaneous every 24 hours  enzalutamide 160 milliGRAM(s) Oral daily  ferrous    sulfate Liquid 300 milliGRAM(s) Enteral Tube daily  glucagon  Injectable 1 milliGRAM(s) IntraMuscular once  guaiFENesin  milliGRAM(s) Oral every 12 hours  influenza  Vaccine (HIGH DOSE) 0.7 milliLiter(s) IntraMuscular once  insulin lispro (ADMELOG) corrective regimen sliding scale   SubCutaneous three times a day before meals  lactobacillus acidophilus 1 Tablet(s) Oral three times a day with meals  multivitamin 1 Tablet(s) Oral daily  multivitamin  Chewable 1 Tablet(s) Oral daily  sodium bicarbonate 650 milliGRAM(s) Oral every 8 hours  sodium bicarbonate  Infusion 0.207 mEq/kG/Hr IV Continuous <Continuous>    PRN MEDICATIONS  acetaminophen     Tablet .. 650 milliGRAM(s) Oral every 6 hours PRN  aluminum hydroxide/magnesium hydroxide/simethicone Suspension 30 milliLiter(s) Oral every 4 hours PRN  dextrose Oral Gel 15 Gram(s) Oral once PRN  melatonin 3 milliGRAM(s) Oral at bedtime PRN  ondansetron Injectable 4 milliGRAM(s) IV Push every 8 hours PRN    VITALS:   T(F): 97  HR: 98  BP: 90/59  RR: 17  SpO2: 94%    PHYSICAL EXAM:  GENERAL:   ( x ) NAD, lying in bed comfortably    , patient not responsive , awake , follows command.     HEAD:   (  x) Atraumatic     (  ) hematoma     (  ) laceration (specify location:       )     NECK:  ( x ) Supple     (  ) neck stiffness     (  ) nuchal rigidity     (  )  no JVD     (  ) JVD present ( -- cm)    HEART:  Rate -->     (xx  ) normal rate     (  ) bradycardic     (  ) tachycardic  Rhythm -->     ( x ) regular     (  ) regularly irregular     (  ) irregularly irregular  Murmurs -->     ( x ) normal s1s2     (  ) systolic murmur     (  ) diastolic murmur     (  ) continuous murmur      (  ) S3 present     (  ) S4 present    LUNGS:   ( x )Unlabored respirations     (  ) tachypnea  (x  ) B/L air entry     (  ) decreased breath sounds in:  (location     )    (  ) no adventitious sound     (  ) crackles     (  ) wheezing      ( x ) rhonchi, bilateral .       (specify location:       )  (  ) chest wall tenderness (specify location:       )    ABDOMEN:   ( x ) Soft     (  ) tense   |   (x  ) nondistended     (  ) distended   |   (x  ) +BS     (  ) hypoactive bowel sounds     (  ) hyperactive bowel sounds  (x  ) nontender     (  ) RUQ tenderness     (  ) RLQ tenderness     (  ) LLQ tenderness     (  ) epigastric tenderness     (  ) diffuse tenderness  (  ) Splenomegaly      (  ) Hepatomegaly      (  ) Jaundice     (  ) ecchymosis     EXTREMITIES:  (  x) Normal     (  ) Rash     (  ) ecchymosis     (  ) varicose veins      (  ) pitting edema     (  ) non-pitting edema   (  ) ulceration     (  ) gangrene:     (location:     )    NERVOUS SYSTEM:    (  x) A&Ox3     (  ) confused     (  ) lethargic  CN II-XII:     (  x) Intact     (  ) deficits found     (Specify:     )   Upper extremities:     ( x ) no sensorimotor deficits     (  ) weakness     (  ) loss of proprioception/vibration     (  ) loss of touch/temperature (specify:    )  Lower extremities:     ( x ) no sensorimotor deficits     (  ) weakness     (  ) loss of proprioception/vibration     (  ) loss of touch/temperature (specify:    )    SKIN:   (  ) No rashes or lesions     (  ) maculopapular rash     (  ) pustules     (  ) vesicles     (  ) ulcer     (  ) ecchymosis     (specify location:     )      LABS:                        7.3    13.46 )-----------( 431      ( 29 Apr 2024 05:21 )             22.3     04-29    143  |  108  |  59<H>  ----------------------------<  228<H>  3.5   |  10<L>  |  2.4<H>    Ca    6.8<L>      29 Apr 2024 05:21  Mg     2.4     04-29    TPro  5.1<L>  /  Alb  2.6<L>  /  TBili  0.6  /  DBili  x   /  AST  22  /  ALT  6   /  AlkPhos  134<H>  04-29      Urinalysis Basic - ( 29 Apr 2024 05:21 )    Color: x / Appearance: x / SG: x / pH: x  Gluc: 228 mg/dL / Ketone: x  / Bili: x / Urobili: x   Blood: x / Protein: x / Nitrite: x   Leuk Esterase: x / RBC: x / WBC x   Sq Epi: x / Non Sq Epi: x / Bacteria: x                RADIOLOGY:

## 2024-04-29 NOTE — PROGRESS NOTE ADULT - SUBJECTIVE AND OBJECTIVE BOX
Patient is a 68y old  Male who presents with a chief complaint of LT Gluteal Pain / Diarrhea (26 Apr 2024 08:35)    Patient was seen and examined.  Pt refusing oral feeds, tube feeds  Refuses BIPAP  Pt awake, alert    PAST MEDICAL & SURGICAL HISTORY:  HTN (hypertension)  Prostate cancer    Allergies  No Known Allergies    MEDICATIONS  (STANDING):  albuterol/ipratropium for Nebulization 3 milliLiter(s) Nebulizer every 6 hours  albuterol/ipratropium for Nebulization 3 milliLiter(s) Nebulizer every 6 hours  ascorbic acid 500 milliGRAM(s) Oral daily  benzocaine 20% Spray 1 Spray(s) Topical three times a day  dicyclomine 10 milliGRAM(s) Oral three times a day before meals  doxycycline IVPB 100 milliGRAM(s) IV Intermittent every 12 hours  enoxaparin Injectable 40 milliGRAM(s) SubCutaneous every 24 hours  enzalutamide 160 milliGRAM(s) Oral daily  ferrous    sulfate Liquid 300 milliGRAM(s) Enteral Tube daily  insulin lispro (ADMELOG) corrective regimen sliding scale   SubCutaneous three times a day before meals  lactobacillus acidophilus 1 Tablet(s) Oral three times a day with meals  multivitamin 1 Tablet(s) Oral daily  multivitamin  Chewable 1 Tablet(s) Oral daily  sodium bicarbonate 1300 milliGRAM(s) Oral every 8 hours  sodium bicarbonate  Infusion 0.207 mEq/kG/Hr (75 mL/Hr) IV Continuous <Continuous>    MEDICATIONS  (PRN):  acetaminophen     Tablet .. 650 milliGRAM(s) Oral every 6 hours PRN Temp greater or equal to 38C (100.4F), Mild Pain (1 - 3)  aluminum hydroxide/magnesium hydroxide/simethicone Suspension 30 milliLiter(s) Oral every 4 hours PRN Dyspepsia  dextrose Oral Gel 15 Gram(s) Oral once PRN Blood Glucose LESS THAN 70 milliGRAM(s)/deciliter  guaiFENesin  milliGRAM(s) Oral every 12 hours PRN Cough    T(C): 36.2 (04-29-24 @ 07:00), Max: 36.2 (04-29-24 @ 07:00)  HR: 101 (04-29-24 @ 07:00) (98 - 101)  BP: 92/56 (04-29-24 @ 07:00) (90/59 - 92/56)  RR: 17 (04-29-24 @ 07:00) (17 - 17)  SpO2: --      O/E:  Awake, alert, not in distress.  HEENT: atraumatic, EOMI.  Chest:  coarse breath sounds b/l  CVS: SIS2 +, no murmur.  P/A: Soft, BS+, PEG+  CNS: awake, alert  Ext: no edema feet.  Skin: Left buttock dressing+  All systems reviewed positive findings as above.                                   7.3<L>  13.46<H> )-----------( 431<H>    ( 29 Apr 2024 05:21 )             22.3<L>                        7.0<L>  13.73<H> )-----------( 451<H>    ( 28 Apr 2024 17:41 )             21.5<L>  04-29    143  |  108  |  59<H>  ----------------------------<  228<H>  3.5   |  10<L>  |  2.4<H>  04-28    136  |  105  |  56<H>  ----------------------------<  311<H>  3.1<L>   |  14<L>  |  2.2<H>    Ca    6.8<L>      29 Apr 2024 05:21  Ca    6.7<L>      28 Apr 2024 10:13  Mg     2.4     04-29    TPro  5.1<L>  /  Alb  2.6<L>  /  TBili  0.6  /  DBili  x   /  AST  22  /  ALT  6   /  AlkPhos  134<H>  04-29  TPro  4.8<L>  /  Alb  2.4<L>  /  TBili  0.5  /  DBili  x   /  AST  18  /  ALT  <5  /  AlkPhos  123<H>  04-28

## 2024-04-29 NOTE — PROGRESS NOTE ADULT - ASSESSMENT
68 year old male with PMH fo Stage 4 Prostatic Ca s/p palliative radiation, medistinal mass on oral chemo (Xtandi)< chronic anemia, HTN, chronic  back pain presents to ED from SNF for evaluation. Per NH, has been refusing medications via PEG x 3 days d/t episodes of diarrhea post-feeds. Patient was only complaining of left buttock pain, s/p debridement by burn on , s/p thoracentesis by pul on  for pleural effusion removed 1.7L.     #Left GLuteal Cellulitis  #MRSA (+) Wound Cx   - vitals: Afebrile, /892, , SpO2 100% on RA  - CT shows new mild edema and enlargement of left gluteus maximums musculature with associated subcutaneous edema.   - s/p Cefepime, Vanc, Flagyl  - currently on doxycycline   - Wound Cx (): rare MRSA   - Contact isolation  - continue local wound care, off-loading  - Per burn, no further plans for debridement  - Per ID, continue Doxycycline for 2 weeks post debridement (-)     #H/o Dysphagia s/p PEG  #N/V   - s/p esophageal stent & PEG 3/5/24  - patient cleared for pureed diet and thin liquids per SS on last admission  - refusing PEG feeds d/t diarrhea  - c/w acidophilus  - antiemetics PRN  - Cr rising (likely prerenal 2/2) dehydration/poor PO intake  - Lactate elevated     #New B/l Pleural Effusions  - d/c IV lasix  - s/p thoracentesis with pulm on , removed 1.7L  - requiring 2-3L via NC    #Stage 4 Prostatic Cancer s/p Radiation (on active PO chemo) w metastasis to mediastinum   #Severe Left Hydronephrosis  - c/w home Xtandi 160 mg qd  - monitor labs    #Metabolic acidosis  - c/w sodium bicarb    #Chronic Normocytic Anemia  - likely 2/2 malignancy  - c/w ferrous sulfate    #GOC- DNR/DNI                                           --------------------------------------------------------------    # DVT prophylaxis: Lovenox  # GI prophylaxis: PPI  # Diet:   # Activity:   # Code status: Full Code  # Disposition:    Pendin) Repeat lactate 11 AM  2) Pulm f/u  3) Encourage PO intake   68 year old male with PMH fo Stage 4 Prostatic Ca s/p palliative radiation, medistinal mass on oral chemo (Xtandi)< chronic anemia, HTN, chronic  back pain presents to ED from SNF for evaluation. Per NH, has been refusing medications via PEG x 3 days d/t episodes of diarrhea post-feeds. Patient was only complaining of left buttock pain, s/p debridement by burn on 4/24, s/p thoracentesis by pul on 4/25 for pleural effusion removed 1.7L.     #Left GLuteal Cellulitis  #MRSA (+) Wound Cx 4/22  - vitals: Afebrile, /892, , SpO2 100% on RA  - CT shows new mild edema and enlargement of left gluteus maximums musculature with associated subcutaneous edema.   - s/p Cefepime, Vanc, Flagyl  - currently on doxycycline   - Wound Cx (4/22): rare MRSA   - Contact isolation  - continue local wound care, off-loading  - Per burn, no further plans for debridement  - Per ID, continue Doxycycline for 2 weeks post debridement (4/24-5/8)     #H/o Dysphagia s/p PEG  #N/V 4/25  - s/p esophageal stent & PEG 3/5/24  - patient cleared for pureed diet and thin liquids per SS on last admission  - refusing PEG feeds d/t diarrhea  - c/w acidophilus  - antiemetics PRN  - Cr rising (likely prerenal 2/2) dehydration/poor PO intake  - Lactate elevated     #New B/l Pleural Effusions  - d/c IV lasix  - s/p thoracentesis with pulm on 4/25, removed 1.7L  - requiring 2-3L via NC    #Stage 4 Prostatic Cancer s/p Radiation (on active PO chemo) w metastasis to mediastinum   #Severe Left Hydronephrosis  - c/w home Xtandi 160 mg qd  - monitor labs    #Metabolic acidosis  - c/w sodium bicarb    #Chronic Normocytic Anemia  - likely 2/2 malignancy  - c/w ferrous sulfate    #GOC- DNR/DNI                                           --------------------------------------------------------------    # DVT prophylaxis: Lovenox  # GI prophylaxis: PPI  # Diet:   # Activity:   # Code status: Full Code  # Disposition:

## 2024-04-29 NOTE — PROGRESS NOTE ADULT - PROBLEM SELECTOR PLAN 1
Left gluteal cellulitis  -continue IV antibiotics  -f/u ID, burn  -f/u cultures.
Left gluteal cellulitis  -continue doxycycline  -no further interventions per Burn team

## 2024-04-29 NOTE — PROGRESS NOTE ADULT - PROBLEM SELECTOR PLAN 4
s/p PEG previous hospitalization. Patient refusing feeds/meds because of diarrhea at times  -f/u nutrition  -f/u calorie count
s/p PEG previous hospitalization. Patient refusing feeds/meds because of diarrhea  -f/u nutrition.

## 2024-04-29 NOTE — PROGRESS NOTE ADULT - SUBJECTIVE AND OBJECTIVE BOX
HPI:  68M w/ PMHx Stage 4 Prostate Ca s/p palliative radiation, Mediastinal Mass on oral Chemo (xtandi), Chronic Anemia, HTN and Chronic Back Pain presents to ED from SNF for evaluation. As per NH documentation, pt has been refusing medications through PEG for the last 3x days d/t episode of diarrhea post feeds. Patient's only complaint is LT buttock pain. Denies fevers, chills, chest pain, SOB, n/v, abdominal pain or urinary symptoms.    Vitals: Temp 98.3F, /82, , RR 18, SpO2 100% on RA    Labs: Hgb 8.2 (previously 8.6), Cr 0.9 (at baseline), Ca 7.8, Albumin 2.9,     Imagin. CT A/P shows:  - New mild edema and enlargement of the left gluteus maximums musculature with associated subcutaneous edema. No discrete abscess.  - Metastatic disease in the abdomen and pelvis as above with slightly increased upper abdominal adenopathy.  - No significant change approximately in the large bladder mass and partially imaged mediastinal mass.  - Interval placement of an esophageal stent.  - Severe left hydronephrosis to the level of the proximal ureter, not significantly changed.  - New/increased large bilateral pleural effusions.    In the ED:  - s/p Cefepime, Vanc, Flagyl IV x1  - s/p 1L NS bolus    Admitted to medicine. (2024 01:33)        Ethics:    Ethics consult initiated. Case discussed with team regarding patient refusing feeds and medical intervention.     Essential to autonomy is capacity, which is the ability to comprehend, understand, appreciate, and reason (C-U-A-R). Crucial to capacity is the ability to communicate an understanding of why medical interventions are necessary and an appreciation of the risks and benefits of receiving interventions versus not. It is important to note that capacity is decision-specific and can thus wax and wane over time. In that regard, capacity is an ongoing process, not a single event or assessment, and it involves a willingness on the part of the practitioners to reassess capacity over time.    In the absence of an emergency, an emergent issue, or the potential for clear harm to third parties, a commitment to respecting autonomous persons means tolerating a decision that may be unwise.     Recommendation at present is to consult Behavioral Health for capacity assessment regarding refusal of medical interventions.     Full consult to follow.

## 2024-04-29 NOTE — PROGRESS NOTE ADULT - PROBLEM SELECTOR PLAN 3
New Large B/L pleural effusion  -patient previously refusing thoracentesis  -continue IV diuresis  -monitor lytes,UOP, creatinine  -f/u pulm
New Large B/L pleural effusion  -s/p thoracentesis 4/25  -f/u pulm

## 2024-04-29 NOTE — PROGRESS NOTE ADULT - ASSESSMENT
68yMale with history of metstatic prostate cancer s/p pall radiation, mediastinal mass on oral chemo presents with patient refusing meds through PEG.  Palliative care consulted for GOC.    Reconsulted for GOC.  Spoke with patient at bedside. Per discussions with primary team, he has been refusing feeds and medical interventions at times. Previously, the patient has not had capacity to understand the complete consequences of his decision when he decided to refuse interventions, but did understand code status earlier in this hospitalization and made himself DNR/DNI.  Ethics decided last admission that the patient had capacity to refuse interventions.    We discussed his clinical status. We noted he had been refusing tube feeds due to diarrhea and other interventions.  When we discussed the consequences of refusing those interventions, he could not explain why he was refusing (other than diarrhea for tube feeds).  We discussed that continued refusal of feeds or other interventions could lead to worsening clinical status and death.  We discussed GOC given his refusal of interventions; discussed both hospice/CMO and ongoing medical management. He noted he wants "to live" and wants ongoing medical management.    Patient does not demonstrate capacity to understand his complete clinical picture. Recommend possible additional ethics consult if more elucidation about next steps is needed.    Education about palliative care provided to patient/family.  See Recs below.    Please call [phone] with questions or concerns 24/7.   We will continue to follow.

## 2024-04-29 NOTE — CHART NOTE - NSCHARTNOTEFT_GEN_A_CORE
Patient called office as requested via patient portal. Patient Provided his [facility]rologist information as:    [facility]   [facility]    PH: [phone]  FX: [phone]      As per patients request his last consult note will be sent to Neuro and to his PCP once its signed by our provider.    Registered Dietitian Follow-Up     Patient Profile Reviewed                           Yes [x]   No []     Nutrition History Previously Obtained        Yes [x]  No []       Pertinent Subjective Information: Pt reports not eating too well because he has persistent diarrhea. Pt says that he has been going 5-6x/day. RN says that Tf was discussed with pt but he is refusing them. Pt says that he does not want EN. Discussed nutrition supplements with pt refuses to try those as well.      Pertinent Medical Interventions:  #Left GLuteal Cellulitis  #MRSA (+) Wound Cx 4/22 Per burn, no further plans for debridement  #H/o Dysphagia s/p PEG  #N/V 4/25  - s/p esophageal stent & PEG 3/5/24  - patient cleared for pureed diet and thin liquids per SS on last admission  - refusing PEG feeds d/t diarrhea  #Stage 4 Prostatic Cancer s/p Radiation (on active PO chemo) w metastasis to mediastinum     Diet order: Diet, Pureed:   Tube Feeding Modality: Gastrostomy  Osmolite 1.5 Sam (OSMOLITE1.5)  Total Volume for 24 Hours (mL): 600  Bolus  Total Volume of Bolus (mL):  150  Total # of Feeds: 4  Tube Feed Frequency: Every 6 hours   Tube Feed Start Time: 06:00  Bolus Feed Rate (mL per Hour): 1000   Bolus Feed Duration (in Hours): 1  Free Water Flush  Bolus   Total Volume per Flush (mL): 75   Frequency: Every 6 Hours  Free Water Flush Instructions:  75ml pre and post feeds  Banatrol TF     Qty per Day:  4  Supplement Feeding Modality:  Oral  Ensure Plus High Protein Cans or Servings Per Day:  2       Frequency:  Daily (04-26-24 @ 11:37) [Active]    Anthropometrics:  Height (cm): 185.4 (04-25-24 @ 12:13)  Weight (kg): 54.4 (04-25-24 @ 12:13)  BMI (kg/m2): 15.8 (04-25-24 @ 12:13)  IBW: 83.6 kg    MEDICATIONS  (STANDING):  albuterol/ipratropium for Nebulization 3 milliLiter(s) Nebulizer every 6 hours  albuterol/ipratropium for Nebulization 3 milliLiter(s) Nebulizer every 6 hours  ascorbic acid 500 milliGRAM(s) Oral daily  dextrose 50% Injectable 25 Gram(s) IV Push once  dextrose 50% Injectable 12.5 Gram(s) IV Push once  dicyclomine 10 milliGRAM(s) Oral three times a day before meals  doxycycline IVPB 100 milliGRAM(s) IV Intermittent every 12 hours  enoxaparin Injectable 40 milliGRAM(s) SubCutaneous every 24 hours  enzalutamide 160 milliGRAM(s) Oral daily  ferrous    sulfate Liquid 300 milliGRAM(s) Enteral Tube daily  insulin lispro (ADMELOG) corrective regimen sliding scale   SubCutaneous three times a day before meals  lactobacillus acidophilus 1 Tablet(s) Oral three times a day with meals  multivitamin 1 Tablet(s) Oral daily  multivitamin  Chewable 1 Tablet(s) Oral daily  sodium bicarbonate 1300 milliGRAM(s) Oral every 8 hours  sodium bicarbonate  Infusion 0.207 mEq/kG/Hr (75 mL/Hr) IV Continuous <Continuous>    MEDICATIONS  (PRN):  aluminum hydroxide/magnesium hydroxide/simethicone Suspension 30 milliLiter(s) Oral every 4 hours PRN Dyspepsia  dextrose Oral Gel 15 Gram(s) Oral once PRN Blood Glucose LESS THAN 70 milliGRAM(s)/deciliter    Pertinent Labs: 04-29 @ 05:21: Na 143, BUN 59<H>, Cr 2.4<H>, <H>, K+ 3.5, Phos --, Mg 2.4, Alk Phos 134<H>, ALT/SGPT 6, AST/SGOT 22, HbA1c --    Finger Sticks:  POCT Blood Glucose.: 282 mg/dL (04-29 @ 11:13)  POCT Blood Glucose.: 256 mg/dL (04-29 @ 07:04)  POCT Blood Glucose.: 186 mg/dL (04-28 @ 21:32)  POCT Blood Glucose.: 144 mg/dL (04-28 @ 16:40)    Physical Findings:  - Appearance: AAOX4  - GI function: diarrhea, last BM 4/28  - Tubes: PEG   - Oral/Mouth cavity: oral/TF via PEG  - Skin: ecchymosis   - Edema: none     Nutrition Requirements: per previous RD assessment 4/24  Weight Used: 54.4 kg      Estimated Energy Needs    Continue [x]  Adjust []  1904-2176kcal/day (35-40kcal/kg)     Estimated Protein Needs    Continue [x]  Adjust []  82-109grams/day (1.5-2grams/kg of admit weight)     Estimated Fluid Needs        Continue []  Adjust [x]  6373-4344 (1 mL/kcal)    Nutrient Intake: current TF order to provide: 900kcal, 37.25g PRO, 456mL free H2O.     [] Previous Nutrition Diagnosis: severe PCM             [x] Ongoing          [] Resolved     Nutrition Education: encouraged PO intake and supplement intake     Goal/Expected Outcome: pt to meet % of estimated EN (protein, energy) needs in 3-5 days.     Nutrition Monitoring:diet order,energy intake,body composition,NFPF, lytes, GI, BM, BG     Recommendation:  1. consider anti-diarrheal   2. Add Banatrol 3x/day to pureed foods like yogurt pudding   3. d/c Ensure as pt does not drink that due to diarrhea   4. add magic cup 3x/day (870kcal/27g PRO)   5. consider appetite stimulant if medically feasible   6. max encouragement and assistance with meals   7. if Pt agrees to TF's -- consider following regimen   Day 1  - start feeds at 120 mL q6hrs  Day 2 - advance feeds to 240 mL q6hrs as tolerated  Day 3 - advance feeds to goal rate of 360 mL q6hrs --provide 1440 mL total volume, 2160 kcal, 90 gm Protein, 1094.4 mL free water from formula + recommend 75 mL flushes pre/post feeds     high risk f/u in 3-5 days   RD remains available: Laura Prabhakar, KRISTAN [phone]

## 2024-04-29 NOTE — PROGRESS NOTE ADULT - ASSESSMENT
68M w/ PMHx Stage 4 Prostate Ca s/p palliative radiation, Mediastinal Mass on oral Chemo (xtandi), Chronic Anemia, HTN and Chronic Back Pain presents to ED from SNF for evaluation. As per NH documentation, pt has been refusing medications through PEG for the last 3x days d/t episode of diarrhea post feeds. Patient's only complaint is LT buttock pain. Denies fevers, chills, chest pain, SOB, n/v, abdominal pain or urinary symptoms.    # Acute Hypoxic resp failure  # B/l  pleural effusions  -  Xray Chest 1 View- PORTABLE-Routine (Xray Chest 1 View- PORTABLE-Routine in AM.) (04.27.24 @ 07:51) >Stable bilateral opacities.  - S/p left thoracentesis on 4/25 --> 1800 milliLiter drained  - BIPAP at HS- pt refuses  - Maintain oxygen sat > 92    # Hypotension  - dc metoprolol  - monitor BP    # Metabolic acidosis  - c/w  IV bicarb/ some times refusing IV  - c/w PO bicarb  - monitor BMP    # Left gluteal cellulitis  - CT Abdomen and Pelvis w/ IV Cont (04.19.24 @ 19:17) >New mild edema and enlargement of the left gluteus maximums musculature with associated subcutaneous edema. No discrete abscess. Metastatic disease in the abdomen and pelvis  with slightly increased upper abdominal adenopathy. No significant change approximately in the large bladder mass and partially imaged mediastinal mass. Interval placement of an esophageal stent. Severe left hydronephrosis to the level of the proximal ureter, not   significantly changed. New/increased large bilateral pleural effusions.  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 Right renal cysts and other low attenuating lesions in the right kidney too small to characterize. PELVIC ORGANS: Large 7 cm bladder mass, unchanged since prior CT.  - Lactate, Blood: 2.8: Elevated lactate. Consider ordering follow-up lactate to trend. mmol/L (04.26.24 @ 07:09)  - monitor I/o  - trend lactate  - monitor BMP    # Hyperglycemia  - A1C with Estimated Average Glucose Result: 5.4 % (04.29.24 @ 05:21)  - monitor FS  - c/w  insulin    # H/o Dysphagia  - s/p esophageal stent and PEG 3/5/24  - swallow eval: puree w/ thin liquids, will likely require continued use of PEG    # Metastatic prostate cancer stage 4  # Mediastinal mass  - S/p palliative radiation  - on  Xtandi 160 mg qd    # Anemia  - Pt refused 1 unit PRBC  - c/w ferrous sulfate  - monitor cbc    # Noncompliant with medication, treatment care    # DVT prophylaxis    # Poor prognosis    #  DNR/DNI/ trial of NIV  - Palliative care f/u: Ethics decided last admission that the patient had capacity to refuse interventions.When we discussed the consequences of refusing those interventions, he could not explain why he was refusing (other than diarrhea for tube feeds).  We discussed that continued refusal of feeds or other interventions could lead to worsening clinical status and death.  We discussed GOC given his refusal of interventions; discussed both hospice/CMO and ongoing medical management. He noted he wants "to live" and wants ongoing medical management.Patient does not demonstrate capacity to understand his complete clinical picture. Recommend possible additional ethics consult if more elucidation about next steps is needed.      # Pending: monitor cbc,  BMP, HBA1C , monitor BP,  ethics consult  # Disposition: [facility]

## 2024-04-29 NOTE — PROGRESS NOTE ADULT - PROBLEM SELECTOR PLAN 2
followed by Dr. Irizarry, on active chemo  -f/u heme onc  -f/u rad onc  -GOC as appropriate  -continue home Xtandi.
followed by Dr. Irizarry, on active chemo  -f/u heme onc  -f/u rad onc  -GOC as appropriate  -continue home Xtandi.

## 2024-04-29 NOTE — PROGRESS NOTE ADULT - SUBJECTIVE AND OBJECTIVE BOX
HPI:  68M w/ PMHx Stage 4 Prostate Ca s/p palliative radiation, Mediastinal Mass on oral Chemo (xtandi), Chronic Anemia, HTN and Chronic Back Pain presents to ED from SNF for evaluation. As per NH documentation, pt has been refusing medications through PEG for the last 3x days d/t episode of diarrhea post feeds. Patient's only complaint is LT buttock pain. Denies fevers, chills, chest pain, SOB, n/v, abdominal pain or urinary symptoms.    Interval history  -Patient seen at bedside  -denied any pain or SOB     ADVANCE DIRECTIVES:     [] Full Code [ x] DNR  MOLST  [ ]  Living Will  [ ]   DECISION MAKER(s):  [ ] Health Care Proxy(s)  [x ] Surrogate(s)  [ ] Guardian           Name(s): Phone Number(s): None      BASELINE (I)ADL(s) (prior to admission):    [place]: [ ]Total  [ ] Moderate [ ]Dependent  Palliative Performance Status Version 2:         %    http://npcrc.org/files/news/palliative_performance_scale_ppsv2.pdf    Allergies    No Known Allergies    Intolerances    MEDICATIONS  (STANDING):  albuterol/ipratropium for Nebulization 3 milliLiter(s) Nebulizer every 6 hours  albuterol/ipratropium for Nebulization 3 milliLiter(s) Nebulizer every 6 hours  ascorbic acid 500 milliGRAM(s) Oral daily  benzocaine 20% Spray 1 Spray(s) Topical three times a day  chlorhexidine 2% Cloths 1 Application(s) Topical <User Schedule>  dextrose 50% Injectable 25 Gram(s) IV Push once  dextrose 50% Injectable 12.5 Gram(s) IV Push once  dicyclomine 10 milliGRAM(s) Oral three times a day before meals  doxycycline IVPB 100 milliGRAM(s) IV Intermittent every 12 hours  enoxaparin Injectable 40 milliGRAM(s) SubCutaneous every 24 hours  enzalutamide 160 milliGRAM(s) Oral daily  ferrous    sulfate Liquid 300 milliGRAM(s) Enteral Tube daily  insulin lispro (ADMELOG) corrective regimen sliding scale   SubCutaneous three times a day before meals  lactobacillus acidophilus 1 Tablet(s) Oral three times a day with meals  multivitamin 1 Tablet(s) Oral daily  multivitamin  Chewable 1 Tablet(s) Oral daily  sodium bicarbonate 1300 milliGRAM(s) Oral every 8 hours  sodium bicarbonate  Infusion 0.207 mEq/kG/Hr (75 mL/Hr) IV Continuous <Continuous>    MEDICATIONS  (PRN):  acetaminophen     Tablet .. 650 milliGRAM(s) Oral every 6 hours PRN Temp greater or equal to 38C (100.4F), Mild Pain (1 - 3)  aluminum hydroxide/magnesium hydroxide/simethicone Suspension 30 milliLiter(s) Oral every 4 hours PRN Dyspepsia  dextrose Oral Gel 15 Gram(s) Oral once PRN Blood Glucose LESS THAN 70 milliGRAM(s)/deciliter  guaiFENesin  milliGRAM(s) Oral every 12 hours PRN Cough        PRESENT SYMPTOMS: [ ]Unable to obtain due to poor mentation   Source if other than patient:  [ ]Family   [ ]Team     Pain: [ ]yes [x ]no  ALL PAIN ASSESSMENT COMPONENTS WERE ASKED ABOUT UNLESS OTHERWISE NOTED  QOL impact -   Location -                    Aggravating factors -  Quality -  Radiation -  Timing-  Severity (0-10 scale):  Minimal acceptable level (0-10 scale):     CPOT:    https://www.[facility].org/getattachment/[email or web address]/Critical-Care-Pain-Observation-Tool-(CPOT)    PAIN AD Score:   [email or web address].[place].[facility]/cog/painad.pdf (press ctrl +  left click to view)    Dyspnea:                           [x ]None[ ]Mild [ ]Moderate [ ]Severe     Respiratory Distress Observation Scale (RDOS):   A score of 0 to 2 signifies little or no respiratory distress, 3 signifies mild distress, scores 4 to 6 indicate moderate distress, and scores greater than 7 signify severe distress  [email or web address]://[email or web address]/sites/default/files/PDFS/475264-zbyhjklmmps-uajttdpi-azvwkofcfyr-jmgrq.pdf    Anxiety:                             [ x]None[ ]Mild [ ]Moderate [ ]Severe   Fatigue:                             [ x]None[ ]Mild [ ]Moderate [ ]Severe   Nausea:                             [ x]None[ ]Mild [ ]Moderate [ ]Severe   Loss of appetite:              [ x]None[ ]Mild [ ]Moderate [ ]Severe   Constipation:                    [x]None[ ]Mild [ ]Moderate [ ]Severe    Other Symptoms:  [ ]All other review of systems negative     Palliative Performance Status Version 2:         30%    http://npcrc.org/files/news/palliative_performance_scale_ppsv2.pdf    PHYSICAL EXAM:  Vital Signs Last 24 Hrs  T(C): 36.2 (29 Apr 2024 07:00), Max: 36.2 (28 Apr 2024 15:00)  T(F): 97.1 (29 Apr 2024 07:00), Max: 97.2 (28 Apr 2024 15:00)  HR: 101 (29 Apr 2024 07:00) (97 - 101)  BP: 92/56 (29 Apr 2024 07:00) (90/59 - 99/70)  BP(mean): --  RR: 17 (29 Apr 2024 07:00) (17 - 19)  SpO2: 94% (28 Apr 2024 15:00) (94% - 94%)    Parameters below as of 28 Apr 2024 15:00  Patient On (Oxygen Delivery Method): room air    GENERAL:  [ ] No acute distress [ ]Lethargic  [ ]Unarousable  [ x]Verbal  [ ]Non-Verbal [ ]Cachexia    BEHAVIORAL/PSYCH:  [x ]Alert and Oriented x2-3  [ ] Anxiety [ ] Delirium [ ] Agitation [x ] Calm   EYES: [ x] No scleral icterus [ ] Scleral icterus [ ] Closed  ENMT:  [ ]Dry mouth  [x ]No external oral lesions [ ] No external ear or nose lesions  CARDIOVASCULAR:  [ ]Regular [ ]Irregular [ ]Tachy [ x]Not Tachy  [ ]Rambo [ ] Edema [ ] No edema  PULMONARY:  [ ]Tachypnea  [ ]Audible excessive secretions [x ] No labored breathing [ ] labored breathing  GASTROINTESTINAL: [ ]Soft  [ ]Distended  [ ]Not distended [ ]Non tender [ ]Tender  MUSCULOSKELETAL: [ ]No clubbing [ ] clubbing  [x ] No cyanosis [ ] cyanosis  NEUROLOGIC: [ ]No focal deficits  [ x]Follows commands  [ ]Does not follow commands  [ ]Cognitive impairment  [ ]Dysphagia  [ ]Dysarthria  [ ]Paresis   SKIN: [ ] Jaundiced [x ] Non-jaundiced [ ]Rash [ ]No Rash [ ] Warm [ ] Dry  MISC/LINES: [ ] ET tube [ ] Trach [ ]NGT/OGT [ ]PEG [ ]Angel    LABS: reviewed by me                          7.3    13.46 )-----------( 431      ( 29 Apr 2024 05:21 )             22.3       04-29    143  |  108  |  59<H>  ----------------------------<  228<H>  3.5   |  10<L>  |  2.4<H>    Ca    6.8<L>      29 Apr 2024 05:21  Mg     2.4     04-29    TPro  5.1<L>  /  Alb  2.6<L>  /  TBili  0.6  /  DBili  x   /  AST  22  /  ALT  6   /  AlkPhos  134<H>  04-29              Urinalysis Basic - ( 29 Apr 2024 05:21 )    Color: x / Appearance: x / SG: x / pH: x  Gluc: 228 mg/dL / Ketone: x  / Bili: x / Urobili: x   Blood: x / Protein: x / Nitrite: x   Leuk Esterase: x / RBC: x / WBC x   Sq Epi: x / Non Sq Epi: x / Bacteria: x                  CAPILLARY BLOOD GLUCOSE      POCT Blood Glucose.: 282 mg/dL (29 Apr 2024 11:13)                RADIOLOGY & ADDITIONAL STUDIES: reviewed by me    < from: Xray Chest 1 View- PORTABLE-Routine (Xray Chest 1 View- PORTABLE-Routine in AM.) (04.28.24 @ 06:06) >  Support devices: Stable esophageal stent.    Cardiac/ Mediastinum: Stable mediastinal silhouette with paratracheal mass    Lungs/ Pleura: Stable bilateral opacities left greater than right   effusions.    Skeletal/soft tissues: Stable    < end of copied text >          EKG: reviewed by me    < from: 12 Lead ECG (04.20.24 @ 09:33) >  Ventricular Rate 84 BPM    Atrial Rate 84 BPM    P-R Interval 160 ms    QRS Duration 58 ms    Q-T Interval 410 ms    QTC Calculation(Bazett) 484 ms    P Axis 35 degrees    R Axis 51 degrees    T Axis 49 degrees    Diagnosis Line Sinus rhythm with Premature atrial complexes  Septal infarct , age undetermined  Abnormal ECG    < end of copied text >      PROTEIN CALORIE MALNUTRITION PRESENT: [ ]mild [ ]moderate [ ]severe [ ]underweight [ ]morbid obesity  https://www.andeal.org/vault/6280/web/files/ONC/Table_Clinical%20Characteristics%20to%20Document%20Malnutrition-White%20JV%20et%20al%202012.pdf    Height (cm): 185.4 (04-21-24 @ 17:28), 185.4 (03-26-24 @ 15:34), 185.4 (03-05-24 @ 16:39)  Weight (kg): 54.4 (04-19-24 @ 15:56), 77.1 (03-05-24 @ 16:39), 77.1 (11-22-23 @ 08:11)  BMI (kg/m2): 15.8 (04-21-24 @ 17:28), 15.8 (04-19-24 @ 15:56), 22.4 (03-26-24 @ 15:34)  [ ]PPSV2 < or = to 30% [ ]significant weight loss  [ ]poor nutritional intake  [ ]anasarca      [ ]Artificial Nutrition      Palliative Care Spiritual/Emotional Screening Tool Question  Severity (0-4):                    OR                    [ x] Unable to determine. Will assess at later time if appropriate.  Score of 2 or greater indicates recommendation of Chaplaincy and/or SW referral  Chaplaincy Referral: [ ] Yes [ ] Refused [ ] Following     Caregiver [place]:  [ ] Yes [ ] No    OR    [x ] Unable to determine. Will assess at later time if appropriate.  Social Work Referral [ ]  Patient and Family Centered Care Referral [ ]    Anticipatory Grief Present: [ ] Yes [ ] No    OR     [ x] Unable to determine. Will assess at later time if appropriate.  Social Work Referral [ ]  Patient and Family Centered Care Referral [ ]    Patient discussed with primary medical team MD  Palliative care education provided to patient and/or family

## 2024-04-29 NOTE — PROGRESS NOTE ADULT - NS ATTEST RISK PROBLEM GEN_ALL_CORE FT
above acuity
above acuity and comorbidities
patients noncompliance with his medical treatment
[ x] Personally review and interpretation of  image or testing   [x ] Discussion of management or test interpretation with external physician/other qualified health care professional\appropriate source (not separately reported)    3. [ x] Drug therapy requiring intensive monitoring for toxicity

## 2024-04-30 DIAGNOSIS — F43.20 ADJUSTMENT DISORDER, UNSPECIFIED: ICD-10-CM

## 2024-04-30 DIAGNOSIS — Z00.8 ENCOUNTER FOR OTHER GENERAL EXAMINATION: ICD-10-CM

## 2024-04-30 LAB
ALBUMIN SERPL ELPH-MCNC: 2.5 G/DL — LOW (ref 3.5–5.2)
ALP SERPL-CCNC: 131 U/L — HIGH (ref 30–115)
ALT FLD-CCNC: <5 U/L — SIGNIFICANT CHANGE UP (ref 0–41)
ANION GAP SERPL CALC-SCNC: 19 MMOL/L — HIGH (ref 7–14)
AST SERPL-CCNC: 15 U/L — SIGNIFICANT CHANGE UP (ref 0–41)
BASOPHILS # BLD AUTO: 0.01 K/UL — SIGNIFICANT CHANGE UP (ref 0–0.2)
BASOPHILS NFR BLD AUTO: 0.1 % — SIGNIFICANT CHANGE UP (ref 0–1)
BILIRUB SERPL-MCNC: 0.6 MG/DL — SIGNIFICANT CHANGE UP (ref 0.2–1.2)
BUN SERPL-MCNC: 60 MG/DL — HIGH (ref 10–20)
CALCIUM SERPL-MCNC: 6.6 MG/DL — LOW (ref 8.4–10.4)
CHLORIDE SERPL-SCNC: 105 MMOL/L — SIGNIFICANT CHANGE UP (ref 98–110)
CO2 SERPL-SCNC: 15 MMOL/L — LOW (ref 17–32)
CREAT SERPL-MCNC: 2.5 MG/DL — HIGH (ref 0.7–1.5)
CULTURE RESULTS: SIGNIFICANT CHANGE UP
EGFR: 27 ML/MIN/1.73M2 — LOW
EOSINOPHIL # BLD AUTO: 0 K/UL — SIGNIFICANT CHANGE UP (ref 0–0.7)
EOSINOPHIL NFR BLD AUTO: 0 % — SIGNIFICANT CHANGE UP (ref 0–8)
GLUCOSE BLDC GLUCOMTR-MCNC: 252 MG/DL — HIGH (ref 70–99)
GLUCOSE BLDC GLUCOMTR-MCNC: 295 MG/DL — HIGH (ref 70–99)
GLUCOSE SERPL-MCNC: 269 MG/DL — HIGH (ref 70–99)
HCT VFR BLD CALC: 21.3 % — LOW (ref 42–52)
HGB BLD-MCNC: 6.9 G/DL — CRITICAL LOW (ref 14–18)
IMM GRANULOCYTES NFR BLD AUTO: 1.1 % — HIGH (ref 0.1–0.3)
LACTATE SERPL-SCNC: 1.5 MMOL/L — SIGNIFICANT CHANGE UP (ref 0.7–2)
LYMPHOCYTES # BLD AUTO: 0.3 K/UL — LOW (ref 1.2–3.4)
LYMPHOCYTES # BLD AUTO: 3.2 % — LOW (ref 20.5–51.1)
MCHC RBC-ENTMCNC: 29.4 PG — SIGNIFICANT CHANGE UP (ref 27–31)
MCHC RBC-ENTMCNC: 32.4 G/DL — SIGNIFICANT CHANGE UP (ref 32–37)
MCV RBC AUTO: 90.6 FL — SIGNIFICANT CHANGE UP (ref 80–94)
MONOCYTES # BLD AUTO: 0.94 K/UL — HIGH (ref 0.1–0.6)
MONOCYTES NFR BLD AUTO: 10 % — HIGH (ref 1.7–9.3)
NEUTROPHILS # BLD AUTO: 8.05 K/UL — HIGH (ref 1.4–6.5)
NEUTROPHILS NFR BLD AUTO: 85.6 % — HIGH (ref 42.2–75.2)
NRBC # BLD: 0 /100 WBCS — SIGNIFICANT CHANGE UP (ref 0–0)
PLATELET # BLD AUTO: 436 K/UL — HIGH (ref 130–400)
PMV BLD: 9.2 FL — SIGNIFICANT CHANGE UP (ref 7.4–10.4)
POTASSIUM SERPL-MCNC: 3.4 MMOL/L — LOW (ref 3.5–5)
POTASSIUM SERPL-SCNC: 3.4 MMOL/L — LOW (ref 3.5–5)
PROT SERPL-MCNC: 4.8 G/DL — LOW (ref 6–8)
RBC # BLD: 2.35 M/UL — LOW (ref 4.7–6.1)
RBC # FLD: 16.8 % — HIGH (ref 11.5–14.5)
SODIUM SERPL-SCNC: 139 MMOL/L — SIGNIFICANT CHANGE UP (ref 135–146)
SPECIMEN SOURCE: SIGNIFICANT CHANGE UP
WBC # BLD: 9.4 K/UL — SIGNIFICANT CHANGE UP (ref 4.8–10.8)
WBC # FLD AUTO: 9.4 K/UL — SIGNIFICANT CHANGE UP (ref 4.8–10.8)

## 2024-04-30 PROCEDURE — 90792 PSYCH DIAG EVAL W/MED SRVCS: CPT | Mod: 2W

## 2024-04-30 PROCEDURE — 99233 SBSQ HOSP IP/OBS HIGH 50: CPT

## 2024-04-30 RX ORDER — POTASSIUM CHLORIDE 20 MEQ
40 PACKET (EA) ORAL ONCE
Refills: 0 | Status: COMPLETED | OUTPATIENT
Start: 2024-04-30 | End: 2024-04-30

## 2024-04-30 RX ADMIN — ENOXAPARIN SODIUM 40 MILLIGRAM(S): 100 INJECTION SUBCUTANEOUS at 21:38

## 2024-04-30 RX ADMIN — Medication 650 MILLIGRAM(S): at 22:21

## 2024-04-30 RX ADMIN — Medication 3: at 08:18

## 2024-04-30 RX ADMIN — Medication 3 MILLILITER(S): at 07:46

## 2024-04-30 RX ADMIN — Medication 1300 MILLIGRAM(S): at 06:08

## 2024-04-30 RX ADMIN — CHLORHEXIDINE GLUCONATE 1 APPLICATION(S): 213 SOLUTION TOPICAL at 06:11

## 2024-04-30 RX ADMIN — Medication 1300 MILLIGRAM(S): at 21:38

## 2024-04-30 RX ADMIN — Medication 3: at 13:07

## 2024-04-30 RX ADMIN — Medication 1 TABLET(S): at 08:19

## 2024-04-30 NOTE — BH CONSULTATION LIAISON ASSESSMENT NOTE - DESCRIPTION
Patient reports that he grew up in [place] , completed 12 th grade . 
Patient reports that he grew up in [place] , completed 12 th grade .

## 2024-04-30 NOTE — CHART NOTE - NSCHARTNOTEFT_GEN_A_CORE
Patient refusing IV insertion for antibiotics .  Patient refusing IV insertion for Packed RBC infusion .  Explained to the patient the risks of not getting treatment and packed RBC transfusion .  Patient still refusing ORal and PEg tube feeds

## 2024-04-30 NOTE — PROGRESS NOTE ADULT - SUBJECTIVE AND OBJECTIVE BOX
24H events:    Patient is a 68y old Male who presents with a chief complaint of LT Gluteal Pain / Diarrhea (29 Apr 2024 15:52)    Primary diagnosis of Abscess      Today is hospital day 11d. This morning patient was seen and examined at bedside, resting comfortably in bed.    No acute or major events overnight.    PAST MEDICAL & SURGICAL HISTORY  HTN (hypertension)    Prostate cancer      SOCIAL HISTORY:  Social History:      ALLERGIES:  No Known Allergies    MEDICATIONS:  STANDING MEDICATIONS  albuterol/ipratropium for Nebulization 3 milliLiter(s) Nebulizer every 6 hours  albuterol/ipratropium for Nebulization 3 milliLiter(s) Nebulizer every 6 hours  ascorbic acid 500 milliGRAM(s) Oral daily  benzocaine 20% Spray 1 Spray(s) Topical three times a day  chlorhexidine 2% Cloths 1 Application(s) Topical <User Schedule>  dextrose 50% Injectable 25 Gram(s) IV Push once  dextrose 50% Injectable 12.5 Gram(s) IV Push once  dicyclomine 10 milliGRAM(s) Oral three times a day before meals  doxycycline IVPB 100 milliGRAM(s) IV Intermittent every 12 hours  enoxaparin Injectable 40 milliGRAM(s) SubCutaneous every 24 hours  enzalutamide 160 milliGRAM(s) Oral daily  ferrous    sulfate Liquid 300 milliGRAM(s) Enteral Tube daily  insulin lispro (ADMELOG) corrective regimen sliding scale   SubCutaneous three times a day before meals  lactobacillus acidophilus 1 Tablet(s) Oral three times a day with meals  multivitamin 1 Tablet(s) Oral daily  multivitamin  Chewable 1 Tablet(s) Oral daily  sodium bicarbonate 1300 milliGRAM(s) Oral every 8 hours  sodium bicarbonate  Infusion 0.207 mEq/kG/Hr IV Continuous <Continuous>    PRN MEDICATIONS  acetaminophen     Tablet .. 650 milliGRAM(s) Oral every 6 hours PRN  aluminum hydroxide/magnesium hydroxide/simethicone Suspension 30 milliLiter(s) Oral every 4 hours PRN  dextrose Oral Gel 15 Gram(s) Oral once PRN  guaiFENesin  milliGRAM(s) Oral every 12 hours PRN    VITALS:   T(F): 97  HR: 100  BP: 113/74  RR: 16  SpO2: --    PHYSICAL EXAM:  GENERAL:   (x  ) NAD, lying in bed comfortably     (  ) obtunded     (  ) lethargic     (  ) somnolent    HEAD:   ( x ) Atraumatic     (  ) hematoma     (  ) laceration (specify location:       )     NECK:  (x  ) Supple     (  ) neck stiffness     (  ) nuchal rigidity     (  )  no JVD     (  ) JVD present ( -- cm)    HEART:  Rate -->     ( x ) normal rate     (  ) bradycardic     (  ) tachycardic  Rhythm -->     ( x ) regular     (  ) regularly irregular     (  ) irregularly irregular  Murmurs -->     (  ) normal s1s2     (  ) systolic murmur     (  ) diastolic murmur     (  ) continuous murmur      (  ) S3 present     (  ) S4 present    LUNGS:   (x  )Unlabored respirations     (  ) tachypnea  (  ) B/L air entry     ( x) decreased breath sounds due to poor effort   (x ) no adventitious sound     (  ) crackles     (  ) wheezing      (  ) rhonchi      (specify location:       )  (  ) chest wall tenderness (specify location:       )    ABDOMEN:   (  x) Soft     (  ) tense   |   ( x nondistended     (  ) distended   |   (x  ) +BS     (  ) hypoactive bowel sounds     (  ) hyperactive bowel sounds  ( x ) nontender     (  ) RUQ tenderness     (  ) RLQ tenderness     (  ) LLQ tenderness     (  ) epigastric tenderness     (  ) diffuse tenderness  (  ) Splenomegaly      (  ) Hepatomegaly      (  ) Jaundice     (  ) ecchymosis   presence of a PEG tube     EXTREMITIES:  ( x ) Normal     (  ) Rash     (  ) ecchymosis     (  ) varicose veins      (  ) pitting edema     (  ) non-pitting edema   (  ) ulceration     (  ) gangrene:     (location:     )    NERVOUS SYSTEM:    ( x ) A&Ox3     (  ) confused     (  ) lethargic  CN II-XII:     ( x ) Intact     (  ) deficits found     (Specify:     )   Upper extremities:     ( x ) no sensorimotor deficits     (  ) weakness     (  ) loss of proprioception/vibration     (  ) loss of touch/temperature (specify:    )  Lower extremities:     (x  ) no sensorimotor deficits     (  ) weakness     (  ) loss of proprioception/vibration     (  ) loss of touch/temperature (specify:    )    SKIN:   ( x ) No rashes or lesions     (  ) maculopapular rash     (  ) pustules     (  ) vesicles     (  ) ulcer     (  ) ecchymosis     (specify location:     )    AMPAC score:      LABS:                        7.3    13.46 )-----------( 431      ( 29 Apr 2024 05:21 )             22.3     04-29    143  |  108  |  59<H>  ----------------------------<  228<H>  3.5   |  10<L>  |  2.4<H>    Ca    6.8<L>      29 Apr 2024 05:21  Mg     2.4     04-29    TPro  5.1<L>  /  Alb  2.6<L>  /  TBili  0.6  /  DBili  x   /  AST  22  /  ALT  6   /  AlkPhos  134<H>  04-29      Urinalysis Basic - ( 29 Apr 2024 05:21 )    Color: x / Appearance: x / SG: x / pH: x  Gluc: 228 mg/dL / Ketone: x  / Bili: x / Urobili: x   Blood: x / Protein: x / Nitrite: x   Leuk Esterase: x / RBC: x / WBC x   Sq Epi: x / Non Sq Epi: x / Bacteria: x                RADIOLOGY:

## 2024-04-30 NOTE — BH CONSULTATION LIAISON ASSESSMENT NOTE - NSBHCONSULTRECOMMENDOTHER_PSY_A_CORE FT
We recommend melatonin 5 –10 mg P.O bedtime to regulate sleep wake cycle     We recommend behavioral interventions, and environmental modifications to help patient's orientation and help her feel safe in addition to implementing delirium precautions as per nursing staff.  1. There is no indication for the use of any psychotropic medications for now   2. We recommend that the medical team address patient's stressors as much as possible   3. We recommend melatonin 5 –10 mg P.O bedtime to regulate sleep wake cycle   4. We recommend behavioral interventions, and environmental modifications to help patient's orientation and help her feel safe in addition to implementing delirium precautions as per nursing staff.   5. Upon discharge, patient will benefit from supportive psychotherapy to help him develop better coping skills and better frustration tolerance to address his stressors.  6. No further psychiatric intervention is indicated for now , please recall as needed .

## 2024-04-30 NOTE — BH CONSULTATION LIAISON ASSESSMENT NOTE - NSICDXBHPRIMARYDX_PSY_ALL_CORE
Adjustment disorder   F43.20   Encounter for assessment of healthcare decision-making capacity   Z02.79

## 2024-04-30 NOTE — BH CONSULTATION LIAISON ASSESSMENT NOTE - NSBHINDICATION_PSY_ALL_CORE
Patient does not need a psychiatric 1 to 1 but may benefit from a nursing 1 to 1 as per medical team

## 2024-04-30 NOTE — BH CONSULTATION LIAISON ASSESSMENT NOTE - NSBHATTESTBILLING_PSY_A_CORE
76530-[id number] diagnostic evaluation with medical services
33106-Ntasbyndatu diagnostic evaluation with medical services

## 2024-04-30 NOTE — PROGRESS NOTE ADULT - SUBJECTIVE AND OBJECTIVE BOX
Patient is a 68y old  Male who presents with a chief complaint of LT Gluteal Pain / Diarrhea (26 Apr 2024 08:35)    Patient was seen and examined.  Pt refusing oral feeds, tube feeds  Refuses BIPAP, Refused blood PRBC transfusion  Pt awake, alert    PAST MEDICAL & SURGICAL HISTORY:  HTN (hypertension)  Prostate cancer    Allergies  No Known Allergies    MEDICATIONS  (STANDING):  albuterol/ipratropium for Nebulization 3 milliLiter(s) Nebulizer every 6 hours  ascorbic acid 500 milliGRAM(s) Oral daily  benzocaine 20% Spray 1 Spray(s) Topical three times a day  dicyclomine 10 milliGRAM(s) Oral three times a day before meals  doxycycline IVPB 100 milliGRAM(s) IV Intermittent every 12 hours  enoxaparin Injectable 40 milliGRAM(s) SubCutaneous every 24 hours  enzalutamide 160 milliGRAM(s) Oral daily  ferrous    sulfate Liquid 300 milliGRAM(s) Enteral Tube daily  insulin lispro (ADMELOG) corrective regimen sliding scale   SubCutaneous three times a day before meals  lactobacillus acidophilus 1 Tablet(s) Oral three times a day with meals  multivitamin 1 Tablet(s) Oral daily  multivitamin  Chewable 1 Tablet(s) Oral daily  sodium bicarbonate 1300 milliGRAM(s) Oral every 8 hours  sodium bicarbonate  Infusion 0.207 mEq/kG/Hr (75 mL/Hr) IV Continuous <Continuous>    MEDICATIONS  (PRN):  acetaminophen     Tablet .. 650 milliGRAM(s) Oral every 6 hours PRN Temp greater or equal to 38C (100.4F), Mild Pain (1 - 3)  aluminum hydroxide/magnesium hydroxide/simethicone Suspension 30 milliLiter(s) Oral every 4 hours PRN Dyspepsia  dextrose Oral Gel 15 Gram(s) Oral once PRN Blood Glucose LESS THAN 70 milliGRAM(s)/deciliter  guaiFENesin  milliGRAM(s) Oral every 12 hours PRN Cough    T(C): 36.1 (04-30-24 @ 07:14), Max: 36.5 (04-29-24 @ 15:00)  HR: 95 (04-30-24 @ 07:14) (95 - 102)  BP: 107/62 (04-30-24 @ 07:14) (90/61 - 113/74)  RR: 18 (04-30-24 @ 07:14) (16 - 18)  SpO2: --    O/E:  Awake, alert, not in distress.  HEENT: atraumatic, EOMI.  Chest:  coarse breath sounds b/l  CVS: SIS2 +, no murmur.  P/A: Soft, BS+, PEG+  CNS: awake, alert  Ext: no edema feet.  Skin: Left buttock dressing+  All systems reviewed positive findings as above.                          6.9<LL>  9.40  )-----------( 436<H>    ( 30 Apr 2024 07:15 )             21.3<L>                        7.3<L>  13.46<H> )-----------( 431<H>    ( 29 Apr 2024 05:21 )             22.3<L>  04-30    139  |  105  |  60<H>  ----------------------------<  269<H>  3.4<L>   |  15<L>  |  2.5<H>  04-29    143  |  108  |  59<H>  ----------------------------<  228<H>  3.5   |  10<L>  |  2.4<H>    Ca    6.6<L>      30 Apr 2024 07:15  Ca    6.8<L>      29 Apr 2024 05:21  Mg     2.4     04-29    TPro  4.8<L>  /  Alb  2.5<L>  /  TBili  0.6  /  DBili  x   /  AST  15  /  ALT  <5  /  AlkPhos  131<H>  04-30  TPro  5.1<L>  /  Alb  2.6<L>  /  TBili  0.6  /  DBili  x   /  AST  22  /  ALT  6   /  AlkPhos  134<H>  04-29

## 2024-04-30 NOTE — PROGRESS NOTE ADULT - ASSESSMENT
68M w/ PMHx Stage 4 Prostate Ca s/p palliative radiation, Mediastinal Mass on oral Chemo (xtandi), Chronic Anemia, HTN and Chronic Back Pain presents to ED from SNF for evaluation. As per NH documentation, pt has been refusing medications through PEG for the last 3x days d/t episode of diarrhea post feeds. Patient's only complaint is LT buttock pain. Denies fevers, chills, chest pain, SOB, n/v, abdominal pain or urinary symptoms.    # Acute Hypoxic resp failure  # B/l  pleural effusions  -  Xray Chest 1 View- PORTABLE-Routine (Xray Chest 1 View- PORTABLE-Routine in AM.) (04.27.24 @ 07:51) >Stable bilateral opacities.  - S/p left thoracentesis on 4/25 --> 1800 milliLiter drained  - BIPAP at HS- pt refuses  - Maintain oxygen sat > 92    # Hypotension- resolved  - dc'd  metoprolol  - monitor BP    # Metabolic acidosis  - c/w  IV bicarb/ some times refusing IV  - c/w PO bicarb  - monitor BMP    # Left gluteal cellulitis  - CT Abdomen and Pelvis w/ IV Cont (04.19.24 @ 19:17) >New mild edema and enlargement of the left gluteus maximums musculature with associated subcutaneous edema. No discrete abscess. Metastatic disease in the abdomen and pelvis  with slightly increased upper abdominal adenopathy. No significant change approximately in the large bladder mass and partially imaged mediastinal mass. Interval placement of an esophageal stent. Severe left hydronephrosis to the level of the proximal ureter, not   significantly changed. New/increased large bilateral pleural effusions.  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 Right renal cysts and other low attenuating lesions in the right kidney too small to characterize. PELVIC ORGANS: Large 7 cm bladder mass, unchanged since prior CT.  - Lactate, Blood: 2.8: Elevated lactate. Consider ordering follow-up lactate to trend. mmol/L (04.26.24 @ 07:09)  - monitor I/o  - trend lactate  - monitor BMP    # Hyperglycemia  - A1C with Estimated Average Glucose Result: 5.4 % (04.29.24 @ 05:21)  - monitor FS  - c/w  insulin    # H/o Dysphagia  - s/p esophageal stent and PEG 3/5/24  - swallow eval: puree w/ thin liquids, will likely require continued use of PEG    # Metastatic prostate cancer stage 4  # Mediastinal mass  - S/p palliative radiation  - on  Xtandi 160 mg qd    # Anemia  - Pt refused 1 unit PRBC  - c/w ferrous sulfate  - monitor cbc    # Severe protein calorie malnutrition  - BMI: 15.8    # Noncompliant with medication, treatment care    # DVT prophylaxis    # Poor prognosis    #  DNR/DNI/ trial of NIV  - Palliative care f/u: Ethics decided last admission that the patient had capacity to refuse interventions.When we discussed the consequences of refusing those interventions, he could not explain why he was refusing (other than diarrhea for tube feeds).  We discussed that continued refusal of feeds or other interventions could lead to worsening clinical status and death.  We discussed GOC given his refusal of interventions; discussed both hospice/CMO and ongoing medical management. He noted he wants "to live" and wants ongoing medical management.Patient does not demonstrate capacity to understand his complete clinical picture. Recommend possible additional ethics consult if more elucidation about next steps is needed.  - Ethic consult: Recommendation at present is to consult Behavioral Health for capacity assessment regarding refusal of medical interventions.     # Pending: monitor cbc,  BMP, , monitor BP,  BH eval  # Disposition: [facility]

## 2024-04-30 NOTE — BH CONSULTATION LIAISON ASSESSMENT NOTE - NSICDXPASTMEDICALHX_GEN_ALL_CORE_FT
PAST MEDICAL HISTORY:  HTN (hypertension)     Prostate cancer     
PAST MEDICAL HISTORY:  HTN (hypertension)     Prostate cancer

## 2024-04-30 NOTE — BH CONSULTATION LIAISON ASSESSMENT NOTE - NSBHCHARTREVIEWVS_PSY_A_CORE FT
Vital Signs Last 24 Hrs  T(C): 36.1 (30 Apr 2024 07:14), Max: 36.5 (29 Apr 2024 15:00)  T(F): 97 (30 Apr 2024 07:14), Max: 97.7 (29 Apr 2024 15:00)  HR: 95 (30 Apr 2024 07:14) (95 - 102)  BP: 107/62 (30 Apr 2024 07:14) (90/61 - 113/74)  BP(mean): --  RR: 18 (30 Apr 2024 07:14) (16 - 18)  SpO2: --    
Vital Signs Last 24 Hrs  T(C): 35.7 (22 Apr 2024 08:00), Max: 37.3 (21 Apr 2024 16:21)  T(F): 96.3 (22 Apr 2024 08:00), Max: 99.2 (21 Apr 2024 16:21)  HR: 79 (22 Apr 2024 08:00) (79 - 102)  BP: 136/95 (22 Apr 2024 08:00) (105/70 - 136/95)  BP(mean): --  RR: 18 (22 Apr 2024 08:00) (18 - 18)  SpO2: 98% (21 Apr 2024 16:21) (98% - 98%)    Parameters below as of 21 Apr 2024 16:21  Patient On (Oxygen Delivery Method): room air

## 2024-04-30 NOTE — BH CONSULTATION LIAISON ASSESSMENT NOTE - RISK ASSESSMENT
Risk factors for suicide in this patient are his chronic medical problems , cognitive deficits , however patient denies past suicide attempts , denies current susicidal ideations, 
Risk factors for suicide in this patient are his chronic medical problems , lack of social support , however patient denies current susicidal ideations, denies past suicide attempts , denies history of substance use

## 2024-04-30 NOTE — PROGRESS NOTE ADULT - ASSESSMENT
68 year old male with PMH fo Stage 4 Prostatic Ca s/p palliative radiation, medistinal mass on oral chemo (Xtandi)< chronic anemia, HTN, chronic  back pain presents to ED from SNF for evaluation. Per NH, has been refusing medications via PEG x 3 days d/t episodes of diarrhea post-feeds. Patient was only complaining of left buttock pain, s/p debridement by burn on 4/24, s/p thoracentesis by pul on 4/25 for pleural effusion removed 1.7L.     #treatement refusal   Patient refusing treatment despite multiple attempts at explaining the importance of treatment.  Patient refusing PEG feeds and oral feeds.  Ethics consulted, Cs noted   awaiting on Psych consult to assess for capacity     #Left GLuteal Cellulitis  #MRSA (+) Wound Cx 4/22  - vitals: Afebrile, /892, , SpO2 100% on RA  - CT shows new mild edema and enlargement of left gluteus maximums musculature with associated subcutaneous edema.   - s/p Cefepime, Vanc, Flagyl  - currently on doxycycline but patient refusing medication and even IV placement .   - Wound Cx (4/22): rare MRSA   - Contact isolation  - continue local wound care, off-loading  - Per burn, no further plans for debridement  - Per ID, continue Doxycycline for 2 weeks post debridement (4/24-5/8)     #H/o Dysphagia s/p PEG  #N/V 4/25  - s/p esophageal stent & PEG 3/5/24  - patient cleared for pureed diet and thin liquids per SS on last admission  - refusing PEG feeds d/t diarrhea  - c/w acidophilus  - antiemetics PRN  - Cr rising (likely prerenal 2/2) dehydration/poor PO intake  - Lactate elevated     #New B/l Pleural Effusions  - d/c IV lasix  - s/p thoracentesis with pulm on 4/25, removed 1.7L  - requiring 2-3L via NC PRN     #Stage 4 Prostatic Cancer s/p Radiation (on active PO chemo) w metastasis to mediastinum   #Severe Left Hydronephrosis  - c/w home Xtandi 160 mg qd  - monitor labs    #Metabolic acidosis  - c/w sodium bicarb    #Chronic Normocytic Anemia  - likely 2/2 malignancy  - c/w ferrous sulfate    #GOC- DNR/DNI                                           --------------------------------------------------------------    # DVT prophylaxis: Lovenox  # GI prophylaxis: PPI  # Diet:   # Activity:   # Code status: Full Code  # Disposition:

## 2024-04-30 NOTE — BH CONSULTATION LIAISON ASSESSMENT NOTE - CURRENT MEDICATION
MEDICATIONS  (STANDING):  ascorbic acid 500 milliGRAM(s) Oral daily  cefpodoxime 200 milliGRAM(s) Oral every 12 hours  doxycycline monohydrate Capsule 100 milliGRAM(s) Oral every 12 hours  enoxaparin Injectable 40 milliGRAM(s) SubCutaneous every 24 hours  ferrous    sulfate Liquid 300 milliGRAM(s) Enteral Tube daily  furosemide   Injectable 40 milliGRAM(s) IV Push daily  influenza  Vaccine (HIGH DOSE) 0.7 milliLiter(s) IntraMuscular once  megestrol Suspension 400 milliGRAM(s) Oral daily  metoprolol tartrate 25 milliGRAM(s) Enteral Tube every 12 hours  multivitamin 1 Tablet(s) Oral daily  multivitamin  Chewable 1 Tablet(s) Oral daily  mupirocin 2% Nasal 1 Application(s) Both Nostrils two times a day  potassium phosphate IVPB 30 milliMole(s) IV Intermittent once    MEDICATIONS  (PRN):  acetaminophen     Tablet .. 650 milliGRAM(s) Oral every 6 hours PRN Temp greater or equal to 38C (100.4F), Mild Pain (1 - 3)  aluminum hydroxide/magnesium hydroxide/simethicone Suspension 30 milliLiter(s) Oral every 4 hours PRN Dyspepsia  loperamide 2 milliGRAM(s) Oral every 8 hours PRN for diarrhea  melatonin 3 milliGRAM(s) Oral at bedtime PRN Insomnia  ondansetron Injectable 4 milliGRAM(s) IV Push every 8 hours PRN Nausea and/or Vomiting  sodium bicarbonate 1300 milliGRAM(s) Oral every 6 hours PRN GERD  
MEDICATIONS  (STANDING):  albuterol/ipratropium for Nebulization 3 milliLiter(s) Nebulizer every 6 hours  albuterol/ipratropium for Nebulization 3 milliLiter(s) Nebulizer every 6 hours  ascorbic acid 500 milliGRAM(s) Oral daily  benzocaine 20% Spray 1 Spray(s) Topical three times a day  chlorhexidine 2% Cloths 1 Application(s) Topical <User Schedule>  dextrose 50% Injectable 12.5 Gram(s) IV Push once  dextrose 50% Injectable 25 Gram(s) IV Push once  dicyclomine 10 milliGRAM(s) Oral three times a day before meals  doxycycline IVPB 100 milliGRAM(s) IV Intermittent every 12 hours  enoxaparin Injectable 40 milliGRAM(s) SubCutaneous every 24 hours  enzalutamide 160 milliGRAM(s) Oral daily  ferrous    sulfate Liquid 300 milliGRAM(s) Enteral Tube daily  insulin lispro (ADMELOG) corrective regimen sliding scale   SubCutaneous three times a day before meals  lactobacillus acidophilus 1 Tablet(s) Oral three times a day with meals  multivitamin 1 Tablet(s) Oral daily  multivitamin  Chewable 1 Tablet(s) Oral daily  sodium bicarbonate 1300 milliGRAM(s) Oral every 8 hours  sodium bicarbonate  Infusion 0.207 mEq/kG/Hr (75 mL/Hr) IV Continuous <Continuous>    MEDICATIONS  (PRN):  acetaminophen     Tablet .. 650 milliGRAM(s) Oral every 6 hours PRN Temp greater or equal to 38C (100.4F), Mild Pain (1 - 3)  aluminum hydroxide/magnesium hydroxide/simethicone Suspension 30 milliLiter(s) Oral every 4 hours PRN Dyspepsia  dextrose Oral Gel 15 Gram(s) Oral once PRN Blood Glucose LESS THAN 70 milliGRAM(s)/deciliter  guaiFENesin  milliGRAM(s) Oral every 12 hours PRN Cough

## 2024-04-30 NOTE — BH CONSULTATION LIAISON ASSESSMENT NOTE - NSBHCONSULTMEDAGITATION_PSY_A_CORE FT
We recommend Haldol 2 mg P.O Q 6 hrs PRN for agitation. Please note that this medication can be given via the intramuscular route if the patient is severely agitated and is considered a danger to herself or others. Please ensure that QTC is < 500 
We recommend Haldol 2 mg P.O Q 6 hrs PRN for agitation. Please note that this medication can be given via the intramuscular route if the patient is severely agitated and is considered a danger to herself or others. Please ensure that QTC is < 500

## 2024-04-30 NOTE — BH CONSULTATION LIAISON ASSESSMENT NOTE - NSBHCHARTREVIEWLAB_PSY_A_CORE FT
9.1    10.17 )-----------( 539      ( 05 Mar 2024 11:58 )             30.2       03-05    146  |  113<H>  |  21<H>  ----------------------------<  77  4.3   |  14<L>  |  1.2    Ca    7.9<L>      05 Mar 2024 11:58  Phos  2.0     03-05  Mg     1.8     03-05    TPro  6.2  /  Alb  3.1<L>  /  TBili  0.5  /  DBili  x   /  AST  15  /  ALT  21  /  AlkPhos  63  03-05    Culture - Blood (collected 02-24-24 @ 12:14)  Source: .Blood None  Final Report (02-28-24 @ 12:01):    Growth in aerobic and anaerobic bottles: Streptococcus pyogenes (Group A)    Direct identification is available within approximately 3-5    hours either by Blood Panel Multiplexed PCR or Direct    MALDI-TOF. Details: [email or web address]  Organism: Blood Culture PCR  Streptococcus pyogenes (Group A) (02-28-24 @ 12:01)  Organism: Streptococcus pyogenes (Group A) (02-28-24 @ 12:01)    
                      6.9    9.40  )-----------( 436      ( 30 Apr 2024 07:15 )             21.3       04-30    139  |  105  |  60<H>  ----------------------------<  269<H>  3.4<L>   |  15<L>  |  2.5<H>    Ca    6.6<L>      30 Apr 2024 07:15  Mg     2.4     04-29    TPro  4.8<L>  /  Alb  2.5<L>  /  TBili  0.6  /  DBili  x   /  AST  15  /  ALT  <5  /  AlkPhos  131<H>  04-30

## 2024-04-30 NOTE — BH CONSULTATION LIAISON ASSESSMENT NOTE - HPI (INCLUDE ILLNESS QUALITY, SEVERITY, DURATION, TIMING, CONTEXT, MODIFYING FACTORS, ASSOCIATED SIGNS AND SYMPTOMS)
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 According to the medical team, patient is  intermittently refusing IV line placements, I.V antibiotics, and feedings. Palliative and ethics have been called to see the case . Psychiatry consult was called for the evaluation of patient's capacity to refuse the placement of an I.V line, I.V antibiotics and feedings .   Patient seen and interviewed .  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 According to the medical team, patient is  intermittently refusing IV line placements, I.V antibiotics, and PEG Tube feedings. Palliative and ethics have been called to see the case . Psychiatry consult was called for the evaluation of patient's capacity to refuse the placement of an I.V line, I.V antibiotics and feedings .   Patient seen and interviewed , 1 to 1 at bedside .     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 According to the medical team, patient is  intermittently refusing IV line placements, I.V antibiotics, and PEG Tube feedings. Palliative and ethics have been called to see the case . Psychiatry consult was called for the evaluation of patient's capacity to refuse the placement of an I.V line, I.V antibiotics and feedings .   Patient seen and interviewed , 1 to 1 at bedside .   upon approach, patient was observed to be lying on his hospital bed , calm, cooperative , voice appears very hoarse , often difficult to understand or hear what he is saying .   he reports that he is doing okay  and has no fresh complaints .   When informed that the medical team is concerned that he is refusing the I.V line placement, I.V antibiotics , and Peg tube feeds , patient was observed to say " yes ". when asked why he is refusing the feeds , he reports that he starts having diarrhea after he receives the feeds . When asked why he is refusing the placement of the I.V line and I.V antibiotics , he states " I don't know ". Writer informed him about the risk of poor wound healing with inadequate nutrition, the risk of non healing wound and the likely spread of infection if the gluteal cellulitis is not treated with antibiotics and the need for the placement of the I.V lines so he can not only receives the antibiotics but also the IV fluids . Patient verbalized understanding and reports that he would accept all the interventions .   patient denies acute symptoms of depression, gini or psychosis . He denies having current suicidal thoughts , intent or plan. He denies current use of illicit drugs or alcohol.   When asked if he has any social support , patient denies .

## 2024-04-30 NOTE — BH CONSULTATION LIAISON ASSESSMENT NOTE - NSBHCONSULTPRIMARYDISCUSSYES_PSY_A_CORE FT
above recommendations given to the medical resident taking care of patient 
above recommendations discussed with hospitalist taking care of patient

## 2024-04-30 NOTE — BH CONSULTATION LIAISON ASSESSMENT NOTE - SUMMARY
Mr Stallings is a 68 year old man with no history of a psychiatric illness but has  Stage 4 Prostatic Ca s/p palliative radiation, mediastinal mass on oral chemotherapy who was admitted to the medical floor for the management of a gluteal abscess and diarrhea. According to the medical team, patient is  intermittently refusing IV line placements, I.V antibiotics, and feedings. Palliative and ethics have been called to see the case . Psychiatry consult was called for the evaluation of patient's capacity to refuse the placement of an I.V line, I.V antibiotics and feedings .     For now, patients do not appear to have acute symptoms of psychosis, gini or depression. He denies having current suicidal ideations, intent or plan.      At this time, patient is not considered an imminent danger to himself or others and does not need inpatient psychiatric hospitalization.  Mr Stallings is a 68 year old man with no history of a psychiatric illness but has  Stage 4 Prostatic Ca s/p palliative radiation, mediastinal mass on oral chemotherapy who was admitted to the medical floor for the management of a gluteal cellulitis and diarrhea. According to the medical team, patient is  intermittently refusing IV line placements, I.V antibiotics, and PEG tube feedings. Palliative and ethics have been called to see the case . Psychiatry consult was called for the evaluation of patient's capacity to refuse the placement of an I.V line, I.V antibiotics and feedings .     For now, patients do not appear to have acute symptoms of psychosis, gini or depression. He denies having current suicidal ideations, intent or plan.      At this time, patient is not considered an imminent danger to himself or others and does not need inpatient psychiatric hospitalization.  Mr Stallings is a 68 year old man with no history of a psychiatric illness but has  Stage 4 Prostatic Ca s/p palliative radiation, mediastinal mass on oral chemotherapy who was admitted to the medical floor for the management of a gluteal cellulitis and diarrhea. According to the medical team, patient is  intermittently refusing IV line placements, I.V antibiotics, and PEG tube feedings. Palliative and ethics have been called to see the case . Psychiatry consult was called for the evaluation of patient's capacity to refuse the placement of an I.V line, I.V antibiotics and feedings .    Although patient is able to make a choice to refuse or at times agree to the I.V line placement , I.V fluid &  I.V antibiotic management , and PEG tube feeds,  recommended by the medical team, and seems to  demonstrate understand of the reason for his hospitalization and need for these interventions,  he is unable to appreciate how his decision to leave could impact him and he doesn't seem to be able to rationalize the possible risk and benefits nor is he willing to consider  other options to his medical care .    Patient seems to be agreeable to the interventions for now , however his decisions seems to waverer episodically . he does have understanding of the need for the interventions , however , doesn't want to consider a recall of the dietician to revaluate the type of feeds he is receiving , is refusing the IV Fluids , and antibiotics intermittently  and seems to change his mind frequently about his choices .   At this time, patient does not demonstrate the capacity to  refuse or at times agree to the I.V line placement , I.V fluid &  I.V antibiotic management , and PEG tube feeds,  recommended by the medical team . In this situation , the decision should be deferred to his next of kin or documented health care proxy. However, since neither party are available in this case , a court appointed decision maker can be appointed to make this decision. In the event of imminent need for these interventions, that is potentially life threatening , the medical team can consider a 2 physician consent.   Patients do not appear to have acute symptoms of psychosis, gini or depression. He denies having current suicidal ideations, intent or plan.    At this time, patient is not considered an imminent danger to himself or others and does not need inpatient psychiatric hospitalization.

## 2024-05-01 LAB
ALBUMIN SERPL ELPH-MCNC: 2.3 G/DL — LOW (ref 3.5–5.2)
ALP SERPL-CCNC: 132 U/L — HIGH (ref 30–115)
ALT FLD-CCNC: <5 U/L — SIGNIFICANT CHANGE UP (ref 0–41)
ANION GAP SERPL CALC-SCNC: 17 MMOL/L — HIGH (ref 7–14)
AST SERPL-CCNC: 16 U/L — SIGNIFICANT CHANGE UP (ref 0–41)
BASOPHILS # BLD AUTO: 0 K/UL — SIGNIFICANT CHANGE UP (ref 0–0.2)
BASOPHILS NFR BLD AUTO: 0 % — SIGNIFICANT CHANGE UP (ref 0–1)
BILIRUB SERPL-MCNC: 0.5 MG/DL — SIGNIFICANT CHANGE UP (ref 0.2–1.2)
BUN SERPL-MCNC: 51 MG/DL — HIGH (ref 10–20)
CALCIUM SERPL-MCNC: 6.5 MG/DL — LOW (ref 8.4–10.5)
CHLORIDE SERPL-SCNC: 108 MMOL/L — SIGNIFICANT CHANGE UP (ref 98–110)
CO2 SERPL-SCNC: 17 MMOL/L — SIGNIFICANT CHANGE UP (ref 17–32)
CREAT SERPL-MCNC: 2.2 MG/DL — HIGH (ref 0.7–1.5)
EGFR: 31 ML/MIN/1.73M2 — LOW
EOSINOPHIL # BLD AUTO: 0.07 K/UL — SIGNIFICANT CHANGE UP (ref 0–0.7)
EOSINOPHIL NFR BLD AUTO: 0.9 % — SIGNIFICANT CHANGE UP (ref 0–8)
GLUCOSE BLDC GLUCOMTR-MCNC: 106 MG/DL — HIGH (ref 70–99)
GLUCOSE BLDC GLUCOMTR-MCNC: 180 MG/DL — HIGH (ref 70–99)
GLUCOSE BLDC GLUCOMTR-MCNC: 189 MG/DL — HIGH (ref 70–99)
GLUCOSE BLDC GLUCOMTR-MCNC: 254 MG/DL — HIGH (ref 70–99)
GLUCOSE SERPL-MCNC: 85 MG/DL — SIGNIFICANT CHANGE UP (ref 70–99)
HCT VFR BLD CALC: 22.3 % — LOW (ref 42–52)
HGB BLD-MCNC: 7.2 G/DL — LOW (ref 14–18)
IMM GRANULOCYTES NFR BLD AUTO: 1 % — HIGH (ref 0.1–0.3)
LYMPHOCYTES # BLD AUTO: 0.42 K/UL — LOW (ref 1.2–3.4)
LYMPHOCYTES # BLD AUTO: 5.2 % — LOW (ref 20.5–51.1)
MCHC RBC-ENTMCNC: 29.1 PG — SIGNIFICANT CHANGE UP (ref 27–31)
MCHC RBC-ENTMCNC: 32.3 G/DL — SIGNIFICANT CHANGE UP (ref 32–37)
MCV RBC AUTO: 90.3 FL — SIGNIFICANT CHANGE UP (ref 80–94)
MONOCYTES # BLD AUTO: 1.22 K/UL — HIGH (ref 0.1–0.6)
MONOCYTES NFR BLD AUTO: 15 % — HIGH (ref 1.7–9.3)
NEUTROPHILS # BLD AUTO: 6.33 K/UL — SIGNIFICANT CHANGE UP (ref 1.4–6.5)
NEUTROPHILS NFR BLD AUTO: 77.9 % — HIGH (ref 42.2–75.2)
NRBC # BLD: 0 /100 WBCS — SIGNIFICANT CHANGE UP (ref 0–0)
PLATELET # BLD AUTO: 383 K/UL — SIGNIFICANT CHANGE UP (ref 130–400)
PMV BLD: 9.2 FL — SIGNIFICANT CHANGE UP (ref 7.4–10.4)
POTASSIUM SERPL-MCNC: 3 MMOL/L — LOW (ref 3.5–5)
POTASSIUM SERPL-SCNC: 3 MMOL/L — LOW (ref 3.5–5)
PROT SERPL-MCNC: 4.7 G/DL — LOW (ref 6–8)
RBC # BLD: 2.47 M/UL — LOW (ref 4.7–6.1)
RBC # FLD: 16.9 % — HIGH (ref 11.5–14.5)
SODIUM SERPL-SCNC: 142 MMOL/L — SIGNIFICANT CHANGE UP (ref 135–146)
WBC # BLD: 8.12 K/UL — SIGNIFICANT CHANGE UP (ref 4.8–10.8)
WBC # FLD AUTO: 8.12 K/UL — SIGNIFICANT CHANGE UP (ref 4.8–10.8)

## 2024-05-01 PROCEDURE — 99232 SBSQ HOSP IP/OBS MODERATE 35: CPT

## 2024-05-01 RX ORDER — POTASSIUM CHLORIDE 20 MEQ
40 PACKET (EA) ORAL EVERY 4 HOURS
Refills: 0 | Status: COMPLETED | OUTPATIENT
Start: 2024-05-01 | End: 2024-05-01

## 2024-05-01 RX ADMIN — Medication 650 MILLIGRAM(S): at 22:10

## 2024-05-01 RX ADMIN — Medication 1 SPRAY(S): at 15:00

## 2024-05-01 RX ADMIN — Medication 1 TABLET(S): at 18:19

## 2024-05-01 RX ADMIN — Medication 1 TABLET(S): at 12:31

## 2024-05-01 RX ADMIN — Medication 1300 MILLIGRAM(S): at 05:04

## 2024-05-01 RX ADMIN — CHLORHEXIDINE GLUCONATE 1 APPLICATION(S): 213 SOLUTION TOPICAL at 06:46

## 2024-05-01 RX ADMIN — Medication 40 MILLIEQUIVALENT(S): at 16:27

## 2024-05-01 RX ADMIN — ENOXAPARIN SODIUM 40 MILLIGRAM(S): 100 INJECTION SUBCUTANEOUS at 21:31

## 2024-05-01 RX ADMIN — Medication 1 TABLET(S): at 12:40

## 2024-05-01 RX ADMIN — Medication 500 MILLIGRAM(S): at 12:31

## 2024-05-01 RX ADMIN — Medication 1 TABLET(S): at 08:26

## 2024-05-01 RX ADMIN — Medication 3: at 08:27

## 2024-05-01 RX ADMIN — Medication 650 MILLIGRAM(S): at 15:00

## 2024-05-01 RX ADMIN — Medication 10 MILLIGRAM(S): at 08:27

## 2024-05-01 RX ADMIN — Medication 1300 MILLIGRAM(S): at 22:10

## 2024-05-01 RX ADMIN — ENZALUTAMIDE 160 MILLIGRAM(S): 80 TABLET ORAL at 12:31

## 2024-05-01 RX ADMIN — Medication 10 MILLIGRAM(S): at 18:19

## 2024-05-01 RX ADMIN — Medication 10 MILLIGRAM(S): at 12:32

## 2024-05-01 RX ADMIN — Medication 40 MILLIEQUIVALENT(S): at 18:20

## 2024-05-01 RX ADMIN — Medication 300 MILLIGRAM(S): at 12:31

## 2024-05-01 NOTE — PROGRESS NOTE ADULT - SUBJECTIVE AND OBJECTIVE BOX
24H events:    Patient is a 68y old Male who presents with a chief complaint of LT Gluteal Pain / Diarrhea (30 Apr 2024 14:15)    Primary diagnosis of Abscess  Today is hospital day 12d. This morning patient was seen and examined at bedside, resting comfortably in bed.    No acute or major events overnight.    PAST MEDICAL & SURGICAL HISTORY  HTN (hypertension)    Prostate cancer      SOCIAL HISTORY:  Social History:      ALLERGIES:  No Known Allergies    MEDICATIONS:  STANDING MEDICATIONS  albuterol/ipratropium for Nebulization 3 milliLiter(s) Nebulizer every 6 hours  albuterol/ipratropium for Nebulization 3 milliLiter(s) Nebulizer every 6 hours  ascorbic acid 500 milliGRAM(s) Oral daily  benzocaine 20% Spray 1 Spray(s) Topical three times a day  chlorhexidine 2% Cloths 1 Application(s) Topical <User Schedule>  dextrose 50% Injectable 12.5 Gram(s) IV Push once  dextrose 50% Injectable 25 Gram(s) IV Push once  dicyclomine 10 milliGRAM(s) Oral three times a day before meals  doxycycline IVPB 100 milliGRAM(s) IV Intermittent every 12 hours  enoxaparin Injectable 40 milliGRAM(s) SubCutaneous every 24 hours  enzalutamide 160 milliGRAM(s) Oral daily  ferrous    sulfate Liquid 300 milliGRAM(s) Enteral Tube daily  insulin lispro (ADMELOG) corrective regimen sliding scale   SubCutaneous three times a day before meals  lactobacillus acidophilus 1 Tablet(s) Oral three times a day with meals  multivitamin 1 Tablet(s) Oral daily  multivitamin  Chewable 1 Tablet(s) Oral daily  sodium bicarbonate 1300 milliGRAM(s) Oral every 8 hours  sodium bicarbonate  Infusion 0.207 mEq/kG/Hr IV Continuous <Continuous>    PRN MEDICATIONS  acetaminophen     Tablet .. 650 milliGRAM(s) Oral every 6 hours PRN  aluminum hydroxide/magnesium hydroxide/simethicone Suspension 30 milliLiter(s) Oral every 4 hours PRN  dextrose Oral Gel 15 Gram(s) Oral once PRN  guaiFENesin  milliGRAM(s) Oral every 12 hours PRN    VITALS:   T(F): 96.8  HR: 95  BP: 99/61  RR: 17  SpO2: --    PHYSICAL EXAM:  GENERAL:   ( x ) NAD, lying in bed comfortably     (  ) obtunded     (  ) lethargic     (  ) somnolent    HEAD:   ( x ) Atraumatic     (  ) hematoma     (  ) laceration (specify location:       )     NECK:  ( x ) Supple     (  ) neck stiffness     (  ) nuchal rigidity     (  )  no JVD     (  ) JVD present ( -- cm)    HEART:  Rate -->     (x  ) normal rate     (  ) bradycardic     (  ) tachycardic  Rhythm -->     ( x ) regular     (  ) regularly irregular     (  ) irregularly irregular  Murmurs -->     (x  ) normal s1s2     (  ) systolic murmur     (  ) diastolic murmur     (  ) continuous murmur      (  ) S3 present     (  ) S4 present    LUNGS:   ( x )Unlabored respirations     (  ) tachypnea  (x  ) B/L air entry     (  ) decreased breath sounds in:  (location     )    (  ) no adventitious sound     (  ) crackles     (  ) wheezing      ( x ) rhonchi bilateral       (specify location:       )  (  ) chest wall tenderness (specify location:       )    ABDOMEN:   ( x ) Soft     (  ) tense   |   (x  ) nondistended     (  ) distended   |   (x  ) +BS     (  ) hypoactive bowel sounds     (  ) hyperactive bowel sounds  (  x) nontender     (  ) RUQ tenderness     (  ) RLQ tenderness     (  ) LLQ tenderness     (  ) epigastric tenderness     (  ) diffuse tenderness  (  ) Splenomegaly      (  ) Hepatomegaly      (  ) Jaundice     (  ) ecchymosis     EXTREMITIES:  ( x ) Normal     (  ) Rash     (  ) ecchymosis     (  ) varicose veins      (  ) pitting edema     (  ) non-pitting edema   (  ) ulceration     (  ) gangrene:     (location:     )    NERVOUS SYSTEM:    ( x ) A&Ox3     (  ) confused     (  ) lethargic  CN II-XII:     (x  ) Intact     (  ) deficits found     (Specify:     )   Upper extremities:     ( x ) no sensorimotor deficits     (  ) weakness     (  ) loss of proprioception/vibration     (  ) loss of touch/temperature (specify:    )  Lower extremities:     (  x) no sensorimotor deficits     (  ) weakness     (  ) loss of proprioception/vibration     (  ) loss of touch/temperature (specify:    )    SKIN:   ( x ) No rashes or lesions     (  ) maculopapular rash     (  ) pustules     (  ) vesicles     (  ) ulcer     (  ) ecchymosis     (specify location:     )        LABS:                        6.9    9.40  )-----------( 436      ( 30 Apr 2024 07:15 )             21.3     04-30    139  |  105  |  60<H>  ----------------------------<  269<H>  3.4<L>   |  15<L>  |  2.5<H>    Ca    6.6<L>      30 Apr 2024 07:15    TPro  4.8<L>  /  Alb  2.5<L>  /  TBili  0.6  /  DBili  x   /  AST  15  /  ALT  <5  /  AlkPhos  131<H>  04-30      Urinalysis Basic - ( 30 Apr 2024 07:15 )    Color: x / Appearance: x / SG: x / pH: x  Gluc: 269 mg/dL / Ketone: x  / Bili: x / Urobili: x   Blood: x / Protein: x / Nitrite: x   Leuk Esterase: x / RBC: x / WBC x   Sq Epi: x / Non Sq Epi: x / Bacteria: x                RADIOLOGY:          + 24H events:    Patient is a 68y old Male who presents with a chief complaint of LT Gluteal Pain / Diarrhea (30 Apr 2024 14:15)    Primary diagnosis of Abscess , gluteal .   Today is hospital day 12d. This morning patient was seen and examined at bedside, resting comfortably in bed.    No acute or major events overnight.    PAST MEDICAL & SURGICAL HISTORY  HTN (hypertension)    Prostate cancer      SOCIAL HISTORY:  Social History:      ALLERGIES:  No Known Allergies    MEDICATIONS:  STANDING MEDICATIONS  albuterol/ipratropium for Nebulization 3 milliLiter(s) Nebulizer every 6 hours  albuterol/ipratropium for Nebulization 3 milliLiter(s) Nebulizer every 6 hours  ascorbic acid 500 milliGRAM(s) Oral daily  benzocaine 20% Spray 1 Spray(s) Topical three times a day  chlorhexidine 2% Cloths 1 Application(s) Topical <User Schedule>  dextrose 50% Injectable 12.5 Gram(s) IV Push once  dextrose 50% Injectable 25 Gram(s) IV Push once  dicyclomine 10 milliGRAM(s) Oral three times a day before meals  doxycycline IVPB 100 milliGRAM(s) IV Intermittent every 12 hours  enoxaparin Injectable 40 milliGRAM(s) SubCutaneous every 24 hours  enzalutamide 160 milliGRAM(s) Oral daily  ferrous    sulfate Liquid 300 milliGRAM(s) Enteral Tube daily  insulin lispro (ADMELOG) corrective regimen sliding scale   SubCutaneous three times a day before meals  lactobacillus acidophilus 1 Tablet(s) Oral three times a day with meals  multivitamin 1 Tablet(s) Oral daily  multivitamin  Chewable 1 Tablet(s) Oral daily  sodium bicarbonate 1300 milliGRAM(s) Oral every 8 hours  sodium bicarbonate  Infusion 0.207 mEq/kG/Hr IV Continuous <Continuous>    PRN MEDICATIONS  acetaminophen     Tablet .. 650 milliGRAM(s) Oral every 6 hours PRN  aluminum hydroxide/magnesium hydroxide/simethicone Suspension 30 milliLiter(s) Oral every 4 hours PRN  dextrose Oral Gel 15 Gram(s) Oral once PRN  guaiFENesin  milliGRAM(s) Oral every 12 hours PRN    VITALS:   T(F): 96.8  HR: 95  BP: 99/61  RR: 17  SpO2: --    PHYSICAL EXAM:  GENERAL:   ( x ) NAD, lying in bed comfortably     (  ) obtunded     (  ) lethargic     (  ) somnolent    HEAD:   ( x ) Atraumatic     (  ) hematoma     (  ) laceration (specify location:       )     NECK:  ( x ) Supple     (  ) neck stiffness     (  ) nuchal rigidity     (  )  no JVD     (  ) JVD present ( -- cm)    HEART:  Rate -->     (x  ) normal rate     (  ) bradycardic     (  ) tachycardic  Rhythm -->     ( x ) regular     (  ) regularly irregular     (  ) irregularly irregular  Murmurs -->     (x  ) normal s1s2     (  ) systolic murmur     (  ) diastolic murmur     (  ) continuous murmur      (  ) S3 present     (  ) S4 present    LUNGS:   ( x )Unlabored respirations     (  ) tachypnea  (x  ) B/L air entry     (  ) decreased breath sounds in:  (location     )    (  ) no adventitious sound     (  ) crackles     (  ) wheezing      ( x ) rhonchi bilateral       (specify location:       )  (  ) chest wall tenderness (specify location:       )    ABDOMEN:   ( x ) Soft     (  ) tense   |   (x  ) nondistended     (  ) distended   |   (x  ) +BS     (  ) hypoactive bowel sounds     (  ) hyperactive bowel sounds  (  x) nontender     (  ) RUQ tenderness     (  ) RLQ tenderness     (  ) LLQ tenderness     (  ) epigastric tenderness     (  ) diffuse tenderness  (  ) Splenomegaly      (  ) Hepatomegaly      (  ) Jaundice     (  ) ecchymosis     EXTREMITIES:  ( x ) Normal     (  ) Rash     (  ) ecchymosis     (  ) varicose veins      (  ) pitting edema     (  ) non-pitting edema   (  ) ulceration     (  ) gangrene:     (location:     )    NERVOUS SYSTEM:    ( x ) A&Ox3     (  ) confused     (  ) lethargic  CN II-XII:     (x  ) Intact     (  ) deficits found     (Specify:     )   Upper extremities:     ( x ) no sensorimotor deficits     (  ) weakness     (  ) loss of proprioception/vibration     (  ) loss of touch/temperature (specify:    )  Lower extremities:     (  x) no sensorimotor deficits     (  ) weakness     (  ) loss of proprioception/vibration     (  ) loss of touch/temperature (specify:    )    SKIN:   ( x ) No rashes or lesions     (  ) maculopapular rash     (  ) pustules     (  ) vesicles     (  ) ulcer     (  ) ecchymosis     (specify location:     )        LABS:                        6.9    9.40  )-----------( 436      ( 30 Apr 2024 07:15 )             21.3     04-30    139  |  105  |  60<H>  ----------------------------<  269<H>  3.4<L>   |  15<L>  |  2.5<H>    Ca    6.6<L>      30 Apr 2024 07:15    TPro  4.8<L>  /  Alb  2.5<L>  /  TBili  0.6  /  DBili  x   /  AST  15  /  ALT  <5  /  AlkPhos  131<H>  04-30      Urinalysis Basic - ( 30 Apr 2024 07:15 )    Color: x / Appearance: x / SG: x / pH: x  Gluc: 269 mg/dL / Ketone: x  / Bili: x / Urobili: x   Blood: x / Protein: x / Nitrite: x   Leuk Esterase: x / RBC: x / WBC x   Sq Epi: x / Non Sq Epi: x / Bacteria: x                RADIOLOGY:          +

## 2024-05-01 NOTE — PROGRESS NOTE ADULT - ATTENDING COMMENTS
Ethics and behavioral health recommendations reviewed.  Tried to discuss in detail regarding goals of care. Patient is unable to comprehend the explanation and difficulty expressing his understanding.   Continue diet as tolerated and further medical management as patient allows  Patient might need guardian for decision making. Ethics team to follow  will D/w care management team

## 2024-05-01 NOTE — PROGRESS NOTE ADULT - ASSESSMENT
68 year old male with PMH fo Stage 4 Prostatic Ca s/p palliative radiation, medistinal mass on oral chemo (Xtandi)< chronic anemia, HTN, chronic  back pain presents to ED from SNF for evaluation. Per NH, has been refusing medications via PEG x 3 days d/t episodes of diarrhea post-feeds. Patient was only complaining of left buttock pain, s/p debridement by burn on 4/24, s/p thoracentesis by pul on 4/25 for pleural effusion removed 1.7L.     #treatement refusal   1.Patient refusing treatment despite multiple attempts at explaining the importance of treatment.  2.Patient refusing PEG feeds and oral feeds.  3.Ethics consulted, Cs noted   4.Psych consult noted.     #Left GLuteal Cellulitis  #MRSA (+) Wound Cx 4/22  - vitals: Afebrile, /892, , SpO2 100% on RA  - CT shows new mild edema and enlargement of left gluteus maximums musculature with associated subcutaneous edema.   - s/p Cefepime, Vanc, Flagyl  - currently on doxycycline but patient refusing medication and even IV placement .   - Wound Cx (4/22): rare MRSA   - Contact isolation  - continue local wound care, off-loading  - Per burn, no further plans for debridement  - Per ID, continue Doxycycline for 2 weeks post debridement (4/24-5/8)     #H/o Dysphagia s/p PEG  #N/V 4/25  - s/p esophageal stent & PEG 3/5/24  - patient cleared for pureed diet and thin liquids per SS on last admission  - refusing PEG feeds d/t diarrhea , with very limited oral intake.   - c/w acidophilus  - antiemetics PRN  - Cr rising (likely prerenal 2/2) dehydration/poor PO intake      #New B/l Pleural Effusions  - d/c IV lasix  - s/p thoracentesis with pulm on 4/25, removed 1.7L  - requiring 2-3L via NC PRN     #Stage 4 Prostatic Cancer s/p Radiation (on active PO chemo) w metastasis to mediastinum   #Severe Left Hydronephrosis  - c/w home Xtandi 160 mg qd  - monitor labs    #Metabolic acidosis  - c/w sodium bicarb    #Chronic Normocytic Anemia  - likely 2/2 malignancy  - c/w ferrous sulfate    #GOC- DNR/DNI                                           --------------------------------------------------------------    # DVT prophylaxis: Lovenox  # GI prophylaxis: PPI  # Code status: Full Code  # Disposition:

## 2024-05-02 LAB
ALBUMIN SERPL ELPH-MCNC: 2.3 G/DL — LOW (ref 3.5–5.2)
ALP SERPL-CCNC: 142 U/L — HIGH (ref 30–115)
ALT FLD-CCNC: <5 U/L — SIGNIFICANT CHANGE UP (ref 0–41)
ANION GAP SERPL CALC-SCNC: 17 MMOL/L — HIGH (ref 7–14)
AST SERPL-CCNC: 14 U/L — SIGNIFICANT CHANGE UP (ref 0–41)
BASOPHILS # BLD AUTO: 0.01 K/UL — SIGNIFICANT CHANGE UP (ref 0–0.2)
BASOPHILS NFR BLD AUTO: 0.1 % — SIGNIFICANT CHANGE UP (ref 0–1)
BILIRUB SERPL-MCNC: 0.6 MG/DL — SIGNIFICANT CHANGE UP (ref 0.2–1.2)
BUN SERPL-MCNC: 42 MG/DL — HIGH (ref 10–20)
CALCIUM SERPL-MCNC: 6.6 MG/DL — LOW (ref 8.4–10.5)
CHLORIDE SERPL-SCNC: 109 MMOL/L — SIGNIFICANT CHANGE UP (ref 98–110)
CO2 SERPL-SCNC: 16 MMOL/L — LOW (ref 17–32)
CREAT SERPL-MCNC: 1.8 MG/DL — HIGH (ref 0.7–1.5)
EGFR: 40 ML/MIN/1.73M2 — LOW
EOSINOPHIL # BLD AUTO: 0.04 K/UL — SIGNIFICANT CHANGE UP (ref 0–0.7)
EOSINOPHIL NFR BLD AUTO: 0.5 % — SIGNIFICANT CHANGE UP (ref 0–8)
GLUCOSE BLDC GLUCOMTR-MCNC: 127 MG/DL — HIGH (ref 70–99)
GLUCOSE BLDC GLUCOMTR-MCNC: 176 MG/DL — HIGH (ref 70–99)
GLUCOSE BLDC GLUCOMTR-MCNC: 200 MG/DL — HIGH (ref 70–99)
GLUCOSE BLDC GLUCOMTR-MCNC: 71 MG/DL — SIGNIFICANT CHANGE UP (ref 70–99)
GLUCOSE SERPL-MCNC: 192 MG/DL — HIGH (ref 70–99)
HCT VFR BLD CALC: 24.5 % — LOW (ref 42–52)
HGB BLD-MCNC: 7.7 G/DL — LOW (ref 14–18)
IMM GRANULOCYTES NFR BLD AUTO: 2.6 % — HIGH (ref 0.1–0.3)
LYMPHOCYTES # BLD AUTO: 0.36 K/UL — LOW (ref 1.2–3.4)
LYMPHOCYTES # BLD AUTO: 4.2 % — LOW (ref 20.5–51.1)
MCHC RBC-ENTMCNC: 29.2 PG — SIGNIFICANT CHANGE UP (ref 27–31)
MCHC RBC-ENTMCNC: 31.4 G/DL — LOW (ref 32–37)
MCV RBC AUTO: 92.8 FL — SIGNIFICANT CHANGE UP (ref 80–94)
MONOCYTES # BLD AUTO: 1.09 K/UL — HIGH (ref 0.1–0.6)
MONOCYTES NFR BLD AUTO: 12.8 % — HIGH (ref 1.7–9.3)
NEUTROPHILS # BLD AUTO: 6.79 K/UL — HIGH (ref 1.4–6.5)
NEUTROPHILS NFR BLD AUTO: 79.8 % — HIGH (ref 42.2–75.2)
NRBC # BLD: 0 /100 WBCS — SIGNIFICANT CHANGE UP (ref 0–0)
PLATELET # BLD AUTO: 380 K/UL — SIGNIFICANT CHANGE UP (ref 130–400)
PMV BLD: 9.4 FL — SIGNIFICANT CHANGE UP (ref 7.4–10.4)
POTASSIUM SERPL-MCNC: 4.4 MMOL/L — SIGNIFICANT CHANGE UP (ref 3.5–5)
POTASSIUM SERPL-SCNC: 4.4 MMOL/L — SIGNIFICANT CHANGE UP (ref 3.5–5)
PROT SERPL-MCNC: 4.8 G/DL — LOW (ref 6–8)
RBC # BLD: 2.64 M/UL — LOW (ref 4.7–6.1)
RBC # FLD: 17.2 % — HIGH (ref 11.5–14.5)
SODIUM SERPL-SCNC: 142 MMOL/L — SIGNIFICANT CHANGE UP (ref 135–146)
WBC # BLD: 8.51 K/UL — SIGNIFICANT CHANGE UP (ref 4.8–10.8)
WBC # FLD AUTO: 8.51 K/UL — SIGNIFICANT CHANGE UP (ref 4.8–10.8)

## 2024-05-02 PROCEDURE — 99232 SBSQ HOSP IP/OBS MODERATE 35: CPT

## 2024-05-02 RX ORDER — IPRATROPIUM/ALBUTEROL SULFATE 18-103MCG
3 AEROSOL WITH ADAPTER (GRAM) INHALATION EVERY 6 HOURS
Refills: 0 | Status: DISCONTINUED | OUTPATIENT
Start: 2024-05-02 | End: 2024-05-14

## 2024-05-02 RX ADMIN — Medication 10 MILLIGRAM(S): at 17:30

## 2024-05-02 RX ADMIN — ENOXAPARIN SODIUM 40 MILLIGRAM(S): 100 INJECTION SUBCUTANEOUS at 21:40

## 2024-05-02 RX ADMIN — Medication 1 SPRAY(S): at 21:40

## 2024-05-02 RX ADMIN — Medication 10 MILLIGRAM(S): at 08:47

## 2024-05-02 RX ADMIN — Medication 1300 MILLIGRAM(S): at 06:36

## 2024-05-02 RX ADMIN — Medication 1 TABLET(S): at 08:46

## 2024-05-02 RX ADMIN — Medication 1300 MILLIGRAM(S): at 21:40

## 2024-05-02 RX ADMIN — Medication 1: at 17:34

## 2024-05-02 RX ADMIN — CHLORHEXIDINE GLUCONATE 1 APPLICATION(S): 213 SOLUTION TOPICAL at 06:36

## 2024-05-02 RX ADMIN — Medication 1: at 08:46

## 2024-05-02 NOTE — CHART NOTE - NSCHARTNOTEFT_GEN_A_CORE
visited patient earlier today. patient encoutnered resting in bed. awake and alert. He denied any pain or discomfort. He did not engage further in conversation.  [phone]

## 2024-05-02 NOTE — PROGRESS NOTE ADULT - ATTENDING COMMENTS
68M w/ PMHx Stage 4 Prostate Ca s/p palliative radiation, Mediastinal Mass on oral Chemo (xtandi), Chronic Anemia, HTN and Chronic Back Pain presents to ED from SNF for evaluation. As per NH documentation, pt has been refusing medications through PEG for the last 3x days d/t episode of diarrhea post feeds. Patient's only complaint is LT buttock pain. Denies fevers, chills, chest pain, SOB, n/v, abdominal pain or urinary symptoms.    MEDICATIONS  (STANDING):  ascorbic acid 500 milliGRAM(s) Oral daily  benzocaine 20% Spray 1 Spray(s) Topical three times a day  chlorhexidine 2% Cloths 1 Application(s) Topical <User Schedule>  dextrose 50% Injectable 25 Gram(s) IV Push once  dextrose 50% Injectable 12.5 Gram(s) IV Push once  dicyclomine 10 milliGRAM(s) Oral three times a day before meals  doxycycline IVPB 100 milliGRAM(s) IV Intermittent every 12 hours  enoxaparin Injectable 40 milliGRAM(s) SubCutaneous every 24 hours  enzalutamide 160 milliGRAM(s) Oral daily  ferrous    sulfate Liquid 300 milliGRAM(s) Enteral Tube daily  insulin lispro (ADMELOG) corrective regimen sliding scale   SubCutaneous three times a day before meals  lactobacillus acidophilus 1 Tablet(s) Oral three times a day with meals  multivitamin 1 Tablet(s) Oral daily  multivitamin  Chewable 1 Tablet(s) Oral daily  sodium bicarbonate 1300 milliGRAM(s) Oral every 8 hours  sodium bicarbonate  Infusion 0.207 mEq/kG/Hr (75 mL/Hr) IV Continuous <Continuous>    MEDICATIONS  (PRN):  acetaminophen     Tablet .. 650 milliGRAM(s) Oral every 6 hours PRN Temp greater or equal to 38C (100.4F), Mild Pain (1 - 3)  albuterol/ipratropium for Nebulization 3 milliLiter(s) Nebulizer every 6 hours PRN Shortness of Breath  aluminum hydroxide/magnesium hydroxide/simethicone Suspension 30 milliLiter(s) Oral every 4 hours PRN Dyspepsia  dextrose Oral Gel 15 Gram(s) Oral once PRN Blood Glucose LESS THAN 70 milliGRAM(s)/deciliter  guaiFENesin  milliGRAM(s) Oral every 12 hours PRN Cough    # Noncompliant with medication, treatment care  Ethics, palliative team following  - patient unable to make decision and currenlty do not exhibit understanding of his clinical condition.  Requested guardianship process; d/w care management team and supervisor  will reach out to on call  for guardianship process    # Metastatic prostate cancer stage 4  # Mediastinal mass  - S/p palliative radiation  - on  Xtandi 160 mg qd    # Acute Hypoxic resp failure due to B/l  pleural effusions  - S/p left thoracentesis on 4/25 --> 1800 milliLiter drained  - Maintain oxygen sat > 92    # Left gluteal cellulitis  - CT Abdomen and Pelvis w/ IV Cont (04.19.24 @ 19:17) >New mild edema and enlargement of the left gluteus maximums musculature with associated subcutaneous edema. No discrete abscess. Metastatic disease in the abdomen and pelvis  with slightly increased upper abdominal adenopathy. No significant change approximately in the large bladder mass and partially imaged mediastinal mass. Interval placement of an esophageal stent. Severe left hydronephrosis to the level of the proximal ureter, not   significantly changed. New/increased large bilateral pleural effusions.  - blood Culture Results: No growth at 5 days (04.19.24 @ 17:37)  - s/p debridement 4.24 - -->  Wound Cx 4/22 with MRSA   - As per ID c/w  doxycycline 100 mg BID -- plan for 2 weeks from debridement (4/24-5/8)   - continue dressing changes with xeroform packing and antibiotics per patient acceptance    # Acute kidney injury, prerenal- refusing PEG tube feeds   - : CT Abdomen and Pelvis w/ IV Cont (04.19.24 @ 19:17) >KIDNEYS: Stable severe left hydronephrosis secondary to obstruction at the level of the proximal ureter, not significantly changed. Right renal cysts and other low attenuating lesions in the right kidney too small to characterize. PELVIC ORGANS: Large 7 cm bladder mass, unchanged since prior CT.    # H/o Dysphagia  - s/p esophageal stent and PEG 3/5/24  - swallow eval: puree w/ thin liquids, will likely require continued use of PEG    # Anemia  - Pt refused 1 unit PRBC    # Severe protein calorie malnutrition  - BMI: 15.8    DVT prophylaxis: lovenox

## 2024-05-02 NOTE — PROGRESS NOTE ADULT - SUBJECTIVE AND OBJECTIVE BOX
24H events:    Patient is a 68y old Male who presents with a chief complaint of LT Gluteal Pain / Diarrhea (01 May 2024 07:30)    Primary diagnosis of Abscess      Day 1:  Day 2:  Day 3:     Today is hospital day 13d. This morning patient was seen and examined at bedside, resting comfortably in bed.    No acute or major events overnight.    Code Status:    Family communication:  Contact date:  Name of person contacted:  Relationship to patient:  Communication details:  What matters most:    PAST MEDICAL & SURGICAL HISTORY  HTN (hypertension)    Prostate cancer      SOCIAL HISTORY:  Social History:      ALLERGIES:  No Known Allergies    MEDICATIONS:  STANDING MEDICATIONS  albuterol/ipratropium for Nebulization 3 milliLiter(s) Nebulizer every 6 hours  albuterol/ipratropium for Nebulization 3 milliLiter(s) Nebulizer every 6 hours  ascorbic acid 500 milliGRAM(s) Oral daily  benzocaine 20% Spray 1 Spray(s) Topical three times a day  chlorhexidine 2% Cloths 1 Application(s) Topical <User Schedule>  dextrose 50% Injectable 25 Gram(s) IV Push once  dextrose 50% Injectable 12.5 Gram(s) IV Push once  dicyclomine 10 milliGRAM(s) Oral three times a day before meals  doxycycline IVPB 100 milliGRAM(s) IV Intermittent every 12 hours  enoxaparin Injectable 40 milliGRAM(s) SubCutaneous every 24 hours  enzalutamide 160 milliGRAM(s) Oral daily  ferrous    sulfate Liquid 300 milliGRAM(s) Enteral Tube daily  insulin lispro (ADMELOG) corrective regimen sliding scale   SubCutaneous three times a day before meals  lactobacillus acidophilus 1 Tablet(s) Oral three times a day with meals  multivitamin 1 Tablet(s) Oral daily  multivitamin  Chewable 1 Tablet(s) Oral daily  sodium bicarbonate 1300 milliGRAM(s) Oral every 8 hours  sodium bicarbonate  Infusion 0.207 mEq/kG/Hr IV Continuous <Continuous>    PRN MEDICATIONS  acetaminophen     Tablet .. 650 milliGRAM(s) Oral every 6 hours PRN  aluminum hydroxide/magnesium hydroxide/simethicone Suspension 30 milliLiter(s) Oral every 4 hours PRN  dextrose Oral Gel 15 Gram(s) Oral once PRN  guaiFENesin  milliGRAM(s) Oral every 12 hours PRN    VITALS:   T(F): 97.3  HR: 99  BP: 87/60  RR: 18  SpO2: --    PHYSICAL EXAM:  GENERAL:   ( x ) NAD, lying in bed comfortably     (  ) obtunded     (  ) lethargic     (  ) somnolent    HEAD:   ( x ) Atraumatic     (  ) hematoma     (  ) laceration (specify location:       )     NECK:  (x  ) Supple     (  ) neck stiffness     (  ) nuchal rigidity     (  )  no JVD     (  ) JVD present ( -- cm)    HEART:  Rate -->     ( x ) normal rate     (  ) bradycardic     (  ) tachycardic  Rhythm -->     (x  ) regular     (  ) regularly irregular     (  ) irregularly irregular  Murmurs -->     (  x) normal s1s2     (  ) systolic murmur     (  ) diastolic murmur     (  ) continuous murmur      (  ) S3 present     (  ) S4 present    LUNGS:   ( x )Unlabored respirations     (  ) tachypnea  (  ) B/L air entry     (x  ) decreased breath sounds bilaterally   (  ) no adventitious sound     (  ) crackles     (  ) wheezing      ( x ) rhonchi bilaterally   (  ) chest wall tenderness (specify location:       )    ABDOMEN:   (x  ) Soft     (  ) tense   |   (x  ) nondistended     (  ) distended   |   ( x ) +BS     (  ) hypoactive bowel sounds     (  ) hyperactive bowel sounds  ( x ) nontender     (  ) RUQ tenderness     (  ) RLQ tenderness     (  ) LLQ tenderness     (  ) epigastric tenderness     (  ) diffuse tenderness  (  ) Splenomegaly      (  ) Hepatomegaly      (  ) Jaundice     (  ) ecchymosis     EXTREMITIES:  ( x ) Normal     (  ) Rash     (  ) ecchymosis     (  ) varicose veins      (  ) pitting edema     (  ) non-pitting edema   (  ) ulceration     (  ) gangrene:     (location:     )    NERVOUS SYSTEM:    (x  ) A&Ox3     (  ) confused     (  ) lethargic  Upper extremities:     ( x ) no sensorimotor deficits     (  ) weakness     (  ) loss of proprioception/vibration     (  ) loss of touch/temperature (specify:    )  Lower extremities:     ( x ) no sensorimotor deficits     (  ) weakness     (  ) loss of proprioception/vibration     (  ) loss of touch/temperature (specify:    )    SKIN:   Pressure injury on the buttock         LABS:                        7.2    8.12  )-----------( 383      ( 01 May 2024 13:18 )             22.3     05-01    142  |  108  |  51<H>  ----------------------------<  85  3.0<L>   |  17  |  2.2<H>    Ca    6.5<L>      01 May 2024 13:18    TPro  4.7<L>  /  Alb  2.3<L>  /  TBili  0.5  /  DBili  x   /  AST  16  /  ALT  <5  /  AlkPhos  132<H>  05-01      Urinalysis Basic - ( 01 May 2024 13:18 )    Color: x / Appearance: x / SG: x / pH: x  Gluc: 85 mg/dL / Ketone: x  / Bili: x / Urobili: x   Blood: x / Protein: x / Nitrite: x   Leuk Esterase: x / RBC: x / WBC x   Sq Epi: x / Non Sq Epi: x / Bacteria: x                RADIOLOGY:           24H events:    Patient is a 68y old Male who presents with a chief complaint of LT Gluteal Pain / Diarrhea (01 May 2024 07:30)    Primary diagnosis of Abscess      Today is hospital day 13d. This morning patient was seen and examined at bedside, resting comfortably in bed.    No acute or major events overnight.  PAST MEDICAL & SURGICAL HISTORY  HTN (hypertension)    Prostate cancer      SOCIAL HISTORY:  Social History:      ALLERGIES:  No Known Allergies    MEDICATIONS:  STANDING MEDICATIONS  albuterol/ipratropium for Nebulization 3 milliLiter(s) Nebulizer every 6 hours  albuterol/ipratropium for Nebulization 3 milliLiter(s) Nebulizer every 6 hours  ascorbic acid 500 milliGRAM(s) Oral daily  benzocaine 20% Spray 1 Spray(s) Topical three times a day  chlorhexidine 2% Cloths 1 Application(s) Topical <User Schedule>  dextrose 50% Injectable 25 Gram(s) IV Push once  dextrose 50% Injectable 12.5 Gram(s) IV Push once  dicyclomine 10 milliGRAM(s) Oral three times a day before meals  doxycycline IVPB 100 milliGRAM(s) IV Intermittent every 12 hours  enoxaparin Injectable 40 milliGRAM(s) SubCutaneous every 24 hours  enzalutamide 160 milliGRAM(s) Oral daily  ferrous    sulfate Liquid 300 milliGRAM(s) Enteral Tube daily  insulin lispro (ADMELOG) corrective regimen sliding scale   SubCutaneous three times a day before meals  lactobacillus acidophilus 1 Tablet(s) Oral three times a day with meals  multivitamin 1 Tablet(s) Oral daily  multivitamin  Chewable 1 Tablet(s) Oral daily  sodium bicarbonate 1300 milliGRAM(s) Oral every 8 hours  sodium bicarbonate  Infusion 0.207 mEq/kG/Hr IV Continuous <Continuous>    PRN MEDICATIONS  acetaminophen     Tablet .. 650 milliGRAM(s) Oral every 6 hours PRN  aluminum hydroxide/magnesium hydroxide/simethicone Suspension 30 milliLiter(s) Oral every 4 hours PRN  dextrose Oral Gel 15 Gram(s) Oral once PRN  guaiFENesin  milliGRAM(s) Oral every 12 hours PRN    VITALS:   T(F): 97.3  HR: 99  BP: 87/60  RR: 18  SpO2: --    PHYSICAL EXAM:  GENERAL:   ( x ) NAD, lying in bed comfortably     (  ) obtunded     (  ) lethargic     (  ) somnolent    HEAD:   ( x ) Atraumatic     (  ) hematoma     (  ) laceration (specify location:       )     NECK:  (x  ) Supple     (  ) neck stiffness     (  ) nuchal rigidity     (  )  no JVD     (  ) JVD present ( -- cm)    HEART:  Rate -->     ( x ) normal rate     (  ) bradycardic     (  ) tachycardic  Rhythm -->     (x  ) regular     (  ) regularly irregular     (  ) irregularly irregular  Murmurs -->     (  x) normal s1s2     (  ) systolic murmur     (  ) diastolic murmur     (  ) continuous murmur      (  ) S3 present     (  ) S4 present    LUNGS:   ( x )Unlabored respirations     (  ) tachypnea  (  ) B/L air entry     (x  ) decreased breath sounds bilaterally   (  ) no adventitious sound     (  ) crackles     (  ) wheezing      ( x ) rhonchi bilaterally   (  ) chest wall tenderness (specify location:       )    ABDOMEN:   (x  ) Soft     (  ) tense   |   (x  ) nondistended     (  ) distended   |   ( x ) +BS     (  ) hypoactive bowel sounds     (  ) hyperactive bowel sounds  ( x ) nontender     (  ) RUQ tenderness     (  ) RLQ tenderness     (  ) LLQ tenderness     (  ) epigastric tenderness     (  ) diffuse tenderness  (  ) Splenomegaly      (  ) Hepatomegaly      (  ) Jaundice     (  ) ecchymosis     EXTREMITIES:  ( x ) Normal     (  ) Rash     (  ) ecchymosis     (  ) varicose veins      (  ) pitting edema     (  ) non-pitting edema   (  ) ulceration     (  ) gangrene:     (location:     )    NERVOUS SYSTEM:    (x  ) A&Ox3     (  ) confused     (  ) lethargic  Upper extremities:     ( x ) no sensorimotor deficits     (  ) weakness     (  ) loss of proprioception/vibration     (  ) loss of touch/temperature (specify:    )  Lower extremities:     ( x ) no sensorimotor deficits     (  ) weakness     (  ) loss of proprioception/vibration     (  ) loss of touch/temperature (specify:    )    SKIN:   Pressure injury on the buttock         LABS:                        7.2    8.12  )-----------( 383      ( 01 May 2024 13:18 )             22.3     05-01    142  |  108  |  51<H>  ----------------------------<  85  3.0<L>   |  17  |  2.2<H>    Ca    6.5<L>      01 May 2024 13:18    TPro  4.7<L>  /  Alb  2.3<L>  /  TBili  0.5  /  DBili  x   /  AST  16  /  ALT  <5  /  AlkPhos  132<H>  05-01      Urinalysis Basic - ( 01 May 2024 13:18 )    Color: x / Appearance: x / SG: x / pH: x  Gluc: 85 mg/dL / Ketone: x  / Bili: x / Urobili: x   Blood: x / Protein: x / Nitrite: x   Leuk Esterase: x / RBC: x / WBC x   Sq Epi: x / Non Sq Epi: x / Bacteria: x                RADIOLOGY:

## 2024-05-02 NOTE — PROGRESS NOTE ADULT - ASSESSMENT
68 year old male with PMH fo Stage 4 Prostatic Ca s/p palliative radiation, medistinal mass on oral chemo (Xtandi)< chronic anemia, HTN, chronic  back pain presents to ED from SNF for evaluation. Per NH, has been refusing medications via PEG x 3 days d/t episodes of diarrhea post-feeds. Patient was only complaining of left buttock pain, s/p debridement by burn on 4/24, s/p thoracentesis by pul on 4/25 for pleural effusion removed 1.7L.     #treatement refusal   1.Patient refusing treatment despite multiple attempts at explaining the importance of treatment.  2.Patient refusing PEG feeds and oral feeds.  3.Ethics consulted, Cs noted   4.Psych consult noted.     #Left GLuteal Cellulitis  #MRSA (+) Wound Cx 4/22  - vitals: Afebrile, /892, , SpO2 100% on RA  - CT shows new mild edema and enlargement of left gluteus maximums musculature with associated subcutaneous edema.   - s/p Cefepime, Vanc, Flagyl  - currently on doxycycline but patient refusing medication and even IV placement .   - Wound Cx (4/22): rare MRSA   - Contact isolation  - continue local wound care, off-loading  - Per burn, no further plans for debridement  - Per ID, continue Doxycycline for 2 weeks post debridement (4/24-5/8)     #H/o Dysphagia s/p PEG  #N/V 4/25  - s/p esophageal stent & PEG 3/5/24  - patient cleared for pureed diet and thin liquids per SS on last admission  - refusing PEG feeds d/t diarrhea , with very limited oral intake.   - c/w acidophilus  - antiemetics PRN  - Cr rising (likely prerenal 2/2) dehydration/poor PO intake      #New B/l Pleural Effusions  - d/c IV lasix  - s/p thoracentesis with pulm on 4/25, removed 1.7L  - requiring 2-3L via NC PRN     #Stage 4 Prostatic Cancer s/p Radiation (on active PO chemo) w metastasis to mediastinum   #Severe Left Hydronephrosis  - c/w home Xtandi 160 mg qd  - monitor labs    #Metabolic acidosis  - c/w sodium bicarb    #Chronic Normocytic Anemia  - likely 2/2 malignancy  - c/w ferrous sulfate    #GOC- DNR/DNI    Guardianship process to be initiated.                                          --------------------------------------------------------------    # DVT prophylaxis: Lovenox  # GI prophylaxis: PPI  # Code status: Full Code  # Disposition:

## 2024-05-02 NOTE — CHART NOTE - NSCHARTNOTEFT_GEN_A_CORE
3-Day calorie Count 4/29-5/1    Day 1: 60kcal/1g PRO; 0% at lunch and dinner     Day 2: 60kcal/1g PRO; 0% at lunch and dinner     Day 3: 180kcal/3g PRO; 0% at lunch and dinner     3-Day average: 100kcal/1.6g PRO    -- Upon my last meeting with pt, he was refusing PEG feeds and PO supplements. PO intake is almost 0%.   Maybe consider discussing with family or pt regarding GOC as PO intake has been <25% >1 week.

## 2024-05-03 LAB
COMMENT - FLUIDS: SIGNIFICANT CHANGE UP
GLUCOSE BLDC GLUCOMTR-MCNC: 141 MG/DL — HIGH (ref 70–99)
GLUCOSE BLDC GLUCOMTR-MCNC: 183 MG/DL — HIGH (ref 70–99)
GLUCOSE BLDC GLUCOMTR-MCNC: 331 MG/DL — HIGH (ref 70–99)
GLUCOSE BLDC GLUCOMTR-MCNC: 377 MG/DL — HIGH (ref 70–99)

## 2024-05-03 PROCEDURE — 99232 SBSQ HOSP IP/OBS MODERATE 35: CPT | Mod: GC

## 2024-05-03 RX ORDER — INSULIN LISPRO 100/ML
4 VIAL (ML) SUBCUTANEOUS ONCE
Refills: 0 | Status: COMPLETED | OUTPATIENT
Start: 2024-05-03 | End: 2024-05-03

## 2024-05-03 RX ADMIN — Medication 1 TABLET(S): at 12:07

## 2024-05-03 RX ADMIN — Medication 1300 MILLIGRAM(S): at 05:25

## 2024-05-03 RX ADMIN — ENOXAPARIN SODIUM 40 MILLIGRAM(S): 100 INJECTION SUBCUTANEOUS at 21:34

## 2024-05-03 RX ADMIN — Medication 4: at 18:03

## 2024-05-03 RX ADMIN — ENZALUTAMIDE 160 MILLIGRAM(S): 80 TABLET ORAL at 12:11

## 2024-05-03 RX ADMIN — Medication 300 MILLIGRAM(S): at 12:06

## 2024-05-03 RX ADMIN — Medication 100 MILLIGRAM(S): at 21:33

## 2024-05-03 RX ADMIN — Medication 1 SPRAY(S): at 21:35

## 2024-05-03 RX ADMIN — Medication 10 MILLIGRAM(S): at 05:27

## 2024-05-03 RX ADMIN — Medication 500 MILLIGRAM(S): at 12:06

## 2024-05-03 RX ADMIN — Medication 10 MILLIGRAM(S): at 12:06

## 2024-05-03 RX ADMIN — Medication 1 TABLET(S): at 17:52

## 2024-05-03 RX ADMIN — Medication 1300 MILLIGRAM(S): at 13:51

## 2024-05-03 RX ADMIN — Medication 1 SPRAY(S): at 05:26

## 2024-05-03 RX ADMIN — Medication 4 UNIT(S): at 22:34

## 2024-05-03 RX ADMIN — Medication 10 MILLIGRAM(S): at 18:03

## 2024-05-03 RX ADMIN — Medication 1: at 09:54

## 2024-05-03 RX ADMIN — CHLORHEXIDINE GLUCONATE 1 APPLICATION(S): 213 SOLUTION TOPICAL at 05:29

## 2024-05-03 RX ADMIN — Medication 1300 MILLIGRAM(S): at 21:34

## 2024-05-03 RX ADMIN — ENZALUTAMIDE 160 MILLIGRAM(S): 80 TABLET ORAL at 17:48

## 2024-05-03 NOTE — PROGRESS NOTE ADULT - ASSESSMENT
68 year old male with PMH fo Stage 4 Prostatic Ca s/p palliative radiation, medistinal mass on oral chemo (Xtandi)< chronic anemia, HTN, chronic  back pain presents to ED from SNF for evaluation. Per NH, has been refusing medications via PEG x 3 days d/t episodes of diarrhea post-feeds. Patient was only complaining of left buttock pain, s/p debridement by burn on 4/24, s/p thoracentesis by pul on 4/25 for pleural effusion removed 1.7L.     #treatement refusal   1.Patient refusing treatment despite multiple attempts at explaining the importance of treatment.  2.Patient refusing PEG feeds and oral feeds.  3.Ethics consulted, Cs noted   4.Psych consult noted.     #Left GLuteal Cellulitis  #MRSA (+) Wound Cx 4/22  - vitals: Afebrile, /892, , SpO2 100% on RA  - CT shows new mild edema and enlargement of left gluteus maximums musculature with associated subcutaneous edema.   - s/p Cefepime, Vanc, Flagyl  - currently on doxycycline but patient refusing medication and even IV placement , will switch to oral and try to administer medications .   - Wound Cx (4/22): rare MRSA   - Contact isolation  - continue local wound care, off-loading  - Per burn, no further plans for debridement  - Per ID, continue Doxycycline for 2 weeks post debridement (4/24-5/8)     #H/o Dysphagia s/p PEG  #N/V 4/25  - s/p esophageal stent & PEG 3/5/24  - patient cleared for pureed diet and thin liquids per SS on last admission  - refusing PEG feeds d/t diarrhea , with very limited oral intake.   - c/w acidophilus  - antiemetics PRN      #New B/l Pleural Effusions  - d/c IV lasix  - s/p thoracentesis with pulm on 4/25, removed 1.7L  - requiring 2-3L via NC PRN     #Stage 4 Prostatic Cancer s/p Radiation (on active PO chemo) w metastasis to mediastinum   #Severe Left Hydronephrosis  - c/w home Xtandi 160 mg qd  - monitor labs    #Metabolic acidosis  - c/w sodium bicarb    #Chronic Normocytic Anemia  - likely 2/2 malignancy  - c/w ferrous sulfate    #GOC- DNR/DNI    Guardianship process to be initiated.                                          --------------------------------------------------------------    # DVT prophylaxis: Lovenox  # GI prophylaxis: PPI  # Code status: Full Code  # Disposition:

## 2024-05-03 NOTE — PROGRESS NOTE ADULT - ATTENDING COMMENTS
68M w/ PMHx Stage 4 Prostate Ca s/p palliative radiation, Mediastinal Mass on oral Chemo (xtandi), Chronic Anemia, HTN and Chronic Back Pain presents to ED from SNF for evaluation. As per NH documentation, pt has been refusing medications through PEG for the last 3x days d/t episode of diarrhea post feeds. Patient's only complaint is LT buttock pain. Denies fevers, chills, chest pain, SOB, n/v, abdominal pain or urinary symptoms.      # Noncompliant with medication, treatment care  Ethics, palliative team following  guradianship process initiated  D/w patient and he is agreeable for tube feeds today. Also agreed for anntibiotics trial through PEG tube. Discussed with nursing; will monitor patient's compliance  - patient unable to make decision and currently do not exhibit understanding of his clinical condition.    # episode of hypotension- improved   monitor    # Metastatic prostate cancer stage 4  # Mediastinal mass  - S/p palliative radiation  - on  Xtandi 160 mg qd  - heme evaluated- poor candidate for treatment; hospice appropriate from the consult     # Acute Hypoxic resp failure due to B/l  pleural effusions  - S/p left thoracentesis on 4/25 --> 1800 milliLiter drained  - Maintain oxygen sat > 92    # Left gluteal cellulitis  - CT Abdomen and Pelvis w/ IV Cont (04.19.24 @ 19:17) >New mild edema and enlargement of the left gluteus maximums musculature with associated subcutaneous edema. No discrete abscess. Metastatic disease in the abdomen and pelvis  with slightly increased upper abdominal adenopathy. No significant change approximately in the large bladder mass and partially imaged mediastinal mass. Interval placement of an esophageal stent. Severe left hydronephrosis to the level of the proximal ureter, not   significantly changed. New/increased large bilateral pleural effusions.  - blood Culture Results: No growth at 5 days (04.19.24 @ 17:37)  - s/p debridement 4.24 - -->  Wound Cx 4/22 with MRSA   - As per ID c/w  doxycycline 100 mg BID -- plan for 2 weeks from debridement (4/24-5/8)   - continue dressing changes with xeroform packing and antibiotics per patient acceptance    # Acute kidney injury, prerenal- refusing PEG tube feeds   - : CT Abdomen and Pelvis w/ IV Cont (04.19.24 @ 19:17) >KIDNEYS: Stable severe left hydronephrosis secondary to obstruction at the level of the proximal ureter, not significantly changed. Right renal cysts and other low attenuating lesions in the right kidney too small to characterize. PELVIC ORGANS: Large 7 cm bladder mass, unchanged since prior CT.    # H/o Dysphagia  - s/p esophageal stent and PEG 3/5/24  - swallow eval: puree w/ thin liquids, will likely require continued use of PEG    # Anemia  - Pt refused 1 unit PRBC    # Severe protein calorie malnutrition  - BMI: 15.8    DVT prophylaxis: lovenox     Pending: monitoring patient's compliance, guardianship   Patient is with very poor prgnosis

## 2024-05-03 NOTE — PROGRESS NOTE ADULT - SUBJECTIVE AND OBJECTIVE BOX
24H events:    Patient is a 68y old Male who presents with a chief complaint of LT Gluteal Pain / Diarrhea (02 May 2024 07:13)    Primary diagnosis of Abscess    Today is hospital day 14d. This morning patient was seen and examined at bedside, resting comfortably in bed.    No acute or major events overnight.    PAST MEDICAL & SURGICAL HISTORY  HTN (hypertension)    Prostate cancer      SOCIAL HISTORY:  Social History:      ALLERGIES:  No Known Allergies    MEDICATIONS:  STANDING MEDICATIONS  ascorbic acid 500 milliGRAM(s) Oral daily  benzocaine 20% Spray 1 Spray(s) Topical three times a day  chlorhexidine 2% Cloths 1 Application(s) Topical <User Schedule>  dextrose 50% Injectable 25 Gram(s) IV Push once  dextrose 50% Injectable 12.5 Gram(s) IV Push once  dicyclomine 10 milliGRAM(s) Oral three times a day before meals  doxycycline IVPB 100 milliGRAM(s) IV Intermittent every 12 hours  enoxaparin Injectable 40 milliGRAM(s) SubCutaneous every 24 hours  enzalutamide 160 milliGRAM(s) Oral daily  ferrous    sulfate Liquid 300 milliGRAM(s) Enteral Tube daily  insulin lispro (ADMELOG) corrective regimen sliding scale   SubCutaneous three times a day before meals  lactobacillus acidophilus 1 Tablet(s) Oral three times a day with meals  multivitamin 1 Tablet(s) Oral daily  multivitamin  Chewable 1 Tablet(s) Oral daily  sodium bicarbonate 1300 milliGRAM(s) Oral every 8 hours  sodium bicarbonate  Infusion 0.207 mEq/kG/Hr IV Continuous <Continuous>    PRN MEDICATIONS  acetaminophen     Tablet .. 650 milliGRAM(s) Oral every 6 hours PRN  albuterol/ipratropium for Nebulization 3 milliLiter(s) Nebulizer every 6 hours PRN  aluminum hydroxide/magnesium hydroxide/simethicone Suspension 30 milliLiter(s) Oral every 4 hours PRN  dextrose Oral Gel 15 Gram(s) Oral once PRN  guaiFENesin  milliGRAM(s) Oral every 12 hours PRN    VITALS:   T(F): 97.5  HR: 63  BP: 90/63  RR: 17  SpO2: 95%    PHYSICAL EXAM:  GENERAL:   (x  ) NAD, lying in bed comfortably     (  ) obtunded     (  ) lethargic     (  ) somnolent    HEAD:   (  x) Atraumatic     (  ) hematoma     (  ) laceration (specify location:       )     NECK:  ( x) Supple     (  ) neck stiffness     (  ) nuchal rigidity     (  )  no JVD     (  ) JVD present ( -- cm)    HEART:  Rate -->     (x  ) normal rate     (  ) bradycardic     (  ) tachycardic  Rhythm -->     (x  ) regular     (  ) regularly irregular     (  ) irregularly irregular  Murmurs -->     ( x ) normal s1s2     (  ) systolic murmur     (  ) diastolic murmur     (  ) continuous murmur      (  ) S3 present     (  ) S4 present    LUNGS:   ( x )Unlabored respirations     (  ) tachypnea  (  ) B/L air entry     (  ) decreased breath sounds in:  (location     )    (  ) no adventitious sound     (  ) crackles     (  ) wheezing      (x  ) rhonchi bilateral      (specify location:       )  (  ) chest wall tenderness (specify location:       )    ABDOMEN:   ( x ) Soft     (  ) tense   |   (x  ) nondistended     (  ) distended   |   (  x) +BS     (  ) hypoactive bowel sounds     (  ) hyperactive bowel sounds  ( x ) nontender     (  ) RUQ tenderness     (  ) RLQ tenderness     (  ) LLQ tenderness     (  ) epigastric tenderness     (  ) diffuse tenderness  (  ) Splenomegaly      (  ) Hepatomegaly      (  ) Jaundice     (  ) ecchymosis     EXTREMITIES:  ( x ) Normal     (  ) Rash     (  ) ecchymosis     (  ) varicose veins      (  ) pitting edema     (  ) non-pitting edema   (  ) ulceration     (  ) gangrene:     (location:     )    NERVOUS SYSTEM:    (x  ) A&Ox3     (  ) confused     (  ) lethargic  CN II-XII:     ( x ) Intact     (  ) deficits found     (Specify:     )   Upper extremities:     (  x) no sensorimotor deficits     (  ) weakness     (  ) loss of proprioception/vibration     (  ) loss of touch/temperature (specify:    )  Lower extremities:     (x ) no sensorimotor deficits     (  ) weakness     (  ) loss of proprioception/vibration     (  ) loss of touch/temperature (specify:    )    SKIN:   (x ) No rashes or lesions     (  ) maculopapular rash     (  ) pustules     (  ) vesicles     (  ) ulcer     (  ) ecchymosis     (specify location:     )      LABS:                        7.7    8.51  )-----------( 380      ( 02 May 2024 06:02 )             24.5     05-02    142  |  109  |  42<H>  ----------------------------<  192<H>  4.4   |  16<L>  |  1.8<H>    Ca    6.6<L>      02 May 2024 06:02    TPro  4.8<L>  /  Alb  2.3<L>  /  TBili  0.6  /  DBili  x   /  AST  14  /  ALT  <5  /  AlkPhos  142<H>  05-02      Urinalysis Basic - ( 02 May 2024 06:02 )    Color: x / Appearance: x / SG: x / pH: x  Gluc: 192 mg/dL / Ketone: x  / Bili: x / Urobili: x   Blood: x / Protein: x / Nitrite: x   Leuk Esterase: x / RBC: x / WBC x   Sq Epi: x / Non Sq Epi: x / Bacteria: x                RADIOLOGY:

## 2024-05-04 LAB
GLUCOSE BLDC GLUCOMTR-MCNC: 136 MG/DL — HIGH (ref 70–99)
GLUCOSE BLDC GLUCOMTR-MCNC: 191 MG/DL — HIGH (ref 70–99)
GLUCOSE BLDC GLUCOMTR-MCNC: 192 MG/DL — HIGH (ref 70–99)
GLUCOSE BLDC GLUCOMTR-MCNC: 258 MG/DL — HIGH (ref 70–99)

## 2024-05-04 PROCEDURE — 99232 SBSQ HOSP IP/OBS MODERATE 35: CPT

## 2024-05-04 RX ORDER — GLUCAGON INJECTION, SOLUTION 0.5 MG/.1ML
1 INJECTION, SOLUTION SUBCUTANEOUS ONCE
Refills: 0 | Status: DISCONTINUED | OUTPATIENT
Start: 2024-05-04 | End: 2024-05-14

## 2024-05-04 RX ORDER — SODIUM CHLORIDE 9 MG/ML
1000 INJECTION, SOLUTION INTRAVENOUS
Refills: 0 | Status: DISCONTINUED | OUTPATIENT
Start: 2024-05-04 | End: 2024-05-14

## 2024-05-04 RX ORDER — INSULIN GLARGINE 100 [IU]/ML
10 INJECTION, SOLUTION SUBCUTANEOUS AT BEDTIME
Refills: 0 | Status: DISCONTINUED | OUTPATIENT
Start: 2024-05-04 | End: 2024-05-09

## 2024-05-04 RX ORDER — DEXTROSE 10 % IN WATER 10 %
125 INTRAVENOUS SOLUTION INTRAVENOUS ONCE
Refills: 0 | Status: DISCONTINUED | OUTPATIENT
Start: 2024-05-04 | End: 2024-05-14

## 2024-05-04 RX ADMIN — Medication 1 SPRAY(S): at 21:58

## 2024-05-04 RX ADMIN — Medication 1300 MILLIGRAM(S): at 15:34

## 2024-05-04 RX ADMIN — Medication 1 SPRAY(S): at 05:34

## 2024-05-04 RX ADMIN — INSULIN GLARGINE 10 UNIT(S): 100 INJECTION, SOLUTION SUBCUTANEOUS at 21:57

## 2024-05-04 RX ADMIN — Medication 1: at 12:20

## 2024-05-04 RX ADMIN — CHLORHEXIDINE GLUCONATE 1 APPLICATION(S): 213 SOLUTION TOPICAL at 05:37

## 2024-05-04 RX ADMIN — Medication 300 MILLIGRAM(S): at 12:20

## 2024-05-04 RX ADMIN — ENZALUTAMIDE 160 MILLIGRAM(S): 80 TABLET ORAL at 12:27

## 2024-05-04 RX ADMIN — Medication 1300 MILLIGRAM(S): at 05:35

## 2024-05-04 RX ADMIN — Medication 1300 MILLIGRAM(S): at 21:58

## 2024-05-04 RX ADMIN — Medication 500 MILLIGRAM(S): at 12:20

## 2024-05-04 RX ADMIN — Medication 3: at 09:00

## 2024-05-04 RX ADMIN — ENOXAPARIN SODIUM 40 MILLIGRAM(S): 100 INJECTION SUBCUTANEOUS at 21:57

## 2024-05-04 RX ADMIN — Medication 1 TABLET(S): at 12:20

## 2024-05-04 RX ADMIN — Medication 10 MILLIGRAM(S): at 05:34

## 2024-05-04 NOTE — PROGRESS NOTE ADULT - ASSESSMENT
68M w/ PMHx Stage 4 Prostate Ca s/p palliative radiation, Mediastinal Mass on oral Chemo (xtandi), Chronic Anemia, HTN and Chronic Back Pain presents to ED from SNF for evaluation. As per NH documentation, pt has been refusing medications through PEG for the last 3x days d/t episode of diarrhea post feeds. Patient's only complaint is LT buttock pain. Denies fevers, chills, chest pain, SOB, n/v, abdominal pain or urinary symptoms.      # Noncompliant with medication, treatment care  Ethics, palliative team following  guradianship process initiated  cont tf, medication via peg  - patient unable to make decision and currently do not exhibit understanding of his clinical condition.      # Metastatic prostate cancer stage 4  # Mediastinal mass  - S/p palliative radiation  - on  Xtandi 160 mg qd  - heme evaluated- poor candidate for treatment; hospice appropriate from the consult     # Acute Hypoxic resp failure due to B/l  pleural effusions  - S/p left thoracentesis on 4/25 --> 1800 milliLiter drained  pleural cx ntd  cyto neg  - Maintain oxygen sat > 92    # Left gluteal cellulitis  - CT Abdomen and Pelvis w/ IV Cont (04.19.24 @ 19:17) >New mild edema and enlargement of the left gluteus maximums musculature with associated subcutaneous edema. No discrete abscess. Metastatic disease in the abdomen and pelvis  with slightly increased upper abdominal adenopathy. No significant change approximately in the large bladder mass and partially imaged mediastinal mass. Interval placement of an esophageal stent. Severe left hydronephrosis to the level of the proximal ureter, not   significantly changed. New/increased large bilateral pleural effusions.  - blood Culture Results: No growth at 5 days (04.19.24 @ 17:37)  - s/p debridement 4.24 - -->  Wound Cx 4/22 with MRSA   - As per ID c/w  doxycycline 100 mg BID -- plan for 2 weeks from debridement (4/24-5/8)   - continue dressing changes with xeroform packing and antibiotics per patient acceptance    # Acute kidney injury, prerenal- refusing PEG tube feeds   - : CT Abdomen and Pelvis w/ IV Cont (04.19.24 @ 19:17) >KIDNEYS: Stable severe left hydronephrosis secondary to obstruction at the level of the proximal ureter, not significantly changed. Right renal cysts and other low attenuating lesions in the right kidney too small to characterize. PELVIC ORGANS: Large 7 cm bladder mass, unchanged since prior CT.    # H/o Dysphagia  - s/p esophageal stent and PEG 3/5/24  - swallow eval: puree w/ thin liquids, will likely require continued use of PEG        # Severe protein calorie malnutrition  - BMI: 15.8    DVT prophylaxis: lovenox         #Progress Note Handoff  Pending (specify): cont abx, trend scr, ethics; jodi  Family discussion:   Disposition: unknown 68M w/ PMHx Stage 4 Prostate Ca s/p palliative radiation, Mediastinal Mass on oral Chemo (xtandi), Chronic Anemia, HTN and Chronic Back Pain presents to ED from SNF for evaluation. As per NH documentation, pt has been refusing medications through PEG for the last 3x days d/t episode of diarrhea post feeds. Patient's only complaint is LT buttock pain. Denies fevers, chills, chest pain, SOB, n/v, abdominal pain or urinary symptoms.      # Noncompliant with medication, treatment care  Ethics, palliative team following  guradianship process initiated  cont tf, medication via peg  - patient unable to make decision and currently do not exhibit understanding of his clinical condition.      # Metastatic prostate cancer stage 4  # Mediastinal mass  - S/p palliative radiation  - on  Xtandi 160 mg qd  - heme evaluated- poor candidate for treatment; hospice appropriate from the consult     # Acute Hypoxic resp failure due to B/l  pleural effusions  - S/p left thoracentesis on 4/25 --> 1800 milliLiter drained  pleural cx ntd  cyto neg  - Maintain oxygen sat > 92    # Left gluteal cellulitis  - CT Abdomen and Pelvis w/ IV Cont (04.19.24 @ 19:17) >New mild edema and enlargement of the left gluteus maximums musculature with associated subcutaneous edema. No discrete abscess. Metastatic disease in the abdomen and pelvis  with slightly increased upper abdominal adenopathy. No significant change approximately in the large bladder mass and partially imaged mediastinal mass. Interval placement of an esophageal stent. Severe left hydronephrosis to the level of the proximal ureter, not   significantly changed. New/increased large bilateral pleural effusions.  - blood Culture Results: No growth at 5 days (04.19.24 @ 17:37)  - s/p debridement 4.24 - -->  Wound Cx 4/22 with MRSA   - As per ID c/w  doxycycline 100 mg BID -- plan for 2 weeks from debridement (4/24-5/8)   - continue dressing changes with xeroform packing and antibiotics per patient acceptance    # Acute kidney injury, prerenal- refusing PEG tube feeds   - : CT Abdomen and Pelvis w/ IV Cont (04.19.24 @ 19:17) >KIDNEYS: Stable severe left hydronephrosis secondary to obstruction at the level of the proximal ureter, not significantly changed. Right renal cysts and other low attenuating lesions in the right kidney too small to characterize. PELVIC ORGANS: Large 7 cm bladder mass, unchanged since prior CT.  c/b ag met acidosis  delta delta with ag met acidosis + alkalosis  bicarb 650 tid  repeat bmp    # H/o Dysphagia  - s/p esophageal stent and PEG 3/5/24  - swallow eval: puree w/ thin liquids, will likely require continued use of PEG        # Severe protein calorie malnutrition  - BMI: 15.8    DVT prophylaxis: lovenox         #Progress Note Handoff  Pending (specify): cont abx, trend scr, ethics; jodi  Family discussion:   Disposition: unknown 68M w/ PMHx Stage 4 Prostate Ca s/p palliative radiation, Mediastinal Mass on oral Chemo (xtandi), Chronic Anemia, HTN and Chronic Back Pain presents to ED from SNF for evaluation. As per NH documentation, pt has been refusing medications through PEG for the last 3x days d/t episode of diarrhea post feeds. Patient's only complaint is LT buttock pain. Denies fevers, chills, chest pain, SOB, n/v, abdominal pain or urinary symptoms.      # Noncompliant with medication, treatment care  Ethics, palliative team following  guradianship process initiated  cont tf, medication via peg  - patient unable to make decision and currently do not exhibit understanding of his clinical condition.      # Metastatic prostate cancer stage 4  # Mediastinal mass  - S/p palliative radiation  - on  Xtandi 160 mg qd  - heme evaluated- poor candidate for treatment; hospice appropriate from the consult     # Acute Hypoxic resp failure due to B/l  pleural effusions  - S/p left thoracentesis on 4/25 --> 1800 milliLiter drained  pleural cx ntd  cyto neg  - Maintain oxygen sat > 92    # Left gluteal cellulitis  - CT Abdomen and Pelvis w/ IV Cont (04.19.24 @ 19:17) >New mild edema and enlargement of the left gluteus maximums musculature with associated subcutaneous edema. No discrete abscess. Metastatic disease in the abdomen and pelvis  with slightly increased upper abdominal adenopathy. No significant change approximately in the large bladder mass and partially imaged mediastinal mass. Interval placement of an esophageal stent. Severe left hydronephrosis to the level of the proximal ureter, not   significantly changed. New/increased large bilateral pleural effusions.  - blood Culture Results: No growth at 5 days (04.19.24 @ 17:37)  - s/p debridement 4.24 - -->  Wound Cx 4/22 with MRSA   - As per ID c/w  doxycycline 100 mg BID -- plan for 2 weeks from debridement (4/24-5/8)   - continue dressing changes with xeroform packing and antibiotics per patient acceptance    # Acute kidney injury, prerenal- refusing PEG tube feeds   - : CT Abdomen and Pelvis w/ IV Cont (04.19.24 @ 19:17) >KIDNEYS: Stable severe left hydronephrosis secondary to obstruction at the level of the proximal ureter, not significantly changed. Right renal cysts and other low attenuating lesions in the right kidney too small to characterize. PELVIC ORGANS: Large 7 cm bladder mass, unchanged since prior CT.  c/b ag met acidosis  delta delta with ag met acidosis + alkalosis  bicarb 1300 tid  repeat bmp    # H/o Dysphagia  - s/p esophageal stent and PEG 3/5/24  - swallow eval: puree w/ thin liquids, will likely require continued use of PEG        # Severe protein calorie malnutrition  - BMI: 15.8    DVT prophylaxis: lovenox         #Progress Note Handoff  Pending (specify): cont abx, trend scr, ethics; jodi  Family discussion:   Disposition: unknown

## 2024-05-04 NOTE — PROGRESS NOTE ADULT - SUBJECTIVE AND OBJECTIVE BOX
INTERVAL HPI/OVERNIGHT EVENTS:    SUBJECTIVE: Patient seen and examined at bedside.     unable to obtain ros    OBJECTIVE:    VITAL SIGNS:  Vital Signs Last 24 Hrs  T(C): 36.3 (04 May 2024 07:45), Max: 37.2 (04 May 2024 00:00)  T(F): 97.4 (04 May 2024 07:45), Max: 98.9 (04 May 2024 00:00)  HR: 98 (04 May 2024 07:45) (61 - 98)  BP: 106/71 (04 May 2024 07:45) (90/59 - 106/71)  BP(mean): --  RR: 17 (04 May 2024 00:00) (16 - 17)  SpO2: 96% (04 May 2024 07:45) (95% - 100%)    Parameters below as of 04 May 2024 07:45  Patient On (Oxygen Delivery Method): room air          PHYSICAL EXAM:    General: NAD  HEENT: NC/AT; PERRL, clear conjunctiva  Neck: supple  Respiratory: CTA b/l  Cardiovascular: +S1/S2; RRR  Abdomen: soft, NT/ND; +BS x4  Extremities: WWP, 2+ peripheral pulses b/l; no LE edema  Skin: normal color and turgor; no rash  Neurological:    MEDICATIONS:  MEDICATIONS  (STANDING):  ascorbic acid 500 milliGRAM(s) Oral daily  benzocaine 20% Spray 1 Spray(s) Topical three times a day  chlorhexidine 2% Cloths 1 Application(s) Topical <User Schedule>  dextrose 10% Bolus 125 milliLiter(s) IV Bolus once  dextrose 5%. 1000 milliLiter(s) (50 mL/Hr) IV Continuous <Continuous>  dextrose 5%. 1000 milliLiter(s) (100 mL/Hr) IV Continuous <Continuous>  dextrose 50% Injectable 25 Gram(s) IV Push once  dextrose 50% Injectable 12.5 Gram(s) IV Push once  dicyclomine 10 milliGRAM(s) Oral three times a day before meals  doxycycline monohydrate Suspension 100 milliGRAM(s) Oral every 12 hours  enoxaparin Injectable 40 milliGRAM(s) SubCutaneous every 24 hours  enzalutamide 160 milliGRAM(s) Oral daily  ferrous    sulfate Liquid 300 milliGRAM(s) Enteral Tube daily  glucagon  Injectable 1 milliGRAM(s) IntraMuscular once  insulin glargine Injectable (LANTUS) 10 Unit(s) SubCutaneous at bedtime  insulin lispro (ADMELOG) corrective regimen sliding scale   SubCutaneous three times a day before meals  lactobacillus acidophilus 1 Tablet(s) Oral three times a day with meals  multivitamin 1 Tablet(s) Oral daily  multivitamin  Chewable 1 Tablet(s) Oral daily  sodium bicarbonate 1300 milliGRAM(s) Oral every 8 hours  sodium bicarbonate  Infusion 0.207 mEq/kG/Hr (75 mL/Hr) IV Continuous <Continuous>    MEDICATIONS  (PRN):  acetaminophen     Tablet .. 650 milliGRAM(s) Oral every 6 hours PRN Temp greater or equal to 38C (100.4F), Mild Pain (1 - 3)  albuterol/ipratropium for Nebulization 3 milliLiter(s) Nebulizer every 6 hours PRN Shortness of Breath  aluminum hydroxide/magnesium hydroxide/simethicone Suspension 30 milliLiter(s) Oral every 4 hours PRN Dyspepsia  dextrose Oral Gel 15 Gram(s) Oral once PRN Blood Glucose LESS THAN 70 milliGRAM(s)/deciliter  guaiFENesin  milliGRAM(s) Oral every 12 hours PRN Cough      ALLERGIES:  Allergies    No Known Allergies    Intolerances        LABS:    Hemoglobin: 7.7 g/dL (05-02 @ 06:02)  Hemoglobin: 7.2 g/dL (05-01 @ 13:18)  Hemoglobin: 6.9 g/dL (04-30 @ 07:15)            Creatinine Trend: 1.8<--, 2.2<--, 2.5<--, 2.4<--, 2.2<--, 2.1<--        hs Troponin:              CSF:                      EKG:   MICROBIOLOGY:    IMAGING:      Labs, imaging, EKG personally reviewed    RADIOLOGY & ADDITIONAL TESTS: Reviewed.

## 2024-05-05 LAB
GLUCOSE BLDC GLUCOMTR-MCNC: 113 MG/DL — HIGH (ref 70–99)
GLUCOSE BLDC GLUCOMTR-MCNC: 64 MG/DL — LOW (ref 70–99)
GLUCOSE BLDC GLUCOMTR-MCNC: 92 MG/DL — SIGNIFICANT CHANGE UP (ref 70–99)
GLUCOSE BLDC GLUCOMTR-MCNC: 97 MG/DL — SIGNIFICANT CHANGE UP (ref 70–99)

## 2024-05-05 PROCEDURE — 99232 SBSQ HOSP IP/OBS MODERATE 35: CPT

## 2024-05-05 RX ADMIN — Medication 1 TABLET(S): at 11:58

## 2024-05-05 RX ADMIN — Medication 1 SPRAY(S): at 05:26

## 2024-05-05 RX ADMIN — Medication 1300 MILLIGRAM(S): at 05:25

## 2024-05-05 RX ADMIN — Medication 10 MILLIGRAM(S): at 05:26

## 2024-05-05 RX ADMIN — Medication 1 SPRAY(S): at 21:09

## 2024-05-05 RX ADMIN — Medication 300 MILLIGRAM(S): at 11:59

## 2024-05-05 RX ADMIN — ENZALUTAMIDE 160 MILLIGRAM(S): 80 TABLET ORAL at 11:58

## 2024-05-05 RX ADMIN — Medication 500 MILLIGRAM(S): at 11:59

## 2024-05-05 RX ADMIN — Medication 1 TABLET(S): at 11:59

## 2024-05-05 RX ADMIN — Medication 1300 MILLIGRAM(S): at 12:00

## 2024-05-05 RX ADMIN — CHLORHEXIDINE GLUCONATE 1 APPLICATION(S): 213 SOLUTION TOPICAL at 05:29

## 2024-05-05 RX ADMIN — Medication 1300 MILLIGRAM(S): at 21:09

## 2024-05-05 RX ADMIN — ENOXAPARIN SODIUM 40 MILLIGRAM(S): 100 INJECTION SUBCUTANEOUS at 21:09

## 2024-05-05 NOTE — PROGRESS NOTE ADULT - ATTENDING COMMENTS
#L gluteal pain  ct abd with L gluteal edema, no abscess; new bl plural effusions; metastatic disease  s/p or with debridement 4/24 +fat necrosis  wcx mrsa  bcx ntd  doxy to end 5/8  appreciate id, burn    #Acute hypoxic resp failure  ct abd with effusions as above  s/p thora L 4/25 with 1.8 L out  plural cx ntd  cyto  nc to keep spo2 >92  ongoing goc  palliative, ethics    #DIMAS  ct abd bladder mass, severe L hydro  ivf if able  repeat bmp, lactate  c/b ag met acidosis  bicarb 1300 tid    #Progress Note Handoff  Pending (specify): repeat blood work, f/u palliative/ ethics; repeat cxr if able  Family discussion: d/w pt at bedside  Disposition: unknown

## 2024-05-06 LAB
ANION GAP SERPL CALC-SCNC: 15 MMOL/L — HIGH (ref 7–14)
BASOPHILS # BLD AUTO: 0 K/UL — SIGNIFICANT CHANGE UP (ref 0–0.2)
BASOPHILS NFR BLD AUTO: 0 % — SIGNIFICANT CHANGE UP (ref 0–1)
BLD GP AB SCN SERPL QL: SIGNIFICANT CHANGE UP
BUN SERPL-MCNC: 14 MG/DL — SIGNIFICANT CHANGE UP (ref 10–20)
CALCIUM SERPL-MCNC: 6.6 MG/DL — LOW (ref 8.4–10.5)
CHLORIDE SERPL-SCNC: 109 MMOL/L — SIGNIFICANT CHANGE UP (ref 98–110)
CO2 SERPL-SCNC: 19 MMOL/L — SIGNIFICANT CHANGE UP (ref 17–32)
CREAT SERPL-MCNC: 1 MG/DL — SIGNIFICANT CHANGE UP (ref 0.7–1.5)
EGFR: 81 ML/MIN/1.73M2 — SIGNIFICANT CHANGE UP
EOSINOPHIL # BLD AUTO: 0.1 K/UL — SIGNIFICANT CHANGE UP (ref 0–0.7)
EOSINOPHIL NFR BLD AUTO: 1.2 % — SIGNIFICANT CHANGE UP (ref 0–8)
GLUCOSE BLDC GLUCOMTR-MCNC: 102 MG/DL — HIGH (ref 70–99)
GLUCOSE BLDC GLUCOMTR-MCNC: 122 MG/DL — HIGH (ref 70–99)
GLUCOSE BLDC GLUCOMTR-MCNC: 95 MG/DL — SIGNIFICANT CHANGE UP (ref 70–99)
GLUCOSE BLDC GLUCOMTR-MCNC: 99 MG/DL — SIGNIFICANT CHANGE UP (ref 70–99)
GLUCOSE SERPL-MCNC: 84 MG/DL — SIGNIFICANT CHANGE UP (ref 70–99)
HCT VFR BLD CALC: 23.7 % — LOW (ref 42–52)
HGB BLD-MCNC: 7.2 G/DL — LOW (ref 14–18)
IMM GRANULOCYTES NFR BLD AUTO: 1.2 % — HIGH (ref 0.1–0.3)
LYMPHOCYTES # BLD AUTO: 0.52 K/UL — LOW (ref 1.2–3.4)
LYMPHOCYTES # BLD AUTO: 6.3 % — LOW (ref 20.5–51.1)
MAGNESIUM SERPL-MCNC: 1.8 MG/DL — SIGNIFICANT CHANGE UP (ref 1.8–2.4)
MCHC RBC-ENTMCNC: 29.3 PG — SIGNIFICANT CHANGE UP (ref 27–31)
MCHC RBC-ENTMCNC: 30.4 G/DL — LOW (ref 32–37)
MCV RBC AUTO: 96.3 FL — HIGH (ref 80–94)
MONOCYTES # BLD AUTO: 0.57 K/UL — SIGNIFICANT CHANGE UP (ref 0.1–0.6)
MONOCYTES NFR BLD AUTO: 6.9 % — SIGNIFICANT CHANGE UP (ref 1.7–9.3)
NEUTROPHILS # BLD AUTO: 7.01 K/UL — HIGH (ref 1.4–6.5)
NEUTROPHILS NFR BLD AUTO: 84.4 % — HIGH (ref 42.2–75.2)
NRBC # BLD: 0 /100 WBCS — SIGNIFICANT CHANGE UP (ref 0–0)
PLATELET # BLD AUTO: 214 K/UL — SIGNIFICANT CHANGE UP (ref 130–400)
PMV BLD: 10.9 FL — HIGH (ref 7.4–10.4)
POTASSIUM SERPL-MCNC: 3.7 MMOL/L — SIGNIFICANT CHANGE UP (ref 3.5–5)
POTASSIUM SERPL-SCNC: 3.7 MMOL/L — SIGNIFICANT CHANGE UP (ref 3.5–5)
RBC # BLD: 2.46 M/UL — LOW (ref 4.7–6.1)
RBC # FLD: 17.7 % — HIGH (ref 11.5–14.5)
SODIUM SERPL-SCNC: 143 MMOL/L — SIGNIFICANT CHANGE UP (ref 135–146)
WBC # BLD: 8.3 K/UL — SIGNIFICANT CHANGE UP (ref 4.8–10.8)
WBC # FLD AUTO: 8.3 K/UL — SIGNIFICANT CHANGE UP (ref 4.8–10.8)

## 2024-05-06 RX ADMIN — Medication 1 SPRAY(S): at 06:08

## 2024-05-06 RX ADMIN — Medication 1 TABLET(S): at 17:29

## 2024-05-06 RX ADMIN — Medication 1 SPRAY(S): at 22:21

## 2024-05-06 RX ADMIN — Medication 1300 MILLIGRAM(S): at 22:21

## 2024-05-06 RX ADMIN — Medication 500 MILLIGRAM(S): at 12:00

## 2024-05-06 RX ADMIN — Medication 1 SPRAY(S): at 13:25

## 2024-05-06 RX ADMIN — Medication 1300 MILLIGRAM(S): at 13:25

## 2024-05-06 RX ADMIN — Medication 1 TABLET(S): at 12:01

## 2024-05-06 RX ADMIN — Medication 100 MILLIGRAM(S): at 17:29

## 2024-05-06 RX ADMIN — Medication 10 MILLIGRAM(S): at 17:29

## 2024-05-06 RX ADMIN — Medication 1 TABLET(S): at 08:47

## 2024-05-06 RX ADMIN — CHLORHEXIDINE GLUCONATE 1 APPLICATION(S): 213 SOLUTION TOPICAL at 06:10

## 2024-05-06 RX ADMIN — Medication 1300 MILLIGRAM(S): at 06:08

## 2024-05-06 RX ADMIN — ENZALUTAMIDE 160 MILLIGRAM(S): 80 TABLET ORAL at 12:15

## 2024-05-06 RX ADMIN — Medication 10 MILLIGRAM(S): at 12:01

## 2024-05-06 RX ADMIN — Medication 10 MILLIGRAM(S): at 06:08

## 2024-05-06 RX ADMIN — ENOXAPARIN SODIUM 40 MILLIGRAM(S): 100 INJECTION SUBCUTANEOUS at 22:21

## 2024-05-06 RX ADMIN — Medication 300 MILLIGRAM(S): at 12:01

## 2024-05-06 NOTE — PROGRESS NOTE ADULT - SUBJECTIVE AND OBJECTIVE BOX
24H events:    Patient is a 68y old Male who presents with a chief complaint of LT Gluteal Pain / Diarrhea (05 May 2024 00:33)    Primary diagnosis of Abscess    Today is hospital day 17d. This morning patient was seen and examined at bedside, resting comfortably in bed.    No acute or major events overnight.      PAST MEDICAL & SURGICAL HISTORY  HTN (hypertension)    Prostate cancer      SOCIAL HISTORY:  Social History:      ALLERGIES:  No Known Allergies    MEDICATIONS:  STANDING MEDICATIONS  ascorbic acid 500 milliGRAM(s) Oral daily  benzocaine 20% Spray 1 Spray(s) Topical three times a day  chlorhexidine 2% Cloths 1 Application(s) Topical <User Schedule>  dextrose 10% Bolus 125 milliLiter(s) IV Bolus once  dextrose 5%. 1000 milliLiter(s) IV Continuous <Continuous>  dextrose 5%. 1000 milliLiter(s) IV Continuous <Continuous>  dextrose 50% Injectable 25 Gram(s) IV Push once  dextrose 50% Injectable 12.5 Gram(s) IV Push once  dicyclomine 10 milliGRAM(s) Oral three times a day before meals  doxycycline monohydrate Suspension 100 milliGRAM(s) Oral every 12 hours  enoxaparin Injectable 40 milliGRAM(s) SubCutaneous every 24 hours  enzalutamide 160 milliGRAM(s) Oral daily  ferrous    sulfate Liquid 300 milliGRAM(s) Enteral Tube daily  glucagon  Injectable 1 milliGRAM(s) IntraMuscular once  insulin glargine Injectable (LANTUS) 10 Unit(s) SubCutaneous at bedtime  insulin lispro (ADMELOG) corrective regimen sliding scale   SubCutaneous three times a day before meals  lactobacillus acidophilus 1 Tablet(s) Oral three times a day with meals  multivitamin 1 Tablet(s) Oral daily  multivitamin  Chewable 1 Tablet(s) Oral daily  sodium bicarbonate 1300 milliGRAM(s) Oral every 8 hours  sodium bicarbonate  Infusion 0.207 mEq/kG/Hr IV Continuous <Continuous>    PRN MEDICATIONS  acetaminophen     Tablet .. 650 milliGRAM(s) Oral every 6 hours PRN  albuterol/ipratropium for Nebulization 3 milliLiter(s) Nebulizer every 6 hours PRN  aluminum hydroxide/magnesium hydroxide/simethicone Suspension 30 milliLiter(s) Oral every 4 hours PRN  dextrose Oral Gel 15 Gram(s) Oral once PRN  guaiFENesin  milliGRAM(s) Oral every 12 hours PRN    VITALS:   T(F): 96.8  HR: 73  BP: 106/73  RR: 17  SpO2: 92%    PHYSICAL EXAM:  GENERAL:   (x  ) NAD, lying in bed comfortably     (  ) obtunded     (  ) lethargic     (  ) somnolent    HEAD:   ( x ) Atraumatic     (  ) hematoma     (  ) laceration (specify location:       )     NECK:  ( x ) Supple     (  ) neck stiffness     (  ) nuchal rigidity     (  )  no JVD     (  ) JVD present ( -- cm)    HEART:  Rate -->     ( x ) normal rate     (  ) bradycardic     (  ) tachycardic  Rhythm -->     (xx  ) regular     (  ) regularly irregular     (  ) irregularly irregular  Murmurs -->     ( x ) normal s1s2     (  ) systolic murmur     (  ) diastolic murmur     (  ) continuous murmur      (  ) S3 present     (  ) S4 present    LUNGS:   ( x )Unlabored respirations     (  ) tachypnea  ( x ) B/L air entry     (  ) decreased breath sounds in:  (location     )    ( x ) no adventitious sound     (  ) crackles     (  ) wheezing      (  ) rhonchi      (specify location:       )  (  ) chest wall tenderness (specify location:       )    ABDOMEN:   ( x ) Soft     (  ) tense   |   ( x ) nondistended     (  ) distended   |   (  x) +BS     (  ) hypoactive bowel sounds     (  ) hyperactive bowel sounds  ( x ) nontender     (  ) RUQ tenderness     (  ) RLQ tenderness     (  ) LLQ tenderness     (  ) epigastric tenderness     (  ) diffuse tenderness  (  ) Splenomegaly      (  ) Hepatomegaly      (  ) Jaundice     (  ) ecchymosis     EXTREMITIES:  (x  ) Normal     (  ) Rash     (  ) ecchymosis     (  ) varicose veins      (  ) pitting edema     (  ) non-pitting edema   (  ) ulceration     (  ) gangrene:     (location:     )    NERVOUS SYSTEM:    ( x ) A&Ox3     (  ) confused     (  ) lethargic  CN II-XII:     ( x ) Intact     (  ) deficits found     (Specify:     )   Upper extremities:     (  x) no sensorimotor deficits     (  ) weakness     (  ) loss of proprioception/vibration     (  ) loss of touch/temperature (specify:    )  Lower extremities:     ( x ) no sensorimotor deficits     (  ) weakness     (  ) loss of proprioception/vibration     (  ) loss of touch/temperature (specify:    )    SKIN:   ( x ) No rashes or lesions     (  ) maculopapular rash     (  ) pustules     (  ) vesicles     (  ) ulcer     (  ) ecchymosis     (specify location:     )        LABS:                        RADIOLOGY:

## 2024-05-06 NOTE — PROGRESS NOTE ADULT - ATTENDING COMMENTS
refusal to get treatments/ non-compliance  - had psych eval previously for suicidal risk, but needs to be reassessed again for purposes of guardianship  - recall psych

## 2024-05-07 LAB
ALBUMIN SERPL ELPH-MCNC: 2.1 G/DL — LOW (ref 3.5–5.2)
ALP SERPL-CCNC: 119 U/L — HIGH (ref 30–115)
ALT FLD-CCNC: <5 U/L — SIGNIFICANT CHANGE UP (ref 0–41)
ANION GAP SERPL CALC-SCNC: 20 MMOL/L — HIGH (ref 7–14)
AST SERPL-CCNC: 11 U/L — SIGNIFICANT CHANGE UP (ref 0–41)
BASOPHILS # BLD AUTO: 0.01 K/UL — SIGNIFICANT CHANGE UP (ref 0–0.2)
BASOPHILS NFR BLD AUTO: 0.1 % — SIGNIFICANT CHANGE UP (ref 0–1)
BILIRUB SERPL-MCNC: 0.7 MG/DL — SIGNIFICANT CHANGE UP (ref 0.2–1.2)
BUN SERPL-MCNC: 14 MG/DL — SIGNIFICANT CHANGE UP (ref 10–20)
CALCIUM SERPL-MCNC: 6.6 MG/DL — LOW (ref 8.4–10.5)
CHLORIDE SERPL-SCNC: 105 MMOL/L — SIGNIFICANT CHANGE UP (ref 98–110)
CO2 SERPL-SCNC: 16 MMOL/L — LOW (ref 17–32)
CREAT SERPL-MCNC: 1 MG/DL — SIGNIFICANT CHANGE UP (ref 0.7–1.5)
EGFR: 81 ML/MIN/1.73M2 — SIGNIFICANT CHANGE UP
EOSINOPHIL # BLD AUTO: 0.08 K/UL — SIGNIFICANT CHANGE UP (ref 0–0.7)
EOSINOPHIL NFR BLD AUTO: 1 % — SIGNIFICANT CHANGE UP (ref 0–8)
GLUCOSE BLDC GLUCOMTR-MCNC: 146 MG/DL — HIGH (ref 70–99)
GLUCOSE BLDC GLUCOMTR-MCNC: 161 MG/DL — HIGH (ref 70–99)
GLUCOSE BLDC GLUCOMTR-MCNC: 171 MG/DL — HIGH (ref 70–99)
GLUCOSE BLDC GLUCOMTR-MCNC: 181 MG/DL — HIGH (ref 70–99)
GLUCOSE SERPL-MCNC: 146 MG/DL — HIGH (ref 70–99)
HCT VFR BLD CALC: 21.7 % — LOW (ref 42–52)
HCT VFR BLD CALC: 22.4 % — LOW (ref 42–52)
HGB BLD-MCNC: 6.5 G/DL — CRITICAL LOW (ref 14–18)
HGB BLD-MCNC: 6.7 G/DL — CRITICAL LOW (ref 14–18)
IMM GRANULOCYTES NFR BLD AUTO: 1.7 % — HIGH (ref 0.1–0.3)
LYMPHOCYTES # BLD AUTO: 0.53 K/UL — LOW (ref 1.2–3.4)
LYMPHOCYTES # BLD AUTO: 6.3 % — LOW (ref 20.5–51.1)
MAGNESIUM SERPL-MCNC: 1.7 MG/DL — LOW (ref 1.8–2.4)
MCHC RBC-ENTMCNC: 29.5 PG — SIGNIFICANT CHANGE UP (ref 27–31)
MCHC RBC-ENTMCNC: 29.6 PG — SIGNIFICANT CHANGE UP (ref 27–31)
MCHC RBC-ENTMCNC: 29.9 G/DL — LOW (ref 32–37)
MCHC RBC-ENTMCNC: 30 G/DL — LOW (ref 32–37)
MCV RBC AUTO: 98.6 FL — HIGH (ref 80–94)
MCV RBC AUTO: 99.1 FL — HIGH (ref 80–94)
MONOCYTES # BLD AUTO: 0.66 K/UL — HIGH (ref 0.1–0.6)
MONOCYTES NFR BLD AUTO: 7.9 % — SIGNIFICANT CHANGE UP (ref 1.7–9.3)
NEUTROPHILS # BLD AUTO: 6.96 K/UL — HIGH (ref 1.4–6.5)
NEUTROPHILS NFR BLD AUTO: 83 % — HIGH (ref 42.2–75.2)
NRBC # BLD: 0 /100 WBCS — SIGNIFICANT CHANGE UP (ref 0–0)
NRBC # BLD: 0 /100 WBCS — SIGNIFICANT CHANGE UP (ref 0–0)
PLATELET # BLD AUTO: 412 K/UL — HIGH (ref 130–400)
PLATELET # BLD AUTO: 414 K/UL — HIGH (ref 130–400)
PMV BLD: 9.4 FL — SIGNIFICANT CHANGE UP (ref 7.4–10.4)
PMV BLD: 9.8 FL — SIGNIFICANT CHANGE UP (ref 7.4–10.4)
POTASSIUM SERPL-MCNC: 3.3 MMOL/L — LOW (ref 3.5–5)
POTASSIUM SERPL-SCNC: 3.3 MMOL/L — LOW (ref 3.5–5)
PROT SERPL-MCNC: 4.5 G/DL — LOW (ref 6–8)
RBC # BLD: 2.2 M/UL — LOW (ref 4.7–6.1)
RBC # BLD: 2.26 M/UL — LOW (ref 4.7–6.1)
RBC # FLD: 18.1 % — HIGH (ref 11.5–14.5)
RBC # FLD: 18.4 % — HIGH (ref 11.5–14.5)
SODIUM SERPL-SCNC: 141 MMOL/L — SIGNIFICANT CHANGE UP (ref 135–146)
WBC # BLD: 8.23 K/UL — SIGNIFICANT CHANGE UP (ref 4.8–10.8)
WBC # BLD: 8.38 K/UL — SIGNIFICANT CHANGE UP (ref 4.8–10.8)
WBC # FLD AUTO: 8.23 K/UL — SIGNIFICANT CHANGE UP (ref 4.8–10.8)
WBC # FLD AUTO: 8.38 K/UL — SIGNIFICANT CHANGE UP (ref 4.8–10.8)

## 2024-05-07 PROCEDURE — 99233 SBSQ HOSP IP/OBS HIGH 50: CPT

## 2024-05-07 RX ORDER — MAGNESIUM SULFATE 500 MG/ML
2 VIAL (ML) INJECTION ONCE
Refills: 0 | Status: COMPLETED | OUTPATIENT
Start: 2024-05-07 | End: 2024-05-07

## 2024-05-07 RX ORDER — POTASSIUM CHLORIDE 20 MEQ
20 PACKET (EA) ORAL ONCE
Refills: 0 | Status: COMPLETED | OUTPATIENT
Start: 2024-05-07 | End: 2024-05-07

## 2024-05-07 RX ADMIN — Medication 1 TABLET(S): at 08:28

## 2024-05-07 RX ADMIN — Medication 1 SPRAY(S): at 22:15

## 2024-05-07 RX ADMIN — Medication 300 MILLIGRAM(S): at 11:47

## 2024-05-07 RX ADMIN — ENOXAPARIN SODIUM 40 MILLIGRAM(S): 100 INJECTION SUBCUTANEOUS at 22:15

## 2024-05-07 RX ADMIN — Medication 1 TABLET(S): at 17:35

## 2024-05-07 RX ADMIN — Medication 1: at 08:32

## 2024-05-07 RX ADMIN — Medication 1 TABLET(S): at 11:47

## 2024-05-07 RX ADMIN — Medication 10 MILLIGRAM(S): at 06:04

## 2024-05-07 RX ADMIN — Medication 100 MILLIGRAM(S): at 17:35

## 2024-05-07 RX ADMIN — Medication 500 MILLIGRAM(S): at 11:47

## 2024-05-07 RX ADMIN — Medication 1: at 11:47

## 2024-05-07 RX ADMIN — ENZALUTAMIDE 160 MILLIGRAM(S): 80 TABLET ORAL at 11:47

## 2024-05-07 RX ADMIN — Medication 1 SPRAY(S): at 06:03

## 2024-05-07 RX ADMIN — Medication 1 SPRAY(S): at 13:25

## 2024-05-07 RX ADMIN — Medication 1300 MILLIGRAM(S): at 13:25

## 2024-05-07 RX ADMIN — Medication 10 MILLIGRAM(S): at 11:47

## 2024-05-07 RX ADMIN — Medication 1300 MILLIGRAM(S): at 22:15

## 2024-05-07 RX ADMIN — INSULIN GLARGINE 10 UNIT(S): 100 INJECTION, SOLUTION SUBCUTANEOUS at 22:17

## 2024-05-07 RX ADMIN — Medication 10 MILLIGRAM(S): at 17:35

## 2024-05-07 RX ADMIN — Medication 100 MILLIGRAM(S): at 06:04

## 2024-05-07 RX ADMIN — CHLORHEXIDINE GLUCONATE 1 APPLICATION(S): 213 SOLUTION TOPICAL at 06:40

## 2024-05-07 RX ADMIN — Medication 1300 MILLIGRAM(S): at 06:04

## 2024-05-07 NOTE — PROGRESS NOTE ADULT - ATTENDING COMMENTS
patient refusing to get treatment. compliance issues    - follow psych need to  reassessed again for purposes of guardianship  d/w resident/ CM patient refusing to get treatment. compliance issues    - follow psych need to  reassessed again for purposes of guardianship  abx as per ID. monitor bicarb.   d/w resident/ CM

## 2024-05-07 NOTE — PROGRESS NOTE ADULT - ASSESSMENT
68 year old male with PMH fo Stage 4 Prostatic Ca s/p palliative radiation, medistinal mass on oral chemo (Xtandi)< chronic anemia, HTN, chronic  back pain presents to ED from SNF for evaluation. Per NH, has been refusing medications via PEG x 3 days d/t episodes of diarrhea post-feeds. Patient was only complaining of left buttock pain, s/p debridement by burn on 4/24, s/p thoracentesis by pul on 4/25 for pleural effusion removed 1.7L.     #treatement refusal   1.Patient refusing treatment despite multiple attempts at explaining the importance of treatment.  2.Patient refusing PEG feeds and oral feeds.  3.Ethics consulted, Cs noted   4.Psych consult noted.     #Left GLuteal Cellulitis  #MRSA (+) Wound Cx 4/22  - vitals: Afebrile, /892, , SpO2 100% on RA  - CT shows new mild edema and enlargement of left gluteus maximums musculature with associated subcutaneous edema.   - s/p Cefepime, Vanc, Flagyl  - currently on doxycycline but patient refusing medication and even IV placement .   - Wound Cx (4/22): rare MRSA   - Contact isolation  - continue local wound care, off-loading  - Per burn, no further plans for debridement  - Per ID, continue Doxycycline for 2 weeks post debridement (4/24-5/8)     #H/o Dysphagia s/p PEG  #N/V 4/25  - s/p esophageal stent & PEG 3/5/24  - patient cleared for pureed diet and thin liquids per SS on last admission  - refusing PEG feeds d/t diarrhea , with very limited oral intake.   - c/w acidophilus  - antiemetics PRN  - Cr rising (likely prerenal 2/2) dehydration/poor PO intake      #New B/l Pleural Effusions  - d/c IV lasix  - s/p thoracentesis with pulm on 4/25, removed 1.7L  - requiring 2-3L via NC PRN     #Stage 4 Prostatic Cancer s/p Radiation (on active PO chemo) w metastasis to mediastinum   #Severe Left Hydronephrosis  - c/w home Xtandi 160 mg qd  - monitor labs    #Metabolic acidosis  - c/w sodium bicarb    #Chronic Normocytic Anemia  - likely 2/2 malignancy  - c/w ferrous sulfate    #GOC- DNR/DNI    Guardianship process initiated.                                          --------------------------------------------------------------    # DVT prophylaxis: Lovenox  # GI prophylaxis: PPI  # Code status: Full Code  # Disposition:

## 2024-05-07 NOTE — PROGRESS NOTE ADULT - SUBJECTIVE AND OBJECTIVE BOX
24H events:    Patient is a 68y old Male who presents with a chief complaint of LT Gluteal Pain / Diarrhea (06 May 2024 07:06)    Primary diagnosis of Abscess    Today is hospital day 18d. This morning patient was seen and examined at bedside, resting comfortably in bed.    No acute or major events overnight.      PAST MEDICAL & SURGICAL HISTORY  HTN (hypertension)    Prostate cancer      SOCIAL HISTORY:  Social History:      ALLERGIES:  No Known Allergies    MEDICATIONS:  STANDING MEDICATIONS  ascorbic acid 500 milliGRAM(s) Oral daily  benzocaine 20% Spray 1 Spray(s) Topical three times a day  chlorhexidine 2% Cloths 1 Application(s) Topical <User Schedule>  dextrose 10% Bolus 125 milliLiter(s) IV Bolus once  dextrose 5%. 1000 milliLiter(s) IV Continuous <Continuous>  dextrose 5%. 1000 milliLiter(s) IV Continuous <Continuous>  dextrose 50% Injectable 12.5 Gram(s) IV Push once  dextrose 50% Injectable 25 Gram(s) IV Push once  dicyclomine 10 milliGRAM(s) Oral three times a day before meals  doxycycline monohydrate Suspension 100 milliGRAM(s) Oral every 12 hours  enoxaparin Injectable 40 milliGRAM(s) SubCutaneous every 24 hours  enzalutamide 160 milliGRAM(s) Oral daily  ferrous    sulfate Liquid 300 milliGRAM(s) Enteral Tube daily  glucagon  Injectable 1 milliGRAM(s) IntraMuscular once  insulin glargine Injectable (LANTUS) 10 Unit(s) SubCutaneous at bedtime  insulin lispro (ADMELOG) corrective regimen sliding scale   SubCutaneous three times a day before meals  lactobacillus acidophilus 1 Tablet(s) Oral three times a day with meals  multivitamin 1 Tablet(s) Oral daily  multivitamin  Chewable 1 Tablet(s) Oral daily  sodium bicarbonate 1300 milliGRAM(s) Oral every 8 hours  sodium bicarbonate  Infusion 0.207 mEq/kG/Hr IV Continuous <Continuous>    PRN MEDICATIONS  acetaminophen     Tablet .. 650 milliGRAM(s) Oral every 6 hours PRN  albuterol/ipratropium for Nebulization 3 milliLiter(s) Nebulizer every 6 hours PRN  aluminum hydroxide/magnesium hydroxide/simethicone Suspension 30 milliLiter(s) Oral every 4 hours PRN  dextrose Oral Gel 15 Gram(s) Oral once PRN  guaiFENesin  milliGRAM(s) Oral every 12 hours PRN    VITALS:   T(F): 98.4  HR: 65  BP: 101/72  RR: 17  SpO2: 97%    PHYSICAL EXAM:  GENERAL:   ( x ) NAD, lying in bed comfortably     (  ) obtunded     (  ) lethargic     (  ) somnolent    HEAD:   ( x ) Atraumatic     (  ) hematoma     (  ) laceration (specify location:       )     NECK:  (x  ) Supple     (  ) neck stiffness     (  ) nuchal rigidity     (  )  no JVD     (  ) JVD present ( -- cm)    HEART:  Rate -->     (x  ) normal rate     (  ) bradycardic     (  ) tachycardic  Rhythm -->     ( x ) regular     (  ) regularly irregular     (  ) irregularly irregular  Murmurs -->     ( x ) normal s1s2     (  ) systolic murmur     (  ) diastolic murmur     (  ) continuous murmur      (  ) S3 present     (  ) S4 present    LUNGS:   ( x )Unlabored respirations     (  ) tachypnea  (  ) B/L air entry     (x  ) decreased breath sounds bilaterally   (  ) no adventitious sound     (  ) crackles     (  ) wheezing      ( x ) rhonchi   bilateral      (  ) chest wall tenderness (specify location:       )    ABDOMEN:   (x  ) Soft     (  ) tense   |   ( x ) nondistended     (  ) distended   |   (  x) +BS     (  ) hypoactive bowel sounds     (  ) hyperactive bowel sounds  ( x ) nontender     (  ) RUQ tenderness     (  ) RLQ tenderness     (  ) LLQ tenderness     (  ) epigastric tenderness     (  ) diffuse tenderness  (  ) Splenomegaly      (  ) Hepatomegaly      (  ) Jaundice     (  ) ecchymosis     EXTREMITIES:  (x  ) Normal     (  ) Rash     (  ) ecchymosis     (  ) varicose veins      (  ) pitting edema     (  ) non-pitting edema   (  ) ulceration     (  ) gangrene:     (location:     )    NERVOUS SYSTEM:    (  ) A&Ox3     (  ) confused     (  ) lethargic  CN II-XII:     ( x ) Intact     (  ) deficits found     (Specify:     )   Upper extremities:     ( x ) no sensorimotor deficits     (  ) weakness     (  ) loss of proprioception/vibration     (  ) loss of touch/temperature (specify:    )  Lower extremities:     ( x ) no sensorimotor deficits     (  ) weakness     (  ) loss of proprioception/vibration     (  ) loss of touch/temperature (specify:    )    SKIN:   ( x ) No rashes or lesions     (  ) maculopapular rash     (  ) pustules     (  ) vesicles     (  ) ulcer     (  ) ecchymosis     (specify location:     )        LABS:                        7.2    8.30  )-----------( 214      ( 06 May 2024 12:24 )             23.7     05-06    143  |  109  |  14  ----------------------------<  84  3.7   |  19  |  1.0    Ca    6.6<L>      06 May 2024 12:24  Mg     1.8     05-06        Urinalysis Basic - ( 06 May 2024 12:24 )    Color: x / Appearance: x / SG: x / pH: x  Gluc: 84 mg/dL / Ketone: x  / Bili: x / Urobili: x   Blood: x / Protein: x / Nitrite: x   Leuk Esterase: x / RBC: x / WBC x   Sq Epi: x / Non Sq Epi: x / Bacteria: x                RADIOLOGY:

## 2024-05-08 LAB
ALBUMIN SERPL ELPH-MCNC: 2.3 G/DL — LOW (ref 3.5–5.2)
ALP SERPL-CCNC: 118 U/L — HIGH (ref 30–115)
ALT FLD-CCNC: <5 U/L — SIGNIFICANT CHANGE UP (ref 0–41)
ANION GAP SERPL CALC-SCNC: 15 MMOL/L — HIGH (ref 7–14)
AST SERPL-CCNC: 12 U/L — SIGNIFICANT CHANGE UP (ref 0–41)
BASOPHILS # BLD AUTO: 0 K/UL — SIGNIFICANT CHANGE UP (ref 0–0.2)
BASOPHILS NFR BLD AUTO: 0 % — SIGNIFICANT CHANGE UP (ref 0–1)
BILIRUB SERPL-MCNC: 0.6 MG/DL — SIGNIFICANT CHANGE UP (ref 0.2–1.2)
BUN SERPL-MCNC: 12 MG/DL — SIGNIFICANT CHANGE UP (ref 10–20)
CALCIUM SERPL-MCNC: 6.8 MG/DL — LOW (ref 8.4–10.5)
CHLORIDE SERPL-SCNC: 105 MMOL/L — SIGNIFICANT CHANGE UP (ref 98–110)
CO2 SERPL-SCNC: 19 MMOL/L — SIGNIFICANT CHANGE UP (ref 17–32)
CREAT SERPL-MCNC: 1 MG/DL — SIGNIFICANT CHANGE UP (ref 0.7–1.5)
EGFR: 81 ML/MIN/1.73M2 — SIGNIFICANT CHANGE UP
EOSINOPHIL # BLD AUTO: 0.1 K/UL — SIGNIFICANT CHANGE UP (ref 0–0.7)
EOSINOPHIL NFR BLD AUTO: 1.5 % — SIGNIFICANT CHANGE UP (ref 0–8)
GLUCOSE BLDC GLUCOMTR-MCNC: 124 MG/DL — HIGH (ref 70–99)
GLUCOSE BLDC GLUCOMTR-MCNC: 125 MG/DL — HIGH (ref 70–99)
GLUCOSE BLDC GLUCOMTR-MCNC: 139 MG/DL — HIGH (ref 70–99)
GLUCOSE BLDC GLUCOMTR-MCNC: 235 MG/DL — HIGH (ref 70–99)
GLUCOSE SERPL-MCNC: 128 MG/DL — HIGH (ref 70–99)
HCT VFR BLD CALC: 21.1 % — LOW (ref 42–52)
HGB BLD-MCNC: 6.4 G/DL — CRITICAL LOW (ref 14–18)
IMM GRANULOCYTES NFR BLD AUTO: 1 % — HIGH (ref 0.1–0.3)
LYMPHOCYTES # BLD AUTO: 0.44 K/UL — LOW (ref 1.2–3.4)
LYMPHOCYTES # BLD AUTO: 6.5 % — LOW (ref 20.5–51.1)
MAGNESIUM SERPL-MCNC: 1.6 MG/DL — LOW (ref 1.8–2.4)
MCHC RBC-ENTMCNC: 29.4 PG — SIGNIFICANT CHANGE UP (ref 27–31)
MCHC RBC-ENTMCNC: 30.3 G/DL — LOW (ref 32–37)
MCV RBC AUTO: 96.8 FL — HIGH (ref 80–94)
MONOCYTES # BLD AUTO: 0.62 K/UL — HIGH (ref 0.1–0.6)
MONOCYTES NFR BLD AUTO: 9.2 % — SIGNIFICANT CHANGE UP (ref 1.7–9.3)
NEUTROPHILS # BLD AUTO: 5.54 K/UL — SIGNIFICANT CHANGE UP (ref 1.4–6.5)
NEUTROPHILS NFR BLD AUTO: 81.8 % — HIGH (ref 42.2–75.2)
NRBC # BLD: 0 /100 WBCS — SIGNIFICANT CHANGE UP (ref 0–0)
PLATELET # BLD AUTO: 436 K/UL — HIGH (ref 130–400)
PMV BLD: 9.4 FL — SIGNIFICANT CHANGE UP (ref 7.4–10.4)
POTASSIUM SERPL-MCNC: 3.4 MMOL/L — LOW (ref 3.5–5)
POTASSIUM SERPL-SCNC: 3.4 MMOL/L — LOW (ref 3.5–5)
PROT SERPL-MCNC: 4.6 G/DL — LOW (ref 6–8)
RBC # BLD: 2.18 M/UL — LOW (ref 4.7–6.1)
RBC # FLD: 18.3 % — HIGH (ref 11.5–14.5)
SODIUM SERPL-SCNC: 139 MMOL/L — SIGNIFICANT CHANGE UP (ref 135–146)
WBC # BLD: 6.77 K/UL — SIGNIFICANT CHANGE UP (ref 4.8–10.8)
WBC # FLD AUTO: 6.77 K/UL — SIGNIFICANT CHANGE UP (ref 4.8–10.8)

## 2024-05-08 PROCEDURE — 99233 SBSQ HOSP IP/OBS HIGH 50: CPT

## 2024-05-08 RX ORDER — POTASSIUM CHLORIDE 20 MEQ
40 PACKET (EA) ORAL EVERY 4 HOURS
Refills: 0 | Status: COMPLETED | OUTPATIENT
Start: 2024-05-08 | End: 2024-05-08

## 2024-05-08 RX ORDER — MAGNESIUM OXIDE 400 MG ORAL TABLET 241.3 MG
400 TABLET ORAL
Refills: 0 | Status: DISCONTINUED | OUTPATIENT
Start: 2024-05-08 | End: 2024-05-09

## 2024-05-08 RX ADMIN — CHLORHEXIDINE GLUCONATE 1 APPLICATION(S): 213 SOLUTION TOPICAL at 05:49

## 2024-05-08 RX ADMIN — Medication 40 MILLIEQUIVALENT(S): at 12:27

## 2024-05-08 RX ADMIN — Medication 1 SPRAY(S): at 05:50

## 2024-05-08 RX ADMIN — Medication 10 MILLIGRAM(S): at 12:27

## 2024-05-08 RX ADMIN — Medication 1 TABLET(S): at 12:27

## 2024-05-08 RX ADMIN — Medication 10 MILLIGRAM(S): at 05:52

## 2024-05-08 RX ADMIN — Medication 10 MILLIGRAM(S): at 17:31

## 2024-05-08 RX ADMIN — Medication 100 MILLIGRAM(S): at 17:31

## 2024-05-08 RX ADMIN — Medication 500 MILLIGRAM(S): at 12:26

## 2024-05-08 RX ADMIN — INSULIN GLARGINE 10 UNIT(S): 100 INJECTION, SOLUTION SUBCUTANEOUS at 22:10

## 2024-05-08 RX ADMIN — Medication 1 SPRAY(S): at 14:41

## 2024-05-08 RX ADMIN — ENZALUTAMIDE 160 MILLIGRAM(S): 80 TABLET ORAL at 15:34

## 2024-05-08 RX ADMIN — MAGNESIUM OXIDE 400 MG ORAL TABLET 400 MILLIGRAM(S): 241.3 TABLET ORAL at 12:27

## 2024-05-08 RX ADMIN — Medication 1 SPRAY(S): at 22:09

## 2024-05-08 RX ADMIN — Medication 1300 MILLIGRAM(S): at 05:49

## 2024-05-08 RX ADMIN — Medication 40 MILLIEQUIVALENT(S): at 17:27

## 2024-05-08 RX ADMIN — Medication 1300 MILLIGRAM(S): at 14:41

## 2024-05-08 RX ADMIN — MAGNESIUM OXIDE 400 MG ORAL TABLET 400 MILLIGRAM(S): 241.3 TABLET ORAL at 17:31

## 2024-05-08 RX ADMIN — Medication 1 TABLET(S): at 12:28

## 2024-05-08 RX ADMIN — Medication 1300 MILLIGRAM(S): at 22:10

## 2024-05-08 RX ADMIN — Medication 1 TABLET(S): at 17:31

## 2024-05-08 RX ADMIN — Medication 1 TABLET(S): at 12:26

## 2024-05-08 RX ADMIN — ENOXAPARIN SODIUM 40 MILLIGRAM(S): 100 INJECTION SUBCUTANEOUS at 22:09

## 2024-05-08 RX ADMIN — Medication 300 MILLIGRAM(S): at 12:27

## 2024-05-08 RX ADMIN — Medication 100 MILLIGRAM(S): at 05:50

## 2024-05-08 NOTE — PROGRESS NOTE ADULT - SUBJECTIVE AND OBJECTIVE BOX
24H events:    Patient is a 68y old Male who presents with a chief complaint of LT Gluteal Pain / Diarrhea (07 May 2024 07:03)    Primary diagnosis of Abscess    Today is hospital day 19d. This morning patient was seen and examined at bedside, resting comfortably in bed.    No acute or major events overnight.    PAST MEDICAL & SURGICAL HISTORY  HTN (hypertension)    Prostate cancer      SOCIAL HISTORY:  Social History:      ALLERGIES:  No Known Allergies    MEDICATIONS:  STANDING MEDICATIONS  ascorbic acid 500 milliGRAM(s) Oral daily  benzocaine 20% Spray 1 Spray(s) Topical three times a day  chlorhexidine 2% Cloths 1 Application(s) Topical <User Schedule>  dextrose 10% Bolus 125 milliLiter(s) IV Bolus once  dextrose 5%. 1000 milliLiter(s) IV Continuous <Continuous>  dextrose 5%. 1000 milliLiter(s) IV Continuous <Continuous>  dextrose 50% Injectable 12.5 Gram(s) IV Push once  dextrose 50% Injectable 25 Gram(s) IV Push once  dicyclomine 10 milliGRAM(s) Oral three times a day before meals  doxycycline monohydrate Suspension 100 milliGRAM(s) Oral every 12 hours  enoxaparin Injectable 40 milliGRAM(s) SubCutaneous every 24 hours  enzalutamide 160 milliGRAM(s) Oral daily  ferrous    sulfate Liquid 300 milliGRAM(s) Enteral Tube daily  glucagon  Injectable 1 milliGRAM(s) IntraMuscular once  insulin glargine Injectable (LANTUS) 10 Unit(s) SubCutaneous at bedtime  insulin lispro (ADMELOG) corrective regimen sliding scale   SubCutaneous three times a day before meals  lactobacillus acidophilus 1 Tablet(s) Oral three times a day with meals  multivitamin 1 Tablet(s) Oral daily  multivitamin  Chewable 1 Tablet(s) Oral daily  sodium bicarbonate 1300 milliGRAM(s) Oral every 8 hours  sodium bicarbonate  Infusion 0.207 mEq/kG/Hr IV Continuous <Continuous>    PRN MEDICATIONS  acetaminophen     Tablet .. 650 milliGRAM(s) Oral every 6 hours PRN  albuterol/ipratropium for Nebulization 3 milliLiter(s) Nebulizer every 6 hours PRN  aluminum hydroxide/magnesium hydroxide/simethicone Suspension 30 milliLiter(s) Oral every 4 hours PRN  dextrose Oral Gel 15 Gram(s) Oral once PRN  guaiFENesin  milliGRAM(s) Oral every 12 hours PRN    VITALS:   T(F): 98.4  HR: 90  BP: 101/71  RR: 18  SpO2: 99%    PHYSICAL EXAM:  GENERAL:   (x  ) NAD, lying in bed comfortably     (  ) obtunded     (  ) lethargic     (  ) somnolent    HEAD:   ( x ) Atraumatic     (  ) hematoma     (  ) laceration (specify location:       )     NECK:  ( x ) Supple     (  ) neck stiffness     (  ) nuchal rigidity     (  )  no JVD     (  ) JVD present ( -- cm)    HEART:  Rate -->     ( x ) normal rate     (  ) bradycardic     (  ) tachycardic  Rhythm -->     (x  ) regular     (  ) regularly irregular     (  ) irregularly irregular  Murmurs -->     ( x ) normal s1s2     (  ) systolic murmur     (  ) diastolic murmur     (  ) continuous murmur      (  ) S3 present     (  ) S4 present    LUNGS:   ( x )Unlabored respirations     (  ) tachypnea  ( x ) B/L air entry     (  ) decreased breath sounds in:  (location     )    (  ) no adventitious sound     (  ) crackles     (  ) wheezing      (x  ) rhonchi bilateral      (specify location:       )  (  ) chest wall tenderness (specify location:       )    ABDOMEN:   (x  ) Soft     (  ) tense   |   (  x) nondistended     (  ) distended   |   ( x ) +BS     (  ) hypoactive bowel sounds     (  ) hyperactive bowel sounds  (x  ) nontender     (  ) RUQ tenderness     (  ) RLQ tenderness     (  ) LLQ tenderness     (  ) epigastric tenderness     (  ) diffuse tenderness  (  ) Splenomegaly      (  ) Hepatomegaly      (  ) Jaundice     (  ) ecchymosis     EXTREMITIES:  ( x ) Normal     (  ) Rash     (  ) ecchymosis     (  ) varicose veins      (  ) pitting edema     (  ) non-pitting edema   (  ) ulceration     (  ) gangrene:     (location:     )    NERVOUS SYSTEM:    ( x ) A&Ox3     (  ) confused     (  ) lethargic  CN II-XII:     ( x ) Intact     (  ) deficits found     (Specify:     )   Upper extremities:     ( x ) no sensorimotor deficits     (  ) weakness     (  ) loss of proprioception/vibration     (  ) loss of touch/temperature (specify:    )  Lower extremities:     (x  ) no sensorimotor deficits     (  ) weakness     (  ) loss of proprioception/vibration     (  ) loss of touch/temperature (specify:    )    SKIN:   (x  ) No rashes or lesions     (  ) maculopapular rash     (  ) pustules     (  ) vesicles     (  ) ulcer     (  ) ecchymosis     (specify location:     )        LABS:                        6.5    8.23  )-----------( 412      ( 07 May 2024 21:06 )             21.7     05-07    141  |  105  |  14  ----------------------------<  146<H>  3.3<L>   |  16<L>  |  1.0    Ca    6.6<L>      07 May 2024 05:52  Mg     1.7     05-07    TPro  4.5<L>  /  Alb  2.1<L>  /  TBili  0.7  /  DBili  x   /  AST  11  /  ALT  <5  /  AlkPhos  119<H>  05-07      Urinalysis Basic - ( 07 May 2024 05:52 )    Color: x / Appearance: x / SG: x / pH: x  Gluc: 146 mg/dL / Ketone: x  / Bili: x / Urobili: x   Blood: x / Protein: x / Nitrite: x   Leuk Esterase: x / RBC: x / WBC x   Sq Epi: x / Non Sq Epi: x / Bacteria: x                RADIOLOGY:

## 2024-05-08 NOTE — CHART NOTE - NSCHARTNOTEFT_GEN_A_CORE
Telepsych [facility] consult was requested by primary provider Dr. Benigno Robledo on 5/7/2024 at 15:17. After telepsychiatrist Dr. Gutierrez and Dr. Robledo discussed case in further detail, it was agreed upon that the case is cancelled until additional information is gathered, and to call telepsych [facility] at [phone] to re-activate consult.

## 2024-05-08 NOTE — PROGRESS NOTE ADULT - SUBJECTIVE AND OBJECTIVE BOX
Patient is a 68y old  Male who presents with a chief complaint of LT Gluteal Pain / Diarrhea (26 Apr 2024 08:35)    Patient was seen and examined.  Pt  continues to refuse feeds, blood transfusions  Pt awake, alert    PAST MEDICAL & SURGICAL HISTORY:  HTN (hypertension)  Prostate cancer    Allergies  No Known Allergies    MEDICATIONS  (STANDING):  ascorbic acid 500 milliGRAM(s) Oral daily  benzocaine 20% Spray 1 Spray(s) Topical three times a day  dicyclomine 10 milliGRAM(s) Oral three times a day before meals  doxycycline monohydrate Suspension 100 milliGRAM(s) Oral every 12 hours  enoxaparin Injectable 40 milliGRAM(s) SubCutaneous every 24 hours  enzalutamide 160 milliGRAM(s) Oral daily  ferrous    sulfate Liquid 300 milliGRAM(s) Enteral Tube daily  glucagon  Injectable 1 milliGRAM(s) IntraMuscular once  insulin glargine Injectable (LANTUS) 10 Unit(s) SubCutaneous at bedtime  insulin lispro (ADMELOG) corrective regimen sliding scale   SubCutaneous three times a day before meals  lactobacillus acidophilus 1 Tablet(s) Oral three times a day with meals  magnesium oxide 400 milliGRAM(s) Oral three times a day with meals  multivitamin 1 Tablet(s) Oral daily  multivitamin  Chewable 1 Tablet(s) Oral daily  potassium chloride   Powder 40 milliEquivalent(s) Oral every 4 hours  sodium bicarbonate 1300 milliGRAM(s) Oral every 8 hours  sodium bicarbonate  Infusion 0.207 mEq/kG/Hr (75 mL/Hr) IV Continuous <Continuous>    MEDICATIONS  (PRN):  acetaminophen     Tablet .. 650 milliGRAM(s) Oral every 6 hours PRN Temp greater or equal to 38C (100.4F), Mild Pain (1 - 3)  albuterol/ipratropium for Nebulization 3 milliLiter(s) Nebulizer every 6 hours PRN Shortness of Breath  aluminum hydroxide/magnesium hydroxide/simethicone Suspension 30 milliLiter(s) Oral every 4 hours PRN Dyspepsia  dextrose Oral Gel 15 Gram(s) Oral once PRN Blood Glucose LESS THAN 70 milliGRAM(s)/deciliter  guaiFENesin  milliGRAM(s) Oral every 12 hours PRN Cough    T(C): 35.6 (05-08-24 @ 07:36), Max: 36.9 (05-08-24 @ 00:15)  HR: 96 (05-08-24 @ 07:36) (62 - 96)  BP: 93/66 (05-08-24 @ 07:36) (93/66 - 124/66)  RR: 18 (05-08-24 @ 00:43) (17 - 18)  SpO2: 99% (05-08-24 @ 00:43) (85% - 99%)    O/E:  Awake, alert, not in distress.  HEENT: atraumatic, EOMI.  Chest: decrease breath sounds B/l at bases  CVS: SIS2 +, no murmur.  P/A: Soft, BS+, PEG+  CNS: awake, alert  Ext: no edema feet.  Skin: Left buttock dressing+  All systems reviewed positive findings as above.                               6.4<LL>  6.77  )-----------( 436<H>    ( 08 May 2024 08:01 )             21.1<L>                        6.5<LL>  8.23  )-----------( 412<H>    ( 07 May 2024 21:06 )             21.7<L>  05-08    139  |  105  |  12  ----------------------------<  128<H>  3.4<L>   |  19  |  1.0  05-07    141  |  105  |  14  ----------------------------<  146<H>  3.3<L>   |  16<L>  |  1.0    Ca    6.8<L>      08 May 2024 08:01  Ca    6.6<L>      07 May 2024 05:52  Mg     1.6     05-08    TPro  4.6<L>  /  Alb  2.3<L>  /  TBili  0.6  /  DBili  x   /  AST  12  /  ALT  <5  /  AlkPhos  118<H>  05-08  TPro  4.5<L>  /  Alb  2.1<L>  /  TBili  0.7  /  DBili  x   /  AST  11  /  ALT  <5  /  AlkPhos  119<H>  05-07

## 2024-05-08 NOTE — PROGRESS NOTE ADULT - ASSESSMENT
68 year old male with PMH fo Stage 4 Prostatic Ca s/p palliative radiation, medistinal mass on oral chemo (Xtandi)< chronic anemia, HTN, chronic  back pain presents to ED from SNF for evaluation. Per NH, has been refusing medications via PEG x 3 days d/t episodes of diarrhea post-feeds. Patient was only complaining of left buttock pain, s/p debridement by burn on 4/24, s/p thoracentesis by pul on 4/25 for pleural effusion removed 1.7L.     #treatement refusal   1.Patient refusing treatment despite multiple attempts at explaining the importance of treatment.  2.Patient refusing PEG feeds and oral feeds.  3.Ethics consulted, Cs noted   4.Psych consult noted.     #Left GLuteal Cellulitis  #MRSA (+) Wound Cx 4/22  - vitals: Afebrile, /892, , SpO2 100% on RA  - CT shows new mild edema and enlargement of left gluteus maximums musculature with associated subcutaneous edema.   - s/p Cefepime, Vanc, Flagyl  - currently on doxycycline but patient refusing medication and even IV placement .   - Wound Cx (4/22): rare MRSA   - Contact isolation  - continue local wound care, off-loading  - Per burn, no further plans for debridement  - Per ID, continue Doxycycline for 2 weeks post debridement (4/24-5/8)     #H/o Dysphagia s/p PEG  #N/V 4/25  - s/p esophageal stent & PEG 3/5/24  - patient cleared for pureed diet and thin liquids per SS on last admission  - refusing PEG feeds d/t diarrhea , with very limited oral intake.   - c/w acidophilus  - antiemetics PRN  - Cr rising (likely prerenal 2/2) dehydration/poor PO intake      #New B/l Pleural Effusions  - d/c IV lasix  - s/p thoracentesis with pulm on 4/25, removed 1.7L  - requiring 2-3L via NC PRN     #Stage 4 Prostatic Cancer s/p Radiation (on active PO chemo) w metastasis to mediastinum   #Severe Left Hydronephrosis  - c/w home Xtandi 160 mg qd  - monitor labs    #Metabolic acidosis  - c/w sodium bicarb    #Chronic Normocytic Anemia  - likely 2/2 malignancy  - c/w ferrous sulfate    #GOC- DNR/DNI    Guardianship process initiated.

## 2024-05-08 NOTE — PROGRESS NOTE ADULT - ASSESSMENT
68M w/ PMHx Stage 4 Prostate Ca s/p palliative radiation, Mediastinal Mass on oral Chemo (xtandi), Chronic Anemia, HTN and Chronic Back Pain presents to ED from SNF for evaluation. As per NH documentation, pt has been refusing medications through PEG for the last 3x days d/t episode of diarrhea post feeds. Patient's only complaint is LT buttock pain. Denies fevers, chills, chest pain, SOB, n/v, abdominal pain or urinary symptoms.    # Acute Hypoxic resp failure- resolved  # B/l  pleural effusions  -  Xray Chest 1 View- PORTABLE-Routine (Xray Chest 1 View- PORTABLE-Routine in AM.) (04.28.24 @ 06:06) >Lungs/ Pleura: Stable bilateral opacities left greater than right effusions  - S/p left thoracentesis on 4/25 --> 1800 milliLiter drained    # Hypotension- resolved  - dc'd  metoprolol  - monitor BP    # Hypomagnesemia, Hypokalemia  - replete mg, k    # Metabolic acidosis  - c/w PO bicarb    # Left gluteal cellulitis  - CT Abdomen and Pelvis w/ IV Cont (04.19.24 @ 19:17) >New mild edema and enlargement of the left gluteus maximums musculature with associated subcutaneous edema. No discrete abscess. Metastatic disease in the abdomen and pelvis  with slightly increased upper abdominal adenopathy. No significant change approximately in the large bladder mass and partially imaged mediastinal mass. Interval placement of an esophageal stent. Severe left hydronephrosis to the level of the proximal ureter, not   significantly changed. New/increased large bilateral pleural effusions.  - blood Culture Results: No growth at 5 days (04.19.24 @ 17:37)  - s/p debridement 4.24 - -->  Wound Cx 4/22 with MRSA   - S/p  cefazolin  - As per ID c/w  doxycycline 100 mg BID -- plan for 2 weeks from debridement (4/24-5/8)   - continue dressing changes with xeroform packing    # Acute kidney injury, prerenal- resolved  # Lactic acidosis- resolved  - : CT Abdomen and Pelvis w/ IV Cont (04.19.24 @ 19:17) >KIDNEYS: Stable severe left hydronephrosis secondary to obstruction at the level of the proximal ureter, not significantly changed. Right renal cysts and other low attenuating lesions in the right kidney too small to characterize. PELVIC ORGANS: Large 7 cm bladder mass, unchanged since prior CT.  - Lactate, Blood: 1.5 mmol/L (04.30.24 @ 07:15)    # Hyperglycemia- resolved  - A1C with Estimated Average Glucose Result: 5.4 % (04.29.24 @ 05:21)  - monitor FS    # H/o Dysphagia  - s/p esophageal stent and PEG 3/5/24  - swallow eval: puree w/ thin liquids, will likely require continued use of PEG    # Metastatic prostate cancer stage 4  # Mediastinal mass  - S/p palliative radiation  - on  Xtandi 160 mg qd    # Anemia  - Pt refused  PRBC  - c/w ferrous sulfate    # Severe protein calorie malnutrition  - BMI: 15.8    # Noncompliant with medication, treatment care    # DVT prophylaxis    # Poor prognosis    #  DNR/DNI/ trial of NIV  - Palliative care f/u: Ethics decided last admission that the patient had capacity to refuse interventions.When we discussed the consequences of refusing those interventions, he could not explain why he was refusing (other than diarrhea for tube feeds).  We discussed that continued refusal of feeds or other interventions could lead to worsening clinical status and death.  We discussed GOC given his refusal of interventions; discussed both hospice/CMO and ongoing medical management. He noted he wants "to live" and wants ongoing medical management.Patient does not demonstrate capacity to understand his complete clinical picture. Recommend possible additional ethics consult if more elucidation about next steps is needed.  - Psych eval: At this time, patient does not demonstrate the capacity to  refuse or at times agree to the I.V line placement , I.V fluid &  I.V antibiotic management , and PEG tube feeds,  recommended by the medical team . In this situation , the decision should be deferred to his next of kin or documented health care proxy. However, since neither party are available in this case , a court appointed decision maker can be appointed to make this decision. In the event of imminent need for these interventions, that is potentially life threatening , the medical team can consider a 2 physician consent.      # Pending: ? guardianship  # Disposition: [facility]

## 2024-05-08 NOTE — CHART NOTE - NSCHARTNOTEFT_GEN_A_CORE
Patient refuses blood transfusions, does not have IV and refuses to get an IV. Please follow up with psych during the day to assess if he has capacity to refuse treatment.

## 2024-05-09 LAB
ANION GAP SERPL CALC-SCNC: 8 MMOL/L — SIGNIFICANT CHANGE UP (ref 7–14)
BUN SERPL-MCNC: 11 MG/DL — SIGNIFICANT CHANGE UP (ref 10–20)
CALCIUM SERPL-MCNC: 6.9 MG/DL — LOW (ref 8.4–10.5)
CHLORIDE SERPL-SCNC: 107 MMOL/L — SIGNIFICANT CHANGE UP (ref 98–110)
CO2 SERPL-SCNC: 22 MMOL/L — SIGNIFICANT CHANGE UP (ref 17–32)
CREAT SERPL-MCNC: 0.8 MG/DL — SIGNIFICANT CHANGE UP (ref 0.7–1.5)
EGFR: 95 ML/MIN/1.73M2 — SIGNIFICANT CHANGE UP
GLUCOSE BLDC GLUCOMTR-MCNC: 124 MG/DL — HIGH (ref 70–99)
GLUCOSE BLDC GLUCOMTR-MCNC: 51 MG/DL — CRITICAL LOW (ref 70–99)
GLUCOSE BLDC GLUCOMTR-MCNC: 66 MG/DL — LOW (ref 70–99)
GLUCOSE BLDC GLUCOMTR-MCNC: 75 MG/DL — SIGNIFICANT CHANGE UP (ref 70–99)
GLUCOSE BLDC GLUCOMTR-MCNC: 79 MG/DL — SIGNIFICANT CHANGE UP (ref 70–99)
GLUCOSE BLDC GLUCOMTR-MCNC: 82 MG/DL — SIGNIFICANT CHANGE UP (ref 70–99)
GLUCOSE SERPL-MCNC: 168 MG/DL — HIGH (ref 70–99)
PHOSPHATE SERPL-MCNC: 1.3 MG/DL — LOW (ref 2.1–4.9)
POTASSIUM SERPL-MCNC: 4.6 MMOL/L — SIGNIFICANT CHANGE UP (ref 3.5–5)
POTASSIUM SERPL-SCNC: 4.6 MMOL/L — SIGNIFICANT CHANGE UP (ref 3.5–5)
SODIUM SERPL-SCNC: 137 MMOL/L — SIGNIFICANT CHANGE UP (ref 135–146)

## 2024-05-09 PROCEDURE — 99232 SBSQ HOSP IP/OBS MODERATE 35: CPT

## 2024-05-09 RX ORDER — DEXTROSE 50 % IN WATER 50 %
12.5 SYRINGE (ML) INTRAVENOUS ONCE
Refills: 0 | Status: COMPLETED | OUTPATIENT
Start: 2024-05-09 | End: 2024-05-09

## 2024-05-09 RX ORDER — DEXTROSE 50 % IN WATER 50 %
25 SYRINGE (ML) INTRAVENOUS ONCE
Refills: 0 | Status: DISCONTINUED | OUTPATIENT
Start: 2024-05-09 | End: 2024-05-09

## 2024-05-09 RX ORDER — INSULIN GLARGINE 100 [IU]/ML
5 INJECTION, SOLUTION SUBCUTANEOUS AT BEDTIME
Refills: 0 | Status: DISCONTINUED | OUTPATIENT
Start: 2024-05-09 | End: 2024-05-10

## 2024-05-09 RX ORDER — POTASSIUM PHOSPHATE, MONOBASIC POTASSIUM PHOSPHATE, DIBASIC 236; 224 MG/ML; MG/ML
30 INJECTION, SOLUTION INTRAVENOUS ONCE
Refills: 0 | Status: COMPLETED | OUTPATIENT
Start: 2024-05-09 | End: 2024-05-09

## 2024-05-09 RX ADMIN — Medication 1 TABLET(S): at 07:53

## 2024-05-09 RX ADMIN — Medication 300 MILLIGRAM(S): at 11:10

## 2024-05-09 RX ADMIN — Medication 10 MILLIGRAM(S): at 11:09

## 2024-05-09 RX ADMIN — Medication 100 MILLIGRAM(S): at 17:16

## 2024-05-09 RX ADMIN — ENZALUTAMIDE 160 MILLIGRAM(S): 80 TABLET ORAL at 13:31

## 2024-05-09 RX ADMIN — Medication 1 TABLET(S): at 11:09

## 2024-05-09 RX ADMIN — Medication 1300 MILLIGRAM(S): at 14:12

## 2024-05-09 RX ADMIN — INSULIN GLARGINE 5 UNIT(S): 100 INJECTION, SOLUTION SUBCUTANEOUS at 22:04

## 2024-05-09 RX ADMIN — Medication 500 MILLIGRAM(S): at 11:09

## 2024-05-09 RX ADMIN — Medication 1300 MILLIGRAM(S): at 22:03

## 2024-05-09 RX ADMIN — Medication 10 MILLIGRAM(S): at 05:52

## 2024-05-09 RX ADMIN — Medication 1300 MILLIGRAM(S): at 05:52

## 2024-05-09 RX ADMIN — Medication 100 MILLIGRAM(S): at 05:53

## 2024-05-09 RX ADMIN — Medication 1 TABLET(S): at 11:15

## 2024-05-09 RX ADMIN — MAGNESIUM OXIDE 400 MG ORAL TABLET 400 MILLIGRAM(S): 241.3 TABLET ORAL at 07:54

## 2024-05-09 RX ADMIN — Medication 1 SPRAY(S): at 22:04

## 2024-05-09 RX ADMIN — POTASSIUM PHOSPHATE, MONOBASIC POTASSIUM PHOSPHATE, DIBASIC 83.33 MILLIMOLE(S): 236; 224 INJECTION, SOLUTION INTRAVENOUS at 17:13

## 2024-05-09 RX ADMIN — ENOXAPARIN SODIUM 40 MILLIGRAM(S): 100 INJECTION SUBCUTANEOUS at 22:03

## 2024-05-09 RX ADMIN — Medication 1 SPRAY(S): at 05:53

## 2024-05-09 RX ADMIN — Medication 12.5 GRAM(S): at 11:26

## 2024-05-09 RX ADMIN — CHLORHEXIDINE GLUCONATE 1 APPLICATION(S): 213 SOLUTION TOPICAL at 05:53

## 2024-05-09 NOTE — PROGRESS NOTE ADULT - ASSESSMENT
68M w/ PMHx Stage 4 Prostate Ca s/p palliative radiation, Mediastinal Mass on oral Chemo (xtandi), Chronic Anemia, HTN and Chronic Back Pain presents to ED from SNF for evaluation. As per NH documentation, pt has been refusing medications through PEG for the last 3x days d/t episode of diarrhea post feeds. Patient's only complaint is LT buttock pain. Denies fevers, chills, chest pain, SOB, n/v, abdominal pain or urinary symptoms.    # Acute Hypoxic resp failure- resolved  # B/l  pleural effusions  -  Xray Chest 1 View- PORTABLE-Routine (Xray Chest 1 View- PORTABLE-Routine in AM.) (04.28.24 @ 06:06) >Lungs/ Pleura: Stable bilateral opacities left greater than right effusions  - S/p left thoracentesis on 4/25 --> 1800 milliLiter drained    # Hypotension  - dc'd  metoprolol  - monitor BP    # Hypomagnesemia, Hypokalemia  - repleted mg, k    # Metabolic acidosis  - c/w PO bicarb    # Left gluteal cellulitis  - CT Abdomen and Pelvis w/ IV Cont (04.19.24 @ 19:17) >New mild edema and enlargement of the left gluteus maximums musculature with associated subcutaneous edema. No discrete abscess. Metastatic disease in the abdomen and pelvis  with slightly increased upper abdominal adenopathy. No significant change approximately in the large bladder mass and partially imaged mediastinal mass. Interval placement of an esophageal stent. Severe left hydronephrosis to the level of the proximal ureter, not   significantly changed. New/increased large bilateral pleural effusions.  - blood Culture Results: No growth at 5 days (04.19.24 @ 17:37)  - s/p debridement 4.24 - -->  Wound Cx 4/22 with MRSA   - S/p  cefazolin  - As per ID c/w  doxycycline 100 mg BID -- plan for 2 weeks from debridement (4/24-5/8)   - continue dressing changes with xeroform packing    # Acute kidney injury, prerenal- resolved  # Lactic acidosis- resolved  - : CT Abdomen and Pelvis w/ IV Cont (04.19.24 @ 19:17) >KIDNEYS: Stable severe left hydronephrosis secondary to obstruction at the level of the proximal ureter, not significantly changed. Right renal cysts and other low attenuating lesions in the right kidney too small to characterize. PELVIC ORGANS: Large 7 cm bladder mass, unchanged since prior CT.  - Lactate, Blood: 1.5 mmol/L (04.30.24 @ 07:15)    # Hyperglycemia- resolved  - A1C with Estimated Average Glucose Result: 5.4 % (04.29.24 @ 05:21)  - monitor FS    # H/o Dysphagia  - s/p esophageal stent and PEG 3/5/24  - swallow eval: puree w/ thin liquids, will likely require continued use of PEG    # Metastatic prostate cancer stage 4  # Mediastinal mass  - S/p palliative radiation  - on  Xtandi 160 mg qd    # Anemia  - Pt  to get 1 unit PRBC if agreeable  - c/w ferrous sulfate    # Severe protein calorie malnutrition  - BMI: 15.8    # Noncompliant with medication, treatment care    # DVT prophylaxis    # Poor prognosis    #  DNR/DNI/ trial of NIV  - Palliative care f/u: Ethics decided last admission that the patient had capacity to refuse interventions.When we discussed the consequences of refusing those interventions, he could not explain why he was refusing (other than diarrhea for tube feeds).  We discussed that continued refusal of feeds or other interventions could lead to worsening clinical status and death.  We discussed GOC given his refusal of interventions; discussed both hospice/CMO and ongoing medical management. He noted he wants "to live" and wants ongoing medical management.Patient does not demonstrate capacity to understand his complete clinical picture. Recommend possible additional ethics consult if more elucidation about next steps is needed.  - Psych eval: At this time, patient does not demonstrate the capacity to  refuse or at times agree to the I.V line placement , I.V fluid &  I.V antibiotic management , and PEG tube feeds,  recommended by the medical team . In this situation , the decision should be deferred to his next of kin or documented health care proxy. However, since neither party are available in this case , a court appointed decision maker can be appointed to make this decision. In the event of imminent need for these interventions, that is potentially life threatening , the medical team can consider a 2 physician consent.    # Pending: monitor cbc  # Disposition: [facility]

## 2024-05-09 NOTE — PROVIDER CONTACT NOTE (HYPOGLYCEMIA EVENT) - NS PROVIDER CONTACT BACKGROUND-HYPO
Age: 68y    Gender: Male    POCT Blood Glucose:  124 mg/dL (05-09-24 @ 11:51)  66 mg/dL (05-09-24 @ 11:14)  79 mg/dL (05-09-24 @ 08:52)  51 mg/dL (05-09-24 @ 08:49)  235 mg/dL (05-08-24 @ 21:17)  125 mg/dL (05-08-24 @ 16:42)      eMAR:  dextrose 50% Injectable   12.5 Gram(s) IV Push (05-09-24 @ 11:26)    insulin glargine Injectable (LANTUS)   10 Unit(s) SubCutaneous (05-08-24 @ 22:10)

## 2024-05-09 NOTE — CONSULT NOTE ADULT - SUBJECTIVE AND OBJECTIVE BOX
Consult requested by: HERBERT Robledo MD	Role: Resident		Service: Medicine  Attending: MICHAEL Tracey MD  Consultant: Jennifer Cifuentes MA, MPH, [facility] (Ethics Fellow) Contact #s: [phone]    CONSULT PURPOSE: To assist the healthcare team in the ethical dilemma of a 68-year-old male patient admitted for left gluteal pain and diarrhea who is intermittently refusing medical interventions.     CLINICAL SUMMARY: This is a 68-year-old male patient with a past medical history of metastatic prostate cancer status post palliative radiation, mediastinal mass on oral chemotherapy (xtandi), chronic anemia, hypertension and chronic back pain. Patient previously admitted in 3/2024 s/p esophageal stent and PEG tube. Patient presented to the ED from skilled nursing facility (SNF). Per SNF, patient has been refusing medication through PEG for 3 days due to diarrhea post feeds. Patient was subsequently admitted to Medicine for further evaluation.     CT Abdomen and Pelvis on 4/19 shows new mild edema and enlargement of the left gluteus maximums musculature with associated subcutaneous edema; no discrete abscess; metastatic disease in the abdomen and pelvis with slightly increased upper adnominal adenopathy. No significant change approximately in the large bladder mass and partially imaged mediastinal mass; interval placement of an esophageal stent; severe left hydronephrosis to the level of the proximal ureter; new and increased large bilateral pleural effusions.     Infectious Disease (ID) was consulted on 4/20 efor left gluteal pain and diarrhea. ID recommended burn evaluation for possible drainage and culture. Pulmonology was consulted on 4/21 for effusions. Pulmonology recommended left sided thoracentesis for diagnostic purpose and dyspnea relief. Per Pulmonology, patient declined to consent to left sided thoracentesis. Burn team was consulted on 4/22 for left buttock cellulitis. Burn team recommended wound care, antibiotics and surgical debridement. Palliative was consulted on 4/22 for goals of care. Course complicated by patient declining PEG tube feeds and IV access. X-ray on 4/23 showed bibasilar opacifies and effusions. Per Medicine, patient underwent thoracentesis on 4/25. On 4/25, per Burn team, Incision and drainage (I&D) of left buttock abscess completed. Course complicated by acute kidney injury, anemia, hyperglycemia, patient refusing oral and PEG tube feeds, IV access for antibiotics and RBC infusion. Behavioral Health () was consulted on 4/30 for capacity assessment to refuse IV line placement, antibiotics and feedings.  deemed that the patient did not demonstrate capacity for refusal of IV line placement, antibiotics and feedings. Ethics was consulted to assist in the plan of care for a patient who is intermittently refusing medical interventions.	  		  Prognosis Estimate (survival in hrs, days, wks, mos, yrs): Poor (Per Medicine 4/25, 4/30, 5/9)  Patient Decision-Making Capacity: Lacks capacity (Per  4/30)  Patient Aware of:  Diagnosis:  Yes  Prognosis:  Unknown       Evidence of Patient’s Preference of Life-Sustaining Treatment (Written or Oral): Yes  Resuscitation status:   DNR:  Yes      DNI:  Yes             Discussions:     Discussion with HERBERT Griffin MD (Medicine Resident) and DAYNA Cifuentes (Ethics Fellow)(4/30/24 15:30-15:45): Ethics consult initiated. Discussed case in detail. Patient has been refusing oral feeds, PEG feeds, IV access and antibiotics. Ethics explained that regardless of capacity, patients have the right to refuse non-emergent medical interventions. Ethics awaits  assessment.     Chart reviewed 5/1.    Discussion with HERBERT Griffin MD (Medicine Resident) and DAYNA Cifuentes (Ethics Fellow)(5/2/24 10:40-10:50):  deemed the patient to lack capacity for refusal of indicated medical interventions. Medical team suggested that guardianship process has been initiated. Ethics will speak with LOIS and NAPOLEON to clarify.     Discussion with Lee Ann Aguila (LOIS) and DAYNA Cifuentes (Ethics Fellow)(5/2/24 13:30-13:35): Confirmed patient’s nursing home is not pursuing guardianship.     Discussion with Judy Saucedo (NAPOLEON) and DAYNA Cifuentes (Ethics Fellow)(5/2/24 13:40-13:50): Guardianship was escalated to senior CM at the request of Attending MD. Ethics awaits an update.     Discussion with HERBERT Griffin MD (Medicine Resident) and DAYNA Cifuentes (Ethics Fellow)(5/3/24 14:34-14:40): Updated on patient’s clinical condition. Patient accepted one dose of PEG and liquid form of antibiotics.     Discussion with HERBERT Griffin MD (Medicine Resident) and DAYNA Cifuentes (Ethics Fellow)(5/6/24 11:00-11:10): Patient refused feeds and antibiotics over the weekend and today. Ethics suggests speaking with patient about treating diarrhea with medication simultaneously with feedings. Ethics awaits response.     Chart reviewed 5/7    Discussion with HERBERT Griffin MD (Medicine Resident) and DAYNA Cifuentes (Ethics Fellow)(5/9/24 10:40-10:50): Patient’s , Delilah, from NH visited patient in the hospital and suggested to the patient that he receives feeds and antibiotics. Patient was amendable to one dose of feeds and blood transfusion. Patient was also agreeable to continue receiving care. Given patient’s intermittent acceptance of interventions, guardianship is not warranted, as patient maintains the ability to accept or decline non-emergent treatment.     Bioethics analysis:    The central ethical issue that exists in this case is (A) the patient’s autonomy versus (B) the providers’ desire for beneficence and non-maleficence. Autonomy centers on letting patients with capacity decide what is best for their health after being fully informed of the options available, as well as the risks and benefits of those options. Non-maleficence focuses on the clinician’s desire to do no harm. Similarly, beneficence focuses on the clinicians’ desire to do what is best for their patient, requiring that health care providers consider what is best for the patient given the individual patient’s unique circumstances.      Essential to the notion of autonomy is that of capacity, the ability to comprehend, understand, appreciate and reason (C-U-A-R). Crucial to capacity is the ability to communicate an understanding of why the recommended medical interventions are necessary and an appreciation of the risks and benefits of not adhering with the medical interventions. In this case, Behavioral Health has deemed the patient without capacity to refuse PEG tube feeds, antibiotic treatment and IV access, as he is unable to appreciate how his decision could impact him. Additionally, per , he is unable to rationalize the possible risks and benefits of refusing interventions.     The medical team likely has the best intentions in planning for feeds and antibiotics, as the patient is suffering from gluteal abscess (exercising provider beneficence). Additionally, the patient is responding to surgical debridement and antibiotics are necessary to cure the underlying infections (non-maleficence). However, despite multiple medical team meetings with patient, it is possible the patient will continue to refuse. Still, regardless of capacity, a patient who is actively refusing treatment cannot be forced against his will to receive it. He should not be coerced.     Ethically, to compel or coerce a patient absent his assent requires an imminent life-threatening condition, as compelling or coercing treatment directly conflicts with autonomy. It is reasonable to believe that most people accept and find comfort in the thought that they will be provided medical care if they become too ill to consent to the care needed, and that it is therefore in line with their autonomous will as healthy individuals1. Coercive treatment is thus acceptable only on the condition that:2  (1)	The patient cannot make an autonomous decision about treatment and  (2)	Treatment is in the patient’s authentic interest    It is ethically and morally challenging for any prudent observer to have patients who are deemed by clinicians to “make wrong choices.” Logically speaking, these are choices that entail an unfavorable benefit/burden ratio involving either diminishing benefit (such as by refusing something helpful) or maximizing burden (such as by requesting something especially harmful).     Mr. Stallings has innate moral status–that is his distinct personhood, personal experiences, and interpretation of suffering, life-story, etc.—which must be respected. In the absence of an emergency, an emergent issue, or the potential for clear harm to third parties, a commitment to the respect for autonomous persons means tolerating a decision that may be unwise.     Clinicians are called to refrain from causing harm to patients while treating them and to promote the best possible health or quality of life—to remediate a harm and to maximize benefit for the patient’s health and wellbeing. However, a patient’s refusal to treatment should be respected. Beneficence and nonmaleficence duties are not absolute principles that should be followed above all else. It would be unpractical to impose unwanted interventions on a patient (considering long-term implications of care). Clinicians should, however, in order to fulfil their ethical duties, arrange for the patient to have all the pertinent information to make a decision and explore more fully the reasons for his refusal and to attempt to educate. Communication is central in the practitioner-patient relationship.3 Practical tools such as extensive communication with the patient and active listening can help in this regard.

## 2024-05-09 NOTE — PROVIDER CONTACT NOTE (HYPOGLYCEMIA EVENT) - NS PROVIDER CONTACT ASSESS-HYPO
Patient responsive and alert. Denies dizziness, sweats, or discomfort at this time. Pt did not eat breakfast, only drank juice and refused tube feed this morning. Previous FS 79.

## 2024-05-09 NOTE — PROGRESS NOTE ADULT - SUBJECTIVE AND OBJECTIVE BOX
Patient is a 68y old  Male who presents with a chief complaint of LT Gluteal Pain / Diarrhea (26 Apr 2024 08:35)    Patient was seen and examined.  Pt awake, alert  He was restarted on tube feeds yesterday noon, after he was agreeable, but refused feeds overnight    PAST MEDICAL & SURGICAL HISTORY:  HTN (hypertension)  Prostate cancer    Allergies  No Known Allergies    MEDICATIONS  (STANDING):  ascorbic acid 500 milliGRAM(s) Oral daily  benzocaine 20% Spray 1 Spray(s) Topical three times a day  dicyclomine 10 milliGRAM(s) Oral three times a day before meals  doxycycline monohydrate Suspension 100 milliGRAM(s) Oral every 12 hours  enoxaparin Injectable 40 milliGRAM(s) SubCutaneous every 24 hours  enzalutamide 160 milliGRAM(s) Oral daily  ferrous    sulfate Liquid 300 milliGRAM(s) Enteral Tube daily  glucagon  Injectable 1 milliGRAM(s) IntraMuscular once  insulin glargine Injectable (LANTUS) 5 Unit(s) SubCutaneous at bedtime  insulin lispro (ADMELOG) corrective regimen sliding scale   SubCutaneous three times a day before meals  lactobacillus acidophilus 1 Tablet(s) Oral three times a day with meals  magnesium oxide 400 milliGRAM(s) Oral three times a day with meals  multivitamin 1 Tablet(s) Oral daily  multivitamin  Chewable 1 Tablet(s) Oral daily  sodium bicarbonate 1300 milliGRAM(s) Oral every 8 hours  sodium bicarbonate  Infusion 0.207 mEq/kG/Hr (75 mL/Hr) IV Continuous <Continuous>    MEDICATIONS  (PRN):  acetaminophen     Tablet .. 650 milliGRAM(s) Oral every 6 hours PRN Temp greater or equal to 38C (100.4F), Mild Pain (1 - 3)  albuterol/ipratropium for Nebulization 3 milliLiter(s) Nebulizer every 6 hours PRN Shortness of Breath  aluminum hydroxide/magnesium hydroxide/simethicone Suspension 30 milliLiter(s) Oral every 4 hours PRN Dyspepsia  dextrose Oral Gel 15 Gram(s) Oral once PRN Blood Glucose LESS THAN 70 milliGRAM(s)/deciliter  guaiFENesin  milliGRAM(s) Oral every 12 hours PRN Cough    T(C): 35.8 (05-09-24 @ 09:19), Max: 36.7 (05-08-24 @ 16:38)  HR: 91 (05-09-24 @ 09:19) (91 - 108)  BP: 89/63 (05-09-24 @ 09:19) (89/63 - 101/72)  RR: 18 (05-09-24 @ 09:19) (17 - 18)  SpO2: 94% (05-09-24 @ 00:00) (94% - 94%)    O/E:  Awake, alert, not in distress.  HEENT: atraumatic, EOMI.  Chest: decrease breath sounds B/l at bases  CVS: SIS2 +, no murmur.  P/A: Soft, BS+, PEG+  CNS: awake, alert  Ext: no edema feet.  Skin: Left buttock dressing+  All systems reviewed positive findings as above.                             6.4<LL>  6.77  )-----------( 436<H>    ( 08 May 2024 08:01 )             21.1<L>                        6.5<LL>  8.23  )-----------( 412<H>    ( 07 May 2024 21:06 )             21.7<L>  05-08    139  |  105  |  12  ----------------------------<  128<H>  3.4<L>   |  19  |  1.0    Ca    6.8<L>      08 May 2024 08:01  Mg     1.6     05-08    TPro  4.6<L>  /  Alb  2.3<L>  /  TBili  0.6  /  DBili  x   /  AST  12  /  ALT  <5  /  AlkPhos  118<H>  05-08

## 2024-05-09 NOTE — CONSULT NOTE ADULT - CONSULT REASON
Effusions
left buttock, cellulitic wound
LT Gluteal Pain / Diarrhea
GOC
To assist the healthcare team in the ethical dilemma of a 68-year-old male patient admitted for left gluteal pain and diarrhea who is intermittently refusing medical interventions.

## 2024-05-09 NOTE — PROGRESS NOTE ADULT - ASSESSMENT
68 year old male with PMH fo Stage 4 Prostatic Ca s/p palliative radiation, medistinal mass on oral chemo (Xtandi)< chronic anemia, HTN, chronic  back pain presents to ED from SNF for evaluation. Per NH, has been refusing medications via PEG x 3 days d/t episodes of diarrhea post-feeds. Patient was only complaining of left buttock pain, s/p debridement by burn on 4/24, s/p thoracentesis by pul on 4/25 for pleural effusion removed 1.7L.     #treatement refusal   -Patient previously refusing treatement  -currently agreeing on feeding and  treatment   -agreed on getting one PRBC today   -Conscent verbally signed and patient counselled on risk vs benefits of transfusion      #Left GLuteal Cellulitis  #MRSA (+) Wound Cx 4/22  - vitals: Afebrile, /892, , SpO2 100% on RA  - CT shows new mild edema and enlargement of left gluteus maximums musculature with associated subcutaneous edema.   - s/p Cefepime, Vanc, Flagyl  - currently on doxycycline but patient refusing medication and even IV placement .   - Wound Cx (4/22): rare MRSA   - Contact isolation  - continue local wound care, off-loading  - Per burn, no further plans for debridement  - Per ID, continue Doxycycline for 2 weeks post debridement (4/24-5/8)  , will keep for now since patient missed many doses and currently agreeable on treatment     #H/o Dysphagia s/p PEG  #N/V 4/25  - s/p esophageal stent & PEG 3/5/24  - patient cleared for pureed diet and thin liquids per SS on last admission  - refusing PEG feeds d/t diarrhea , with very limited oral intake.   - c/w acidophilus  - antiemetics PRN      #New B/l Pleural Effusions  - d/c IV lasix  - s/p thoracentesis with pulm on 4/25, removed 1.7L  - requiring 2-3L via NC PRN     #Stage 4 Prostatic Cancer s/p Radiation (on active PO chemo) w metastasis to mediastinum   #Severe Left Hydronephrosis  - c/w home Xtandi 160 mg qd  - monitor labs    #Metabolic acidosis  - c/w sodium bicarb    #Chronic Normocytic Anemia  - likely 2/2 malignancy  - c/w ferrous sulfate    #GOC- DNR/DNI

## 2024-05-09 NOTE — PROGRESS NOTE ADULT - SUBJECTIVE AND OBJECTIVE BOX
24H events:    Patient is a 68y old Male who presents with a chief complaint of LT Gluteal Pain / Diarrhea (08 May 2024 14:00)    Primary diagnosis of Abscess    Today is hospital day 20d. This morning patient was seen and examined at bedside, resting comfortably in bed.    No acute or major events overnight.    PAST MEDICAL & SURGICAL HISTORY  HTN (hypertension)    Prostate cancer      SOCIAL HISTORY:  Social History:      ALLERGIES:  No Known Allergies    MEDICATIONS:  STANDING MEDICATIONS  ascorbic acid 500 milliGRAM(s) Oral daily  benzocaine 20% Spray 1 Spray(s) Topical three times a day  chlorhexidine 2% Cloths 1 Application(s) Topical <User Schedule>  dextrose 10% Bolus 125 milliLiter(s) IV Bolus once  dextrose 5%. 1000 milliLiter(s) IV Continuous <Continuous>  dextrose 5%. 1000 milliLiter(s) IV Continuous <Continuous>  dextrose 50% Injectable 12.5 Gram(s) IV Push once  dextrose 50% Injectable 25 Gram(s) IV Push once  dicyclomine 10 milliGRAM(s) Oral three times a day before meals  doxycycline monohydrate Suspension 100 milliGRAM(s) Oral every 12 hours  enoxaparin Injectable 40 milliGRAM(s) SubCutaneous every 24 hours  enzalutamide 160 milliGRAM(s) Oral daily  ferrous    sulfate Liquid 300 milliGRAM(s) Enteral Tube daily  glucagon  Injectable 1 milliGRAM(s) IntraMuscular once  insulin glargine Injectable (LANTUS) 10 Unit(s) SubCutaneous at bedtime  insulin lispro (ADMELOG) corrective regimen sliding scale   SubCutaneous three times a day before meals  lactobacillus acidophilus 1 Tablet(s) Oral three times a day with meals  magnesium oxide 400 milliGRAM(s) Oral three times a day with meals  multivitamin 1 Tablet(s) Oral daily  multivitamin  Chewable 1 Tablet(s) Oral daily  sodium bicarbonate 1300 milliGRAM(s) Oral every 8 hours  sodium bicarbonate  Infusion 0.207 mEq/kG/Hr IV Continuous <Continuous>    PRN MEDICATIONS  acetaminophen     Tablet .. 650 milliGRAM(s) Oral every 6 hours PRN  albuterol/ipratropium for Nebulization 3 milliLiter(s) Nebulizer every 6 hours PRN  aluminum hydroxide/magnesium hydroxide/simethicone Suspension 30 milliLiter(s) Oral every 4 hours PRN  dextrose Oral Gel 15 Gram(s) Oral once PRN  guaiFENesin  milliGRAM(s) Oral every 12 hours PRN    VITALS:   T(F): 97  HR: 99  BP: 101/72  RR: 17  SpO2: 94%    PHYSICAL EXAM:  GENERAL:   ( x ) NAD, lying in bed comfortably     (  ) obtunded     (  ) lethargic     (  ) somnolent    HEAD:   ( x ) Atraumatic     (  ) hematoma     (  ) laceration (specify location:       )     NECK:  (x  ) Supple     (  ) neck stiffness     (  ) nuchal rigidity     (  )  no JVD     (  ) JVD present ( -- cm)    HEART:  Rate -->     (  x) normal rate     (  ) bradycardic     (  ) tachycardic  Rhythm -->     ( x ) regular     (  ) regularly irregular     (  ) irregularly irregular  Murmurs -->     (x  ) normal s1s2     (  ) systolic murmur     (  ) diastolic murmur     (  ) continuous murmur      (  ) S3 present     (  ) S4 present    LUNGS:   ( x )Unlabored respirations     (  ) tachypnea  (x  ) B/L air entry     (  ) decreased breath sounds in:  (location     )    ( x ) no adventitious sound     (  ) crackles     (  ) wheezing      (  ) rhonchi      (specify location:       )  (  ) chest wall tenderness (specify location:       )    ABDOMEN:   ( x ) Soft     (  ) tense   |   ( x ) nondistended     (  ) distended   |   ( x ) +BS     (  ) hypoactive bowel sounds     (  ) hyperactive bowel sounds  ( x ) nontender     (  ) RUQ tenderness     (  ) RLQ tenderness     (  ) LLQ tenderness     (  ) epigastric tenderness     (  ) diffuse tenderness  (  ) Splenomegaly      (  ) Hepatomegaly      (  ) Jaundice     (  ) ecchymosis     EXTREMITIES:  ( x ) Normal     (  ) Rash     (  ) ecchymosis     (  ) varicose veins      (  ) pitting edema     (  ) non-pitting edema   (  ) ulceration     (  ) gangrene:     (location:     )    NERVOUS SYSTEM:    ( x ) A&Ox3     (  ) confused     (  ) lethargic  CN II-XII:     (x  ) Intact     (  ) deficits found     (Specify:     )   Upper extremities:     (x  ) no sensorimotor deficits     (  ) weakness     (  ) loss of proprioception/vibration     (  ) loss of touch/temperature (specify:    )  Lower extremities:     (x  ) no sensorimotor deficits     (  ) weakness     (  ) loss of proprioception/vibration     (  ) loss of touch/temperature (specify:    )    SKIN:   (  x) No rashes or lesions     (  ) maculopapular rash     (  ) pustules     (  ) vesicles     (  ) ulcer     (  ) ecchymosis     (specify location:     )    [facility] score:      LABS:                        6.4    6.77  )-----------( 436      ( 08 May 2024 08:01 )             21.1     05-08    139  |  105  |  12  ----------------------------<  128<H>  3.4<L>   |  19  |  1.0    Ca    6.8<L>      08 May 2024 08:01  Mg     1.6     05-08    TPro  4.6<L>  /  Alb  2.3<L>  /  TBili  0.6  /  DBili  x   /  AST  12  /  ALT  <5  /  AlkPhos  118<H>  05-08      Urinalysis Basic - ( 08 May 2024 08:01 )    Color: x / Appearance: x / SG: x / pH: x  Gluc: 128 mg/dL / Ketone: x  / Bili: x / Urobili: x   Blood: x / Protein: x / Nitrite: x   Leuk Esterase: x / RBC: x / WBC x   Sq Epi: x / Non Sq Epi: x / Bacteria: x                RADIOLOGY:

## 2024-05-09 NOTE — CONSULT NOTE ADULT - ASSESSMENT
RECOMMENDATION: In this case, Mr. Stallings has been deemed without capacity by Behavioral Health. However, regardless of capacity, a patient who is actively refusing treatment cannot be forced against his will to receive it. He should not be coerced, unless the medical intervention is emergent (which PEG tube feeds and IV antibiotics are currently not). The patient’s right to refuse, even when he does not have capacity, must be respected.    Ethics recommends continuing to foster trust with the patient to encourage him to assent to recommended medical interventions. Further, the patient has received additional support by Delilah, an associate from his nursing home, and with her support, he has assented to feeds and treatment. It was communicated that the patient is feeling discouraged by his current health status. Therefore, it can be beneficial to work closely with this patient and offer him additional support and encouragement. The team is commended for working with the patient and for putting in time and effort to discuss the risks and benefits of refusing certain interventions.    Thank you for this challenging case. Please do not hesitate to call us if there are any questions.   	  					  CASE DISCUSSED WITH MEDICAL ETHICS ATTENDING: UMESH CARCAMO MS, [facility]- AND  GAL KINNEY DNP, [facility], DIRECTOR OF MEDICAL ETHICS  MORE THAN 50% OF THE TIME OF THIS CONSULTATION WAS SPENT IN COORDINATION OF CARE OF PATIENT					        REFERENCES: 		    1YOHAN Angulo (1972). The tactics and ethics of persuasion. Attitudes, conflict, and social change, 81-99.  2Skarl SYasmine P., & LUCÍA Rodriguez. (2002). Treatment over Objection: Minds, Bodies and Beneficence. July 2002 J. Mental Health L., 105.  3Vig EK, Joy H, et al. Surviving Surrogate Decision-Making: What Helps and Hampers the Experience of Making Medical Decisions for Others. Soc Gen Int Med 2007;22:9909-4219.

## 2024-05-09 NOTE — CONSULT NOTE ADULT - REASON FOR ADMISSION
LT Gluteal Pain / Diarrhea

## 2024-05-10 LAB
ALBUMIN SERPL ELPH-MCNC: 2.3 G/DL — LOW (ref 3.5–5.2)
ALP SERPL-CCNC: 121 U/L — HIGH (ref 30–115)
ALT FLD-CCNC: <5 U/L — SIGNIFICANT CHANGE UP (ref 0–41)
ANION GAP SERPL CALC-SCNC: 9 MMOL/L — SIGNIFICANT CHANGE UP (ref 7–14)
AST SERPL-CCNC: 14 U/L — SIGNIFICANT CHANGE UP (ref 0–41)
BASOPHILS # BLD AUTO: 0.01 K/UL — SIGNIFICANT CHANGE UP (ref 0–0.2)
BASOPHILS NFR BLD AUTO: 0.1 % — SIGNIFICANT CHANGE UP (ref 0–1)
BILIRUB SERPL-MCNC: 0.7 MG/DL — SIGNIFICANT CHANGE UP (ref 0.2–1.2)
BUN SERPL-MCNC: 10 MG/DL — SIGNIFICANT CHANGE UP (ref 10–20)
CALCIUM SERPL-MCNC: 6.7 MG/DL — LOW (ref 8.4–10.5)
CHLORIDE SERPL-SCNC: 110 MMOL/L — SIGNIFICANT CHANGE UP (ref 98–110)
CO2 SERPL-SCNC: 21 MMOL/L — SIGNIFICANT CHANGE UP (ref 17–32)
CREAT SERPL-MCNC: 0.8 MG/DL — SIGNIFICANT CHANGE UP (ref 0.7–1.5)
EGFR: 95 ML/MIN/1.73M2 — SIGNIFICANT CHANGE UP
EOSINOPHIL # BLD AUTO: 0.06 K/UL — SIGNIFICANT CHANGE UP (ref 0–0.7)
EOSINOPHIL NFR BLD AUTO: 0.8 % — SIGNIFICANT CHANGE UP (ref 0–8)
GLUCOSE BLDC GLUCOMTR-MCNC: 126 MG/DL — HIGH (ref 70–99)
GLUCOSE BLDC GLUCOMTR-MCNC: 36 MG/DL — CRITICAL LOW (ref 70–99)
GLUCOSE BLDC GLUCOMTR-MCNC: 39 MG/DL — CRITICAL LOW (ref 70–99)
GLUCOSE BLDC GLUCOMTR-MCNC: 80 MG/DL — SIGNIFICANT CHANGE UP (ref 70–99)
GLUCOSE SERPL-MCNC: 47 MG/DL — CRITICAL LOW (ref 70–99)
HCT VFR BLD CALC: 29.1 % — LOW (ref 42–52)
HGB BLD-MCNC: 9.2 G/DL — LOW (ref 14–18)
IMM GRANULOCYTES NFR BLD AUTO: 1.3 % — HIGH (ref 0.1–0.3)
LYMPHOCYTES # BLD AUTO: 0.53 K/UL — LOW (ref 1.2–3.4)
LYMPHOCYTES # BLD AUTO: 6.8 % — LOW (ref 20.5–51.1)
MAGNESIUM SERPL-MCNC: 2 MG/DL — SIGNIFICANT CHANGE UP (ref 1.8–2.4)
MCHC RBC-ENTMCNC: 29.2 PG — SIGNIFICANT CHANGE UP (ref 27–31)
MCHC RBC-ENTMCNC: 31.6 G/DL — LOW (ref 32–37)
MCV RBC AUTO: 92.4 FL — SIGNIFICANT CHANGE UP (ref 80–94)
MONOCYTES # BLD AUTO: 0.83 K/UL — HIGH (ref 0.1–0.6)
MONOCYTES NFR BLD AUTO: 10.7 % — HIGH (ref 1.7–9.3)
NEUTROPHILS # BLD AUTO: 6.25 K/UL — SIGNIFICANT CHANGE UP (ref 1.4–6.5)
NEUTROPHILS NFR BLD AUTO: 80.3 % — HIGH (ref 42.2–75.2)
NRBC # BLD: 0 /100 WBCS — SIGNIFICANT CHANGE UP (ref 0–0)
PHOSPHATE SERPL-MCNC: 2.8 MG/DL — SIGNIFICANT CHANGE UP (ref 2.1–4.9)
PLATELET # BLD AUTO: 458 K/UL — HIGH (ref 130–400)
PMV BLD: 9.2 FL — SIGNIFICANT CHANGE UP (ref 7.4–10.4)
POTASSIUM SERPL-MCNC: 4.4 MMOL/L — SIGNIFICANT CHANGE UP (ref 3.5–5)
POTASSIUM SERPL-SCNC: 4.4 MMOL/L — SIGNIFICANT CHANGE UP (ref 3.5–5)
PROT SERPL-MCNC: 4.6 G/DL — LOW (ref 6–8)
RBC # BLD: 3.15 M/UL — LOW (ref 4.7–6.1)
RBC # FLD: 21.7 % — HIGH (ref 11.5–14.5)
SODIUM SERPL-SCNC: 140 MMOL/L — SIGNIFICANT CHANGE UP (ref 135–146)
WBC # BLD: 7.78 K/UL — SIGNIFICANT CHANGE UP (ref 4.8–10.8)
WBC # FLD AUTO: 7.78 K/UL — SIGNIFICANT CHANGE UP (ref 4.8–10.8)

## 2024-05-10 PROCEDURE — 99233 SBSQ HOSP IP/OBS HIGH 50: CPT

## 2024-05-10 RX ORDER — MIRTAZAPINE 45 MG/1
15 TABLET, ORALLY DISINTEGRATING ORAL DAILY
Refills: 0 | Status: DISCONTINUED | OUTPATIENT
Start: 2024-05-10 | End: 2024-05-14

## 2024-05-10 RX ORDER — SODIUM CHLORIDE 9 MG/ML
1000 INJECTION, SOLUTION INTRAVENOUS
Refills: 0 | Status: DISCONTINUED | OUTPATIENT
Start: 2024-05-10 | End: 2024-05-11

## 2024-05-10 RX ORDER — DRONABINOL 2.5 MG
2.5 CAPSULE ORAL DAILY
Refills: 0 | Status: DISCONTINUED | OUTPATIENT
Start: 2024-05-10 | End: 2024-05-14

## 2024-05-10 RX ORDER — DEXTROSE 50 % IN WATER 50 %
15 SYRINGE (ML) INTRAVENOUS ONCE
Refills: 0 | Status: COMPLETED | OUTPATIENT
Start: 2024-05-10 | End: 2024-05-10

## 2024-05-10 RX ADMIN — Medication 100 MILLIGRAM(S): at 17:17

## 2024-05-10 RX ADMIN — MIRTAZAPINE 15 MILLIGRAM(S): 45 TABLET, ORALLY DISINTEGRATING ORAL at 14:58

## 2024-05-10 RX ADMIN — Medication 1 TABLET(S): at 11:37

## 2024-05-10 RX ADMIN — Medication 15 GRAM(S): at 09:22

## 2024-05-10 RX ADMIN — Medication 500 MILLIGRAM(S): at 11:36

## 2024-05-10 RX ADMIN — Medication 1 SPRAY(S): at 05:45

## 2024-05-10 RX ADMIN — ENZALUTAMIDE 160 MILLIGRAM(S): 80 TABLET ORAL at 11:37

## 2024-05-10 RX ADMIN — Medication 300 MILLIGRAM(S): at 11:36

## 2024-05-10 RX ADMIN — Medication 10 MILLIGRAM(S): at 05:46

## 2024-05-10 RX ADMIN — CHLORHEXIDINE GLUCONATE 1 APPLICATION(S): 213 SOLUTION TOPICAL at 05:53

## 2024-05-10 RX ADMIN — Medication 100 MILLIGRAM(S): at 05:46

## 2024-05-10 RX ADMIN — Medication 1 SPRAY(S): at 14:59

## 2024-05-10 RX ADMIN — Medication 1 TABLET(S): at 08:27

## 2024-05-10 RX ADMIN — Medication 1300 MILLIGRAM(S): at 21:55

## 2024-05-10 RX ADMIN — Medication 10 MILLIGRAM(S): at 17:17

## 2024-05-10 RX ADMIN — Medication 1 TABLET(S): at 11:36

## 2024-05-10 RX ADMIN — SODIUM CHLORIDE 75 MILLILITER(S): 9 INJECTION, SOLUTION INTRAVENOUS at 21:54

## 2024-05-10 RX ADMIN — Medication 1 TABLET(S): at 17:17

## 2024-05-10 RX ADMIN — SODIUM CHLORIDE 75 MILLILITER(S): 9 INJECTION, SOLUTION INTRAVENOUS at 11:30

## 2024-05-10 RX ADMIN — Medication 1300 MILLIGRAM(S): at 05:46

## 2024-05-10 RX ADMIN — Medication 10 MILLIGRAM(S): at 11:36

## 2024-05-10 RX ADMIN — ENOXAPARIN SODIUM 40 MILLIGRAM(S): 100 INJECTION SUBCUTANEOUS at 21:54

## 2024-05-10 RX ADMIN — Medication 1 SPRAY(S): at 22:11

## 2024-05-10 RX ADMIN — Medication 1300 MILLIGRAM(S): at 14:58

## 2024-05-10 NOTE — PROGRESS NOTE ADULT - ASSESSMENT
68M w/ PMHx Stage 4 Prostate Ca s/p palliative radiation, Mediastinal Mass on oral Chemo (xtandi), Chronic Anemia, HTN and Chronic Back Pain presents to ED from SNF for evaluation. As per NH documentation, pt has been refusing medications through PEG for the last 3x days d/t episode of diarrhea post feeds. Patient's only complaint is LT buttock pain. Denies fevers, chills, chest pain, SOB, n/v, abdominal pain or urinary symptoms.    # Hypoglycemia- refused tube feeds  - was given orange juice  - Started on IV fluids  - monitor FS    # Acute Hypoxic resp failure- resolved  # B/l  pleural effusions  -  Xray Chest 1 View- PORTABLE-Routine (Xray Chest 1 View- PORTABLE-Routine in AM.) (04.28.24 @ 06:06) >Lungs/ Pleura: Stable bilateral opacities left greater than right effusions  - S/p left thoracentesis on 4/25 --> 1800 milliLiter drained    # Hypotension- resolved  - dc'd  metoprolol  - monitor BP    # Metabolic acidosis  - c/w PO bicarb    # Left gluteal cellulitis  - CT Abdomen and Pelvis w/ IV Cont (04.19.24 @ 19:17) >New mild edema and enlargement of the left gluteus maximums musculature with associated subcutaneous edema. No discrete abscess. Metastatic disease in the abdomen and pelvis  with slightly increased upper abdominal adenopathy. No significant change approximately in the large bladder mass and partially imaged mediastinal mass. Interval placement of an esophageal stent. Severe left hydronephrosis to the level of the proximal ureter, not   significantly changed. New/increased large bilateral pleural effusions.  - blood Culture Results: No growth at 5 days (04.19.24 @ 17:37)  - s/p debridement 4.24 - -->  Wound Cx 4/22 with MRSA   - S/p  cefazolin  - As per ID c/w  doxycycline 100 mg BID -- plan for 2 weeks from debridement (4/24-5/8)   - continue dressing changes with xeroform packing    # Acute kidney injury, prerenal- resolved  # Lactic acidosis- resolved  - : CT Abdomen and Pelvis w/ IV Cont (04.19.24 @ 19:17) >KIDNEYS: Stable severe left hydronephrosis secondary to obstruction at the level of the proximal ureter, not significantly changed. Right renal cysts and other low attenuating lesions in the right kidney too small to characterize. PELVIC ORGANS: Large 7 cm bladder mass, unchanged since prior CT.  - Lactate, Blood: 1.5 mmol/L (04.30.24 @ 07:15)    # H/o Dysphagia  - s/p esophageal stent and PEG 3/5/24  - swallow eval: puree w/ thin liquids, will likely require continued use of PEG    # Metastatic prostate cancer stage 4  # Mediastinal mass  - S/p palliative radiation  - on  Xtandi 160 mg qd    # Anemia  - S/p 1 unit PRBC stable  - c/w ferrous sulfate    # Severe protein calorie malnutrition  - BMI: 15.8  - Start marinol  - remeron    # Noncompliant with medication, treatment care    # DVT prophylaxis    # Poor prognosis    #  DNR/DNI/ trial of NIV  - Palliative care f/u: Ethics decided last admission that the patient had capacity to refuse interventions.When we discussed the consequences of refusing those interventions, he could not explain why he was refusing (other than diarrhea for tube feeds).  We discussed that continued refusal of feeds or other interventions could lead to worsening clinical status and death.  We discussed GOC given his refusal of interventions; discussed both hospice/CMO and ongoing medical management. He noted he wants "to live" and wants ongoing medical management.Patient does not demonstrate capacity to understand his complete clinical picture. Recommend possible additional ethics consult if more elucidation about next steps is needed.  - Psych eval: At this time, patient does not demonstrate the capacity to  refuse or at times agree to the I.V line placement , I.V fluid &  I.V antibiotic management , and PEG tube feeds,  recommended by the medical team . In this situation , the decision should be deferred to his next of kin or documented health care proxy. However, since neither party are available in this case , a court appointed decision maker can be appointed to make this decision. In the event of imminent need for these interventions, that is potentially life threatening , the medical team can consider a 2 physician consent.    # Pending: monitor  FS  # Disposition: [facility]

## 2024-05-10 NOTE — CHART NOTE - NSCHARTNOTEFT_GEN_A_CORE
Registered Dietitian Follow-Up     Patient Profile Reviewed                           Yes [x]   No []     Nutrition History Previously Obtained        Yes [x]  No []       Pertinent Subjective Information: Per RN, pt refuses pureed foods and TF most days. on 5/8 had only 1 feed. Pt keeps saying he doesn't want the TF because of diarrhea. However, he is still having multiple BM's without feeds. RN did say that pt did have few multiple loose BM's overnight. Pt is currently ordered for Banatrol, but unlikely that he receiving since he is refusing TF's and pureed foods.       Pertinent Medical Interventions:  #treatement refusal   -Patient previously refusing treatement  -Still refusing feeds   -S/p 1 PRBC on  5/9  #H/o Dysphagia s/p PEG  #N/V 4/25  - s/p esophageal stent & PEG 3/5/24  - patient cleared for pureed diet and thin liquids per SS on last admission  - refusing PEG feeds d/t diarrhea , with very limited oral intake.   - c/w acidophilus  - antiemetics PRN  · Assessment	  ** Per Ethics note 5/9; RECOMMENDATION: In this case, Mr. Stallings has been deemed without capacity by Behavioral Health. However, regardless of capacity, a patient who is actively refusing treatment cannot be forced against his will to receive it. He should not be coerced, unless the medical intervention is emergent (which PEG tube feeds and IV antibiotics are currently not). The patient’s right to refuse, even when he does not have capacity, must be respected.    Ethics recommends continuing to foster trust with the patient to encourage him to assent to recommended medical interventions. Further, the patient has received additional support by Delilah, an associate from his nursing home, and with her support, he has assented to feeds and treatment. It was communicated that the patient is feeling discouraged by his current health status. Therefore, it can be beneficial to work closely with this patient and offer him additional support and encouragement. The team is commended for working with the patient and for putting in time and effort to discuss the risks and benefits of refusing certain interventions.    Diet, Pureed:   Tube Feeding Modality: Gastrostomy  Osmolite 1.5 Sam (OSMOLITE1.5)  Total Volume for 24 Hours (mL): 600  Bolus  Total Volume of Bolus (mL):  150  Total # of Feeds: 4  Tube Feed Frequency: Every 6 hours   Tube Feed Start Time: 06:00  Bolus Feed Rate (mL per Hour): 1000   Bolus Feed Duration (in Hours): 1  Free Water Flush  Bolus   Total Volume per Flush (mL): 75   Frequency: Every 6 Hours  Free Water Flush Instructions:  75ml pre and post feeds  Banatrol TF     Qty per Day:  4  Supplement Feeding Modality:  Oral  Ensure Plus High Protein Cans or Servings Per Day:  2       Frequency:  Daily (04-26-24 @ 11:37) [Active]    Anthropometrics:  Height (cm): 185.4 (04-25-24 @ 12:13)  Weight (kg): 54.4 (04-25-24 @ 12:13)  BMI (kg/m2): 15.8 (04-25-24 @ 12:13)  IBW: 83.6 kg    MEDICATIONS  (STANDING):  ascorbic acid 500 milliGRAM(s) Oral daily  dextrose 10% Bolus 125 milliLiter(s) IV Bolus once  dextrose 5% + lactated ringers. 1000 milliLiter(s) (75 mL/Hr) IV Continuous <Continuous>  dextrose 5%. 1000 milliLiter(s) (100 mL/Hr) IV Continuous <Continuous>  dextrose 5%. 1000 milliLiter(s) (50 mL/Hr) IV Continuous <Continuous>  dextrose 50% Injectable 25 Gram(s) IV Push once  dextrose 50% Injectable 12.5 Gram(s) IV Push once  dicyclomine 10 milliGRAM(s) Oral three times a day before meals  doxycycline monohydrate Suspension 100 milliGRAM(s) Oral every 12 hours  dronabinol 2.5 milliGRAM(s) Oral daily  enoxaparin Injectable 40 milliGRAM(s) SubCutaneous every 24 hours  enzalutamide 160 milliGRAM(s) Oral daily  ferrous    sulfate Liquid 300 milliGRAM(s) Enteral Tube daily  glucagon  Injectable 1 milliGRAM(s) IntraMuscular once  lactobacillus acidophilus 1 Tablet(s) Oral three times a day with meals  mirtazapine 15 milliGRAM(s) Oral daily  multivitamin 1 Tablet(s) Oral daily  multivitamin  Chewable 1 Tablet(s) Oral daily  sodium bicarbonate 1300 milliGRAM(s) Oral every 8 hours  sodium bicarbonate  Infusion 0.207 mEq/kG/Hr (75 mL/Hr) IV Continuous <Continuous>    MEDICATIONS  (PRN):  albuterol/ipratropium for Nebulization 3 milliLiter(s) Nebulizer every 6 hours PRN Shortness of Breath  aluminum hydroxide/magnesium hydroxide/simethicone Suspension 30 milliLiter(s) Oral every 4 hours PRN Dyspepsia  dextrose Oral Gel 15 Gram(s) Oral once PRN Blood Glucose LESS THAN 70 milliGRAM(s)/deciliter    Pertinent Labs: 05-10 @ 07:09: Na 140, BUN 10, Cr 0.8, BG 47<LL>, K+ 4.4, Phos 2.8, Mg 2.0, Alk Phos 121<H>, ALT/SGPT <5, AST/SGOT 14, HbA1c --  05-09 @ 11:40: Na 137, BUN 11, Cr 0.8, <H>, K+ 4.6, Phos 1.3<L>, Mg --, Alk Phos --, ALT/SGPT --, AST/SGOT --, HbA1c --    Finger Sticks:  POCT Blood Glucose.: 80 mg/dL (05-10 @ 08:59)  POCT Blood Glucose.: 39 mg/dL (05-10 @ 08:10)  POCT Blood Glucose.: 36 mg/dL (05-10 @ 08:08)  POCT Blood Glucose.: 82 mg/dL (05-09 @ 21:42)  POCT Blood Glucose.: 75 mg/dL (05-09 @ 17:20)  POCT Blood Glucose.: 124 mg/dL (05-09 @ 11:51)    Physical Findings:  - Appearance: confused/alert, disoriented to time, situation   - GI function: fecal incontinence, last BM X3 on 5/8  - Tubes: PEG   - Oral/Mouth cavity: pureed per SLP and TF via PEG   - Skin: L buttock abscess   - Edema: none     Nutrition Requirements: per previous RD assessment 4/24  Weight Used: 54.4 kg      Estimated Energy Needs    Continue [x]  Adjust []  1904-2176kcal/day (35-40kcal/kg)     Estimated Protein Needs    Continue [x]  Adjust []  82-109grams/day (1.5-2grams/kg of admit weight)     Estimated Fluid Needs        Continue [x]  Adjust []  0462-6491 (1 mL/kcal)     Nutrient Intake: 0% PO, received 1 feed of Osmolite 1.5225kcal,  5/8     [] Previous Nutrition Diagnosis: severe PCM             [x] Ongoing          [] Resolved     Nutrition Education: encouraged PO intake and supplement intake     Goal/Expected Outcome: pt to meet % of estimated EN (protein, energy) needs in 3-5 days.     Nutrition Monitoring:diet order,energy intake,body composition,NFPF, lytes, GI, BM, BG     Recommendation:     Recommendation:  1. Cont w/ anti-diarrheal   2. Continue to add Banatrol 3x/day to any pureed foods that pt will accept to aid in bulking the stool    3. d/c Ensure as pt does not drink that due to diarrhea   4. add magic cup 3x/day (870kcal/27g PRO) (added via CBoard)  5. continue appetite stimulant if medically feasible   6. max encouragement and assistance with meals   7. if Pt agrees to TF's -- consider following regimen   Day 1  - start feeds at 120 mL q6hrs  Day 2 - advance feeds to 240 mL q6hrs as tolerated  Day 3 - advance feeds to goal rate of 360 mL q6hrs --provide 1440 mL total volume, 2160 kcal, 90 gm Protein, 1094.4 mL free water from formula + recommend 75 mL flushes pre/post feeds     high risk f/u in 3-5 days   RD remains available: Laura Prabhakar RDN [phone].

## 2024-05-10 NOTE — PROGRESS NOTE ADULT - SUBJECTIVE AND OBJECTIVE BOX
Patient is a 68y old  Male who presents with a chief complaint of LT Gluteal Pain / Diarrhea (26 Apr 2024 08:35)    Patient was seen and examined.  Pt awake, alert  Pt continues to refuse tube feeds  Hypoglycemic this AM    PAST MEDICAL & SURGICAL HISTORY:  HTN (hypertension)  Prostate cancer    Allergies  No Known Allergies    MEDICATIONS  (STANDING):  ascorbic acid 500 milliGRAM(s) Oral daily  benzocaine 20% Spray 1 Spray(s) Topical three times a day  chlorhexidine 2% Cloths 1 Application(s) Topical <User Schedule>  dicyclomine 10 milliGRAM(s) Oral three times a day before meals  doxycycline monohydrate Suspension 100 milliGRAM(s) Oral every 12 hours  dronabinol 2.5 milliGRAM(s) Oral daily  enoxaparin Injectable 40 milliGRAM(s) SubCutaneous every 24 hours  enzalutamide 160 milliGRAM(s) Oral daily  ferrous    sulfate Liquid 300 milliGRAM(s) Enteral Tube daily  glucagon  Injectable 1 milliGRAM(s) IntraMuscular once  lactobacillus acidophilus 1 Tablet(s) Oral three times a day with meals  mirtazapine 15 milliGRAM(s) Oral daily  multivitamin 1 Tablet(s) Oral daily  multivitamin  Chewable 1 Tablet(s) Oral daily  sodium bicarbonate 1300 milliGRAM(s) Oral every 8 hours  sodium bicarbonate  Infusion 0.207 mEq/kG/Hr (75 mL/Hr) IV Continuous <Continuous>    MEDICATIONS  (PRN):  acetaminophen     Tablet .. 650 milliGRAM(s) Oral every 6 hours PRN Temp greater or equal to 38C (100.4F), Mild Pain (1 - 3)  albuterol/ipratropium for Nebulization 3 milliLiter(s) Nebulizer every 6 hours PRN Shortness of Breath  aluminum hydroxide/magnesium hydroxide/simethicone Suspension 30 milliLiter(s) Oral every 4 hours PRN Dyspepsia  dextrose Oral Gel 15 Gram(s) Oral once PRN Blood Glucose LESS THAN 70 milliGRAM(s)/deciliter  guaiFENesin  milliGRAM(s) Oral every 12 hours PRN Cough    T(C): 36.2 (05-09-24 @ 23:50), Max: 36.2 (05-09-24 @ 23:50)  HR: 66 (05-10-24 @ 07:35) (66 - 81)  BP: 112/83 (05-10-24 @ 07:35) (102/81 - 112/83)  RR: 17 (05-09-24 @ 23:50) (17 - 17)  SpO2: 96% (05-09-24 @ 23:50) (96% - 96%)    O/E:  Awake, alert, not in distress.  HEENT: atraumatic, EOMI.  Chest: decrease breath sounds B/l at bases  CVS: SIS2 +, no murmur.  P/A: Soft, BS+, PEG+  CNS: awake, alert  Ext: no edema feet.  Skin: Left buttock dressing+  All systems reviewed positive findings as above.                          9.2<L>  7.78  )-----------( 458<H>    ( 10 May 2024 07:09 )             29.1<L>  05-10    140  |  110  |  10  ----------------------------<  47<LL>  4.4   |  21  |  0.8  05-09    137  |  107  |  11  ----------------------------<  168<H>  4.6   |  22  |  0.8    Ca    6.7<L>      10 May 2024 07:09  Ca    6.9<L>      09 May 2024 11:40  Phos  2.8     05-10  Mg     2.0     05-10    TPro  4.6<L>  /  Alb  2.3<L>  /  TBili  0.7  /  DBili  x   /  AST  14  /  ALT  <5  /  AlkPhos  121<H>  05-10

## 2024-05-10 NOTE — PROGRESS NOTE ADULT - ASSESSMENT
68 year old male with PMH fo Stage 4 Prostatic Ca s/p palliative radiation, medistinal mass on oral chemo (Xtandi)< chronic anemia, HTN, chronic  back pain presents to ED from SNF for evaluation. Per NH, has been refusing medications via PEG x 3 days d/t episodes of diarrhea post-feeds. Patient was only complaining of left buttock pain, s/p debridement by burn on 4/24, s/p thoracentesis by pul on 4/25 for pleural effusion removed 1.7L.     #treatement refusal   -Patient previously refusing treatement  -Still refusing feeds   -S/p 1 PRBC on  5/9       #Left GLuteal Cellulitis  #MRSA (+) Wound Cx 4/22  - vitals: Afebrile, /892, , SpO2 100% on RA  - CT shows new mild edema and enlargement of left gluteus maximums musculature with associated subcutaneous edema.   - s/p Cefepime, Vanc, Flagyl  - currently on doxycycline but patient refusing medication and even IV placement .   - Wound Cx (4/22): rare MRSA   - Contact isolation  - continue local wound care, off-loading  - Per burn, no further plans for debridement  - Per ID, continue Doxycycline for 2 weeks post debridement (4/24-5/8)  , will keep for now since patient missed many doses and currently agreeable on treatment     #H/o Dysphagia s/p PEG  #N/V 4/25  - s/p esophageal stent & PEG 3/5/24  - patient cleared for pureed diet and thin liquids per SS on last admission  - refusing PEG feeds d/t diarrhea , with very limited oral intake.   - c/w acidophilus  - antiemetics PRN      #New B/l Pleural Effusions  - d/c IV lasix  - s/p thoracentesis with pulm on 4/25, removed 1.7L  - requiring 2-3L via NC PRN     #Stage 4 Prostatic Cancer s/p Radiation (on active PO chemo) w metastasis to mediastinum   #Severe Left Hydronephrosis  - c/w home Xtandi 160 mg qd  - monitor labs    #Metabolic acidosis  - c/w sodium bicarb    #Chronic Normocytic Anemia  - likely 2/2 malignancy  - c/w ferrous sulfate    #GOC- DNR/DNI

## 2024-05-10 NOTE — PROGRESS NOTE ADULT - SUBJECTIVE AND OBJECTIVE BOX
24H events:    Patient is a 68y old Male who presents with a chief complaint of LT Gluteal Pain / Diarrhea (09 May 2024 14:43)    Primary diagnosis of Abscess    Today is hospital day 21d. This morning patient was seen and examined at bedside, resting comfortably in bed.    No acute or major events overnight.      PAST MEDICAL & SURGICAL HISTORY  HTN (hypertension)    Prostate cancer      SOCIAL HISTORY:  Social History:      ALLERGIES:  No Known Allergies    MEDICATIONS:  STANDING MEDICATIONS  ascorbic acid 500 milliGRAM(s) Oral daily  benzocaine 20% Spray 1 Spray(s) Topical three times a day  chlorhexidine 2% Cloths 1 Application(s) Topical <User Schedule>  dextrose 10% Bolus 125 milliLiter(s) IV Bolus once  dextrose 5% + lactated ringers. 1000 milliLiter(s) IV Continuous <Continuous>  dextrose 5%. 1000 milliLiter(s) IV Continuous <Continuous>  dextrose 5%. 1000 milliLiter(s) IV Continuous <Continuous>  dextrose 50% Injectable 25 Gram(s) IV Push once  dextrose 50% Injectable 12.5 Gram(s) IV Push once  dicyclomine 10 milliGRAM(s) Oral three times a day before meals  doxycycline monohydrate Suspension 100 milliGRAM(s) Oral every 12 hours  dronabinol 2.5 milliGRAM(s) Oral daily  enoxaparin Injectable 40 milliGRAM(s) SubCutaneous every 24 hours  enzalutamide 160 milliGRAM(s) Oral daily  ferrous    sulfate Liquid 300 milliGRAM(s) Enteral Tube daily  glucagon  Injectable 1 milliGRAM(s) IntraMuscular once  lactobacillus acidophilus 1 Tablet(s) Oral three times a day with meals  mirtazapine 15 milliGRAM(s) Oral daily  multivitamin 1 Tablet(s) Oral daily  multivitamin  Chewable 1 Tablet(s) Oral daily  sodium bicarbonate 1300 milliGRAM(s) Oral every 8 hours  sodium bicarbonate  Infusion 0.207 mEq/kG/Hr IV Continuous <Continuous>    PRN MEDICATIONS  acetaminophen     Tablet .. 650 milliGRAM(s) Oral every 6 hours PRN  albuterol/ipratropium for Nebulization 3 milliLiter(s) Nebulizer every 6 hours PRN  aluminum hydroxide/magnesium hydroxide/simethicone Suspension 30 milliLiter(s) Oral every 4 hours PRN  dextrose Oral Gel 15 Gram(s) Oral once PRN  guaiFENesin  milliGRAM(s) Oral every 12 hours PRN    VITALS:   T(F): 97.2  HR: 66  BP: 112/83  RR: 17  SpO2: 96%    PHYSICAL EXAM:  GENERAL:   (x  ) NAD, lying in bed comfortably     (  ) obtunded     (  ) lethargic     (  ) somnolent    HEAD:   (x ) Atraumatic     (  ) hematoma     (  ) laceration (specify location:       )     NECK:  ( x ) Supple     (  ) neck stiffness     (  ) nuchal rigidity     (  )  no JVD     (  ) JVD present ( -- cm)    HEART:  Rate -->     ( x ) normal rate     (  ) bradycardic     (  ) tachycardic  Rhythm -->     ( x ) regular     (  ) regularly irregular     (  ) irregularly irregular  Murmurs -->     (  ) normal s1s2     (  ) systolic murmur     (  ) diastolic murmur     (  ) continuous murmur      (  ) S3 present     (  ) S4 present    LUNGS:   (x  )Unlabored respirations     (  ) tachypnea  (x  ) B/L air entry     (  ) decreased breath sounds in:  (location     )    ( x ) no adventitious sound     (  ) crackles     (  ) wheezing      (  ) rhonchi      (specify location:       )  (  ) chest wall tenderness (specify location:       )    ABDOMEN:   ( x ) Soft     (  ) tense   |   ( x nondistended     (  ) distended   |   ( x ) +BS     (  ) hypoactive bowel sounds     (  ) hyperactive bowel sounds  ( x ) nontender     (  ) RUQ tenderness     (  ) RLQ tenderness     (  ) LLQ tenderness     (  ) epigastric tenderness     (  ) diffuse tenderness  (  ) Splenomegaly      (  ) Hepatomegaly      (  ) Jaundice     (  ) ecchymosis     EXTREMITIES:  ( x ) Normal     (  ) Rash     (  ) ecchymosis     (  ) varicose veins      (  ) pitting edema     (  ) non-pitting edema   (  ) ulceration     (  ) gangrene:     (location:     )    NERVOUS SYSTEM:    ( x ) A&Ox3     (  ) confused     (  ) lethargic  CN II-XII:     (x  ) Intact     (  ) deficits found     (Specify:     )   Upper extremities:     (  ) no sensorimotor deficits     ( x ) weakness 3/5      (  ) loss of proprioception/vibration     (  ) loss of touch/temperature (specify:    )  Lower extremities:     (  ) no sensorimotor deficits     ( x ) weakness 3/5     (  ) loss of proprioception/vibration     (  ) loss of touch/temperature (specify:    )    SKIN:   (x) No rashes or lesions     (  ) maculopapular rash     (  ) pustules     (  ) vesicles     (  ) ulcer     (  ) ecchymosis     (specify location:     )    LABS:                        9.2    7.78  )-----------( 458      ( 10 May 2024 07:09 )             29.1     05-10    140  |  110  |  10  ----------------------------<  47<LL>  4.4   |  21  |  0.8    Ca    6.7<L>      10 May 2024 07:09  Phos  2.8     05-10  Mg     2.0     05-10    TPro  4.6<L>  /  Alb  2.3<L>  /  TBili  0.7  /  DBili  x   /  AST  14  /  ALT  <5  /  AlkPhos  121<H>  05-10      Urinalysis Basic - ( 10 May 2024 07:09 )    Color: x / Appearance: x / SG: x / pH: x  Gluc: 47 mg/dL / Ketone: x  / Bili: x / Urobili: x   Blood: x / Protein: x / Nitrite: x   Leuk Esterase: x / RBC: x / WBC x   Sq Epi: x / Non Sq Epi: x / Bacteria: x                RADIOLOGY:

## 2024-05-11 LAB
GLUCOSE BLDC GLUCOMTR-MCNC: 129 MG/DL — HIGH (ref 70–99)
GLUCOSE BLDC GLUCOMTR-MCNC: 191 MG/DL — HIGH (ref 70–99)

## 2024-05-11 PROCEDURE — 99232 SBSQ HOSP IP/OBS MODERATE 35: CPT

## 2024-05-11 RX ORDER — SODIUM CHLORIDE 9 MG/ML
1000 INJECTION, SOLUTION INTRAVENOUS
Refills: 0 | Status: DISCONTINUED | OUTPATIENT
Start: 2024-05-11 | End: 2024-05-13

## 2024-05-11 RX ADMIN — CHLORHEXIDINE GLUCONATE 1 APPLICATION(S): 213 SOLUTION TOPICAL at 05:24

## 2024-05-11 RX ADMIN — Medication 100 MILLIGRAM(S): at 06:35

## 2024-05-11 RX ADMIN — Medication 1 TABLET(S): at 11:03

## 2024-05-11 RX ADMIN — Medication 10 MILLIGRAM(S): at 11:03

## 2024-05-11 RX ADMIN — Medication 300 MILLIGRAM(S): at 11:03

## 2024-05-11 RX ADMIN — Medication 10 MILLIGRAM(S): at 17:36

## 2024-05-11 RX ADMIN — Medication 500 MILLIGRAM(S): at 11:03

## 2024-05-11 RX ADMIN — Medication 10 MILLIGRAM(S): at 05:17

## 2024-05-11 RX ADMIN — MIRTAZAPINE 15 MILLIGRAM(S): 45 TABLET, ORALLY DISINTEGRATING ORAL at 11:03

## 2024-05-11 RX ADMIN — ENOXAPARIN SODIUM 40 MILLIGRAM(S): 100 INJECTION SUBCUTANEOUS at 21:45

## 2024-05-11 RX ADMIN — SODIUM CHLORIDE 75 MILLILITER(S): 9 INJECTION, SOLUTION INTRAVENOUS at 05:18

## 2024-05-11 RX ADMIN — Medication 1 TABLET(S): at 11:06

## 2024-05-11 RX ADMIN — Medication 1 TABLET(S): at 08:52

## 2024-05-11 RX ADMIN — Medication 100 MILLIGRAM(S): at 17:35

## 2024-05-11 RX ADMIN — Medication 1300 MILLIGRAM(S): at 05:17

## 2024-05-11 RX ADMIN — Medication 1 TABLET(S): at 17:35

## 2024-05-11 RX ADMIN — Medication 1 SPRAY(S): at 05:17

## 2024-05-11 RX ADMIN — Medication 1300 MILLIGRAM(S): at 11:42

## 2024-05-11 RX ADMIN — ENZALUTAMIDE 160 MILLIGRAM(S): 80 TABLET ORAL at 13:18

## 2024-05-11 RX ADMIN — Medication 1300 MILLIGRAM(S): at 21:51

## 2024-05-11 NOTE — PROGRESS NOTE ADULT - TIME BILLING
Direct patient care. Discussed on rounds with Housestaff
I have personally seen and examined this patient.    I have reviewed all pertinent clinical information and reviewed all relevant imaging and diagnostic studies personally.   I counseled the patient about diagnostic testing and treatment plan. All questions were answered.   I discussed recommendations with the primary team.
Direct patient care. Discussed on rounds with Housestaff
time spent on review of labs, imaging studies, old records, obtaining history, personally examining patient, counselling and communicating with patient, entering orders for medications/tests/etc, discussions with other health care providers, documentation in electronic health records, independent interpretation of labs, imaging/procedure results and care coordination.
Direct patient care. Discussed on rounds with Housestaff
Direct patient care. Discussed on rounds with housestaff, Discussed with cyndie bush .
Time above includes time spent preparing to see the patient, obtaining and/or reviewing separately obtained history, performing a medically appropriate examination and/or evaluation, counseling and educating the patient/family/caregiver, referring and communicating with other health care professionals (when not separately reported), documenting clinical information in the electronic or other health record, independently interpreting results (not separately reported) and communicating results to the patient/family/caregiver, and/or care coordination (not separately reported).

## 2024-05-11 NOTE — PROGRESS NOTE ADULT - SUBJECTIVE AND OBJECTIVE BOX
Patient is a 68y old  Male who presents with a chief complaint of LT Gluteal Pain / Diarrhea (26 Apr 2024 08:35)    Patient was seen and examined.  Pt awake, alert  Pt continues to refuse tube feeds  But ate some pudding and fruits this AM    PAST MEDICAL & SURGICAL HISTORY:  HTN (hypertension)  Prostate cancer    Allergies  No Known Allergies    MEDICATIONS  (STANDING):  ascorbic acid 500 milliGRAM(s) Oral daily  dicyclomine 10 milliGRAM(s) Oral three times a day before meals  doxycycline monohydrate Suspension 100 milliGRAM(s) Oral every 12 hours  dronabinol 2.5 milliGRAM(s) Oral daily  enoxaparin Injectable 40 milliGRAM(s) SubCutaneous every 24 hours  enzalutamide 160 milliGRAM(s) Oral daily  ferrous    sulfate Liquid 300 milliGRAM(s) Enteral Tube daily  glucagon  Injectable 1 milliGRAM(s) IntraMuscular once  lactobacillus acidophilus 1 Tablet(s) Oral three times a day with meals  mirtazapine 15 milliGRAM(s) Oral daily  multivitamin  Chewable 1 Tablet(s) Oral daily  sodium bicarbonate 1300 milliGRAM(s) Oral every 8 hours    MEDICATIONS  (PRN):  acetaminophen     Tablet .. 650 milliGRAM(s) Oral every 6 hours PRN Temp greater or equal to 38C (100.4F), Mild Pain (1 - 3)  albuterol/ipratropium for Nebulization 3 milliLiter(s) Nebulizer every 6 hours PRN Shortness of Breath  aluminum hydroxide/magnesium hydroxide/simethicone Suspension 30 milliLiter(s) Oral every 4 hours PRN Dyspepsia  dextrose Oral Gel 15 Gram(s) Oral once PRN Blood Glucose LESS THAN 70 milliGRAM(s)/deciliter  guaiFENesin  milliGRAM(s) Oral every 12 hours PRN Cough    T(C): 36.4 (05-11-24 @ 07:50), Max: 36.7 (05-10-24 @ 15:00)  HR: 76 (05-11-24 @ 07:50) (76 - 94)  BP: 123/64 (05-11-24 @ 07:50) (102/66 - 123/64)  RR: 18 (05-11-24 @ 07:50) (18 - 20)  SpO2: 98% (05-11-24 @ 07:50) (90% - 98%)    O/E:  Awake, alert, not in distress.  HEENT: atraumatic, EOMI.  Chest: decrease breath sounds B/l at bases  CVS: SIS2 +, no murmur.  P/A: Soft, BS+, PEG+  CNS: awake, alert  Ext: no edema feet.  Skin: Left buttock dressing+  All systems reviewed positive findings as above.                               9.2<L>  7.78  )-----------( 458<H>    ( 10 May 2024 07:09 )             29.1<L>    05-10    140  |  110  |  10  ----------------------------<  47<LL>  4.4   |  21  |  0.8  05-09    137  |  107  |  11  ----------------------------<  168<H>  4.6   |  22  |  0.8    Ca    6.7<L>      10 May 2024 07:09  Ca    6.9<L>      09 May 2024 11:40  Phos  2.8     05-10  Mg     2.0     05-10    TPro  4.6<L>  /  Alb  2.3<L>  /  TBili  0.7  /  DBili  x   /  AST  14  /  ALT  <5  /  AlkPhos  121<H>  05-10

## 2024-05-11 NOTE — PROGRESS NOTE ADULT - SUBJECTIVE AND OBJECTIVE BOX
24H events:    Patient is a 68y old Male who presents with a chief complaint of LT Gluteal Pain / Diarrhea (10 May 2024 17:25)    Primary diagnosis of Abscess  Today is hospital day 22d. This morning patient was seen and examined at bedside, resting comfortably in bed.    No acute or major events overnight.      PAST MEDICAL & SURGICAL HISTORY  HTN (hypertension)    Prostate cancer      SOCIAL HISTORY:  Social History:      ALLERGIES:  No Known Allergies    MEDICATIONS:  STANDING MEDICATIONS  ascorbic acid 500 milliGRAM(s) Oral daily  benzocaine 20% Spray 1 Spray(s) Topical three times a day  chlorhexidine 2% Cloths 1 Application(s) Topical <User Schedule>  dextrose 10% Bolus 125 milliLiter(s) IV Bolus once  dextrose 5% + lactated ringers. 1000 milliLiter(s) IV Continuous <Continuous>  dextrose 5%. 1000 milliLiter(s) IV Continuous <Continuous>  dextrose 5%. 1000 milliLiter(s) IV Continuous <Continuous>  dextrose 50% Injectable 25 Gram(s) IV Push once  dextrose 50% Injectable 12.5 Gram(s) IV Push once  dicyclomine 10 milliGRAM(s) Oral three times a day before meals  doxycycline monohydrate Suspension 100 milliGRAM(s) Oral every 12 hours  dronabinol 2.5 milliGRAM(s) Oral daily  enoxaparin Injectable 40 milliGRAM(s) SubCutaneous every 24 hours  enzalutamide 160 milliGRAM(s) Oral daily  ferrous    sulfate Liquid 300 milliGRAM(s) Enteral Tube daily  glucagon  Injectable 1 milliGRAM(s) IntraMuscular once  lactobacillus acidophilus 1 Tablet(s) Oral three times a day with meals  mirtazapine 15 milliGRAM(s) Oral daily  multivitamin 1 Tablet(s) Oral daily  multivitamin  Chewable 1 Tablet(s) Oral daily  sodium bicarbonate 1300 milliGRAM(s) Oral every 8 hours    PRN MEDICATIONS  acetaminophen     Tablet .. 650 milliGRAM(s) Oral every 6 hours PRN  albuterol/ipratropium for Nebulization 3 milliLiter(s) Nebulizer every 6 hours PRN  aluminum hydroxide/magnesium hydroxide/simethicone Suspension 30 milliLiter(s) Oral every 4 hours PRN  dextrose Oral Gel 15 Gram(s) Oral once PRN  guaiFENesin  milliGRAM(s) Oral every 12 hours PRN    VITALS:   T(F): 97.2  HR: 83  BP: 106/77  RR: 18  SpO2: 92%    PHYSICAL EXAM:  GENERAL:   ( x ) NAD, lying in bed comfortably     (  ) obtunded     (  ) lethargic     (  ) somnolent    HEAD:   ( x ) Atraumatic     (  ) hematoma     (  ) laceration (specify location:       )     NECK:  (x  ) Supple     (  ) neck stiffness     (  ) nuchal rigidity     (  )  no JVD     (  ) JVD present ( -- cm)    HEART:  Rate -->     ( x ) normal rate     (  ) bradycardic     (  ) tachycardic  Rhythm -->     ( x ) regular     (  ) regularly irregular     (  ) irregularly irregular  Murmurs -->     (x  ) normal s1s2     (  ) systolic murmur     (  ) diastolic murmur     (  ) continuous murmur      (  ) S3 present     (  ) S4 present    LUNGS:   ( xx )Unlabored respirations     (  ) tachypnea  ( x) B/L air entry     (  ) decreased breath sounds in:  (location     )    ( x ) no adventitious sound     (  ) crackles     (  ) wheezing      (  ) rhonchi      (specify location:       )  (  ) chest wall tenderness (specify location:       )    ABDOMEN:   ( x ) Soft     (  ) tense   |   (x  ) nondistended     (  ) distended   |   ( x ) +BS     (  ) hypoactive bowel sounds     (  ) hyperactive bowel sounds  (x  ) nontender     (  ) RUQ tenderness     (  ) RLQ tenderness     (  ) LLQ tenderness     (  ) epigastric tenderness     (  ) diffuse tenderness  (  ) Splenomegaly      (  ) Hepatomegaly      (  ) Jaundice     (  ) ecchymosis     EXTREMITIES:  (  x) Normal     (  ) Rash     (  ) ecchymosis     (  ) varicose veins      (  ) pitting edema     (  ) non-pitting edema   (  ) ulceration     (  ) gangrene:     (location:     )    NERVOUS SYSTEM:    ( x ) A&Ox3     (  ) confused     (  ) lethargic  CN II-XII:     ( x ) Intact     (  ) deficits found     (Specify:     )   Upper extremities:     ( x ) no sensorimotor deficits     (  ) weakness     (  ) loss of proprioception/vibration     (  ) loss of touch/temperature (specify:    )  Lower extremities:     (x  ) no sensorimotor deficits     (  ) weakness     (  ) loss of proprioception/vibration     (  ) loss of touch/temperature (specify:    )    SKIN:   (  x) No rashes or lesions     (  ) maculopapular rash     (  ) pustules     (  ) vesicles     (  ) ulcer     (  ) ecchymosis     (specify location:     )      LABS:                        9.2    7.78  )-----------( 458      ( 10 May 2024 07:09 )             29.1     05-10    140  |  110  |  10  ----------------------------<  47<LL>  4.4   |  21  |  0.8    Ca    6.7<L>      10 May 2024 07:09  Phos  2.8     05-10  Mg     2.0     05-10    TPro  4.6<L>  /  Alb  2.3<L>  /  TBili  0.7  /  DBili  x   /  AST  14  /  ALT  <5  /  AlkPhos  121<H>  05-10      Urinalysis Basic - ( 10 May 2024 07:09 )    Color: x / Appearance: x / SG: x / pH: x  Gluc: 47 mg/dL / Ketone: x  / Bili: x / Urobili: x   Blood: x / Protein: x / Nitrite: x   Leuk Esterase: x / RBC: x / WBC x   Sq Epi: x / Non Sq Epi: x / Bacteria: x                RADIOLOGY:

## 2024-05-11 NOTE — PROGRESS NOTE ADULT - ASSESSMENT
68M w/ PMHx Stage 4 Prostate Ca s/p palliative radiation, Mediastinal Mass on oral Chemo (xtandi), Chronic Anemia, HTN and Chronic Back Pain presents to ED from SNF for evaluation. As per NH documentation, pt has been refusing medications through PEG for the last 3x days d/t episode of diarrhea post feeds. Patient's only complaint is LT buttock pain. Denies fevers, chills, chest pain, SOB, n/v, abdominal pain or urinary symptoms.    # Hypoglycemia- refused tube feeds- resolved  - was given orange juice  - on  IV fluids  - monitor FS    # Acute Hypoxic resp failure- resolved  # B/l  pleural effusions  -  Xray Chest 1 View- PORTABLE-Routine (Xray Chest 1 View- PORTABLE-Routine in AM.) (04.28.24 @ 06:06) >Lungs/ Pleura: Stable bilateral opacities left greater than right effusions  - S/p left thoracentesis on 4/25 --> 1800 milliLiter drained    # Hypotension- resolved  - dc'd  metoprolol  - monitor BP    # Metabolic acidosis  - c/w PO bicarb    # Left gluteal cellulitis  - CT Abdomen and Pelvis w/ IV Cont (04.19.24 @ 19:17) >New mild edema and enlargement of the left gluteus maximums musculature with associated subcutaneous edema. No discrete abscess. Metastatic disease in the abdomen and pelvis  with slightly increased upper abdominal adenopathy. No significant change approximately in the large bladder mass and partially imaged mediastinal mass. Interval placement of an esophageal stent. Severe left hydronephrosis to the level of the proximal ureter, not   significantly changed. New/increased large bilateral pleural effusions.  - blood Culture Results: No growth at 5 days (04.19.24 @ 17:37)  - s/p debridement 4.24 - -->  Wound Cx 4/22 with MRSA   - S/p  cefazolin  - As per ID c/w  doxycycline 100 mg BID -- plan for 2 weeks from debridement (4/24-5/8)   - continue dressing changes with xeroform packing    # Acute kidney injury, prerenal- resolved  # Lactic acidosis- resolved  - : CT Abdomen and Pelvis w/ IV Cont (04.19.24 @ 19:17) >KIDNEYS: Stable severe left hydronephrosis secondary to obstruction at the level of the proximal ureter, not significantly changed. Right renal cysts and other low attenuating lesions in the right kidney too small to characterize. PELVIC ORGANS: Large 7 cm bladder mass, unchanged since prior CT.  - Lactate, Blood: 1.5 mmol/L (04.30.24 @ 07:15)    # H/o Dysphagia  - s/p esophageal stent and PEG 3/5/24  - swallow eval: puree w/ thin liquids, will likely require continued use of PEG    # Metastatic prostate cancer stage 4  # Mediastinal mass  - S/p palliative radiation  - on  Xtandi 160 mg qd    # Anemia  - S/p 1 unit PRBC stable  - c/w ferrous sulfate    # Severe protein calorie malnutrition  - BMI: 15.8  - Start ensure pudding  - Start magic cup  - c/w  marinol  -c/w  remeron    # Noncompliant with medication, treatment care    # DVT prophylaxis    # Poor prognosis    #  DNR/DNI/ trial of NIV  - Palliative care f/u: Ethics decided last admission that the patient had capacity to refuse interventions.When we discussed the consequences of refusing those interventions, he could not explain why he was refusing (other than diarrhea for tube feeds).  We discussed that continued refusal of feeds or other interventions could lead to worsening clinical status and death.  We discussed GOC given his refusal of interventions; discussed both hospice/CMO and ongoing medical management. He noted he wants "to live" and wants ongoing medical management.Patient does not demonstrate capacity to understand his complete clinical picture. Recommend possible additional ethics consult if more elucidation about next steps is needed.  - Psych eval: At this time, patient does not demonstrate the capacity to  refuse or at times agree to the I.V line placement , I.V fluid &  I.V antibiotic management , and PEG tube feeds,  recommended by the medical team . In this situation , the decision should be deferred to his next of kin or documented health care proxy. However, since neither party are available in this case , a court appointed decision maker can be appointed to make this decision. In the event of imminent need for these interventions, that is potentially life threatening , the medical team can consider a 2 physician consent.    # Pending: monitor  FS  # Disposition: [facility]         68M w/ PMHx Stage 4 Prostate Ca s/p palliative radiation, Mediastinal Mass on oral Chemo (xtandi), Chronic Anemia, HTN and Chronic Back Pain presents to ED from SNF for evaluation. As per NH documentation, pt has been refusing medications through PEG for the last 3x days d/t episode of diarrhea post feeds. Patient's only complaint is LT buttock pain. Denies fevers, chills, chest pain, SOB, n/v, abdominal pain or urinary symptoms.    # Hypoglycemia- refused tube feeds- resolved  - was given orange juice  - on  IV fluids  - monitor FS    # Acute Hypoxic resp failure- resolved  # B/l  pleural effusions  -  Xray Chest 1 View- PORTABLE-Routine (Xray Chest 1 View- PORTABLE-Routine in AM.) (04.28.24 @ 06:06) >Lungs/ Pleura: Stable bilateral opacities left greater than right effusions  - S/p left thoracentesis on 4/25 --> 1800 milliLiter drained    # Hypotension- resolved  - dc'd  metoprolol  - monitor BP    # Metabolic acidosis  - c/w PO bicarb    # Left gluteal cellulitis  - CT Abdomen and Pelvis w/ IV Cont (04.19.24 @ 19:17) >New mild edema and enlargement of the left gluteus maximums musculature with associated subcutaneous edema. No discrete abscess. Metastatic disease in the abdomen and pelvis  with slightly increased upper abdominal adenopathy. No significant change approximately in the large bladder mass and partially imaged mediastinal mass. Interval placement of an esophageal stent. Severe left hydronephrosis to the level of the proximal ureter, not   significantly changed. New/increased large bilateral pleural effusions.  - blood Culture Results: No growth at 5 days (04.19.24 @ 17:37)  - s/p debridement 4.24 - -->  Wound Cx 4/22 with MRSA   - S/p  cefazolin  - As per ID c/w  doxycycline 100 mg BID -- plan for 2 weeks from debridement, end date 5/13  - continue dressing changes with xeroform packing    # Acute kidney injury, prerenal- resolved  # Lactic acidosis- resolved  - : CT Abdomen and Pelvis w/ IV Cont (04.19.24 @ 19:17) >KIDNEYS: Stable severe left hydronephrosis secondary to obstruction at the level of the proximal ureter, not significantly changed. Right renal cysts and other low attenuating lesions in the right kidney too small to characterize. PELVIC ORGANS: Large 7 cm bladder mass, unchanged since prior CT.  - Lactate, Blood: 1.5 mmol/L (04.30.24 @ 07:15)    # H/o Dysphagia  - s/p esophageal stent and PEG 3/5/24  - swallow eval: puree w/ thin liquids, will likely require continued use of PEG    # Metastatic prostate cancer stage 4  # Mediastinal mass  - S/p palliative radiation  - on  Xtandi 160 mg qd    # Anemia  - S/p 1 unit PRBC stable  - c/w ferrous sulfate    # Severe protein calorie malnutrition  - BMI: 15.8  - Start ensure pudding  - Start magic cup  - c/w  marinol  -c/w  remeron    # Noncompliant with medication, treatment care    # DVT prophylaxis    # Poor prognosis    #  DNR/DNI/ trial of NIV  - Palliative care f/u: Ethics decided last admission that the patient had capacity to refuse interventions.When we discussed the consequences of refusing those interventions, he could not explain why he was refusing (other than diarrhea for tube feeds).  We discussed that continued refusal of feeds or other interventions could lead to worsening clinical status and death.  We discussed GOC given his refusal of interventions; discussed both hospice/CMO and ongoing medical management. He noted he wants "to live" and wants ongoing medical management.Patient does not demonstrate capacity to understand his complete clinical picture. Recommend possible additional ethics consult if more elucidation about next steps is needed.  - Psych eval: At this time, patient does not demonstrate the capacity to  refuse or at times agree to the I.V line placement , I.V fluid &  I.V antibiotic management , and PEG tube feeds,  recommended by the medical team . In this situation , the decision should be deferred to his next of kin or documented health care proxy. However, since neither party are available in this case , a court appointed decision maker can be appointed to make this decision. In the event of imminent need for these interventions, that is potentially life threatening , the medical team can consider a 2 physician consent.    # Pending: monitor  FS  # Disposition: [facility]

## 2024-05-11 NOTE — PROGRESS NOTE ADULT - ASSESSMENT
68 year old male with PMH fo Stage 4 Prostatic Ca s/p palliative radiation, medistinal mass on oral chemo (Xtandi)< chronic anemia, HTN, chronic  back pain presents to ED from SNF for evaluation. Per NH, has been refusing medications via PEG x 3 days d/t episodes of diarrhea post-feeds. Patient was only complaining of left buttock pain, s/p debridement by burn on 4/24, s/p thoracentesis by pul on 4/25 for pleural effusion removed 1.7L.     #treatement refusal   -Patient previously refusing treatement  -Still refusing feeds   -S/p 1 PRBC on  5/9       #Left GLuteal Cellulitis  #MRSA (+) Wound Cx 4/22  - vitals: Afebrile, /892, , SpO2 100% on RA  - CT shows new mild edema and enlargement of left gluteus maximums musculature with associated subcutaneous edema.   - s/p Cefepime, Vanc, Flagyl  - currently on doxycycline but patient refusing medication and even IV placement .   - Wound Cx (4/22): rare MRSA   - Contact isolation  - continue local wound care, off-loading  - Per burn, no further plans for debridement  - Per ID, continue Doxycycline for 2 weeks post debridement (4/24-5/8)  , will keep for now since patient missed many doses and currently agreeable on treatment     #H/o Dysphagia s/p PEG  #N/V 4/25  - s/p esophageal stent & PEG 3/5/24  - patient cleared for pureed diet and thin liquids per SS on last admission  - refusing PEG feeds d/t diarrhea , with very limited oral intake.   - c/w acidophilus  - antiemetics PRN      #New B/l Pleural Effusions  - d/c IV lasix  - s/p thoracentesis with pulm on 4/25, removed 1.7L  - requiring 2-3L via NC PRN     #Stage 4 Prostatic Cancer s/p Radiation (on active PO chemo) w metastasis to mediastinum   #Severe Left Hydronephrosis  - c/w home Xtandi 160 mg qd  - monitor labs    #Metabolic acidosis  - c/w sodium bicarb    #Chronic Normocytic Anemia  - likely 2/2 malignancy  - c/w ferrous sulfate    #GOC- DNR/DNI   68 year old male with PMH fo Stage 4 Prostatic Ca s/p palliative radiation, medistinal mass on oral chemo (Xtandi)< chronic anemia, HTN, chronic  back pain presents to ED from SNF for evaluation. Per NH, has been refusing medications via PEG x 3 days d/t episodes of diarrhea post-feeds. Patient was only complaining of left buttock pain, s/p debridement by burn on 4/24, s/p thoracentesis by pul on 4/25 for pleural effusion removed 1.7L.     #treatement refusal   -Patient previously refusing treatement  -Still refusing feeds   -S/p 1 PRBC on  5/9       #Left GLuteal Cellulitis  #MRSA (+) Wound Cx 4/22  - vitals: Afebrile, /892, , SpO2 100% on RA  - CT shows new mild edema and enlargement of left gluteus maximums musculature with associated subcutaneous edema.   - s/p Cefepime, Vanc, Flagyl  - currently on doxycycline but patient refusing medication and even IV placement .   - Wound Cx (4/22): rare MRSA   - Contact isolation  - continue local wound care, off-loading  - Per burn, no further plans for debridement  - Per ID, continue Doxycycline for 2 weeks post debridement (4/24-5/8)  , will keep for now   PATIENT HAS BEEN REFUSINGG MEDICATION INCLUDING DOXY INTERMITTENTLY , TOOK  ALMOST A TOTAL OF 7 DAYS BETWEEN IV AND PO , WILL KEEP FOR Now , CONSIDER STOPPING IF DIARRHEA PERSISTS     #H/o Dysphagia s/p PEG  #N/V 4/25  - s/p esophageal stent & PEG 3/5/24  - patient cleared for pureed diet and thin liquids per SS on last admission  - refusing PEG feeds d/t diarrhea , with very limited oral intake.   - c/w acidophilus  - antiemetics PRN      #New B/l Pleural Effusions  - d/c IV lasix  - s/p thoracentesis with pulm on 4/25, removed 1.7L  - requiring 2-3L via NC PRN     #Stage 4 Prostatic Cancer s/p Radiation (on active PO chemo) w metastasis to mediastinum   #Severe Left Hydronephrosis  - c/w home Xtandi 160 mg qd  - monitor labs    #Metabolic acidosis  - c/w sodium bicarb    #Chronic Normocytic Anemia  - likely 2/2 malignancy  - c/w ferrous sulfate    #GOC- DNR/DNI

## 2024-05-12 LAB
GLUCOSE BLDC GLUCOMTR-MCNC: 180 MG/DL — HIGH (ref 70–99)
GLUCOSE BLDC GLUCOMTR-MCNC: 243 MG/DL — HIGH (ref 70–99)

## 2024-05-12 PROCEDURE — 99232 SBSQ HOSP IP/OBS MODERATE 35: CPT

## 2024-05-12 RX ADMIN — Medication 1 TABLET(S): at 07:28

## 2024-05-12 RX ADMIN — SODIUM CHLORIDE 75 MILLILITER(S): 9 INJECTION, SOLUTION INTRAVENOUS at 22:11

## 2024-05-12 RX ADMIN — Medication 300 MILLIGRAM(S): at 11:01

## 2024-05-12 RX ADMIN — Medication 1300 MILLIGRAM(S): at 12:51

## 2024-05-12 RX ADMIN — Medication 1 TABLET(S): at 11:01

## 2024-05-12 RX ADMIN — CHLORHEXIDINE GLUCONATE 1 APPLICATION(S): 213 SOLUTION TOPICAL at 05:26

## 2024-05-12 RX ADMIN — Medication 1 TABLET(S): at 17:50

## 2024-05-12 RX ADMIN — Medication 1300 MILLIGRAM(S): at 05:28

## 2024-05-12 RX ADMIN — Medication 500 MILLIGRAM(S): at 11:01

## 2024-05-12 RX ADMIN — Medication 10 MILLIGRAM(S): at 07:28

## 2024-05-12 RX ADMIN — Medication 1 TABLET(S): at 11:09

## 2024-05-12 RX ADMIN — ENOXAPARIN SODIUM 40 MILLIGRAM(S): 100 INJECTION SUBCUTANEOUS at 22:09

## 2024-05-12 RX ADMIN — Medication 10 MILLIGRAM(S): at 17:50

## 2024-05-12 RX ADMIN — ENZALUTAMIDE 160 MILLIGRAM(S): 80 TABLET ORAL at 11:01

## 2024-05-12 RX ADMIN — Medication 10 MILLIGRAM(S): at 11:00

## 2024-05-12 RX ADMIN — Medication 1300 MILLIGRAM(S): at 22:09

## 2024-05-12 RX ADMIN — Medication 100 MILLIGRAM(S): at 17:50

## 2024-05-12 RX ADMIN — MIRTAZAPINE 15 MILLIGRAM(S): 45 TABLET, ORALLY DISINTEGRATING ORAL at 11:01

## 2024-05-12 RX ADMIN — Medication 100 MILLIGRAM(S): at 05:28

## 2024-05-12 NOTE — PROGRESS NOTE ADULT - ASSESSMENT
68M w/ PMHx Stage 4 Prostate Ca s/p palliative radiation, Mediastinal Mass on oral Chemo (xtandi), Chronic Anemia, HTN and Chronic Back Pain presents to ED from SNF for evaluation. As per NH documentation, pt has been refusing medications through PEG for the last 3x days d/t episode of diarrhea post feeds. Patient's only complaint is LT buttock pain. Denies fevers, chills, chest pain, SOB, n/v, abdominal pain or urinary symptoms.    # Hypoglycemia- refused tube feeds- resolved  - was given orange juice  - on  IV fluids  - monitor FS    # Acute Hypoxic resp failure- resolved  # B/l  pleural effusions  -  Xray Chest 1 View- PORTABLE-Routine (Xray Chest 1 View- PORTABLE-Routine in AM.) (04.28.24 @ 06:06) >Lungs/ Pleura: Stable bilateral opacities left greater than right effusions  - S/p left thoracentesis on 4/25 --> 1800 milliLiter drained    # Hypotension- resolved  - dc'd  metoprolol  - monitor BP    # Metabolic acidosis  - c/w PO bicarb    # Left gluteal cellulitis  - CT Abdomen and Pelvis w/ IV Cont (04.19.24 @ 19:17) >New mild edema and enlargement of the left gluteus maximums musculature with associated subcutaneous edema. No discrete abscess. Metastatic disease in the abdomen and pelvis  with slightly increased upper abdominal adenopathy. No significant change approximately in the large bladder mass and partially imaged mediastinal mass. Interval placement of an esophageal stent. Severe left hydronephrosis to the level of the proximal ureter, not   significantly changed. New/increased large bilateral pleural effusions.  - blood Culture Results: No growth at 5 days (04.19.24 @ 17:37)  - s/p debridement 4.24 - -->  Wound Cx 4/22 with MRSA   - S/p  cefazolin  - As per ID c/w  doxycycline 100 mg BID -- plan for 2 weeks from debridement, end date 5/13  - continue dressing changes with xeroform packing    # Acute kidney injury, prerenal- resolved  # Lactic acidosis- resolved  - : CT Abdomen and Pelvis w/ IV Cont (04.19.24 @ 19:17) >KIDNEYS: Stable severe left hydronephrosis secondary to obstruction at the level of the proximal ureter, not significantly changed. Right renal cysts and other low attenuating lesions in the right kidney too small to characterize. PELVIC ORGANS: Large 7 cm bladder mass, unchanged since prior CT.  - Lactate, Blood: 1.5 mmol/L (04.30.24 @ 07:15)    # H/o Dysphagia  - s/p esophageal stent and PEG 3/5/24  - swallow eval: puree w/ thin liquids, will likely require continued use of PEG    # Metastatic prostate cancer stage 4  # Mediastinal mass  - S/p palliative radiation  - on  Xtandi 160 mg qd    # Anemia  - S/p 1 unit PRBC stable  - c/w ferrous sulfate    # Severe protein calorie malnutrition  - BMI: 15.8  - Start ensure pudding  - Start magic cup  - c/w  marinol  -c/w  remeron    # Noncompliant with medication, treatment care    # DVT prophylaxis    # Poor prognosis    #  DNR/DNI/ trial of NIV  - Palliative care f/u note (4/29): Ethics decided last admission that the patient had capacity to refuse interventions. When we discussed the consequences of refusing those interventions, he could not explain why he was refusing (other than diarrhea for tube feeds).  We discussed that continued refusal of feeds or other interventions could lead to worsening clinical status and death.  We discussed GOC given his refusal of interventions; discussed both hospice/CMO and ongoing medical management. He noted he wants "to live" and wants ongoing medical management. Patient does not demonstrate capacity to understand his complete clinical picture. Recommend possible additional ethics consult if more elucidation about next steps is needed.  - Psych eval (4/30): At this time, patient does not demonstrate the capacity to  refuse or at times agree to the I.V line placement , I.V fluid &  I.V antibiotic management , and PEG tube feeds,  recommended by the medical team . In this situation , the decision should be deferred to his next of kin or documented health care proxy. However, since neither party are available in this case , a court appointed decision maker can be appointed to make this decision. In the event of imminent need for these interventions, that is potentially life threatening , the medical team can consider a 2 physician consent.      Seen by Ethics team on 5/9. as per note: "RECOMMENDATION: In this case, Mr. Stallings has been deemed without capacity by Behavioral Health. However, regardless of capacity, a patient who is actively refusing treatment cannot be forced against his will to receive it. He should not be coerced, unless the medical intervention is emergent (which PEG tube feeds and IV antibiotics are currently not). The patient’s right to refuse, even when he does not have capacity, must be respected.    Ethics recommends continuing to foster trust with the patient to encourage him to assent to recommended medical interventions. Further, the patient has received additional support by Delilah, an associate from his nursing home, and with her support, he has assented to feeds and treatment. It was communicated that the patient is feeling discouraged by his current health status. Therefore, it can be beneficial to work closely with this patient and offer him additional support and encouragement. The team is commended for working with the patient and for putting in time and effort to discuss the risks and benefits of refusing certain interventions."    # Pending: monitor  FS,  coordinating care,  encouraging patient to take medications and tube feeds.  # Disposition: [facility]        Total time spent to complete patient's bedside assessment, review medical chart, discuss medical plan of care with covering medical team was more than 35 minutes  with >50% of time spent face to face with patient, discussion with patient/family and/or coordination of care.

## 2024-05-12 NOTE — PROGRESS NOTE ADULT - SUBJECTIVE AND OBJECTIVE BOX
Patient is a 68y old  Male who presents with a chief complaint of LT Gluteal Pain / Diarrhea (26 Apr 2024 08:35)    Patient was seen and examined.  Pt awake, alert  Pt still refusing tube feeds.  But ate some pudding and fruits this AM    PAST MEDICAL & SURGICAL HISTORY:  HTN (hypertension)  Prostate cancer    Allergies  No Known Allergies      MEDICATIONS:  STANDING MEDICATIONS  ascorbic acid 500 milliGRAM(s) Oral daily, 04-20-24 @ 02:48  chlorhexidine 2% Cloths 1 Application(s) Topical <User Schedule>, 04-26-24 @ 10:44  dextrose 10% Bolus 125 milliLiter(s) IV Bolus once, 05-04-24 @ 13:54  dextrose 5% + lactated ringers. 1000 milliLiter(s) IV Continuous <Continuous>, 05-11-24 @ 09:03  dextrose 5%. 1000 milliLiter(s) IV Continuous <Continuous>, 05-04-24 @ 13:54  dextrose 5%. 1000 milliLiter(s) IV Continuous <Continuous>, 05-04-24 @ 13:54  dextrose 50% Injectable 25 Gram(s) IV Push once, 04-26-24 @ 14:00  dextrose 50% Injectable 12.5 Gram(s) IV Push once, 04-26-24 @ 14:00  dicyclomine 10 milliGRAM(s) Oral three times a day before meals, 04-26-24 @ 11:14  doxycycline monohydrate Suspension 100 milliGRAM(s) Oral every 12 hours, 05-03-24 @ 12:36  dronabinol 2.5 milliGRAM(s) Oral daily, 05-10-24 @ 09:35  enoxaparin Injectable 40 milliGRAM(s) SubCutaneous every 24 hours, 04-20-24 @ 03:14  enzalutamide 160 milliGRAM(s) Oral daily, 04-24-24 @ 18:01  ferrous    sulfate Liquid 300 milliGRAM(s) Enteral Tube daily, 04-20-24 @ 02:47  glucagon  Injectable 1 milliGRAM(s) IntraMuscular once, 05-04-24 @ 13:54  lactobacillus acidophilus 1 Tablet(s) Oral three times a day with meals, 04-25-24 @ 14:37  mirtazapine 15 milliGRAM(s) Oral daily, 05-10-24 @ 09:35  multivitamin  Chewable 1 Tablet(s) Oral daily, 04-22-24 @ 11:34  sodium bicarbonate 1300 milliGRAM(s) Oral every 8 hours, 04-29-24 @ 09:18    PRN MEDICATIONS  acetaminophen     Tablet .. 650 milliGRAM(s) Oral every 6 hours, 04-20-24 @ 01:58 PRN  albuterol/ipratropium for Nebulization 3 milliLiter(s) Nebulizer every 6 hours, 05-02-24 @ 09:59 PRN  aluminum hydroxide/magnesium hydroxide/simethicone Suspension 30 milliLiter(s) Oral every 4 hours, 04-20-24 @ 01:58 PRN  dextrose Oral Gel 15 Gram(s) Oral once, 04-26-24 @ 14:00 PRN    Diet, Pureed:   Tube Feeding Modality: Gastrostomy  Osmolite 1.5 Sam (OSMOLITE1.5)  Total Volume for 24 Hours (mL): 600  Bolus  Total Volume of Bolus (mL):  150  Total # of Feeds: 4  Tube Feed Frequency: Every 6 hours   Tube Feed Start Time: 06:00  Bolus Feed Rate (mL per Hour): 1000   Bolus Feed Duration (in Hours): 1  Free Water Flush  Bolus   Total Volume per Flush (mL): 75   Frequency: Every 6 Hours  Free Water Flush Instructions:  75ml pre and post feeds  Banatrol TF     Qty per Day:  4  Supplement Feeding Modality:  Oral  Ensure Pudding Cans or Servings Per Day:  3       Frequency:  Daily (05-11-24 @ 10:51) [Available for Activation]  Diet, Pureed:   Tube Feeding Modality: Gastrostomy  Osmolite 1.5 Sam (OSMOLITE1.5)  Total Volume for 24 Hours (mL): 480  Bolus  Total Volume of Bolus (mL):  120  Total # of Feeds: 4  Tube Feed Frequency: Every 6 hours   Tube Feed Start Time: 06:00  Bolus Feed Rate (mL per Hour): 120   Bolus Feed Duration (in Hours): 1  Free Water Flush  Bolus   Total Volume per Flush (mL): 75   Frequency: Every 6 Hours  Free Water Flush Instructions:  75ml pre and post feeds     Qty per Day:  ADD BANATROL     Qty per Day:  MAGIC CUP 3X/DAY     Qty per Day:  TO PUREED FOODS     Qty per Day:  LIKE YOGURT/PUDDING  Banatrol TF     Qty per Day:  3  Supplement Feeding Modality:  Oral (04-29-24 @ 14:10) [Pending Verification By Attending]  Diet, Pureed:   Tube Feeding Modality: Gastrostomy  Osmolite 1.5 Sam (OSMOLITE1.5)  Total Volume for 24 Hours (mL): 1600  Bolus  Total Volume of Bolus (mL):  400  Tube Feed Frequency: Every 6 hours   Tube Feed Start Time: 19:00  Bolus Feed Rate (mL per Hour): 67   Bolus Feed Duration (in Hours): 24  Free Water Flush  Bolus   Total Volume per Flush (mL): 150   Frequency: Every 6 Hours    Start Time: 19:00 (04-21-24 @ 18:08) [Pending Verification By Attending]          Vital Signs Last 24 Hrs  T(C): 36.8 (12 May 2024 19:23), Max: 36.8 (12 May 2024 19:23)  T(F): 98.3 (12 May 2024 19:23), Max: 98.3 (12 May 2024 19:23)  HR: 61 (12 May 2024 19:23) (61 - 96)  BP: 97/68 (12 May 2024 19:23) (97/68 - 121/82)  BP(mean): --  RR: 18 (12 May 2024 19:23) (18 - 18)  SpO2: 84% (12 May 2024 19:23) (84% - 99%)    Parameters below as of 12 May 2024 16:00  Patient On (Oxygen Delivery Method): room air      I&Os:    LABS:    WBC trend: 7.78 <--  Hgb: 9.2 [05-10-24 @ 07:09]<--, 6.4 [05-08-24 @ 08:01]<--, 6.5 [05-07-24 @ 21:06]<--, 6.7 [05-07-24 @ 05:52]<--      Creatinine trend: 0.8<--, 0.8<--, 1.0<--, 1.0<--, 1.0<--, 1.8<--, 2.2<--  SODIUM TREND: Sodium 140 [05-10 @ 07:09]<--, Sodium 137 [05-09 @ 11:40]<--, Sodium 139 [05-08 @ 08:01]<--      POC Glucose: 180 [05-12-24 @ 12:17]<--, 243 [05-12-24 @ 08:25]<--      Procalcitonin: 0.35 ng/mL (04-25-24 @ 22:57)                                              -------------------------------------------------          PHYSICAL EXAM:  GEN: Awake, alert, not in distress.  HEENT: atraumatic, EOMI.  Chest: decrease breath sounds B/l at bases  CVS: SIS2 +, no murmur.  P/A: Soft, BS+, PEG+  CNS: awake, alert  Ext: no edema feet.

## 2024-05-13 ENCOUNTER — TRANSCRIPTION ENCOUNTER (OUTPATIENT)
Age: 69
End: 2024-05-13

## 2024-05-13 LAB
ANION GAP SERPL CALC-SCNC: 11 MMOL/L — SIGNIFICANT CHANGE UP (ref 7–14)
BASOPHILS # BLD AUTO: 0.02 K/UL — SIGNIFICANT CHANGE UP (ref 0–0.2)
BASOPHILS NFR BLD AUTO: 0.3 % — SIGNIFICANT CHANGE UP (ref 0–1)
BUN SERPL-MCNC: 12 MG/DL — SIGNIFICANT CHANGE UP (ref 10–20)
CALCIUM SERPL-MCNC: 6.5 MG/DL — LOW (ref 8.4–10.5)
CHLORIDE SERPL-SCNC: 113 MMOL/L — HIGH (ref 98–110)
CO2 SERPL-SCNC: 18 MMOL/L — SIGNIFICANT CHANGE UP (ref 17–32)
CREAT SERPL-MCNC: 1 MG/DL — SIGNIFICANT CHANGE UP (ref 0.7–1.5)
EGFR: 81 ML/MIN/1.73M2 — SIGNIFICANT CHANGE UP
EOSINOPHIL # BLD AUTO: 0.08 K/UL — SIGNIFICANT CHANGE UP (ref 0–0.7)
EOSINOPHIL NFR BLD AUTO: 1.4 % — SIGNIFICANT CHANGE UP (ref 0–8)
GLUCOSE BLDC GLUCOMTR-MCNC: 149 MG/DL — HIGH (ref 70–99)
GLUCOSE BLDC GLUCOMTR-MCNC: 189 MG/DL — HIGH (ref 70–99)
GLUCOSE SERPL-MCNC: 190 MG/DL — HIGH (ref 70–99)
HCT VFR BLD CALC: 30.5 % — LOW (ref 42–52)
HGB BLD-MCNC: 9.3 G/DL — LOW (ref 14–18)
IMM GRANULOCYTES NFR BLD AUTO: 1 % — HIGH (ref 0.1–0.3)
IRON SATN MFR SERPL: 33 UG/DL — LOW (ref 35–150)
IRON SATN MFR SERPL: 35 % — SIGNIFICANT CHANGE UP (ref 15–50)
LYMPHOCYTES # BLD AUTO: 0.44 K/UL — LOW (ref 1.2–3.4)
LYMPHOCYTES # BLD AUTO: 7.6 % — LOW (ref 20.5–51.1)
MAGNESIUM SERPL-MCNC: 1.4 MG/DL — LOW (ref 1.8–2.4)
MCHC RBC-ENTMCNC: 29.4 PG — SIGNIFICANT CHANGE UP (ref 27–31)
MCHC RBC-ENTMCNC: 30.5 G/DL — LOW (ref 32–37)
MCV RBC AUTO: 96.5 FL — HIGH (ref 80–94)
MONOCYTES # BLD AUTO: 0.64 K/UL — HIGH (ref 0.1–0.6)
MONOCYTES NFR BLD AUTO: 11 % — HIGH (ref 1.7–9.3)
NEUTROPHILS # BLD AUTO: 4.57 K/UL — SIGNIFICANT CHANGE UP (ref 1.4–6.5)
NEUTROPHILS NFR BLD AUTO: 78.7 % — HIGH (ref 42.2–75.2)
NRBC # BLD: 0 /100 WBCS — SIGNIFICANT CHANGE UP (ref 0–0)
PLATELET # BLD AUTO: 314 K/UL — SIGNIFICANT CHANGE UP (ref 130–400)
PMV BLD: 10.1 FL — SIGNIFICANT CHANGE UP (ref 7.4–10.4)
POTASSIUM SERPL-MCNC: 4.3 MMOL/L — SIGNIFICANT CHANGE UP (ref 3.5–5)
POTASSIUM SERPL-SCNC: 4.3 MMOL/L — SIGNIFICANT CHANGE UP (ref 3.5–5)
RBC # BLD: 3.16 M/UL — LOW (ref 4.7–6.1)
RBC # FLD: 20.7 % — HIGH (ref 11.5–14.5)
SODIUM SERPL-SCNC: 142 MMOL/L — SIGNIFICANT CHANGE UP (ref 135–146)
TIBC SERPL-MCNC: 94 UG/DL — LOW (ref 220–430)
UIBC SERPL-MCNC: 61 UG/DL — LOW (ref 110–370)
WBC # BLD: 5.81 K/UL — SIGNIFICANT CHANGE UP (ref 4.8–10.8)
WBC # FLD AUTO: 5.81 K/UL — SIGNIFICANT CHANGE UP (ref 4.8–10.8)

## 2024-05-13 PROCEDURE — 99232 SBSQ HOSP IP/OBS MODERATE 35: CPT

## 2024-05-13 RX ORDER — DRONABINOL 2.5 MG
1 CAPSULE ORAL
Qty: 0 | Refills: 0 | DISCHARGE
Start: 2024-05-13

## 2024-05-13 RX ORDER — MIRTAZAPINE 45 MG/1
1 TABLET, ORALLY DISINTEGRATING ORAL
Qty: 0 | Refills: 0 | DISCHARGE
Start: 2024-05-13

## 2024-05-13 RX ORDER — MEGESTROL ACETATE 40 MG/ML
10 SUSPENSION ORAL
Refills: 0 | DISCHARGE

## 2024-05-13 RX ORDER — MAGNESIUM SULFATE 500 MG/ML
2 VIAL (ML) INJECTION ONCE
Refills: 0 | Status: COMPLETED | OUTPATIENT
Start: 2024-05-13 | End: 2024-05-13

## 2024-05-13 RX ORDER — IPRATROPIUM/ALBUTEROL SULFATE 18-103MCG
3 AEROSOL WITH ADAPTER (GRAM) INHALATION
Qty: 0 | Refills: 0 | DISCHARGE
Start: 2024-05-13

## 2024-05-13 RX ORDER — LACTOBACILLUS ACIDOPHILUS 100MM CELL
1 CAPSULE ORAL
Qty: 0 | Refills: 0 | DISCHARGE
Start: 2024-05-13

## 2024-05-13 RX ORDER — METOPROLOL TARTRATE 50 MG
1 TABLET ORAL
Refills: 0 | DISCHARGE

## 2024-05-13 RX ORDER — MAGNESIUM OXIDE 400 MG ORAL TABLET 241.3 MG
400 TABLET ORAL EVERY 4 HOURS
Refills: 0 | Status: COMPLETED | OUTPATIENT
Start: 2024-05-13 | End: 2024-05-14

## 2024-05-13 RX ADMIN — Medication 300 MILLIGRAM(S): at 12:29

## 2024-05-13 RX ADMIN — Medication 1300 MILLIGRAM(S): at 06:11

## 2024-05-13 RX ADMIN — Medication 10 MILLIGRAM(S): at 06:11

## 2024-05-13 RX ADMIN — MAGNESIUM OXIDE 400 MG ORAL TABLET 400 MILLIGRAM(S): 241.3 TABLET ORAL at 18:29

## 2024-05-13 RX ADMIN — Medication 100 MILLIGRAM(S): at 18:01

## 2024-05-13 RX ADMIN — CHLORHEXIDINE GLUCONATE 1 APPLICATION(S): 213 SOLUTION TOPICAL at 06:11

## 2024-05-13 RX ADMIN — Medication 1 TABLET(S): at 08:42

## 2024-05-13 RX ADMIN — Medication 1300 MILLIGRAM(S): at 14:45

## 2024-05-13 RX ADMIN — Medication 25 GRAM(S): at 18:01

## 2024-05-13 RX ADMIN — ENOXAPARIN SODIUM 40 MILLIGRAM(S): 100 INJECTION SUBCUTANEOUS at 21:15

## 2024-05-13 RX ADMIN — Medication 500 MILLIGRAM(S): at 12:29

## 2024-05-13 RX ADMIN — Medication 1 TABLET(S): at 12:28

## 2024-05-13 RX ADMIN — Medication 1 TABLET(S): at 12:29

## 2024-05-13 RX ADMIN — Medication 1300 MILLIGRAM(S): at 21:14

## 2024-05-13 RX ADMIN — ENZALUTAMIDE 160 MILLIGRAM(S): 80 TABLET ORAL at 12:41

## 2024-05-13 RX ADMIN — Medication 100 MILLIGRAM(S): at 06:13

## 2024-05-13 RX ADMIN — MAGNESIUM OXIDE 400 MG ORAL TABLET 400 MILLIGRAM(S): 241.3 TABLET ORAL at 21:23

## 2024-05-13 RX ADMIN — Medication 10 MILLIGRAM(S): at 18:00

## 2024-05-13 RX ADMIN — SODIUM CHLORIDE 75 MILLILITER(S): 9 INJECTION, SOLUTION INTRAVENOUS at 08:44

## 2024-05-13 RX ADMIN — Medication 1 TABLET(S): at 18:00

## 2024-05-13 RX ADMIN — Medication 10 MILLIGRAM(S): at 12:29

## 2024-05-13 RX ADMIN — MIRTAZAPINE 15 MILLIGRAM(S): 45 TABLET, ORALLY DISINTEGRATING ORAL at 12:29

## 2024-05-13 RX ADMIN — Medication 3 MILLILITER(S): at 21:36

## 2024-05-13 NOTE — PROGRESS NOTE ADULT - ASSESSMENT
68M w/ PMHx Stage 4 Prostate Ca s/p palliative radiation, Mediastinal Mass on oral Chemo (xtandi), Chronic Anemia, HTN and Chronic Back Pain presents to ED from SNF for evaluation. As per NH documentation, pt has been refusing medications through PEG for the last 3x days d/t episode of diarrhea post feeds. Patient's only complaint is LT buttock pain. Denies fevers, chills, chest pain, SOB, n/v, abdominal pain or urinary symptoms.    # Hypoglycemia- refused tube feeds- resolved  - was given orange juice  - on  IV fluids  - monitor FS    # Acute Hypoxic resp failure- resolved  # B/l  pleural effusions  -  Xray Chest 1 View- PORTABLE-Routine (Xray Chest 1 View- PORTABLE-Routine in AM.) (04.28.24 @ 06:06) >Lungs/ Pleura: Stable bilateral opacities left greater than right effusions  - S/p left thoracentesis on 4/25 --> 1800 milliLiter drained    # Hypomagnesemia  - IV Mg supplement ordered.    # Hypotension- resolved  - dc'd  metoprolol  - monitor BP    # Metabolic acidosis  - c/w PO bicarb    # Left gluteal cellulitis  - CT Abdomen and Pelvis w/ IV Cont (04.19.24 @ 19:17) >New mild edema and enlargement of the left gluteus maximums musculature with associated subcutaneous edema. No discrete abscess. Metastatic disease in the abdomen and pelvis  with slightly increased upper abdominal adenopathy. No significant change approximately in the large bladder mass and partially imaged mediastinal mass. Interval placement of an esophageal stent. Severe left hydronephrosis to the level of the proximal ureter, not   significantly changed. New/increased large bilateral pleural effusions.  - blood Culture Results: No growth at 5 days (04.19.24 @ 17:37)  - s/p debridement 4.24 - -->  Wound Cx 4/22 with MRSA   - S/p  cefazolin  - As per ID c/w  doxycycline 100 mg BID -- plan for 2 weeks from debridement, end date 5/13.  ID f/up: no need for further antibiotics.  - continue dressing changes with xeroform packing    # Acute kidney injury, prerenal- resolved  # Lactic acidosis- resolved  - : CT Abdomen and Pelvis w/ IV Cont (04.19.24 @ 19:17) >KIDNEYS: Stable severe left hydronephrosis secondary to obstruction at the level of the proximal ureter, not significantly changed. Right renal cysts and other low attenuating lesions in the right kidney too small to characterize. PELVIC ORGANS: Large 7 cm bladder mass, unchanged since prior CT.  - Lactate, Blood: 1.5 mmol/L (04.30.24 @ 07:15)    # H/o Dysphagia  - s/p esophageal stent and PEG 3/5/24  - swallow eval: puree w/ thin liquids, will likely require continued use of PEG    # Metastatic prostate cancer stage 4  # Mediastinal mass  - S/p palliative radiation  - on  Xtandi 160 mg qd    # Anemia  - S/p 1 unit PRBC stable  - c/w ferrous sulfate    # Severe protein calorie malnutrition  - BMI: 15.8  - Start ensure pudding  - Start magic cup  - c/w  marinol  -c/w  remeron    # Noncompliant with medication, treatment care    # DVT prophylaxis    # Poor prognosis    #  DNR/DNI/ trial of NIV  - Palliative care f/u note (4/29): Ethics decided last admission that the patient had capacity to refuse interventions. When we discussed the consequences of refusing those interventions, he could not explain why he was refusing (other than diarrhea for tube feeds).  We discussed that continued refusal of feeds or other interventions could lead to worsening clinical status and death.  We discussed GOC given his refusal of interventions; discussed both hospice/CMO and ongoing medical management. He noted he wants "to live" and wants ongoing medical management. Patient does not demonstrate capacity to understand his complete clinical picture. Recommend possible additional ethics consult if more elucidation about next steps is needed.  - Psych eval (4/30): At this time, patient does not demonstrate the capacity to  refuse or at times agree to the I.V line placement , I.V fluid &  I.V antibiotic management , and PEG tube feeds,  recommended by the medical team . In this situation , the decision should be deferred to his next of kin or documented health care proxy. However, since neither party are available in this case , a court appointed decision maker can be appointed to make this decision. In the event of imminent need for these interventions, that is potentially life threatening , the medical team can consider a 2 physician consent.      Seen by Ethics team on 5/9. as per note: "RECOMMENDATION: In this case, Mr. Stallings has been deemed without capacity by Behavioral Health. However, regardless of capacity, a patient who is actively refusing treatment cannot be forced against his will to receive it. He should not be coerced, unless the medical intervention is emergent (which PEG tube feeds and IV antibiotics are currently not). The patient’s right to refuse, even when he does not have capacity, must be respected.    Ethics recommends continuing to foster trust with the patient to encourage him to assent to recommended medical interventions. Further, the patient has received additional support by Delilah, an associate from his nursing home, and with her support, he has assented to feeds and treatment. It was communicated that the patient is feeling discouraged by his current health status. Therefore, it can be beneficial to work closely with this patient and offer him additional support and encouragement. The team is commended for working with the patient and for putting in time and effort to discuss the risks and benefits of refusing certain interventions."    # Pending: monitor  FS,  coordinating care,  encouraging patient to take medications and tube feeds.  # Disposition: [facility].   anticipate for tomorrow.       Total time spent to complete patient's bedside assessment, review medical chart, discuss medical plan of care with covering medical team was more than 35 minutes  with >50% of time spent face to face with patient, discussion with patient/family and/or coordination of care.

## 2024-05-13 NOTE — DISCHARGE NOTE PROVIDER - NSDCCPCAREPLAN_GEN_ALL_CORE_FT
PRINCIPAL DISCHARGE DIAGNOSIS  Diagnosis: Abscess  Assessment and Plan of Treatment: You presented to the hospital from nursing home with left buttock pain. Blood work, imaging studies were obatined and infectious disease was consulted. You were treated with antibiotics and your symptoms improved.   You also had difficulty breathing due to collection of fluid in lung and underwent procedure to remove 1800cc from left lung, Your breathing improved after.   You refused to take PEG feeds and medications on and off while here, behavioural health and legal team were consulted while here. While it was determined that patient didnt have capacity to make medical decisions, legal team suggested to still hold off on proceeding against patients will unless life threating decisions are being made. Legal team suggested to continue to encourage patient and work with him.   Please continue to use the medications as prescribed and follow-up op with pcp. Return to ER in case symptoms worsen or recur.      SECONDARY DISCHARGE DIAGNOSES  Diagnosis: Diarrhea  Assessment and Plan of Treatment:     Diagnosis: Pleural effusion  Assessment and Plan of Treatment:

## 2024-05-13 NOTE — DISCHARGE NOTE PROVIDER - CARE PROVIDER_API CALL
Russ Haynes  Geriatric Medicine  [facility], Phys,    Phone: [phone]  Fax: [phone]  Follow Up Time: 1 week

## 2024-05-13 NOTE — PROGRESS NOTE ADULT - PROVIDER SPECIALTY LIST ADULT
Heme/Onc
Hospitalist
Hospitalist
Internal Medicine
Hospitalist
Internal Medicine
Pulmonology
Burn
Ethics
Hospitalist
Hospitalist
Internal Medicine
Hospitalist
Infectious Disease
Internal Medicine
CONSTITUTIONAL: Well-appearing; well-nourished; in no apparent distress.   EYES: PERRL; EOM intact.    CARDIOVASCULAR: Normal S1, S2; no murmurs, rubs, or gallops.   RESPIRATORY: Normal chest excursion with respiration; breath sounds clear and equal bilaterally; no wheezes, rhonchi, or rales.  GI/: + LUQ/LLQ tenderness. no rebound and guarding. Normal bowel sounds; non-distended; no palpable organomegaly.   MS: No evidence of trauma or deformity to extremities.   SKIN: Normal for age and race; warm; dry; good turgor; no apparent lesions or exudate.   NEURO/PSYCH: A & O x 4; grossly unremarkable.
Hospitalist
Hospitalist
Internal Medicine
Hospitalist
Hospitalist
Palliative Care
Palliative Care

## 2024-05-13 NOTE — CHART NOTE - NSCHARTNOTESELECT_GEN_ALL_CORE
Event Note
Nutrition/Event Note
PallCare/Event Note
Palliative Care - Sign Off/Event Note
Palliative Care - Social Work/Event Note
Event Note
Nutrition - RD Follow Up/Nutrition Services
Nutrition/Event Note
Palliative Care - Social Work/Event Note

## 2024-05-13 NOTE — CHART NOTE - NSCHARTNOTEFT_GEN_A_CORE
Wound evaluated     Area without fluctuance or erythema.     He did miss several days of doxycycline, but overall, the wound in lower back/buttock is well appearing.     No need for further antibiotics     Please call or message on [employer] Teams if with any questions.  Spectra [phone]

## 2024-05-13 NOTE — DISCHARGE NOTE PROVIDER - ATTENDING DISCHARGE PHYSICAL EXAMINATION:
VITALS:  Vital Signs Last 24 Hrs  T(C): 36.2 (14 May 2024 12:26), Max: 37.3 (13 May 2024 22:06)  T(F): 97.2 (14 May 2024 12:26), Max: 99.1 (13 May 2024 22:06)  HR: 73 (14 May 2024 12:26) (64 - 73)  BP: 112/81 (14 May 2024 12:26) (105/74 - 114/84)  BP(mean): --  RR: 18 (14 May 2024 12:26) (18 - 18)  SpO2: 92% (14 May 2024 12:26) (66% - 96%)    Parameters below as of 14 May 2024 08:33  Patient On (Oxygen Delivery Method): nasal cannula  O2 Flow (L/min): 3      PHYSICAL EXAM:  GEN: Awake, alert, not in distress.  HEENT: atraumatic, EOMI.  Chest: decrease breath sounds B/l at bases  CVS: SIS2 +, no murmur.  P/A: Soft, BS+, PEG+  CNS: awake, alert  Ext: no edema feet.

## 2024-05-13 NOTE — DISCHARGE NOTE PROVIDER - INSTRUCTIONS
Tube Feeding Modality: Gastrostomy  Osmolite 1.5 Sam (OSMOLITE1.5)  Total Volume for 24 Hours (mL): 480  Bolus  Total Volume of Bolus (mL):  120  Total # of Feeds: 4  Tube Feed Frequency: Every 6 hours   Tube Feed Start Time: 06:00  Bolus Feed Rate (mL per Hour): 120   Bolus Feed Duration (in Hours): 1  Free Water Flush  Bolus   Total Volume per Flush (mL): 75   Frequency: Every 6 Hours  Free Water Flush Instructions:  75ml pre and post feeds     Qty per Day:  ADD BANATROL     Qty per Day:  MAGIC CUP 3X/DAY     Qty per Day:  TO PUREED FOODS     Qty per Day:  LIKE YOGURT/PUDDING  Banatrol TF     Qty per Day:  3  Supplement Feeding Modality:  Oral

## 2024-05-13 NOTE — PROGRESS NOTE ADULT - SUBJECTIVE AND OBJECTIVE BOX
Patient is a 68y old  Male who presents with a chief complaint of LT Gluteal Pain / Diarrhea (26 Apr 2024 08:35)    Patient was seen and examined.  Pt awake, alert  Pt refusing tube feeds.    PAST MEDICAL & SURGICAL HISTORY:  HTN (hypertension)  Prostate cancer    Allergies  No Known Allergies    MEDICATIONS:  STANDING MEDICATIONS  ascorbic acid 500 milliGRAM(s) Oral daily, 04-20-24 @ 02:48  chlorhexidine 2% Cloths 1 Application(s) Topical <User Schedule>, 04-26-24 @ 10:44  dextrose 10% Bolus 125 milliLiter(s) IV Bolus once, 05-04-24 @ 13:54  dextrose 5%. 1000 milliLiter(s) IV Continuous <Continuous>, 05-04-24 @ 13:54  dextrose 5%. 1000 milliLiter(s) IV Continuous <Continuous>, 05-04-24 @ 13:54  dextrose 50% Injectable 25 Gram(s) IV Push once, 04-26-24 @ 14:00  dextrose 50% Injectable 12.5 Gram(s) IV Push once, 04-26-24 @ 14:00  dicyclomine 10 milliGRAM(s) Oral three times a day before meals, 04-26-24 @ 11:14  doxycycline monohydrate Suspension 100 milliGRAM(s) Oral every 12 hours, 05-03-24 @ 12:36  dronabinol 2.5 milliGRAM(s) Oral daily, 05-10-24 @ 09:35  enoxaparin Injectable 40 milliGRAM(s) SubCutaneous every 24 hours, 04-20-24 @ 03:14  enzalutamide 160 milliGRAM(s) Oral daily, 04-24-24 @ 18:01  ferrous    sulfate Liquid 300 milliGRAM(s) Enteral Tube daily, 04-20-24 @ 02:47  glucagon  Injectable 1 milliGRAM(s) IntraMuscular once, 05-04-24 @ 13:54  lactobacillus acidophilus 1 Tablet(s) Oral three times a day with meals, 04-25-24 @ 14:37  magnesium oxide 400 milliGRAM(s) Oral every 4 hours, 05-13-24 @ 18:20  mirtazapine 15 milliGRAM(s) Oral daily, 05-10-24 @ 09:35  multivitamin  Chewable 1 Tablet(s) Oral daily, 04-22-24 @ 11:34  sodium bicarbonate 1300 milliGRAM(s) Oral every 8 hours, 04-29-24 @ 09:18    PRN MEDICATIONS  acetaminophen     Tablet .. 650 milliGRAM(s) Oral every 6 hours, 04-20-24 @ 01:58 PRN  albuterol/ipratropium for Nebulization 3 milliLiter(s) Nebulizer every 6 hours, 05-02-24 @ 09:59 PRN  aluminum hydroxide/magnesium hydroxide/simethicone Suspension 30 milliLiter(s) Oral every 4 hours, 04-20-24 @ 01:58 PRN  dextrose Oral Gel 15 Gram(s) Oral once, 04-26-24 @ 14:00 PRN    Diet, Pureed:   Tube Feeding Modality: Gastrostomy  Osmolite 1.5 Sam (OSMOLITE1.5)  Total Volume for 24 Hours (mL): 600  Bolus  Total Volume of Bolus (mL):  150  Total # of Feeds: 4  Tube Feed Frequency: Every 6 hours   Tube Feed Start Time: 06:00  Bolus Feed Rate (mL per Hour): 1000   Bolus Feed Duration (in Hours): 1  Free Water Flush  Bolus   Total Volume per Flush (mL): 75   Frequency: Every 6 Hours  Free Water Flush Instructions:  75ml pre and post feeds  Banatrol TF     Qty per Day:  4  Supplement Feeding Modality:  Oral  Ensure Pudding Cans or Servings Per Day:  3       Frequency:  Daily (05-11-24 @ 10:51) [Available for Activation]  Diet, Pureed:   Tube Feeding Modality: Gastrostomy  Osmolite 1.5 Sam (OSMOLITE1.5)  Total Volume for 24 Hours (mL): 480  Bolus  Total Volume of Bolus (mL):  120  Total # of Feeds: 4  Tube Feed Frequency: Every 6 hours   Tube Feed Start Time: 06:00  Bolus Feed Rate (mL per Hour): 120   Bolus Feed Duration (in Hours): 1  Free Water Flush  Bolus   Total Volume per Flush (mL): 75   Frequency: Every 6 Hours  Free Water Flush Instructions:  75ml pre and post feeds     Qty per Day:  ADD BANATROL     Qty per Day:  MAGIC CUP 3X/DAY     Qty per Day:  TO PUREED FOODS     Qty per Day:  LIKE YOGURT/PUDDING  Banatrol TF     Qty per Day:  3  Supplement Feeding Modality:  Oral (04-29-24 @ 14:10) [Pending Verification By Attending]  Diet, Pureed:   Tube Feeding Modality: Gastrostomy  Osmolite 1.5 Sam (OSMOLITE1.5)  Total Volume for 24 Hours (mL): 1600  Bolus  Total Volume of Bolus (mL):  400  Tube Feed Frequency: Every 6 hours   Tube Feed Start Time: 19:00  Bolus Feed Rate (mL per Hour): 67   Bolus Feed Duration (in Hours): 24  Free Water Flush  Bolus   Total Volume per Flush (mL): 150   Frequency: Every 6 Hours    Start Time: 19:00 (04-21-24 @ 18:08) [Pending Verification By Attending]          Vital Signs Last 24 Hrs  T(C): 37.3 (13 May 2024 22:06), Max: 37.3 (13 May 2024 22:06)  T(F): 99.1 (13 May 2024 22:06), Max: 99.1 (13 May 2024 22:06)  HR: 64 (13 May 2024 22:06) (64 - 107)  BP: 105/74 (13 May 2024 22:06) (97/65 - 105/74)  BP(mean): 76 (13 May 2024 05:13) (76 - 76)  RR: 18 (13 May 2024 22:06) (18 - 18)  SpO2: 66% (13 May 2024 22:06) (66% - 98%)    Parameters below as of 13 May 2024 12:55  Patient On (Oxygen Delivery Method): nasal cannula  O2 Flow (L/min): 2    I&Os:  05-12-24 @ 07:01  -  05-13-24 @ 07:00  --------------------------------------------------------  IN: 0 mL / OUT: 250 mL / NET: -250 mL        LABS:                        9.3    5.81  )-----------( 314      ( 13 May 2024 08:42 )             30.5     WBC trend: 5.81 <--, 7.78 <--  Hgb: 9.3 [05-13-24 @ 08:42]<--, 9.2 [05-10-24 @ 07:09]<--, 6.4 [05-08-24 @ 08:01]<--    05-13    142  |  113<H>  |  12  ----------------------------<  190<H>  4.3   |  18  |  1.0    Ca    6.5<L>      13 May 2024 08:42  Mg     1.4     05-13      Creatinine trend: 1.0<--, 0.8<--, 0.8<--, 1.0<--, 1.0<--, 1.0<--, 1.8<--  SODIUM TREND: Sodium 142 [05-13 @ 08:42]<--, Sodium 140 [05-10 @ 07:09]<--, Sodium 137 [05-09 @ 11:40]<--      POC Glucose: 149 [05-13-24 @ 11:34]<--, 189 [05-13-24 @ 08:02]<--      Procalcitonin: 0.35 ng/mL (04-25-24 @ 22:57)          Urinalysis Basic - ( 13 May 2024 08:42 )    Color: x / Appearance: x / SG: x / pH: x  Gluc: 190 mg/dL / Ketone: x  / Bili: x / Urobili: x   Blood: x / Protein: x / Nitrite: x   Leuk Esterase: x / RBC: x / WBC x   Sq Epi: x / Non Sq Epi: x / Bacteria: x                                              -------------------------------------------------            PHYSICAL EXAM:  GEN: Awake, alert, not in distress.  HEENT: atraumatic, EOMI.  Chest: decrease breath sounds B/l at bases  CVS: SIS2 +, no murmur.  P/A: Soft, BS+, PEG+  CNS: awake, alert  Ext: no edema feet.

## 2024-05-13 NOTE — PROGRESS NOTE ADULT - NUTRITIONAL ASSESSMENT
This patient has been assessed with a concern for Malnutrition and has been determined to have a diagnosis/diagnoses of Severe protein-calorie malnutrition and Underweight (BMI < 19).    The following pending diet order is being considered for treatment of Severe protein-calorie malnutrition and Underweight (BMI < 19):  Diet Pureed-  Tube Feeding Modality: Gastrostomy  Osmolite 1.5 Sam (OSMOLITE1.5)  Total Volume for 24 Hours (mL): 480  Bolus  Total Volume of Bolus (mL):  120  Total # of Feeds: 4  Tube Feed Frequency: Every 6 hours   Tube Feed Start Time: 06:00  Bolus Feed Rate (mL per Hour): 120   Bolus Feed Duration (in Hours): 1  Free Water Flush  Bolus   Total Volume per Flush (mL): 75   Frequency: Every 6 Hours  Free Water Flush Instructions:  75ml pre and post feeds     Qty per Day:  ADD BANATROL     Qty per Day:  MAGIC CUP 3X/DAY     Qty per Day:  TO PUREED FOODS     Qty per Day:  LIKE YOGURT/PUDDING  Banatrol TF     Qty per Day:  3  Supplement Feeding Modality:  Oral  Entered: Apr 29 2024  2:08PM    Diet Pureed-  Tube Feeding Modality: Gastrostomy  Osmolite 1.5 Sam (OSMOLITE1.5)  Total Volume for 24 Hours (mL): 1600  Bolus  Total Volume of Bolus (mL):  400  Tube Feed Frequency: Every 6 hours   Tube Feed Start Time: 19:00  Bolus Feed Rate (mL per Hour): 67   Bolus Feed Duration (in Hours): 24  Free Water Flush  Bolus   Total Volume per Flush (mL): 150   Frequency: Every 6 Hours    Start Time: 19:00  Entered: Apr 21 2024  6:07PM  
This patient has been assessed with a concern for Malnutrition and has been determined to have a diagnosis/diagnoses of Severe protein-calorie malnutrition and Underweight (BMI < 19).    This patient is being managed with:   Diet Pureed-  Tube Feeding Modality: Gastrostomy  Osmolite 1.5 Sam (OSMOLITE1.5)  Total Volume for 24 Hours (mL): 480  Bolus  Total Volume of Bolus (mL):  120  Total # of Feeds: 4  Tube Feed Frequency: Every 6 hours   Tube Feed Start Time: 06:00  Bolus Feed Rate (mL per Hour): 120   Bolus Feed Duration (in Hours): 1  Free Water Flush  Bolus   Total Volume per Flush (mL): 75   Frequency: Every 6 Hours  Free Water Flush Instructions:  75ml pre and post feeds     Qty per Day:  ADD BANATROL     Qty per Day:  MAGIC CUP 3X/DAY     Qty per Day:  TO PUREED FOODS     Qty per Day:  LIKE YOGURT/PUDDING  Banatrol TF     Qty per Day:  3  Supplement Feeding Modality:  Oral  Entered: Apr 29 2024  2:08PM    Diet Pureed-  Tube Feeding Modality: Gastrostomy  Osmolite 1.5 Sam (OSMOLITE1.5)  Total Volume for 24 Hours (mL): 600  Bolus  Total Volume of Bolus (mL):  150  Total # of Feeds: 4  Tube Feed Frequency: Every 6 hours   Tube Feed Start Time: 06:00  Bolus Feed Rate (mL per Hour): 1000   Bolus Feed Duration (in Hours): 1  Free Water Flush  Bolus   Total Volume per Flush (mL): 75   Frequency: Every 6 Hours  Free Water Flush Instructions:  75ml pre and post feeds  Banatrol TF     Qty per Day:  4  Supplement Feeding Modality:  Oral  Ensure Plus High Protein Cans or Servings Per Day:  2       Frequency:  Daily  Entered: Apr 26 2024 11:35AM    Diet Pureed-  Tube Feeding Modality: Gastrostomy  Osmolite 1.5 Asm (OSMOLITE1.5)  Total Volume for 24 Hours (mL): 1600  Bolus  Total Volume of Bolus (mL):  400  Tube Feed Frequency: Every 6 hours   Tube Feed Start Time: 19:00  Bolus Feed Rate (mL per Hour): 67   Bolus Feed Duration (in Hours): 24  Free Water Flush  Bolus   Total Volume per Flush (mL): 150   Frequency: Every 6 Hours    Start Time: 19:00  Entered: Apr 21 2024  6:07PM    The following pending diet order is being considered for treatment of Severe protein-calorie malnutrition and Underweight (BMI < 19):null
This patient has been assessed with a concern for Malnutrition and has been determined to have a diagnosis/diagnoses of Severe protein-calorie malnutrition and Underweight (BMI < 19).    This patient is being managed with:   Diet Pureed-  Tube Feeding Modality: Gastrostomy  Osmolite 1.5 Sam (OSMOLITE1.5)  Total Volume for 24 Hours (mL): 480  Bolus  Total Volume of Bolus (mL):  120  Total # of Feeds: 4  Tube Feed Frequency: Every 6 hours   Tube Feed Start Time: 06:00  Bolus Feed Rate (mL per Hour): 120   Bolus Feed Duration (in Hours): 1  Free Water Flush  Bolus   Total Volume per Flush (mL): 75   Frequency: Every 6 Hours  Free Water Flush Instructions:  75ml pre and post feeds     Qty per Day:  ADD BANATROL     Qty per Day:  MAGIC CUP 3X/DAY     Qty per Day:  TO PUREED FOODS     Qty per Day:  LIKE YOGURT/PUDDING  Banatrol TF     Qty per Day:  3  Supplement Feeding Modality:  Oral  Entered: Apr 29 2024  2:08PM    Diet Pureed-  Tube Feeding Modality: Gastrostomy  Osmolite 1.5 Sam (OSMOLITE1.5)  Total Volume for 24 Hours (mL): 600  Bolus  Total Volume of Bolus (mL):  150  Total # of Feeds: 4  Tube Feed Frequency: Every 6 hours   Tube Feed Start Time: 06:00  Bolus Feed Rate (mL per Hour): 1000   Bolus Feed Duration (in Hours): 1  Free Water Flush  Bolus   Total Volume per Flush (mL): 75   Frequency: Every 6 Hours  Free Water Flush Instructions:  75ml pre and post feeds  Banatrol TF     Qty per Day:  4  Supplement Feeding Modality:  Oral  Ensure Plus High Protein Cans or Servings Per Day:  2       Frequency:  Daily  Entered: Apr 26 2024 11:35AM    Diet Pureed-  Tube Feeding Modality: Gastrostomy  Osmolite 1.5 Sam (OSMOLITE1.5)  Total Volume for 24 Hours (mL): 1600  Bolus  Total Volume of Bolus (mL):  400  Tube Feed Frequency: Every 6 hours   Tube Feed Start Time: 19:00  Bolus Feed Rate (mL per Hour): 67   Bolus Feed Duration (in Hours): 24  Free Water Flush  Bolus   Total Volume per Flush (mL): 150   Frequency: Every 6 Hours    Start Time: 19:00  Entered: Apr 21 2024  6:07PM    The following pending diet order is being considered for treatment of Severe protein-calorie malnutrition and Underweight (BMI < 19):null
This patient has been assessed with a concern for Malnutrition and has been determined to have a diagnosis/diagnoses of Severe protein-calorie malnutrition and Underweight (BMI < 19).    This patient is being managed with:   Diet Pureed-  Tube Feeding Modality: Gastrostomy  Osmolite 1.5 Sam (OSMOLITE1.5)  Total Volume for 24 Hours (mL): 480  Bolus  Total Volume of Bolus (mL):  120  Total # of Feeds: 4  Tube Feed Frequency: Every 6 hours   Tube Feed Start Time: 06:00  Bolus Feed Rate (mL per Hour): 120   Bolus Feed Duration (in Hours): 1  Free Water Flush  Bolus   Total Volume per Flush (mL): 75   Frequency: Every 6 Hours  Free Water Flush Instructions:  75ml pre and post feeds     Qty per Day:  ADD BANATROL     Qty per Day:  MAGIC CUP 3X/DAY     Qty per Day:  TO PUREED FOODS     Qty per Day:  LIKE YOGURT/PUDDING  Banatrol TF     Qty per Day:  3  Supplement Feeding Modality:  Oral  Entered: Apr 29 2024  2:08PM    Diet Pureed-  Tube Feeding Modality: Gastrostomy  Osmolite 1.5 Sam (OSMOLITE1.5)  Total Volume for 24 Hours (mL): 600  Bolus  Total Volume of Bolus (mL):  150  Total # of Feeds: 4  Tube Feed Frequency: Every 6 hours   Tube Feed Start Time: 06:00  Bolus Feed Rate (mL per Hour): 1000   Bolus Feed Duration (in Hours): 1  Free Water Flush  Bolus   Total Volume per Flush (mL): 75   Frequency: Every 6 Hours  Free Water Flush Instructions:  75ml pre and post feeds  Banatrol TF     Qty per Day:  4  Supplement Feeding Modality:  Oral  Ensure Plus High Protein Cans or Servings Per Day:  2       Frequency:  Daily  Entered: Apr 26 2024 11:35AM    Diet Pureed-  Tube Feeding Modality: Gastrostomy  Osmolite 1.5 Sam (OSMOLITE1.5)  Total Volume for 24 Hours (mL): 1600  Bolus  Total Volume of Bolus (mL):  400  Tube Feed Frequency: Every 6 hours   Tube Feed Start Time: 19:00  Bolus Feed Rate (mL per Hour): 67   Bolus Feed Duration (in Hours): 24  Free Water Flush  Bolus   Total Volume per Flush (mL): 150   Frequency: Every 6 Hours    Start Time: 19:00  Entered: Apr 21 2024  6:07PM    The following pending diet order is being considered for treatment of Severe protein-calorie malnutrition and Underweight (BMI < 19):null
This patient has been assessed with a concern for Malnutrition and has been determined to have a diagnosis/diagnoses of Severe protein-calorie malnutrition and Underweight (BMI < 19).    This patient is being managed with:   Diet NPO-  NPO for Procedure/Test     NPO Start Date: 24-Apr-2024   NPO Start Time: 08:00  Except Medications  Entered: Apr 24 2024  8:39AM    Diet NPO after Midnight-     NPO Start Date: 23-Apr-2024   NPO Start Time: 23:59  Except Medications  Entered: Apr 23 2024 10:35AM    Diet Pureed-  Tube Feeding Modality: Gastrostomy  Osmolite 1.5 Sam (OSMOLITE1.5)  Total Volume for 24 Hours (mL): 1600  Bolus  Total Volume of Bolus (mL):  400  Tube Feed Frequency: Every 6 hours   Tube Feed Start Time: 19:00  Bolus Feed Rate (mL per Hour): 67   Bolus Feed Duration (in Hours): 24  Free Water Flush  Bolus   Total Volume per Flush (mL): 150   Frequency: Every 6 Hours    Start Time: 19:00  Entered: Apr 21 2024  6:07PM    Diet Pureed-  Tube Feeding Modality: Gastrostomy  Peptamen A.F. Formula (PEPTAMENAFRTH)  Total Volume for 24 Hours (mL): 1000  Bolus  Total Volume of Bolus (mL):  250  Tube Feed Frequency: Every 6 hours   Tube Feed Start Time: 06:00  Bolus Feed Rate (mL per Hour): 250   Bolus Feed Duration (in Hours): 1  Free Water Flush  Bolus   Total Volume per Flush (mL): 60   Frequency: Every 6 Hours  Entered: Apr 21 2024  1:04PM    The following pending diet order is being considered for treatment of Severe protein-calorie malnutrition and Underweight (BMI < 19):null
This patient has been assessed with a concern for Malnutrition and has been determined to have a diagnosis/diagnoses of Severe protein-calorie malnutrition and Underweight (BMI < 19).    This patient is being managed with:   Diet Pureed-  Tube Feeding Modality: Gastrostomy  Osmolite 1.5 Sam (OSMOLITE1.5)  Total Volume for 24 Hours (mL): 480  Bolus  Total Volume of Bolus (mL):  120  Total # of Feeds: 4  Tube Feed Frequency: Every 6 hours   Tube Feed Start Time: 06:00  Bolus Feed Rate (mL per Hour): 120   Bolus Feed Duration (in Hours): 1  Free Water Flush  Bolus   Total Volume per Flush (mL): 75   Frequency: Every 6 Hours  Free Water Flush Instructions:  75ml pre and post feeds     Qty per Day:  ADD BANATROL     Qty per Day:  MAGIC CUP 3X/DAY     Qty per Day:  TO PUREED FOODS     Qty per Day:  LIKE YOGURT/PUDDING  Banatrol TF     Qty per Day:  3  Supplement Feeding Modality:  Oral  Entered: Apr 29 2024  2:08PM    Diet Pureed-  Tube Feeding Modality: Gastrostomy  Osmolite 1.5 Sam (OSMOLITE1.5)  Total Volume for 24 Hours (mL): 600  Bolus  Total Volume of Bolus (mL):  150  Total # of Feeds: 4  Tube Feed Frequency: Every 6 hours   Tube Feed Start Time: 06:00  Bolus Feed Rate (mL per Hour): 1000   Bolus Feed Duration (in Hours): 1  Free Water Flush  Bolus   Total Volume per Flush (mL): 75   Frequency: Every 6 Hours  Free Water Flush Instructions:  75ml pre and post feeds  Banatrol TF     Qty per Day:  4  Supplement Feeding Modality:  Oral  Ensure Plus High Protein Cans or Servings Per Day:  2       Frequency:  Daily  Entered: Apr 26 2024 11:35AM    Diet Pureed-  Tube Feeding Modality: Gastrostomy  Osmolite 1.5 Sam (OSMOLITE1.5)  Total Volume for 24 Hours (mL): 1600  Bolus  Total Volume of Bolus (mL):  400  Tube Feed Frequency: Every 6 hours   Tube Feed Start Time: 19:00  Bolus Feed Rate (mL per Hour): 67   Bolus Feed Duration (in Hours): 24  Free Water Flush  Bolus   Total Volume per Flush (mL): 150   Frequency: Every 6 Hours    Start Time: 19:00  Entered: Apr 21 2024  6:07PM    The following pending diet order is being considered for treatment of Severe protein-calorie malnutrition and Underweight (BMI < 19):null
This patient has been assessed with a concern for Malnutrition and has been determined to have a diagnosis/diagnoses of Underweight (BMI < 19) and Severe protein-calorie malnutrition.    This patient is being managed with:   Diet Pureed-  Tube Feeding Modality: Gastrostomy  Osmolite 1.5 Sam (OSMOLITE1.5)  Total Volume for 24 Hours (mL): 1600  Bolus  Total Volume of Bolus (mL):  400  Tube Feed Frequency: Every 6 hours   Tube Feed Start Time: 19:00  Bolus Feed Rate (mL per Hour): 67   Bolus Feed Duration (in Hours): 24  Free Water Flush  Bolus   Total Volume per Flush (mL): 150   Frequency: Every 6 Hours    Start Time: 19:00  Entered: Apr 21 2024  6:07PM    Diet Pureed-  Tube Feeding Modality: Gastrostomy  Peptamen A.F. Formula (PEPTAMENAFRTH)  Total Volume for 24 Hours (mL): 1000  Bolus  Total Volume of Bolus (mL):  250  Tube Feed Frequency: Every 6 hours   Tube Feed Start Time: 06:00  Bolus Feed Rate (mL per Hour): 250   Bolus Feed Duration (in Hours): 1  Free Water Flush  Bolus   Total Volume per Flush (mL): 60   Frequency: Every 6 Hours  Entered: Apr 21 2024  1:04PM    The following pending diet order is being considered for treatment of Underweight (BMI < 19) and Severe protein-calorie malnutrition:null
This patient has been assessed with a concern for Malnutrition and has been determined to have a diagnosis/diagnoses of Severe protein-calorie malnutrition and Underweight (BMI < 19).    The following pending diet order is being considered for treatment of Severe protein-calorie malnutrition and Underweight (BMI < 19):  Diet Pureed-  Tube Feeding Modality: Gastrostomy  Osmolite 1.5 Sam (OSMOLITE1.5)  Total Volume for 24 Hours (mL): 480  Bolus  Total Volume of Bolus (mL):  120  Total # of Feeds: 4  Tube Feed Frequency: Every 6 hours   Tube Feed Start Time: 06:00  Bolus Feed Rate (mL per Hour): 120   Bolus Feed Duration (in Hours): 1  Free Water Flush  Bolus   Total Volume per Flush (mL): 75   Frequency: Every 6 Hours  Free Water Flush Instructions:  75ml pre and post feeds     Qty per Day:  ADD BANATROL     Qty per Day:  MAGIC CUP 3X/DAY     Qty per Day:  TO PUREED FOODS     Qty per Day:  LIKE YOGURT/PUDDING  Banatrol TF     Qty per Day:  3  Supplement Feeding Modality:  Oral  Entered: Apr 29 2024  2:08PM    Diet Pureed-  Tube Feeding Modality: Gastrostomy  Osmolite 1.5 Sam (OSMOLITE1.5)  Total Volume for 24 Hours (mL): 1600  Bolus  Total Volume of Bolus (mL):  400  Tube Feed Frequency: Every 6 hours   Tube Feed Start Time: 19:00  Bolus Feed Rate (mL per Hour): 67   Bolus Feed Duration (in Hours): 24  Free Water Flush  Bolus   Total Volume per Flush (mL): 150   Frequency: Every 6 Hours    Start Time: 19:00  Entered: Apr 21 2024  6:07PM  
This patient has been assessed with a concern for Malnutrition and has been determined to have a diagnosis/diagnoses of Severe protein-calorie malnutrition and Underweight (BMI < 19).    This patient is being managed with:   Diet Pureed-  Tube Feeding Modality: Gastrostomy  Osmolite 1.5 Sam (OSMOLITE1.5)  Total Volume for 24 Hours (mL): 1600  Bolus  Total Volume of Bolus (mL):  400  Tube Feed Frequency: Every 6 hours   Tube Feed Start Time: 19:00  Bolus Feed Rate (mL per Hour): 67   Bolus Feed Duration (in Hours): 24  Free Water Flush  Bolus   Total Volume per Flush (mL): 150   Frequency: Every 6 Hours    Start Time: 19:00  Entered: Apr 21 2024  6:07PM    Diet Pureed-  Tube Feeding Modality: Gastrostomy  Peptamen A.F. Formula (PEPTAMENAFRTH)  Total Volume for 24 Hours (mL): 1000  Bolus  Total Volume of Bolus (mL):  250  Tube Feed Frequency: Every 6 hours   Tube Feed Start Time: 06:00  Bolus Feed Rate (mL per Hour): 250   Bolus Feed Duration (in Hours): 1  Free Water Flush  Bolus   Total Volume per Flush (mL): 60   Frequency: Every 6 Hours  Entered: Apr 21 2024  1:04PM    The following pending diet order is being considered for treatment of Severe protein-calorie malnutrition and Underweight (BMI < 19):null
This patient has been assessed with a concern for Malnutrition and has been determined to have a diagnosis/diagnoses of Severe protein-calorie malnutrition and Underweight (BMI < 19).    This patient is being managed with:   Diet Pureed-  Tube Feeding Modality: Gastrostomy  Osmolite 1.5 Sam (OSMOLITE1.5)  Total Volume for 24 Hours (mL): 480  Bolus  Total Volume of Bolus (mL):  120  Total # of Feeds: 4  Tube Feed Frequency: Every 6 hours   Tube Feed Start Time: 06:00  Bolus Feed Rate (mL per Hour): 120   Bolus Feed Duration (in Hours): 1  Free Water Flush  Bolus   Total Volume per Flush (mL): 75   Frequency: Every 6 Hours  Free Water Flush Instructions:  75ml pre and post feeds     Qty per Day:  ADD BANATROL     Qty per Day:  MAGIC CUP 3X/DAY     Qty per Day:  TO PUREED FOODS     Qty per Day:  LIKE YOGURT/PUDDING  Banatrol TF     Qty per Day:  3  Supplement Feeding Modality:  Oral  Entered: Apr 29 2024  2:08PM    Diet Pureed-  Tube Feeding Modality: Gastrostomy  Osmolite 1.5 Sam (OSMOLITE1.5)  Total Volume for 24 Hours (mL): 600  Bolus  Total Volume of Bolus (mL):  150  Total # of Feeds: 4  Tube Feed Frequency: Every 6 hours   Tube Feed Start Time: 06:00  Bolus Feed Rate (mL per Hour): 1000   Bolus Feed Duration (in Hours): 1  Free Water Flush  Bolus   Total Volume per Flush (mL): 75   Frequency: Every 6 Hours  Free Water Flush Instructions:  75ml pre and post feeds  Banatrol TF     Qty per Day:  4  Supplement Feeding Modality:  Oral  Ensure Plus High Protein Cans or Servings Per Day:  2       Frequency:  Daily  Entered: Apr 26 2024 11:35AM    Diet Pureed-  Tube Feeding Modality: Gastrostomy  Osmolite 1.5 Sam (OSMOLITE1.5)  Total Volume for 24 Hours (mL): 1600  Bolus  Total Volume of Bolus (mL):  400  Tube Feed Frequency: Every 6 hours   Tube Feed Start Time: 19:00  Bolus Feed Rate (mL per Hour): 67   Bolus Feed Duration (in Hours): 24  Free Water Flush  Bolus   Total Volume per Flush (mL): 150   Frequency: Every 6 Hours    Start Time: 19:00  Entered: Apr 21 2024  6:07PM    The following pending diet order is being considered for treatment of Severe protein-calorie malnutrition and Underweight (BMI < 19):null
This patient has been assessed with a concern for Malnutrition and has been determined to have a diagnosis/diagnoses of Severe protein-calorie malnutrition and Underweight (BMI < 19).    This patient is being managed with:   Diet Pureed-  Tube Feeding Modality: Gastrostomy  Osmolite 1.5 Sam (OSMOLITE1.5)  Total Volume for 24 Hours (mL): 600  Bolus  Total Volume of Bolus (mL):  150  Total # of Feeds: 4  Tube Feed Frequency: Every 6 hours   Tube Feed Start Time: 06:00  Bolus Feed Rate (mL per Hour): 1000   Bolus Feed Duration (in Hours): 1  Free Water Flush  Bolus   Total Volume per Flush (mL): 75   Frequency: Every 6 Hours  Free Water Flush Instructions:  75ml pre and post feeds  Banatrol TF     Qty per Day:  4  Supplement Feeding Modality:  Oral  Ensure Plus High Protein Cans or Servings Per Day:  2       Frequency:  Daily  Entered: Apr 26 2024 11:35AM    Diet Pureed-  Tube Feeding Modality: Gastrostomy  Osmolite 1.5 Sam (OSMOLITE1.5)  Total Volume for 24 Hours (mL): 1600  Bolus  Total Volume of Bolus (mL):  400  Tube Feed Frequency: Every 6 hours   Tube Feed Start Time: 19:00  Bolus Feed Rate (mL per Hour): 67   Bolus Feed Duration (in Hours): 24  Free Water Flush  Bolus   Total Volume per Flush (mL): 150   Frequency: Every 6 Hours    Start Time: 19:00  Entered: Apr 21 2024  6:07PM    The following pending diet order is being considered for treatment of Severe protein-calorie malnutrition and Underweight (BMI < 19):null
This patient has been assessed with a concern for Malnutrition and has been determined to have a diagnosis/diagnoses of Severe protein-calorie malnutrition and Underweight (BMI < 19).    This patient is being managed with:   Diet Pureed-  Tube Feeding Modality: Gastrostomy  Osmolite 1.5 Sam (OSMOLITE1.5)  Total Volume for 24 Hours (mL): 1600  Bolus  Total Volume of Bolus (mL):  400  Tube Feed Frequency: Every 6 hours   Tube Feed Start Time: 19:00  Bolus Feed Rate (mL per Hour): 67   Bolus Feed Duration (in Hours): 24  Free Water Flush  Bolus   Total Volume per Flush (mL): 150   Frequency: Every 6 Hours    Start Time: 19:00  Entered: Apr 21 2024  6:07PM    Diet Pureed-  Tube Feeding Modality: Gastrostomy  Peptamen A.F. Formula (PEPTAMENAFRTH)  Total Volume for 24 Hours (mL): 1000  Bolus  Total Volume of Bolus (mL):  250  Tube Feed Frequency: Every 6 hours   Tube Feed Start Time: 06:00  Bolus Feed Rate (mL per Hour): 250   Bolus Feed Duration (in Hours): 1  Free Water Flush  Bolus   Total Volume per Flush (mL): 60   Frequency: Every 6 Hours  Entered: Apr 21 2024  1:04PM    The following pending diet order is being considered for treatment of Severe protein-calorie malnutrition and Underweight (BMI < 19):null
This patient has been assessed with a concern for Malnutrition and has been determined to have a diagnosis/diagnoses of Severe protein-calorie malnutrition and Underweight (BMI < 19).    This patient is being managed with:   Diet Pureed-  Tube Feeding Modality: Gastrostomy  Osmolite 1.5 Sam (OSMOLITE1.5)  Total Volume for 24 Hours (mL): 480  Bolus  Total Volume of Bolus (mL):  120  Total # of Feeds: 4  Tube Feed Frequency: Every 6 hours   Tube Feed Start Time: 06:00  Bolus Feed Rate (mL per Hour): 120   Bolus Feed Duration (in Hours): 1  Free Water Flush  Bolus   Total Volume per Flush (mL): 75   Frequency: Every 6 Hours  Free Water Flush Instructions:  75ml pre and post feeds     Qty per Day:  ADD BANATROL     Qty per Day:  MAGIC CUP 3X/DAY     Qty per Day:  TO PUREED FOODS     Qty per Day:  LIKE YOGURT/PUDDING  Banatrol TF     Qty per Day:  3  Supplement Feeding Modality:  Oral  Entered: Apr 29 2024  2:08PM    Diet Pureed-  Tube Feeding Modality: Gastrostomy  Osmolite 1.5 Sam (OSMOLITE1.5)  Total Volume for 24 Hours (mL): 600  Bolus  Total Volume of Bolus (mL):  150  Total # of Feeds: 4  Tube Feed Frequency: Every 6 hours   Tube Feed Start Time: 06:00  Bolus Feed Rate (mL per Hour): 1000   Bolus Feed Duration (in Hours): 1  Free Water Flush  Bolus   Total Volume per Flush (mL): 75   Frequency: Every 6 Hours  Free Water Flush Instructions:  75ml pre and post feeds  Banatrol TF     Qty per Day:  4  Supplement Feeding Modality:  Oral  Ensure Plus High Protein Cans or Servings Per Day:  2       Frequency:  Daily  Entered: Apr 26 2024 11:35AM    Diet Pureed-  Tube Feeding Modality: Gastrostomy  Osmolite 1.5 Sam (OSMOLITE1.5)  Total Volume for 24 Hours (mL): 1600  Bolus  Total Volume of Bolus (mL):  400  Tube Feed Frequency: Every 6 hours   Tube Feed Start Time: 19:00  Bolus Feed Rate (mL per Hour): 67   Bolus Feed Duration (in Hours): 24  Free Water Flush  Bolus   Total Volume per Flush (mL): 150   Frequency: Every 6 Hours    Start Time: 19:00  Entered: Apr 21 2024  6:07PM    The following pending diet order is being considered for treatment of Severe protein-calorie malnutrition and Underweight (BMI < 19):null
This patient has been assessed with a concern for Malnutrition and has been determined to have a diagnosis/diagnoses of Severe protein-calorie malnutrition and Underweight (BMI < 19).    This patient is being managed with:   Diet Pureed-  Tube Feeding Modality: Gastrostomy  Osmolite 1.5 Sam (OSMOLITE1.5)  Total Volume for 24 Hours (mL): 600  Bolus  Total Volume of Bolus (mL):  150  Total # of Feeds: 4  Tube Feed Frequency: Every 6 hours   Tube Feed Start Time: 06:00  Bolus Feed Rate (mL per Hour): 1000   Bolus Feed Duration (in Hours): 1  Free Water Flush  Bolus   Total Volume per Flush (mL): 75   Frequency: Every 6 Hours  Free Water Flush Instructions:  75ml pre and post feeds  Banatrol TF     Qty per Day:  4  Supplement Feeding Modality:  Oral  Ensure Plus High Protein Cans or Servings Per Day:  2       Frequency:  Daily  Entered: Apr 26 2024 11:35AM    Diet Pureed-  Tube Feeding Modality: Gastrostomy  Osmolite 1.5 Sam (OSMOLITE1.5)  Total Volume for 24 Hours (mL): 1600  Bolus  Total Volume of Bolus (mL):  400  Tube Feed Frequency: Every 6 hours   Tube Feed Start Time: 19:00  Bolus Feed Rate (mL per Hour): 67   Bolus Feed Duration (in Hours): 24  Free Water Flush  Bolus   Total Volume per Flush (mL): 150   Frequency: Every 6 Hours    Start Time: 19:00  Entered: Apr 21 2024  6:07PM    The following pending diet order is being considered for treatment of Severe protein-calorie malnutrition and Underweight (BMI < 19):null
This patient has been assessed with a concern for Malnutrition and has been determined to have a diagnosis/diagnoses of Severe protein-calorie malnutrition and Underweight (BMI < 19).    This patient is being managed with:   Diet Pureed-  Tube Feeding Modality: Gastrostomy  Osmolite 1.5 Sam (OSMOLITE1.5)  Total Volume for 24 Hours (mL): 600  Bolus  Total Volume of Bolus (mL):  150  Total # of Feeds: 4  Tube Feed Frequency: Every 6 hours   Tube Feed Start Time: 06:00  Bolus Feed Rate (mL per Hour): 1000   Bolus Feed Duration (in Hours): 1  Free Water Flush  Bolus   Total Volume per Flush (mL): 75   Frequency: Every 6 Hours  Free Water Flush Instructions:  75ml pre and post feeds  Banatrol TF     Qty per Day:  4  Supplement Feeding Modality:  Oral  Ensure Plus High Protein Cans or Servings Per Day:  2       Frequency:  Daily  Entered: Apr 26 2024 11:35AM    Diet Pureed-  Tube Feeding Modality: Gastrostomy  Osmolite 1.5 Sam (OSMOLITE1.5)  Total Volume for 24 Hours (mL): 1600  Bolus  Total Volume of Bolus (mL):  400  Tube Feed Frequency: Every 6 hours   Tube Feed Start Time: 19:00  Bolus Feed Rate (mL per Hour): 67   Bolus Feed Duration (in Hours): 24  Free Water Flush  Bolus   Total Volume per Flush (mL): 150   Frequency: Every 6 Hours    Start Time: 19:00  Entered: Apr 21 2024  6:07PM    The following pending diet order is being considered for treatment of Severe protein-calorie malnutrition and Underweight (BMI < 19):null
This patient has been assessed with a concern for Malnutrition and has been determined to have a diagnosis/diagnoses of Severe protein-calorie malnutrition and Underweight (BMI < 19).    This patient is being managed with:   Diet NPO after Midnight-     NPO Start Date: 23-Apr-2024   NPO Start Time: 23:59  Except Medications  Entered: Apr 23 2024 10:35AM    Diet Pureed-  Tube Feeding Modality: Gastrostomy  Osmolite 1.5 Sam (OSMOLITE1.5)  Total Volume for 24 Hours (mL): 1600  Bolus  Total Volume of Bolus (mL):  400  Tube Feed Frequency: Every 6 hours   Tube Feed Start Time: 19:00  Bolus Feed Rate (mL per Hour): 67   Bolus Feed Duration (in Hours): 24  Free Water Flush  Bolus   Total Volume per Flush (mL): 150   Frequency: Every 6 Hours    Start Time: 19:00  Entered: Apr 21 2024  6:07PM    Diet Pureed-  Tube Feeding Modality: Gastrostomy  Peptamen A.F. Formula (PEPTAMENAFRTH)  Total Volume for 24 Hours (mL): 1000  Bolus  Total Volume of Bolus (mL):  250  Tube Feed Frequency: Every 6 hours   Tube Feed Start Time: 06:00  Bolus Feed Rate (mL per Hour): 250   Bolus Feed Duration (in Hours): 1  Free Water Flush  Bolus   Total Volume per Flush (mL): 60   Frequency: Every 6 Hours  Entered: Apr 21 2024  1:04PM    The following pending diet order is being considered for treatment of Severe protein-calorie malnutrition and Underweight (BMI < 19):null
This patient has been assessed with a concern for Malnutrition and has been determined to have a diagnosis/diagnoses of Severe protein-calorie malnutrition and Underweight (BMI < 19).    This patient is being managed with:   Diet Pureed-  Tube Feeding Modality: Gastrostomy  Osmolite 1.5 Sam (OSMOLITE1.5)  Total Volume for 24 Hours (mL): 1600  Bolus  Total Volume of Bolus (mL):  400  Tube Feed Frequency: Every 6 hours   Tube Feed Start Time: 19:00  Bolus Feed Rate (mL per Hour): 67   Bolus Feed Duration (in Hours): 24  Free Water Flush  Bolus   Total Volume per Flush (mL): 150   Frequency: Every 6 Hours    Start Time: 19:00  Entered: Apr 21 2024  6:07PM    Diet Pureed-  Tube Feeding Modality: Gastrostomy  Peptamen A.F. Formula (PEPTAMENAFRTH)  Total Volume for 24 Hours (mL): 1000  Bolus  Total Volume of Bolus (mL):  250  Tube Feed Frequency: Every 6 hours   Tube Feed Start Time: 06:00  Bolus Feed Rate (mL per Hour): 250   Bolus Feed Duration (in Hours): 1  Free Water Flush  Bolus   Total Volume per Flush (mL): 60   Frequency: Every 6 Hours  Entered: Apr 21 2024  1:04PM    The following pending diet order is being considered for treatment of Severe protein-calorie malnutrition and Underweight (BMI < 19):null
This patient has been assessed with a concern for Malnutrition and has been determined to have a diagnosis/diagnoses of Severe protein-calorie malnutrition and Underweight (BMI < 19).    This patient is being managed with:   Diet Pureed-  Tube Feeding Modality: Gastrostomy  Osmolite 1.5 Sam (OSMOLITE1.5)  Total Volume for 24 Hours (mL): 600  Bolus  Total Volume of Bolus (mL):  150  Total # of Feeds: 4  Tube Feed Frequency: Every 6 hours   Tube Feed Start Time: 06:00  Bolus Feed Rate (mL per Hour): 1000   Bolus Feed Duration (in Hours): 1  Free Water Flush  Bolus   Total Volume per Flush (mL): 75   Frequency: Every 6 Hours  Free Water Flush Instructions:  75ml pre and post feeds  Banatrol TF     Qty per Day:  4  Supplement Feeding Modality:  Oral  Ensure Plus High Protein Cans or Servings Per Day:  2       Frequency:  Daily  Entered: Apr 26 2024 11:35AM    Diet Pureed-  Tube Feeding Modality: Gastrostomy  Osmolite 1.5 Sam (OSMOLITE1.5)  Total Volume for 24 Hours (mL): 1600  Bolus  Total Volume of Bolus (mL):  400  Tube Feed Frequency: Every 6 hours   Tube Feed Start Time: 19:00  Bolus Feed Rate (mL per Hour): 67   Bolus Feed Duration (in Hours): 24  Free Water Flush  Bolus   Total Volume per Flush (mL): 150   Frequency: Every 6 Hours    Start Time: 19:00  Entered: Apr 21 2024  6:07PM    The following pending diet order is being considered for treatment of Severe protein-calorie malnutrition and Underweight (BMI < 19):null

This patient has been assessed with a concern for Malnutrition and has been determined to have a diagnosis/diagnoses of Severe protein-calorie malnutrition and Underweight (BMI < 19).    This patient is being managed with:   Diet Pureed-  Tube Feeding Modality: Gastrostomy  Osmolite 1.5 Sam (OSMOLITE1.5)  Total Volume for 24 Hours (mL): 480  Bolus  Total Volume of Bolus (mL):  120  Total # of Feeds: 4  Tube Feed Frequency: Every 6 hours   Tube Feed Start Time: 06:00  Bolus Feed Rate (mL per Hour): 120   Bolus Feed Duration (in Hours): 1  Free Water Flush  Bolus   Total Volume per Flush (mL): 75   Frequency: Every 6 Hours  Free Water Flush Instructions:  75ml pre and post feeds     Qty per Day:  ADD BANATROL     Qty per Day:  MAGIC CUP 3X/DAY     Qty per Day:  TO PUREED FOODS     Qty per Day:  LIKE YOGURT/PUDDING  Banatrol TF     Qty per Day:  3  Supplement Feeding Modality:  Oral  Entered: Apr 29 2024  2:08PM    Diet Pureed-  Tube Feeding Modality: Gastrostomy  Osmolite 1.5 Sam (OSMOLITE1.5)  Total Volume for 24 Hours (mL): 600  Bolus  Total Volume of Bolus (mL):  150  Total # of Feeds: 4  Tube Feed Frequency: Every 6 hours   Tube Feed Start Time: 06:00  Bolus Feed Rate (mL per Hour): 1000   Bolus Feed Duration (in Hours): 1  Free Water Flush  Bolus   Total Volume per Flush (mL): 75   Frequency: Every 6 Hours  Free Water Flush Instructions:  75ml pre and post feeds  Banatrol TF     Qty per Day:  4  Supplement Feeding Modality:  Oral  Ensure Plus High Protein Cans or Servings Per Day:  2       Frequency:  Daily  Entered: Apr 26 2024 11:35AM    Diet Pureed-  Tube Feeding Modality: Gastrostomy  Osmolite 1.5 Sam (OSMOLITE1.5)  Total Volume for 24 Hours (mL): 1600  Bolus  Total Volume of Bolus (mL):  400  Tube Feed Frequency: Every 6 hours   Tube Feed Start Time: 19:00  Bolus Feed Rate (mL per Hour): 67   Bolus Feed Duration (in Hours): 24  Free Water Flush  Bolus   Total Volume per Flush (mL): 150   Frequency: Every 6 Hours    Start Time: 19:00  Entered: Apr 21 2024  6:07PM    The following pending diet order is being considered for treatment of Severe protein-calorie malnutrition and Underweight (BMI < 19):null
This patient has been assessed with a concern for Malnutrition and has been determined to have a diagnosis/diagnoses of Severe protein-calorie malnutrition and Underweight (BMI < 19).    This patient is being managed with:   Diet Pureed-  Tube Feeding Modality: Gastrostomy  Osmolite 1.5 Sam (OSMOLITE1.5)  Total Volume for 24 Hours (mL): 600  Bolus  Total Volume of Bolus (mL):  150  Total # of Feeds: 4  Tube Feed Frequency: Every 6 hours   Tube Feed Start Time: 06:00  Bolus Feed Rate (mL per Hour): 1000   Bolus Feed Duration (in Hours): 1  Free Water Flush  Bolus   Total Volume per Flush (mL): 75   Frequency: Every 6 Hours  Free Water Flush Instructions:  75ml pre and post feeds  Banatrol TF     Qty per Day:  4  Supplement Feeding Modality:  Oral  Ensure Plus High Protein Cans or Servings Per Day:  2       Frequency:  Daily  Entered: Apr 26 2024 11:35AM    Diet Pureed-  Tube Feeding Modality: Gastrostomy  Osmolite 1.5 Sam (OSMOLITE1.5)  Total Volume for 24 Hours (mL): 1600  Bolus  Total Volume of Bolus (mL):  400  Tube Feed Frequency: Every 6 hours   Tube Feed Start Time: 19:00  Bolus Feed Rate (mL per Hour): 67   Bolus Feed Duration (in Hours): 24  Free Water Flush  Bolus   Total Volume per Flush (mL): 150   Frequency: Every 6 Hours    Start Time: 19:00  Entered: Apr 21 2024  6:07PM    The following pending diet order is being considered for treatment of Severe protein-calorie malnutrition and Underweight (BMI < 19):null
This patient has been assessed with a concern for Malnutrition and has been determined to have a diagnosis/diagnoses of Severe protein-calorie malnutrition and Underweight (BMI < 19).    This patient is being managed with:   Diet NPO-  NPO for Procedure/Test     NPO Start Date: 24-Apr-2024   NPO Start Time: 08:00  Except Medications  Entered: Apr 24 2024  8:39AM    Diet NPO after Midnight-     NPO Start Date: 23-Apr-2024   NPO Start Time: 23:59  Except Medications  Entered: Apr 23 2024 10:35AM    Diet Pureed-  Tube Feeding Modality: Gastrostomy  Osmolite 1.5 Sam (OSMOLITE1.5)  Total Volume for 24 Hours (mL): 1600  Bolus  Total Volume of Bolus (mL):  400  Tube Feed Frequency: Every 6 hours   Tube Feed Start Time: 19:00  Bolus Feed Rate (mL per Hour): 67   Bolus Feed Duration (in Hours): 24  Free Water Flush  Bolus   Total Volume per Flush (mL): 150   Frequency: Every 6 Hours    Start Time: 19:00  Entered: Apr 21 2024  6:07PM    Diet Pureed-  Tube Feeding Modality: Gastrostomy  Peptamen A.F. Formula (PEPTAMENAFRTH)  Total Volume for 24 Hours (mL): 1000  Bolus  Total Volume of Bolus (mL):  250  Tube Feed Frequency: Every 6 hours   Tube Feed Start Time: 06:00  Bolus Feed Rate (mL per Hour): 250   Bolus Feed Duration (in Hours): 1  Free Water Flush  Bolus   Total Volume per Flush (mL): 60   Frequency: Every 6 Hours  Entered: Apr 21 2024  1:04PM    The following pending diet order is being considered for treatment of Severe protein-calorie malnutrition and Underweight (BMI < 19):null

## 2024-05-13 NOTE — DISCHARGE NOTE PROVIDER - NSDCFUADDINST_GEN_ALL_CORE_FT
Tube Feeding Modality: Gastrostomy  Osmolite 1.5 Sam (OSMOLITE1.5)  Total Volume for 24 Hours (mL): 480  Bolus  Total Volume of Bolus (mL):  120  Total # of Feeds: 4  Tube Feed Frequency: Every 6 hours   Tube Feed Start Time: 06:00  Bolus Feed Rate (mL per Hour): 120   Bolus Feed Duration (in Hours): 1  Free Water Flush  Bolus   Total Volume per Flush (mL): 75   Frequency: Every 6 Hours  Free Water Flush Instructions:  75ml pre and post feeds     Qty per Day:  ADD BANATROL     Qty per Day:  MAGIC CUP 3X/DAY     Qty per Day:  TO PUREED FOODS     Qty per Day:  LIKE YOGURT/PUDDING  Banatrol TF     Qty per Day:  3  Supplement Feeding Modality:  Oral  Entered: Apr 29 2024  2:08PM

## 2024-05-13 NOTE — PROGRESS NOTE ADULT - REASON FOR ADMISSION
LT Gluteal Pain / Diarrhea

## 2024-05-13 NOTE — DISCHARGE NOTE PROVIDER - HOSPITAL COURSE
68M w/ PMHx Stage 4 Prostate Ca s/p palliative radiation, Mediastinal Mass on oral Chemo (xtandi), Chronic Anemia, HTN and Chronic Back Pain presents to ED from SNF for evaluation. As per NH documentation, pt has been refusing medications through PEG for the last 3x days d/t episode of diarrhea post feeds. Patient's only complaint is LT buttock pain. Denies fevers, chills, chest pain, SOB, n/v, abdominal pain or urinary symptoms.    For Left gluteal cellulitis, CT Abdomen and Pelvis w/ IV Cont (04.19.24 @ 19:17) >New mild edema and enlargement of the left gluteus maximums musculature with associated subcutaneous edema. No discrete abscess. Metastatic disease in the abdomen and pelvis  with slightly increased upper abdominal adenopathy. No significant change approximately in the large bladder mass and partially imaged mediastinal mass. Interval placement of an esophageal stent. Severe left hydronephrosis to the level of the proximal ureter, not   significantly changed. New/increased large bilateral pleural effusions. blood Culture Results: No growth at 5 days (04.19.24 @ 17:37). 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 S/p  cefazolin. As per ID c/w  doxycycline 100 mg BID -- plan for 2 weeks from debridement, end date 5/6, continued until 5/13 given he missed few doses, cleared for dc by ID 5/13. patient will continue dressing changes with xeroform packing at NH.     Patient had intermittent episodes of hypoglycemia while refusing tube feeds, resolved later.     Acute Hypoxic resp failure- resolved, B/l  pleural effusions, Xray Chest 1 View- PORTABLE-Routine (Xray Chest 1 View- PORTABLE-Routine in AM.) (04.28.24 @ 06:06) >Lungs/ Pleura: Stable bilateral opacities left greater than right effusions. S/p left thoracentesis on 4/25 --> 1800 milliLiter drained    # Hypotension- resolved  - dc'd  metoprolol  - monitor BP    # Metabolic acidosis  - c/w PO bicarb        # Acute kidney injury, prerenal- resolved  # Lactic acidosis- resolved  - : CT Abdomen and Pelvis w/ IV Cont (04.19.24 @ 19:17) >KIDNEYS: Stable severe left hydronephrosis secondary to obstruction at the level of the proximal ureter, not significantly changed. Right renal cysts and other low attenuating lesions in the right kidney too small to characterize. PELVIC ORGANS: Large 7 cm bladder mass, unchanged since prior CT.  - Lactate, Blood: 1.5 mmol/L (04.30.24 @ 07:15)    # H/o Dysphagia  - s/p esophageal stent and PEG 3/5/24  - swallow eval: puree w/ thin liquids, will likely require continued use of PEG    # Metastatic prostate cancer stage 4  # Mediastinal mass  - S/p palliative radiation  - on  Xtandi 160 mg qd    # Anemia  - S/p 1 unit PRBC stable  - c/w ferrous sulfate    # Severe protein calorie malnutrition  - BMI: 15.8  - Start ensure pudding  - Start magic cup  - c/w  marinol  -c/w  remeron      -Palliative care f/u note (4/29): Ethics decided last admission that the patient had capacity to refuse interventions. When we discussed the consequences of refusing those interventions, he could not explain why he was refusing (other than diarrhea for tube feeds).  We discussed that continued refusal of feeds or other interventions could lead to worsening clinical status and death.  We discussed GOC given his refusal of interventions; discussed both hospice/CMO and ongoing medical management. He noted he wants "to live" and wants ongoing medical management. Patient does not demonstrate capacity to understand his complete clinical picture. Recommend possible additional ethics consult if more elucidation about next steps is needed.  - Psych eval (4/30): At this time, patient does not demonstrate the capacity to  refuse or at times agree to the I.V line placement , I.V fluid &  I.V antibiotic management , and PEG tube feeds,  recommended by the medical team . In this situation , the decision should be deferred to his next of kin or documented health care proxy. However, since neither party are available in this case , a court appointed decision maker can be appointed to make this decision. In the event of imminent need for these interventions, that is potentially life threatening , the medical team can consider a 2 physician consent.      Seen by Ethics team on 5/9. as per note: "RECOMMENDATION: In this case, Mr. Stallings has been deemed without capacity by Behavioral Health. However, regardless of capacity, a patient who is actively refusing treatment cannot be forced against his will to receive it. He should not be coerced, unless the medical intervention is emergent (which PEG tube feeds and IV antibiotics are currently not). The patient’s right to refuse, even when he does not have capacity, must be respected.    Ethics recommends continuing to foster trust with the patient to encourage him to assent to recommended medical interventions. Further, the patient has received additional support by Delilah, an associate from his nursing home, and with her support, he has assented to feeds and treatment. It was communicated that the patient is feeling discouraged by his current health status. Therefore, it can be beneficial to work closely with this patient and offer him additional support and encouragement. The team is commended for working with the patient and for putting in time and effort to discuss the risks and benefits of refusing certain interventions."       68M w/ PMHx Stage 4 Prostate Ca s/p palliative radiation, Mediastinal Mass on oral Chemo (xtandi), Chronic Anemia, HTN and Chronic Back Pain presents to ED from SNF for evaluation. As per NH documentation, pt has been refusing medications through PEG for the last 3x days d/t episode of diarrhea post feeds. Patient's only complaint is LT buttock pain. Denies fevers, chills, chest pain, SOB, n/v, abdominal pain or urinary symptoms.    For Left gluteal cellulitis, CT Abdomen and Pelvis w/ IV Cont (04.19.24 @ 19:17) >New mild edema and enlargement of the left gluteus maximums musculature with associated subcutaneous edema. No discrete abscess. Metastatic disease in the abdomen and pelvis  with slightly increased upper abdominal adenopathy. No significant change approximately in the large bladder mass and partially imaged mediastinal mass. Interval placement of an esophageal stent. Severe left hydronephrosis to the level of the proximal ureter, not   significantly changed. New/increased large bilateral pleural effusions. blood Culture Results: No growth at 5 days (04.19.24 @ 17:37). 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 S/p  cefazolin. As per ID c/w  doxycycline 100 mg BID -- plan for 2 weeks from debridement, end date 5/6, continued until 5/13 given he missed few doses, cleared for dc by ID 5/13. patient will continue dressing changes with xeroform packing at NH.     Patient had intermittent episodes of hypoglycemia while refusing tube feeds, resolved later.     Acute Hypoxic resp failure- resolved, B/l  pleural effusions, Xray Chest 1 View- PORTABLE-Routine (Xray Chest 1 View- PORTABLE-Routine in AM.) (04.28.24 @ 06:06) >Lungs/ Pleura: Stable bilateral opacities left greater than right effusions. S/p left thoracentesis on 4/25 --> 1800 milliLiter drained    # Hypotension- resolved  - dc'd  metoprolol  - monitor BP  - BP, HR controlled off metoprolol. d/c metoprolol on discharge. reevaluate as out-patient    # Metabolic acidosis  - c/w PO bicarb    # Acute kidney injury, prerenal- resolved  # Lactic acidosis- resolved  - : CT Abdomen and Pelvis w/ IV Cont (04.19.24 @ 19:17) >KIDNEYS: Stable severe left hydronephrosis secondary to obstruction at the level of the proximal ureter, not significantly changed. Right renal cysts and other low attenuating lesions in the right kidney too small to characterize. PELVIC ORGANS: Large 7 cm bladder mass, unchanged since prior CT.  - Lactate, Blood: 1.5 mmol/L (04.30.24 @ 07:15)    # H/o Dysphagia  - s/p esophageal stent and PEG 3/5/24  - swallow eval: puree w/ thin liquids, will likely require continued use of PEG    # Metastatic prostate cancer stage 4  # Mediastinal mass  - S/p palliative radiation  - on  Xtandi 160 mg qd    # Anemia  # Folic acid deficiency  - S/p 1 unit PRBC stable  - c/w ferrous sulfate  - folate supplement.    # Severe protein calorie malnutrition  - BMI: 15.8  - Start ensure pudding  - Start magic cup  - c/w  marinol  -c/w  remeron      -Palliative care f/u note (4/29): Ethics decided last admission that the patient had capacity to refuse interventions. When we discussed the consequences of refusing those interventions, he could not explain why he was refusing (other than diarrhea for tube feeds).  We discussed that continued refusal of feeds or other interventions could lead to worsening clinical status and death.  We discussed GOC given his refusal of interventions; discussed both hospice/CMO and ongoing medical management. He noted he wants "to live" and wants ongoing medical management. Patient does not demonstrate capacity to understand his complete clinical picture. Recommend possible additional ethics consult if more elucidation about next steps is needed.  - Psych eval (4/30): At this time, patient does not demonstrate the capacity to  refuse or at times agree to the I.V line placement , I.V fluid &  I.V antibiotic management , and PEG tube feeds,  recommended by the medical team . In this situation , the decision should be deferred to his next of kin or documented health care proxy. However, since neither party are available in this case , a court appointed decision maker can be appointed to make this decision. In the event of imminent need for these interventions, that is potentially life threatening , the medical team can consider a 2 physician consent.      Seen by Ethics team on 5/9. as per note: "RECOMMENDATION: In this case, Mr. Stallings has been deemed without capacity by Behavioral Health. However, regardless of capacity, a patient who is actively refusing treatment cannot be forced against his will to receive it. He should not be coerced, unless the medical intervention is emergent (which PEG tube feeds and IV antibiotics are currently not). The patient’s right to refuse, even when he does not have capacity, must be respected.    Ethics recommends continuing to foster trust with the patient to encourage him to assent to recommended medical interventions. Further, the patient has received additional support by Delilah, an associate from his nursing home, and with her support, he has assented to feeds and treatment. It was communicated that the patient is feeling discouraged by his current health status. Therefore, it can be beneficial to work closely with this patient and offer him additional support and encouragement. The team is commended for working with the patient and for putting in time and effort to discuss the risks and benefits of refusing certain interventions."

## 2024-05-13 NOTE — DISCHARGE NOTE PROVIDER - NSDCMRMEDTOKEN_GEN_ALL_CORE_FT
ascorbic acid 500 mg oral capsule: 1 cap(s) orally once a day  dicyclomine 10 mg oral capsule: 1 cap(s) orally 3 times a day (before meals)  droNABinol 2.5 mg oral capsule: 1 cap(s) orally once a day  ferrous sulfate 220 mg/5 mL (44 mg/5 mL elemental iron) oral elixir: 5 milliliter(s) orally every 12 hours  Imodium 2 mg oral capsule: 1 cap(s) orally every 8 hours as needed for  diarrhea  ipratropium-albuterol 0.5 mg-2.5 mg/3 mL inhalation solution: 3 milliliter(s) inhaled every 6 hours As needed Shortness of Breath  lactobacillus acidophilus oral capsule: 1 cap(s) orally 3 times a day  mirtazapine 15 mg oral tablet: 1 tab(s) orally once a day  Multiple Vitamins oral tablet: 1 tab(s) orally once a day  sodium bicarbonate 650 mg oral tablet: 2 tab(s) orally every 6 hours  Tylenol 325 mg oral tablet: 2 tab(s) orally every 6 hours  Xtandi 40 mg oral capsule: 4 cap(s) orally once a day   ascorbic acid 500 mg oral capsule: 1 cap(s) orally once a day  dicyclomine 10 mg oral capsule: 1 cap(s) orally 3 times a day (before meals)  droNABinol 2.5 mg oral capsule: 1 cap(s) orally once a day  ferrous sulfate 220 mg/5 mL (44 mg/5 mL elemental iron) oral elixir: 5 milliliter(s) orally every 12 hours  folic acid 1 mg oral tablet: 1 tab(s) orally once a day  Imodium 2 mg oral capsule: 1 cap(s) orally every 8 hours as needed for  diarrhea  ipratropium-albuterol 0.5 mg-2.5 mg/3 mL inhalation solution: 3 milliliter(s) inhaled every 6 hours As needed Shortness of Breath  lactobacillus acidophilus oral capsule: 1 cap(s) orally 3 times a day  mirtazapine 15 mg oral tablet: 1 tab(s) orally once a day  Multiple Vitamins oral tablet: 1 tab(s) orally once a day  sodium bicarbonate 650 mg oral tablet: 2 tab(s) orally every 6 hours  Tylenol 325 mg oral tablet: 2 tab(s) orally every 6 hours  Xtandi 40 mg oral capsule: 4 cap(s) orally once a day

## 2024-05-14 ENCOUNTER — TRANSCRIPTION ENCOUNTER (OUTPATIENT)
Age: 69
End: 2024-05-14

## 2024-05-14 VITALS
HEART RATE: 73 BPM | OXYGEN SATURATION: 92 % | RESPIRATION RATE: 18 BRPM | SYSTOLIC BLOOD PRESSURE: 112 MMHG | TEMPERATURE: 97 F | DIASTOLIC BLOOD PRESSURE: 81 MMHG

## 2024-05-14 LAB
FERRITIN SERPL-MCNC: 1544 NG/ML — HIGH (ref 30–400)
FOLATE SERPL-MCNC: 4.2 NG/ML — LOW
GLUCOSE BLDC GLUCOMTR-MCNC: 121 MG/DL — HIGH (ref 70–99)
GLUCOSE BLDC GLUCOMTR-MCNC: 122 MG/DL — HIGH (ref 70–99)
GLUCOSE BLDC GLUCOMTR-MCNC: 137 MG/DL — HIGH (ref 70–99)
GLUCOSE BLDC GLUCOMTR-MCNC: 73 MG/DL — SIGNIFICANT CHANGE UP (ref 70–99)
VIT B12 SERPL-MCNC: 550 PG/ML — SIGNIFICANT CHANGE UP (ref 232–1245)

## 2024-05-14 PROCEDURE — 99239 HOSP IP/OBS DSCHRG MGMT >30: CPT

## 2024-05-14 RX ORDER — FOLIC ACID 0.8 MG
1 TABLET ORAL
Qty: 30 | Refills: 0
Start: 2024-05-14 | End: 2024-06-12

## 2024-05-14 RX ADMIN — Medication 1 TABLET(S): at 10:32

## 2024-05-14 RX ADMIN — MIRTAZAPINE 15 MILLIGRAM(S): 45 TABLET, ORALLY DISINTEGRATING ORAL at 12:03

## 2024-05-14 RX ADMIN — Medication 10 MILLIGRAM(S): at 10:58

## 2024-05-14 RX ADMIN — CHLORHEXIDINE GLUCONATE 1 APPLICATION(S): 213 SOLUTION TOPICAL at 06:37

## 2024-05-14 RX ADMIN — Medication 10 MILLIGRAM(S): at 06:10

## 2024-05-14 RX ADMIN — Medication 1 TABLET(S): at 12:04

## 2024-05-14 RX ADMIN — MAGNESIUM OXIDE 400 MG ORAL TABLET 400 MILLIGRAM(S): 241.3 TABLET ORAL at 06:10

## 2024-05-14 RX ADMIN — Medication 1300 MILLIGRAM(S): at 06:10

## 2024-05-14 RX ADMIN — Medication 100 MILLIGRAM(S): at 06:09

## 2024-05-14 RX ADMIN — Medication 500 MILLIGRAM(S): at 12:03

## 2024-05-14 NOTE — DISCHARGE NOTE NURSING/CASE MANAGEMENT/SOCIAL WORK - PATIENT PORTAL LINK FT
You can access the FollowMyHealth Patient Portal offered by [facility] by registering at the following website: [email or web address]. By joining [facility]’s FollowMyHealth portal, you will also be able to view your health information using other applications (apps) compatible with our system.

## 2024-05-20 DIAGNOSIS — Z74.01 BED CONFINEMENT STATUS: ICD-10-CM

## 2024-05-20 DIAGNOSIS — M79.89 OTHER SPECIFIED SOFT TISSUE DISORDERS: ICD-10-CM

## 2024-05-20 DIAGNOSIS — Z93.1 GASTROSTOMY STATUS: ICD-10-CM

## 2024-05-20 DIAGNOSIS — F05 DELIRIUM DUE TO KNOWN PHYSIOLOGICAL CONDITION: ICD-10-CM

## 2024-05-20 DIAGNOSIS — J96.01 ACUTE RESPIRATORY FAILURE WITH HYPOXIA: ICD-10-CM

## 2024-05-20 DIAGNOSIS — Z91.148 PATIENT'S OTHER NONCOMPLIANCE WITH MEDICATION REGIMEN FOR OTHER REASON: ICD-10-CM

## 2024-05-20 DIAGNOSIS — D63.0 ANEMIA IN NEOPLASTIC DISEASE: ICD-10-CM

## 2024-05-20 DIAGNOSIS — I95.9 HYPOTENSION, UNSPECIFIED: ICD-10-CM

## 2024-05-20 DIAGNOSIS — R73.9 HYPERGLYCEMIA, UNSPECIFIED: ICD-10-CM

## 2024-05-20 DIAGNOSIS — L02.31 CUTANEOUS ABSCESS OF BUTTOCK: ICD-10-CM

## 2024-05-20 DIAGNOSIS — R64 CACHEXIA: ICD-10-CM

## 2024-05-20 DIAGNOSIS — N13.30 UNSPECIFIED HYDRONEPHROSIS: ICD-10-CM

## 2024-05-20 DIAGNOSIS — L03.317 CELLULITIS OF BUTTOCK: ICD-10-CM

## 2024-05-20 DIAGNOSIS — N17.9 ACUTE KIDNEY FAILURE, UNSPECIFIED: ICD-10-CM

## 2024-05-20 DIAGNOSIS — C61 MALIGNANT NEOPLASM OF PROSTATE: ICD-10-CM

## 2024-05-20 DIAGNOSIS — E53.8 DEFICIENCY OF OTHER SPECIFIED B GROUP VITAMINS: ICD-10-CM

## 2024-05-20 DIAGNOSIS — R13.10 DYSPHAGIA, UNSPECIFIED: ICD-10-CM

## 2024-05-20 DIAGNOSIS — E87.20 ACIDOSIS, UNSPECIFIED: ICD-10-CM

## 2024-05-20 DIAGNOSIS — I96 GANGRENE, NOT ELSEWHERE CLASSIFIED: ICD-10-CM

## 2024-05-20 DIAGNOSIS — B95.62 METHICILLIN RESISTANT STAPHYLOCOCCUS AUREUS INFECTION AS THE CAUSE OF DISEASES CLASSIFIED ELSEWHERE: ICD-10-CM

## 2024-05-20 DIAGNOSIS — E43 UNSPECIFIED SEVERE PROTEIN-CALORIE MALNUTRITION: ICD-10-CM

## 2024-05-20 DIAGNOSIS — Z66 DO NOT RESUSCITATE: ICD-10-CM

## 2024-05-20 DIAGNOSIS — C78.89 SECONDARY MALIGNANT NEOPLASM OF OTHER DIGESTIVE ORGANS: ICD-10-CM

## 2024-05-20 DIAGNOSIS — R19.7 DIARRHEA, UNSPECIFIED: ICD-10-CM

## 2024-05-20 DIAGNOSIS — C78.1 SECONDARY MALIGNANT NEOPLASM OF MEDIASTINUM: ICD-10-CM

## 2024-05-20 DIAGNOSIS — E83.42 HYPOMAGNESEMIA: ICD-10-CM

## 2024-05-25 LAB
CULTURE RESULTS: SIGNIFICANT CHANGE UP
SPECIMEN SOURCE: SIGNIFICANT CHANGE UP

## 2025-02-05 NOTE — BH CONSULTATION LIAISON ASSESSMENT NOTE - NSBHCHARTREVIEWINVESTIGATE_PSY_A_CORE FT
Remove gauze and tape after 24 hours, then you may shower. Leave surgical tape underneath intact. Pain medication has been sent to your pharmacy, take it sparingly and do not drive if taking pain medication. You can take Tylenol 650 mg every 6 hours (do not take if taking Percocet) or ibuprofen 600 mg every 6 hours for mild pain. Take the Percocet as prescribed for moderate to severe pain. Call the office to schedule follow up in one week. CT Abd/Plevis  2/21/24  IMPRESSION:  1.  Since November 22, 2023, increase dupper abdominal bulky   lymphadenopathy.  2.  Unchanged bulky retroperitoneal lymphadenopathy, large left bladder   mass and perirectal soft tissue infiltrative mass.  3.  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  4.  Chronic moderate to severe left hydroureteronephrosis.    CT Chest ( 2/20/24)   IMPRESSION:  No evidence of pulmonary embolus  Interval enlargement of the central/posterior mediastinal mass with   development of more extensive lymphadenopathy. The mass encircles the   descending thoracic aorta and has some mass effect upon the left atrium.   The mid aspect of the esophagus is encircled by the mass and difficult to   distinguish. Possible ovoid pill is noted within the mass and possible   location of the mid esophagus appear. The maximal esophagus appears   distended with layering debris within. Consideration may be given to   esophageal stenting.